# Patient Record
Sex: MALE | Race: WHITE | NOT HISPANIC OR LATINO | Employment: OTHER | URBAN - METROPOLITAN AREA
[De-identification: names, ages, dates, MRNs, and addresses within clinical notes are randomized per-mention and may not be internally consistent; named-entity substitution may affect disease eponyms.]

---

## 2017-04-13 ENCOUNTER — OFFICE VISIT (OUTPATIENT)
Dept: FAMILY MEDICINE | Facility: CLINIC | Age: 67
End: 2017-04-13
Payer: MEDICARE

## 2017-04-13 VITALS
HEART RATE: 72 BPM | TEMPERATURE: 99 F | WEIGHT: 303.56 LBS | DIASTOLIC BLOOD PRESSURE: 78 MMHG | HEIGHT: 73 IN | BODY MASS INDEX: 40.23 KG/M2 | SYSTOLIC BLOOD PRESSURE: 136 MMHG

## 2017-04-13 DIAGNOSIS — Z87.19 HISTORY OF HERNIA REPAIR: ICD-10-CM

## 2017-04-13 DIAGNOSIS — Z98.890 HISTORY OF HERNIA REPAIR: ICD-10-CM

## 2017-04-13 DIAGNOSIS — E66.01 MORBID OBESITY DUE TO EXCESS CALORIES: ICD-10-CM

## 2017-04-13 DIAGNOSIS — R63.5 WEIGHT GAIN: ICD-10-CM

## 2017-04-13 DIAGNOSIS — R10.31 PAIN, ABDOMINAL, RLQ: Primary | ICD-10-CM

## 2017-04-13 PROCEDURE — 99999 PR PBB SHADOW E&M-EST. PATIENT-LVL IV: CPT | Mod: PBBFAC,,, | Performed by: FAMILY MEDICINE

## 2017-04-13 PROCEDURE — 1125F AMNT PAIN NOTED PAIN PRSNT: CPT | Mod: S$GLB,,, | Performed by: FAMILY MEDICINE

## 2017-04-13 PROCEDURE — 1159F MED LIST DOCD IN RCRD: CPT | Mod: S$GLB,,, | Performed by: FAMILY MEDICINE

## 2017-04-13 PROCEDURE — 99213 OFFICE O/P EST LOW 20 MIN: CPT | Mod: S$GLB,,, | Performed by: FAMILY MEDICINE

## 2017-04-13 PROCEDURE — 99499 UNLISTED E&M SERVICE: CPT | Mod: S$GLB,,, | Performed by: FAMILY MEDICINE

## 2017-04-13 PROCEDURE — 1160F RVW MEDS BY RX/DR IN RCRD: CPT | Mod: S$GLB,,, | Performed by: FAMILY MEDICINE

## 2017-04-13 NOTE — PROGRESS NOTES
Patient, Adan Cain (MRN #[id number]), presented with a recorded BMI of 40.05 kg/m^2 consistent with the definition of morbid obesity (ICD-10 E66.01). The patient's morbid obesity was monitored, evaluated, addressed and/or treated. This addendum to the medical record is made on 04/13/2017.

## 2017-04-13 NOTE — MR AVS SNAPSHOT
[facility]  [address] [facility], [address]  [facility] [address]  Phone: [phone]  Fax: [phone]                  Adan Cain   2017 3:00 PM   Office Visit    Description:  Male : 1950   Provider:  Gina Reyes MD   Department:  [facility]           Reason for Visit     Hernia           Diagnoses this Visit        Comments    Pain, abdominal, RLQ    -  Primary     History of hernia repair         Weight gain                To Do List           Goals (5 Years of Data)     None      [facility]     [facility] On Call Nurse Care Line -  Assistance  Unless otherwise directed by your provider, please contact [facility], our nurse care line that is available for  assistance.     Registered nurses in the [facility] On Call Center provide: appointment scheduling, clinical advisement, health education, and other advisory services.  Call: [phone] (toll free)               Medications           Message regarding Medications     Verify the changes and/or additions to your medication regime listed below are the same as discussed with your clinician today.  If any of these changes or additions are incorrect, please notify your healthcare provider.             Verify that the below list of medications is an accurate representation of the medications you are currently taking.  If none reported, the list may be blank. If incorrect, please contact your healthcare provider. Carry this list with you in case of emergency.           Current Medications     BABY ASPIRIN ORAL Take by mouth every evening.     cholecalciferol, vitamin D3, 1,000 unit capsule Take 1,000 Units by mouth once daily.    coenzyme Q10 (CO Q-10) 100 mg capsule Take by mouth once daily.    fluticasone (FLONASE) 50 mcg/actuation nasal spray 1 spray by Each Nare route once daily.    glucosamine-chondroitin 500-400 mg tablet Take 1 tablet by mouth 3 (three) times daily.     "MULTIVITAMIN W-MINERALS/LUTEIN (CENTRUM SILVER ORAL) Take by mouth.    omega-3 fatty acids (FISH OIL) 300 mg Cap Take by mouth.    polyethylene glycol (GLYCOLAX) 17 gram PwPk Take 17 g by mouth once daily.    tamsulosin (FLOMAX) 0.4 mg Cp24 Take 1 capsule (0.4 mg total) by mouth 2 (two) times daily.    UNABLE TO FIND Take by mouth nightly. tumeric    UNABLE TO FIND once daily. actaline    VITAMIN B COMPLEX (SUPER B COMPLEX-B-12 ORAL) Take by mouth.    diclofenac sodium 1 % Gel Apply 2 g topically 4 (four) times daily.    oxycodone-acetaminophen (PERCOCET) 5-325 mg per tablet Take 1 tablet by mouth every 6 (six) hours as needed.    oxycodone-acetaminophen (PERCOCET) 5-325 mg per tablet Take 1 tablet by mouth every 6 (six) hours as needed for Pain.           Clinical Reference Information           Your Vitals Were     BP Pulse Temp Height Weight BMI    136/78 (BP Location: Right arm) 72 98.5 °F (36.9 °C) 6' 1" (1.854 m) 137.7 kg (303 lb 9.2 oz) 40.05 kg/m2      Blood Pressure          Most Recent Value    BP  136/78      Allergies as of 4/13/2017     Avodart [Dutasteride]    Naproxen    Ragweed    Synthroid [Levothyroxine]      Immunizations Administered on Date of Encounter - 4/13/2017     None      Instructions    Focus on weight loss    ================================    Increase FIBER INTAKE - your body is happiest with FIVE FRESH COLORS of fruits and  vegetables DAILY    With respect to FIBER intake, you should have 5-10 grams of viscous soluble fiber daily. This  Includes fruit (bananas, apples, pears, blackberries, citrus, peaches, plums, nectarines), whole grains (oatmeal, oat bran, all-bran cereal, granola, shredded wheat, wheat germ, alvina barley, brown rice), legumes (northern/sharma/navy/lima/kidney/black beans, peas, lentils), seeds (psyllium), and vegetables (carrots, broccoli, brussels sprouts, artichokes).            " ---------------------------------------------------------------------------------------------------------    GET MOVING-- Walking & being active (20 minutes most days of the week). [email or web address]      If not better, let me know & I can refer to surgeon           Language Assistance Services     ATTENTION: Language assistance services are available, free of charge. Please call [phone].      ATENCIÓN: Si benjie aguayo, tiene a mullen disposición servicios gratuitos de asistencia lingüística. Brandy arrieta [phone].     MARISSA Ý: N?u b?n nói Ti?ng Vi?t, có các d?ch v? h? tr? ngôn ng? mi?n phí joanne cho b?n. G?i s? [phone].         [facility] complies with applicable Federal civil rights laws and does not discriminate on the basis of race, color, national origin, age, disability, or sex.

## 2017-04-13 NOTE — PROGRESS NOTES
Subjective:      Patient ID: Adan Cain is a 67 y.o. male.    Chief Complaint: Hernia (RIGHT SIDE, INCISION REGION)    Here today for pain in the right groin region same location as in R hernia & hydrocele repair in NOv 2016.  No difficulty with bowel movements, no constipation, no blood in stool.  Has not lifted or push anything comminuted tearing sensation, no rip in the right lower quadrant.  No difficulty with urination    He has gained 20 pounds since his hernia surgery and states that the belt and his pants rub up against the scar.  This is aggravating type pain    No results found for: HGBA1C  No results found for: MICROALBUR  Lab Results   Component Value Date    LDLCALC 122.4 01/20/2015    LDLCALC 141.4 (H) 05/30/2008    CHOL 172 01/20/2015    HDL 36 (L) 01/20/2015    TRIG 68 01/20/2015       Lab Results   Component Value Date     11/23/2016    K 4.1 11/23/2016     11/23/2016    CO2 26 11/23/2016    GLU 82 11/23/2016    BUN 19 11/23/2016    CREATININE 0.8 11/23/2016    CALCIUM 8.8 11/23/2016    PROT 7.3 03/15/2016    ALBUMIN 3.5 03/15/2016    BILITOT 0.5 03/15/2016    ALKPHOS 101 03/15/2016    AST 19 03/15/2016    ALT 19 03/15/2016    ANIONGAP 9 11/23/2016    ESTGFRAFRICA >60.0 11/23/2016    EGFRNONAA >60.0 11/23/2016    WBC 7.69 01/20/2015    HGB 14.6 01/20/2015    HCT 44.0 01/20/2015    MCV 93 01/20/2015     01/20/2015    TSH 4.857 (H) 01/20/2015         Current Outpatient Prescriptions on File Prior to Visit   Medication Sig    BABY ASPIRIN ORAL Take by mouth every evening.     cholecalciferol, vitamin D3, 1,000 unit capsule Take 1,000 Units by mouth once daily.    coenzyme Q10 (CO Q-10) 100 mg capsule Take by mouth once daily.    fluticasone (FLONASE) 50 mcg/actuation nasal spray 1 spray by Each Nare route once daily. (Patient taking differently: 1 spray by Each Nare route continuous prn. )    glucosamine-chondroitin 500-400 mg tablet Take 1 tablet by mouth 3 (three) times  "daily.    MULTIVITAMIN W-MINERALS/LUTEIN (CENTRUM SILVER ORAL) Take by mouth.    omega-3 fatty acids (FISH OIL) 300 mg Cap Take by mouth.    polyethylene glycol (GLYCOLAX) 17 gram PwPk Take 17 g by mouth once daily.    tamsulosin (FLOMAX) 0.4 mg Cp24 Take 1 capsule (0.4 mg total) by mouth 2 (two) times daily.    UNABLE TO FIND Take by mouth nightly. tumeric    UNABLE TO FIND once daily. actaline    VITAMIN B COMPLEX (SUPER B COMPLEX-B-12 ORAL) Take by mouth.    diclofenac sodium 1 % Gel Apply 2 g topically 4 (four) times daily.    oxycodone-acetaminophen (PERCOCET) 5-325 mg per tablet Take 1 tablet by mouth every 6 (six) hours as needed.    oxycodone-acetaminophen (PERCOCET) 5-325 mg per tablet Take 1 tablet by mouth every 6 (six) hours as needed for Pain.     No current facility-administered medications on file prior to visit.      Past Medical History:   Diagnosis Date    Allergic rhinitis 4/13/2016    BPH (benign prostatic hyperplasia)     Urology Dr Zamora, [facility] prostate revive helps, avodart caused chest pains    Calculus of gallbladder     US 2016    DJD (degenerative joint disease) of hip 1/29/2015    Morbid obesity due to excess calories 4/13/2017    Morbid obesity with BMI of 40.0-44.9, adult 1/29/2015     Past Surgical History:   Procedure Laterality Date    CATARACT EXTRACTION      HERNIA REPAIR       Social History     Social History Narrative     to 'T', 2 children, nonsmoker, ETOH none, never had colonscopy & declines     Family History   Problem Relation Age of Onset    Leukemia Father     Aneurysm Father     Diabetes Mother      Vitals:    04/13/17 1446   BP: 136/78   Pulse: 72   Temp: 98.5 °F (36.9 °C)   Weight: (!) 137.7 kg (303 lb 9.2 oz)   Height: 6' 1" (1.854 m)   PainSc:   6     Objective:   Physical Exam   Constitutional: He is oriented to person, place, and time. He appears well-developed and well-nourished.   HENT:   Head: Normocephalic and atraumatic.   Right Ear: " External ear normal.   Left Ear: External ear normal.   Nose: Nose normal.   Mouth/Throat: Oropharynx is clear and moist.   Eyes: EOM are normal. Pupils are equal, round, and reactive to light.   Neck: Neck supple. No thyromegaly present.   Cardiovascular: Normal rate, regular rhythm, normal heart sounds and intact distal pulses.    No murmur heard.  Pulmonary/Chest: Effort normal and breath sounds normal. He has no wheezes.   Abdominal: Soft. Bowel sounds are normal. He exhibits no distension and no mass. There is no tenderness. There is no rebound and no guarding.   Large pannus, well healed scar R mons pubis, mild tenderness & R testicle mild swelling   Genitourinary: Penis normal.   Genitourinary Comments: No recurrent hernia   Musculoskeletal: He exhibits no edema.   Lymphadenopathy:     He has no cervical adenopathy.   Neurological: He is alert and oriented to person, place, and time.   Skin: Skin is warm and dry.   Psychiatric: He has a normal mood and affect. His behavior is normal.     Assessment:     1. Pain, abdominal, RLQ    2. History of hernia repair    3. Weight gain    4. Morbid obesity due to excess calories      Plan:          Patient Instructions   Focus on weight loss    ================================    Increase FIBER INTAKE - your body is happiest with FIVE FRESH COLORS of fruits and  vegetables DAILY    With respect to FIBER intake, you should have 5-10 grams of viscous soluble fiber daily. This  Includes fruit (bananas, apples, pears, blackberries, citrus, peaches, plums, nectarines), whole grains (oatmeal, oat bran, all-bran cereal, granola, shredded wheat, wheat germ, alvina barley, brown rice), legumes (northern/sharma/navy/lima/kidney/black beans, peas, lentils), seeds (psyllium), and vegetables (carrots, broccoli, brussels sprouts, artichokes).            ---------------------------------------------------------------------------------------------------------    GET MOVING-- Walking &  being active (20 minutes most days of the week). [email or web address]      If not better, let me know & I can refer to surgeon

## 2017-04-13 NOTE — PATIENT INSTRUCTIONS
Focus on weight loss    ================================    Increase FIBER INTAKE - your body is happiest with FIVE FRESH COLORS of fruits and  vegetables DAILY    With respect to FIBER intake, you should have 5-10 grams of viscous soluble fiber daily. This  Includes fruit (bananas, apples, pears, blackberries, citrus, peaches, plums, nectarines), whole grains (oatmeal, oat bran, all-bran cereal, granola, shredded wheat, wheat germ, alvina barley, brown rice), legumes (northern/sharma/navy/lima/kidney/black beans, peas, lentils), seeds (psyllium), and vegetables (carrots, broccoli, brussels sprouts, artichokes).            ---------------------------------------------------------------------------------------------------------    GET MOVING-- Walking & being active (20 minutes most days of the week). [email or web address]      If not better, let me know & I can refer to surgeon

## 2017-05-11 ENCOUNTER — OFFICE VISIT (OUTPATIENT)
Dept: FAMILY MEDICINE | Facility: CLINIC | Age: 67
End: 2017-05-11
Payer: MEDICARE

## 2017-05-11 ENCOUNTER — LAB VISIT (OUTPATIENT)
Dept: LAB | Facility: HOSPITAL | Age: 67
End: 2017-05-11
Attending: FAMILY MEDICINE
Payer: MEDICARE

## 2017-05-11 VITALS
DIASTOLIC BLOOD PRESSURE: 72 MMHG | TEMPERATURE: 99 F | SYSTOLIC BLOOD PRESSURE: 124 MMHG | HEART RATE: 84 BPM | BODY MASS INDEX: 40.32 KG/M2 | HEIGHT: 73 IN | WEIGHT: 304.25 LBS

## 2017-05-11 DIAGNOSIS — E66.01 MORBID OBESITY WITH BMI OF 40.0-44.9, ADULT: ICD-10-CM

## 2017-05-11 DIAGNOSIS — R10.11 RUQ ABDOMINAL PAIN: ICD-10-CM

## 2017-05-11 DIAGNOSIS — R10.11 RUQ ABDOMINAL PAIN: Primary | ICD-10-CM

## 2017-05-11 LAB
ALBUMIN SERPL BCP-MCNC: 3.5 G/DL
ALP SERPL-CCNC: 103 U/L
ALT SERPL W/O P-5'-P-CCNC: 18 U/L
AST SERPL-CCNC: 21 U/L
BILIRUB DIRECT SERPL-MCNC: 0.2 MG/DL
BILIRUB SERPL-MCNC: 0.5 MG/DL
PROT SERPL-MCNC: 7.7 G/DL

## 2017-05-11 PROCEDURE — 1125F AMNT PAIN NOTED PAIN PRSNT: CPT | Mod: S$GLB,,, | Performed by: FAMILY MEDICINE

## 2017-05-11 PROCEDURE — 99499 UNLISTED E&M SERVICE: CPT | Mod: S$PBB,,, | Performed by: FAMILY MEDICINE

## 2017-05-11 PROCEDURE — 36415 COLL VENOUS BLD VENIPUNCTURE: CPT | Mod: PO

## 2017-05-11 PROCEDURE — 99999 PR PBB SHADOW E&M-EST. PATIENT-LVL IV: CPT | Mod: PBBFAC,,, | Performed by: FAMILY MEDICINE

## 2017-05-11 PROCEDURE — 1160F RVW MEDS BY RX/DR IN RCRD: CPT | Mod: S$GLB,,, | Performed by: FAMILY MEDICINE

## 2017-05-11 PROCEDURE — 80076 HEPATIC FUNCTION PANEL: CPT

## 2017-05-11 PROCEDURE — 1159F MED LIST DOCD IN RCRD: CPT | Mod: S$GLB,,, | Performed by: FAMILY MEDICINE

## 2017-05-11 PROCEDURE — 99214 OFFICE O/P EST MOD 30 MIN: CPT | Mod: S$GLB,,, | Performed by: FAMILY MEDICINE

## 2017-05-11 NOTE — MR AVS SNAPSHOT
[facility]  [address] [facility], [address]  [facility] [address]  Phone: [phone]  Fax: [phone]                  Adan Cain   2017 3:20 PM   Office Visit    Description:  Male : 1950   Provider:  Gina Reyes MD   Department:  [facility]           Reason for Visit     Flank Pain           Diagnoses this Visit        Comments    RUQ abdominal pain    -  Primary     Morbid obesity with BMI of 40.0-44.9, adult                To Do List           Future Appointments        Provider Department Dept Phone    2017 3:20 PM Jamal Zamora MD [place] - Urology [phone]      Goals (5 Years of Data)     None      [facility]     [facility] On Call Nurse Care Line -  Assistance  Unless otherwise directed by your provider, please contact [facility], our nurse care line that is available for  assistance.     Registered nurses in the [facility] On Call Center provide: appointment scheduling, clinical advisement, health education, and other advisory services.  Call: [phone] (toll free)               Medications           Message regarding Medications     Verify the changes and/or additions to your medication regime listed below are the same as discussed with your clinician today.  If any of these changes or additions are incorrect, please notify your healthcare provider.             Verify that the below list of medications is an accurate representation of the medications you are currently taking.  If none reported, the list may be blank. If incorrect, please contact your healthcare provider. Carry this list with you in case of emergency.           Current Medications     BABY ASPIRIN ORAL Take by mouth every evening.     cholecalciferol, vitamin D3, 1,000 unit capsule Take 1,000 Units by mouth once daily.    glucosamine-chondroitin 500-400 mg tablet Take 1 tablet by mouth 3 (three) times daily.    MULTIVITAMIN W-MINERALS/LUTEIN  "(CENTRUM SILVER ORAL) Take by mouth.    omega-3 fatty acids (FISH OIL) 300 mg Cap Take by mouth.    UNABLE TO FIND Take by mouth nightly. tumeric    VITAMIN B COMPLEX (SUPER B COMPLEX-B-12 ORAL) Take by mouth.    coenzyme Q10 (CO Q-10) 100 mg capsule Take by mouth once daily.    diclofenac sodium 1 % Gel Apply 2 g topically 4 (four) times daily.    fluticasone (FLONASE) 50 mcg/actuation nasal spray 1 spray by Each Nare route once daily.    oxycodone-acetaminophen (PERCOCET) 5-325 mg per tablet Take 1 tablet by mouth every 6 (six) hours as needed.    oxycodone-acetaminophen (PERCOCET) 5-325 mg per tablet Take 1 tablet by mouth every 6 (six) hours as needed for Pain.    polyethylene glycol (GLYCOLAX) 17 gram PwPk Take 17 g by mouth once daily.    tamsulosin (FLOMAX) 0.4 mg Cp24 Take 1 capsule (0.4 mg total) by mouth 2 (two) times daily.    UNABLE TO FIND once daily. actaline           Clinical Reference Information           Your Vitals Were     BP Pulse Temp Height Weight BMI    124/72 (BP Location: Left arm) 84 98.8 °F (37.1 °C) 6' 1" (1.854 m) 138 kg (304 lb 3.8 oz) 40.14 kg/m2      Blood Pressure          Most Recent Value    BP  124/72      Allergies as of 5/11/2017     Avodart [Dutasteride]    Naproxen    Ragweed    Synthroid [Levothyroxine]      Immunizations Administered on Date of Encounter - 5/11/2017     None      Orders Placed During Today's Visit     Future Labs/Procedures Expected by Expires    Hepatic function panel  5/11/2017 7/10/2018    US Abdomen Limited  5/11/2017 5/11/2018      Language Assistance Services     ATTENTION: Language assistance services are available, free of charge. Please call [phone].      ATENCIÓN: Si benjie aguayo, tiene a mullen disposición servicios gratuitos de asistencia lingüística. Llame al [phone].     CHÚ Ý: N?u b?n nói Ti?ng Vi?t, có các d?ch v? h? tr? ngôn ng? mi?n phí dành cho b?n. G?i s? [phone].         [facility] complies with " applicable Federal civil rights laws and does not discriminate on the basis of race, color, national origin, age, disability, or sex.

## 2017-05-11 NOTE — PROGRESS NOTES
Subjective:      Patient ID: Adan Cain is a 67 y.o. male.    Chief Complaint: Flank Pain (RIGHT SIDE)    Here today for medical pain, on and off.  He was told that he had gallstones before.  Last night he did eat out, but ate broiled fish, nothing fried, nothing heavy sauce, no dessert.  last night it pain in the right upper quadrant to his right side denies any nausea vomiting.  no diarrhea    Current Outpatient Prescriptions on File Prior to Visit   Medication Sig    BABY ASPIRIN ORAL Take by mouth every evening.     cholecalciferol, vitamin D3, 1,000 unit capsule Take 1,000 Units by mouth once daily.    glucosamine-chondroitin 500-400 mg tablet Take 1 tablet by mouth 3 (three) times daily.    MULTIVITAMIN W-MINERALS/LUTEIN (CENTRUM SILVER ORAL) Take by mouth.    omega-3 fatty acids (FISH OIL) 300 mg Cap Take by mouth.    UNABLE TO FIND Take by mouth nightly. tumeric    VITAMIN B COMPLEX (SUPER B COMPLEX-B-12 ORAL) Take by mouth.    coenzyme Q10 (CO Q-10) 100 mg capsule Take by mouth once daily.    diclofenac sodium 1 % Gel Apply 2 g topically 4 (four) times daily.    fluticasone (FLONASE) 50 mcg/actuation nasal spray 1 spray by Each Nare route once daily. (Patient taking differently: 1 spray by Each Nare route continuous prn. )     No current facility-administered medications on file prior to visit.      Past Medical History:   Diagnosis Date    Allergic rhinitis 4/13/2016    BPH (benign prostatic hyperplasia)     Urology Dr Zamora, [facility] prostate revive helps, avodart caused chest pains    Calculus of gallbladder     US 2016    DJD (degenerative joint disease) of hip 1/29/2015    Morbid obesity due to excess calories 4/13/2017    Morbid obesity with BMI of 40.0-44.9, adult 1/29/2015     Past Surgical History:   Procedure Laterality Date    CATARACT EXTRACTION      HERNIA REPAIR       Social History     Social History Narrative     to 'T', 2 children, nonsmoker, ETOH none, never had  "colonscopy & declines     Family History   Problem Relation Age of Onset    Leukemia Father     Aneurysm Father     Diabetes Mother      Vitals:    05/11/17 1511   BP: 124/72   Pulse: 84   Temp: 98.8 °F (37.1 °C)   Weight: (!) 138 kg (304 lb 3.8 oz)   Height: 6' 1" (1.854 m)   PainSc:   6     Objective:   Physical Exam   Cardiovascular: Normal rate and regular rhythm.    Abdominal: Soft. Bowel sounds are normal. He exhibits no distension and no mass. There is tenderness. There is no rebound and no guarding.   Large pannus     Assessment:     1. RUQ abdominal pain    2. Morbid obesity with BMI of 40.0-44.9, adult      Plan:     Orders Placed This Encounter    US Abdomen Limited    Hepatic function panel     Avoid fatty foods    I will notify pt of results    There are no Patient Instructions on file for this visit.                            "

## 2017-05-23 ENCOUNTER — HOSPITAL ENCOUNTER (OUTPATIENT)
Dept: RADIOLOGY | Facility: HOSPITAL | Age: 67
Discharge: HOME | End: 2017-05-23
Attending: FAMILY MEDICINE
Payer: MEDICARE

## 2017-05-23 ENCOUNTER — PATIENT MESSAGE (OUTPATIENT)
Dept: FAMILY MEDICINE | Facility: CLINIC | Age: 67
End: 2017-05-23

## 2017-05-23 DIAGNOSIS — R10.11 RUQ ABDOMINAL PAIN: ICD-10-CM

## 2017-05-23 DIAGNOSIS — K80.20 CALCULUS OF GALLBLADDER WITHOUT CHOLECYSTITIS WITHOUT OBSTRUCTION: Primary | ICD-10-CM

## 2017-05-23 PROCEDURE — 76705 ECHO EXAM OF ABDOMEN: CPT | Mod: TC

## 2017-05-23 PROCEDURE — 76705 ECHO EXAM OF ABDOMEN: CPT | Mod: 26,,, | Performed by: RADIOLOGY

## 2017-05-23 NOTE — TELEPHONE ENCOUNTER
Please let pt know that US shows gallstone, but not currently blocking the gallbladder    Referral in for general surgeon for gallstones

## 2017-05-23 NOTE — PROGRESS NOTES
Subjective:      Patient ID: Adan Cain is a 67 y.o. male.    Chief Complaint: Consult  66yo patient presents for evaluation of gallstones found on recent imaging.  He states he was seen approximately 20 years ago at [facility] for similar complaint.  Surgery was recommended at that time but the patient declined a follow-up.  Since that time he denies any symptoms until last week.  At that time he experienced right upper quadrant pain that radiates to the right flank.  He stated that the discomfort lasted approximately 10 minutes and described it as moderate to severe.  It then resolved.  He did not experience any nausea or vomiting.  No fevers or chills.  No diarrhea or constipation.  His wife is present and states that he intermittently complains of brief right upper quadrant pain which the patient dismisses as minor discomfort.  He recently underwent hernia and hydrocele surgery and states he is still having intermittent discomfort in the right groin.  He is not anxious to undergo surgery at this time.  He states that the gallstones do not bother him.  No other c/o.    Past Medical History:   Diagnosis Date    Allergic rhinitis 4/13/2016    BPH (benign prostatic hyperplasia)     Urology Dr Zamora, [facility] prostate revive helps, avodart caused chest pains    Calculus of gallbladder     US 2016    DJD (degenerative joint disease) of hip 1/29/2015    Morbid obesity due to excess calories 4/13/2017    Morbid obesity with BMI of 40.0-44.9, adult 1/29/2015     Past Surgical History:   Procedure Laterality Date    CATARACT EXTRACTION      HERNIA REPAIR       Family History   Problem Relation Age of Onset    Leukemia Father     Aneurysm Father     Diabetes Mother      Social History     Social History    Marital status:      Spouse name: N/A    Number of children: N/A    Years of education: N/A     Social History Main Topics    Smoking status: Former Smoker     Types: Cigarettes     "Smokeless tobacco: Never Used    Alcohol use Yes    Drug use: No    Sexual activity: Yes     Partners: Female     Other Topics Concern    None     Social History Narrative     to 'T', 2 children, nonsmoker, ETOH none, never had colonscopy & declines       Current Outpatient Prescriptions   Medication Sig Dispense Refill    BABY ASPIRIN ORAL Take by mouth every evening.       cholecalciferol, vitamin D3, 1,000 unit capsule Take 1,000 Units by mouth once daily.      coenzyme Q10 (CO Q-10) 100 mg capsule Take by mouth once daily.      fluticasone (FLONASE) 50 mcg/actuation nasal spray 1 spray by Each Nare route once daily. (Patient taking differently: 1 spray by Each Nare route continuous prn. ) 16 g 12    glucosamine-chondroitin 500-400 mg tablet Take 1 tablet by mouth 3 (three) times daily.      MULTIVITAMIN W-MINERALS/LUTEIN (CENTRUM SILVER ORAL) Take by mouth.      omega-3 fatty acids (FISH OIL) 300 mg Cap Take by mouth.      polyethylene glycol (GLYCOLAX) 17 gram PwPk Take 17 g by mouth once daily. 30 packet 0    UNABLE TO FIND Take by mouth nightly. tumeric      UNABLE TO FIND once daily. actaline      VITAMIN B COMPLEX (SUPER B COMPLEX-B-12 ORAL) Take by mouth.      diclofenac sodium 1 % Gel Apply 2 g topically 4 (four) times daily. 400 g 0     No current facility-administered medications for this visit.      Review of patient's allergies indicates:   Allergen Reactions    Avodart [dutasteride]     Naproxen     Ragweed     Synthroid [levothyroxine] Rash       ROS:  All systems were reviewed and are negative, except that mentioned in the HPI.    Objective:     Vitals:    05/25/17 0913   BP: 138/84   Pulse: 75   Weight: (!) 137.8 kg (303 lb 12.7 oz)   Height: 6' 1" (1.854 m)     Physical Exam   Constitutional: He is oriented to person, place, and time. He appears well-developed and well-nourished. No distress.   HENT:   Head: Normocephalic and atraumatic.   Eyes: Conjunctivae and EOM are " normal. Pupils are equal, round, and reactive to light. No scleral icterus.   Neck: Normal range of motion. Neck supple. No JVD present.   Cardiovascular: Normal rate and regular rhythm.    Pulmonary/Chest: Effort normal and breath sounds normal. No respiratory distress.   Abdominal: Soft. Bowel sounds are normal. He exhibits no distension.   Obese. Well healed R groin incision   Musculoskeletal: Normal range of motion. He exhibits no edema or tenderness.   Neurological: He is alert and oriented to person, place, and time. No cranial nerve deficit.   Skin: Skin is warm and dry. He is not diaphoretic.   Psychiatric: He has a normal mood and affect.       Lab Review: CBC:   Lab Results   Component Value Date    WBC 7.69 01/20/2015    RBC 4.75 01/20/2015    HGB 14.6 01/20/2015    HCT 44.0 01/20/2015     01/20/2015     BMP:   Lab Results   Component Value Date    GLU 82 11/23/2016     11/23/2016    K 4.1 11/23/2016     11/23/2016    CO2 26 11/23/2016    BUN 19 11/23/2016    CREATININE 0.8 11/23/2016    CALCIUM 8.8 11/23/2016     Lab Results   Component Value Date    ALT 18 05/11/2017    AST 21 05/11/2017    ALKPHOS 103 05/11/2017    BILITOT 0.5 05/11/2017       Diagnostics Review:  U/S 5/23/17 images and reports were personally reviewed.  The report states:  The liver measures 16.8 cm and demonstrates 2 simple cysts in its left lobe.  The largest cyst measures 1.8 cm.  The liver is otherwise unremarkable.  There is no intra or extrahepatic bile duct dilatation.  The common duct measures 4 mm.  The gallbladder demonstrates multiple mobile stones.  The largest stone measures 8 mm.  The gallbladder is otherwise unremarkable.  There is no sonographic Goldberg's sign.  The visualized portions of the pancreas are unremarkable.    The spleen measures 9.1 cm and is unremarkable.  There is no free fluid within the visualized abdomen.    Assessment:     1. Calculus of gallbladder without cholecystitis without  obstruction    2. Liver cyst    3. Severe obesity (BMI >= 40)    4. Aspirin long-term use      Plan:   The pathology of the biliary colic was discussed. Written handouts were explained and given to the patient.  The patient is not interested in surgical intervention at this time b/c her believes his sx are minor and infrequent. Signs and symptoms of worsening disease was discussed.  The patient will call or go to ER should those symptoms develop.  He will monitor the frequency and severity of his discomfort.  He prefers a 3-month call back.  All of his questions and his wife's questions were answered.

## 2017-05-25 ENCOUNTER — OFFICE VISIT (OUTPATIENT)
Dept: SURGERY | Facility: CLINIC | Age: 67
End: 2017-05-25
Payer: MEDICARE

## 2017-05-25 VITALS
DIASTOLIC BLOOD PRESSURE: 84 MMHG | SYSTOLIC BLOOD PRESSURE: 138 MMHG | BODY MASS INDEX: 40.27 KG/M2 | WEIGHT: 303.81 LBS | HEART RATE: 75 BPM | HEIGHT: 73 IN

## 2017-05-25 DIAGNOSIS — K80.20 CALCULUS OF GALLBLADDER WITHOUT CHOLECYSTITIS WITHOUT OBSTRUCTION: Primary | ICD-10-CM

## 2017-05-25 DIAGNOSIS — E66.01 SEVERE OBESITY (BMI >= 40): ICD-10-CM

## 2017-05-25 DIAGNOSIS — K76.89 LIVER CYST: ICD-10-CM

## 2017-05-25 DIAGNOSIS — Z79.82 ASPIRIN LONG-TERM USE: ICD-10-CM

## 2017-05-25 PROCEDURE — 99214 OFFICE O/P EST MOD 30 MIN: CPT | Mod: S$GLB,,, | Performed by: SURGERY

## 2017-05-25 PROCEDURE — 99999 PR PBB SHADOW E&M-EST. PATIENT-LVL III: CPT | Mod: PBBFAC,,, | Performed by: SURGERY

## 2017-05-25 PROCEDURE — 1125F AMNT PAIN NOTED PAIN PRSNT: CPT | Mod: S$GLB,,, | Performed by: SURGERY

## 2017-05-25 PROCEDURE — 1159F MED LIST DOCD IN RCRD: CPT | Mod: S$GLB,,, | Performed by: SURGERY

## 2017-05-25 PROCEDURE — 99499 UNLISTED E&M SERVICE: CPT | Mod: S$PBB,,, | Performed by: SURGERY

## 2017-05-25 NOTE — LETTER
May 28, 2017      Gina Reyes MD  [address]  [address]  [facility] [address]           [facility] General Surgery  [address] [facility] [facility] [address]  Phone: [phone]          Patient: Adan Cain   MR Number: [id number]   YOB: 1950   Date of Visit: 5/25/2017       Dear Dr. Gina Reyes:    Thank you for referring Adan Cain to me for evaluation. Attached you will find relevant portions of my assessment and plan of care.    If you have questions, please do not hesitate to call me. I look forward to following Adan Cain along with you.    Sincerely,        Enclosure  CC:  No Recipients    If you would like to receive this communication electronically, please contact [email or web address] or [phone] to request more information on [employer] access.    For providers and/or their staff who would like to refer a patient to [facility], please contact us through our one-stop-shop provider referral line, Eris Engle, at [phone].    If you feel you have received this communication in error or would no longer like to receive these types of communications, please e-mail [email or web address]

## 2017-06-12 ENCOUNTER — LAB VISIT (OUTPATIENT)
Dept: LAB | Facility: HOSPITAL | Age: 67
End: 2017-06-12
Attending: UROLOGY
Payer: MEDICARE

## 2017-06-12 ENCOUNTER — OFFICE VISIT (OUTPATIENT)
Dept: UROLOGY | Facility: CLINIC | Age: 67
End: 2017-06-12
Payer: MEDICARE

## 2017-06-12 VITALS
SYSTOLIC BLOOD PRESSURE: 157 MMHG | BODY MASS INDEX: 40.84 KG/M2 | WEIGHT: 308.19 LBS | HEIGHT: 73 IN | HEART RATE: 73 BPM | DIASTOLIC BLOOD PRESSURE: 83 MMHG

## 2017-06-12 DIAGNOSIS — N13.8 BPH (BENIGN PROSTATIC HYPERTROPHY) WITH URINARY OBSTRUCTION: Primary | ICD-10-CM

## 2017-06-12 DIAGNOSIS — N40.1 BPH (BENIGN PROSTATIC HYPERTROPHY) WITH URINARY OBSTRUCTION: Primary | ICD-10-CM

## 2017-06-12 DIAGNOSIS — N13.8 BPH (BENIGN PROSTATIC HYPERTROPHY) WITH URINARY OBSTRUCTION: ICD-10-CM

## 2017-06-12 DIAGNOSIS — N40.1 BPH (BENIGN PROSTATIC HYPERTROPHY) WITH URINARY OBSTRUCTION: ICD-10-CM

## 2017-06-12 PROCEDURE — 99214 OFFICE O/P EST MOD 30 MIN: CPT | Mod: S$GLB,,, | Performed by: UROLOGY

## 2017-06-12 PROCEDURE — 1159F MED LIST DOCD IN RCRD: CPT | Mod: S$GLB,,, | Performed by: UROLOGY

## 2017-06-12 PROCEDURE — 36415 COLL VENOUS BLD VENIPUNCTURE: CPT | Mod: PO

## 2017-06-12 PROCEDURE — 1125F AMNT PAIN NOTED PAIN PRSNT: CPT | Mod: S$GLB,,, | Performed by: UROLOGY

## 2017-06-12 PROCEDURE — 99999 PR PBB SHADOW E&M-EST. PATIENT-LVL III: CPT | Mod: PBBFAC,,, | Performed by: UROLOGY

## 2017-06-12 PROCEDURE — 84153 ASSAY OF PSA TOTAL: CPT

## 2017-06-12 NOTE — PROGRESS NOTES
CC: right groin pain    Adan Cain is a 67 y.o. man who is here for the evaluation of right groin pain.  c/o right groin pain since he had right inguinal hernia and right hydrocelectomy on 11/30/16.  It is more sensitive when he bends or moves around.  The waist belt goes over the area and it also causes discomfort.  No voiding problems noted.  He had right hydrocelectomy thru the scrotal incision, followed by inguinal incision for hernia repair.  He also had a right orchiopexy during right inguinal hernia repair, because his small testicle did not stay in the dependent position of the scrotum.    No voiding problems reported.  Denies flank pain, dysuira, hematuria .      Past Medical History:   Diagnosis Date    Allergic rhinitis 4/13/2016    BPH (benign prostatic hyperplasia)     Urology Dr Zamora, [facility] prostate revive helps, avodart caused chest pains    Calculus of gallbladder     US 2016    DJD (degenerative joint disease) of hip 1/29/2015    Morbid obesity due to excess calories 4/13/2017    Morbid obesity with BMI of 40.0-44.9, adult 1/29/2015     Past Surgical History:   Procedure Laterality Date    CATARACT EXTRACTION      HERNIA REPAIR       Social History   Substance Use Topics    Smoking status: Former Smoker     Types: Cigarettes    Smokeless tobacco: Never Used    Alcohol use Yes     Family History   Problem Relation Age of Onset    Leukemia Father     Aneurysm Father     Diabetes Mother      Allergy:  Review of patient's allergies indicates:   Allergen Reactions    Avodart [dutasteride]     Naproxen     Ragweed     Synthroid [levothyroxine] Rash     Outpatient Encounter Prescriptions as of 6/12/2017   Medication Sig Dispense Refill    BABY ASPIRIN ORAL Take by mouth every evening.       cholecalciferol, vitamin D3, 1,000 unit capsule Take 1,000 Units by mouth once daily.      coenzyme Q10 (CO Q-10) 100 mg capsule Take by mouth once daily.      fluticasone (FLONASE) 50  mcg/actuation nasal spray 1 spray by Each Nare route once daily. (Patient taking differently: 1 spray by Each Nare route continuous prn. ) 16 g 12    glucosamine-chondroitin 500-400 mg tablet Take 1 tablet by mouth 3 (three) times daily.      MULTIVITAMIN W-MINERALS/LUTEIN (CENTRUM SILVER ORAL) Take by mouth.      omega-3 fatty acids (FISH OIL) 300 mg Cap Take by mouth.      polyethylene glycol (GLYCOLAX) 17 gram PwPk Take 17 g by mouth once daily. 30 packet 0    UNABLE TO FIND Take by mouth nightly. tumeric      UNABLE TO FIND once daily. actaline      VITAMIN B COMPLEX (SUPER B COMPLEX-B-12 ORAL) Take by mouth.      diclofenac sodium 1 % Gel Apply 2 g topically 4 (four) times daily. 400 g 0     No facility-administered encounter medications on file as of 6/12/2017.      Review of Systems   General ROS: GENERAL:  No weight gain or loss  SKIN:  No rashes or lacerations  HEAD:  No headaches  EYES:  No exophthalmos, jaundice or blindness  EARS:  No dizziness, tinnitus or hearing loss  NOSE:  No changes in smell  MOUTH & THROAT:  No dyskinetic movements or obvious goiter  CHEST:  No shortness of breath, hyperventilation or cough  CARDIOVASCULAR:  No tachycardia or chest pain  ABDOMEN:  No nausea, vomiting, pain, constipation or diarrhea  URINARY:  No frequency, dysuria or sexual dysfunction  ENDOCRINE:  No polydipsia, polyuria  MUSCULOSKELETAL:  No pain or stiffness of the joints  NEUROLOGIC:  No weakness, sensory changes, seizures, confusion, memory loss, tremor or other abnormal movements  Physical Exam     Vitals:    06/12/17 1456   BP: (!) 157/83   Pulse: 73     Physical Exam   Constitutional: He is oriented to person, place, and time. He appears well-developed and well-nourished. No distress.   HENT:   Head: Normocephalic and atraumatic.   Right Ear: External ear normal.   Left Ear: External ear normal.   Nose: Nose normal.   Mouth/Throat: Oropharynx is clear and moist.   Eyes: Conjunctivae are normal.  Pupils are equal, round, and reactive to light.   Neck: Normal range of motion. Neck supple. No JVD present. No tracheal deviation present. No thyromegaly present.   Cardiovascular: Normal rate, regular rhythm, normal heart sounds and intact distal pulses.  Exam reveals no gallop and no friction rub.    No murmur heard.  Pulmonary/Chest: Effort normal and breath sounds normal. No respiratory distress. He has no wheezes. He exhibits no tenderness.   Abdominal: Soft. Bowel sounds are normal. He exhibits no distension and no mass. There is no tenderness. There is no rebound and no guarding.   Genitourinary: Rectum normal and penis normal. No penile tenderness.   Genitourinary Comments: Prostate 30 grams with negative nodule or negative tenderness     Musculoskeletal: Normal range of motion. He exhibits no edema, tenderness or deformity.   Lymphadenopathy:     He has no cervical adenopathy.   Neurological: He is alert and oriented to person, place, and time.   Skin: Skin is warm and dry. He is not diaphoretic.     Psychiatric: He has a normal mood and affect. His behavior is normal. Thought content normal.     Genitalia:  Scrotum: no rash or lesion  Normal symmetric epididymis without masses  Normal vas palpated  Normal size, symmetric testicles with no masses   Normal urethral meatus with no discharge  Normal circumcised penis with no lesion   Rectal:  Normal perineum and anus upon inspection.  Normal tone, no masses or tenderness;     LABS:  Lab Results   Component Value Date    PSA 2.7 01/20/2015    PSA 1.5 05/30/2008    PSA 2.1 05/11/2005    PSADIAG 2.7 08/18/2014     Results for orders placed or performed in visit on 08/18/14   Prostate Specific Antigen, Diagnostic   Result Value Ref Range    PSA DIAGNOSTIC 2.7 0.00 - 4.00 ng/mL     Lab Results   Component Value Date    CREATININE 0.8 11/23/2016    CREATININE 0.8 03/15/2016    CREATININE 0.8 01/20/2015     No results found for this or any previous visit.  Urine  Culture, Routine   Date Value Ref Range Status   12/04/2016 No growth  Final     Assessment and Plan:  Adan was seen today for follow-up.    Diagnoses and all orders for this visit:    BPH (benign prostatic hypertrophy) with urinary obstruction  -     Prostate Specific Antigen, Diagnostic; Future  -     Prostate Specific Antigen, Diagnostic; Future    no recurrent hernia noted.  Recommend to see Dr. Hoover, his general surgeon regarding his right groin pain.  OK to use NSAIDS.  PSA today.  If stable, no more PSA is needed.    I spent 25 minutes with the patient of which more than half was spent in direct consultation with the patient in regards to our treatment and plan.      Follow-up:  Return if symptoms worsen or fail to improve.

## 2017-06-13 LAB — COMPLEXED PSA SERPL-MCNC: 2.8 NG/ML

## 2017-06-23 ENCOUNTER — OFFICE VISIT (OUTPATIENT)
Dept: SURGERY | Facility: CLINIC | Age: 67
End: 2017-06-23
Payer: MEDICARE

## 2017-06-23 VITALS — HEIGHT: 73 IN | BODY MASS INDEX: 40.29 KG/M2 | WEIGHT: 304 LBS

## 2017-06-23 DIAGNOSIS — R10.31 RIGHT GROIN PAIN: Primary | ICD-10-CM

## 2017-06-23 PROCEDURE — 1126F AMNT PAIN NOTED NONE PRSNT: CPT | Mod: S$GLB,,, | Performed by: SURGERY

## 2017-06-23 PROCEDURE — 99212 OFFICE O/P EST SF 10 MIN: CPT | Mod: S$GLB,,, | Performed by: SURGERY

## 2017-06-23 PROCEDURE — 99999 PR PBB SHADOW E&M-EST. PATIENT-LVL II: CPT | Mod: PBBFAC,,, | Performed by: SURGERY

## 2017-06-23 PROCEDURE — 1159F MED LIST DOCD IN RCRD: CPT | Mod: S$GLB,,, | Performed by: SURGERY

## 2017-06-26 NOTE — PROGRESS NOTES
Patient is 6 months s/p BIH with mesh    Recently some right groin pain    PE:  No evidence of recurrence    Normal testicles/scrotum      Advised that no recurrence exists,   Will treat with NSAID    F/u prn

## 2017-07-20 ENCOUNTER — OFFICE VISIT (OUTPATIENT)
Dept: FAMILY MEDICINE | Facility: CLINIC | Age: 67
End: 2017-07-20
Payer: MEDICARE

## 2017-07-20 ENCOUNTER — TELEPHONE (OUTPATIENT)
Dept: FAMILY MEDICINE | Facility: CLINIC | Age: 67
End: 2017-07-20

## 2017-07-20 VITALS
HEART RATE: 104 BPM | BODY MASS INDEX: 40.99 KG/M2 | SYSTOLIC BLOOD PRESSURE: 130 MMHG | TEMPERATURE: 99 F | DIASTOLIC BLOOD PRESSURE: 63 MMHG | WEIGHT: 309.31 LBS | HEIGHT: 73 IN

## 2017-07-20 DIAGNOSIS — Z00.00 ROUTINE GENERAL MEDICAL EXAMINATION AT A HEALTH CARE FACILITY: Primary | ICD-10-CM

## 2017-07-20 DIAGNOSIS — E66.01 MORBID OBESITY WITH BMI OF 40.0-44.9, ADULT: ICD-10-CM

## 2017-07-20 DIAGNOSIS — E86.0 DEHYDRATION: ICD-10-CM

## 2017-07-20 DIAGNOSIS — J30.2 ACUTE SEASONAL ALLERGIC RHINITIS, UNSPECIFIED TRIGGER: Primary | ICD-10-CM

## 2017-07-20 DIAGNOSIS — R53.83 FATIGUE, UNSPECIFIED TYPE: ICD-10-CM

## 2017-07-20 PROCEDURE — 99214 OFFICE O/P EST MOD 30 MIN: CPT | Mod: S$GLB,,, | Performed by: FAMILY MEDICINE

## 2017-07-20 PROCEDURE — 1125F AMNT PAIN NOTED PAIN PRSNT: CPT | Mod: S$GLB,,, | Performed by: FAMILY MEDICINE

## 2017-07-20 PROCEDURE — 99999 PR PBB SHADOW E&M-EST. PATIENT-LVL III: CPT | Mod: PBBFAC,,, | Performed by: FAMILY MEDICINE

## 2017-07-20 PROCEDURE — 99499 UNLISTED E&M SERVICE: CPT | Mod: S$GLB,,, | Performed by: FAMILY MEDICINE

## 2017-07-20 PROCEDURE — 1159F MED LIST DOCD IN RCRD: CPT | Mod: S$GLB,,, | Performed by: FAMILY MEDICINE

## 2017-07-20 NOTE — PATIENT INSTRUCTIONS
"Due Tdap, pneumonia & zoster vaccines    Due Hep C testing    Due colonoscopy    Follow up for Physical with fasting labs prior with Hep C    --------------------------  WATER BALANCE-  Your body systems work best with 64 ounces DAILY (HALF A GALLON DAILY)  - to flush out impurities & cleanse our organs. For every 8 ounces of caffeine you drink, ADD ANOTHER CUP of water to your daily water needs. (2 cups of coffee and one diet coke = you need 11 glasses of water a day). We were not made to drink sugary drinks  - not even artificial sweeteners. You'll notice a difference in your brain function, energy level & overall wellness. Your liver & kidney will thank you too for keeping them properly cleansed  ---------------------------    Email me if you're worse next week           An UPPER RESPIRATORY ILLNESS is initially caused by a virus or allergies 95% of the time. The goal to help you feel better is drying up the drainage & stopping the cough so the virus can run it's course in about 10 days. If your drainage becomes more thick and worse after 7-10 days of trying the below  over the counter medications, please make an appointment for further evaluation    If you have post nasal drip , "runny nose" or sore throat from clearing your throat :  Take an ANTIHISTAMINE  (Claritin or Zyrtec or Allegra ) AT NIGHT    Nasal Saline: Tilt head back and squirt into nostril 2-3 times until you taste saline in back of throat. Spit, and blow nose. Do this 4 times, alternating right and left nostril.   Do this routine 2-3 times per day.     Nasacort Nasal spray (steroid spray over the counter) or Flonase (fluticasone prescription)  Twice daily to decrease swelling & post nasal drip ------ very safe , works where the congestion is , & does not interfere with other medication or go through your blood stream    If you still have drainage or ear popping/ pressure/ decreased hearing, and you do NOT have high blood pressure:  You can add a " "DECONGESTANT like Sudafed (if it doesn't cause palpitations) or Sudafed PE  In the MORNING.     If you have thick mucus drainage from the nose or coughing up thick phlegm:  You can add a MUCOLYTIC like Mucinex (plain)    If your cough persist:  You can add a COUGH SUPPRESSANT like Delsym (dextromethorphan) twice a day    If you have pain, sore throat, fever or chills:  You can alternate 250 mg of  Tylenol with 200mg of   ibuprofen every 3 hours - for the next 3 days  Discontinue and call me if  you have stomach upset    For SORE THROAT , try: Gargle with warm salt water 2-3 times per day.     Drink lots of WATER until your urine is clear because all of the above medications can "dry you out" and cause constipation.     Come & see me  if you are not better in 7-10 days or if your symptoms worsen.    "

## 2017-07-20 NOTE — TELEPHONE ENCOUNTER
----- Message from Kathleen Nickerson sent at 7/20/2017  3:01 PM CDT -----  Doctor appointment and lab have been scheduled.  Please link lab orders to the lab appointment.  Date of doctor appointment:  11/21  Physical or EP:  epp  Date of lab appointment:  11/14  Comments:

## 2017-07-20 NOTE — PROGRESS NOTES
Subjective:      Patient ID: Adan Cain is a 67 y.o. male.    Chief Complaint: Nasal Congestion (bloody mucus); Eye Problem (right, sinus); and Rash (bilateral arms)    Here today for  nasal discharge thick secretions yesterday morning. Some blood. No mucus during the day. He has some pressure above his R eye, worse with bending forward.Sunday he mild cough but this resolved, this week he has pain above the right eye with bending over.  He is taking Claritin and Flonase once daily for allergies in the past 3 days.  Denies any fever chills,    He does not drink water. He consumes 3 cups of coffee & green tea daily.     Current Outpatient Prescriptions on File Prior to Visit   Medication Sig    BABY ASPIRIN ORAL Take by mouth every evening.     cholecalciferol, vitamin D3, 1,000 unit capsule Take 1,000 Units by mouth once daily.    coenzyme Q10 (CO Q-10) 100 mg capsule Take by mouth once daily.    fluticasone (FLONASE) 50 mcg/actuation nasal spray 1 spray by Each Nare route once daily. (Patient taking differently: 1 spray by Each Nare route continuous prn. )    glucosamine-chondroitin 500-400 mg tablet Take 1 tablet by mouth 3 (three) times daily.    MULTIVITAMIN W-MINERALS/LUTEIN (CENTRUM SILVER ORAL) Take by mouth.    omega-3 fatty acids (FISH OIL) 300 mg Cap Take by mouth.    UNABLE TO FIND Take by mouth nightly. tumeric    UNABLE TO FIND once daily. actaline    UNABLE TO FIND Use as directed 1 tablet in the mouth or throat 2 (two) times daily. Prostate Revive manufactured by [employer]    VITAMIN B COMPLEX (SUPER B COMPLEX-B-12 ORAL) Take by mouth.    diclofenac sodium 1 % Gel Apply 2 g topically 4 (four) times daily.     No current facility-administered medications on file prior to visit.      Past Medical History:   Diagnosis Date    Allergic rhinitis 4/13/2016    BPH (benign prostatic hyperplasia)     Urology Dr Zamora, OTC prostate revive helps, avodart caused chest pains    Calculus of  "gallbladder     US 2016    DJD (degenerative joint disease) of hip 1/29/2015    Morbid obesity due to excess calories 4/13/2017    Morbid obesity with BMI of 40.0-44.9, adult 1/29/2015     Past Surgical History:   Procedure Laterality Date    CATARACT EXTRACTION      HERNIA REPAIR       Social History     Social History Narrative     to 'T', 2 children, nonsmoker, ETOH none, never had colonscopy & declines     Family History   Problem Relation Age of Onset    Leukemia Father     Aneurysm Father     Diabetes Mother      Vitals:    07/20/17 1430   BP: 130/63   Pulse: 104   Temp: 98.7 °F (37.1 °C)   Weight: (!) 140.3 kg (309 lb 4.9 oz)   Height: 6' 1" (1.854 m)   PainSc:   1     Objective:   Physical Exam   Constitutional: He appears well-developed and well-nourished. He appears distressed.   HENT:   Right Ear: Tympanic membrane and external ear normal.   Left Ear: Tympanic membrane and external ear normal.   Nose: Mucosal edema and rhinorrhea present. Right sinus exhibits no maxillary sinus tenderness and no frontal sinus tenderness. Left sinus exhibits no maxillary sinus tenderness and no frontal sinus tenderness.   Mouth/Throat: Mucous membranes are normal. Posterior oropharyngeal erythema present. No oropharyngeal exudate.   Frontal sinus tenderness   Eyes: EOM are normal. Pupils are equal, round, and reactive to light.   Neck: Normal range of motion. Neck supple. No thyromegaly present.   Cardiovascular: Normal rate, regular rhythm and normal heart sounds.    No murmur heard.  Pulmonary/Chest: Effort normal and breath sounds normal. He has no wheezes. He has no rales.   Lymphadenopathy:     He has no cervical adenopathy.   Skin: Skin is warm and dry.   Nursing note and vitals reviewed.    Assessment:     1. Acute seasonal allergic rhinitis, unspecified trigger    2. Morbid obesity with BMI of 40.0-44.9, adult    3. Dehydration      Plan:        Focus on exercise & weight loss    FLONASE TWICE " "DAILY    Patient Instructions   Due Tdap, pneumonia & zoster vaccines    Due Hep C testing    Due colonoscopy    Follow up for Physical with fasting labs prior with Hep C    --------------------------  WATER BALANCE-  Your body systems work best with 64 ounces DAILY (HALF A GALLON DAILY)  - to flush out impurities & cleanse our organs. For every 8 ounces of caffeine you drink, ADD ANOTHER CUP of water to your daily water needs. (2 cups of coffee and one diet coke = you need 11 glasses of water a day). We were not made to drink sugary drinks  - not even artificial sweeteners. You'll notice a difference in your brain function, energy level & overall wellness. Your liver & kidney will thank you too for keeping them properly cleansed  ---------------------------    Email me if you're worse next week           An UPPER RESPIRATORY ILLNESS is initially caused by a virus or allergies 95% of the time. The goal to help you feel better is drying up the drainage & stopping the cough so the virus can run it's course in about 10 days. If your drainage becomes more thick and worse after 7-10 days of trying the below  over the counter medications, please make an appointment for further evaluation    If you have post nasal drip , "runny nose" or sore throat from clearing your throat :  Take an ANTIHISTAMINE  (Claritin or Zyrtec or Allegra ) AT NIGHT    Nasal Saline: Tilt head back and squirt into nostril 2-3 times until you taste saline in back of throat. Spit, and blow nose. Do this 4 times, alternating right and left nostril.   Do this routine 2-3 times per day.     Nasacort Nasal spray (steroid spray over the counter) or Flonase (fluticasone prescription)  Twice daily to decrease swelling & post nasal drip ------ very safe , works where the congestion is , & does not interfere with other medication or go through your blood stream    If you still have drainage or ear popping/ pressure/ decreased hearing, and you do NOT have high " "blood pressure:  You can add a DECONGESTANT like Sudafed (if it doesn't cause palpitations) or Sudafed PE  In the MORNING.     If you have thick mucus drainage from the nose or coughing up thick phlegm:  You can add a MUCOLYTIC like Mucinex (plain)    If your cough persist:  You can add a COUGH SUPPRESSANT like Delsym (dextromethorphan) twice a day    If you have pain, sore throat, fever or chills:  You can alternate 250 mg of  Tylenol with 200mg of   ibuprofen every 3 hours - for the next 3 days  Discontinue and call me if  you have stomach upset    For SORE THROAT , try: Gargle with warm salt water 2-3 times per day.     Drink lots of WATER until your urine is clear because all of the above medications can "dry you out" and cause constipation.     Come & see me  if you are not better in 7-10 days or if your symptoms worsen.                                "

## 2017-08-16 ENCOUNTER — OFFICE VISIT (OUTPATIENT)
Dept: URGENT CARE | Facility: CLINIC | Age: 67
End: 2017-08-16
Payer: MEDICARE

## 2017-08-16 VITALS
WEIGHT: 305 LBS | SYSTOLIC BLOOD PRESSURE: 154 MMHG | HEIGHT: 73 IN | TEMPERATURE: 98 F | BODY MASS INDEX: 40.42 KG/M2 | HEART RATE: 78 BPM | DIASTOLIC BLOOD PRESSURE: 74 MMHG | OXYGEN SATURATION: 98 %

## 2017-08-16 DIAGNOSIS — J06.9 URI, ACUTE: Primary | ICD-10-CM

## 2017-08-16 DIAGNOSIS — J30.89 ACUTE NON-SEASONAL ALLERGIC RHINITIS, UNSPECIFIED TRIGGER: ICD-10-CM

## 2017-08-16 PROCEDURE — 3008F BODY MASS INDEX DOCD: CPT | Mod: S$GLB,,, | Performed by: EMERGENCY MEDICINE

## 2017-08-16 PROCEDURE — 1159F MED LIST DOCD IN RCRD: CPT | Mod: S$GLB,,, | Performed by: EMERGENCY MEDICINE

## 2017-08-16 PROCEDURE — 96372 THER/PROPH/DIAG INJ SC/IM: CPT | Mod: S$GLB,,, | Performed by: EMERGENCY MEDICINE

## 2017-08-16 PROCEDURE — 99214 OFFICE O/P EST MOD 30 MIN: CPT | Mod: 25,S$GLB,, | Performed by: EMERGENCY MEDICINE

## 2017-08-16 RX ORDER — AZITHROMYCIN 250 MG/1
TABLET, FILM COATED ORAL
Qty: 6 TABLET | Refills: 0 | Status: SHIPPED | OUTPATIENT
Start: 2017-08-16 | End: 2017-08-21

## 2017-08-16 RX ORDER — BETAMETHASONE SODIUM PHOSPHATE AND BETAMETHASONE ACETATE 3; 3 MG/ML; MG/ML
9 INJECTION, SUSPENSION INTRA-ARTICULAR; INTRALESIONAL; INTRAMUSCULAR; SOFT TISSUE
Status: COMPLETED | OUTPATIENT
Start: 2017-08-16 | End: 2017-08-16

## 2017-08-16 RX ADMIN — BETAMETHASONE SODIUM PHOSPHATE AND BETAMETHASONE ACETATE 9 MG: 3; 3 INJECTION, SUSPENSION INTRA-ARTICULAR; INTRALESIONAL; INTRAMUSCULAR; SOFT TISSUE at 07:08

## 2017-08-17 NOTE — PATIENT INSTRUCTIONS
Preventing Common Respiratory Infections  Respiratory infections such as colds and influenza (the flu) are common in winter. These infections are often caused by viruses. They may share some symptoms, but not all respiratory infections are the same. Some make you more sick than others. You can take steps to prevent common respiratory infections. And if you get sick, you can take care of yourself to keep the infection from getting worse.    What is a cold?  · Symptoms include runny nose, coughing and sneezing, and sore throat. Cold symptoms tend to be milder than flu symptoms.  · Symptoms tend to come on slowly. They last for a few days to about a week.  · With a cold, you can still do most of the things you usually do.  What is the flu?  · Symptoms include fever, headache, fatigue, cough, sore throat, runny nose, and muscle aches. Children may have upset stomach and vomiting, but adults usually dont.  · Symptoms tend to come on quickly. Some, such as fatigue and cough, can last a few weeks.  · With the flu, you may feel worn out and not able to do normal activities.  · Its most likely NOT the flu if an adult has vomiting or diarrhea for a day or two. This so-called stomach flu is probably a GI (gastrointestinal) infection.  When the infection gets worse  Without proper care, a respiratory infection can get worse. It can lead to serious complications and death. If you arent getting better, call your health care provider. Complications can include:  · Bronchitis (infection of the airways that leads to shortness of breath and coughing up thick yellow or green mucus)  · Pneumonia (infection of the lungs in which fluid and mucus settle in the lungs, making breathing difficult)  · Worsening of chronic conditions such as heart failure, chronic lung disease, asthma, or diabetes  · Severe dehydration (loss of fluids)  · Sinus problems  · Ear infections   Get a flu vaccination  A flu vaccination protects you from  influenza (but not other colds or infections). Get a vaccination each fall, before flu season starts. This can be done at a clinic, doctors office, drugstore, senior center, or through your workplace.  Get pneumococcal vaccinations  Pneumonia can be a complication of influenza. There are 2 pneumococcal pneumonia vaccines that protect against many types of pneumonia. Talk with your health care provider about these important vaccines.   Keep germs from spreading  No one likes getting sick. To protect yourself and others from cold and flu germs:  · Wash your hands often. Use alcohol-based hand  when you dont have access to soap and water.  · Dont touch your eyes, nose, and mouth. This may help you keep germs out of your body.  · Try to avoid people with respiratory infections. You may want to stay out of crowds during flu season (winter).  · Ask your health care provider if you should get a pneumonia vaccination.  How to wash your hands  · Use warm water and plenty of soap. Work up a good lather.  · Clean your whole hand, under your nails, between your fingers, and up your wrists. Wash for at least 15 to 20 seconds. Dont just wipe--rub well.  · Rinse. Let the water run down your fingertips, not up your wrists.  · In a public restroom, use a paper towel to turn off the faucet and open the door.   Date Last Reviewed: 6/19/2014  © 1220-2576 [employer]. [address] [place] [address], [place] [address]. All rights reserved. This information is not intended as a substitute for professional medical care. Always follow your healthcare professional's instructions.      REST AND HYDRATE WITH PLENTY OF FLUIDS  1.5 CC CELESTONE GIVEN IN CLINIC  ZPACK RX  OTC ROBITUSSIN DM FOR COUGH  OTC FLONASE NASAL SPRAY  OTC CLARITIN FOR NASAL CONGESTION/RUNNY NOSE/POST-NASAL DRIP  SEE COMMON COLD ABOVE INFO  RETURN FOR ANY CONCERNS OR PROBLEMS

## 2017-08-17 NOTE — PROGRESS NOTES
"Subjective:       Patient ID: Adan Cain is a 67 y.o. male.    Vitals:  height is 6' 1" (1.854 m) and weight is 138.3 kg (305 lb) (abnormal). His temperature is 97.9 °F (36.6 °C). His blood pressure is 154/74 (abnormal) and his pulse is 78. His oxygen saturation is 98%.     Chief Complaint: Sore Throat and Nasal Congestion    SORE THROAT, RUNNY NOSE, COUGH, CONGESTION, 2-3 DAYS, NO FEVER, NO SOB, NO Cp, HO HEADACHE      Sore Throat    This is a new problem. The current episode started yesterday. The problem has been gradually worsening. The pain is worse on the left (both sides of throat) side. There has been no fever. The pain is at a severity of 6/10. The pain is moderate. Associated symptoms include congestion, coughing, a hoarse voice and trouble swallowing. Pertinent negatives include no abdominal pain, diarrhea, headaches, shortness of breath or vomiting. He has tried nothing for the symptoms. The treatment provided no relief.     Review of Systems   Constitution: Negative for chills and fever.   HENT: Positive for congestion, hoarse voice, sore throat and trouble swallowing. Negative for headaches.         Patient feels like his throat is closing. Allergies to house dust, mold, goat dander and rag wheat.   Eyes: Negative for blurred vision.   Cardiovascular: Negative for chest pain.   Respiratory: Positive for cough. Negative for shortness of breath.    Skin: Negative for rash.   Musculoskeletal: Negative for back pain and joint pain.   Gastrointestinal: Negative for abdominal pain, diarrhea, nausea and vomiting.   Psychiatric/Behavioral: The patient is not nervous/anxious.        Objective:      Physical Exam   Constitutional: He is oriented to person, place, and time. He appears well-developed and well-nourished. No distress.   HENT:   Head: Normocephalic and atraumatic.   Right Ear: Hearing, external ear and ear canal normal. No drainage or tenderness. Tympanic membrane is not injected, not " erythematous, not retracted and not bulging. No middle ear effusion.   Left Ear: Hearing, external ear and ear canal normal. No drainage or tenderness. Tympanic membrane is not injected, not erythematous, not retracted and not bulging.  No middle ear effusion.   Nose: No mucosal edema or rhinorrhea. Right sinus exhibits no maxillary sinus tenderness and no frontal sinus tenderness. Left sinus exhibits no maxillary sinus tenderness and no frontal sinus tenderness.   Mouth/Throat: Mucous membranes are normal. No dental abscesses or uvula swelling. No oropharyngeal exudate, posterior oropharyngeal edema or posterior oropharyngeal erythema. No tonsillar exudate.   POSTERIOR OP COBBLESTONING  ERYTHEMA POSTERIOR OP  NASAL CONGESTION  DRY COUGH/RARE   Eyes: Conjunctivae and EOM are normal. Pupils are equal, round, and reactive to light. Right eye exhibits no discharge. Left eye exhibits no discharge.   Neck: Normal range of motion. Neck supple.   Cardiovascular: Normal rate and regular rhythm.  Exam reveals no gallop and no friction rub.    No murmur heard.  Pulmonary/Chest: Breath sounds normal. No respiratory distress. He has no wheezes. He has no rales. He exhibits no tenderness.   Abdominal: Bowel sounds are normal. There is no tenderness.   Lymphadenopathy:        Head (right side): No submental adenopathy present.        Head (left side): No submental adenopathy present.        Right cervical: No superficial cervical and no posterior cervical adenopathy present.       Left cervical: No superficial cervical and no posterior cervical adenopathy present.   Neurological: He is alert and oriented to person, place, and time.   Skin: Skin is warm and dry. No rash noted.   Nursing note and vitals reviewed.      Assessment:       1. URI, acute    2. Acute non-seasonal allergic rhinitis, unspecified trigger        Plan:         URI, acute  -     betamethasone acetate-betamethasone sodium phosphate injection 9 mg; Inject 1.5  mLs (9 mg total) into the muscle one time.  -     azithromycin (Z-LESLEE) 250 MG tablet; Take 2 tablets by mouth on day 1; Take 1 tablet by mouth on days 2-5  Dispense: 6 tablet; Refill: 0    Acute non-seasonal allergic rhinitis, unspecified trigger  -     betamethasone acetate-betamethasone sodium phosphate injection 9 mg; Inject 1.5 mLs (9 mg total) into the muscle one time.  -     azithromycin (Z-LESLEE) 250 MG tablet; Take 2 tablets by mouth on day 1; Take 1 tablet by mouth on days 2-5  Dispense: 6 tablet; Refill: 0        IS TO CONTINUE CLARITIN, FLONASE, ADD ROBITUSSIN Dm, ZPACK Rx, CELESTONE GIVEN IN CLINIC

## 2017-08-25 DIAGNOSIS — Z12.11 COLON CANCER SCREENING: ICD-10-CM

## 2017-08-31 ENCOUNTER — OFFICE VISIT (OUTPATIENT)
Dept: FAMILY MEDICINE | Facility: CLINIC | Age: 67
End: 2017-08-31
Payer: MEDICARE

## 2017-08-31 VITALS
HEART RATE: 72 BPM | WEIGHT: 307.31 LBS | DIASTOLIC BLOOD PRESSURE: 74 MMHG | HEIGHT: 73 IN | BODY MASS INDEX: 40.73 KG/M2 | TEMPERATURE: 98 F | SYSTOLIC BLOOD PRESSURE: 115 MMHG

## 2017-08-31 DIAGNOSIS — R10.31 RIGHT GROIN PAIN: Primary | ICD-10-CM

## 2017-08-31 DIAGNOSIS — Z98.890 HISTORY OF RIGHT INGUINAL HERNIA REPAIR: ICD-10-CM

## 2017-08-31 DIAGNOSIS — Z87.19 HISTORY OF RIGHT INGUINAL HERNIA REPAIR: ICD-10-CM

## 2017-08-31 DIAGNOSIS — Z28.39 IMMUNIZATION DEFICIENCY: ICD-10-CM

## 2017-08-31 DIAGNOSIS — R05.9 COUGH: ICD-10-CM

## 2017-08-31 DIAGNOSIS — Z00.00 ROUTINE GENERAL MEDICAL EXAMINATION AT A HEALTH CARE FACILITY: ICD-10-CM

## 2017-08-31 DIAGNOSIS — Z12.11 SCREEN FOR COLON CANCER: ICD-10-CM

## 2017-08-31 PROCEDURE — 1159F MED LIST DOCD IN RCRD: CPT | Mod: S$GLB,,, | Performed by: FAMILY MEDICINE

## 2017-08-31 PROCEDURE — 90670 PCV13 VACCINE IM: CPT | Mod: S$GLB,,, | Performed by: FAMILY MEDICINE

## 2017-08-31 PROCEDURE — 99214 OFFICE O/P EST MOD 30 MIN: CPT | Mod: S$GLB,,, | Performed by: FAMILY MEDICINE

## 2017-08-31 PROCEDURE — G0009 ADMIN PNEUMOCOCCAL VACCINE: HCPCS | Mod: S$GLB,,, | Performed by: FAMILY MEDICINE

## 2017-08-31 PROCEDURE — 3008F BODY MASS INDEX DOCD: CPT | Mod: S$GLB,,, | Performed by: FAMILY MEDICINE

## 2017-08-31 PROCEDURE — 1125F AMNT PAIN NOTED PAIN PRSNT: CPT | Mod: S$GLB,,, | Performed by: FAMILY MEDICINE

## 2017-08-31 PROCEDURE — 99999 PR PBB SHADOW E&M-EST. PATIENT-LVL III: CPT | Mod: PBBFAC,,, | Performed by: FAMILY MEDICINE

## 2017-08-31 RX ORDER — METHOCARBAMOL 500 MG/1
500 TABLET, FILM COATED ORAL 3 TIMES DAILY PRN
Qty: 21 TABLET | Refills: 0 | Status: SHIPPED | OUTPATIENT
Start: 2017-08-31 | End: 2017-09-07

## 2017-08-31 NOTE — PROGRESS NOTES
Subjective:      Patient ID: Adan Cain is a 67 y.o. male.    Chief Complaint: Pain (right sided pain, near surgery site)    Here today for RIGHT groin pain, near site where he had inguinal hernia repair one year ago. Pain did worsen after 1 month of cough (treated in urgent care August 16, 2017). He has more tenderness of RIGHT lower abdomen. Not affecting his bowel movements. No fever. Pain with bending down forward & when taking care of his dog. Pain is worse than when he saw general surgeon in June.     No results found for: HGBA1C  No results found for: MICALBCREAT  Lab Results   Component Value Date    LDLCALC 122.4 01/20/2015    LDLCALC 141.4 (H) 05/30/2008    CHOL 172 01/20/2015    HDL 36 (L) 01/20/2015    TRIG 68 01/20/2015       Lab Results   Component Value Date     11/23/2016    K 4.1 11/23/2016     11/23/2016    CO2 26 11/23/2016    GLU 82 11/23/2016    BUN 19 11/23/2016    CREATININE 0.8 11/23/2016    CALCIUM 8.8 11/23/2016    PROT 7.7 05/11/2017    ALBUMIN 3.5 05/11/2017    BILITOT 0.5 05/11/2017    ALKPHOS 103 05/11/2017    AST 21 05/11/2017    ALT 18 05/11/2017    ANIONGAP 9 11/23/2016    ESTGFRAFRICA >60.0 11/23/2016    EGFRNONAA >60.0 11/23/2016    WBC 7.69 01/20/2015    HGB 14.6 01/20/2015    HCT 44.0 01/20/2015    MCV 93 01/20/2015     01/20/2015    TSH 4.857 (H) 01/20/2015         Current Outpatient Prescriptions on File Prior to Visit   Medication Sig    BABY ASPIRIN ORAL Take by mouth every evening.     cholecalciferol, vitamin D3, 1,000 unit capsule Take 1,000 Units by mouth 2 (two) times daily.     coenzyme Q10 (CO Q-10) 100 mg capsule Take by mouth once daily.    fluticasone (FLONASE) 50 mcg/actuation nasal spray 1 spray by Each Nare route once daily. (Patient taking differently: 1 spray by Each Nare route continuous prn. )    glucosamine-chondroitin 500-400 mg tablet Take 1 tablet by mouth 3 (three) times daily.    MULTIVITAMIN W-MINERALS/LUTEIN (CENTRUM  "SILVER ORAL) Take by mouth.    omega-3 fatty acids (FISH OIL) 300 mg Cap Take by mouth.    UNABLE TO FIND Take by mouth nightly. tumeric    UNABLE TO FIND once daily. actaline    UNABLE TO FIND Use as directed 1 tablet in the mouth or throat 2 (two) times daily. Prostate Revive manufactured by [employer]    VITAMIN B COMPLEX (SUPER B COMPLEX-B-12 ORAL) Take by mouth.    diclofenac sodium 1 % Gel Apply 2 g topically 4 (four) times daily.     No current facility-administered medications on file prior to visit.      Past Medical History:   Diagnosis Date    Allergic rhinitis 4/13/2016    BPH (benign prostatic hyperplasia)     Urology Dr Zamora, OTC prostate revive helps, avodart caused chest pains    Calculus of gallbladder     US 2016    DJD (degenerative joint disease) of hip 1/29/2015    Morbid obesity due to excess calories 4/13/2017    Morbid obesity with BMI of 40.0-44.9, adult 1/29/2015     Past Surgical History:   Procedure Laterality Date    CATARACT EXTRACTION      HERNIA REPAIR       Social History     Social History Narrative     to 'T', 2 children, nonsmoker, ETOH none, never had colonscopy & declines     Family History   Problem Relation Age of Onset    Leukemia Father     Aneurysm Father     Diabetes Mother      Vitals:    08/31/17 1034   BP: 115/74   Pulse: 72   Temp: 98.1 °F (36.7 °C)   Weight: (!) 139.4 kg (307 lb 5.1 oz)   Height: 6' 1" (1.854 m)   PainSc:   4     Objective:   Physical Exam   Abdominal: Soft. Bowel sounds are normal. He exhibits no distension.   Normal circumcised male, normal size testes, tender on RIGHT mons pubis & near RIGHT inguinal hernia repair, difficulty to fully assess with large pannus, no mass, unable to determine if new inguinal hernia     Assessment:     1. Right groin pain    2. Immunization deficiency    3. Cough    4. History of right inguinal hernia repair        6. Screen for colon cancer      Plan:     Orders Placed This Encounter    (In Office " Administered) Pneumococcal Conjugate Vaccine (13 Valent) (IM)    Fecal Immunochemical Test (iFOBT)    Ambulatory referral to General Surgery    DIPH,PERTUSS,ACEL,,TET VAC,PF, ADULT (ADACEL) 2 Lf-(2.5-5-3-5 mcg)-5Lf/0.5 mL Syrg    methocarbamol (ROBAXIN) 500 MG Tab       Patient Instructions   Due pneumonia vaccine    Get Tdap & flu at your pharmacy

## 2017-08-31 NOTE — PROGRESS NOTES
Two patient identifiers used, allergies reviewed, vaccine confirmed.  Prevnar/13 pneumococcal conjugate vaccine administered IM right deltoid.  Pt tolerated well, no redness or bruising at injection site.  Pt advised to remain in clinic 15 mins following injection for observation.

## 2017-09-05 ENCOUNTER — OFFICE VISIT (OUTPATIENT)
Dept: SURGERY | Facility: CLINIC | Age: 67
End: 2017-09-05
Payer: MEDICARE

## 2017-09-05 VITALS
DIASTOLIC BLOOD PRESSURE: 70 MMHG | SYSTOLIC BLOOD PRESSURE: 140 MMHG | TEMPERATURE: 98 F | HEART RATE: 86 BPM | HEIGHT: 73 IN | BODY MASS INDEX: 39.6 KG/M2 | WEIGHT: 298.81 LBS

## 2017-09-05 DIAGNOSIS — R63.5 WEIGHT GAIN: ICD-10-CM

## 2017-09-05 DIAGNOSIS — E66.01 SEVERE OBESITY (BMI >= 40): ICD-10-CM

## 2017-09-05 DIAGNOSIS — K80.20 CALCULUS OF GALLBLADDER WITHOUT CHOLECYSTITIS WITHOUT OBSTRUCTION: ICD-10-CM

## 2017-09-05 DIAGNOSIS — R10.31 RIGHT GROIN PAIN: Primary | ICD-10-CM

## 2017-09-05 PROCEDURE — 1126F AMNT PAIN NOTED NONE PRSNT: CPT | Mod: S$GLB,,, | Performed by: SURGERY

## 2017-09-05 PROCEDURE — 99999 PR PBB SHADOW E&M-EST. PATIENT-LVL III: CPT | Mod: PBBFAC,,, | Performed by: SURGERY

## 2017-09-05 PROCEDURE — 3008F BODY MASS INDEX DOCD: CPT | Mod: S$GLB,,, | Performed by: SURGERY

## 2017-09-05 PROCEDURE — 99214 OFFICE O/P EST MOD 30 MIN: CPT | Mod: S$GLB,,, | Performed by: SURGERY

## 2017-09-05 PROCEDURE — 99499 UNLISTED E&M SERVICE: CPT | Mod: S$GLB,,, | Performed by: SURGERY

## 2017-09-05 PROCEDURE — 1159F MED LIST DOCD IN RCRD: CPT | Mod: S$GLB,,, | Performed by: SURGERY

## 2017-09-05 RX ORDER — LORATADINE 10 MG/1
10 TABLET ORAL DAILY PRN
Status: ON HOLD | COMMUNITY
End: 2018-05-28 | Stop reason: HOSPADM

## 2017-09-05 NOTE — LETTER
September 8, 2017      Gina Reyes MD  [address] [facility]  [address]  [facility] [address]           [facility]  [address] [facility]  [address] [facility] [address]  Phone: [phone]          Patient: Adan Cain   MR Number: [id number]   YOB: 1950   Date of Visit: 9/5/2017       Dear Dr. Gina Reyes:    Thank you for referring Adan Cain to me for evaluation. Attached you will find relevant portions of my assessment and plan of care.    If you have questions, please do not hesitate to call me. I look forward to following Adan Cain along with you.    Sincerely,    Lian Verde,     Enclosure  CC:  No Recipients    If you would like to receive this communication electronically, please contact [email or web address] or [phone] to request more information on [employer] Link access.    For providers and/or their staff who would like to refer a patient to [facility], please contact us through our one-stop-shop provider referral line, [facility], at [phone].    If you feel you have received this communication in error or would no longer like to receive these types of communications, please e-mail [email or web address]

## 2017-09-07 ENCOUNTER — HOSPITAL ENCOUNTER (EMERGENCY)
Facility: HOSPITAL | Age: 67
Discharge: HOME | End: 2017-09-08
Attending: EMERGENCY MEDICINE
Payer: MEDICARE

## 2017-09-07 DIAGNOSIS — R07.9 CHEST PAIN, UNSPECIFIED TYPE: Primary | ICD-10-CM

## 2017-09-07 PROCEDURE — 93005 ELECTROCARDIOGRAM TRACING: CPT

## 2017-09-07 PROCEDURE — 99284 EMERGENCY DEPT VISIT MOD MDM: CPT | Mod: 25

## 2017-09-07 PROCEDURE — 96374 THER/PROPH/DIAG INJ IV PUSH: CPT

## 2017-09-07 PROCEDURE — 93010 ELECTROCARDIOGRAM REPORT: CPT | Mod: ,,, | Performed by: INTERNAL MEDICINE

## 2017-09-08 VITALS
BODY MASS INDEX: 38.7 KG/M2 | WEIGHT: 292 LBS | OXYGEN SATURATION: 93 % | SYSTOLIC BLOOD PRESSURE: 122 MMHG | HEART RATE: 70 BPM | DIASTOLIC BLOOD PRESSURE: 58 MMHG | RESPIRATION RATE: 18 BRPM | TEMPERATURE: 99 F | HEIGHT: 73 IN

## 2017-09-08 LAB
ALBUMIN SERPL BCP-MCNC: 3.5 G/DL
ALP SERPL-CCNC: 110 U/L
ALT SERPL W/O P-5'-P-CCNC: 17 U/L
ANION GAP SERPL CALC-SCNC: 12 MMOL/L
AST SERPL-CCNC: 22 U/L
BASOPHILS # BLD AUTO: 0.05 K/UL
BASOPHILS NFR BLD: 0.5 %
BILIRUB SERPL-MCNC: 0.6 MG/DL
BNP SERPL-MCNC: 19 PG/ML
BUN SERPL-MCNC: 24 MG/DL
CALCIUM SERPL-MCNC: 9.1 MG/DL
CHLORIDE SERPL-SCNC: 105 MMOL/L
CO2 SERPL-SCNC: 21 MMOL/L
CREAT SERPL-MCNC: 0.8 MG/DL
DIFFERENTIAL METHOD: ABNORMAL
EOSINOPHIL # BLD AUTO: 0.5 K/UL
EOSINOPHIL NFR BLD: 5.3 %
ERYTHROCYTE [DISTWIDTH] IN BLOOD BY AUTOMATED COUNT: 13.7 %
EST. GFR  (AFRICAN AMERICAN): >60 ML/MIN/1.73 M^2
EST. GFR  (NON AFRICAN AMERICAN): >60 ML/MIN/1.73 M^2
GLUCOSE SERPL-MCNC: 131 MG/DL
HCT VFR BLD AUTO: 41.6 %
HGB BLD-MCNC: 14.1 G/DL
LYMPHOCYTES # BLD AUTO: 3.8 K/UL
LYMPHOCYTES NFR BLD: 40.5 %
MCH RBC QN AUTO: 31.3 PG
MCHC RBC AUTO-ENTMCNC: 33.9 G/DL
MCV RBC AUTO: 92 FL
MONOCYTES # BLD AUTO: 0.9 K/UL
MONOCYTES NFR BLD: 9.5 %
NEUTROPHILS # BLD AUTO: 4.2 K/UL
NEUTROPHILS NFR BLD: 44 %
PLATELET # BLD AUTO: 278 K/UL
PMV BLD AUTO: 11.6 FL
POTASSIUM SERPL-SCNC: 4.5 MMOL/L
PROT SERPL-MCNC: 7.3 G/DL
RBC # BLD AUTO: 4.51 M/UL
SODIUM SERPL-SCNC: 138 MMOL/L
TROPONIN I SERPL DL<=0.01 NG/ML-MCNC: <0.006 NG/ML
TROPONIN I SERPL DL<=0.01 NG/ML-MCNC: <0.006 NG/ML
WBC # BLD AUTO: 9.45 K/UL

## 2017-09-08 PROCEDURE — 83880 ASSAY OF NATRIURETIC PEPTIDE: CPT

## 2017-09-08 PROCEDURE — 93010 ELECTROCARDIOGRAM REPORT: CPT | Mod: ,,, | Performed by: INTERNAL MEDICINE

## 2017-09-08 PROCEDURE — 63600175 PHARM REV CODE 636 W HCPCS: Performed by: EMERGENCY MEDICINE

## 2017-09-08 PROCEDURE — 85025 COMPLETE CBC W/AUTO DIFF WBC: CPT

## 2017-09-08 PROCEDURE — 84484 ASSAY OF TROPONIN QUANT: CPT | Mod: 91

## 2017-09-08 PROCEDURE — 93005 ELECTROCARDIOGRAM TRACING: CPT

## 2017-09-08 PROCEDURE — 25000003 PHARM REV CODE 250: Performed by: EMERGENCY MEDICINE

## 2017-09-08 PROCEDURE — 80053 COMPREHEN METABOLIC PANEL: CPT

## 2017-09-08 RX ORDER — ASPIRIN 325 MG
325 TABLET ORAL
Status: COMPLETED | OUTPATIENT
Start: 2017-09-08 | End: 2017-09-08

## 2017-09-08 RX ORDER — ONDANSETRON 2 MG/ML
4 INJECTION INTRAMUSCULAR; INTRAVENOUS
Status: COMPLETED | OUTPATIENT
Start: 2017-09-08 | End: 2017-09-08

## 2017-09-08 RX ADMIN — ONDANSETRON 4 MG: 2 INJECTION INTRAMUSCULAR; INTRAVENOUS at 12:09

## 2017-09-08 RX ADMIN — ASPIRIN 325 MG ORAL TABLET 325 MG: 325 PILL ORAL at 12:09

## 2017-09-08 NOTE — PROGRESS NOTES
Subjective:       Patient ID: Adan Cain is a 67 y.o. male.    Chief Complaint: No chief complaint on file.    HPI   Patient presents for reevaluation of right groin pain.  He is status post right inguinal hernia repair and hydrocele repair at [facility] in 11/2016.  He states he developed intermittent discomfort in the right inguinal region several weeks postop.  This is been intermittent and has not really improved.  It is worse when he coughs.  Anti-inflammatories do not help much.  He saw his surgeon who recommended stretches.  The patient reports doing stretches with minimal improvement.  The patient has gained 30 pounds over the past 10 months.  He has had no real changes and in his activity.  He has not noticed any bulge in the region of his hernia.  No other complaints.  He presents today with his wife.    Review of Systems    All systems were reviewed and are negative, except that mentioned in the HPI.    Objective:      Physical Exam   Constitutional: He is oriented to person, place, and time. He appears well-developed and well-nourished. No distress.   HENT:   Head: Normocephalic and atraumatic.   Eyes: Conjunctivae and EOM are normal. Pupils are equal, round, and reactive to light. No scleral icterus.   Neck: Normal range of motion. Neck supple. No JVD present.   Cardiovascular: Normal rate and regular rhythm.    Pulmonary/Chest: Effort normal and breath sounds normal. No respiratory distress.   Abdominal: Soft. Bowel sounds are normal. He exhibits no distension. Tenderness: mild ttp above RIH incision when supine, ttp below incision when standing. There is no rebound and no guarding. No hernia.   Well-healed right inguinal incision, no overlying skin changes   Musculoskeletal: Normal range of motion. He exhibits no edema or tenderness.   Neurological: He is alert and oriented to person, place, and time. No cranial nerve deficit.   Skin: Skin is warm and dry. He is not diaphoretic.   Psychiatric:  He has a normal mood and affect.       Assessment:       1. Right groin pain    2. Calculus of gallbladder without cholecystitis without obstruction    3. Severe obesity (BMI >= 40)    4. Weight gain        Plan:   No recurrent inguinal hernia noted.  No obvious source of his discomfort.  Possibly scar tissue.  This could also be exacerbated by his 30 pound weight gain over the past 10 months.  We discussed this in detail.  We discussed that any additional surgery could also contribute to additional discomfort.  Patient is not interested in surgery at this time.  He is interested in additional evaluation and an ultrasound was ordered.  We will call the patient with his results.  All of his questions and his wife questions were answered.  Weight loss was encouraged.

## 2017-09-08 NOTE — ED PROVIDER NOTES
Encounter Date: 9/7/2017       History     Chief Complaint   Patient presents with    Chest Pain     pt. reports episode od mid sternal chest pain approx. 1hr ago that lasted several minutes. associated symptoms are, nausea, diarrhea.      68 y/o male presents with less than 1 minute of chest pain at 10pm.  Associated loose stool and nausea.  Currently he feels well.  He does not get chest pain or heart problems.  No dyspnea.          Review of patient's allergies indicates:   Allergen Reactions    Avodart [dutasteride]     Naproxen     Ragweed     Synthroid [levothyroxine] Rash     Past Medical History:   Diagnosis Date    Allergic rhinitis 4/13/2016    BPH (benign prostatic hyperplasia)     Urology Dr Zamora, OTC prostate revive helps, avodart caused chest pains    Calculus of gallbladder     US 2016    DJD (degenerative joint disease) of hip 1/29/2015    Morbid obesity due to excess calories 4/13/2017    Morbid obesity with BMI of 40.0-44.9, adult 1/29/2015     Past Surgical History:   Procedure Laterality Date    CATARACT EXTRACTION      HERNIA REPAIR       Family History   Problem Relation Age of Onset    Leukemia Father     Aneurysm Father     Diabetes Mother      Social History   Substance Use Topics    Smoking status: Former Smoker     Types: Cigarettes    Smokeless tobacco: Never Used    Alcohol use No     Review of Systems   Constitutional: Negative for chills and fever.   HENT: Negative for congestion.    Eyes: Negative for photophobia.   Respiratory: Negative for shortness of breath.    Cardiovascular: Positive for chest pain. Negative for palpitations.   Gastrointestinal: Positive for diarrhea and nausea. Negative for abdominal distention, abdominal pain and vomiting.   Endocrine: Negative for polydipsia and polyuria.   Genitourinary: Negative for difficulty urinating.   Musculoskeletal: Negative for back pain and neck pain.   Skin: Negative for rash.   Neurological: Negative for  seizures.   Hematological: Negative for adenopathy.   Psychiatric/Behavioral: Negative for agitation.       Physical Exam     Initial Vitals [09/07/17 2333]   BP Pulse Resp Temp SpO2   (!) 176/74 70 20 98.9 °F (37.2 °C) (!) 94 %      MAP       108         Physical Exam    Vitals reviewed.  Constitutional: He appears well-developed and well-nourished.   HENT:   Head: Normocephalic.   Eyes: Pupils are equal, round, and reactive to light.   Neck: Normal range of motion. Neck supple. No JVD present.   Cardiovascular: Normal rate, regular rhythm, normal heart sounds and intact distal pulses.   No murmur heard.  Pulmonary/Chest: Breath sounds normal.   Abdominal: Soft. Bowel sounds are normal. He exhibits no distension. There is no tenderness.   Musculoskeletal: Normal range of motion. He exhibits no edema or tenderness.   Neurological: He is alert. He has normal strength. No sensory deficit.   Skin: Skin is warm. Capillary refill takes less than 2 seconds.         ED Course   Procedures  Labs Reviewed   CBC W/ AUTO DIFFERENTIAL - Abnormal; Notable for the following:        Result Value    RBC 4.51 (*)     MCH 31.3 (*)     All other components within normal limits   COMPREHENSIVE METABOLIC PANEL - Abnormal; Notable for the following:     CO2 21 (*)     Glucose 131 (*)     BUN, Bld 24 (*)     All other components within normal limits   TROPONIN I   B-TYPE NATRIURETIC PEPTIDE   TROPONIN I     EKG Readings: (Independently Interpreted)   EKG # 1: significant artifact.  Will repeat  EKG NSR with bifascicular block and frequent PVCs          Medical Decision Making:   Initial Assessment:   Atypical chest pain  Trop x 2 neg  Was asymptomatic during entire stay                   ED Course      Clinical Impression:   The encounter diagnosis was Chest pain, unspecified type.    Disposition:   Disposition: Discharged  Condition: Stable                        Ryan Dutta MD  09/08/17 6230

## 2017-09-08 NOTE — ED TRIAGE NOTES
Pt. To ER with c/o having mid sternal chest pain one hour PTA. Pt. Reports having nausea with belching and diarrhea. Pt. Is on the cardiac monitor, BP and pulse oximetry monitors. Skin is PWD, resp. Even and non labored. Family at the bedside. Bed in the low position. Dr. Dutta at bedside for exam.

## 2017-09-18 ENCOUNTER — HOSPITAL ENCOUNTER (OUTPATIENT)
Dept: RADIOLOGY | Facility: HOSPITAL | Age: 67
Discharge: HOME | End: 2017-09-18
Attending: SURGERY
Payer: MEDICARE

## 2017-09-18 DIAGNOSIS — R10.31 RIGHT GROIN PAIN: ICD-10-CM

## 2017-09-18 DIAGNOSIS — E66.01 SEVERE OBESITY (BMI >= 40): ICD-10-CM

## 2017-09-18 PROCEDURE — 76705 ECHO EXAM OF ABDOMEN: CPT | Mod: TC

## 2017-09-18 PROCEDURE — 76705 ECHO EXAM OF ABDOMEN: CPT | Mod: 26,,, | Performed by: RADIOLOGY

## 2017-10-24 ENCOUNTER — LAB VISIT (OUTPATIENT)
Dept: LAB | Facility: HOSPITAL | Age: 67
End: 2017-10-24
Attending: FAMILY MEDICINE
Payer: MEDICARE

## 2017-10-24 DIAGNOSIS — Z00.00 ROUTINE GENERAL MEDICAL EXAMINATION AT A HEALTH CARE FACILITY: ICD-10-CM

## 2017-10-24 DIAGNOSIS — R53.83 FATIGUE, UNSPECIFIED TYPE: ICD-10-CM

## 2017-10-24 DIAGNOSIS — Z11.59 NEED FOR HEPATITIS C SCREENING TEST: ICD-10-CM

## 2017-10-24 DIAGNOSIS — E66.01 MORBID OBESITY WITH BMI OF 40.0-44.9, ADULT: ICD-10-CM

## 2017-10-24 LAB
ALBUMIN SERPL BCP-MCNC: 3.4 G/DL
ALP SERPL-CCNC: 116 U/L
ALT SERPL W/O P-5'-P-CCNC: 20 U/L
ANION GAP SERPL CALC-SCNC: 9 MMOL/L
AST SERPL-CCNC: 18 U/L
BASOPHILS # BLD AUTO: 0.09 K/UL
BASOPHILS NFR BLD: 1.1 %
BILIRUB SERPL-MCNC: 0.8 MG/DL
BUN SERPL-MCNC: 22 MG/DL
CALCIUM SERPL-MCNC: 9.6 MG/DL
CHLORIDE SERPL-SCNC: 108 MMOL/L
CHOLEST SERPL-MCNC: 170 MG/DL
CHOLEST/HDLC SERPL: 4.3 {RATIO}
CO2 SERPL-SCNC: 27 MMOL/L
CREAT SERPL-MCNC: 0.8 MG/DL
DIFFERENTIAL METHOD: NORMAL
EOSINOPHIL # BLD AUTO: 0.2 K/UL
EOSINOPHIL NFR BLD: 2.9 %
ERYTHROCYTE [DISTWIDTH] IN BLOOD BY AUTOMATED COUNT: 14.1 %
EST. GFR  (AFRICAN AMERICAN): >60 ML/MIN/1.73 M^2
EST. GFR  (NON AFRICAN AMERICAN): >60 ML/MIN/1.73 M^2
GLUCOSE SERPL-MCNC: 102 MG/DL
HCT VFR BLD AUTO: 43.8 %
HCV AB SERPL QL IA: NEGATIVE
HDLC SERPL-MCNC: 40 MG/DL
HDLC SERPL: 23.5 %
HGB BLD-MCNC: 14.2 G/DL
IMM GRANULOCYTES # BLD AUTO: 0.02 K/UL
IMM GRANULOCYTES NFR BLD AUTO: 0.2 %
LDLC SERPL CALC-MCNC: 115.4 MG/DL
LYMPHOCYTES # BLD AUTO: 2.6 K/UL
LYMPHOCYTES NFR BLD: 32.1 %
MCH RBC QN AUTO: 30.5 PG
MCHC RBC AUTO-ENTMCNC: 32.4 G/DL
MCV RBC AUTO: 94 FL
MONOCYTES # BLD AUTO: 0.9 K/UL
MONOCYTES NFR BLD: 11.5 %
NEUTROPHILS # BLD AUTO: 4.3 K/UL
NEUTROPHILS NFR BLD: 52.2 %
NONHDLC SERPL-MCNC: 130 MG/DL
NRBC BLD-RTO: 0 /100 WBC
PLATELET # BLD AUTO: 273 K/UL
PMV BLD AUTO: 11.8 FL
POTASSIUM SERPL-SCNC: 4.7 MMOL/L
PROT SERPL-MCNC: 7.6 G/DL
RBC # BLD AUTO: 4.66 M/UL
SODIUM SERPL-SCNC: 144 MMOL/L
T4 FREE SERPL-MCNC: 0.85 NG/DL
TRIGL SERPL-MCNC: 73 MG/DL
TSH SERPL DL<=0.005 MIU/L-ACNC: 4.7 UIU/ML
WBC # BLD AUTO: 8.2 K/UL

## 2017-10-24 PROCEDURE — 84443 ASSAY THYROID STIM HORMONE: CPT

## 2017-10-24 PROCEDURE — 85025 COMPLETE CBC W/AUTO DIFF WBC: CPT

## 2017-10-24 PROCEDURE — 86803 HEPATITIS C AB TEST: CPT

## 2017-10-24 PROCEDURE — 36415 COLL VENOUS BLD VENIPUNCTURE: CPT | Mod: PO

## 2017-10-24 PROCEDURE — 80061 LIPID PANEL: CPT

## 2017-10-24 PROCEDURE — 80053 COMPREHEN METABOLIC PANEL: CPT

## 2017-10-24 PROCEDURE — 84439 ASSAY OF FREE THYROXINE: CPT

## 2017-10-31 ENCOUNTER — OFFICE VISIT (OUTPATIENT)
Dept: FAMILY MEDICINE | Facility: CLINIC | Age: 67
End: 2017-10-31
Payer: MEDICARE

## 2017-10-31 VITALS
DIASTOLIC BLOOD PRESSURE: 66 MMHG | HEIGHT: 73 IN | TEMPERATURE: 98 F | SYSTOLIC BLOOD PRESSURE: 120 MMHG | HEART RATE: 88 BPM | BODY MASS INDEX: 41.46 KG/M2 | WEIGHT: 312.81 LBS

## 2017-10-31 DIAGNOSIS — L60.0 INGROWING NAIL: ICD-10-CM

## 2017-10-31 DIAGNOSIS — Z23 NEED FOR PROPHYLACTIC VACCINATION AND INOCULATION AGAINST INFLUENZA: ICD-10-CM

## 2017-10-31 DIAGNOSIS — N13.8 BENIGN PROSTATIC HYPERPLASIA WITH URINARY OBSTRUCTION: ICD-10-CM

## 2017-10-31 DIAGNOSIS — E66.01 MORBID OBESITY WITH BMI OF 40.0-44.9, ADULT: ICD-10-CM

## 2017-10-31 DIAGNOSIS — Z91.89 FRAMINGHAM CARDIAC RISK 10-20% IN NEXT 10 YEARS: ICD-10-CM

## 2017-10-31 DIAGNOSIS — Z28.39 IMMUNIZATION DEFICIENCY: ICD-10-CM

## 2017-10-31 DIAGNOSIS — Z00.00 ROUTINE GENERAL MEDICAL EXAMINATION AT A HEALTH CARE FACILITY: Primary | ICD-10-CM

## 2017-10-31 DIAGNOSIS — N40.1 BENIGN PROSTATIC HYPERPLASIA WITH URINARY OBSTRUCTION: ICD-10-CM

## 2017-10-31 PROCEDURE — 90662 IIV NO PRSV INCREASED AG IM: CPT | Mod: S$GLB,,, | Performed by: FAMILY MEDICINE

## 2017-10-31 PROCEDURE — G0008 ADMIN INFLUENZA VIRUS VAC: HCPCS | Mod: S$GLB,,, | Performed by: FAMILY MEDICINE

## 2017-10-31 PROCEDURE — 99499 UNLISTED E&M SERVICE: CPT | Mod: S$GLB,,, | Performed by: FAMILY MEDICINE

## 2017-10-31 PROCEDURE — 99397 PER PM REEVAL EST PAT 65+ YR: CPT | Mod: S$GLB,,, | Performed by: FAMILY MEDICINE

## 2017-10-31 PROCEDURE — 99999 PR PBB SHADOW E&M-EST. PATIENT-LVL IV: CPT | Mod: PBBFAC,,, | Performed by: FAMILY MEDICINE

## 2017-10-31 NOTE — PATIENT INSTRUCTIONS
==========================  Your 10 year risk of future heart attack / stroke ([place] risk)     Goal is < 10%    If your risk is > 10%, I  recommend statin medication daily (lipitor, pravachol, crestor) . He declines this    ACC/AHA , ages 40 to 75 , treat if ?7.5% risk  [place]'s National [place] for Health and Clinical Excellence (NICE) recommend statin medication (lipitor, pravachol, crestor) , treat if > 10%      --------------------------  WATER BALANCE-  Your body systems work best with 64 ounces DAILY (HALF A GALLON DAILY)  - to flush out impurities & cleanse our organs. For every 8 ounces of caffeine you drink, ADD ANOTHER CUP of water to your daily water needs. (2 cups of coffee and one diet coke = you need 11 glasses of water a day). We were not made to drink sugary drinks  - not even artificial sweeteners. You'll notice a difference in your brain function, energy level & overall wellness. Your liver & kidney will thank you too for keeping them properly cleansed  ---------------------------      ====================    Increase FIBER INTAKE - your body is happiest with FIVE FRESH COLORS of fruits and  vegetables DAILY    With respect to FIBER intake, you should have 5-10 grams of viscous soluble fiber daily. This  Includes fruit (bananas, apples, pears, blackberries, citrus, peaches, plums, nectarines), whole grains (oatmeal, oat bran, all-bran cereal, granola, shredded wheat, wheat germ, alvina barley, brown rice), legumes (northern/sharma/navy/lima/kidney/black beans, peas, lentils), seeds (psyllium), and vegetables (carrots, broccoli, brussels sprouts, artichokes).     -------------------------------------------------------------------------------------------------------    Consider watching the movies to learn how our body processes American food:    FORKS OVER KNIVES    FAT SICK and NEARLY DEAD     SUPERSIZE ME    BOOK RECOMMENDATIONS: Prevent and Reverse Heart Disease,  by Sergio  MD Delon       ---------------------------------------------------------------------------------------------------------    GET MOVING-- Walking & being active (20 minutes most days of the week). [email or web address]    This is the best way to strengthen & protect your heart & all blood vessels in your body .     Cholesterol is significantly lowered with these dietary changes & exercise.     If you do the above & would like to recheck your cholesterol to see your progress -- just let me know.     Due for  Vaccines  -   Your insurance will pay for certain vaccines only when administered by your pharmacy. We sent a prescription to your pharmacy    ================================  RECOMMENDATIONS FOR MALES   ================================    Prevent the #1 cause of death- cardiovascular disease (HEART ATTACK & STROKE) by checking for normal blood pressure, cholesterol, sugars, & by not smoking.     Yearly FLU shot, ONE pneumonia shot after 65, ONE Shingles vaccine after 60    Screening colonoscopy every 10 years to check for COLON CANCER,  one of the most common & preventable cancers. Or FIT z12.11. Repeat in 3 years if POLYP found    PROSTATE CANCER screening is controversial. We can discuss this & consider checking PSA from 55-69 years.     If you EVER SMOKED - Abdominal Aortic Aneurysm ultrasound once age 65-75      I recommend  high fiber (5 fresh fruits or vegetables daily), low fat diet and aerobic  exercise (huffing/ puffing/ sweating for 20 min straight at least 4 days a week)    Follow up yearly with fasting lipids, CMP, CBC, TSH prior

## 2017-10-31 NOTE — PROGRESS NOTES
Subjective:      Patient ID: Adan Cain is a 67 y.o. male.    Chief Complaint: Annual Exam    Here today for general exam.     He still has some pain in Rlower abdomen, but he feels it's from his belt putting pressure.     He does not exercise. He does not drink water, but enjoys coffee & tea.     Denies any chest pain, shortness of breath, nausea vomiting constipation diarrhea, blood in stool, heartburn    The 10-year ASCVD risk score (Glenn BENSON Jr., et al., 2013) is: 15.5%    Values used to calculate the score:      Age: 67 years      Sex: Male      Is Non- : No      Diabetic: No      Tobacco smoker: No      Systolic Blood Pressure: 120 mmHg      Is BP treated: No      HDL Cholesterol: 40 mg/dL      Total Cholesterol: 170 mg/dL      No results found for: HGBA1C  No results found for: MICALBCREAT  Lab Results   Component Value Date    LDLCALC 115.4 10/24/2017    LDLCALC 122.4 01/20/2015    CHOL 170 10/24/2017    HDL 40 10/24/2017    TRIG 73 10/24/2017       Lab Results   Component Value Date     10/24/2017    K 4.7 10/24/2017     10/24/2017    CO2 27 10/24/2017     10/24/2017    BUN 22 10/24/2017    CREATININE 0.8 10/24/2017    CALCIUM 9.6 10/24/2017    PROT 7.6 10/24/2017    ALBUMIN 3.4 (L) 10/24/2017    BILITOT 0.8 10/24/2017    ALKPHOS 116 10/24/2017    AST 18 10/24/2017    ALT 20 10/24/2017    ANIONGAP 9 10/24/2017    ESTGFRAFRICA >60.0 10/24/2017    EGFRNONAA >60.0 10/24/2017    WBC 8.20 10/24/2017    HGB 14.2 10/24/2017    HCT 43.8 10/24/2017    MCV 94 10/24/2017     10/24/2017    TSH 4.704 (H) 10/24/2017         Current Outpatient Prescriptions on File Prior to Visit   Medication Sig    BABY ASPIRIN ORAL Take by mouth every evening.     cholecalciferol, vitamin D3, 1,000 unit capsule Take 1,000 Units by mouth 2 (two) times daily.     coenzyme Q10 (CO Q-10) 100 mg capsule Take by mouth once daily.    fluticasone (FLONASE) 50 mcg/actuation nasal spray  "1 spray by Each Nare route once daily. (Patient taking differently: 1 spray by Each Nare route continuous prn. )    glucosamine-chondroitin 500-400 mg tablet Take 1 tablet by mouth 3 (three) times daily.    loratadine (CLARITIN) 10 mg tablet Take 10 mg by mouth daily as needed for Allergies.    MULTIVITAMIN W-MINERALS/LUTEIN (CENTRUM SILVER ORAL) Take by mouth.    omega-3 fatty acids (FISH OIL) 300 mg Cap Take by mouth.    UNABLE TO FIND Take by mouth nightly. tumeric    UNABLE TO FIND once daily. actaline    UNABLE TO FIND Use as directed 1 tablet in the mouth or throat 2 (two) times daily. Prostate Revive manufactured by [employer]    VITAMIN B COMPLEX (SUPER B COMPLEX-B-12 ORAL) Take by mouth.    diclofenac sodium 1 % Gel Apply 2 g topically 4 (four) times daily.     No current facility-administered medications on file prior to visit.      Past Medical History:   Diagnosis Date    Allergic rhinitis 4/13/2016    BPH (benign prostatic hyperplasia)     Urology Dr Zamora, OTC prostate revive helps, avodart caused chest pains    Calculus of gallbladder     US 2016    DJD (degenerative joint disease) of hip 1/29/2015    [place] cardiac risk 10-20% in next 10 years 10/31/2017    recommended statin, but he declines    Morbid obesity with BMI of 40.0-44.9, adult 1/29/2015     Past Surgical History:   Procedure Laterality Date    CATARACT EXTRACTION      HERNIA REPAIR       Social History     Social History Narrative     to 'T', 2 children, nonsmoker, ETOH none, never had colonscopy & declines     Family History   Problem Relation Age of Onset    Leukemia Father     Aneurysm Father     Diabetes Mother      Vitals:    10/31/17 1359   BP: 120/66   Pulse: 88   Temp: 98 °F (36.7 °C)   Weight: (!) 141.9 kg (312 lb 13.3 oz)   Height: 6' 0.5" (1.842 m)     Objective:   Physical Exam   Constitutional: He is oriented to person, place, and time. He appears well-developed and well-nourished.   HENT:   Head: " Normocephalic and atraumatic.   Right Ear: External ear normal.   Left Ear: External ear normal.   Nose: Nose normal.   Mouth/Throat: Oropharynx is clear and moist.   Eyes: EOM are normal. Pupils are equal, round, and reactive to light.   Neck: Neck supple. No thyromegaly present.   Cardiovascular: Normal rate, regular rhythm, normal heart sounds and intact distal pulses.    No murmur heard.  Pulmonary/Chest: Effort normal and breath sounds normal. He has no wheezes.   Abdominal: Soft. Bowel sounds are normal. He exhibits no distension and no mass. There is no tenderness. There is no rebound and no guarding.   Large pannus   Musculoskeletal: He exhibits no edema.   Lymphadenopathy:     He has no cervical adenopathy.   Neurological: He is alert and oriented to person, place, and time.   Skin: Skin is warm and dry.   Psychiatric: He has a normal mood and affect. His behavior is normal.     Assessment:     1. Routine general medical examination at a health care facility    2. Immunization deficiency    3. Morbid obesity with BMI of 40.0-44.9, adult    4. [employer] cardiac risk 10-20% in next 10 years    5. Benign prostatic hyperplasia with urinary obstruction    6. Need for prophylactic vaccination and inoculation against influenza    7. Ingrowing nail      Plan:     Orders Placed This Encounter    Flu Vaccine - High Dose (PF) (65+)    Ambulatory referral to Podiatry    DIPH,BJORN XIAO,,TET VAC,PF, ADULT (ADACEL) 2 Lf-(2.5-5-3-5 mcg)-5Lf/0.5 mL Syrg       Patient Instructions   ==========================  Your 10 year risk of future heart attack / stroke ([employer] risk)     Goal is < 10%    If your risk is > 10%, I  recommend statin medication daily (lipitor, pravachol, crestor) . He declines this    ACC/AHA , ages 40 to 75 , treat if ?7.5% risk  [place]'s National [place] for Health and Clinical Excellence (NICE) recommend statin medication (lipitor, pravachol, crestor) , treat if > 10%       --------------------------  WATER BALANCE-  Your body systems work best with 64 ounces DAILY (HALF A GALLON DAILY)  - to flush out impurities & cleanse our organs. For every 8 ounces of caffeine you drink, ADD ANOTHER CUP of water to your daily water needs. (2 cups of coffee and one diet coke = you need 11 glasses of water a day). We were not made to drink sugary drinks  - not even artificial sweeteners. You'll notice a difference in your brain function, energy level & overall wellness. Your liver & kidney will thank you too for keeping them properly cleansed  ---------------------------      ====================    Increase FIBER INTAKE - your body is happiest with FIVE FRESH COLORS of fruits and  vegetables DAILY    With respect to FIBER intake, you should have 5-10 grams of viscous soluble fiber daily. This  Includes fruit (bananas, apples, pears, blackberries, citrus, peaches, plums, nectarines), whole grains (oatmeal, oat bran, all-bran cereal, granola, shredded wheat, wheat germ, alvina barley, brown rice), legumes (northern/sharma/navy/lima/kidney/black beans, peas, lentils), seeds (psyllium), and vegetables (carrots, broccoli, brussels sprouts, artichokes).     -------------------------------------------------------------------------------------------------------    Consider watching the movies to learn how our body processes American food:    FORKS OVER KNIVES    FAT SICK and NEARLY DEAD     SUPERSIZE ME    BOOK RECOMMENDATIONS: Prevent and Reverse Heart Disease,  by Hill Essel[place]ne, MD       ---------------------------------------------------------------------------------------------------------    GET MOVING-- Walking & being active (20 minutes most days of the week). [email or web address]    This is the best way to strengthen & protect your heart & all blood vessels in your body .     Cholesterol is significantly lowered with these dietary changes & exercise.     If you do the above & would like to  recheck your cholesterol to see your progress -- just let me know.     Due for  Vaccines  -   Your insurance will pay for certain vaccines only when administered by your pharmacy. We sent a prescription to your pharmacy    ================================  RECOMMENDATIONS FOR MALES   ================================    Prevent the #1 cause of death- cardiovascular disease (HEART ATTACK & STROKE) by checking for normal blood pressure, cholesterol, sugars, & by not smoking.     Yearly FLU shot, ONE pneumonia shot after 65, ONE Shingles vaccine after 60    Screening colonoscopy every 10 years to check for COLON CANCER,  one of the most common & preventable cancers. Or FIT z12.11. Repeat in 3 years if POLYP found    PROSTATE CANCER screening is controversial. We can discuss this & consider checking PSA from 55-69 years.     If you EVER SMOKED - Abdominal Aortic Aneurysm ultrasound once age 65-75      I recommend  high fiber (5 fresh fruits or vegetables daily), low fat diet and aerobic  exercise (huffing/ puffing/ sweating for 20 min straight at least 4 days a week)    Follow up yearly with fasting lipids, CMP, CBC, TSH prior                                    Answers for HPI/ROS submitted by the patient on 10/29/2017   activity change: No  unexpected weight change: No  neck pain: No  hearing loss: No  rhinorrhea: No  trouble swallowing: No  eye discharge: No  visual disturbance: No  chest tightness: No  wheezing: No  chest pain: No  palpitations: No  blood in stool: No  constipation: No  vomiting: No  diarrhea: No  polydipsia: No  polyuria: No  difficulty urinating: No  urgency: No  hematuria: No  joint swelling: No  arthralgias: No  headaches: No  weakness: No  confusion: No  dysphoric mood: No

## 2017-11-07 ENCOUNTER — OFFICE VISIT (OUTPATIENT)
Dept: PODIATRY | Facility: CLINIC | Age: 67
End: 2017-11-07
Payer: MEDICARE

## 2017-11-07 VITALS — HEIGHT: 72 IN | WEIGHT: 312 LBS | BODY MASS INDEX: 42.26 KG/M2

## 2017-11-07 DIAGNOSIS — M79.675 PAIN OF TOE OF LEFT FOOT: Primary | ICD-10-CM

## 2017-11-07 DIAGNOSIS — L60.0 INGROWN NAIL: ICD-10-CM

## 2017-11-07 DIAGNOSIS — B35.1 DERMATOPHYTOSIS OF NAIL: ICD-10-CM

## 2017-11-07 PROCEDURE — 99999 PR PBB SHADOW E&M-EST. PATIENT-LVL II: CPT | Mod: PBBFAC,,, | Performed by: PODIATRIST

## 2017-11-07 PROCEDURE — 99499 UNLISTED E&M SERVICE: CPT | Mod: S$GLB,,, | Performed by: PODIATRIST

## 2017-11-07 PROCEDURE — 11750 EXCISION NAIL&NAIL MATRIX: CPT | Mod: TA,S$GLB,, | Performed by: PODIATRIST

## 2017-11-07 RX ORDER — CICLOPIROX 80 MG/ML
SOLUTION TOPICAL NIGHTLY
Qty: 6.6 ML | Refills: 10 | Status: SHIPPED | OUTPATIENT
Start: 2017-11-07 | End: 2019-01-16 | Stop reason: ALTCHOICE

## 2017-11-07 NOTE — PROGRESS NOTES
CC:  Ingrown toenail      HPI:   Patient is a 67 y.o. male with complaints of painful toenail on the left hallux.  The pain started over a year. .  Patient denies acute trauma to the toe.    Home treatment:  clipping    Past Medical History:   Diagnosis Date    Allergic rhinitis 4/13/2016    BPH (benign prostatic hyperplasia)     Urology Dr Zamora, OTC prostate revive helps, avodart caused chest pains    Calculus of gallbladder     US 2016    DJD (degenerative joint disease) of hip 1/29/2015    [place] cardiac risk 10-20% in next 10 years 10/31/2017    recommended statin, but he declines    Morbid obesity with BMI of 40.0-44.9, adult 1/29/2015       Current Outpatient Prescriptions on File Prior to Visit   Medication Sig Dispense Refill    BABY ASPIRIN ORAL Take by mouth every evening.       cholecalciferol, vitamin D3, 1,000 unit capsule Take 1,000 Units by mouth 2 (two) times daily.       coenzyme Q10 (CO Q-10) 100 mg capsule Take by mouth once daily.      fluticasone (FLONASE) 50 mcg/actuation nasal spray 1 spray by Each Nare route once daily. (Patient taking differently: 1 spray by Each Nare route continuous prn. ) 16 g 12    glucosamine-chondroitin 500-400 mg tablet Take 1 tablet by mouth 3 (three) times daily.      loratadine (CLARITIN) 10 mg tablet Take 10 mg by mouth daily as needed for Allergies.      MULTIVITAMIN W-MINERALS/LUTEIN (CENTRUM SILVER ORAL) Take by mouth.      omega-3 fatty acids (FISH OIL) 300 mg Cap Take by mouth.      UNABLE TO FIND Take by mouth nightly. tumeric      UNABLE TO FIND once daily. actaline      UNABLE TO FIND Use as directed 1 tablet in the mouth or throat 2 (two) times daily. Prostate Revive manufactured by [employer]      VITAMIN B COMPLEX (SUPER B COMPLEX-B-12 ORAL) Take by mouth.      diclofenac sodium 1 % Gel Apply 2 g topically 4 (four) times daily. 400 g 0     No current facility-administered medications on file prior to visit.         ALLG:  Review of  patient's allergies indicates:   Allergen Reactions    Avodart [dutasteride]     Naproxen     Ragweed     Synthroid [levothyroxine] Rash             ROS:  General ROS: negative for chills, fatigue or fever  Cardiovascular ROS: no chest pain or dyspnea on exertion  Musculoskeletal ROS: negative for - joint pain or joint stiffness.  Negative for loss of strength  Neuro ROS: Negative for syncope, numbness, or muscle weakness  Skin ROS: Negative for rash, itching or hair changes.  +Toenail changes      EXAM:   Vitals:    11/07/17 0922   Weight: (!) 141.5 kg (312 lb)   Height: 6' (1.829 m)       Bilateral LOWER EXTREMITY EXAM:    Vascular:  Dorsalis pedis and posterior tibial pulses are palpable. capillary refill time is within normal limits and toes are warm to touch.  There is  presence of digital hair.  There is  mild localized edema to the affected area.     Neurological:  Light touch, proprioception, and sharp/dull sensation are all intact.     Dermatological:  The toenail is incurvated, Lateral border of the Left hallux.    no erythema noted to the affected area.     Absent paronychia.     Absent abscess    Musculoskeletal:  Muscle strength is 5/5 in all groups .    overlapping toes        ASSESSMENT/PLAN     I counseled the patient on the patient's  conditions, their implications and medical management.       Pain of toe of left foot    Ingrown nail - Left Foot    Dermatophytosis of nail  -     ciclopirox (PENLAC) 8 % Soln; Apply topically nightly. To fungal toenail  Dispense: 6.6 mL; Refill: 10    Other orders  -     Nail Removal           Nail Removal  Date/Time: 11/7/2017 9:53 AM  Performed by: LEONIDES VALERO  Authorized by: LEONIDES VALERO     Consent Done?:  Yes (Written)    Location:  Left foot  Location detail:  Left big toe  Anesthesia:  Local infiltration  Local anesthetic: lidocaine 1% without epinephrine and bupivacaine 0.5% without epinephrine  Anesthetic total (ml):  3  Preparation:  Skin prepped  with Betadine  Nail removed location: lateral border.  Wedge excision of skin of nail fold: No    Nail bed sutured?: No    Nail matrix removed:  Partial (phenol was used)  Dressing applied:  Antibiotic ointment and dressing applied  Patient tolerance:  Patient tolerated the procedure well with no immediate complications          Verbal and written post operative instructions were provided to the patient.       Patient to monitor for any adverse reactions and Return in about 10 days (around 11/17/2017) for post-op, or sooner if concerned.

## 2017-11-07 NOTE — LETTER
November 7, 2017      Gina Reyes MD  [address]  [facility] [address]           [place] [facility]  [address] [facility] [address]  Phone: [phone]          Patient: Adan Cain   MR Number: [id number]   YOB: 1950   Date of Visit: 11/7/2017       Dear Dr. Gina Reyes:    Thank you for referring Adan Cain to me for evaluation. Attached you will find relevant portions of my assessment and plan of care.    If you have questions, please do not hesitate to call me. I look forward to following Adan Cain along with you.    Sincerely,    Ledy Persaud DPM    Enclosure  CC:  No Recipients    If you would like to receive this communication electronically, please contact [email or web address] or [phone] to request more information on [employer] access.    For providers and/or their staff who would like to refer a patient to [facility], please contact us through our one-stop-shop provider referral line, [facility] , at [phone].    If you feel you have received this communication in error or would no longer like to receive these types of communications, please e-mail [email or web address]

## 2017-11-21 ENCOUNTER — OFFICE VISIT (OUTPATIENT)
Dept: PODIATRY | Facility: CLINIC | Age: 67
End: 2017-11-21
Payer: MEDICARE

## 2017-11-21 VITALS
HEIGHT: 72 IN | DIASTOLIC BLOOD PRESSURE: 66 MMHG | HEART RATE: 60 BPM | SYSTOLIC BLOOD PRESSURE: 120 MMHG | WEIGHT: 312 LBS | BODY MASS INDEX: 42.26 KG/M2

## 2017-11-21 DIAGNOSIS — L60.0 INGROWN NAIL: ICD-10-CM

## 2017-11-21 DIAGNOSIS — Z09 FOLLOW-UP EXAM AFTER TREATMENT: Primary | ICD-10-CM

## 2017-11-21 PROCEDURE — 99999 PR PBB SHADOW E&M-EST. PATIENT-LVL III: CPT | Mod: PBBFAC,,, | Performed by: PODIATRIST

## 2017-11-21 PROCEDURE — 99024 POSTOP FOLLOW-UP VISIT: CPT | Mod: S$GLB,,, | Performed by: PODIATRIST

## 2017-11-21 NOTE — PROGRESS NOTES
S:   67 y.o. male returns for follow up s/p ingrown toenail procedure - left hallux .  Patient is healing well, no complaints. Has been keeping the toe covered as instructed. Patient has no pain today. Patient denies constitutional symptoms.     O:   Vitals:    11/21/17 0941   BP: 120/66   Pulse: 60   Weight: (!) 141.5 kg (312 lb)   Height: 6' (1.829 m)      S/p  left hallux ingrown toenail matrixectomy. no erythema;  no ascending cellulitis. no swelling. No drainage.  The site is healing well. No signs of infection. Pedal pulses are palpable. Range of motion intact. no tenderness to palpation.     A/P:   1. Follow-up exam after treatment     2. Ingrown nail - Left Foot          S/p left hallux ingrown toenail chemical matrixectomy. Patient is healing well.  Return to regular activities as tolerated.   Call or return to clinic prn if these symptoms worsen or fail to improve as anticipated.

## 2017-11-28 ENCOUNTER — OFFICE VISIT (OUTPATIENT)
Dept: SURGERY | Facility: CLINIC | Age: 67
End: 2017-11-28
Payer: MEDICARE

## 2017-11-28 VITALS
OXYGEN SATURATION: 94 % | TEMPERATURE: 98 F | BODY MASS INDEX: 42.09 KG/M2 | HEIGHT: 72 IN | SYSTOLIC BLOOD PRESSURE: 139 MMHG | DIASTOLIC BLOOD PRESSURE: 74 MMHG | HEART RATE: 79 BPM | WEIGHT: 310.75 LBS

## 2017-11-28 DIAGNOSIS — Z98.890 S/P INGUINAL HERNIA REPAIR USING SYNTHETIC PATCH: ICD-10-CM

## 2017-11-28 DIAGNOSIS — N43.3 HYDROCELE, RIGHT: ICD-10-CM

## 2017-11-28 DIAGNOSIS — E66.01 SEVERE OBESITY (BMI >= 40): ICD-10-CM

## 2017-11-28 DIAGNOSIS — Z87.19 S/P INGUINAL HERNIA REPAIR USING SYNTHETIC PATCH: ICD-10-CM

## 2017-11-28 DIAGNOSIS — R10.31 RIGHT GROIN PAIN: Primary | ICD-10-CM

## 2017-11-28 PROCEDURE — 99215 OFFICE O/P EST HI 40 MIN: CPT | Mod: S$GLB,,, | Performed by: SURGERY

## 2017-11-28 PROCEDURE — 99499 UNLISTED E&M SERVICE: CPT | Mod: S$GLB,,, | Performed by: SURGERY

## 2017-11-28 PROCEDURE — 99999 PR PBB SHADOW E&M-EST. PATIENT-LVL III: CPT | Mod: PBBFAC,,, | Performed by: SURGERY

## 2017-11-28 NOTE — PROGRESS NOTES
"Subjective:       Patient ID: Adan Cain is a 67 y.o. male.    Chief Complaint: Follow-up    HPI   Patient presents for reevaluation of right groin pain/right lateral abd and right lumbar back pain.    He is status post right inguinal hernia repair and hydrocele repair at [facility] in 11/2016 with Ray Zamora and Kiko.  He states he developed intermittent discomfort in the right inguinal region several weeks postop.  This was intermittent and mildly bothersome. He was last seen by his surgeons in June 2017.  He presented to my office 9/2017 for eval of this discomfort and U/S ordered which did not reveal any abnormality.  He admits to continued weight gain and had recent in-grown toenail surgery.  He reports that he was bent over/ kneeling checking a flat tire approx 2 wk ago and thinks he "tore a muscle" or re-injured the groin when he tried to stand up.  He applied all of his weight on the RLE due to the L foot in a postop boot.  He twisted his torso during this process and felt pain in the right pubis/right lateral abdomen and right lumbar region.  Since then, the discomfort is intermittent, worse with movt and has not really improved.  He has not tried anything to help. No NSAIDs. No heating pads. Rare stretching.  He has not noticed any bulge in the groin or any abnormal skin changes. Denies constipation or diarrhea.    Review of Systems    All systems were reviewed and are negative, except that mentioned in the HPI.    Objective:      Physical Exam   Constitutional: He is oriented to person, place, and time. He appears well-developed and well-nourished. No distress.   HENT:   Head: Normocephalic and atraumatic.   Eyes: Conjunctivae and EOM are normal. Pupils are equal, round, and reactive to light. No scleral icterus.   Neck: Normal range of motion. Neck supple. No JVD present.   Cardiovascular: Normal rate and regular rhythm.    Pulmonary/Chest: Effort normal and breath sounds normal. No respiratory " distress.   Abdominal: Soft. Bowel sounds are normal. He exhibits no distension and no mass. Tenderness: sensitivity noted in ther right pubic region. There is no rebound and no guarding. No hernia.   Mild ttp inf to RIHR incision, mild ttp to the right lateral abdomen and R lumbar paravertebral muscles, no skin changes   Musculoskeletal: Normal range of motion. He exhibits no edema or tenderness.   Neurological: He is alert and oriented to person, place, and time. No cranial nerve deficit.   Skin: Skin is warm and dry. He is not diaphoretic.   Psychiatric: He has a normal mood and affect.       US 9/18/17 images and report were personally reviewed.  Multiple images were acquired of the right inguinal region.  There is no hernia identified.    Assessment:       1. Right groin pain    2. Severe obesity (BMI >= 40)    3. S/P inguinal hernia repair using synthetic patch    4. Hydrocele, right        Plan:   No obvious hernia palpated on physical exam.  Repeat U/S ordered. Discussed that this is likely musculoskeletal as he has lateral abd and r lower back discomfort now as well.  Encouraged weight loss again, as he unfortunately continues to gain weight, which may contribute to the discomfort. Encouraged the use of heating pad and anti-inflammatories for several days an monitor for improvement. Can consider CT if discomfort persists and U/S neg. Encourage f/u with his original surgeons as they are likely unaware of his postop discomfort. Pt will consider this. We will call pt with his U/S results.  All questions were answered.

## 2017-12-05 ENCOUNTER — HOSPITAL ENCOUNTER (OUTPATIENT)
Dept: RADIOLOGY | Facility: HOSPITAL | Age: 67
Discharge: HOME | End: 2017-12-05
Attending: SURGERY
Payer: MEDICARE

## 2017-12-05 DIAGNOSIS — Z87.19 S/P INGUINAL HERNIA REPAIR USING SYNTHETIC PATCH: ICD-10-CM

## 2017-12-05 DIAGNOSIS — E66.01 SEVERE OBESITY (BMI >= 40): ICD-10-CM

## 2017-12-05 DIAGNOSIS — Z98.890 S/P INGUINAL HERNIA REPAIR USING SYNTHETIC PATCH: ICD-10-CM

## 2017-12-05 DIAGNOSIS — N43.3 HYDROCELE, RIGHT: ICD-10-CM

## 2017-12-05 DIAGNOSIS — R10.31 RIGHT GROIN PAIN: ICD-10-CM

## 2017-12-05 PROCEDURE — 76705 ECHO EXAM OF ABDOMEN: CPT | Mod: 26,,, | Performed by: RADIOLOGY

## 2017-12-05 PROCEDURE — 76705 ECHO EXAM OF ABDOMEN: CPT | Mod: TC

## 2018-01-31 ENCOUNTER — OFFICE VISIT (OUTPATIENT)
Dept: FAMILY MEDICINE | Facility: CLINIC | Age: 68
End: 2018-01-31
Payer: MEDICARE

## 2018-01-31 VITALS
WEIGHT: 310.88 LBS | RESPIRATION RATE: 20 BRPM | HEIGHT: 72 IN | TEMPERATURE: 99 F | HEART RATE: 88 BPM | BODY MASS INDEX: 42.11 KG/M2 | DIASTOLIC BLOOD PRESSURE: 86 MMHG | SYSTOLIC BLOOD PRESSURE: 138 MMHG

## 2018-01-31 DIAGNOSIS — R53.83 FATIGUE, UNSPECIFIED TYPE: ICD-10-CM

## 2018-01-31 DIAGNOSIS — J34.89 SINUS PRESSURE: Primary | ICD-10-CM

## 2018-01-31 DIAGNOSIS — J30.2 ACUTE SEASONAL ALLERGIC RHINITIS, UNSPECIFIED TRIGGER: ICD-10-CM

## 2018-01-31 DIAGNOSIS — E66.01 MORBID OBESITY WITH BMI OF 40.0-44.9, ADULT: ICD-10-CM

## 2018-01-31 PROCEDURE — 1159F MED LIST DOCD IN RCRD: CPT | Mod: S$GLB,,, | Performed by: FAMILY MEDICINE

## 2018-01-31 PROCEDURE — 96372 THER/PROPH/DIAG INJ SC/IM: CPT | Mod: S$GLB,,, | Performed by: FAMILY MEDICINE

## 2018-01-31 PROCEDURE — 99214 OFFICE O/P EST MOD 30 MIN: CPT | Mod: 25,S$GLB,, | Performed by: FAMILY MEDICINE

## 2018-01-31 PROCEDURE — 99999 PR PBB SHADOW E&M-EST. PATIENT-LVL III: CPT | Mod: PBBFAC,,, | Performed by: FAMILY MEDICINE

## 2018-01-31 PROCEDURE — 99499 UNLISTED E&M SERVICE: CPT | Mod: S$GLB,,, | Performed by: FAMILY MEDICINE

## 2018-01-31 PROCEDURE — 3008F BODY MASS INDEX DOCD: CPT | Mod: S$GLB,,, | Performed by: FAMILY MEDICINE

## 2018-01-31 RX ORDER — TRIAMCINOLONE ACETONIDE 40 MG/ML
40 INJECTION, SUSPENSION INTRA-ARTICULAR; INTRAMUSCULAR
Status: COMPLETED | OUTPATIENT
Start: 2018-01-31 | End: 2018-01-31

## 2018-01-31 RX ADMIN — TRIAMCINOLONE ACETONIDE 40 MG: 40 INJECTION, SUSPENSION INTRA-ARTICULAR; INTRAMUSCULAR at 09:01

## 2018-01-31 NOTE — PROGRESS NOTES
Two patient identifiers used, allergies reviewed, medication confirmed.  Triamcinolone 40 mg administered IM RUOQ.  Pt tolerated well, no redness or bruising at injection site.  Pt advised to remain in clinic 15 mins following injection for observation.

## 2018-01-31 NOTE — PROGRESS NOTES
Subjective:      Patient ID: Adan Cain is a 67 y.o. male.    Chief Complaint: No chief complaint on file.    Here today for general exam.     Denies any chest pain, shortness of breath, nausea vomiting constipation diarrhea, blood in stool, heartburn      No results found for: HGBA1C  No results found for: MICALBCREAT  Lab Results   Component Value Date    LDLCALC 115.4 10/24/2017    LDLCALC 122.4 01/20/2015    CHOL 170 10/24/2017    HDL 40 10/24/2017    TRIG 73 10/24/2017       Lab Results   Component Value Date     10/24/2017    K 4.7 10/24/2017     10/24/2017    CO2 27 10/24/2017     10/24/2017    BUN 22 10/24/2017    CREATININE 0.8 10/24/2017    CALCIUM 9.6 10/24/2017    PROT 7.6 10/24/2017    ALBUMIN 3.4 (L) 10/24/2017    BILITOT 0.8 10/24/2017    ALKPHOS 116 10/24/2017    AST 18 10/24/2017    ALT 20 10/24/2017    ANIONGAP 9 10/24/2017    ESTGFRAFRICA >60.0 10/24/2017    EGFRNONAA >60.0 10/24/2017    WBC 8.20 10/24/2017    HGB 14.2 10/24/2017    HCT 43.8 10/24/2017    MCV 94 10/24/2017     10/24/2017    TSH 4.704 (H) 10/24/2017    PSADIAG 2.8 06/12/2017         Current Outpatient Prescriptions on File Prior to Visit   Medication Sig    BABY ASPIRIN ORAL Take by mouth every evening.     cholecalciferol, vitamin D3, 1,000 unit capsule Take 1,000 Units by mouth 2 (two) times daily.     ciclopirox (PENLAC) 8 % Soln Apply topically nightly. To fungal toenail    coenzyme Q10 (CO Q-10) 100 mg capsule Take by mouth once daily.    diclofenac sodium 1 % Gel Apply 2 g topically 4 (four) times daily.    fluticasone (FLONASE) 50 mcg/actuation nasal spray 1 spray by Each Nare route once daily. (Patient taking differently: 1 spray by Each Nare route continuous prn. )    glucosamine-chondroitin 500-400 mg tablet Take 1 tablet by mouth 3 (three) times daily.    loratadine (CLARITIN) 10 mg tablet Take 10 mg by mouth daily as needed for Allergies.    MULTIVITAMIN W-MINERALS/LUTEIN  (CENTRUM SILVER ORAL) Take by mouth.    omega-3 fatty acids (FISH OIL) 300 mg Cap Take by mouth.    UNABLE TO FIND Take by mouth nightly. tumeric    UNABLE TO FIND once daily. actaline    UNABLE TO FIND Use as directed 1 tablet in the mouth or throat 2 (two) times daily. Prostate Revive manufactured by [employer]    VITAMIN B COMPLEX (SUPER B COMPLEX-B-12 ORAL) Take by mouth.     No current facility-administered medications on file prior to visit.      Past Medical History:   Diagnosis Date    Allergic rhinitis 4/13/2016    BPH (benign prostatic hyperplasia)     Urology Dr Zamora, OTC prostate revive helps, avodart caused chest pains    Calculus of gallbladder     US 2016    DJD (degenerative joint disease) of hip 1/29/2015    [place] cardiac risk 10-20% in next 10 years 10/31/2017    recommended statin, but he declines    Morbid obesity with BMI of 40.0-44.9, adult 1/29/2015     Past Surgical History:   Procedure Laterality Date    CATARACT EXTRACTION      HERNIA REPAIR       Social History     Social History Narrative     to 'T', 2 children, nonsmoker, ETOH none, never had colonscopy & declines     Family History   Problem Relation Age of Onset    Leukemia Father     Aneurysm Father     Diabetes Mother      Vitals:    01/31/18 0841 01/31/18 0842   BP:  138/86   Pulse: 88    Resp:  20   Temp:  98.7 °F (37.1 °C)   Weight: (!) 141 kg (310 lb 13.6 oz)    Height:  6' (1.829 m)     Objective:   Physical Exam  Assessment:     1. Sinus pressure    2. Acute seasonal allergic rhinitis, unspecified trigger    3. Fatigue, unspecified type    4. Morbid obesity with BMI of 40.0-44.9, adult      Plan:     Orders Placed This Encounter    triamcinolone acetonide injection 40 mg       Patient Instructions   Continue Claritin , FLonase & saline nasal spray    Continue medications    ==========================       An UPPER RESPIRATORY ILLNESS is initially caused by a virus or allergies 95% of the time. The goal  "to help you feel better is drying up the drainage & stopping the cough so the virus can run it's course in about 10 days. If your drainage becomes more thick and worse after 7-10 days of trying the below  over the counter medications, please make an appointment for further evaluation    If you have post nasal drip , "runny nose" or sore throat from clearing your throat :  Take an ANTIHISTAMINE  (Claritin or Zyrtec or Allegra ) AT NIGHT    Nasal Saline: Tilt head back and squirt into nostril 2-3 times until you taste saline in back of throat. Spit, and blow nose. Do this 4 times, alternating right and left nostril.   Do this routine 2-3 times per day.     Nasacort Nasal spray (steroid spray over the counter) or Flonase (fluticasone prescription)  Twice daily to decrease swelling & post nasal drip ------ very safe , works where the congestion is , & does not interfere with other medication or go through your blood stream    If you still have drainage or ear popping/ pressure/ decreased hearing, and you do NOT have high blood pressure:  You can add a DECONGESTANT like Sudafed (if it doesn't cause palpitations) or Sudafed PE  In the MORNING.     If you have thick mucus drainage from the nose or coughing up thick phlegm:  You can add a MUCOLYTIC like Mucinex (plain)    If your cough persist:  You can add a COUGH SUPPRESSANT like Delsym (dextromethorphan) twice a day    If you have pain, sore throat, fever or chills:  You can alternate 250 mg of  Tylenol with 200mg of   ibuprofen every 3 hours - for the next 3 days  Discontinue and call me if  you have stomach upset    For SORE THROAT , try: Gargle with warm salt water 2-3 times per day.     Drink lots of WATER until your urine is clear because all of the above medications can "dry you out" and cause constipation.     Come & see me  if you are not better in 7-10 days or if your symptoms worsen.                                "

## 2018-01-31 NOTE — PATIENT INSTRUCTIONS
"Continue Claritin , FLonase & saline nasal spray    Continue medications    ==========================       An UPPER RESPIRATORY ILLNESS is initially caused by a virus or allergies 95% of the time. The goal to help you feel better is drying up the drainage & stopping the cough so the virus can run it's course in about 10 days. If your drainage becomes more thick and worse after 7-10 days of trying the below  over the counter medications, please make an appointment for further evaluation    If you have post nasal drip , "runny nose" or sore throat from clearing your throat :  Take an ANTIHISTAMINE  (Claritin or Zyrtec or Allegra ) AT NIGHT    Nasal Saline: Tilt head back and squirt into nostril 2-3 times until you taste saline in back of throat. Spit, and blow nose. Do this 4 times, alternating right and left nostril.   Do this routine 2-3 times per day.     Nasacort Nasal spray (steroid spray over the counter) or Flonase (fluticasone prescription)  Twice daily to decrease swelling & post nasal drip ------ very safe , works where the congestion is , & does not interfere with other medication or go through your blood stream    If you still have drainage or ear popping/ pressure/ decreased hearing, and you do NOT have high blood pressure:  You can add a DECONGESTANT like Sudafed (if it doesn't cause palpitations) or Sudafed PE  In the MORNING.     If you have thick mucus drainage from the nose or coughing up thick phlegm:  You can add a MUCOLYTIC like Mucinex (plain)    If your cough persist:  You can add a COUGH SUPPRESSANT like Delsym (dextromethorphan) twice a day    If you have pain, sore throat, fever or chills:  You can alternate 250 mg of  Tylenol with 200mg of   ibuprofen every 3 hours - for the next 3 days  Discontinue and call me if  you have stomach upset    For SORE THROAT , try: Gargle with warm salt water 2-3 times per day.     Drink lots of WATER until your urine is clear because all of the above " "medications can "dry you out" and cause constipation.     Come & see me  if you are not better in 7-10 days or if your symptoms worsen.    "

## 2018-02-05 RX ORDER — FLUTICASONE PROPIONATE 50 MCG
SPRAY, SUSPENSION (ML) NASAL
Qty: 16 G | Refills: 12 | Status: SHIPPED | OUTPATIENT
Start: 2018-02-05 | End: 2023-03-14

## 2018-03-17 ENCOUNTER — OFFICE VISIT (OUTPATIENT)
Dept: URGENT CARE | Facility: CLINIC | Age: 68
End: 2018-03-17
Payer: MEDICARE

## 2018-03-17 VITALS
HEART RATE: 83 BPM | RESPIRATION RATE: 18 BRPM | SYSTOLIC BLOOD PRESSURE: 161 MMHG | BODY MASS INDEX: 41.99 KG/M2 | HEIGHT: 72 IN | TEMPERATURE: 99 F | OXYGEN SATURATION: 96 % | WEIGHT: 310 LBS | DIASTOLIC BLOOD PRESSURE: 85 MMHG

## 2018-03-17 DIAGNOSIS — J32.9 SINUSITIS, UNSPECIFIED CHRONICITY, UNSPECIFIED LOCATION: Primary | ICD-10-CM

## 2018-03-17 PROCEDURE — 99214 OFFICE O/P EST MOD 30 MIN: CPT | Mod: S$GLB,,, | Performed by: PHYSICIAN ASSISTANT

## 2018-03-17 RX ORDER — AMOXICILLIN AND CLAVULANATE POTASSIUM 875; 125 MG/1; MG/1
1 TABLET, FILM COATED ORAL 2 TIMES DAILY
Qty: 14 TABLET | Refills: 0 | Status: SHIPPED | OUTPATIENT
Start: 2018-03-17 | End: 2018-03-24

## 2018-03-17 NOTE — PATIENT INSTRUCTIONS
-Please take antibiotic to completion.    Below are suggestions for symptomatic relief:   -Tylenol every 4 hours OR ibuprofen every 6 hours as needed for pain/fever.   -Salt water gargles to soothe throat pain.   -Chloroseptic spray also helps to numb throat pain.   -Nasal saline spray reduces inflammation and dryness.   -Warm face compresses to help with facial sinus pain/pressure.   -Vicks vapor rub at night.   -Flonase OTC or Nasacort OTC for nasal congestion.   -Simple foods like chicken noodle soup.   -Delsym helps with coughing at night   -Zyrtec/Claritin during the day & Benadryl at night may help with allergies.     If you DO NOT have Hypertension or any history of palpitations, it is ok to take over the counter Sudafed or Mucinex D or Allegra-D or Claritin-D or Zyrtec-D.  If you do take one of the above, it is ok to combine that with plain over the counter Mucinex or Allegra or Claritin or Zyrtec. If, for example, you are taking Zyrtec -D, you can combine that with Mucinex, but not Mucinex-D.  If you are taking Mucinex-D, you can combine that with plain Allegra or Claritin or Zyrtec.   If you DO have Hypertension or palpitations, it is safe to take Coricidin HBP for relief of sinus symptoms.    Please follow up with your primary care provider within 2-5 days if your signs and symptoms have not resolved or worsen.     If your condition worsens or fails to improve we recommend that you receive another evaluation at the emergency room immediately or contact your primary medical clinic to discuss your concerns.   You must understand that you have received an Urgent Care treatment only and that you may be released before all of your medical problems are known or treated. You, the patient, will arrange for follow up care as instructed.         Sinusitis (Antibiotic Treatment)    The sinuses are air-filled spaces within the bones of the face. They connect to the inside of the nose. Sinusitis is an inflammation of  the tissue lining the sinus cavity. Sinus inflammation can occur during a cold. It can also be due to allergies to pollens and other particles in the air. Sinusitis can cause symptoms of sinus congestion and fullness. A sinus infection causes fever, headache and facial pain. There is often green or yellow drainage from the nose or into the back of the throat (post-nasal drip). You have been given antibiotics to treat this condition.  Home care:  · Take the full course of antibiotics as instructed. Do not stop taking them, even if you feel better.  · Drink plenty of water, hot tea, and other liquids. This may help thin mucus. It also may promote sinus drainage.  · Heat may help soothe painful areas of the face. Use a towel soaked in hot water. Or,  the shower and direct the hot spray onto your face. Using a vaporizer along with a menthol rub at night may also help.   · An expectorant containing guaifenesin may help thin the mucus and promote drainage from the sinuses.  · Over-the-counter decongestants may be used unless a similar medicine was prescribed. Nasal sprays work the fastest. Use one that contains phenylephrine or oxymetazoline. First blow the nose gently. Then use the spray. Do not use these medicines more often than directed on the label or symptoms may get worse. You may also use tablets containing pseudoephedrine. Avoid products that combine ingredients, because side effects may be increased. Read labels. You can also ask the pharmacist for help. (NOTE: Persons with high blood pressure should not use decongestants. They can raise blood pressure.)  · Over-the-counter antihistamines may help if allergies contributed to your sinusitis.    · Do not use nasal rinses or irrigation during an acute sinus infection, unless told to by your health care provider. Rinsing may spread the infection to other sinuses.  · Use acetaminophen or ibuprofen to control pain, unless another pain medicine was prescribed.  (If you have chronic liver or kidney disease or ever had a stomach ulcer, talk with your doctor before using these medicines. Aspirin should never be used in anyone under 18 years of age who is ill with a fever. It may cause severe liver damage.)  · Don't smoke. This can worsen symptoms.  Follow-up care  Follow up with your healthcare provider or our staff if you are not improving within the next week.  When to seek medical advice  Call your healthcare provider if any of these occur:  · Facial pain or headache becoming more severe  · Stiff neck  · Unusual drowsiness or confusion  · Swelling of the forehead or eyelids  · Vision problems, including blurred or double vision  · Fever of 100.4ºF (38ºC) or higher, or as directed by your healthcare provider  · Seizure  · Breathing problems  · Symptoms not resolving within 10 days  Date Last Reviewed: 4/13/2015  © 4587-7225 [employer]. [address] [place] [address], [place] [address]. All rights reserved. This information is not intended as a substitute for professional medical care. Always follow your healthcare professional's instructions.

## 2018-03-17 NOTE — PROGRESS NOTES
Subjective:       Patient ID: Adan Cain is a 68 y.o. male.    Vitals:  height is 6' (1.829 m) and weight is 140.6 kg (310 lb) (abnormal). His oral temperature is 98.5 °F (36.9 °C). His blood pressure is 161/85 (abnormal) and his pulse is 83. His respiration is 18 and oxygen saturation is 96%.     Chief Complaint: Adenopathy    Patient presents with sinus pain behind right eye and sore throat that started 3 weeks ago but has worsened over the past few days.    Sinus Problem   This is a new problem. The current episode started in the past 7 days. The problem has been gradually worsening since onset. There has been no fever. Associated symptoms include congestion, sinus pressure and a sore throat. Pertinent negatives include no chills, coughing, diaphoresis, ear pain, headaches, shortness of breath or sneezing. Past treatments include nothing.     Review of Systems   Constitution: Negative for chills, diaphoresis and fever.   HENT: Positive for congestion, sinus pressure and sore throat. Negative for ear pain and sneezing.    Eyes: Negative for blurred vision.   Cardiovascular: Negative for chest pain.   Respiratory: Negative for cough, shortness of breath, sputum production and wheezing.    Skin: Negative for color change and rash.   Musculoskeletal: Negative for back pain and joint pain.   Gastrointestinal: Negative for abdominal pain, diarrhea, nausea and vomiting.   Neurological: Negative for headaches.   Psychiatric/Behavioral: The patient is not nervous/anxious.        Objective:      Physical Exam   Constitutional: He is oriented to person, place, and time. He appears well-developed and well-nourished. He does not appear ill. No distress.   HENT:   Head: Normocephalic and atraumatic.   Right Ear: Hearing, tympanic membrane, external ear and ear canal normal.   Left Ear: Hearing, tympanic membrane, external ear and ear canal normal.   Nose: Mucosal edema present. Right sinus exhibits maxillary sinus  tenderness and frontal sinus tenderness. Left sinus exhibits no maxillary sinus tenderness and no frontal sinus tenderness.   Mouth/Throat: Uvula is midline. Posterior oropharyngeal erythema (cobblestoning) present. No oropharyngeal exudate or posterior oropharyngeal edema.   Eyes: Conjunctivae, EOM and lids are normal. Right eye exhibits no discharge. Left eye exhibits no discharge.   Neck: Normal range of motion. Neck supple.   Cardiovascular: Normal rate, regular rhythm and normal heart sounds.  Exam reveals no gallop and no friction rub.    No murmur heard.  Pulmonary/Chest: Effort normal and breath sounds normal. No respiratory distress. He has no decreased breath sounds. He has no wheezes. He has no rhonchi. He has no rales.   Musculoskeletal: Normal range of motion.   Lymphadenopathy:        Head (right side): Tonsillar adenopathy present. No submandibular adenopathy present.        Head (left side): No submandibular and no tonsillar adenopathy present.   Neurological: He is alert and oriented to person, place, and time.   Skin: Skin is warm and dry. No rash noted. No erythema.   Psychiatric: He has a normal mood and affect. His behavior is normal.   Nursing note and vitals reviewed.      Assessment:       1. Sinusitis, unspecified chronicity, unspecified location        Plan:         Sinusitis, unspecified chronicity, unspecified location  -     amoxicillin-clavulanate 875-125mg (AUGMENTIN) 875-125 mg per tablet; Take 1 tablet by mouth 2 (two) times daily.  Dispense: 14 tablet; Refill: 0      Patient Instructions   -Please take antibiotic to completion.    Below are suggestions for symptomatic relief:   -Tylenol every 4 hours OR ibuprofen every 6 hours as needed for pain/fever.   -Salt water gargles to soothe throat pain.   -Chloroseptic spray also helps to numb throat pain.   -Nasal saline spray reduces inflammation and dryness.   -Warm face compresses to help with facial sinus pain/pressure.   -Vicks vapor  rub at night.   -Flonase OTC or Nasacort OTC for nasal congestion.   -Simple foods like chicken noodle soup.   -Delsym helps with coughing at night   -Zyrtec/Claritin during the day & Benadryl at night may help with allergies.     If you DO NOT have Hypertension or any history of palpitations, it is ok to take over the counter Sudafed or Mucinex D or Allegra-D or Claritin-D or Zyrtec-D.  If you do take one of the above, it is ok to combine that with plain over the counter Mucinex or Allegra or Claritin or Zyrtec. If, for example, you are taking Zyrtec -D, you can combine that with Mucinex, but not Mucinex-D.  If you are taking Mucinex-D, you can combine that with plain Allegra or Claritin or Zyrtec.   If you DO have Hypertension or palpitations, it is safe to take Coricidin HBP for relief of sinus symptoms.    Please follow up with your primary care provider within 2-5 days if your signs and symptoms have not resolved or worsen.     If your condition worsens or fails to improve we recommend that you receive another evaluation at the emergency room immediately or contact your primary medical clinic to discuss your concerns.   You must understand that you have received an Urgent Care treatment only and that you may be released before all of your medical problems are known or treated. You, the patient, will arrange for follow up care as instructed.         Sinusitis (Antibiotic Treatment)    The sinuses are air-filled spaces within the bones of the face. They connect to the inside of the nose. Sinusitis is an inflammation of the tissue lining the sinus cavity. Sinus inflammation can occur during a cold. It can also be due to allergies to pollens and other particles in the air. Sinusitis can cause symptoms of sinus congestion and fullness. A sinus infection causes fever, headache and facial pain. There is often green or yellow drainage from the nose or into the back of the throat (post-nasal drip). You have been given  antibiotics to treat this condition.  Home care:  · Take the full course of antibiotics as instructed. Do not stop taking them, even if you feel better.  · Drink plenty of water, hot tea, and other liquids. This may help thin mucus. It also may promote sinus drainage.  · Heat may help soothe painful areas of the face. Use a towel soaked in hot water. Or,  the shower and direct the hot spray onto your face. Using a vaporizer along with a menthol rub at night may also help.   · An expectorant containing guaifenesin may help thin the mucus and promote drainage from the sinuses.  · Over-the-counter decongestants may be used unless a similar medicine was prescribed. Nasal sprays work the fastest. Use one that contains phenylephrine or oxymetazoline. First blow the nose gently. Then use the spray. Do not use these medicines more often than directed on the label or symptoms may get worse. You may also use tablets containing pseudoephedrine. Avoid products that combine ingredients, because side effects may be increased. Read labels. You can also ask the pharmacist for help. (NOTE: Persons with high blood pressure should not use decongestants. They can raise blood pressure.)  · Over-the-counter antihistamines may help if allergies contributed to your sinusitis.    · Do not use nasal rinses or irrigation during an acute sinus infection, unless told to by your health care provider. Rinsing may spread the infection to other sinuses.  · Use acetaminophen or ibuprofen to control pain, unless another pain medicine was prescribed. (If you have chronic liver or kidney disease or ever had a stomach ulcer, talk with your doctor before using these medicines. Aspirin should never be used in anyone under 18 years of age who is ill with a fever. It may cause severe liver damage.)  · Don't smoke. This can worsen symptoms.  Follow-up care  Follow up with your healthcare provider or our staff if you are not improving within the next  week.  When to seek medical advice  Call your healthcare provider if any of these occur:  · Facial pain or headache becoming more severe  · Stiff neck  · Unusual drowsiness or confusion  · Swelling of the forehead or eyelids  · Vision problems, including blurred or double vision  · Fever of 100.4ºF (38ºC) or higher, or as directed by your healthcare provider  · Seizure  · Breathing problems  · Symptoms not resolving within 10 days  Date Last Reviewed: 4/13/2015  © 4417-0014 [employer]. [address] [place] [address], [place] [address]. All rights reserved. This information is not intended as a substitute for professional medical care. Always follow your healthcare professional's instructions.

## 2018-05-04 ENCOUNTER — OFFICE VISIT (OUTPATIENT)
Dept: PODIATRY | Facility: CLINIC | Age: 68
End: 2018-05-04
Payer: MEDICARE

## 2018-05-04 VITALS
DIASTOLIC BLOOD PRESSURE: 80 MMHG | BODY MASS INDEX: 41.99 KG/M2 | SYSTOLIC BLOOD PRESSURE: 145 MMHG | HEART RATE: 82 BPM | WEIGHT: 310 LBS | HEIGHT: 72 IN

## 2018-05-04 DIAGNOSIS — M76.821 POSTERIOR TIBIAL TENDINITIS OF RIGHT LEG: ICD-10-CM

## 2018-05-04 DIAGNOSIS — M77.41 METATARSALGIA OF BOTH FEET: ICD-10-CM

## 2018-05-04 DIAGNOSIS — M20.12 VALGUS DEFORMITY OF BOTH GREAT TOES: ICD-10-CM

## 2018-05-04 DIAGNOSIS — Q66.6 PES VALGUS: ICD-10-CM

## 2018-05-04 DIAGNOSIS — M20.42 HAMMER TOES OF BOTH FEET: ICD-10-CM

## 2018-05-04 DIAGNOSIS — L60.0 INGROWN NAIL: Primary | ICD-10-CM

## 2018-05-04 DIAGNOSIS — M20.11 VALGUS DEFORMITY OF BOTH GREAT TOES: ICD-10-CM

## 2018-05-04 DIAGNOSIS — L03.032 PARONYCHIA OF FIFTH TOE OF LEFT FOOT: ICD-10-CM

## 2018-05-04 DIAGNOSIS — M77.42 METATARSALGIA OF BOTH FEET: ICD-10-CM

## 2018-05-04 DIAGNOSIS — B35.1 ONYCHOMYCOSIS DUE TO DERMATOPHYTE: ICD-10-CM

## 2018-05-04 DIAGNOSIS — M20.41 HAMMER TOES OF BOTH FEET: ICD-10-CM

## 2018-05-04 PROCEDURE — 11730 AVULSION NAIL PLATE SIMPLE 1: CPT | Mod: TA,S$GLB,,

## 2018-05-04 PROCEDURE — 99999 PR PBB SHADOW E&M-EST. PATIENT-LVL IV: CPT | Mod: PBBFAC,,,

## 2018-05-04 PROCEDURE — 99214 OFFICE O/P EST MOD 30 MIN: CPT | Mod: 25,S$GLB,,

## 2018-05-04 NOTE — PROGRESS NOTES
Subjective:       Patient ID: Adan Cain is a 68 y.o. male.    Chief Complaint: Ankle Pain (rt ) and Ingrown Toenail (both great toes)    HPI  67 yo male presents to the clinic reporting b/l great ingrown toenails and right medial ankle pain.  He states the left medial great toenail hurts the most and he points to the posterior tibial tendon as hurting him.  No prior treatment.  Nails hurt the touch b/l medial great toenails.  He does put Penlac on his toenails for the fungual infection.    Past Medical History:   Diagnosis Date    Allergic rhinitis 4/13/2016    BPH (benign prostatic hyperplasia)     Urology Dr Zamora, OTC prostate revive helps, avodart caused chest pains    Calculus of gallbladder     US 2016    DJD (degenerative joint disease) of hip 1/29/2015    [place] cardiac risk 10-20% in next 10 years 10/31/2017    recommended statin, but he declines    Morbid obesity with BMI of 40.0-44.9, adult 1/29/2015       Past Surgical History:   Procedure Laterality Date    CATARACT EXTRACTION      HERNIA REPAIR         Family History   Problem Relation Age of Onset    Leukemia Father     Aneurysm Father     Diabetes Mother        Social History     Social History    Marital status:      Spouse name: N/A    Number of children: N/A    Years of education: N/A     Social History Main Topics    Smoking status: Former Smoker     Types: Cigarettes    Smokeless tobacco: Never Used    Alcohol use No    Drug use: No    Sexual activity: Yes     Partners: Female     Other Topics Concern    None     Social History Narrative     to 'T', 2 children, nonsmoker, ETOH none, never had colonscopy & declines       Current Outpatient Prescriptions   Medication Sig Dispense Refill    BABY ASPIRIN ORAL Take by mouth every evening.       cholecalciferol, vitamin D3, 1,000 unit capsule Take 1,000 Units by mouth 2 (two) times daily.       ciclopirox (PENLAC) 8 % Soln Apply topically nightly. To  fungal toenail 6.6 mL 10    coenzyme Q10 (CO Q-10) 100 mg capsule Take by mouth once daily.      fluticasone (FLONASE) 50 mcg/actuation nasal spray PLACE 1 SPRAY INTO EACH NOSTRIL DAILY 16 g 12    glucosamine-chondroitin 500-400 mg tablet Take 1 tablet by mouth 3 (three) times daily.      loratadine (CLARITIN) 10 mg tablet Take 10 mg by mouth daily as needed for Allergies.      MULTIVITAMIN W-MINERALS/LUTEIN (CENTRUM SILVER ORAL) Take by mouth.      omega-3 fatty acids (FISH OIL) 300 mg Cap Take by mouth.      UNABLE TO FIND Take by mouth nightly. tumeric      UNABLE TO FIND once daily. actaline      UNABLE TO FIND Use as directed 1 tablet in the mouth or throat 2 (two) times daily. Prostate Revive manufactured by [employer]      VITAMIN B COMPLEX (SUPER B COMPLEX-B-12 ORAL) Take by mouth.      diclofenac sodium 1 % Gel Apply 2 g topically 4 (four) times daily. 400 g 0     No current facility-administered medications for this visit.        Review of patient's allergies indicates:   Allergen Reactions    Avodart [dutasteride]     Naproxen     Ragweed     Synthroid [levothyroxine] Rash       Review of Systems  ROS:  Constitution: Negative for chills, fever, weakness and malaise/fatigue.   HEENT: Negative for headaches.   Cardiovascular: Negative for chest pain and claudication.   Respiratory: Negative for cough and shortness of breath.   Musculoskeletal: Positive for bilateral foot pain.  Negative for muscle cramps and muscle weakness.   Gastrointestinal: Negative for nausea and vomiting.   Neurological: Negative for numbness and paresthesias.   Dermatological: Negativefor skin rash, Negative for calluses, Positive for fungal nails, Negative for wound.        Objective:      Physical Exam  Constitutional:  Patient is oriented to person, place, and time. Vital signs are normal.  Appears well-developed and well-nourished.     Vascular:  Dorsalis pedis pulses are 2/4 on the right side, and 2/4 on the left side.    Posterior tibial pulses are 2/4 on the right side, and 2/4 on the left side.   Positive for digital hair growth, capillary fill time to all toes <3 seconds, toes are warm touch, trace pedal swelling    Skin/Dermatological:  Skin is warm and intact.  No cyanosis or clubbing.  No rashes noted.  No open wounds.  All ten toenails yellow discolored, thickened 2-4 mm to base with subungual debris.  b/    Musculoskeletal:      Pes planus, tttp right PTT from ankle to insertion.  Hallux abducto valgus bilaterally, hammertoes 2-5 observed.  Pedal rom within normal limits.  (--) ankle joint DF restriction with both knee flexed and extened.    Neurological:  (--) deficits to sharp/dull, light touch or vibratory sensation bilateral feet, ten points tested.   Muscle strength to tibialis anterior, extensor hallucis longus, extensor digitorum longus, peroneal muscles, flexor hallucis/digotorum longus, posterior tibial and gastrosoleal complex is 5/5, normal tone without assymmetry   Patellar reflexes are 2+ on the right side and 2+ on the left side.  Achilles reflexes are 2+ on the right side and 2+ on the left side.      Assessment:       1. Ingrown nail    2. Pes valgus    3. Posterior tibial tendinitis of right leg    4. Valgus deformity of both great toes    5. Hammer toes of both feet    6. Metatarsalgia of both feet    7. Onychomycosis due to dermatophyte    8. Paronychia of fifth toe of left foot        Plan:       Ingrown nail    Pes valgus    Posterior tibial tendinitis of right leg    Valgus deformity of both great toes    Hammer toes of both feet    Metatarsalgia of both feet    Onychomycosis due to dermatophyte    Paronychia of fifth toe of left foot          PTT tendinitis, hammertoes, metatarsalgia, pew valgus.  RICE right foot, obtain Spenco orthotics, wide high toe box shoes and padding.    Paronychia/ingrown b/l medial great toenail: A digital block to the left hallux with 2 1/2 cc of lidocaine 1% plain and  2-1/2 cc of Marcaine 0.75% plain was performed.  Informed consent was obtained with explanation of risks, indications, complications and alternatives.  A timeout was performed.   Laterality of the foot was noted.  A partial toenail avulsion of the left medial toenail was performed.  A slant back of the right medial great toenail was performed.  Silvadene and a dry sterile dressing was placed.  Patient tolerated well. There were no complications. Estimated blood loss was minimal.  Patient was given instructions for soaks with Epson salt and wound care to be done for the next two weeks. Patient will consider a phenol matrixectomy in the future.  Return to clinic 2 weeks.

## 2018-05-08 ENCOUNTER — PES CALL (OUTPATIENT)
Dept: ADMINISTRATIVE | Facility: CLINIC | Age: 68
End: 2018-05-08

## 2018-05-25 ENCOUNTER — OFFICE VISIT (OUTPATIENT)
Dept: URGENT CARE | Facility: CLINIC | Age: 68
End: 2018-05-25
Payer: MEDICARE

## 2018-05-25 ENCOUNTER — OFFICE VISIT (OUTPATIENT)
Dept: PODIATRY | Facility: CLINIC | Age: 68
End: 2018-05-25
Payer: MEDICARE

## 2018-05-25 VITALS
BODY MASS INDEX: 41.99 KG/M2 | WEIGHT: 310 LBS | SYSTOLIC BLOOD PRESSURE: 138 MMHG | TEMPERATURE: 98 F | OXYGEN SATURATION: 96 % | DIASTOLIC BLOOD PRESSURE: 83 MMHG | HEIGHT: 72 IN | HEART RATE: 69 BPM | RESPIRATION RATE: 18 BRPM

## 2018-05-25 VITALS — WEIGHT: 310 LBS | HEIGHT: 72 IN | BODY MASS INDEX: 41.99 KG/M2

## 2018-05-25 DIAGNOSIS — R05.9 COUGH: ICD-10-CM

## 2018-05-25 DIAGNOSIS — M76.821 POSTERIOR TIBIAL TENDINITIS OF RIGHT LEG: ICD-10-CM

## 2018-05-25 DIAGNOSIS — M20.11 VALGUS DEFORMITY OF BOTH GREAT TOES: ICD-10-CM

## 2018-05-25 DIAGNOSIS — L60.0 INGROWN NAIL: Primary | ICD-10-CM

## 2018-05-25 DIAGNOSIS — J32.9 SINUSITIS, UNSPECIFIED CHRONICITY, UNSPECIFIED LOCATION: Primary | ICD-10-CM

## 2018-05-25 DIAGNOSIS — M20.12 VALGUS DEFORMITY OF BOTH GREAT TOES: ICD-10-CM

## 2018-05-25 DIAGNOSIS — Q66.6 PES VALGUS: ICD-10-CM

## 2018-05-25 PROCEDURE — 99999 PR PBB SHADOW E&M-EST. PATIENT-LVL III: CPT | Mod: PBBFAC,,,

## 2018-05-25 PROCEDURE — 99213 OFFICE O/P EST LOW 20 MIN: CPT | Mod: S$GLB,,,

## 2018-05-25 PROCEDURE — 96372 THER/PROPH/DIAG INJ SC/IM: CPT | Mod: S$GLB,,, | Performed by: FAMILY MEDICINE

## 2018-05-25 PROCEDURE — 99214 OFFICE O/P EST MOD 30 MIN: CPT | Mod: 25,S$GLB,, | Performed by: FAMILY MEDICINE

## 2018-05-25 RX ORDER — AZITHROMYCIN 250 MG/1
TABLET, FILM COATED ORAL
Qty: 6 TABLET | Refills: 0 | Status: ON HOLD | OUTPATIENT
Start: 2018-05-25 | End: 2018-05-27

## 2018-05-25 RX ORDER — BETAMETHASONE SODIUM PHOSPHATE AND BETAMETHASONE ACETATE 3; 3 MG/ML; MG/ML
9 INJECTION, SUSPENSION INTRA-ARTICULAR; INTRALESIONAL; INTRAMUSCULAR; SOFT TISSUE
Status: COMPLETED | OUTPATIENT
Start: 2018-05-25 | End: 2018-05-25

## 2018-05-25 RX ORDER — DICLOFENAC SODIUM 10 MG/G
2 GEL TOPICAL 4 TIMES DAILY
Qty: 100 G | Refills: 10 | Status: SHIPPED | OUTPATIENT
Start: 2018-05-25 | End: 2019-01-22

## 2018-05-25 RX ADMIN — BETAMETHASONE SODIUM PHOSPHATE AND BETAMETHASONE ACETATE 9 MG: 3; 3 INJECTION, SUSPENSION INTRA-ARTICULAR; INTRALESIONAL; INTRAMUSCULAR; SOFT TISSUE at 01:05

## 2018-05-25 NOTE — PROGRESS NOTES
Subjective:       Patient ID: Adan Cain is a 68 y.o. male.    Vitals:  height is 6' (1.829 m) and weight is 140.6 kg (310 lb) (abnormal). His temperature is 97.7 °F (36.5 °C). His blood pressure is 138/83 and his pulse is 69. His respiration is 18 and oxygen saturation is 96%.     Chief Complaint: Sinus Problem    Sinus Problem   This is a new problem. The current episode started in the past 7 days. The problem has been gradually worsening since onset. There has been no fever. His pain is at a severity of 2/10. The pain is mild. Associated symptoms include congestion, coughing, headaches, sinus pressure and a sore throat. Pertinent negatives include no chills, ear pain, hoarse voice or shortness of breath. Past treatments include spray decongestants and oral decongestants. The treatment provided mild relief.     Review of Systems   Constitution: Negative for chills, fever and malaise/fatigue.   HENT: Positive for congestion, sinus pressure and sore throat. Negative for ear pain and hoarse voice.    Eyes: Negative for discharge and redness.   Cardiovascular: Negative for chest pain, dyspnea on exertion and leg swelling.   Respiratory: Positive for cough and sputum production. Negative for shortness of breath and wheezing.    Musculoskeletal: Negative for myalgias.   Gastrointestinal: Negative for abdominal pain and nausea.   Neurological: Positive for headaches.       Objective:      Physical Exam   Constitutional: He is oriented to person, place, and time. He appears well-developed and well-nourished.   HENT:   Head: Normocephalic and atraumatic.   Right Ear: External ear normal.   Left Ear: External ear normal.   Bilateral frontal sinus tenderness.   Eyes: EOM are normal. Pupils are equal, round, and reactive to light.   Neck: Normal range of motion. Neck supple. No JVD present. No tracheal deviation present. No thyromegaly present.   Cardiovascular: Normal rate, regular rhythm and normal heart sounds.   Exam reveals no gallop and no friction rub.    No murmur heard.  Pulmonary/Chest: Breath sounds normal. No respiratory distress. He has no wheezes. He has no rales. He exhibits no tenderness.   Abdominal: Soft. Bowel sounds are normal. He exhibits no distension and no mass. There is no tenderness. There is no rebound and no guarding. No hernia.   Musculoskeletal: Normal range of motion. He exhibits no edema, tenderness or deformity.   Lymphadenopathy:     He has no cervical adenopathy.   Neurological: He is alert and oriented to person, place, and time. He displays normal reflexes. No cranial nerve deficit. He exhibits normal muscle tone. Coordination normal.   Skin: Skin is warm. Capillary refill takes less than 2 seconds. No rash noted. No erythema. No pallor.   Psychiatric: He has a normal mood and affect. His behavior is normal. Judgment and thought content normal.   Vitals reviewed.      Assessment:       1. Sinusitis, unspecified chronicity, unspecified location    2. Cough        Plan:         Sinusitis, unspecified chronicity, unspecified location  -     azithromycin (Z-LESLEE) 250 MG tablet; Take 2 tablets by mouth x 1 for day 1 Then take 1 tablet by mouth daily for day 2 - 5  Dispense: 6 tablet; Refill: 0  -     betamethasone acetate-betamethasone sodium phosphate injection 9 mg; Inject 1.5 mLs (9 mg total) into the muscle one time.    Cough      Follow up with your doctor in a few days as needed.  Return to the urgent care or go to the ER if symptoms get worse.    Sukhjinder Persaud MD

## 2018-05-25 NOTE — PROGRESS NOTES
Subjective:       Patient ID: Adan Cain is a 68 y.o. male.    Chief Complaint: Ingrown Toenail (Right foot)    HPI  69 yo male returns for fu to b/l great toenail avulsion secondary to a paronychia and a right PT tendinitis.  Both doing better.    Past Medical History:   Diagnosis Date    Allergic rhinitis 4/13/2016    BPH (benign prostatic hyperplasia)     Urology Dr Zamora, OTC prostate revive helps, avodart caused chest pains    Calculus of gallbladder     US 2016    DJD (degenerative joint disease) of hip 1/29/2015    [place] cardiac risk 10-20% in next 10 years 10/31/2017    recommended statin, but he declines    Morbid obesity with BMI of 40.0-44.9, adult 1/29/2015       Past Surgical History:   Procedure Laterality Date    CATARACT EXTRACTION      HERNIA REPAIR         Family History   Problem Relation Age of Onset    Leukemia Father     Aneurysm Father     Diabetes Mother        Social History     Social History    Marital status:      Spouse name: N/A    Number of children: N/A    Years of education: N/A     Social History Main Topics    Smoking status: Former Smoker     Packs/day: 6.00     Years: 1.00     Types: Cigarettes    Smokeless tobacco: Never Used    Alcohol use No    Drug use: No    Sexual activity: Yes     Partners: Female     Other Topics Concern    None     Social History Narrative     to 'T', 2 children, nonsmoker, ETOH none, never had colonscopy & declines       No current facility-administered medications for this visit.      Current Outpatient Prescriptions   Medication Sig Dispense Refill    atorvastatin (LIPITOR) 10 MG tablet Take 1 tablet (10 mg total) by mouth once daily. 30 tablet 3    [START ON 5/29/2018] cetirizine (ZYRTEC) 10 MG tablet Take 1 tablet (10 mg total) by mouth once daily.  0     Facility-Administered Medications Ordered in Other Visits   Medication Dose Route Frequency Provider Last Rate Last Dose    acetaminophen tablet 650  mg  650 mg Oral Q6H PRN Mervin Lopez, NP        aspirin EC tablet 81 mg  81 mg Oral Daily Mervin Lopez NP   81 mg at 05/28/18 1000    atorvastatin tablet 10 mg  10 mg Oral Daily Vargas Keller MD   10 mg at 05/28/18 1208    cetirizine tablet 10 mg  10 mg Oral Daily Mervin Lopez NP   10 mg at 05/28/18 1000    diphenhydrAMINE capsule 25 mg  25 mg Oral Q6H PRN Mervin Lopez, NP        fluticasone 50 mcg/actuation nasal spray 100 mcg  2 spray Each Nare Daily Mervin Lopez NP   100 mcg at 05/28/18 1003    labetalol injection 10 mg  10 mg Intravenous Q4H PRN Mervin Lopez NP        ondansetron disintegrating tablet 8 mg  8 mg Oral Q8H PRN Mervin Lopez, NP        sodium chloride 0.9% flush 3 mL  3 mL Intravenous Q8H Mervin Lopez NP   3 mL at 05/28/18 0748       Review of patient's allergies indicates:   Allergen Reactions    Avodart [dutasteride]     Naproxen     Ragweed     Synthroid [levothyroxine] Rash       Review of Systems  ROS:  Constitution: Negative for chills, fever, weakness and malaise/fatigue.   HEENT: Negative for headaches.   Cardiovascular: Negative for chest pain and claudication.   Respiratory: Negative for cough and shortness of breath.   Musculoskeletal: Positive for bilateral foot pain.  Negative for muscle cramps and muscle weakness.   Gastrointestinal: Negative for nausea and vomiting.   Neurological: Negative for numbness and paresthesias.   Dermatological: Negativefor skin rash, Negative for calluses, Positive for fungal nails, Negative for wound.        Objective:      Physical Exam  Constitutional:  Patient is oriented to person, place, and time. Vital signs are normal.  Appears well-developed and well-nourished.     Vascular:  Dorsalis pedis pulses are 2/4 on the right side, and 2/4 on the left side.   Posterior tibial pulses are 2/4 on the right side, and 2/4 on the left side.   Positive for digital hair growth, capillary fill time to all toes <3 seconds, toes  are warm touch, trace pedal swelling    Skin/Dermatological:  Skin is warm and intact.  No cyanosis or clubbing.  No rashes noted.  No open wounds.  All ten toenails yellow discolored, thickened 2-4 mm to base with subungual debris.  B/l medial great toenails no signs of cryptosis or erythema or draainage, no pain    Musculoskeletal:      Pes planus, tttp right PTT from ankle to insertion.  Hallux abducto valgus bilaterally, hammertoes 2-5 observed.  Pedal rom within normal limits.  (--) ankle joint DF restriction with both knee flexed and extened.  Minor tenderness right medial ankle.    Neurological:  (--) deficits to sharp/dull, light touch or vibratory sensation bilateral feet, ten points tested.   Muscle strength to tibialis anterior, extensor hallucis longus, extensor digitorum longus, peroneal muscles, flexor hallucis/digotorum longus, posterior tibial and gastrosoleal complex is 5/5, normal tone without assymmetry   Patellar reflexes are 2+ on the right side and 2+ on the left side.  Achilles reflexes are 2+ on the right side and 2+ on the left side.      Assessment:       1. Ingrown nail    2. Pes valgus    3. Posterior tibial tendinitis of right leg    4. Valgus deformity of both great toes        Plan:       Ingrown nail  -     diclofenac sodium 1 % Gel; Apply 2 g topically 4 (four) times daily.  Dispense: 100 g; Refill: 10    Pes valgus  -     diclofenac sodium 1 % Gel; Apply 2 g topically 4 (four) times daily.  Dispense: 100 g; Refill: 10    Posterior tibial tendinitis of right leg  -     diclofenac sodium 1 % Gel; Apply 2 g topically 4 (four) times daily.  Dispense: 100 g; Refill: 10    Valgus deformity of both great toes  -     diclofenac sodium 1 % Gel; Apply 2 g topically 4 (four) times daily.  Dispense: 100 g; Refill: 10          PTT tendinitis, hammertoes, metatarsalgia, pew valgus.  Continue the orthotics, stretching, quality athletic shoes.    Paronychia/ingrown b/l medial great toenail: this is  healed, consider a phenol matrixectomy if recurrence.    RTC prn.

## 2018-05-25 NOTE — PATIENT INSTRUCTIONS
Sinusitis (Antibiotic Treatment)    The sinuses are air-filled spaces within the bones of the face. They connect to the inside of the nose. Sinusitis is an inflammation of the tissue lining the sinus cavity. Sinus inflammation can occur during a cold. It can also be due to allergies to pollens and other particles in the air. Sinusitis can cause symptoms of sinus congestion and fullness. A sinus infection causes fever, headache and facial pain. There is often green or yellow drainage from the nose or into the back of the throat (post-nasal drip). You have been given antibiotics to treat this condition.  Home care:  · Take the full course of antibiotics as instructed. Do not stop taking them, even if you feel better.  · Drink plenty of water, hot tea, and other liquids. This may help thin mucus. It also may promote sinus drainage.  · Heat may help soothe painful areas of the face. Use a towel soaked in hot water. Or,  the shower and direct the hot spray onto your face. Using a vaporizer along with a menthol rub at night may also help.   · An expectorant containing guaifenesin may help thin the mucus and promote drainage from the sinuses.  · Over-the-counter decongestants may be used unless a similar medicine was prescribed. Nasal sprays work the fastest. Use one that contains phenylephrine or oxymetazoline. First blow the nose gently. Then use the spray. Do not use these medicines more often than directed on the label or symptoms may get worse. You may also use tablets containing pseudoephedrine. Avoid products that combine ingredients, because side effects may be increased. Read labels. You can also ask the pharmacist for help. (NOTE: Persons with high blood pressure should not use decongestants. They can raise blood pressure.)  · Over-the-counter antihistamines may help if allergies contributed to your sinusitis.    · Do not use nasal rinses or irrigation during an acute sinus infection, unless told to by  your health care provider. Rinsing may spread the infection to other sinuses.  · Use acetaminophen or ibuprofen to control pain, unless another pain medicine was prescribed. (If you have chronic liver or kidney disease or ever had a stomach ulcer, talk with your doctor before using these medicines. Aspirin should never be used in anyone under 18 years of age who is ill with a fever. It may cause severe liver damage.)  · Don't smoke. This can worsen symptoms.  Follow-up care  Follow up with your healthcare provider or our staff if you are not improving within the next week.  When to seek medical advice  Call your healthcare provider if any of these occur:  · Facial pain or headache becoming more severe  · Stiff neck  · Unusual drowsiness or confusion  · Swelling of the forehead or eyelids  · Vision problems, including blurred or double vision  · Fever of 100.4ºF (38ºC) or higher, or as directed by your healthcare provider  · Seizure  · Breathing problems  · Symptoms not resolving within 10 days  Date Last Reviewed: 4/13/2015  © 9119-7436 [employer]. [address] [place] [address]. All rights reserved. This information is not intended as a substitute for professional medical care. Always follow your healthcare professional's instructions.    Follow up with your doctor in a few days as needed.  Return to the urgent care or go to the ER if symptoms get worse.    Sukhjinder Persaud MD

## 2018-05-26 ENCOUNTER — HOSPITAL ENCOUNTER (OUTPATIENT)
Facility: HOSPITAL | Age: 68
Discharge: HOME | End: 2018-05-28
Attending: EMERGENCY MEDICINE | Admitting: HOSPITALIST
Payer: MEDICARE

## 2018-05-26 ENCOUNTER — TELEPHONE (OUTPATIENT)
Dept: URGENT CARE | Facility: CLINIC | Age: 68
End: 2018-05-26

## 2018-05-26 DIAGNOSIS — G45.9 TIA (TRANSIENT ISCHEMIC ATTACK): ICD-10-CM

## 2018-05-26 DIAGNOSIS — I63.412 CEREBRAL INFARCTION DUE TO EMBOLISM OF LEFT MIDDLE CEREBRAL ARTERY: Primary | ICD-10-CM

## 2018-05-26 DIAGNOSIS — G45.9 TRANSIENT CEREBRAL ISCHEMIA, UNSPECIFIED TYPE: ICD-10-CM

## 2018-05-26 DIAGNOSIS — I63.9 STROKE: ICD-10-CM

## 2018-05-26 DIAGNOSIS — J32.9 SINUSITIS, UNSPECIFIED CHRONICITY, UNSPECIFIED LOCATION: Primary | ICD-10-CM

## 2018-05-26 LAB
ALBUMIN SERPL BCP-MCNC: 3.2 G/DL
ALLENS TEST: ABNORMAL
ALP SERPL-CCNC: 105 U/L
ALT SERPL W/O P-5'-P-CCNC: 18 U/L
ANION GAP SERPL CALC-SCNC: 12 MMOL/L
APTT BLDCRRT: 25.2 SEC
AST SERPL-CCNC: 15 U/L
BASOPHILS # BLD AUTO: ABNORMAL K/UL
BASOPHILS NFR BLD: 0 %
BILIRUB SERPL-MCNC: 0.3 MG/DL
BUN SERPL-MCNC: 26 MG/DL
CALCIUM SERPL-MCNC: 10 MG/DL
CHLORIDE SERPL-SCNC: 108 MMOL/L
CHOLEST SERPL-MCNC: 168 MG/DL
CHOLEST/HDLC SERPL: 4.7 {RATIO}
CO2 SERPL-SCNC: 21 MMOL/L
CREAT SERPL-MCNC: 1.3 MG/DL (ref 0.5–1.4)
CREAT SERPL-MCNC: 1.4 MG/DL
DIFFERENTIAL METHOD: ABNORMAL
EOSINOPHIL # BLD AUTO: ABNORMAL K/UL
EOSINOPHIL NFR BLD: 0 %
ERYTHROCYTE [DISTWIDTH] IN BLOOD BY AUTOMATED COUNT: 13.9 %
EST. GFR  (AFRICAN AMERICAN): 58 ML/MIN/1.73 M^2
EST. GFR  (NON AFRICAN AMERICAN): 51 ML/MIN/1.73 M^2
GLUCOSE SERPL-MCNC: 145 MG/DL
HCO3 UR-SCNC: 25.2 MMOL/L (ref 24–28)
HCT VFR BLD AUTO: 44.9 %
HDLC SERPL-MCNC: 36 MG/DL
HDLC SERPL: 21.4 %
HGB BLD-MCNC: 14.8 G/DL
INR PPP: 1
LDLC SERPL CALC-MCNC: 119.6 MG/DL
LYMPHOCYTES # BLD AUTO: ABNORMAL K/UL
LYMPHOCYTES NFR BLD: 10 %
MCH RBC QN AUTO: 30.5 PG
MCHC RBC AUTO-ENTMCNC: 33 G/DL
MCV RBC AUTO: 93 FL
MONOCYTES # BLD AUTO: ABNORMAL K/UL
MONOCYTES NFR BLD: 5 %
NEUTROPHILS # BLD AUTO: ABNORMAL K/UL
NEUTROPHILS NFR BLD: 83 %
NEUTS BAND NFR BLD MANUAL: 2 %
NONHDLC SERPL-MCNC: 132 MG/DL
PCO2 BLDA: 37.7 MMHG (ref 35–45)
PH SMN: 7.43 [PH] (ref 7.35–7.45)
PLATELET # BLD AUTO: 272 K/UL
PLATELET BLD QL SMEAR: ABNORMAL
PMV BLD AUTO: 11.2 FL
PO2 BLDA: 41 MMHG (ref 40–60)
POC BE: 1 MMOL/L
POC PTINR: 1.1 (ref 0.9–1.2)
POC PTWBT: 13.1 SEC (ref 9.7–14.3)
POC SATURATED O2: 78 % (ref 95–100)
POC TCO2: 26 MMOL/L (ref 24–29)
POCT GLUCOSE: 136 MG/DL (ref 70–110)
POTASSIUM SERPL-SCNC: 4.1 MMOL/L
PROT SERPL-MCNC: 6.9 G/DL
PROTHROMBIN TIME: 10.4 SEC
RBC # BLD AUTO: 4.85 M/UL
SAMPLE: ABNORMAL
SAMPLE: NORMAL
SAMPLE: NORMAL
SITE: ABNORMAL
SODIUM SERPL-SCNC: 141 MMOL/L
T4 FREE SERPL-MCNC: 0.8 NG/DL
TRIGL SERPL-MCNC: 62 MG/DL
TSH SERPL DL<=0.005 MIU/L-ACNC: 8.55 UIU/ML
WBC # BLD AUTO: 10.94 K/UL

## 2018-05-26 PROCEDURE — 93010 ELECTROCARDIOGRAM REPORT: CPT | Mod: ,,, | Performed by: INTERNAL MEDICINE

## 2018-05-26 PROCEDURE — 85610 PROTHROMBIN TIME: CPT

## 2018-05-26 PROCEDURE — 85007 BL SMEAR W/DIFF WBC COUNT: CPT

## 2018-05-26 PROCEDURE — 80061 LIPID PANEL: CPT

## 2018-05-26 PROCEDURE — 80053 COMPREHEN METABOLIC PANEL: CPT

## 2018-05-26 PROCEDURE — 99291 CRITICAL CARE FIRST HOUR: CPT | Mod: 25

## 2018-05-26 PROCEDURE — 82565 ASSAY OF CREATININE: CPT

## 2018-05-26 PROCEDURE — 93005 ELECTROCARDIOGRAM TRACING: CPT

## 2018-05-26 PROCEDURE — G0378 HOSPITAL OBSERVATION PER HR: HCPCS

## 2018-05-26 PROCEDURE — 84439 ASSAY OF FREE THYROXINE: CPT

## 2018-05-26 PROCEDURE — 82803 BLOOD GASES ANY COMBINATION: CPT

## 2018-05-26 PROCEDURE — 93010 ELECTROCARDIOGRAM REPORT: CPT | Mod: S$GLB,,, | Performed by: INTERNAL MEDICINE

## 2018-05-26 PROCEDURE — 85730 THROMBOPLASTIN TIME PARTIAL: CPT

## 2018-05-26 PROCEDURE — 84443 ASSAY THYROID STIM HORMONE: CPT

## 2018-05-26 PROCEDURE — 25500020 PHARM REV CODE 255: Performed by: EMERGENCY MEDICINE

## 2018-05-26 PROCEDURE — 85027 COMPLETE CBC AUTOMATED: CPT

## 2018-05-26 PROCEDURE — 82962 GLUCOSE BLOOD TEST: CPT

## 2018-05-26 PROCEDURE — G0426 INPT/ED TELECONSULT50: HCPCS | Mod: GT,,, | Performed by: PSYCHIATRY & NEUROLOGY

## 2018-05-26 RX ORDER — AMOXICILLIN AND CLAVULANATE POTASSIUM 875; 125 MG/1; MG/1
1 TABLET, FILM COATED ORAL 2 TIMES DAILY
Qty: 20 TABLET | Refills: 0 | Status: SHIPPED | OUTPATIENT
Start: 2018-05-26 | End: 2018-05-26 | Stop reason: SDUPTHER

## 2018-05-26 RX ORDER — AMOXICILLIN AND CLAVULANATE POTASSIUM 875; 125 MG/1; MG/1
1 TABLET, FILM COATED ORAL 2 TIMES DAILY
Qty: 20 TABLET | Refills: 0 | Status: ON HOLD | OUTPATIENT
Start: 2018-05-26 | End: 2018-05-27

## 2018-05-26 RX ORDER — METHYLPREDNISOLONE SOD SUCC 125 MG
80 VIAL (EA) INJECTION ONCE
Status: DISCONTINUED | OUTPATIENT
Start: 2018-05-26 | End: 2018-05-26

## 2018-05-26 RX ORDER — EPINEPHRINE 1 MG/ML
0.3 INJECTION, SOLUTION INTRACARDIAC; INTRAMUSCULAR; INTRAVENOUS; SUBCUTANEOUS
Status: DISCONTINUED | OUTPATIENT
Start: 2018-05-26 | End: 2018-05-26

## 2018-05-26 RX ADMIN — IOHEXOL 100 ML: 350 INJECTION, SOLUTION INTRAVENOUS at 10:05

## 2018-05-26 NOTE — TELEPHONE ENCOUNTER
Pt called and complaint of get rash from taking azithromycin.  Discontinue azithromycin and start augmentin 875mg bid x10 days.    Sukhjinder Persaud MD

## 2018-05-27 PROBLEM — I71.20 THORACIC AORTIC ANEURYSM WITHOUT RUPTURE: Status: ACTIVE | Noted: 2018-05-27

## 2018-05-27 PROBLEM — T78.40XA ALLERGIC DRUG REACTION: Status: ACTIVE | Noted: 2018-05-27

## 2018-05-27 PROBLEM — J01.90 ACUTE SINUSITIS: Status: ACTIVE | Noted: 2018-05-27

## 2018-05-27 PROBLEM — E66.01 MORBID OBESITY: Status: ACTIVE | Noted: 2018-05-27

## 2018-05-27 PROBLEM — G45.1 TIA INVOLVING CAROTID ARTERY: Status: ACTIVE | Noted: 2018-05-26

## 2018-05-27 LAB
ALBUMIN SERPL BCP-MCNC: 3.3 G/DL
ALP SERPL-CCNC: 100 U/L
ALT SERPL W/O P-5'-P-CCNC: 18 U/L
ANION GAP SERPL CALC-SCNC: 8 MMOL/L
AST SERPL-CCNC: 17 U/L
BASOPHILS # BLD AUTO: 0.04 K/UL
BASOPHILS NFR BLD: 0.2 %
BILIRUB SERPL-MCNC: 0.6 MG/DL
BUN SERPL-MCNC: 27 MG/DL
CALCIUM SERPL-MCNC: 9.8 MG/DL
CHLORIDE SERPL-SCNC: 107 MMOL/L
CO2 SERPL-SCNC: 25 MMOL/L
CREAT SERPL-MCNC: 0.9 MG/DL
DIFFERENTIAL METHOD: ABNORMAL
EOSINOPHIL # BLD AUTO: 0 K/UL
EOSINOPHIL NFR BLD: 0.1 %
ERYTHROCYTE [DISTWIDTH] IN BLOOD BY AUTOMATED COUNT: 14.2 %
EST. GFR  (AFRICAN AMERICAN): >60 ML/MIN/1.73 M^2
EST. GFR  (NON AFRICAN AMERICAN): >60 ML/MIN/1.73 M^2
ESTIMATED AVG GLUCOSE: 105 MG/DL
GLUCOSE SERPL-MCNC: 109 MG/DL
HBA1C MFR BLD HPLC: 5.3 %
HCT VFR BLD AUTO: 42.8 %
HGB BLD-MCNC: 14.4 G/DL
LYMPHOCYTES # BLD AUTO: 2.3 K/UL
LYMPHOCYTES NFR BLD: 11.7 %
MCH RBC QN AUTO: 31 PG
MCHC RBC AUTO-ENTMCNC: 33.6 G/DL
MCV RBC AUTO: 92 FL
MONOCYTES # BLD AUTO: 1.3 K/UL
MONOCYTES NFR BLD: 6.6 %
NEUTROPHILS # BLD AUTO: 16.2 K/UL
NEUTROPHILS NFR BLD: 81 %
PLATELET # BLD AUTO: 266 K/UL
PMV BLD AUTO: 11.3 FL
POTASSIUM SERPL-SCNC: 4.2 MMOL/L
PROT SERPL-MCNC: 6.8 G/DL
RBC # BLD AUTO: 4.65 M/UL
SODIUM SERPL-SCNC: 140 MMOL/L
TROPONIN I SERPL DL<=0.01 NG/ML-MCNC: 0.01 NG/ML
TROPONIN I SERPL DL<=0.01 NG/ML-MCNC: 0.03 NG/ML
WBC # BLD AUTO: 19.95 K/UL

## 2018-05-27 PROCEDURE — G8980 MOBILITY D/C STATUS: HCPCS | Mod: CH

## 2018-05-27 PROCEDURE — 85025 COMPLETE CBC W/AUTO DIFF WBC: CPT

## 2018-05-27 PROCEDURE — G0378 HOSPITAL OBSERVATION PER HR: HCPCS

## 2018-05-27 PROCEDURE — G8978 MOBILITY CURRENT STATUS: HCPCS | Mod: CH

## 2018-05-27 PROCEDURE — 25000242 PHARM REV CODE 250 ALT 637 W/ HCPCS: Performed by: NURSE PRACTITIONER

## 2018-05-27 PROCEDURE — 36415 COLL VENOUS BLD VENIPUNCTURE: CPT

## 2018-05-27 PROCEDURE — 84484 ASSAY OF TROPONIN QUANT: CPT | Mod: 91

## 2018-05-27 PROCEDURE — A4216 STERILE WATER/SALINE, 10 ML: HCPCS | Performed by: NURSE PRACTITIONER

## 2018-05-27 PROCEDURE — 83036 HEMOGLOBIN GLYCOSYLATED A1C: CPT

## 2018-05-27 PROCEDURE — G8998 SWALLOW D/C STATUS: HCPCS | Mod: CH

## 2018-05-27 PROCEDURE — 92610 EVALUATE SWALLOWING FUNCTION: CPT

## 2018-05-27 PROCEDURE — 97161 PT EVAL LOW COMPLEX 20 MIN: CPT

## 2018-05-27 PROCEDURE — 84484 ASSAY OF TROPONIN QUANT: CPT

## 2018-05-27 PROCEDURE — G8989 SELF CARE D/C STATUS: HCPCS | Mod: CI

## 2018-05-27 PROCEDURE — G8996 SWALLOW CURRENT STATUS: HCPCS | Mod: CH

## 2018-05-27 PROCEDURE — 25000003 PHARM REV CODE 250: Performed by: NURSE PRACTITIONER

## 2018-05-27 PROCEDURE — 97165 OT EVAL LOW COMPLEX 30 MIN: CPT

## 2018-05-27 PROCEDURE — 94761 N-INVAS EAR/PLS OXIMETRY MLT: CPT

## 2018-05-27 PROCEDURE — G8987 SELF CARE CURRENT STATUS: HCPCS | Mod: CI

## 2018-05-27 PROCEDURE — G8988 SELF CARE GOAL STATUS: HCPCS | Mod: CI

## 2018-05-27 PROCEDURE — 80053 COMPREHEN METABOLIC PANEL: CPT

## 2018-05-27 PROCEDURE — G8997 SWALLOW GOAL STATUS: HCPCS | Mod: CH

## 2018-05-27 PROCEDURE — 99900039 HC SLP GENERIC THERAPY SCREENING (STAT)

## 2018-05-27 PROCEDURE — G8979 MOBILITY GOAL STATUS: HCPCS | Mod: CH

## 2018-05-27 RX ORDER — ASPIRIN 81 MG/1
81 TABLET ORAL DAILY
Status: DISCONTINUED | OUTPATIENT
Start: 2018-05-27 | End: 2018-05-28 | Stop reason: HOSPADM

## 2018-05-27 RX ORDER — CETIRIZINE HYDROCHLORIDE 10 MG/1
10 TABLET ORAL DAILY
Status: DISCONTINUED | OUTPATIENT
Start: 2018-05-27 | End: 2018-05-28 | Stop reason: HOSPADM

## 2018-05-27 RX ORDER — ACETAMINOPHEN 325 MG/1
650 TABLET ORAL EVERY 6 HOURS PRN
Status: DISCONTINUED | OUTPATIENT
Start: 2018-05-27 | End: 2018-05-28 | Stop reason: HOSPADM

## 2018-05-27 RX ORDER — SODIUM CHLORIDE 0.9 % (FLUSH) 0.9 %
3 SYRINGE (ML) INJECTION EVERY 8 HOURS
Status: DISCONTINUED | OUTPATIENT
Start: 2018-05-27 | End: 2018-05-28 | Stop reason: HOSPADM

## 2018-05-27 RX ORDER — DIPHENHYDRAMINE HCL 25 MG
25 CAPSULE ORAL EVERY 6 HOURS PRN
Status: DISCONTINUED | OUTPATIENT
Start: 2018-05-27 | End: 2018-05-28 | Stop reason: HOSPADM

## 2018-05-27 RX ORDER — FLUTICASONE PROPIONATE 50 MCG
2 SPRAY, SUSPENSION (ML) NASAL DAILY
Status: DISCONTINUED | OUTPATIENT
Start: 2018-05-27 | End: 2018-05-28 | Stop reason: HOSPADM

## 2018-05-27 RX ORDER — ONDANSETRON 8 MG/1
8 TABLET, ORALLY DISINTEGRATING ORAL EVERY 8 HOURS PRN
Status: DISCONTINUED | OUTPATIENT
Start: 2018-05-27 | End: 2018-05-28 | Stop reason: HOSPADM

## 2018-05-27 RX ORDER — CLOPIDOGREL BISULFATE 75 MG/1
75 TABLET ORAL DAILY
Status: DISCONTINUED | OUTPATIENT
Start: 2018-05-27 | End: 2018-05-27

## 2018-05-27 RX ORDER — LABETALOL HYDROCHLORIDE 5 MG/ML
10 INJECTION, SOLUTION INTRAVENOUS EVERY 4 HOURS PRN
Status: DISCONTINUED | OUTPATIENT
Start: 2018-05-27 | End: 2018-05-28 | Stop reason: HOSPADM

## 2018-05-27 RX ORDER — ATORVASTATIN CALCIUM 40 MG/1
40 TABLET, FILM COATED ORAL DAILY
Status: DISCONTINUED | OUTPATIENT
Start: 2018-05-27 | End: 2018-05-27

## 2018-05-27 RX ADMIN — SODIUM CHLORIDE, PRESERVATIVE FREE 3 ML: 5 INJECTION INTRAVENOUS at 10:05

## 2018-05-27 RX ADMIN — SODIUM CHLORIDE, PRESERVATIVE FREE 3 ML: 5 INJECTION INTRAVENOUS at 05:05

## 2018-05-27 RX ADMIN — SODIUM CHLORIDE, PRESERVATIVE FREE 3 ML: 5 INJECTION INTRAVENOUS at 02:05

## 2018-05-27 RX ADMIN — CETIRIZINE HYDROCHLORIDE 10 MG: 10 TABLET, FILM COATED ORAL at 08:05

## 2018-05-27 RX ADMIN — ASPIRIN 81 MG: 81 TABLET, COATED ORAL at 08:05

## 2018-05-27 RX ADMIN — FLUTICASONE PROPIONATE 100 MCG: 50 SPRAY, METERED NASAL at 09:05

## 2018-05-27 NOTE — SUBJECTIVE & OBJECTIVE
"  Woke up with symptoms?: no  Last known normal: Last Known Normal Date: 05/26/18 Last Known Normal Time: 2100    Recent bleeding noted: no  Does the patient take any Blood Thinners? no  Medications: No relevant medications      Past Medical History: no relevant history    Past Surgical History: no relevant surgical history    Family History: no relevant history    Social History: no smoking, no drinking, no drugs    Allergies: Augmentin [Amoxicillin-Pot Clavulanate]  Azithromycin  Avodart [Dutasteride]  Naproxen  Ragweed  Synthroid [Levothyroxine]     Review of Systems   Neurological: Positive for facial asymmetry, weakness, numbness and headaches.   Psychiatric/Behavioral: Positive for confusion.   All other systems reviewed and are negative.    Objective:   Vitals: Blood pressure 139/74, pulse 70, temperature 98 °F (36.7 °C), temperature source Oral, resp. rate 18, height 6' 1" (1.854 m), weight (!) 137.8 kg (303 lb 12.7 oz), SpO2 95 %.     CT READ: Yes  No hemmorhage. No mass effect. No early infarct signs.     Physical Exam   Constitutional: He is oriented to person, place, and time. He appears well-developed and well-nourished.   HENT:   Head: Normocephalic and atraumatic.   Eyes: EOM are normal. Pupils are equal, round, and reactive to light.   Cardiovascular: Normal rate and regular rhythm.    Pulmonary/Chest: Effort normal.   Neurological: He is alert and oriented to person, place, and time.   Vitals reviewed.        "

## 2018-05-27 NOTE — HPI
Adan Cain is a 69 y/o  male with dejenerative joint disease, seasonal allergic rhinitis, obesity, and BPH.   He is  and lives in [place], [place].  His PCP is Dr. Gina Reyes.  Patient had been suffering with sinus pressure, sore throat, cough, and nasal congestion x 1 week.  He was evaluated at an [facility] on 5/25/18 and diagnosed with acute sinusitis.  He was prescribed azithromycin.  He developed a rash after taking a dose of azithromycin.  His wife called Urgent Care on 5/26/18 to inform of rash, he was changed to Augmentin.  Patient begin to have bilateral hand itching, swelling of lips and cheeks after taking Augmentin. He began to feel funny and was unable to ambulate; his daughter reports that he was diaphoretic and shaking.  EMS was called, patient was given IV Benadryl en route to ED.  While in ED patient began to have right facial droop and right sided weakness prompting initiation of Code Stroke.  Head CT was negative.  Patients symptoms resolved prior to evaluation by vascular neurologist Dr. Feng via telemedicine.  Dr. Feng recommended admission for TIA work-up.

## 2018-05-27 NOTE — CONSULTS
[facility]  Vascular Neurology  Comprehensive Stroke Center  Tele-Consultation Note      Inpatient consult to Telemedicine-Stroke  Consult performed by: CY FLOYD  Consult ordered by: JONA BROWNING  Reason for consult: Stroke          Consulting Provider: Spoke Physician:: Dr Thompson  Current Providers  No providers found    Patient Location: [facility]  Spoke hospital nurse at bedside with patient assisting consultant.     Patient information was obtained from patient and spouse/SO.       Assessment/Plan:     STROKE DOCUMENTATION     Acute Stroke Times:   Acute Stroke Times   Last Known Normal Date: 05/26/18  Last Known Normal Time: 2100  Symptom Onset Date: 05/26/18  Symptom Onset Time: 2100  Stroke Team Called Date: 05/26/18  Stroke Team Called Time: 2146  Stroke Team Arrival Date: 05/26/18  Stroke Team Arrival Time: 2200  CT Interpretation Time: 1500    NIH Scale:  Interval: baseline (upon arrival/admit)  1a. Level Of Consciousness: 0-->Alert: keenly responsive  1b. LOC Questions: 0-->Answers both questions correctly     Modified [place] Score: 0  [place] Coma Scale:15   ABCD2 Score:    [id number] Score:   HAS -BLED Score:   ICH Score:   Hunt & Walters Classification:       Diagnoses:   * TIA involving carotid artery    TIA  Antithrombotics for secondary stroke prevention: Antiplatelets: Aspirin: 81 mg daily    Statins for secondary stroke prevention and hyperlipidemia, if present:   Statins: Atorvastatin- 40 mg daily    Aggressive risk factor modification: None     Rehab efforts: None: Reason: No deficits    Diagnostics ordered/pending: CTA Head to assess vasculature , CTA Neck/Arch to assess vasculature, Lipid Profile to assess cholesterol levels, MRI head without contrast to assess brain parenchyma, TTE to assess cardiac function/status     VTE prophylaxis: None: Reason for No Pharmacological VTE Prophylaxis: Ambulating with or without  "assistance    BP parameters: TIA: SBP <220 until imaging confirmation of no infarct                 Blood pressure 139/74, pulse 70, temperature 98 °F (36.7 °C), temperature source Oral, resp. rate 18, height 6' 1" (1.854 m), weight (!) 137.8 kg (303 lb 12.7 oz), SpO2 95 %.  Alteplase Eligible?: Yes  Alteplase Recommendation: Alteplase not recommended due to Symptoms resolved   Possible Interventional Revascularization Candidate? No; No significant neurological deficit    Disposition Recommendation: admit to inpatient  do not transfer      Subjective:     History of Present Illness:  Adan Cain is a 68 y.o. male who  has a past medical history of Allergic rhinitis (4/13/2016); BPH (benign prostatic hyperplasia); Calculus of gallbladder; DJD (degenerative joint disease) of hip (1/29/2015); [place] cardiac risk 10-20% in next 10 years (10/31/2017); and Morbid obesity with BMI of 40.0-44.9, adult (1/29/2015).     The patient presents to the ED via EMS due to hand tingling and redness to chest and arms, as well as lip swelling after taking Augmentin 1 hour PTA. Upon intial assessment, patient appeared confused with a  mild right facial droop and RUE weakness and numbness to his right hand. The patient became more somnolent and became unable to answer questions or follow commands. A stroke code was called at this time.       Woke up with symptoms?: no  Last known normal: Last Known Normal Date: 05/26/18 Last Known Normal Time: 2100    Recent bleeding noted: no  Does the patient take any Blood Thinners? no  Medications: No relevant medications      Past Medical History: no relevant history    Past Surgical History: no relevant surgical history    Family History: no relevant history    Social History: no smoking, no drinking, no drugs    Allergies: Augmentin [Amoxicillin-Pot Clavulanate]  Azithromycin  Avodart [Dutasteride]  Naproxen  Ragweed  Synthroid [Levothyroxine]     Review of Systems   Neurological: " "Positive for facial asymmetry, weakness, numbness and headaches.   Psychiatric/Behavioral: Positive for confusion.   All other systems reviewed and are negative.    Objective:   Vitals: Blood pressure 139/74, pulse 70, temperature 98 °F (36.7 °C), temperature source Oral, resp. rate 18, height 6' 1" (1.854 m), weight (!) 137.8 kg (303 lb 12.7 oz), SpO2 95 %.     CT READ: Yes  No hemmorhage. No mass effect. No early infarct signs.     Physical Exam   Constitutional: He is oriented to person, place, and time. He appears well-developed and well-nourished.   HENT:   Head: Normocephalic and atraumatic.   Eyes: EOM are normal. Pupils are equal, round, and reactive to light.   Cardiovascular: Normal rate and regular rhythm.    Pulmonary/Chest: Effort normal.   Neurological: He is alert and oriented to person, place, and time.   Vitals reviewed.            Recommended the emergency room physician to have a brief discussion with the patient and/or family if available regarding the risks and benefits of treatment, and to briefly document the occurrence of that discussion in his clinical encounter note.     The attending portion of this evaluation, treatment, and documentation was performed per Marium Feng MD via audiovisual.    Billing code:  (non-intervention mild to moderate stroke, TIA, some mimics)    · This patient has a critical neurological condition/illness, with some potential for high morbidity and mortality.  · There is a moderate probability for acute neurological change leading to clinical and possibly life-threatening deterioration requiring highest level of physician preparedness for urgent intervention.  · Care was coordinated with other physicians involved in the patient's care.  · Radiologic studies and laboratory data were reviewed and interpreted, and plan of care was re-assessed based on the results.  · Diagnosis, treatment options and prognosis may have been discussed with the patient and/or " family members or caregiver.      Consult End Time: 4872     Marium Feng MD  [facility]  Vascular Neurology   [facility]

## 2018-05-27 NOTE — ASSESSMENT & PLAN NOTE
Chronic.  Patient takes all natural supplement at home; not available in hospital formulary.  Continue to monitor.

## 2018-05-27 NOTE — PROGRESS NOTES
[facility] Hospital Medicine  Progress Note    Patient Name: Adan Cain  MRN: [id number]  Patient Class: OP- Observation   Admission Date: 5/26/2018  Length of Stay: 0 days  Attending Physician: Vargas Keller MD  Primary Care Provider: Gina Reyes MD        Subjective:     Principal Problem:TIA involving carotid artery    HPI:  Adan Cain is a 69 y/o  male with dejenerative joint disease, seasonal allergic rhinitis, obesity, and BPH.   He is  and lives in [place], [place].  His PCP is Dr. Gina Reyes.  Patient had been suffering with sinus pressure, sore throat, cough, and nasal congestion x 1 week.  He was evaluated at an [facility] on 5/25/18 and diagnosed with acute sinusitis.  He was prescribed azithromycin.  He developed a rash after taking a dose of azithromycin.  His wife called Urgent Care on 5/26/18 to inform of rash, he was changed to Augmentin.  Patient begin to have bilateral hand itching, swelling of lips and cheeks after taking Augmentin. He began to feel funny and was unable to ambulate; his daughter reports that he was diaphoretic and shaking.  EMS was called, patient was given IV Benadryl en route to ED.  While in ED patient began to have right facial droop and right sided weakness prompting initiation of Code Stroke.  Head CT was negative.  Patients symptoms resolved prior to evaluation by vascular neurologist Dr. Feng via telemedicine.  Dr. Feng recommended admission for TIA work-up.    Hospital Course:  No notes on file    Interval History: Feeling fine today. Itching and redness have resolved. Thinks that the weakness/numbness and ?LOC was due to the medications that he received.     Review of Systems   Constitutional: Negative for chills and fever.   Respiratory: Negative for cough and shortness of breath.    Cardiovascular: Negative for chest pain and palpitations.   Gastrointestinal: Negative for nausea and vomiting.      Objective:     Vital Signs (Most Recent):  Temp: 98.6 °F (37 °C) (05/27/18 1601)  Pulse: 74 (05/27/18 1601)  Resp: 18 (05/27/18 1601)  BP: (!) 159/76 (05/27/18 1601)  SpO2: 96 % (05/27/18 1325) Vital Signs (24h Range):  Temp:  [97 °F (36.1 °C)-99 °F (37.2 °C)] 98.6 °F (37 °C)  Pulse:  [] 74  Resp:  [16-21] 18  SpO2:  [94 %-97 %] 96 %  BP: ()/(60-85) 159/76     Weight: (!) 137.8 kg (303 lb 12.7 oz)  Body mass index is 40.08 kg/m².    Intake/Output Summary (Last 24 hours) at 05/27/18 1647  Last data filed at 05/27/18 1300   Gross per 24 hour   Intake              610 ml   Output             1570 ml   Net             -960 ml      Physical Exam   Constitutional: He is oriented to person, place, and time. He appears well-developed and well-nourished. No distress.   HENT:   Head: Normocephalic and atraumatic.   Musculoskeletal: He exhibits no edema.   Neurological: He is alert and oriented to person, place, and time.   Skin: No rash noted.   Psychiatric: He has a normal mood and affect. His behavior is normal.   Nursing note and vitals reviewed.      Significant Labs:   A1C:   Recent Labs  Lab 05/27/18  0104   HGBA1C 5.3     CBC:   Recent Labs  Lab 05/26/18  2151 05/27/18  0535   WBC 10.94 19.95*   HGB 14.8 14.4   HCT 44.9 42.8    266     CMP:   Recent Labs  Lab 05/26/18  2153 05/27/18  0534    140   K 4.1 4.2    107   CO2 21* 25   * 109   BUN 26* 27*   CREATININE 1.4 0.9   CALCIUM 10.0 9.8   PROT 6.9 6.8   ALBUMIN 3.2* 3.3*   BILITOT 0.3 0.6   ALKPHOS 105 100   AST 15 17   ALT 18 18   ANIONGAP 12 8   EGFRNONAA 51* >60     Lipid Panel:   Recent Labs  Lab 05/26/18  2153   CHOL 168   HDL 36*   LDLCALC 119.6   TRIG 62   CHOLHDL 21.4     Troponin:   Recent Labs  Lab 05/27/18  0534 05/27/18  1132   TROPONINI 0.032* 0.012       Significant Imaging: I have reviewed all pertinent imaging results/findings within the past 24 hours.    Assessment/Plan:      * TIA involving carotid artery     CTA negative for stenosis or thrombosis. Will get MRI brain to ensure no stroke. Daily ASA. Refused to take the Lipitor. Consults placed to PT/OT/SLP for evaluations.  Monitor on telemetry.  2D echo and neuro checks.  Consult Vascular Neuro.          Thoracic aortic aneurysm without rupture    F/u as outpatient with cardiology.          Allergic drug reaction    PRN Benadryl; Augmentin and azithromycin listed as allergies in EPIC.          Acute sinusitis    Allergic rhinitis  Augmentin and azithromycin discontinued. No Abx for now (majority of sinus infections viral); continue cetirizine and Flonase. Says that he is feeling a little better today.         Morbid obesity with BMI of 40.0-44.9, adult    Noted. Discussed healthy diet and weight loss.         Benign prostatic hyperplasia without lower urinary tract symptoms    Chronic.  Patient takes all natural supplement at home; not available in hospital formulary.  Continue to monitor.            VTE Risk Mitigation         Ordered     IP VTE HIGH RISK PATIENT  Once      05/27/18 0052              Vargas Keller MD  Department of Hospital Medicine   [facility]

## 2018-05-27 NOTE — PT/OT/SLP EVAL
Occupational Therapy   Evaluation and Discharge Note    Name: Adan Cain  MRN: [id number]  Admitting Diagnosis:  TIA involving carotid artery      Recommendations:     Discharge Recommendations: home  Discharge Equipment Recommendations:  none  Barriers to discharge:  None    History:     Occupational Profile:  Living Environment: Pt lives with spouse in [facility] with 4 steps to enter and B rails - has walk in shower with built in bench and GB  Previous level of function: Independent  Roles and Routines:  - father - recently retired (04/18)  Equipment Owned:  grab bar, raised toilet, shower chair  Assistance upon Discharge: 24/7 - from wife - daughter lives 1 block away and available as needed    Past Medical History:   Diagnosis Date    Allergic rhinitis 4/13/2016    BPH (benign prostatic hyperplasia)     Urology Dr Zamora, OTC prostate revive helps, avodart caused chest pains    Calculus of gallbladder     US 2016    DJD (degenerative joint disease) of hip 1/29/2015    [place] cardiac risk 10-20% in next 10 years 10/31/2017    recommended statin, but he declines    Morbid obesity with BMI of 40.0-44.9, adult 1/29/2015       Past Surgical History:   Procedure Laterality Date    CATARACT EXTRACTION      HERNIA REPAIR         Subjective     Chief Complaint: none   Patient/Family stated goals: pt feels ready to go home  Communicated with: nurse prior to session.  Pain/Comfort:  · Pain Rating 1: 0/10  · Pain Rating Post-Intervention 1: 0/10    Patients cultural, spiritual, Mandaeism conflicts given the current situation:      Objective:     Patient found with: telemetry    General Precautions: Standard, fall   Orthopedic Precautions:N/A   Braces: N/A     Occupational Performance:    Bed Mobility:    · Patient completed Rolling/Turning to Left with  independence  · Patient completed Rolling/Turning to Right with independence  · Patient completed Scooting/Bridging with independence  · Patient completed  "Supine to Sit with modified independence  · Patient completed Sit to Supine with modified independence    Functional Mobility/Transfers:  · Patient completed Sit <> Stand Transfer with modified independence  with  no assistive device   · Patient completed Toilet Transfer Stand Pivot technique with modified independence with  no AD and raised toilet height  · Functional Mobility: Pt able to walk from bed <-> bathroom with SBA - slight lean to L when walking (family report this is his "norm" - supposed to get insert for shoe)    Activities of Daily Living:  · Feeding:  independence with meals  · Grooming: modified independence standing at sink to wash hands  · UB Dressing: modified [facility] gown as robe in sitting  · LB Dressing: modified independence haim/doff socks sitting EOB - needed increased time to complete -  reports he uses a sock aide at home  · Toileting: modified independence completed all steps    Cognitive/Visual Perceptual:  Cognitive/Psychosocial Skills:     -       Oriented to: Person, Place, Time and Situation   -       Follows Commands/attention:Follows two-step commands  -       Communication: clear/fluent  -       Memory: No Deficits noted  -       Safety awareness/insight to disability: intact   -       Mood/Affect/Coping skills/emotional control: Appropriate to situation    Physical Exam:  Balance:    -       Sitting = Good; Standing = fair-good  Postural examination/scapula alignment: -       Rounded shoulders  Skin integrity: Visible skin intact and some redness in R hand  Edema:  Mild B hands  Sensation: -       Intact  Dominant hand: -       right  Upper Extremity Range of Motion:   B UEs = WFL  Upper Extremity Strength:  B UEs = WFL   Strength:  Good  Fine Motor Coordination: -       Intact  Gross motor coordination: WFL    Patient left HOB elevated with call button in reach, nurse notified and family present    [facility] 6 Click:  AMPA Total Score: 23    Treatment & " "Education:  · Pt completed ADLs and func mobility activities for tx session as noted above  · Pt educated on role of OT and POC    Education:    Assessment:     Adan Cain is a 68 y.o. male with a medical diagnosis of TIA involving carotid artery. At this time, patient is functioning at their prior level of function and does not require further acute OT services.     Clinical Decision Makin.  OT Low:  "Pt evaluation falls under low complexity for evaluation coding due to performance deficits noted in 1-3 areas as stated above and 0 co-morbities affecting current functional status. Data obtained from problem focused assessments. No modifications or assistance was required for completion of evaluation. Only brief occupational profile and history review completed."     Plan:     During this hospitalization, patient does not require further acute OT services.  Please re-consult if situation changes.    · Plan of Care Reviewed with: patient    This Plan of care has been discussed with the patient who was involved in its development and understands and is in agreement with the identified goals and treatment plan    GOALS:    Occupational Therapy Goals     Not on file                Time Tracking:     OT Date of Treatment: 18  OT Start Time: 932  OT Stop Time: 950  OT Total Time (min): 18 min    Billable Minutes:Evaluation 18    Summer Zurita, OT  2018    "

## 2018-05-27 NOTE — PLAN OF CARE
Problem: Physical Therapy Goal  Goal: Physical Therapy Goal  Outcome: Outcome(s) achieved Date Met: 05/27/18  Initial PT evaluation performed.  Pt ok for D/C home from PT standpoint.  No skilled PT or DME needs

## 2018-05-27 NOTE — SUBJECTIVE & OBJECTIVE
Interval History: Feeling fine today. Itching and redness have resolved. Thinks that the weakness/numbness and ?LOC was due to the medications that he received.     Review of Systems   Constitutional: Negative for chills and fever.   Respiratory: Negative for cough and shortness of breath.    Cardiovascular: Negative for chest pain and palpitations.   Gastrointestinal: Negative for nausea and vomiting.     Objective:     Vital Signs (Most Recent):  Temp: 98.6 °F (37 °C) (05/27/18 1601)  Pulse: 74 (05/27/18 1601)  Resp: 18 (05/27/18 1601)  BP: (!) 159/76 (05/27/18 1601)  SpO2: 96 % (05/27/18 1325) Vital Signs (24h Range):  Temp:  [97 °F (36.1 °C)-99 °F (37.2 °C)] 98.6 °F (37 °C)  Pulse:  [] 74  Resp:  [16-21] 18  SpO2:  [94 %-97 %] 96 %  BP: ()/(60-85) 159/76     Weight: (!) 137.8 kg (303 lb 12.7 oz)  Body mass index is 40.08 kg/m².    Intake/Output Summary (Last 24 hours) at 05/27/18 1647  Last data filed at 05/27/18 1300   Gross per 24 hour   Intake              610 ml   Output             1570 ml   Net             -960 ml      Physical Exam   Constitutional: He is oriented to person, place, and time. He appears well-developed and well-nourished. No distress.   HENT:   Head: Normocephalic and atraumatic.   Musculoskeletal: He exhibits no edema.   Neurological: He is alert and oriented to person, place, and time.   Skin: No rash noted.   Psychiatric: He has a normal mood and affect. His behavior is normal.   Nursing note and vitals reviewed.      Significant Labs:   A1C:   Recent Labs  Lab 05/27/18  0104   HGBA1C 5.3     CBC:   Recent Labs  Lab 05/26/18 2151 05/27/18  0535   WBC 10.94 19.95*   HGB 14.8 14.4   HCT 44.9 42.8    266     CMP:   Recent Labs  Lab 05/26/18 2153 05/27/18  0534    140   K 4.1 4.2    107   CO2 21* 25   * 109   BUN 26* 27*   CREATININE 1.4 0.9   CALCIUM 10.0 9.8   PROT 6.9 6.8   ALBUMIN 3.2* 3.3*   BILITOT 0.3 0.6   ALKPHOS 105 100   AST 15 17   ALT 18  18   ANIONGAP 12 8   EGFRNONAA 51* >60     Lipid Panel:   Recent Labs  Lab 05/26/18  2153   CHOL 168   HDL 36*   LDLCALC 119.6   TRIG 62   CHOLHDL 21.4     Troponin:   Recent Labs  Lab 05/27/18  0534 05/27/18  1132   TROPONINI 0.032* 0.012       Significant Imaging: I have reviewed all pertinent imaging results/findings within the past 24 hours.

## 2018-05-27 NOTE — ASSESSMENT & PLAN NOTE
Allergic rhinitis  Augmentin and azithromycin discontinued.  Offered patient doxycycline, he refused.  No Abx for now (majority of sinus infections viral); continue cetirizine and Flonase.  Can do sinus rinses and compressions for symptom relief.

## 2018-05-27 NOTE — PT/OT/SLP EVAL
Speech Language Pathology Evaluation  Bedside Swallow    Patient Name:  Adan Cain   MRN:  [id number]  Admitting Diagnosis: TIA involving carotid artery    Recommendations:                 General Recommendations:  N/A  Diet recommendations:  Regular, Thin   Aspiration Precautions: HOB to 90 degrees   General Precautions: Standard, fall  Communication strategies:  none    History:     Past Medical History:   Diagnosis Date    Allergic rhinitis 4/13/2016    BPH (benign prostatic hyperplasia)     Urology Dr Zamora, OTC prostate revive helps, avodart caused chest pains    Calculus of gallbladder     US 2016    DJD (degenerative joint disease) of hip 1/29/2015    [place] cardiac risk 10-20% in next 10 years 10/31/2017    recommended statin, but he declines    Morbid obesity with BMI of 40.0-44.9, adult 1/29/2015       Past Surgical History:   Procedure Laterality Date    CATARACT EXTRACTION      HERNIA REPAIR         Social History: Patient lives with family at home.    Prior Intubation HX:  Not indicated this admit    Modified Barium Swallow: Not indicated this admit    Chest X-Rays: 5/26/18 FINDINGS:  Support device overlies the left chest wall.  Heart is stable in size.  Lungs are symmetrically expanded.  There is nonspecific diffuse interstitial prominence which could reflect mild interstitial edema.  No evidence of focal consolidation, pneumothorax, or significant effusion.  No acute osseous abnormality identified.    Prior diet: regular /thin    Occupation/hobbies/homemaking: none stated    Subjective     Principal Problem:TIA (transient ischemic attack)     Chief Complaint:        Chief Complaint   Patient presents with    Allergic Reaction       Reports X 2 hours having itching to hands, neck, and lips. Reports swelling to lip and hands. Pt denies trouble swallowing at this time. Pt reports relief with Benadryl. States is taking augmentin at this time         HPI: Adan Cain is a 69 y/o   male with dejenerative joint disease, seasonal allergic rhinitis, obesity, and BPH.   He is  and lives in [place], [place].  His PCP is Dr. Gina Reyes.  Patient had been suffering with sinus pressure, sore throat, cough, and nasal congestion x 1 week.  He was evaluated at an [facility] on 5/25/18 and diagnosed with acute sinusitis.  He was prescribed azithromycin.  He developed a rash after taking a dose of azithromycin.  His wife called Urgent Care on 5/26/18 to inform of rash, he was changed to Augmentin.  Patient begin to have bilateral hand itching, swelling of lips and cheeks after taking Augmentin. He began to feel funny and was unable to ambulate; his daughter reports that he was diaphoretic and shaking.  EMS was called, patient was given IV Benadryl en route to ED.  While in ED patient began to have right facial droop and right sided weakness prompting initiation of Code Stroke.  Head CT was negative.  Patients symptoms resolved prior to evaluation by vascular neurologist Dr. Feng via telemedicine.  Dr. Feng recommended admission for TIA work-up.          Past Medical History:   Diagnosis Date    Allergic rhinitis 4/13/2016    BPH (benign prostatic hyperplasia)       Urology Dr Zamora, OTC prostate revive helps, avodart caused chest pains    Calculus of gallbladder       US 2016    DJD (degenerative joint disease) of hip 1/29/2015    [place] cardiac risk 10-20% in next 10 years 10/31/2017     recommended statin, but he declines    Morbid obesity with BMI of 40.0-44.9, adult 1/29/2015               Past Surgical History:   Procedure Laterality Date    CATARACT EXTRACTION        HERNIA REPAIR                   Review of patient's allergies indicates:   Allergen Reactions    Augmentin [amoxicillin-pot clavulanate] Rash       Rash, confusion, TIA like symptoms    Azithromycin Rash    Avodart [dutasteride]      Naproxen      Ragweed      Synthroid [levothyroxine] Rash          No current facility-administered medications on file prior to encounter.            Current Outpatient Prescriptions on File Prior to Encounter   Medication Sig    BABY ASPIRIN ORAL Take by mouth every evening.     cholecalciferol, vitamin D3, 1,000 unit capsule Take 1,000 Units by mouth 2 (two) times daily.     ciclopirox (PENLAC) 8 % Soln Apply topically nightly. To fungal toenail    coenzyme Q10 (CO Q-10) 100 mg capsule Take by mouth once daily.    diclofenac sodium 1 % Gel Apply 2 g topically 4 (four) times daily.    fluticasone (FLONASE) 50 mcg/actuation nasal spray PLACE 1 SPRAY INTO EACH NOSTRIL DAILY    glucosamine-chondroitin 500-400 mg tablet Take 1 tablet by mouth 3 (three) times daily.    loratadine (CLARITIN) 10 mg tablet Take 10 mg by mouth daily as needed for Allergies.    MULTIVITAMIN W-MINERALS/LUTEIN (CENTRUM SILVER ORAL) Take by mouth.    omega-3 fatty acids (FISH OIL) 300 mg Cap Take by mouth.    UNABLE TO FIND Take by mouth nightly. tumeric    UNABLE TO FIND once daily. actaline    UNABLE TO FIND Use as directed 1 tablet in the mouth or throat 2 (two) times daily. Prostate Revive manufactured by [employer]    VITAMIN B COMPLEX (SUPER B COMPLEX-B-12 ORAL) Take by mouth.    [DISCONTINUED] amoxicillin-clavulanate 875-125mg (AUGMENTIN) 875-125 mg per tablet Take 1 tablet by mouth 2 (two) times daily.    [DISCONTINUED] azithromycin (Z-LESLEE) 250 MG tablet Take 2 tablets by mouth x 1 for day 1 Then take 1 tablet by mouth daily for day 2 - 5           Family History      Problem Relation (Age of Onset)     Aneurysm Father     Diabetes Mother     Leukemia Father                Social History Main Topics    Smoking status: Former Smoker       Packs/day: 6.00       Years: 1.00       Types: Cigarettes    Smokeless tobacco: Never Used    Alcohol use No    Drug use: No    Sexual activity: Yes       Partners: Female      Review of Systems   Constitutional: Positive for diaphoresis.  Negative for chills, fatigue and fever.   HENT: Positive for facial swelling, sinus pressure and sore throat. Negative for congestion and trouble swallowing.    Eyes: Negative for photophobia, pain and visual disturbance.   Respiratory: Negative for cough, shortness of breath and wheezing.    Cardiovascular: Negative for chest pain, palpitations and leg swelling.   Gastrointestinal: Negative for abdominal pain, nausea and vomiting.   Endocrine: Negative for cold intolerance and heat intolerance.   Genitourinary: Negative for dysuria, frequency and urgency.   Musculoskeletal: Negative for arthralgias and myalgias.   Skin: Positive for rash. Negative for color change and pallor.        Pruritus    Neurological: Positive for tremors, weakness and numbness. Negative for dizziness and headaches.   Hematological: Does not bruise/bleed easily.   Psychiatric/Behavioral: Negative for agitation and confusion. The patient is not nervous/anxious.       Objective:      Vital Signs (Most Recent):  Temp: 97 °F (36.1 °C) (05/27/18 0059)  Pulse: 85 (05/27/18 0059)  Resp: 16 (05/27/18 0059)  BP: 139/74 (05/27/18 0059)  SpO2: 95 % (05/27/18 0144) Vital Signs (24h Range):  Temp:  [97 °F (36.1 °C)-97.8 °F (36.6 °C)] 97 °F (36.1 °C)  Pulse:  [] 85  Resp:  [16-21] 16  SpO2:  [94 %-97 %] 95 %  BP: ()/(60-77) 139/74      Weight: (!) 137.8 kg (303 lb 12.7 oz)  Body mass index is 40.08 kg/m².     Physical Exam   Constitutional: He is oriented to person, place, and time. He appears well-developed and well-nourished. No distress. Nasal cannula in place.   2L NC   HENT:   Head: Normocephalic and atraumatic.   Mouth/Throat: Oropharynx is clear and moist.   Eyes: EOM are normal. Pupils are equal, round, and reactive to light. No scleral icterus.   Neck: Normal range of motion. Neck supple. No tracheal deviation present.   Cardiovascular: Normal rate, regular rhythm and intact distal pulses.    Pulmonary/Chest: Effort normal and breath  sounds normal. No respiratory distress. He has no wheezes.   Abdominal: Soft. Bowel sounds are normal. He exhibits no distension. There is no tenderness.   Musculoskeletal: Normal range of motion. He exhibits edema. He exhibits no tenderness.   1+ edema to right hand   Neurological: He is alert and oriented to person, place, and time. No sensory deficit. He exhibits normal muscle tone. Coordination normal. GCS eye subscore is 4. GCS verbal subscore is 5. GCS motor subscore is 6.   Skin: Skin is warm, dry and intact. There is erythema.   Mild erythema to bilateral hands   Psychiatric: He has a normal mood and affect. His speech is normal and behavior is normal.   Nursing note and vitals reviewed.           CRANIAL NERVES      CN III, IV, VI   Pupils are equal, round, and reactive to light.  Extraocular motions are normal.         Significant Labs:   A1C:   Recent Labs  Lab 05/27/18  0104   HGBA1C 5.3      CBC:   Recent Labs  Lab 05/26/18 2151   WBC 10.94   HGB 14.8   HCT 44.9         CMP:   Recent Labs  Lab 05/26/18 2153      K 4.1      CO2 21*   *   BUN 26*   CREATININE 1.4   CALCIUM 10.0   PROT 6.9   ALBUMIN 3.2*   BILITOT 0.3   ALKPHOS 105   AST 15   ALT 18   ANIONGAP 12   EGFRNONAA 51*      Coagulation:   Recent Labs  Lab 05/26/18 2151   INR 1.0   APTT 25.2      Lipid Panel:   Recent Labs  Lab 05/26/18 2153   CHOL 168   HDL 36*   LDLCALC 119.6   TRIG 62   CHOLHDL 21.4      TSH:   Recent Labs  Lab 05/26/18 2153   TSH 8.549*      Free T4 0.71 - 1.51 ng/dL 0.80         All pertinent labs within the past 24 hours have been reviewed.     Significant Imaging: I have reviewed all pertinent imaging results/findings within the past 24 hours.      CT Head Without Contrast: No acute intracranial abnormalities identified.     X-Ray Chest AP Portable: Nonspecific diffuse interstitial prominence which could reflect chronic change versus mild interstitial edema.     CTA Head and Neck:   1. No  acute intracranial abnormalities identified.  2. CTA head and neck without significant focal stenosis or occlusion.  3. Fusiform aneurysmal dilatation of the ascending thoracic aorta measuring 5 cm.  Recommend nonemergent cardiology referral and future CT follow-up as clinically indicated.     Assessment/Plan:          * TIA (transient ischemic attack)     Complete stroke work-up.  Daily ASA and Lipitor.  Consults placed to PT/OT/SLP for evaluations.  Monitor on telemetry.  2D echo, A1c, neuro checks.  Consult neuro telemedicine.             Thoracic aortic aneurysm without rupture     F/u as outpatient with cardiology.             Allergic drug reaction     PRN Benadryl; Augmentin and azithromycin listed as allergies in EPIC.             Acute sinusitis     Allergic rhinitis  Augmentin and azithromycin discontinued.  Offered patient doxycycline, he refused.  No Abx for now (majority of sinus infections viral); continue cetirizine and Flonase.  Can do sinus rinses and compressions for symptom relief.             Benign prostatic hyperplasia without lower urinary tract symptoms     Chronic.  Patient takes all natural supplement at home; not available in hospital formulary.  Continue to monitor.             Patient goals: to go home    Pain/Comfort:  · Pain Rating 1: 0/10    Objective:     Oral Musculature Evaluation  · Oral Musculature: WFL  · Dentition: present and adequate  · Mucosal Quality: good  · Mandibular Strength and Mobility: WFL  · Oral Labial Strength and Mobility: WFL  · Lingual Strength and Mobility: WFL  · Buccal Strength and Mobility: WFL  · Volitional Cough: strong  · Volitional Swallow: WFL  · Voice Prior to PO Intake: clear vocal quality    Bedside Swallow Eval:   Consistencies Assessed:  · Thin liquids approx 4 oz via self regulated straw sips  · Puree approx 2 oz via self regulated tsp bites  · Soft solids approx 4 oz via self regulated tsp bites  · Solids 1 irving cracker     Oral Phase:    · WFL    Pharyngeal Phase:   · no overt clinical signs/symptoms of aspiration  · no overt clinical signs/symptoms of pharyngeal dysphagia    Compensatory Strategies  · None    Treatment: St provided bedside swallow eval and speech/cognitive language screen this afternoon.     Assessment:     Adan Cain is a 68 y.o. male with an SLP diagnosis of N/A.  He presents with oropharyngeal skills and speech/cognitive language skills WFL this eval. Oral mech exam revealed labial/lingual/buccal strength/movement WFL. Confrontational naming at 100%. Pt followed 1 and 2 step verbal directions at 100%. Intelligibility at 100%. Pt independently oriented to self, place, situation, and time. Pt was observed to consume presented PO consistencies this eval without overt s/s of aspiration. Pt denied odynophagia, denied globus sensation. Recommend continue current diet of regular consistency food/thin liquid. Further ST intervention not warranted at this time. MARIANNE Ortiz notified of these recommendations.     Goals:    SLP Goals     Not on file                Plan:     · Patient to be seen:      · Plan of Care expires:     · Plan of Care reviewed with:  patient, son   · SLP Follow-Up:  No       Discharge recommendations:  home   Barriers to Discharge:  None    Time Tracking:     SLP Treatment Date:   05/27/18  Speech Start Time:  1445  Speech Stop Time:  1500     Speech Total Time (min):  15 min    Billable Minutes: Eval Swallow and Oral Function 15    Hina Aragon CCC-SLP  05/27/2018

## 2018-05-27 NOTE — ED NOTES
Pt neuro exam now  Normal, motor, sensory intake, alert and orient x4. NAD noted. Pt remains on cardiac monitor. Family @ bedside.

## 2018-05-27 NOTE — ASSESSMENT & PLAN NOTE
CTA negative for stenosis or thrombosis. Will get MRI brain to ensure no stroke. Daily ASA. Refused to take the Lipitor. Consults placed to PT/OT/SLP for evaluations.  Monitor on telemetry.  2D echo and neuro checks.  Consult Vascular Neuro.

## 2018-05-27 NOTE — H&P
[facility] Medicine  History & Physical    Patient Name: Adan Cain  MRN: [id number]  Admission Date: 5/26/2018  Attending Physician: Vargas Keller MD   Primary Care Provider: Gina Reyes MD         Patient information was obtained from patient, past medical records and ER records.     Subjective:     Principal Problem:TIA (transient ischemic attack)    Chief Complaint:   Chief Complaint   Patient presents with    Allergic Reaction     Reports X 2 hours having itching to hands, neck, and lips. Reports swelling to lip and hands. Pt denies trouble swallowing at this time. Pt reports relief with Benadryl. States is taking augmentin at this time        HPI: Adan aCin is a 69 y/o  male with dejenerative joint disease, seasonal allergic rhinitis, obesity, and BPH.   He is  and lives in [place], [place].  His PCP is Dr. Gina Reyes.  Patient had been suffering with sinus pressure, sore throat, cough, and nasal congestion x 1 week.  He was evaluated at an [facility] on 5/25/18 and diagnosed with acute sinusitis.  He was prescribed azithromycin.  He developed a rash after taking a dose of azithromycin.  His wife called Urgent Care on 5/26/18 to inform of rash, he was changed to Augmentin.  Patient begin to have bilateral hand itching, swelling of lips and cheeks after taking Augmentin. He began to feel funny and was unable to ambulate; his daughter reports that he was diaphoretic and shaking.  EMS was called, patient was given IV Benadryl en route to ED.  While in ED patient began to have right facial droop and right sided weakness prompting initiation of Code Stroke.  Head CT was negative.  Patients symptoms resolved prior to evaluation by vascular neurologist Dr. Feng via telemedicine.  Dr. Feng recommended admission for TIA work-up.    Past Medical History:   Diagnosis Date    Allergic rhinitis 4/13/2016    BPH (benign prostatic hyperplasia)      Urology Dr Zamora, OTC prostate revive helps, avodart caused chest pains    Calculus of gallbladder     US 2016    DJD (degenerative joint disease) of hip 1/29/2015    [place] cardiac risk 10-20% in next 10 years 10/31/2017    recommended statin, but he declines    Morbid obesity with BMI of 40.0-44.9, adult 1/29/2015       Past Surgical History:   Procedure Laterality Date    CATARACT EXTRACTION      HERNIA REPAIR         Review of patient's allergies indicates:   Allergen Reactions    Augmentin [amoxicillin-pot clavulanate] Rash     Rash, confusion, TIA like symptoms    Azithromycin Rash    Avodart [dutasteride]     Naproxen     Ragweed     Synthroid [levothyroxine] Rash       No current facility-administered medications on file prior to encounter.      Current Outpatient Prescriptions on File Prior to Encounter   Medication Sig    BABY ASPIRIN ORAL Take by mouth every evening.     cholecalciferol, vitamin D3, 1,000 unit capsule Take 1,000 Units by mouth 2 (two) times daily.     ciclopirox (PENLAC) 8 % Soln Apply topically nightly. To fungal toenail    coenzyme Q10 (CO Q-10) 100 mg capsule Take by mouth once daily.    diclofenac sodium 1 % Gel Apply 2 g topically 4 (four) times daily.    fluticasone (FLONASE) 50 mcg/actuation nasal spray PLACE 1 SPRAY INTO EACH NOSTRIL DAILY    glucosamine-chondroitin 500-400 mg tablet Take 1 tablet by mouth 3 (three) times daily.    loratadine (CLARITIN) 10 mg tablet Take 10 mg by mouth daily as needed for Allergies.    MULTIVITAMIN W-MINERALS/LUTEIN (CENTRUM SILVER ORAL) Take by mouth.    omega-3 fatty acids (FISH OIL) 300 mg Cap Take by mouth.    UNABLE TO FIND Take by mouth nightly. tumeric    UNABLE TO FIND once daily. actaline    UNABLE TO FIND Use as directed 1 tablet in the mouth or throat 2 (two) times daily. Prostate Revive manufactured by [employer]    VITAMIN B COMPLEX (SUPER B COMPLEX-B-12 ORAL) Take by mouth.    [DISCONTINUED]  amoxicillin-clavulanate 875-125mg (AUGMENTIN) 875-125 mg per tablet Take 1 tablet by mouth 2 (two) times daily.    [DISCONTINUED] azithromycin (Z-LESLEE) 250 MG tablet Take 2 tablets by mouth x 1 for day 1 Then take 1 tablet by mouth daily for day 2 - 5     Family History     Problem Relation (Age of Onset)    Aneurysm Father    Diabetes Mother    Leukemia Father        Social History Main Topics    Smoking status: Former Smoker     Packs/day: 6.00     Years: 1.00     Types: Cigarettes    Smokeless tobacco: Never Used    Alcohol use No    Drug use: No    Sexual activity: Yes     Partners: Female     Review of Systems   Constitutional: Positive for diaphoresis. Negative for chills, fatigue and fever.   HENT: Positive for facial swelling, sinus pressure and sore throat. Negative for congestion and trouble swallowing.    Eyes: Negative for photophobia, pain and visual disturbance.   Respiratory: Negative for cough, shortness of breath and wheezing.    Cardiovascular: Negative for chest pain, palpitations and leg swelling.   Gastrointestinal: Negative for abdominal pain, nausea and vomiting.   Endocrine: Negative for cold intolerance and heat intolerance.   Genitourinary: Negative for dysuria, frequency and urgency.   Musculoskeletal: Negative for arthralgias and myalgias.   Skin: Positive for rash. Negative for color change and pallor.        Pruritus    Neurological: Positive for tremors, weakness and numbness. Negative for dizziness and headaches.   Hematological: Does not bruise/bleed easily.   Psychiatric/Behavioral: Negative for agitation and confusion. The patient is not nervous/anxious.      Objective:     Vital Signs (Most Recent):  Temp: 97 °F (36.1 °C) (05/27/18 0059)  Pulse: 85 (05/27/18 0059)  Resp: 16 (05/27/18 0059)  BP: 139/74 (05/27/18 0059)  SpO2: 95 % (05/27/18 0144) Vital Signs (24h Range):  Temp:  [97 °F (36.1 °C)-97.8 °F (36.6 °C)] 97 °F (36.1 °C)  Pulse:  [] 85  Resp:  [16-21] 16  SpO2:   [94 %-97 %] 95 %  BP: ()/(60-77) 139/74     Weight: (!) 137.8 kg (303 lb 12.7 oz)  Body mass index is 40.08 kg/m².    Physical Exam   Constitutional: He is oriented to person, place, and time. He appears well-developed and well-nourished. No distress. Nasal cannula in place.   2L NC   HENT:   Head: Normocephalic and atraumatic.   Mouth/Throat: Oropharynx is clear and moist.   Eyes: EOM are normal. Pupils are equal, round, and reactive to light. No scleral icterus.   Neck: Normal range of motion. Neck supple. No tracheal deviation present.   Cardiovascular: Normal rate, regular rhythm and intact distal pulses.    Pulmonary/Chest: Effort normal and breath sounds normal. No respiratory distress. He has no wheezes.   Abdominal: Soft. Bowel sounds are normal. He exhibits no distension. There is no tenderness.   Musculoskeletal: Normal range of motion. He exhibits edema. He exhibits no tenderness.   1+ edema to right hand   Neurological: He is alert and oriented to person, place, and time. No sensory deficit. He exhibits normal muscle tone. Coordination normal. GCS eye subscore is 4. GCS verbal subscore is 5. GCS motor subscore is 6.   Skin: Skin is warm, dry and intact. There is erythema.   Mild erythema to bilateral hands   Psychiatric: He has a normal mood and affect. His speech is normal and behavior is normal.   Nursing note and vitals reviewed.        CRANIAL NERVES     CN III, IV, VI   Pupils are equal, round, and reactive to light.  Extraocular motions are normal.        Significant Labs:   A1C:   Recent Labs  Lab 05/27/18  0104   HGBA1C 5.3     CBC:   Recent Labs  Lab 05/26/18 2151   WBC 10.94   HGB 14.8   HCT 44.9        CMP:   Recent Labs  Lab 05/26/18 2153      K 4.1      CO2 21*   *   BUN 26*   CREATININE 1.4   CALCIUM 10.0   PROT 6.9   ALBUMIN 3.2*   BILITOT 0.3   ALKPHOS 105   AST 15   ALT 18   ANIONGAP 12   EGFRNONAA 51*     Coagulation:   Recent Labs  Lab 05/26/18 2151    INR 1.0   APTT 25.2     Lipid Panel:   Recent Labs  Lab 05/26/18  2153   CHOL 168   HDL 36*   LDLCALC 119.6   TRIG 62   CHOLHDL 21.4     TSH:   Recent Labs  Lab 05/26/18  2153   TSH 8.549*     Free T4 0.71 - 1.51 ng/dL 0.80       All pertinent labs within the past 24 hours have been reviewed.    Significant Imaging: I have reviewed all pertinent imaging results/findings within the past 24 hours.     CT Head Without Contrast: No acute intracranial abnormalities identified.    X-Ray Chest AP Portable: Nonspecific diffuse interstitial prominence which could reflect chronic change versus mild interstitial edema.    CTA Head and Neck:   1. No acute intracranial abnormalities identified.  2. CTA head and neck without significant focal stenosis or occlusion.  3. Fusiform aneurysmal dilatation of the ascending thoracic aorta measuring 5 cm.  Recommend nonemergent cardiology referral and future CT follow-up as clinically indicated.    Assessment/Plan:     * TIA (transient ischemic attack)    Complete stroke work-up.  Daily ASA and Lipitor.  Consults placed to PT/OT/SLP for evaluations.  Monitor on telemetry.  2D echo, A1c, neuro checks.  Consult neuro telemedicine.          Thoracic aortic aneurysm without rupture    F/u as outpatient with cardiology.          Allergic drug reaction    PRN Benadryl; Augmentin and azithromycin listed as allergies in EPIC.          Acute sinusitis    Allergic rhinitis  Augmentin and azithromycin discontinued.  Offered patient doxycycline, he refused.  No Abx for now (majority of sinus infections viral); continue cetirizine and Flonase.  Can do sinus rinses and compressions for symptom relief.          Benign prostatic hyperplasia without lower urinary tract symptoms    Chronic.  Patient takes all natural supplement at home; not available in hospital formulary.  Continue to monitor.            VTE Risk Mitigation         Ordered     IP VTE HIGH RISK PATIENT  Once      05/27/18 0052              Jennifer Lopez NP  Department of Hospital Medicine   [facility]

## 2018-05-27 NOTE — HPI
68M presented to ED for allergic reaction after abx, found to have right hemiparesis w/ facial droop in ED, s/p telemedicine consult, no tPA given.

## 2018-05-27 NOTE — ED PROVIDER NOTES
Encounter Date: 5/26/2018    SCRIBE #1 NOTE: I, Shyam Faulkner, am scribing for, and in the presence of,  Dr. Thompson. I have scribed the entire note.       History     Chief Complaint   Patient presents with    Allergic Reaction     Reports X 2 hours having itching to hands, neck, and lips. Reports swelling to lip and hands. Pt denies trouble swallowing at this time. Pt reports relief with Benadryl. States is taking augmentin at this time     Adan Cain is a 68 y.o. male who  has a past medical history of Allergic rhinitis (4/13/2016); BPH (benign prostatic hyperplasia); Calculus of gallbladder; DJD (degenerative joint disease) of hip (1/29/2015); [place] cardiac risk 10-20% in next 10 years (10/31/2017); and Morbid obesity with BMI of 40.0-44.9, adult (1/29/2015).    The patient presents to the ED via EMS due to hand tingling and redness to chest and arms, as well as lip swelling after taking Augmentin 1 hour PTA. He reports gradually worsening symptoms. He denies prior similar episodes or known allergy to Augmentin.     Upon intial assessment, patient appears slightly confused with a mild right facial droop and RUE weakness on exam. The patient suddenly became more somnolent, and was unable to answer questions or follow commands. A stroke code was called at this time. He was taken to CT immediately.      The history is provided by the patient.     Review of patient's allergies indicates:   Allergen Reactions    Avodart [dutasteride]     Naproxen     Ragweed     Synthroid [levothyroxine] Rash     Past Medical History:   Diagnosis Date    Allergic rhinitis 4/13/2016    BPH (benign prostatic hyperplasia)     Urology Dr Zamora, [facility] prostate revive helps, avodart caused chest pains    Calculus of gallbladder     US 2016    DJD (degenerative joint disease) of hip 1/29/2015    [place] cardiac risk 10-20% in next 10 years 10/31/2017    recommended statin, but he declines    Hemispheric carotid artery  syndrome 6/6/2018    [facility] , see MRI    Morbid obesity with BMI of 40.0-44.9, adult 1/29/2015    Seasonal allergic rhinitis due to pollen 4/13/2016     Past Surgical History:   Procedure Laterality Date    CATARACT EXTRACTION      HERNIA REPAIR       Family History   Problem Relation Age of Onset    Leukemia Father     Aneurysm Father     Diabetes Mother      Social History   Substance Use Topics    Smoking status: Former Smoker     Packs/day: 6.00     Years: 1.00     Types: Cigarettes    Smokeless tobacco: Never Used    Alcohol use No     Review of Systems   Constitutional: Negative for chills and fever.   HENT: Positive for facial swelling and trouble swallowing. Negative for congestion.         Oral swelling, itchiness to lips and neck   Eyes: Negative for redness.   Respiratory: Negative for shortness of breath.    Cardiovascular: Negative for chest pain.   Gastrointestinal: Negative for abdominal pain, diarrhea, nausea and vomiting.   Genitourinary: Negative for dysuria and hematuria.   Musculoskeletal: Negative for gait problem.        Swelling and itchiness to hands   Skin: Negative for rash.   Neurological: Positive for facial asymmetry (Right-sided facial droop) and weakness. Negative for speech difficulty.   Psychiatric/Behavioral: Positive for confusion. Negative for agitation and behavioral problems.       Physical Exam     Initial Vitals [05/26/18 2054]   BP Pulse Resp Temp SpO2   (!) 144/64 104 20 97.7 °F (36.5 °C) 96 %      MAP       90.67         Physical Exam    Nursing note and vitals reviewed.  Constitutional: He appears well-developed and well-nourished. He is not diaphoretic. No distress.   HENT:   Head: Normocephalic and atraumatic.   Mouth/Throat: Oropharynx is clear and moist.   Eyes: EOM are normal. Pupils are equal, round, and reactive to light.   Neck: No tracheal deviation present.   Cardiovascular: Normal rate, regular rhythm, normal heart sounds and intact distal pulses.    Pulmonary/Chest: Breath sounds normal. No stridor. No respiratory distress.   Abdominal: Soft. He exhibits no distension and no mass. There is no tenderness.   Musculoskeletal: Normal range of motion. He exhibits no edema.   Neurological: He is alert and oriented to person, place, and time. A cranial nerve deficit and sensory deficit is present.   Right-sided facial droop  RUE flaccid with weakness  Patient appears confused, not following commands   Skin: Skin is warm and dry. Capillary refill takes less than 2 seconds. No rash noted.   Psychiatric: He has a normal mood and affect. His behavior is normal. Thought content normal.         ED Course   Procedures  Labs Reviewed   CBC W/ AUTO DIFFERENTIAL - Abnormal; Notable for the following:        Result Value    Gran% 83.0 (*)     Lymph% 10.0 (*)     All other components within normal limits   LIPID PANEL - Abnormal; Notable for the following:     HDL 36 (*)     All other components within normal limits   TSH - Abnormal; Notable for the following:     TSH 8.549 (*)     All other components within normal limits   COMPREHENSIVE METABOLIC PANEL - Abnormal; Notable for the following:     CO2 21 (*)     Glucose 145 (*)     BUN, Bld 26 (*)     Albumin 3.2 (*)     eGFR if  58 (*)     eGFR if non  51 (*)     All other components within normal limits   POCT GLUCOSE - Abnormal; Notable for the following:     POCT Glucose 136 (*)     All other components within normal limits   ISTAT PROCEDURE - Abnormal; Notable for the following:     POC SATURATED O2 78 (*)     All other components within normal limits   CBC W/ AUTO DIFFERENTIAL   LIPID PANEL   COMPREHENSIVE METABOLIC PANEL   PROTIME-INR   TSH   APTT   PROTIME-INR   T4, FREE   ISTAT PROCEDURE   ISTAT CREATININE     EKG Readings: (Independently Interpreted)   NSR at 76 bpm, RBBB, left anterior fascicular block, LVH, normal intervals, no ischemia  Prior EKG during 09/2017 shows no significant  change    Repeat EKG at 21:30  Sinus tachycardia at 107 bpm, RBBB, no ST changes, no ischemia  Grossly stable from prior EKG       X-Rays:   Independently Interpreted Readings:   Other Readings:  Reviewed by myself, read by radiology.    Imaging Results          CTA Head and Neck (xpd) (Final result)  Result time 05/26/18 23:10:38    Final result by Lorna Ruiz MD (05/26/18 23:10:38)                 Impression:      1. No acute intracranial abnormalities identified.  2. CTA head and neck without significant focal stenosis or occlusion.  3. Fusiform aneurysmal dilatation of the ascending thoracic aorta measuring 5 cm.  Recommend nonemergent cardiology referral and future CT follow-up as clinically indicated.      Electronically signed by: Lorna Ruiz MD  Date:    05/26/2018  Time:    23:10             Narrative:    EXAMINATION:  CTA HEAD AND NECK (XPD)    CLINICAL HISTORY:  RUE weakness, R facial droop;    TECHNIQUE:  Non contrast low dose axial images were obtained thought the head.  CT angiogram was performed from the level of the gustabo to the top of the head following the IV administration of 75mL of Omnipaque 350.   Sagittal and coronal reconstructions and maximum intensity projection reconstructions were performed. Arterial stenosis percentages are based on NASCET measurement criteria.    COMPARISON:  CT head from the same date.    FINDINGS:  No evidence of acute/recent major vascular distribution cerebral infarction, intraparenchymal hemorrhage, or intra-axial space occupying lesion. The ventricular system is normal in size and configuration with no evidence of hydrocephalus. No effacement of the skull-base cisterns. No abnormal extra-axial fluid collections or blood products.  No evidence of abnormal enhancement on postcontrast imaging.  The visualized paranasal sinuses and mastoid air cells are clear. The calvarium shows no significant abnormality.    CTA Neck - the origins of the right  brachiocephalic, left common carotid and left subclavian arteries from the arch are within normal limits.  The origins of the vertebral arteries are within normal limits.  The vertebral arteries are unremarkable throughout their course without evidence for focal stenosis or occlusion.   The bilateral common carotid arteries and internal carotid arteries are patent without evidence for focal stenosis or occlusion.    Anterior circulation - The bilateral distal ICAs are patent without significant stenosis or aneurysm.  Minimal plaquing of the cavernous segments of the ICAs.  The anterior and middle cerebral arteries are patent without significant stenosis, occlusion, or aneurysm.    Posterior circulation -   distal vertebral arteries, basilar artery and posterior cerebral arteries are patent without significant focal stenosis, occlusion, or aneurysm.    Airways - Unremarkable.    Glands/Nodes - The parotid, submandibular, and thyroid glands are unremarkable.    Spine-multilevel degenerative changes are seen within the spine.    Lungs - Visualized lung apices are clear.    There is fusiform aneurysmal dilatation of the ascending thoracic aorta measuring 5 cm.                               X-Ray Chest AP Portable (Final result)  Result time 05/26/18 21:48:30    Final result by Lorna Ruiz MD (05/26/18 21:48:30)                 Impression:      Nonspecific diffuse interstitial prominence which could reflect chronic change versus mild interstitial edema.      Electronically signed by: Lorna Ruiz MD  Date:    05/26/2018  Time:    21:48             Narrative:    EXAMINATION:  XR CHEST AP PORTABLE    CLINICAL HISTORY:  Stroke;    TECHNIQUE:  Single frontal view of the chest was performed.    COMPARISON:  September 2017.    FINDINGS:  Support device overlies the left chest wall.  Heart is stable in size.  Lungs are symmetrically expanded.  There is nonspecific diffuse interstitial prominence which could reflect mild  interstitial edema.  No evidence of focal consolidation, pneumothorax, or significant effusion.  No acute osseous abnormality identified.                               CT Head Without Contrast (Final result)  Result time 05/26/18 21:46:45    Final result by Lorna Ruiz MD (05/26/18 21:46:45)                 Impression:      No acute intracranial abnormalities identified.      Electronically signed by: Lorna Ruiz MD  Date:    05/26/2018  Time:    21:46             Narrative:    EXAMINATION:  CT HEAD WITHOUT CONTRAST    CLINICAL HISTORY:  Stroke;    TECHNIQUE:  Low dose axial images were obtained through the head.  Coronal and sagittal reformations were also performed. Contrast was not administered.    COMPARISON:  None.    FINDINGS:  No evidence of acute/recent major vascular distribution cerebral infarction, intraparenchymal hemorrhage, or intra-axial space occupying lesion. The ventricular system is normal in size and configuration with no evidence of hydrocephalus. No effacement of the skull-base cisterns. The visualized paranasal sinuses and mastoid air cells are clear. The calvarium shows no significant abnormality.                              Medical Decision Making:   Initial Assessment:   68 y.o. male who presents to the ED with hand tingling and redness to chest and arms, as well as lip swelling after taking Augmentin 1 hour PTA. Upon intial assessment, patient appears confused with a  mild right facial droop and RUE weakness on exam. Code stroke activated. Patient somnolent and difficult to arouse; taken into trauma bed and IV fluids started prior to CT.  Differential Diagnosis:   Differential Diagnosis includes, but is not limited to:  CVA/TIA, intracranial mass/hemorrhage, head trauma, seizure, status epilepticus, post-ictal state, meningitis/encephalitis, sepsis, MI/ACS, arrhythmia, syncope,   anaphylaxis, thyroid disease, neuroleptic malignant syndrome, serotonin syndrome, CO poisoning,  hypoxia/hypercapnea, uremic/hepatic encephalopathy, medication reaction, intentional overdose, metabolic derangement, psychiatric disturbance, substance abuse, alcohol intoxication/withdrawal, hypoglycemia/hyperglycemia, complicated migraine.    Clinical Tests:   Lab Tests: Ordered and Reviewed  Radiological Study: Ordered and Reviewed  Medical Tests: Ordered and Reviewed  ED Management:  EKG without arrhythmia or ischemia.  CT head without acute process.  On reassessment, patient back to baseline mental status.    Dr. Feng, vascular neurologist, evaluated patient vie tele-consult, and appreciated no continued focal neuro deficits. Given temporary focal deficits, recommend admission for TIA workup and further management.    Spoke with [facility] hospitalist, who will admit patient for TIA/stroke workup.  Upon re-evaluation, the patient's status has improved.  At this time, I believe the patient should be admitted to the hospital for further evaluation and management of TIA.    [facility] HM service was contacted and the case was discussed. The consulting physician agrees with plan and will admit under their service. Additional recommendations at this time: none    The patient and family were updated with test results, overall impression, and further plan of care. All questions were answered. The patient expressed understanding and agreed with the current plan.                  Attending Attestation:         Attending Critical Care:   Critical Care Times:   Direct Patient Care (initial evaluation, reassessments, and time considering the case)................................................................20 minutes.   Additional History from reviewing old medical records or taking additional history from the family, EMS, PCP, etc.......................10 minutes.   Ordering, Reviewing, and Interpreting Diagnostic  Studies...............................................................................................................10 minutes.   Documentation..................................................................................................................................................................................5 minutes.   Consultation with other Physicians. .................................................................................................................................................10 minutes.   Consultation with the patient's family directly relating to the patient's condition, care, and DNR status (when patient unable)......5 minutes.   ==============================================================  · Total Critical Care Time - exclusive of procedural time: 60 minutes.  ==============================================================  Critical care was necessary to treat or prevent imminent or life-threatening deterioration of the following conditions: stroke and hypotension (TIA, hypotension).   Critical care was time spent personally by me on the following activities: obtaining history from patient or relative, examination of patient, review of x-rays / CT sent with the patient, review of old charts, ordering lab, x-rays, and/or EKG, development of treatment plan with patient or relative, ordering and performing treatments and interventions, evaluation of patient's response to treatment, interpretation of cardiac measurements and re-evaluation of patient's conition.   Critical Care Condition: critical                  Clinical Impression:     1. Cerebral infarction due to embolism of left middle cerebral artery    2. Stroke    3. TIA (transient ischemic attack)    4. Transient cerebral ischemia, unspecified type             I, Dr. Viral Thompson, personally performed the services described in this documentation. All medical record entries made by the scribe were at my direction and in my  presence.  I have reviewed the chart and agree that the record reflects my personal performance and is accurate and complete.     Viral Thompson MD.             Viral Thompson MD  06/14/18 0811

## 2018-05-27 NOTE — PLAN OF CARE
Problem: Patient Care Overview  Goal: Plan of Care Review  Outcome: Outcome(s) achieved Date Met: 05/27/18  ST provided bedside swallow study eval and speech/cognitive language eval. Pt with oropharyngeal and speech/cognitive skills WFL this eval. Further ST intervention not warranted at this time.

## 2018-05-27 NOTE — PT/OT/SLP EVAL
Physical Therapy Evaluation and Discharge Note    Patient Name:  Adan Cain   MRN:  [id number]    Recommendations:     Discharge Recommendations:  home   Discharge Equipment Recommendations: none   Barriers to discharge: None    Assessment:     Adan Cain is a 68 y.o. male admitted with a medical diagnosis of TIA involving carotid artery. .  At this time, patient is functioning at their prior level of function and does not require further acute PT services.     Recent Surgery: * No surgery found *      Plan:     During this hospitalization, patient does not require further acute PT services.  Please re-consult if situation changes.     Plan of Care Reviewed with: patient, family    Subjective     Communicated with nsg prior to session.  Patient found supine in bed el upon PT entry to room, agreeable to evaluation.      Chief Complaint: No c/o  Patient comments/goals: to go home  Pain/Comfort:  · Pain Rating 1: 0/10    Patients cultural, spiritual, Taoist conflicts given the current situation: none    Living Environment:  Pt lives with spouse, no concerns  Prior to admission, patients level of function was Independent.  Patient has the following equipment: raised toilet, grab bar, shower chair.  DME owned (not currently used): rolling walker.  Upon discharge, patient will have assistance from spouse/family.    Objective:     Patient found with: telemetry     General Precautions: Standard, fall   Orthopedic Precautions:N/A   Braces: N/A     Exams:  · Cognitive Exam:  Patient is oriented to Person, Place, Time and Situation and follows 100% of 1-step commands   · Gross Motor Coordination:  Person, Place, Time and Situation  · Postural Exam:  Patient presented with the following abnormalities:    · -       Rounded shoulders  · -       Forward head  · -       Abnormal trunk flexion  · Sensation:    · -       Intact  light/touch B LE's  · Skin Integrity/Edema:      · -       Skin integrity: Visible skin  intact  · RLE ROM: WFL  · RLE Strength: WFL  · LLE ROM: WFL  · LLE Strength: WFL    Functional Mobility:  · Bed Mobility:     · Scooting: modified independence  · Supine to Sit: modified independence  · Sit to Supine: modified independence  · Transfers:     · Sit to Stand:  modified independence with no AD  · Gait: 400' Mod Indep, decreased kirti  · Balance: good sit, Good- stand     AM-PAC 6 CLICK MOBILITY  Total Score:24       Therapeutic Activities and Exercises:   eval only    Patient left supine with all lines intact, call button in reach, nsg notified and family present.    GOALS:    Physical Therapy Goals     Not on file          Multidisciplinary Problems (Resolved)        Problem: Physical Therapy Goal    Goal Priority Disciplines Outcome Goal Variances Interventions   Physical Therapy Goal   (Resolved)     PT/OT, PT Outcome(s) achieved                     History:     Past Medical History:   Diagnosis Date    Allergic rhinitis 4/13/2016    BPH (benign prostatic hyperplasia)     Urology Dr Zamora, [facility] prostate revive helps, avodart caused chest pains    Calculus of gallbladder     US 2016    DJD (degenerative joint disease) of hip 1/29/2015    [place] cardiac risk 10-20% in next 10 years 10/31/2017    recommended statin, but he declines    Morbid obesity with BMI of 40.0-44.9, adult 1/29/2015       Past Surgical History:   Procedure Laterality Date    CATARACT EXTRACTION      HERNIA REPAIR         Clinical Decision Making:     History  Co-morbidities and personal factors that may impact the plan of care Examination  Body Structures and Functions, activity limitations and participation restrictions that may impact the plan of care Clinical Presentation   Decision Making/ Complexity Score   Co-morbidities:   [] Time since onset of injury / illness / exacerbation  [] Status of current condition  []Patient's cognitive status and safety concerns    [] Multiple Medical Problems (see med hx)  Personal  Factors:   [] Patient's age  [] Prior Level of function   [] Patient's home situation (environment and family support)  [] Patient's level of motivation  [] Expected progression of patient      HISTORY:(criteria)    [] 23418 - no personal factors/history    [] 74150 - has 1-2 personal factor/comorbidity     [] 73098 - has >3 personal factor/comorbidity     Body Regions:  [] Objective examination findings  [] Head     []  Neck  [] Trunk   [] Upper Extremity  [] Lower Extremity    Body Systems:  [] For communication ability, affect, cognition, language, and learning style: the assessment of the ability to make needs known, consciousness, orientation (person, place, and time), expected emotional /behavioral responses, and learning preferences (eg, learning barriers, education  needs)  [] For the neuromuscular system: a general assessment of gross coordinated movement (eg, balance, gait, locomotion, transfers, and transitions) and motor function  (motor control and motor learning)  [] For the musculoskeletal system: the assessment of gross symmetry, gross range of motion, gross strength, height, and weight  [] For the integumentary system: the assessment of pliability(texture), presence of scar formation, skin color, and skin integrity  [] For cardiovascular/pulmonary system: the assessment of heart rate, respiratory rate, blood pressure, and edema     Activity limitations:    [] Patient's cognitive status and saf ety concerns          [] Status of current condition      [] Weight bearing restriction  [] Cardiopulmunary Restriction    Participation Restrictions:   [] Goals and goal agreement with the patient     [] Rehab potential (prognosis) and probable outcome      Examination of Body System: (criteria)    [] 19840 - addressing 1-2 elements    [] 08528 - addressing a total of 3 or more elements     [] 83153 -  Addressing a total of 4 or more elements         Clinical Presentation: (criteria)  Choose one     On  examination of body system using standardized tests and measures patient presents with (CHOOSE ONE) elements from any of the following: body structures and functions, activity limitations, and/or participation restrictions.  Leading to a clinical presentation that is considered (CHOOSE ONE)                              Clinical Decision Making  (Eval Complexity):  Choose One     Time Tracking:     PT Received On: 05/27/18  PT Start Time: 1110     PT Stop Time: 1125  PT Total Time (min): 15 min     Billable Minutes: Evaluation 15      Sade Fam, PT  05/27/2018

## 2018-05-27 NOTE — ED NOTES
While Jay DURON assessing pt, pt became disoriented, less responsive, pale, c/o numbness/tingling to RUE, pt eyes rolling back. Pt unable to to move RUE and endorses numbness/tingling. Pt transferred to , code stroke called.

## 2018-05-27 NOTE — ASSESSMENT & PLAN NOTE
TIA  Antithrombotics for secondary stroke prevention: Antiplatelets: Aspirin: 81 mg daily    Statins for secondary stroke prevention and hyperlipidemia, if present:   Statins: Atorvastatin- 40 mg daily    Aggressive risk factor modification: None     Rehab efforts: None: Reason: No deficits    Diagnostics ordered/pending: CTA Head to assess vasculature , CTA Neck/Arch to assess vasculature, Lipid Profile to assess cholesterol levels, MRI head without contrast to assess brain parenchyma, TTE to assess cardiac function/status     VTE prophylaxis: None: Reason for No Pharmacological VTE Prophylaxis: Ambulating with or without assistance    BP parameters: TIA: SBP <220 until imaging confirmation of no infarct

## 2018-05-27 NOTE — SUBJECTIVE & OBJECTIVE
Past Medical History:   Diagnosis Date    Allergic rhinitis 4/13/2016    BPH (benign prostatic hyperplasia)     Urology Dr Zamora, OTC prostate revive helps, avodart caused chest pains    Calculus of gallbladder     US 2016    DJD (degenerative joint disease) of hip 1/29/2015    [place] cardiac risk 10-20% in next 10 years 10/31/2017    recommended statin, but he declines    Morbid obesity with BMI of 40.0-44.9, adult 1/29/2015       Past Surgical History:   Procedure Laterality Date    CATARACT EXTRACTION      HERNIA REPAIR         Review of patient's allergies indicates:   Allergen Reactions    Augmentin [amoxicillin-pot clavulanate] Rash     Rash, confusion, TIA like symptoms    Azithromycin Rash    Avodart [dutasteride]     Naproxen     Ragweed     Synthroid [levothyroxine] Rash       No current facility-administered medications on file prior to encounter.      Current Outpatient Prescriptions on File Prior to Encounter   Medication Sig    BABY ASPIRIN ORAL Take by mouth every evening.     cholecalciferol, vitamin D3, 1,000 unit capsule Take 1,000 Units by mouth 2 (two) times daily.     ciclopirox (PENLAC) 8 % Soln Apply topically nightly. To fungal toenail    coenzyme Q10 (CO Q-10) 100 mg capsule Take by mouth once daily.    diclofenac sodium 1 % Gel Apply 2 g topically 4 (four) times daily.    fluticasone (FLONASE) 50 mcg/actuation nasal spray PLACE 1 SPRAY INTO EACH NOSTRIL DAILY    glucosamine-chondroitin 500-400 mg tablet Take 1 tablet by mouth 3 (three) times daily.    loratadine (CLARITIN) 10 mg tablet Take 10 mg by mouth daily as needed for Allergies.    MULTIVITAMIN W-MINERALS/LUTEIN (CENTRUM SILVER ORAL) Take by mouth.    omega-3 fatty acids (FISH OIL) 300 mg Cap Take by mouth.    UNABLE TO FIND Take by mouth nightly. tumeric    UNABLE TO FIND once daily. actaline    UNABLE TO FIND Use as directed 1 tablet in the mouth or throat 2 (two) times daily. Prostate Revive  manufactured by [employer]    VITAMIN B COMPLEX (SUPER B COMPLEX-B-12 ORAL) Take by mouth.    [DISCONTINUED] amoxicillin-clavulanate 875-125mg (AUGMENTIN) 875-125 mg per tablet Take 1 tablet by mouth 2 (two) times daily.    [DISCONTINUED] azithromycin (Z-LESLEE) 250 MG tablet Take 2 tablets by mouth x 1 for day 1 Then take 1 tablet by mouth daily for day 2 - 5     Family History     Problem Relation (Age of Onset)    Aneurysm Father    Diabetes Mother    Leukemia Father        Social History Main Topics    Smoking status: Former Smoker     Packs/day: 6.00     Years: 1.00     Types: Cigarettes    Smokeless tobacco: Never Used    Alcohol use No    Drug use: No    Sexual activity: Yes     Partners: Female     Review of Systems   Constitutional: Positive for diaphoresis. Negative for chills, fatigue and fever.   HENT: Positive for facial swelling, sinus pressure and sore throat. Negative for congestion and trouble swallowing.    Eyes: Negative for photophobia, pain and visual disturbance.   Respiratory: Negative for cough, shortness of breath and wheezing.    Cardiovascular: Negative for chest pain, palpitations and leg swelling.   Gastrointestinal: Negative for abdominal pain, nausea and vomiting.   Endocrine: Negative for cold intolerance and heat intolerance.   Genitourinary: Negative for dysuria, frequency and urgency.   Musculoskeletal: Negative for arthralgias and myalgias.   Skin: Positive for rash. Negative for color change and pallor.        Pruritus    Neurological: Positive for tremors, weakness and numbness. Negative for dizziness and headaches.   Hematological: Does not bruise/bleed easily.   Psychiatric/Behavioral: Negative for agitation and confusion. The patient is not nervous/anxious.      Objective:     Vital Signs (Most Recent):  Temp: 97 °F (36.1 °C) (05/27/18 0059)  Pulse: 85 (05/27/18 0059)  Resp: 16 (05/27/18 0059)  BP: 139/74 (05/27/18 0059)  SpO2: 95 % (05/27/18 0144) Vital Signs (24h  Range):  Temp:  [97 °F (36.1 °C)-97.8 °F (36.6 °C)] 97 °F (36.1 °C)  Pulse:  [] 85  Resp:  [16-21] 16  SpO2:  [94 %-97 %] 95 %  BP: ()/(60-77) 139/74     Weight: (!) 137.8 kg (303 lb 12.7 oz)  Body mass index is 40.08 kg/m².    Physical Exam   Constitutional: He is oriented to person, place, and time. He appears well-developed and well-nourished. No distress. Nasal cannula in place.   2L NC   HENT:   Head: Normocephalic and atraumatic.   Mouth/Throat: Oropharynx is clear and moist.   Eyes: EOM are normal. Pupils are equal, round, and reactive to light. No scleral icterus.   Neck: Normal range of motion. Neck supple. No tracheal deviation present.   Cardiovascular: Normal rate, regular rhythm and intact distal pulses.    Pulmonary/Chest: Effort normal and breath sounds normal. No respiratory distress. He has no wheezes.   Abdominal: Soft. Bowel sounds are normal. He exhibits no distension. There is no tenderness.   Musculoskeletal: Normal range of motion. He exhibits edema. He exhibits no tenderness.   1+ edema to right hand   Neurological: He is alert and oriented to person, place, and time. No sensory deficit. He exhibits normal muscle tone. Coordination normal. GCS eye subscore is 4. GCS verbal subscore is 5. GCS motor subscore is 6.   Skin: Skin is warm, dry and intact. There is erythema.   Mild erythema to bilateral hands   Psychiatric: He has a normal mood and affect. His speech is normal and behavior is normal.   Nursing note and vitals reviewed.        CRANIAL NERVES     CN III, IV, VI   Pupils are equal, round, and reactive to light.  Extraocular motions are normal.        Significant Labs:   A1C:   Recent Labs  Lab 05/27/18  0104   HGBA1C 5.3     CBC:   Recent Labs  Lab 05/26/18  2151   WBC 10.94   HGB 14.8   HCT 44.9        CMP:   Recent Labs  Lab 05/26/18  2153      K 4.1      CO2 21*   *   BUN 26*   CREATININE 1.4   CALCIUM 10.0   PROT 6.9   ALBUMIN 3.2*   BILITOT  0.3   ALKPHOS 105   AST 15   ALT 18   ANIONGAP 12   EGFRNONAA 51*     Coagulation:   Recent Labs  Lab 05/26/18  2151   INR 1.0   APTT 25.2     Lipid Panel:   Recent Labs  Lab 05/26/18 2153   CHOL 168   HDL 36*   LDLCALC 119.6   TRIG 62   CHOLHDL 21.4     TSH:   Recent Labs  Lab 05/26/18 2153   TSH 8.549*     Free T4 0.71 - 1.51 ng/dL 0.80       All pertinent labs within the past 24 hours have been reviewed.    Significant Imaging: I have reviewed all pertinent imaging results/findings within the past 24 hours.     CT Head Without Contrast: No acute intracranial abnormalities identified.    X-Ray Chest AP Portable: Nonspecific diffuse interstitial prominence which could reflect chronic change versus mild interstitial edema.    CTA Head and Neck:   1. No acute intracranial abnormalities identified.  2. CTA head and neck without significant focal stenosis or occlusion.  3. Fusiform aneurysmal dilatation of the ascending thoracic aorta measuring 5 cm.  Recommend nonemergent cardiology referral and future CT follow-up as clinically indicated.

## 2018-05-27 NOTE — PLAN OF CARE
Plan of care reviewed with patient, understanding verbalized.  Family at bedside.  Pt remains on SR tele.  Patient passed YVETTE with score of 20. Bed alarm on, call light in reach, fall precautions in place. Report given to oncoming nurse.

## 2018-05-27 NOTE — PLAN OF CARE
Problem: Patient Care Overview  Goal: Plan of Care Review  Outcome: Ongoing (interventions implemented as appropriate)  Reviewed plan of care with patient and family members. Patient verbalized understanding. AAOx3. Neuro checks done q4h - no deficits noted. Worked with PT/OT. Seen by SLP. Pt on regular diet; tolerates PO meds whole. 2D echo and MRI pending to be done. Denies pain. Patient on continuous tele monitoring; SR; HR 60s-80s. Ambulatory to bathroom. Strict I/Os. Refuses bed alarm when family is at bedside. Bed in lowest position, call bell in reach. Will continue to monitor.

## 2018-05-27 NOTE — ASSESSMENT & PLAN NOTE
Complete stroke work-up.  Daily ASA and Lipitor.  Consults placed to PT/OT/SLP for evaluations.  Monitor on telemetry.  2D echo, A1c, neuro checks.  Consult neuro telemedicine.

## 2018-05-27 NOTE — PLAN OF CARE
Problem: Occupational Therapy Goal  Goal: Occupational Therapy Goal  Outcome: Outcome(s) achieved Date Met: 05/27/18    Pt's PLOF was I with ADLs and func mobility.  Pt completed OT evaluation and noted to perform func mobility and ADLs with mod I to I.  Pt will have assistance/supervision as needed once discharged home.  Due to these factors, pt is discharged from OT services.  No DME or post acute OT required at this time.       Summer Zurita, OT  5/27/2018

## 2018-05-27 NOTE — ASSESSMENT & PLAN NOTE
Allergic rhinitis  Augmentin and azithromycin discontinued. No Abx for now (majority of sinus infections viral); continue cetirizine and Flonase. Says that he is feeling a little better today.

## 2018-05-28 VITALS
HEART RATE: 81 BPM | DIASTOLIC BLOOD PRESSURE: 67 MMHG | HEIGHT: 73 IN | OXYGEN SATURATION: 97 % | RESPIRATION RATE: 16 BRPM | TEMPERATURE: 98 F | WEIGHT: 303.81 LBS | SYSTOLIC BLOOD PRESSURE: 138 MMHG | BODY MASS INDEX: 40.27 KG/M2

## 2018-05-28 PROBLEM — I63.412 CEREBRAL INFARCTION DUE TO EMBOLISM OF LEFT MIDDLE CEREBRAL ARTERY: Status: ACTIVE | Noted: 2018-05-26

## 2018-05-28 PROBLEM — R29.818 TRANSIENT NEUROLOGICAL SYMPTOMS: Status: ACTIVE | Noted: 2018-05-28

## 2018-05-28 LAB
ALBUMIN SERPL BCP-MCNC: 3 G/DL
ALP SERPL-CCNC: 82 U/L
ALT SERPL W/O P-5'-P-CCNC: 17 U/L
ANION GAP SERPL CALC-SCNC: 7 MMOL/L
AST SERPL-CCNC: 20 U/L
BASOPHILS # BLD AUTO: 0.05 K/UL
BASOPHILS NFR BLD: 0.4 %
BILIRUB SERPL-MCNC: 0.6 MG/DL
BUN SERPL-MCNC: 24 MG/DL
CALCIUM SERPL-MCNC: 8.7 MG/DL
CHLORIDE SERPL-SCNC: 107 MMOL/L
CO2 SERPL-SCNC: 24 MMOL/L
CREAT SERPL-MCNC: 0.7 MG/DL
DIASTOLIC DYSFUNCTION: YES
DIFFERENTIAL METHOD: ABNORMAL
EOSINOPHIL # BLD AUTO: 0.2 K/UL
EOSINOPHIL NFR BLD: 2 %
ERYTHROCYTE [DISTWIDTH] IN BLOOD BY AUTOMATED COUNT: 14.1 %
EST. GFR  (AFRICAN AMERICAN): >60 ML/MIN/1.73 M^2
EST. GFR  (NON AFRICAN AMERICAN): >60 ML/MIN/1.73 M^2
GLUCOSE SERPL-MCNC: 89 MG/DL
HCT VFR BLD AUTO: 39.6 %
HGB BLD-MCNC: 13 G/DL
LYMPHOCYTES # BLD AUTO: 3.6 K/UL
LYMPHOCYTES NFR BLD: 32.1 %
MCH RBC QN AUTO: 30.5 PG
MCHC RBC AUTO-ENTMCNC: 32.8 G/DL
MCV RBC AUTO: 93 FL
MITRAL VALVE MOBILITY: NORMAL
MONOCYTES # BLD AUTO: 1.1 K/UL
MONOCYTES NFR BLD: 9.7 %
NEUTROPHILS # BLD AUTO: 6.2 K/UL
NEUTROPHILS NFR BLD: 55.6 %
PLATELET # BLD AUTO: 242 K/UL
PMV BLD AUTO: 10.8 FL
POTASSIUM SERPL-SCNC: 4 MMOL/L
PROT SERPL-MCNC: 6.3 G/DL
RBC # BLD AUTO: 4.26 M/UL
RETIRED EF AND QEF - SEE NOTES: 60 (ref 55–65)
SODIUM SERPL-SCNC: 138 MMOL/L
WBC # BLD AUTO: 11.2 K/UL

## 2018-05-28 PROCEDURE — 93306 TTE W/DOPPLER COMPLETE: CPT | Mod: 26,,, | Performed by: INTERNAL MEDICINE

## 2018-05-28 PROCEDURE — G0378 HOSPITAL OBSERVATION PER HR: HCPCS

## 2018-05-28 PROCEDURE — 85025 COMPLETE CBC W/AUTO DIFF WBC: CPT

## 2018-05-28 PROCEDURE — 36415 COLL VENOUS BLD VENIPUNCTURE: CPT

## 2018-05-28 PROCEDURE — 93306 TTE W/DOPPLER COMPLETE: CPT

## 2018-05-28 PROCEDURE — 25500020 PHARM REV CODE 255: Performed by: HOSPITALIST

## 2018-05-28 PROCEDURE — 25000003 PHARM REV CODE 250: Performed by: NURSE PRACTITIONER

## 2018-05-28 PROCEDURE — 80053 COMPREHEN METABOLIC PANEL: CPT

## 2018-05-28 PROCEDURE — 94761 N-INVAS EAR/PLS OXIMETRY MLT: CPT

## 2018-05-28 PROCEDURE — 63600175 PHARM REV CODE 636 W HCPCS: Performed by: HOSPITALIST

## 2018-05-28 PROCEDURE — G0425 INPT/ED TELECONSULT30: HCPCS | Mod: GT,,, | Performed by: PSYCHIATRY & NEUROLOGY

## 2018-05-28 PROCEDURE — A4216 STERILE WATER/SALINE, 10 ML: HCPCS | Performed by: NURSE PRACTITIONER

## 2018-05-28 PROCEDURE — 25000003 PHARM REV CODE 250: Performed by: HOSPITALIST

## 2018-05-28 PROCEDURE — A9585 GADOBUTROL INJECTION: HCPCS | Performed by: HOSPITALIST

## 2018-05-28 RX ORDER — CETIRIZINE HYDROCHLORIDE 10 MG/1
10 TABLET ORAL DAILY
Refills: 0 | COMMUNITY
Start: 2018-05-29 | End: 2023-03-14

## 2018-05-28 RX ORDER — ATORVASTATIN CALCIUM 10 MG/1
10 TABLET, FILM COATED ORAL DAILY
Status: DISCONTINUED | OUTPATIENT
Start: 2018-05-28 | End: 2018-05-28 | Stop reason: HOSPADM

## 2018-05-28 RX ORDER — ATORVASTATIN CALCIUM 10 MG/1
10 TABLET, FILM COATED ORAL DAILY
Qty: 30 TABLET | Refills: 3 | Status: SHIPPED | OUTPATIENT
Start: 2018-05-28 | End: 2018-09-09

## 2018-05-28 RX ORDER — GADOBUTROL 604.72 MG/ML
10 INJECTION INTRAVENOUS
Status: COMPLETED | OUTPATIENT
Start: 2018-05-28 | End: 2018-05-28

## 2018-05-28 RX ADMIN — CETIRIZINE HYDROCHLORIDE 10 MG: 10 TABLET, FILM COATED ORAL at 10:05

## 2018-05-28 RX ADMIN — ASPIRIN 81 MG: 81 TABLET, COATED ORAL at 10:05

## 2018-05-28 RX ADMIN — FLUTICASONE PROPIONATE 100 MCG: 50 SPRAY, METERED NASAL at 10:05

## 2018-05-28 RX ADMIN — SODIUM CHLORIDE, PRESERVATIVE FREE 3 ML: 5 INJECTION INTRAVENOUS at 07:05

## 2018-05-28 RX ADMIN — LORAZEPAM 2 MG: 2 INJECTION, SOLUTION INTRAMUSCULAR; INTRAVENOUS at 07:05

## 2018-05-28 RX ADMIN — ATORVASTATIN CALCIUM 10 MG: 10 TABLET, FILM COATED ORAL at 12:05

## 2018-05-28 RX ADMIN — GADOBUTROL 10 ML: 604.72 INJECTION INTRAVENOUS at 09:05

## 2018-05-28 NOTE — ASSESSMENT & PLAN NOTE
Etiology ddx includes TIA vs. Benadryl related event. Patient got two IV pushes of benadryl and symptoms were confusion and decreased level of alertness, questionable right sided weakness. In any event, he does have some CV risk factors, particularly hypertension, hypothyroidism, and a transient left MCA syndrome at this point is impossible to rule out. MRI reviewed and showed faint area of hyperintensity only on DWI sequence in left frontal middle gyrus, unclear significance. Vessel imaging showed small plaque at origin of left ICA extracranially.    · Continue Aspirin 81 mg daily  · Start low dose statin therapy, lipitor 10 given the left ICA calcification  · Goal BP < 130/80 long term  · F/u TTE; 30 day event monitor at discharge to r/o occult atrial fibrillation  · F/u vascular neurology clinic in 4-6 weeks

## 2018-05-28 NOTE — PLAN OF CARE
TN met with patient, no family at bedside.  Patient is discharged to home. No needs noted upon discharge. Follow-up appointment scheduled with PCP. TN called vascular neurology. They will call him to schedule follow-up. TN left message for office. Patient is aware.    Patient's prescriptions called into his preferred pharmacy.    Future Appointments  Date Time Provider Department Center   6/6/2018 9:20 AM Gina Reyes MD [facility]     Follow-up With  Details  Why  Contact Info   Gina Reyes MD  On 6/6/2018  at 09:20 am, Primary Care Physician  [address]  [address]  [place] [address]  [phone]   Marium Feng MD  Schedule an appointment as soon as possible for a visit in 6 weeks  Vascular Neurology Follow-Up--They will call you to schedule.  [address]  [place] [address]  [phone]         05/28/18 1409   Final Note   Assessment Type Final Discharge Note   Discharge Disposition Home   What phone number can be called within the next 1-3 days to see how you are doing after discharge? [phone]   Hospital Follow Up  Appt(s) scheduled? Yes   Discharge plans and expectations educations in teach back method with documentation complete? Yes   Right Care Referral Info   Post Acute Recommendation No Care     Diane Diaz RN  Transition Navigator  [phone]

## 2018-05-28 NOTE — PLAN OF CARE
Problem: Patient Care Overview  Goal: Plan of Care Review  Outcome: Ongoing (interventions implemented as appropriate)  Patient is awake, alert and orientedx4. No drift on all four extremities. On heart monitoring running Sinus Rhythm with PVCs at 80s. No complaints of shortness of breath, rash or pain. Ambulates to the bathroom with stand-by assist. Bed in lowest position, call light within reach and instructed to call for help. Family remain at bedside.

## 2018-05-28 NOTE — PROGRESS NOTES
[facility]  Vascular Neurology  Telemedicine Rounding Note        Consulting Provider: Spoke Physician:: Dr Thompson  Current Providers  No providers found    Patient Location: [facility]  Spoke hospital nurse at bedside with patient assisting consultant.     Patient information was obtained from patient, spouse/SO and relative(s).     Subjective:     History of Present Illness:  68M presented to ED for allergic reaction after abx, found to have right hemiparesis w/ facial droop in ED, s/p telemedicine consult, no tPA given.    Neurologic Chief Complaint: right sided weakness and facial droop    Subjective:     Interval History: Patient is seen for follow-up neurological assessment and treatment recommendations: states he is doing well, he was having a reaction to a medication, he had two benadryl injections on the way in via EMS, says he was being stuck with two needs at the same time, aggravated with questions, and says that his right arm was weak when he went to grab the rail because he had a turniquet on that arm. Wife denies seeing a facial droop, but said that his face was puffy and swollen on both sides.    HPI, Past Medical, Family, and Social History remains the same as documented in the initial encounter.     Review of Systems   Constitutional: Negative for appetite change, chills and fever.   HENT: Negative for congestion and sore throat.    Eyes: Negative for discharge and itching.   Respiratory: Negative for apnea and shortness of breath.    Cardiovascular: Negative for chest pain and palpitations.   Gastrointestinal: Negative for abdominal pain and anal bleeding.   Endocrine: Negative for cold intolerance and polydipsia.   Genitourinary: Negative for dysuria and hematuria.   Musculoskeletal: Negative for joint swelling and myalgias.   Skin: Negative for color change and rash.   Neurological: Negative for tremors.   Psychiatric/Behavioral: Negative for  hallucinations and self-injury.     Scheduled Meds:   aspirin  81 mg Oral Daily    cetirizine  10 mg Oral Daily    fluticasone  2 spray Each Nare Daily    sodium chloride 0.9%  3 mL Intravenous Q8H     Continuous Infusions:  PRN Meds:acetaminophen, diphenhydrAMINE, labetalol, ondansetron    Objective:     Vital Signs (Most Recent):  Temp: 97.7 °F (36.5 °C) (05/28/18 0444)  Pulse: 72 (05/28/18 0500)  Resp: 18 (05/28/18 0444)  BP: (!) 156/76 (05/28/18 0444)  SpO2: (!) 94 % (05/28/18 0730)  BP Location: Left arm    Vital Signs Range (Last 24H):  Temp:  [97.7 °F (36.5 °C)-98.6 °F (37 °C)]   Pulse:  [58-81]   Resp:  [18-20]   BP: (141-175)/(70-85)   SpO2:  [94 %-98 %]   BP Location: Left arm    Physical Exam   Constitutional: He appears well-nourished. No distress.   HENT:   Head: Atraumatic.   Right Ear: External ear normal.   Left Ear: External ear normal.   Eyes: Conjunctivae are normal. No scleral icterus.   Neck: Normal range of motion.   Pulmonary/Chest: Effort normal.   Abdominal: He exhibits no distension. There is no guarding.   Musculoskeletal: Normal range of motion. He exhibits no deformity.   Neurological: He is alert.   Skin: Skin is warm and dry.   Psychiatric: He has a normal mood and affect.     Laboratory:  CMP:   Recent Labs  Lab 05/28/18  0253   CALCIUM 8.7   ALBUMIN 3.0*   PROT 6.3      K 4.0   CO2 24      BUN 24*   CREATININE 0.7   ALKPHOS 82   ALT 17   AST 20   BILITOT 0.6     CBC:   Recent Labs  Lab 05/28/18 0253   WBC 11.20   RBC 4.26*   HGB 13.0*   HCT 39.6*      MCV 93   MCH 30.5   MCHC 32.8     Lipid Panel:   Recent Labs  Lab 05/26/18 2153   CHOL 168   LDLCALC 119.6   HDL 36*   TRIG 62     Coagulation:   Recent Labs  Lab 05/26/18 2151   INR 1.0   APTT 25.2     Hgb A1C:   Recent Labs  Lab 05/27/18  0104   HGBA1C 5.3     TSH:   Recent Labs  Lab 05/26/18  2153   TSH 8.549*       Diagnostic Results     Brain Imaging   MRI - 5/26 - faint area of hyperintensity within left  middle frontal gyrus, no ADC correlation , no FLAIR correlation; no significant burden of  or other pathology    Vessel Imaging   CTA - negative for significant extracranial atherosclerotic disease or intracranial atherosclerotic disease; minor calcified atheroma at origin of extracranial left ICA    Cardiac Imaging   Pending TTE      Assessment/Plan:     STROKE DOCUMENTATION     NIH Scale:  1a. Level Of Consciousness: 0-->Alert: keenly responsive  1b. LOC Questions: 0-->Answers both questions correctly  1c. LOC Commands: 0-->Performs both tasks correctly  2. Best Gaze: 0-->Normal  3. Visual: 0-->No visual loss  4. Facial Palsy: 0-->Normal symmetrical movements  5a. Motor Arm, Left: 0-->No drift: limb holds 90 (or 45) degrees for full 10 secs  5b. Motor Arm, Right: 0-->No drift: limb holds 90 (or 45) degrees for full 10 secs  6a. Motor Leg, Left: 0-->No drift: leg holds 30 degree position for full 5 secs  6b. Motor Leg, Right: 0-->No drift: leg holds 30 degree position for full 5 secs  7. Limb Ataxia: 0-->Absent  8. Sensory: 0-->Normal: no sensory loss  9. Best Language: 0-->No aphasia: normal  10. Dysarthria: 0-->Normal  11. Extinction and Inattention (formerly Neglect): 0-->No abnormality  Total (NIH Stroke Scale): 0     Modified [place] Score: 0  [place] Coma Scale:    ABCD2 Score:    [id number] Score:   HAS -BLED Score:   ICH Score:   Hunt & Walters Classification:       Diagnoses:   Transient neurological symptoms    Etiology ddx includes TIA vs. Benadryl related event. Patient got two IV pushes of benadryl and symptoms were confusion and decreased level of alertness, questionable right sided weakness. In any event, he does have some CV risk factors, particularly hypertension, hypothyroidism, and a transient left MCA syndrome at this point is impossible to rule out. MRI reviewed and showed faint area of hyperintensity only on DWI sequence in left frontal middle gyrus, unclear significance. Vessel imaging  "showed small plaque at origin of left ICA extracranially.    · Continue Aspirin 81 mg daily  · Start low dose statin therapy, lipitor 10 given the left ICA calcification  · Goal BP < 130/80 long term  · F/u TTE; 30 day event monitor at discharge to r/o occult atrial fibrillation  · F/u vascular neurology clinic in 4-6 weeks            Blood pressure (!) 156/76, pulse 72, temperature 97.7 °F (36.5 °C), temperature source Oral, resp. rate 18, height 6' 1" (1.854 m), weight (!) 137.8 kg (303 lb 12.7 oz), SpO2 (!) 94 %.    The attending portion of this evaluation, treatment, and documentation was performed per Jean Lee MD via audiovisual.    Billing code:  ([place] [facility] INPT CONSULT 35MIN)    Consult End Time: 10:32 AM     Jean Lee MD  [facility]  Vascular Neurology   [facility]  "

## 2018-05-28 NOTE — SUBJECTIVE & OBJECTIVE
Neurologic Chief Complaint: right sided weakness and facial droop    Subjective:     Interval History: Patient is seen for follow-up neurological assessment and treatment recommendations: states he is doing well, he was having a reaction to a medication, he had two benadryl injections on the way in via EMS, says he was being stuck with two needs at the same time, aggravated with questions, and says that his right arm was weak when he went to grab the rail because he had a turniquet on that arm. Wife denies seeing a facial droop, but said that his face was puffy and swollen on both sides.    HPI, Past Medical, Family, and Social History remains the same as documented in the initial encounter.     Review of Systems   Constitutional: Negative for appetite change, chills and fever.   HENT: Negative for congestion and sore throat.    Eyes: Negative for discharge and itching.   Respiratory: Negative for apnea and shortness of breath.    Cardiovascular: Negative for chest pain and palpitations.   Gastrointestinal: Negative for abdominal pain and anal bleeding.   Endocrine: Negative for cold intolerance and polydipsia.   Genitourinary: Negative for dysuria and hematuria.   Musculoskeletal: Negative for joint swelling and myalgias.   Skin: Negative for color change and rash.   Neurological: Negative for tremors.   Psychiatric/Behavioral: Negative for hallucinations and self-injury.     Scheduled Meds:   aspirin  81 mg Oral Daily    cetirizine  10 mg Oral Daily    fluticasone  2 spray Each Nare Daily    sodium chloride 0.9%  3 mL Intravenous Q8H     Continuous Infusions:  PRN Meds:acetaminophen, diphenhydrAMINE, labetalol, ondansetron    Objective:     Vital Signs (Most Recent):  Temp: 97.7 °F (36.5 °C) (05/28/18 0444)  Pulse: 72 (05/28/18 0500)  Resp: 18 (05/28/18 0444)  BP: (!) 156/76 (05/28/18 0444)  SpO2: (!) 94 % (05/28/18 0730)  BP Location: Left arm    Vital Signs Range (Last 24H):  Temp:  [97.7 °F (36.5 °C)-98.6  °F (37 °C)]   Pulse:  [58-81]   Resp:  [18-20]   BP: (141-175)/(70-85)   SpO2:  [94 %-98 %]   BP Location: Left arm    Physical Exam   Constitutional: He appears well-nourished. No distress.   HENT:   Head: Atraumatic.   Right Ear: External ear normal.   Left Ear: External ear normal.   Eyes: Conjunctivae are normal. No scleral icterus.   Neck: Normal range of motion.   Pulmonary/Chest: Effort normal.   Abdominal: He exhibits no distension. There is no guarding.   Musculoskeletal: Normal range of motion. He exhibits no deformity.   Neurological: He is alert.   Skin: Skin is warm and dry.   Psychiatric: He has a normal mood and affect.     Laboratory:  CMP:   Recent Labs  Lab 18  0253   CALCIUM 8.7   ALBUMIN 3.0*   PROT 6.3      K 4.0   CO2 24      BUN 24*   CREATININE 0.7   ALKPHOS 82   ALT 17   AST 20   BILITOT 0.6     CBC:   Recent Labs  Lab 18   WBC 11.20   RBC 4.26*   HGB 13.0*   HCT 39.6*      MCV 93   MCH 30.5   MCHC 32.8     Lipid Panel:   Recent Labs  Lab 18  2153   CHOL 168   LDLCALC 119.6   HDL 36*   TRIG 62     Coagulation:   Recent Labs  Lab 18   INR 1.0   APTT 25.2     Hgb A1C:   Recent Labs  Lab 18  0104   HGBA1C 5.3     TSH:   Recent Labs  Lab 18   TSH 8.549*       Diagnostic Results     Brain Imaging   MRI -  - faint area of hyperintensity within left middle frontal gyrus, no ADC correlation , no FLAIR correlation; no significant burden of  or other pathology    Vessel Imaging   CTA - negative for significant extracranial atherosclerotic disease or intracranial atherosclerotic disease; minor calcified atheroma at origin of extracranial left ICA    Cardiac Imaging   Pending TTE

## 2018-05-28 NOTE — PROGRESS NOTES
Consult received to assess dietary needs. Met with pt and family to discuss changing Regular diet order to Cardiac. Discussed benefits of low NA diet. Pt reports use of [employer] Pal application to track caloric intake - goal of 2000kcal/day for wt loss. Wife cooks meals at home w/o adding salt. Discussed potential sources of NA in diet - deli meat, canned goods, fast food/restuarant meals. Encouraged pt to choose low NA foods with meals at home, to request restaurant meals cooked without salt, and to continue to self-monitor through [employer] with a goal of <2300mg NA/day. Pt and family understanding; additional questions answered. If pt is not d/c'd today, RD to monitor pt per protocol.

## 2018-05-28 NOTE — DISCHARGE INSTRUCTIONS
Stroke, Discharge Instructions for (English) View Edit Remove   TIA, What is (English) View Edit Remove   TIA: Transient Ischemic Attack (English) View Edit Remove   Transient Ischemic Attack (TIA), Discharge Instructions for (English) View Edit Remove   Atorvastatin tablets (English) View Edit Remove   Cetirizine tablets (English) View Edit Remove

## 2018-05-28 NOTE — PLAN OF CARE
Problem: Patient Care Overview  Goal: Plan of Care Review  Outcome: Outcome(s) achieved Date Met: 05/28/18  Patient safety maintained throughout this shift, Neuro checks done x2, IV and tele box removed, patient and family verbalizes understanding of discharge instructions, new prescriptions and changes to medications, patient was in stable condition throughout shift, patient discharged from unit

## 2018-05-29 PROBLEM — T78.40XA ALLERGIC DRUG REACTION: Status: RESOLVED | Noted: 2018-05-27 | Resolved: 2018-05-29

## 2018-05-30 NOTE — HOSPITAL COURSE
He did not have any recurrence of his weakness or other issues. His itching and redness resolved. He underwent stroke evaluation and was found to have a possible subacute infarction in the anterior left frontal lobe on MRI without any significant stenosis on CTA head and neck. PT/OT/SLP evaluated him and did not feel any continued need was present. Vascular Neurology as evaluated him and recommended continuing ASA 81 mg daily and to start on lipitor 10 mg daily. They also recommended 30 day event monitor on discharge. His blood pressure was elevated at times during the hospital stay, but initiation of medication will be deferred to his PCP pending continued elevation of his blood pressure.

## 2018-05-30 NOTE — DISCHARGE SUMMARY
[facility] Hospital Medicine  Discharge Summary      Patient Name: Adan Cain  MRN: [id number]  Admission Date: 5/26/2018  Hospital Length of Stay: 0 days  Discharge Date and Time: 5/28/2018  2:18 PM  Attending Physician: Vargas Keller MD   Discharging Provider: Vargas Keller MD  Primary Care Provider: Gina Reyes MD      HPI:   Adan Cain is a 69 y/o  male with dejenerative joint disease, seasonal allergic rhinitis, obesity, and BPH.   He is  and lives in [place], [place].  His PCP is Dr. Gina Reyes.  Patient had been suffering with sinus pressure, sore throat, cough, and nasal congestion x 1 week.  He was evaluated at an [facility] on 5/25/18 and diagnosed with acute sinusitis.  He was prescribed azithromycin.  He developed a rash after taking a dose of azithromycin.  His wife called Urgent Care on 5/26/18 to inform of rash, he was changed to Augmentin.  Patient begin to have bilateral hand itching, swelling of lips and cheeks after taking Augmentin. He began to feel funny and was unable to ambulate; his daughter reports that he was diaphoretic and shaking.  EMS was called, patient was given IV Benadryl en route to ED.  While in ED patient began to have right facial droop and right sided weakness prompting initiation of Code Stroke.  Head CT was negative.  Patients symptoms resolved prior to evaluation by vascular neurologist Dr. Feng via telemedicine.  Dr. Feng recommended admission for TIA work-up.    * No surgery found *      Hospital Course:   He did not have any recurrence of his weakness or other issues. His itching and redness resolved. He underwent stroke evaluation and was found to have a possible subacute infarction in the anterior left frontal lobe on MRI without any significant stenosis on CTA head and neck. PT/OT/SLP evaluated him and did not feel any continued need was present. Vascular Neurology as evaluated him and recommended  continuing ASA 81 mg daily and to start on lipitor 10 mg daily. They also recommended 30 day event monitor on discharge. His blood pressure was elevated at times during the hospital stay, but initiation of medication will be deferred to his PCP pending continued elevation of his blood pressure.      Consults:   Consults         Status Ordering Provider     Inpatient consult to Registered Dietitian/Nutritionist  Once     Provider:  (Not yet assigned)    JONA Camarena     Inpatient consult to Telemedicine-Stroke  Once     Provider:  Maruim Feng MD    Completed RICCARDO CONWAY          No new Assessment & Plan notes have been filed under this hospital service since the last note was generated.  Service: Hospital Medicine    Final Active Diagnoses:    Diagnosis Date Noted POA    PRINCIPAL PROBLEM:  Cerebral infarction due to embolism of left middle cerebral artery [I63.412] 05/26/2018 Yes    Transient neurological symptoms [R29.818] 05/28/2018 Yes    Acute sinusitis [J01.90] 05/27/2018 Yes    Thoracic aortic aneurysm without rupture [I71.2] 05/27/2018 Yes    Allergic rhinitis [J30.9] 04/13/2016 Yes     Chronic    Morbid obesity with BMI of 40.0-44.9, adult [E66.01, Z68.41] 01/29/2015 Not Applicable    Benign prostatic hyperplasia without lower urinary tract symptoms [N40.0] 08/18/2014 Yes     Chronic      Problems Resolved During this Admission:    Diagnosis Date Noted Date Resolved POA    Allergic drug reaction [T78.40XA] 05/27/2018 05/29/2018 Yes       Discharged Condition: good    Disposition: Home or Self Care    Follow Up:  Follow-up Information     Gina Reyes MD On 6/6/2018.    Specialty:  Family Medicine  Why:  at 09:20 am, Primary Care Physician  Contact information:  [address]  [address]  [facility] [address]  [phone]             Marium Feng MD. Schedule an appointment as soon as possible for a visit in 6 weeks.    Specialty:  Neurology  Why:  Vascular  Neurology Follow-Up--They will call you to schedule.  Contact information:  [address]  [facility] [id number] [phone]                 Patient Instructions:     Diet Cardiac     Activity as tolerated     Notify your health care provider if you experience any of the following:  persistent nausea and vomiting or diarrhea     Notify your health care provider if you experience any of the following:  severe uncontrolled pain     Notify your health care provider if you experience any of the following:  difficulty breathing or increased cough     Notify your health care provider if you experience any of the following:  severe persistent headache     Notify your health care provider if you experience any of the following:  persistent dizziness, light-headedness, or visual disturbances     Notify your health care provider if you experience any of the following:  increased confusion or weakness     Cardiac event monitor   Order Comments: 30 day event monitor   Order Specific Question Answer Comments   Cardiac Event Monitor Looping Recorder          Significant Diagnostic Studies: Labs:   Lipid Panel   Lab Results   Component Value Date    CHOL 168 05/26/2018    HDL 36 (L) 05/26/2018    LDLCALC 119.6 05/26/2018    TRIG 62 05/26/2018    CHOLHDL 21.4 05/26/2018    and A1C:   Recent Labs  Lab 05/27/18  0104   HGBA1C 5.3     Radiology:  Imaging Results          CTA Head and Neck (xpd) (Final result)  Result time 05/26/18 23:10:38    Final result by Lorna Ruiz MD (05/26/18 23:10:38)                 Impression:      1. No acute intracranial abnormalities identified.  2. CTA head and neck without significant focal stenosis or occlusion.  3. Fusiform aneurysmal dilatation of the ascending thoracic aorta measuring 5 cm.  Recommend nonemergent cardiology referral and future CT follow-up as clinically indicated.      Electronically signed by: Lorna Ruiz MD  Date:    05/26/2018  Time:    23:10             Narrative:     EXAMINATION:  CTA HEAD AND NECK (XPD)    CLINICAL HISTORY:  RUE weakness, R facial droop;    TECHNIQUE:  Non contrast low dose axial images were obtained thought the head.  CT angiogram was performed from the level of the gustabo to the top of the head following the IV administration of 75mL of Omnipaque 350.   Sagittal and coronal reconstructions and maximum intensity projection reconstructions were performed. Arterial stenosis percentages are based on NASCET measurement criteria.    COMPARISON:  CT head from the same date.    FINDINGS:  No evidence of acute/recent major vascular distribution cerebral infarction, intraparenchymal hemorrhage, or intra-axial space occupying lesion. The ventricular system is normal in size and configuration with no evidence of hydrocephalus. No effacement of the skull-base cisterns. No abnormal extra-axial fluid collections or blood products.  No evidence of abnormal enhancement on postcontrast imaging.  The visualized paranasal sinuses and mastoid air cells are clear. The calvarium shows no significant abnormality.    CTA Neck - the origins of the right brachiocephalic, left common carotid and left subclavian arteries from the arch are within normal limits.  The origins of the vertebral arteries are within normal limits.  The vertebral arteries are unremarkable throughout their course without evidence for focal stenosis or occlusion.   The bilateral common carotid arteries and internal carotid arteries are patent without evidence for focal stenosis or occlusion.    Anterior circulation - The bilateral distal ICAs are patent without significant stenosis or aneurysm.  Minimal plaquing of the cavernous segments of the ICAs.  The anterior and middle cerebral arteries are patent without significant stenosis, occlusion, or aneurysm.    Posterior circulation -   distal vertebral arteries, basilar artery and posterior cerebral arteries are patent without significant focal stenosis,  occlusion, or aneurysm.    Airways - Unremarkable.    Glands/Nodes - The parotid, submandibular, and thyroid glands are unremarkable.    Spine-multilevel degenerative changes are seen within the spine.    Lungs - Visualized lung apices are clear.    There is fusiform aneurysmal dilatation of the ascending thoracic aorta measuring 5 cm.                               X-Ray Chest AP Portable (Final result)  Result time 05/26/18 21:48:30    Final result by Lorna Ruiz MD (05/26/18 21:48:30)                 Impression:      Nonspecific diffuse interstitial prominence which could reflect chronic change versus mild interstitial edema.      Electronically signed by: Lorna Ruiz MD  Date:    05/26/2018  Time:    21:48             Narrative:    EXAMINATION:  XR CHEST AP PORTABLE    CLINICAL HISTORY:  Stroke;    TECHNIQUE:  Single frontal view of the chest was performed.    COMPARISON:  September 2017.    FINDINGS:  Support device overlies the left chest wall.  Heart is stable in size.  Lungs are symmetrically expanded.  There is nonspecific diffuse interstitial prominence which could reflect mild interstitial edema.  No evidence of focal consolidation, pneumothorax, or significant effusion.  No acute osseous abnormality identified.                               CT Head Without Contrast (Final result)  Result time 05/26/18 21:46:45    Final result by Lorna Ruiz MD (05/26/18 21:46:45)                 Impression:      No acute intracranial abnormalities identified.      Electronically signed by: Lorna Ruiz MD  Date:    05/26/2018  Time:    21:46             Narrative:    EXAMINATION:  CT HEAD WITHOUT CONTRAST    CLINICAL HISTORY:  Stroke;    TECHNIQUE:  Low dose axial images were obtained through the head.  Coronal and sagittal reformations were also performed. Contrast was not administered.    COMPARISON:  None.    FINDINGS:  No evidence of acute/recent major vascular distribution cerebral infarction,  intraparenchymal hemorrhage, or intra-axial space occupying lesion. The ventricular system is normal in size and configuration with no evidence of hydrocephalus. No effacement of the skull-base cisterns. The visualized paranasal sinuses and mastoid air cells are clear. The calvarium shows no significant abnormality.                                Cardiac Graphics: Echocardiogram:   2D echo with color flow doppler:   Results for orders placed or performed during the hospital encounter of 05/26/18   Echo doppler color flow   Result Value Ref Range    EF 60 55 - 65    Diastolic Dysfunction Yes (A)     Mitral Valve Mobility NORMAL        Pending Diagnostic Studies:     None         Medications:  Reconciled Home Medications:      Medication List      START taking these medications    atorvastatin 10 MG tablet  Commonly known as:  LIPITOR  Take 1 tablet (10 mg total) by mouth once daily.     cetirizine 10 MG tablet  Commonly known as:  ZYRTEC  Take 1 tablet (10 mg total) by mouth once daily.  Replaces:  loratadine 10 mg tablet        CONTINUE taking these medications    BABY ASPIRIN ORAL  Take by mouth every evening.     CENTRUM SILVER ORAL  Take by mouth.     cholecalciferol (vitamin D3) 1,000 unit capsule  Take 1,000 Units by mouth 2 (two) times daily.     ciclopirox 8 % Soln  Commonly known as:  PENLAC  Apply topically nightly. To fungal toenail     CO Q-10 100 mg capsule  Generic drug:  coenzyme Q10  Take by mouth once daily.     diclofenac sodium 1 % Gel  Apply 2 g topically 4 (four) times daily.     FISH  mg Cap  Generic drug:  omega-3 fatty acids  Take by mouth.     fluticasone 50 mcg/actuation nasal spray  Commonly known as:  FLONASE  PLACE 1 SPRAY INTO EACH NOSTRIL DAILY     glucosamine-chondroitin 500-400 mg tablet  Take 1 tablet by mouth 3 (three) times daily.     SUPER B COMPLEX-B-12 ORAL  Take by mouth.     * UNABLE TO FIND  Take by mouth nightly. tumeric     * UNABLE TO FIND  once daily. actaline      * UNABLE TO FIND  Use as directed 1 tablet in the mouth or throat 2 (two) times daily. Prostate Revive manufactured by [employer]        * This list has 3 medication(s) that are the same as other medications prescribed for you. Read the directions carefully, and ask your doctor or other care provider to review them with you.            STOP taking these medications    amoxicillin-clavulanate 875-125mg 875-125 mg per tablet  Commonly known as:  AUGMENTIN     azithromycin 250 MG tablet  Commonly known as:  Z-LESLEE     loratadine 10 mg tablet  Commonly known as:  CLARITIN  Replaced by:  cetirizine 10 MG tablet            Indwelling Lines/Drains at time of discharge:   Lines/Drains/Airways          No matching active lines, drains, or airways          Time spent on the discharge of patient: 40 minutes  Patient was seen and examined on the date of discharge and determined to be suitable for discharge.         Vargas Keller MD  Department of Hospital Medicine  [facility]

## 2018-06-01 DIAGNOSIS — G45.9 TIA (TRANSIENT ISCHEMIC ATTACK): Primary | ICD-10-CM

## 2018-06-06 ENCOUNTER — LAB VISIT (OUTPATIENT)
Dept: LAB | Facility: HOSPITAL | Age: 68
End: 2018-06-06
Attending: FAMILY MEDICINE
Payer: MEDICARE

## 2018-06-06 ENCOUNTER — OFFICE VISIT (OUTPATIENT)
Dept: FAMILY MEDICINE | Facility: CLINIC | Age: 68
End: 2018-06-06
Payer: MEDICARE

## 2018-06-06 VITALS
RESPIRATION RATE: 20 BRPM | TEMPERATURE: 99 F | SYSTOLIC BLOOD PRESSURE: 130 MMHG | HEIGHT: 73 IN | DIASTOLIC BLOOD PRESSURE: 70 MMHG | WEIGHT: 304 LBS | BODY MASS INDEX: 40.29 KG/M2

## 2018-06-06 DIAGNOSIS — I71.20 THORACIC AORTIC ANEURYSM WITHOUT RUPTURE: ICD-10-CM

## 2018-06-06 DIAGNOSIS — G45.9 TRANSIENT CEREBRAL ISCHEMIA, UNSPECIFIED TYPE: Primary | ICD-10-CM

## 2018-06-06 DIAGNOSIS — R79.89 ABNORMAL THYROID BLOOD TEST: ICD-10-CM

## 2018-06-06 DIAGNOSIS — I63.412 CEREBRAL INFARCTION DUE TO EMBOLISM OF LEFT MIDDLE CEREBRAL ARTERY: ICD-10-CM

## 2018-06-06 PROBLEM — J01.90 ACUTE SINUSITIS: Status: RESOLVED | Noted: 2018-05-27 | Resolved: 2018-06-06

## 2018-06-06 PROBLEM — R29.818 TRANSIENT NEUROLOGICAL SYMPTOMS: Status: RESOLVED | Noted: 2018-05-28 | Resolved: 2018-06-06

## 2018-06-06 PROBLEM — G45.1 HEMISPHERIC CAROTID ARTERY SYNDROME: Status: ACTIVE | Noted: 2018-06-06

## 2018-06-06 PROBLEM — G45.1 HEMISPHERIC CAROTID ARTERY SYNDROME: Status: RESOLVED | Noted: 2018-06-06 | Resolved: 2018-06-06

## 2018-06-06 LAB — TSH SERPL DL<=0.005 MIU/L-ACNC: 0.86 UIU/ML

## 2018-06-06 PROCEDURE — 36415 COLL VENOUS BLD VENIPUNCTURE: CPT | Mod: PO

## 2018-06-06 PROCEDURE — 99999 PR PBB SHADOW E&M-EST. PATIENT-LVL III: CPT | Mod: PBBFAC,,, | Performed by: FAMILY MEDICINE

## 2018-06-06 PROCEDURE — 99499 UNLISTED E&M SERVICE: CPT | Mod: HCNC,,, | Performed by: FAMILY MEDICINE

## 2018-06-06 PROCEDURE — 99499 UNLISTED E&M SERVICE: CPT | Mod: HCNC,S$GLB,, | Performed by: FAMILY MEDICINE

## 2018-06-06 PROCEDURE — 84443 ASSAY THYROID STIM HORMONE: CPT

## 2018-06-06 PROCEDURE — 99214 OFFICE O/P EST MOD 30 MIN: CPT | Mod: S$GLB,,, | Performed by: FAMILY MEDICINE

## 2018-06-06 NOTE — PROGRESS NOTES
"2Subjective:      Patient ID: Adan Cain is a 68 y.o. male.    Chief Complaint: Hospital Follow Up and Sore Throat (itchy, bump near tonsils)    Transitional Care Note    Family and/or Caretaker present at visit?  Yes.  Diagnostic tests reviewed/disposition: No diagnosic tests pending after this hospitalization.  Disease/illness education:     Home health/community services discussion/referrals: Patient does not have home health established from hospital visit.  They do not need home health.  If needed, we will set up home health for the patient.   Establishment or re-establishment of referral orders for community resources: No other necessary community resources.   Discussion with other health care providers: No discussion with other health care providers necessary.     He was treated for sinusitis at urgent care center with an injection of Celestone and Z-Florentin.  States when he picked up that medication, which he had taken before without any problems, it was "different looking than previous".  The next morning he had itching with a rash on his chest and back.  He called the urgent care physician who changed his medication to Augmentin.  He has taken amoxicillin before without any side effects.  He took one dosage of this, drove to the grocery store, his hands were itchy, redness, face swelling, legs were red, he felt weak.  His wife called EMS and he was taken to the emergency room.  He could not grab the right hand rail due to right hand pain from IV site. He states this prompted a workup for stroke.     Mr Cain & his wife state that neurologist and cardiologist who evaluated him upon discharge stated that he did not have a stroke. His R arm weakness was from his IV site & states he had no residual weakness.   He has been taking a baby aspirin.  He was reluctant to take Lipitor daily, but states he will start. He does not exercise, but has a pool in his backyard.     TSH was abnormal in the " ER    5/28/2018 -   Small area of probable subacute infarction involving the anterior left frontal lobe as detailed above.    CTA head/ neck - 5/26/2018  1. No acute intracranial abnormalities identified.  2. CTA head and neck without significant focal stenosis or occlusion.  3. Fusiform aneurysmal dilatation of the ascending thoracic aorta measuring 5 cm.  Recommend nonemergent cardiology referral and future CT follow-up as clinically indicated.    5/28/2018 Echocardiogram  CONCLUSIONS     1 - Normal left ventricular systolic function (EF 60-65%).     2 - Impaired LV relaxation, normal LAP (grade 1 diastolic dysfunction).     3 - Normal right ventricular systolic function .     ============  He still has mild sore throat with postnasal drip, worse at night    Lab Results   Component Value Date    HGBA1C 5.3 05/27/2018     No results found for: MICALBCREAT  Lab Results   Component Value Date    LDLCALC 119.6 05/26/2018    LDLCALC 115.4 10/24/2017    CHOL 168 05/26/2018    HDL 36 (L) 05/26/2018    TRIG 62 05/26/2018       Lab Results   Component Value Date     05/28/2018    K 4.0 05/28/2018     05/28/2018    CO2 24 05/28/2018    GLU 89 05/28/2018    BUN 24 (H) 05/28/2018    CREATININE 0.7 05/28/2018    CALCIUM 8.7 05/28/2018    PROT 6.3 05/28/2018    ALBUMIN 3.0 (L) 05/28/2018    BILITOT 0.6 05/28/2018    ALKPHOS 82 05/28/2018    AST 20 05/28/2018    ALT 17 05/28/2018    ANIONGAP 7 (L) 05/28/2018    ESTGFRAFRICA >60 05/28/2018    EGFRNONAA >60 05/28/2018    WBC 11.20 05/28/2018    HGB 13.0 (L) 05/28/2018    HCT 39.6 (L) 05/28/2018    MCV 93 05/28/2018     05/28/2018    TSH 8.549 (H) 05/26/2018         Current Outpatient Prescriptions on File Prior to Visit   Medication Sig    atorvastatin (LIPITOR) 10 MG tablet Take 1 tablet (10 mg total) by mouth once daily.    BABY ASPIRIN ORAL Take by mouth every evening.     cetirizine (ZYRTEC) 10 MG tablet Take 1 tablet (10 mg total) by mouth once daily.     cholecalciferol, vitamin D3, 1,000 unit capsule Take 1,000 Units by mouth 2 (two) times daily.     ciclopirox (PENLAC) 8 % Soln Apply topically nightly. To fungal toenail    coenzyme Q10 (CO Q-10) 100 mg capsule Take by mouth once daily.    fluticasone (FLONASE) 50 mcg/actuation nasal spray PLACE 1 SPRAY INTO EACH NOSTRIL DAILY    glucosamine-chondroitin 500-400 mg tablet Take 1 tablet by mouth 3 (three) times daily.    MULTIVITAMIN W-MINERALS/LUTEIN (CENTRUM SILVER ORAL) Take by mouth.    omega-3 fatty acids (FISH OIL) 300 mg Cap Take by mouth.    UNABLE TO FIND Take by mouth nightly. tumeric    UNABLE TO FIND once daily. actaline    UNABLE TO FIND Use as directed 1 tablet in the mouth or throat 2 (two) times daily. Prostate Revive manufactured by [employer]    VITAMIN B COMPLEX (SUPER B COMPLEX-B-12 ORAL) Take by mouth.    diclofenac sodium 1 % Gel Apply 2 g topically 4 (four) times daily.     No current facility-administered medications on file prior to visit.      Past Medical History:   Diagnosis Date    Allergic rhinitis 4/13/2016    BPH (benign prostatic hyperplasia)     Urology Dr Zamora, OTC prostate revive helps, avodart caused chest pains    Calculus of gallbladder     US 2016    DJD (degenerative joint disease) of hip 1/29/2015    [place] cardiac risk 10-20% in next 10 years 10/31/2017    recommended statin, but he declines    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    MCA , see MRI    Morbid obesity with BMI of 40.0-44.9, adult 1/29/2015    Seasonal allergic rhinitis due to pollen 4/13/2016     Past Surgical History:   Procedure Laterality Date    CATARACT EXTRACTION      HERNIA REPAIR       Social History     Social History Narrative     to 'T', 2 children, nonsmoker, ETOH none, never had colonscopy & declines     Family History   Problem Relation Age of Onset    Leukemia Father     Aneurysm Father     Diabetes Mother      Vitals:    06/06/18 0922   BP: 130/70   Resp:  "20   Temp: 98.5 °F (36.9 °C)   Weight: (!) 137.9 kg (304 lb 0.2 oz)   Height: 6' 1" (1.854 m)   PainSc:   2     Objective:   Physical Exam   Constitutional: He is oriented to person, place, and time. He appears well-developed and well-nourished.   HENT:   Head: Normocephalic and atraumatic.   Right Ear: External ear normal.   Left Ear: External ear normal.   Nose: Nose normal.   Mouth/Throat: Oropharynx is clear and moist.   Eyes: EOM are normal. Pupils are equal, round, and reactive to light.   Neck: Neck supple. No thyromegaly present.   Cardiovascular: Normal rate, regular rhythm, normal heart sounds and intact distal pulses.    No murmur heard.  Pulmonary/Chest: Effort normal and breath sounds normal. He has no wheezes.   Abdominal: Soft. Bowel sounds are normal. He exhibits no distension and no mass. There is no tenderness. There is no rebound and no guarding.   Musculoskeletal: He exhibits no edema.   Lymphadenopathy:     He has no cervical adenopathy.   Neurological: He is alert and oriented to person, place, and time.     Cranial nerves III through XII grossly intact, neck is supple, Nontender Cervical spine,  Can touch chin to  chest and to both shoulders     Skin: Skin is warm and dry.   Psychiatric: He has a normal mood and affect. His behavior is normal.     Assessment:     1. Transient cerebral ischemia, unspecified type    2. Cerebral infarction due to embolism of left middle cerebral artery    3. Thoracic aortic aneurysm without rupture    4. Abnormal thyroid blood test      Plan:     Orders Placed This Encounter    TSH    Ambulatory referral to Neurology     To help with management as TIA or stroke    Continue Aspirin 81 mg daily & lipitor    Patient Instructions   flonase twice a day    claritin at night    Continue medications        Your #1 MEDICINE is DAILY EXERCISE - 15-20 minutes of huffing & puffing EVERY DAY.     I recommend  high fiber (5 fresh fruits or vegetables daily), low fat diet and " aerobic  exercise (huffing/ puffing/ sweating for 20 min straight at least 4 days a week)    Follow up yearly with fasting lipids, CMP, CBC, TSH prior

## 2018-06-07 ENCOUNTER — PATIENT MESSAGE (OUTPATIENT)
Dept: NEUROLOGY | Facility: CLINIC | Age: 68
End: 2018-06-07

## 2018-07-16 ENCOUNTER — PES CALL (OUTPATIENT)
Dept: ADMINISTRATIVE | Facility: CLINIC | Age: 68
End: 2018-07-16

## 2018-07-16 ENCOUNTER — OFFICE VISIT (OUTPATIENT)
Dept: UROLOGY | Facility: CLINIC | Age: 68
End: 2018-07-16
Payer: MEDICARE

## 2018-07-16 ENCOUNTER — LAB VISIT (OUTPATIENT)
Dept: LAB | Facility: HOSPITAL | Age: 68
End: 2018-07-16
Attending: UROLOGY
Payer: MEDICARE

## 2018-07-16 VITALS
HEIGHT: 73 IN | HEART RATE: 61 BPM | BODY MASS INDEX: 40.82 KG/M2 | SYSTOLIC BLOOD PRESSURE: 143 MMHG | WEIGHT: 308 LBS | DIASTOLIC BLOOD PRESSURE: 77 MMHG

## 2018-07-16 DIAGNOSIS — N40.0 BENIGN PROSTATIC HYPERPLASIA WITHOUT LOWER URINARY TRACT SYMPTOMS: Chronic | ICD-10-CM

## 2018-07-16 DIAGNOSIS — R35.1 NOCTURIA: Primary | ICD-10-CM

## 2018-07-16 LAB — COMPLEXED PSA SERPL-MCNC: 2.5 NG/ML

## 2018-07-16 PROCEDURE — 99999 PR PBB SHADOW E&M-EST. PATIENT-LVL IV: CPT | Mod: PBBFAC,,, | Performed by: UROLOGY

## 2018-07-16 PROCEDURE — 36415 COLL VENOUS BLD VENIPUNCTURE: CPT | Mod: PO

## 2018-07-16 PROCEDURE — 84153 ASSAY OF PSA TOTAL: CPT

## 2018-07-16 PROCEDURE — 99214 OFFICE O/P EST MOD 30 MIN: CPT | Mod: S$GLB,,, | Performed by: UROLOGY

## 2018-07-16 RX ORDER — TAMSULOSIN HYDROCHLORIDE 0.4 MG/1
0.4 CAPSULE ORAL NIGHTLY
Qty: 30 CAPSULE | Refills: 11 | Status: SHIPPED | OUTPATIENT
Start: 2018-07-16 | End: 2019-01-16

## 2018-07-16 NOTE — PROGRESS NOTES
CC: nocturia 4 to 5 x, BPH    Adan Cain is a 68 y.o. man who is here for the evaluation of Nocturia (about 4-5 a night)    A new pt referred by his PCP, Dr. Reyes.  C/o nocturia 4 to 5 x, daytime urination only 3 to 4 x.  C/o weak urine flow but feels like he is emptying his bladder well.  Denies any problem with sleep apnea.  Hx of CAD and HTN.  No family hx of prostate cancer.  Denies flank pain, dysuira, hematuria.      Previously had right hydrocelectomy by me at the time he was getting right inguinal hernia surgery back in 11/30/16.    Past Medical History:   Diagnosis Date    Allergic rhinitis 4/13/2016    BPH (benign prostatic hyperplasia)     Urology Dr Zamora, [facility] prostate revive helps, avodart caused chest pains    Calculus of gallbladder     US 2016    DJD (degenerative joint disease) of hip 1/29/2015    [place] cardiac risk 10-20% in next 10 years 10/31/2017    recommended statin, but he declines    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    [facility] , see MRI    Morbid obesity with BMI of 40.0-44.9, adult 1/29/2015    Seasonal allergic rhinitis due to pollen 4/13/2016     Past Surgical History:   Procedure Laterality Date    CATARACT EXTRACTION      HERNIA REPAIR       Social History   Substance Use Topics    Smoking status: Former Smoker     Packs/day: 6.00     Years: 1.00     Types: Cigarettes    Smokeless tobacco: Never Used    Alcohol use No     Family History   Problem Relation Age of Onset    Leukemia Father     Aneurysm Father     Diabetes Mother      Allergy:  Review of patient's allergies indicates:   Allergen Reactions    Augmentin [amoxicillin-pot clavulanate] Rash     Rash, confusion, TIA like symptoms    Azithromycin Rash    Avodart [dutasteride]     Naproxen     Ragweed     Synthroid [levothyroxine] Rash     Outpatient Encounter Prescriptions as of 7/16/2018   Medication Sig Dispense Refill    BABY ASPIRIN ORAL Take by mouth every evening.       cetirizine  (ZYRTEC) 10 MG tablet Take 1 tablet (10 mg total) by mouth once daily.  0    cholecalciferol, vitamin D3, 1,000 unit capsule Take 1,000 Units by mouth 2 (two) times daily.       ciclopirox (PENLAC) 8 % Soln Apply topically nightly. To fungal toenail 6.6 mL 10    coenzyme Q10 (CO Q-10) 100 mg capsule Take by mouth once daily.      fluticasone (FLONASE) 50 mcg/actuation nasal spray PLACE 1 SPRAY INTO EACH NOSTRIL DAILY 16 g 12    glucosamine-chondroitin 500-400 mg tablet Take 1 tablet by mouth 3 (three) times daily.      MULTIVITAMIN W-MINERALS/LUTEIN (CENTRUM SILVER ORAL) Take by mouth.      omega-3 fatty acids (FISH OIL) 300 mg Cap Take by mouth.      UNABLE TO FIND Take by mouth nightly. tumeric      UNABLE TO FIND once daily. actaline      UNABLE TO FIND Use as directed 1 tablet in the mouth or throat 2 (two) times daily. Prostate Revive manufactured by [employer]      VITAMIN B COMPLEX (SUPER B COMPLEX-B-12 ORAL) Take by mouth.      atorvastatin (LIPITOR) 10 MG tablet Take 1 tablet (10 mg total) by mouth once daily. 30 tablet 3    diclofenac sodium 1 % Gel Apply 2 g topically 4 (four) times daily. 100 g 10    tamsulosin (FLOMAX) 0.4 mg Cap Take 1 capsule (0.4 mg total) by mouth every evening. 30 capsule 11     No facility-administered encounter medications on file as of 7/16/2018.      Review of Systems   General ROS: GENERAL:  No weight gain or loss  SKIN:  No rashes or lacerations  HEAD:  No headaches  EYES:  No exophthalmos, jaundice or blindness  EARS:  No dizziness, tinnitus or hearing loss  NOSE:  No changes in smell  MOUTH & THROAT:  No dyskinetic movements or obvious goiter  CHEST:  No shortness of breath, hyperventilation or cough  CARDIOVASCULAR:  No tachycardia or chest pain  ABDOMEN:  No nausea, vomiting, pain, constipation or diarrhea  URINARY:  No frequency, dysuria or sexual dysfunction  ENDOCRINE:  No polydipsia, polyuria  MUSCULOSKELETAL:  No pain or stiffness of the joints  NEUROLOGIC:   No weakness, sensory changes, seizures, confusion, memory loss, tremor or other abnormal movements  Physical Exam     Vitals:    07/16/18 1403   BP: (!) 143/77   Pulse: 61     Physical Exam   Constitutional: He is oriented to person, place, and time. He appears well-developed and well-nourished. No distress.   HENT:   Head: Normocephalic and atraumatic.   Right Ear: External ear normal.   Left Ear: External ear normal.   Nose: Nose normal.   Mouth/Throat: Oropharynx is clear and moist.   Eyes: Conjunctivae are normal. Pupils are equal, round, and reactive to light.   Neck: Normal range of motion. Neck supple. No JVD present. No tracheal deviation present. No thyromegaly present.   Cardiovascular: Normal rate, regular rhythm, normal heart sounds and intact distal pulses.  Exam reveals no gallop and no friction rub.    No murmur heard.  Pulmonary/Chest: Effort normal and breath sounds normal. No respiratory distress. He has no wheezes. He exhibits no tenderness.   Abdominal: Soft. Bowel sounds are normal. He exhibits no distension and no mass. There is no tenderness. There is no rebound and no guarding.   Genitourinary: Rectum normal and penis normal. No penile tenderness.   Genitourinary Comments: Prostate 30 grams with negative nodule or negative tenderness     Musculoskeletal: Normal range of motion. He exhibits no edema, tenderness or deformity.   Lymphadenopathy:     He has no cervical adenopathy.   Neurological: He is alert and oriented to person, place, and time.   Skin: Skin is warm and dry. He is not diaphoretic.     Psychiatric: He has a normal mood and affect. His behavior is normal. Thought content normal.     Genitalia:  Scrotum: no rash or lesion  Normal symmetric epididymis without masses  Normal vas palpated  Normal size, symmetric testicles with no masses   Normal urethral meatus with no discharge  Normal circumcised penis with no lesion   Rectal:  Normal perineum and anus upon inspection.  Normal  tone, no masses or tenderness;     LABS:  Lab Results   Component Value Date    PSA 2.7 01/20/2015    PSA 1.5 05/30/2008    PSA 2.1 05/11/2005    PSADIAG 2.8 06/12/2017    PSADIAG 2.7 08/18/2014     Results for orders placed or performed in visit on 06/12/17   Prostate Specific Antigen, Diagnostic   Result Value Ref Range    PSA DIAGNOSTIC 2.8 0.00 - 4.00 ng/mL   Results for orders placed or performed in visit on 08/18/14   Prostate Specific Antigen, Diagnostic   Result Value Ref Range    PSA DIAGNOSTIC 2.7 0.00 - 4.00 ng/mL     Lab Results   Component Value Date    CREATININE 0.7 05/28/2018    CREATININE 0.9 05/27/2018    CREATININE 1.4 05/26/2018     No results found for this or any previous visit.  Urine Culture, Routine   Date Value Ref Range Status   12/04/2016 No growth  Final     UA clear    Assessment and Plan:  Adan was seen today for nocturia.    Diagnoses and all orders for this visit:    Nocturia  -     tamsulosin (FLOMAX) 0.4 mg Cap; Take 1 capsule (0.4 mg total) by mouth every evening.    Benign prostatic hyperplasia without lower urinary tract symptoms  -     Prostate Specific Antigen, Diagnostic; Future  -     tamsulosin (FLOMAX) 0.4 mg Cap; Take 1 capsule (0.4 mg total) by mouth every evening.    nature of nocturia including, but not limited to polyuria due to cardio-vascular condition, sleep apnea, BPH and / or bladder function problems.  Will try Flomax for now.  Check his PSA today.  Will follow up in 2 month to re-assess his LUTS.  He was informed about Rezum Therapy.   Its website given  He watched its video today.    discussed Rezum therapy for BPH with obstruction.  Its brochure given along with its website.   He watched the educational video.  He understands that his urinary symptoms may not be better for the first month, and even  up to 3 months when the denaturing and absorption of the prostate tissues will be completed.  Expect to keep an indwelling catheter up to 1 wk.  He needs to stop  blood thinning medications 1 wk before the procedure.  Needs to have someone to drive in and out for your procedure.  Nothing by mouth for anesthesia for 8 hours before the procedure.    Follow-up:  Follow-up in about 2 months (around 9/16/2018) for voiding diary night vs. day time.

## 2018-09-09 ENCOUNTER — OFFICE VISIT (OUTPATIENT)
Dept: URGENT CARE | Facility: CLINIC | Age: 68
End: 2018-09-09
Payer: MEDICARE

## 2018-09-09 VITALS
SYSTOLIC BLOOD PRESSURE: 140 MMHG | RESPIRATION RATE: 16 BRPM | HEIGHT: 73 IN | WEIGHT: 299.19 LBS | BODY MASS INDEX: 39.65 KG/M2 | HEART RATE: 88 BPM | TEMPERATURE: 99 F | OXYGEN SATURATION: 96 % | DIASTOLIC BLOOD PRESSURE: 67 MMHG

## 2018-09-09 DIAGNOSIS — J02.9 PHARYNGITIS, UNSPECIFIED ETIOLOGY: ICD-10-CM

## 2018-09-09 DIAGNOSIS — J01.00 ACUTE NON-RECURRENT MAXILLARY SINUSITIS: Primary | ICD-10-CM

## 2018-09-09 PROCEDURE — 99214 OFFICE O/P EST MOD 30 MIN: CPT | Mod: 25,S$GLB,, | Performed by: PHYSICIAN ASSISTANT

## 2018-09-09 PROCEDURE — 96372 THER/PROPH/DIAG INJ SC/IM: CPT | Mod: S$GLB,,, | Performed by: PHYSICIAN ASSISTANT

## 2018-09-09 RX ORDER — BETAMETHASONE SODIUM PHOSPHATE AND BETAMETHASONE ACETATE 3; 3 MG/ML; MG/ML
6 INJECTION, SUSPENSION INTRA-ARTICULAR; INTRALESIONAL; INTRAMUSCULAR; SOFT TISSUE
Status: COMPLETED | OUTPATIENT
Start: 2018-09-09 | End: 2018-09-09

## 2018-09-09 RX ORDER — DOXYCYCLINE 100 MG/1
100 CAPSULE ORAL 2 TIMES DAILY
Qty: 14 CAPSULE | Refills: 0 | Status: SHIPPED | OUTPATIENT
Start: 2018-09-09 | End: 2018-09-16

## 2018-09-09 RX ADMIN — BETAMETHASONE SODIUM PHOSPHATE AND BETAMETHASONE ACETATE 6 MG: 3; 3 INJECTION, SUSPENSION INTRA-ARTICULAR; INTRALESIONAL; INTRAMUSCULAR; SOFT TISSUE at 12:09

## 2018-09-09 NOTE — PROGRESS NOTES
"Subjective:       Patient ID: Adan Cain is a 68 y.o. male.    Vitals:  height is 6' 1" (1.854 m) and weight is 135.7 kg (299 lb 3.2 oz). His oral temperature is 99.2 °F (37.3 °C). His blood pressure is 140/67 (abnormal) and his pulse is 88. His respiration is 16 and oxygen saturation is 96%.     Chief Complaint: Sore Throat    Sore Throat    This is a new problem. The current episode started yesterday. The problem has been gradually worsening. There has been no fever. The pain is at a severity of 6/10. The pain is moderate. Associated symptoms include congestion, coughing and a hoarse voice. Pertinent negatives include no abdominal pain, ear pain, headaches or shortness of breath. Treatments tried: Flonase, claritin, Robitussin. The treatment provided mild relief.     Review of Systems   Constitution: Negative for chills, fever and malaise/fatigue.   HENT: Positive for congestion, hoarse voice and sore throat. Negative for ear pain.    Eyes: Negative for discharge and redness.   Cardiovascular: Negative for chest pain, dyspnea on exertion and leg swelling.   Respiratory: Positive for cough and sputum production. Negative for shortness of breath and wheezing.    Musculoskeletal: Negative for myalgias.   Gastrointestinal: Negative for abdominal pain and nausea.   Neurological: Negative for headaches.       Objective:      Physical Exam   Constitutional: He is oriented to person, place, and time. He appears well-developed and well-nourished. He is cooperative.  Non-toxic appearance. He does not appear ill. No distress.   HENT:   Head: Normocephalic and atraumatic.   Right Ear: Hearing, tympanic membrane, external ear and ear canal normal.   Left Ear: Hearing, tympanic membrane, external ear and ear canal normal.   Nose: Mucosal edema, rhinorrhea and sinus tenderness present. No nasal deformity. No epistaxis. Right sinus exhibits maxillary sinus tenderness. Right sinus exhibits no frontal sinus tenderness. Left " sinus exhibits maxillary sinus tenderness. Left sinus exhibits no frontal sinus tenderness.   Mouth/Throat: Uvula is midline and mucous membranes are normal. No trismus in the jaw. Normal dentition. No uvula swelling. Posterior oropharyngeal erythema present. Tonsils are 1+ on the right. Tonsils are 1+ on the left. No tonsillar exudate.   Eyes: Conjunctivae and lids are normal. Right eye exhibits no discharge. Left eye exhibits no discharge. No scleral icterus.   Sclera clear bilat   Neck: Trachea normal, normal range of motion, full passive range of motion without pain and phonation normal. Neck supple.   Cardiovascular: Normal rate, regular rhythm, normal heart sounds, intact distal pulses and normal pulses.   Pulmonary/Chest: Effort normal and breath sounds normal. No accessory muscle usage or stridor. No respiratory distress. He has no decreased breath sounds. He has no wheezes. He has no rhonchi. He has no rales.   Abdominal: Soft. Normal appearance and bowel sounds are normal. He exhibits no distension, no pulsatile midline mass and no mass. There is no tenderness.   Musculoskeletal: Normal range of motion. He exhibits no edema or deformity.   Neurological: He is alert and oriented to person, place, and time. He exhibits normal muscle tone. Coordination normal.   Skin: Skin is warm, dry and intact. He is not diaphoretic. No pallor.   Psychiatric: He has a normal mood and affect. His speech is normal and behavior is normal. Judgment and thought content normal. Cognition and memory are normal.   Nursing note and vitals reviewed.      Assessment:       1. Acute non-recurrent maxillary sinusitis    2. Pharyngitis, unspecified etiology        Plan:         Acute non-recurrent maxillary sinusitis  -     betamethasone acetate-betamethasone sodium phosphate injection 6 mg; Inject 1 mL (6 mg total) into the muscle one time.    Pharyngitis, unspecified etiology    Other orders  -     doxycycline (VIBRAMYCIN) 100 MG Cap;  Take 1 capsule (100 mg total) by mouth 2 (two) times daily. for 7 days  Dispense: 14 capsule; Refill: 0          Sinusitis (No Antibiotics)    The sinuses are air-filled spaces within the bones of the face. They connect to the inside of the nose. Sinusitis is an inflammation of the tissue lining the sinus cavity. Sinus inflammation can occur during a cold. It can also be due to allergies to pollens and other particles in the air. It can cause symptoms such as sinus congestion, headache, sore throat, facial swelling and fullness. It may also cause a low-grade fever. No infection is present, and no antibiotic treatment is needed.  Home care  · Drink plenty of water, hot tea, and other liquids. This may help thin mucus. It also may promote sinus drainage.  · Heat may help soothe painful areas of the face. Use a towel soaked in hot water. Or,  the shower and direct the hot spray onto your face. Using a vaporizer along with a menthol rub at night may also help.   · An expectorant containing guaifenesin may help thin the mucus and promote drainage from the sinuses.  · Over-the-counter decongestants may be used unless a similar medicine was prescribed. Nasal sprays work the fastest. Use one that contains phenylephrine or oxymetazoline. First blow the nose gently. Then use the spray. Do not use these medicines more often than directed on the label or symptoms may get worse. You may also use tablets containing pseudoephedrine. Avoid products that combine ingredients, because side effects may be increased. Read labels. You can also ask the pharmacist for help. (NOTE: Persons with high blood pressure should not use decongestants. They can raise blood pressure.)  · Over-the-counter antihistamines may help if allergies contributed to your sinusitis.    · Use acetaminophen or ibuprofen to control pain, unless another pain medicine was prescribed. (If you have chronic liver or kidney disease or ever had a stomach ulcer,  talk with your doctor before using these medicines. Aspirin should never be used in anyone under 18 years of age who is ill with a fever. It may cause severe liver damage.)  · Use nasal rinses or irrigation as instructed by your health care provider.  · Don't smoke. This can worsen symptoms.  Follow-up care  Follow up with your healthcare provider or our staff if you are not improving within the next week.  When to seek medical advice  Call your healthcare provider if any of these occur:  · Green or yellow discharge from the nose or into the throat  · Facial pain or headache becoming more severe  · Stiff neck  · Unusual drowsiness or confusion  · Swelling of the forehead or eyelids  · Vision problems, including blurred or double vision  · Fever of 100.4ºF (38ºC) or higher, or as directed by your healthcare provider  · Seizure  · Breathing problems  · Symptoms not resolving within 10 days  Date Last Reviewed: 4/13/2015  © 4126-2481 [employer]. [address] [place] [address]. All rights reserved. This information is not intended as a substitute for professional medical care. Always follow your healthcare professional's instructions.      Please follow up with your Primary care provider within 2-5 days if your signs and symptoms have not resolved or worsen.     If your condition worsens or fails to improve we recommend that you receive another evaluation at the emergency room immediately or contact your primary medical clinic to discuss your concerns.   You must understand that you have received an Urgent Care treatment only and that you may be released before all of your medical problems are known or treated. You, the patient, will arrange for follow up care as instructed.     RED FLAGS/WARNING SYMPTOMS DISCUSSED WITH PATIENT THAT WOULD WARRANT EMERGENT MEDICAL ATTENTION. PATIENT VERBALIZED UNDERSTANDING.       YOU HAVE BEEN GIVEN A PRESCRIPTION FOR ANTIBIOTICS. IT WAS ADVISED TO DELAY THE  COURSE OF ANTIBIOTICS FOR 2-3 DAYS IF SYMPTOMS DO NOT RESOLVE. IT IMPORTANT TO FOLLOW THESE INSTRUCTIONS AS ANTIBIOTIC RESISTANCE IS HIGH. YOUR SYMPTOMS WILL LIKELY RESOLVE WITHOUT THIS PRESCRIPTIONS.

## 2018-09-09 NOTE — PATIENT INSTRUCTIONS
Sinusitis (No Antibiotics)    The sinuses are air-filled spaces within the bones of the face. They connect to the inside of the nose. Sinusitis is an inflammation of the tissue lining the sinus cavity. Sinus inflammation can occur during a cold. It can also be due to allergies to pollens and other particles in the air. It can cause symptoms such as sinus congestion, headache, sore throat, facial swelling and fullness. It may also cause a low-grade fever. No infection is present, and no antibiotic treatment is needed.  Home care  · Drink plenty of water, hot tea, and other liquids. This may help thin mucus. It also may promote sinus drainage.  · Heat may help soothe painful areas of the face. Use a towel soaked in hot water. Or,  the shower and direct the hot spray onto your face. Using a vaporizer along with a menthol rub at night may also help.   · An expectorant containing guaifenesin may help thin the mucus and promote drainage from the sinuses.  · Over-the-counter decongestants may be used unless a similar medicine was prescribed. Nasal sprays work the fastest. Use one that contains phenylephrine or oxymetazoline. First blow the nose gently. Then use the spray. Do not use these medicines more often than directed on the label or symptoms may get worse. You may also use tablets containing pseudoephedrine. Avoid products that combine ingredients, because side effects may be increased. Read labels. You can also ask the pharmacist for help. (NOTE: Persons with high blood pressure should not use decongestants. They can raise blood pressure.)  · Over-the-counter antihistamines may help if allergies contributed to your sinusitis.    · Use acetaminophen or ibuprofen to control pain, unless another pain medicine was prescribed. (If you have chronic liver or kidney disease or ever had a stomach ulcer, talk with your doctor before using these medicines. Aspirin should never be used in anyone under 18 years of age  who is ill with a fever. It may cause severe liver damage.)  · Use nasal rinses or irrigation as instructed by your health care provider.  · Don't smoke. This can worsen symptoms.  Follow-up care  Follow up with your healthcare provider or our staff if you are not improving within the next week.  When to seek medical advice  Call your healthcare provider if any of these occur:  · Green or yellow discharge from the nose or into the throat  · Facial pain or headache becoming more severe  · Stiff neck  · Unusual drowsiness or confusion  · Swelling of the forehead or eyelids  · Vision problems, including blurred or double vision  · Fever of 100.4ºF (38ºC) or higher, or as directed by your healthcare provider  · Seizure  · Breathing problems  · Symptoms not resolving within 10 days  Date Last Reviewed: 4/13/2015  © 9152-2734 [employer]. [address] [place] [address]. All rights reserved. This information is not intended as a substitute for professional medical care. Always follow your healthcare professional's instructions.      Please follow up with your Primary care provider within 2-5 days if your signs and symptoms have not resolved or worsen.     If your condition worsens or fails to improve we recommend that you receive another evaluation at the emergency room immediately or contact your primary medical clinic to discuss your concerns.   You must understand that you have received an Urgent Care treatment only and that you may be released before all of your medical problems are known or treated. You, the patient, will arrange for follow up care as instructed.     RED FLAGS/WARNING SYMPTOMS DISCUSSED WITH PATIENT THAT WOULD WARRANT EMERGENT MEDICAL ATTENTION. PATIENT VERBALIZED UNDERSTANDING.       YOU HAVE BEEN GIVEN A PRESCRIPTION FOR ANTIBIOTICS. IT WAS ADVISED TO DELAY THE COURSE OF ANTIBIOTICS FOR 2-3 DAYS IF SYMPTOMS DO NOT RESOLVE. IT IMPORTANT TO FOLLOW THESE INSTRUCTIONS AS  ANTIBIOTIC RESISTANCE IS HIGH. YOUR SYMPTOMS WILL LIKELY RESOLVE WITHOUT THIS PRESCRIPTIONS.

## 2018-09-17 ENCOUNTER — TELEPHONE (OUTPATIENT)
Dept: URGENT CARE | Facility: CLINIC | Age: 68
End: 2018-09-17

## 2018-09-17 DIAGNOSIS — J32.9 SINUSITIS, UNSPECIFIED CHRONICITY, UNSPECIFIED LOCATION: Primary | ICD-10-CM

## 2018-09-17 RX ORDER — CEPHALEXIN 500 MG/1
500 CAPSULE ORAL 4 TIMES DAILY
Qty: 40 CAPSULE | Refills: 0 | Status: SHIPPED | OUTPATIENT
Start: 2018-09-17 | End: 2018-09-27

## 2018-09-18 NOTE — TELEPHONE ENCOUNTER
Patient had trouble with getting prescription due to addition of antibiotic. Will change to keflex. All patient questions were answered. I advised if he does not get better or worsens to RTC or FU with PCP.

## 2018-09-25 ENCOUNTER — PES CALL (OUTPATIENT)
Dept: ADMINISTRATIVE | Facility: CLINIC | Age: 68
End: 2018-09-25

## 2018-10-11 ENCOUNTER — PES CALL (OUTPATIENT)
Dept: ADMINISTRATIVE | Facility: CLINIC | Age: 68
End: 2018-10-11

## 2019-01-14 ENCOUNTER — NURSE TRIAGE (OUTPATIENT)
Dept: ADMINISTRATIVE | Facility: CLINIC | Age: 69
End: 2019-01-14

## 2019-01-14 ENCOUNTER — HOSPITAL ENCOUNTER (EMERGENCY)
Facility: HOSPITAL | Age: 69
Discharge: HOME | End: 2019-01-14
Attending: EMERGENCY MEDICINE
Payer: MEDICARE

## 2019-01-14 VITALS
TEMPERATURE: 98 F | HEIGHT: 73 IN | DIASTOLIC BLOOD PRESSURE: 81 MMHG | RESPIRATION RATE: 16 BRPM | WEIGHT: 298 LBS | HEART RATE: 80 BPM | OXYGEN SATURATION: 98 % | BODY MASS INDEX: 39.49 KG/M2 | SYSTOLIC BLOOD PRESSURE: 179 MMHG

## 2019-01-14 DIAGNOSIS — R07.9 CHEST PAIN: ICD-10-CM

## 2019-01-14 LAB
ALBUMIN SERPL BCP-MCNC: 3.6 G/DL
ALP SERPL-CCNC: 110 U/L
ALT SERPL W/O P-5'-P-CCNC: 23 U/L
ANION GAP SERPL CALC-SCNC: 8 MMOL/L
AST SERPL-CCNC: 19 U/L
BASOPHILS # BLD AUTO: 0.08 K/UL
BASOPHILS NFR BLD: 1 %
BILIRUB SERPL-MCNC: 0.6 MG/DL
BNP SERPL-MCNC: 39 PG/ML
BUN SERPL-MCNC: 16 MG/DL
CALCIUM SERPL-MCNC: 10.1 MG/DL
CHLORIDE SERPL-SCNC: 104 MMOL/L
CO2 SERPL-SCNC: 24 MMOL/L
CREAT SERPL-MCNC: 0.8 MG/DL
DIFFERENTIAL METHOD: ABNORMAL
EOSINOPHIL # BLD AUTO: 0.1 K/UL
EOSINOPHIL NFR BLD: 1 %
ERYTHROCYTE [DISTWIDTH] IN BLOOD BY AUTOMATED COUNT: 13.8 %
EST. GFR  (AFRICAN AMERICAN): >60 ML/MIN/1.73 M^2
EST. GFR  (NON AFRICAN AMERICAN): >60 ML/MIN/1.73 M^2
GLUCOSE SERPL-MCNC: 106 MG/DL
HCT VFR BLD AUTO: 41.7 %
HGB BLD-MCNC: 14.1 G/DL
IMM GRANULOCYTES # BLD AUTO: 0.01 K/UL
IMM GRANULOCYTES NFR BLD AUTO: 0.1 %
LYMPHOCYTES # BLD AUTO: 1.9 K/UL
LYMPHOCYTES NFR BLD: 24.4 %
MCH RBC QN AUTO: 30.7 PG
MCHC RBC AUTO-ENTMCNC: 33.8 G/DL
MCV RBC AUTO: 91 FL
MONOCYTES # BLD AUTO: 1 K/UL
MONOCYTES NFR BLD: 12.6 %
NEUTROPHILS # BLD AUTO: 4.7 K/UL
NEUTROPHILS NFR BLD: 60.9 %
NRBC BLD-RTO: 0 /100 WBC
PLATELET # BLD AUTO: 313 K/UL
PMV BLD AUTO: 11 FL
POTASSIUM SERPL-SCNC: 4.1 MMOL/L
PROT SERPL-MCNC: 8 G/DL
RBC # BLD AUTO: 4.59 M/UL
SODIUM SERPL-SCNC: 136 MMOL/L
TROPONIN I SERPL DL<=0.01 NG/ML-MCNC: <0.006 NG/ML
WBC # BLD AUTO: 7.75 K/UL

## 2019-01-14 PROCEDURE — 83880 ASSAY OF NATRIURETIC PEPTIDE: CPT

## 2019-01-14 PROCEDURE — 93005 ELECTROCARDIOGRAM TRACING: CPT

## 2019-01-14 PROCEDURE — 93010 ELECTROCARDIOGRAM REPORT: CPT | Mod: ,,, | Performed by: INTERNAL MEDICINE

## 2019-01-14 PROCEDURE — 99284 PR EMERGENCY DEPT VISIT,LEVEL IV: ICD-10-PCS | Mod: ,,, | Performed by: PHYSICIAN ASSISTANT

## 2019-01-14 PROCEDURE — 99285 EMERGENCY DEPT VISIT HI MDM: CPT | Mod: 25

## 2019-01-14 PROCEDURE — 90471 IMMUNIZATION ADMIN: CPT | Performed by: PHYSICIAN ASSISTANT

## 2019-01-14 PROCEDURE — 85025 COMPLETE CBC W/AUTO DIFF WBC: CPT

## 2019-01-14 PROCEDURE — 90715 TDAP VACCINE 7 YRS/> IM: CPT | Performed by: PHYSICIAN ASSISTANT

## 2019-01-14 PROCEDURE — 84484 ASSAY OF TROPONIN QUANT: CPT

## 2019-01-14 PROCEDURE — 80053 COMPREHEN METABOLIC PANEL: CPT

## 2019-01-14 PROCEDURE — 96372 THER/PROPH/DIAG INJ SC/IM: CPT

## 2019-01-14 PROCEDURE — 93010 EKG 12-LEAD: ICD-10-PCS | Mod: ,,, | Performed by: INTERNAL MEDICINE

## 2019-01-14 PROCEDURE — 63600175 PHARM REV CODE 636 W HCPCS: Performed by: PHYSICIAN ASSISTANT

## 2019-01-14 PROCEDURE — 99284 EMERGENCY DEPT VISIT MOD MDM: CPT | Mod: ,,, | Performed by: PHYSICIAN ASSISTANT

## 2019-01-14 RX ADMIN — CLOSTRIDIUM TETANI TOXOID ANTIGEN (FORMALDEHYDE INACTIVATED), CORYNEBACTERIUM DIPHTHERIAE TOXOID ANTIGEN (FORMALDEHYDE INACTIVATED), BORDETELLA PERTUSSIS TOXOID ANTIGEN (GLUTARALDEHYDE INACTIVATED), BORDETELLA PERTUSSIS FILAMENTOUS HEMAGGLUTININ ANTIGEN (FORMALDEHYDE INACTIVATED), BORDETELLA PERTUSSIS PERTACTIN ANTIGEN, AND BORDETELLA PERTUSSIS FIMBRIAE 2/3 ANTIGEN 0.5 ML: 5; 2; 2.5; 5; 3; 5 INJECTION, SUSPENSION INTRAMUSCULAR at 03:01

## 2019-01-14 NOTE — ED NOTES
LOC: The patient is awake, alert, and aware of environment. The patient is oriented x 3 and speaking appropriately.   APPEARANCE: No acute distress noted.   PSYCHOSOCIAL: Patient is calm and cooperative.   SKIN: The skin is warm, dry.   RESPIRATORY: Airway is open and patent. Bilateral chest rise and fall. Respirations are spontaneous, even and unlabored. Normal effort and rate noted. No accessory muscle use noted.   CARDIAC: Patient has a normal rate and rhythm. Denies chest pain or SOB.   ABDOMEN: Soft and non tender to palpation. No distention noted.   URINARY:  Voids independently.   EXTREMITIES: WNL  NEUROLOGIC: Eyes open spontaneously. Speech clear. Tolerating saliva secretions well. Able to follow commands, demonstrating ability to actively and appropriately communicate within context of current conversation. Symmetrical facial muscles. Moving all extremities well. Movement is purposeful.   MUSCULOSKELETAL: No obvious deformities noted.

## 2019-01-14 NOTE — TELEPHONE ENCOUNTER
Reason for Disposition   All other patients with chest pain    Protocols used: ST CHEST PAIN-A-[place]  Mr. Cain states he has been experiencing intermittent chest pain since yesterday. Patient is requesting an appointment for today or tomorrow.

## 2019-01-14 NOTE — PROVIDER PROGRESS NOTES - EMERGENCY DEPT.
Encounter Date: 1/14/2019    ED Physician Progress Notes       SCRIBE NOTE: I, Dre Archer, am scribing for, and in the presence of,  Dr. Valera.  Physician Statement: I, Dr. Valera, personally performed the services described in this documentation as scribed by Dre Archer in my presence, and it is both accurate and complete.      EKG - STEMI Decision  Initial Reading: No STEMI present.

## 2019-01-14 NOTE — ED TRIAGE NOTES
"Pt reports he was sent from PCP's office concerning CP, elevated BP, and excessive burping yesterday; reports CP has persisted "but only when I burp"; reports symptoms were mildly alleviated s/p taking TUMS yesterday; pt denies SOB, fever; endorses nonproductive cough; pt A&Ox4; respirations even, unlabored; lung sounds clear; abd soft, nontender  "

## 2019-01-14 NOTE — ED NOTES
/82, asymptomatic. Marco Antonio Martinez PA-C, notified of BP reading with no new order at this time.

## 2019-01-14 NOTE — ED PROVIDER NOTES
"Encounter Date: 1/14/2019       History     Chief Complaint   Patient presents with    Chest Pain     had chest pain yest on my outside muscle across chest, called my gp and told to come to er, no cardiac hx     Mr Cain is a 67 yo  male patient with PMHx of BPH, DJD that presents to the ED with chest pain yesterday that has since resolved. Pt currently asymptomatic without complaints. Pt reports that the chest pain yesterday was bilateral and improved when he would belch. Pain did not radiate. Pain improved with belching.  Pt was watching the [place] game yesterday when the discomfort began and thinks he got too "worked up when Nathan threw that interception in the first quarter". Pt checked his BP last night using his wife's machine and it was elevated at 158/108. Pt denies any history of HTN and takes no medications. Pt called his PCP this morning to make an appointment to be evaluated for his chest pain, but would not be able to get in until next week. Pt currently denies any chest pain, shortness of breath, abdominal pain, N/V/D, urinary symptoms, headaches, weakness, numbness, fevers, chills, myalgias.           Review of patient's allergies indicates:   Allergen Reactions    Augmentin [amoxicillin-pot clavulanate] Rash     Rash, confusion, TIA like symptoms    Azithromycin Rash    Avodart [dutasteride]     Naproxen     Ragweed     Synthroid [levothyroxine] Rash     Past Medical History:   Diagnosis Date    Allergic rhinitis 4/13/2016    BPH (benign prostatic hyperplasia)     Urology Dr Zamora, OTC prostate revive helps, avodart caused chest pains    Calculus of gallbladder     US 2016    DJD (degenerative joint disease) of hip 1/29/2015    [place] cardiac risk 10-20% in next 10 years 10/31/2017    recommended statin, but he declines    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    [facility] , see MRI    Morbid obesity with BMI of 40.0-44.9, adult 1/29/2015    Seasonal allergic rhinitis " due to pollen 4/13/2016     Past Surgical History:   Procedure Laterality Date    CATARACT EXTRACTION      HERNIA REPAIR      HYDROCELECTOMY Right 11/30/2016    Performed by [facility]al [facility] at [facility]ND [facility]    REPAIR-HERNIA-INGUINAL Right 11/30/2016    Performed by Marco Antonio Hoover MD at [facility]    SPERMATOCELECTOMY Right 11/30/2016    Performed by [facility] MD at [facility] 2ND [facility]     Family History   Problem Relation Age of Onset    Leukemia Father     Aneurysm Father     Diabetes Mother      Social History     Tobacco Use    Smoking status: Former Smoker     Packs/day: 6.00     Years: 1.00     Pack years: 6.00     Types: Cigarettes    Smokeless tobacco: Never Used   Substance Use Topics    Alcohol use: No    Drug use: No     Review of Systems   Constitutional: Negative for chills and fever.   HENT: Negative for congestion, rhinorrhea, sinus pressure, sinus pain and sore throat.    Eyes: Negative for photophobia and pain.   Respiratory: Negative for cough and shortness of breath.    Cardiovascular: Negative for chest pain, palpitations and leg swelling.   Gastrointestinal: Negative for abdominal pain, diarrhea, nausea and vomiting.   Genitourinary: Negative for dysuria, flank pain and frequency.   Musculoskeletal: Negative for arthralgias, back pain, myalgias, neck pain and neck stiffness.   Skin: Negative for pallor and rash.   Neurological: Positive for facial asymmetry. Negative for dizziness, syncope, weakness, light-headedness, numbness and headaches.   Hematological: Does not bruise/bleed easily.   Psychiatric/Behavioral: Negative for confusion.       Physical Exam     Initial Vitals [01/14/19 1213]   BP Pulse Resp Temp SpO2   (!) 172/74 90 18 98.7 °F (37.1 °C) 96 %      MAP       --         Physical Exam    Constitutional: He appears well-developed and well-nourished. He is cooperative. He does not appear ill. No distress.   HENT:   Head: Normocephalic and atraumatic.   Eyes:  Pupils are equal, round, and reactive to light.   Neck: Normal range of motion. Neck supple.   Cardiovascular: Normal rate. Exam reveals no gallop and no friction rub.    No murmur heard.  Pulmonary/Chest: Effort normal. No respiratory distress. He has no wheezes. He has no rhonchi. He has no rales.   Abdominal: Soft. Bowel sounds are normal. There is no tenderness.   Musculoskeletal: Normal range of motion.   Neurological: He is alert and oriented to person, place, and time.   Skin: Skin is warm, dry and intact.   Psychiatric: He has a normal mood and affect. His speech is normal and behavior is normal.         ED Course   Procedures  Labs Reviewed   CBC W/ AUTO DIFFERENTIAL - Abnormal; Notable for the following components:       Result Value    RBC 4.59 (*)     All other components within normal limits    Narrative:     LAV SHARED   COMPREHENSIVE METABOLIC PANEL    Narrative:     LAV SHARED   TROPONIN I    Narrative:     LAV SHARED   B-TYPE NATRIURETIC PEPTIDE    Narrative:     LAV SHARED          Imaging Results          X-Ray Chest PA And Lateral (Final result)  Result time 01/14/19 13:55:12    Final result by Israel Harper III, MD (01/14/19 13:55:12)                 Impression:      See above    No acute process seen.      Electronically signed by: Israel Harper MD  Date:    01/14/2019  Time:    13:55             Narrative:    EXAMINATION:  XR CHEST PA AND LATERAL    CLINICAL HISTORY:  Chest pain, unspecified    FINDINGS:  Heart size is normal lungs are clear and the bones showed DJD and aortic plaque.                                 Medical Decision Making:   History:   Old Medical Records: I decided to obtain old medical records.  Initial Assessment:   Mr Cain is a 67 yo  male patient with PMHx of BPH, DJD that presents to the ED with chest pain yesterday that has since resolved. Pt currently asymptomatic without complaints. Pt reports that the chest pain yesterday was bilateral and improved  "when he would belch. Pain did not radiate. Pain improved with belching.  Pt was watching the [place] game yesterday when the discomfort began and thinks he got too "worked up when Nathan threw that interception in the first quarter".  Differential Diagnosis:   ACS  Reflux  Indigestion  Electroyte abnormality  MSK Strain    Clinical Tests:   Lab Tests: Ordered and Reviewed  Radiological Study: Ordered and Reviewed  Medical Tests: Ordered and Reviewed  ED Management:  Pt hypertensive on arrival to ED. Pt very pleasant, conversational and in no acute distress. Pt's symptoms resolved last night and has been asymptomatic without complaints since. Labs, 12-lead, CXR ordered. 12 lead shows NSR with bifascicular block. CXR reveals no acute process. Troponin <0.006. BNP 39. Discussed results with patient and patient's wife and offered patient the option to be placed in observation with possibly stress test in AM. Pt states that he feels great and would prefer to have close follow up with Cardiology and be evaluated on outpatient basis. Ambulatory referral to Cardiology placed. Before discharge, patient raised concerns of not being up to date on tetanus due to a small abrasion on hand that he received by scraping his hand on grocery cart. Pt given tdap. Plan is to discharge patient with close follow up with Cardiology. Strict return instructions given. He was discharged with no new prescriptions.  He will follow up with Cardiology and PCP.  All of the patient's questions were answered.  I reviewed the patient's chart, labs, and imaging and discussed the case with my supervising physician.                       Clinical Impression:   The encounter diagnosis was Chest pain.      Disposition:   Disposition: Discharged  Condition: Stable                        Marco Antonio Martinez PA-C  01/15/19 1506    "

## 2019-01-14 NOTE — ED NOTES
"Pt requesting a tetanus shot; reports he cut the top of his hand on a [employer] shopping cart two days ago; small laceration noted to dorsal aspect of L hand; bleeding controlled PTA; reports last tetanus was "several years ago"  "

## 2019-01-14 NOTE — DISCHARGE INSTRUCTIONS
Please follow up with Cardiology and your PCP to discuss today's Emergency Department visit and for further evaluation and management. Please return to the Emergency Department if your symptoms persist, worsen or you develop any additional concerning symptoms.

## 2019-01-16 ENCOUNTER — OFFICE VISIT (OUTPATIENT)
Dept: CARDIOLOGY | Facility: CLINIC | Age: 69
End: 2019-01-16
Payer: MEDICARE

## 2019-01-16 VITALS
HEIGHT: 73 IN | BODY MASS INDEX: 39.41 KG/M2 | WEIGHT: 297.38 LBS | DIASTOLIC BLOOD PRESSURE: 76 MMHG | HEART RATE: 72 BPM | SYSTOLIC BLOOD PRESSURE: 130 MMHG

## 2019-01-16 DIAGNOSIS — Z91.89 FRAMINGHAM CARDIAC RISK 10-20% IN NEXT 10 YEARS: ICD-10-CM

## 2019-01-16 DIAGNOSIS — I77.9 AORTIC DISEASE: ICD-10-CM

## 2019-01-16 DIAGNOSIS — R69 DIAGNOSIS DEFERRED: ICD-10-CM

## 2019-01-16 DIAGNOSIS — E66.01 MORBID OBESITY WITH BMI OF 40.0-44.9, ADULT: Primary | ICD-10-CM

## 2019-01-16 PROCEDURE — 1101F PT FALLS ASSESS-DOCD LE1/YR: CPT | Mod: CPTII,S$GLB,, | Performed by: INTERNAL MEDICINE

## 2019-01-16 PROCEDURE — 93010 ELECTROCARDIOGRAM REPORT: CPT | Mod: S$GLB,,, | Performed by: INTERNAL MEDICINE

## 2019-01-16 PROCEDURE — 99999 PR PBB SHADOW E&M-EST. PATIENT-LVL IV: CPT | Mod: PBBFAC,,, | Performed by: INTERNAL MEDICINE

## 2019-01-16 PROCEDURE — 93010 EKG 12-LEAD: ICD-10-PCS | Mod: S$GLB,,, | Performed by: INTERNAL MEDICINE

## 2019-01-16 PROCEDURE — 99999 PR PBB SHADOW E&M-EST. PATIENT-LVL IV: ICD-10-PCS | Mod: PBBFAC,,, | Performed by: INTERNAL MEDICINE

## 2019-01-16 PROCEDURE — 93005 ELECTROCARDIOGRAM TRACING: CPT | Mod: S$GLB,,, | Performed by: INTERNAL MEDICINE

## 2019-01-16 PROCEDURE — 99204 OFFICE O/P NEW MOD 45 MIN: CPT | Mod: S$GLB,,, | Performed by: INTERNAL MEDICINE

## 2019-01-16 PROCEDURE — 1101F PR PT FALLS ASSESS DOC 0-1 FALLS W/OUT INJ PAST YR: ICD-10-PCS | Mod: CPTII,S$GLB,, | Performed by: INTERNAL MEDICINE

## 2019-01-16 PROCEDURE — 93005 EKG 12-LEAD: ICD-10-PCS | Mod: S$GLB,,, | Performed by: INTERNAL MEDICINE

## 2019-01-16 PROCEDURE — 99204 PR OFFICE/OUTPT VISIT, NEW, LEVL IV, 45-59 MIN: ICD-10-PCS | Mod: S$GLB,,, | Performed by: INTERNAL MEDICINE

## 2019-01-16 NOTE — LETTER
January 17, 2019      Marco Antonio Martinez PA-C  [address] [facility] [address]           [place] - Cardiology  [address] [place] [facility] [address]  [place] [address]  Phone: [phone]          Patient: Adan Cain   MR Number: [id number]   YOB: 1950   Date of Visit: 1/16/2019       Dear Marco Antonio Martinez:    Thank you for referring Adan Cain to me for evaluation. Attached you will find relevant portions of my assessment and plan of care.    If you have questions, please do not hesitate to call me. I look forward to following Adan Cain along with you.    Sincerely,    Kenan Stone MD    Enclosure  CC:  No Recipients    If you would like to receive this communication electronically, please contact [email or web address] or [phone] to request more information on [employer] access.    For providers and/or their staff who would like to refer a patient to [facility], please contact us through our one-stop-shop provider referral line, [facility], at [phone].    If you feel you have received this communication in error or would no longer like to receive these types of communications, please e-mail [email or web address]

## 2019-01-17 ENCOUNTER — TELEPHONE (OUTPATIENT)
Dept: CARDIOLOGY | Facility: CLINIC | Age: 69
End: 2019-01-17

## 2019-01-17 NOTE — PROGRESS NOTES
Subjective:   Chief Complaint: Follow-up and Chest Pain    Last Clinic Visit: Visit date not found    History of Present Illness: Adan Cain is a 68 y.o. gentleman with obesity, physical inactivity, who presents to establish Cardiology after was recently seen in the ER for chest pain.  He reports he was watching the [place] game on Sunday, and during the first quarter with the excitement and [place] interception he developed a cramping sensation across his chest, he reports that the pain improved with belching, has never had pain like this before, and only lasted a brief time before resolving spontaneously.  He does report GERD, and takes Tums occasionally.  He was evaluated in the ER the following morning, EKG negative for acute changes, troponins were negative, and discharged with Cardiology follow-up.  He reports at baseline he is not extremely active, but when he does perform activities, denies any chest pain.  With the episode he had he denies any nausea, no vomiting, no diaphoresis, and no lightheadedness.  He has a diagnosis of TIA, but reports he feels like this was a miss managed diagnosis, had difficulty in his right hand which he ascribed to an IV line, and was reported having weakness in that hand.     PMHx:  Obesity  Physical inactivity    Review of Systems   Constitution: Negative.   HENT: Negative.    Eyes: Negative.    Cardiovascular: Positive for chest pain.   Respiratory: Negative.    Hematologic/Lymphatic: Negative.    Skin: Negative.    Musculoskeletal: Negative.    Gastrointestinal: Negative.    Genitourinary: Negative.        Medications:  Current Outpatient Medications on File Prior to Visit   Medication Sig    BABY ASPIRIN ORAL Take 81 tablets by mouth every evening.     cetirizine (ZYRTEC) 10 MG tablet Take 1 tablet (10 mg total) by mouth once daily. (Patient taking differently: Take 10 mg by mouth as needed. )    cholecalciferol, vitamin D3, 1,000 unit capsule Take 1,000 Units by  "mouth 2 (two) times daily.     coenzyme Q10 (CO Q-10) 100 mg capsule Take by mouth once daily.    fluticasone (FLONASE) 50 mcg/actuation nasal spray PLACE 1 SPRAY INTO EACH NOSTRIL DAILY (Patient taking differently: Pt states he use as needed)    glucosamine-chondroitin 500-400 mg tablet Take 1 tablet by mouth 2 (two) times daily.     MULTIVITAMIN W-MINERALS/LUTEIN (CENTRUM SILVER ORAL) Take by mouth.    omega-3 fatty acids (FISH OIL) 300 mg Cap Take 1 tablet by mouth once daily.     UNABLE TO FIND Take by mouth nightly. tumeric    UNABLE TO FIND once daily. actaline    UNABLE TO FIND Use as directed 1 tablet in the mouth or throat 2 (two) times daily. Prostate Revive manufactured by [employer]    VITAMIN B COMPLEX (SUPER B COMPLEX-B-12 ORAL) Take by mouth.    diclofenac sodium 1 % Gel Apply 2 g topically 4 (four) times daily.     No current facility-administered medications on file prior to visit.      Family History:  Adan's family history includes Aneurysm in his father; Diabetes in his mother; Leukemia in his father.    Social History:  Adan reports that he has quit smoking. His smoking use included cigarettes. He has a 6.00 pack-year smoking history. he has never used smokeless tobacco. He reports that he does not drink alcohol or use drugs.    Objective:   /76   Pulse 72   Ht 6' 1" (1.854 m)   Wt 134.9 kg (297 lb 6.4 oz)   BMI 39.24 kg/m²     Physical Exam   Constitutional: He is oriented to person, place, and time and well-developed, well-nourished, and in no distress. No distress.   HENT:   Head: Normocephalic and atraumatic.   Mouth/Throat: No oropharyngeal exudate.   Eyes: EOM are normal. No scleral icterus.   Neck: No JVD present. No tracheal deviation present. No thyromegaly present.   Cardiovascular: Normal rate and regular rhythm. Exam reveals no gallop and no friction rub.   No murmur heard.  Pulmonary/Chest: Effort normal and breath sounds normal. No respiratory distress. He has no " wheezes. He has no rales. He exhibits no tenderness.   Abdominal: Soft. He exhibits no distension. There is no tenderness. There is no rebound and no guarding.   Musculoskeletal: Normal range of motion. He exhibits no edema.   Neurological: He is alert and oriented to person, place, and time.   Skin: Skin is warm and dry. He is not diaphoretic. No erythema.   Psychiatric: Affect normal.     EKG:  My independent visualization of most recent EKG is normal sinus with a right bundle-branch block    TTE:  05/28/2018  CONCLUSIONS     1 - Normal left ventricular systolic function (EF 60-65%).     2 - Impaired LV relaxation, normal LAP (grade 1 diastolic dysfunction).     3 - Normal right ventricular systolic function .     Event monitor  06/11/2018  CONCLUSIONS     1 - Normal left ventricular systolic function (EF 60-65%).     2 - Impaired LV relaxation, normal LAP (grade 1 diastolic dysfunction).     3 - Normal right ventricular systolic function .     Lipids:  Recent Labs   Lab 05/26/18  2153   LDL CHOLESTEROL 119.6   HDL 36 L   CHOLESTEROL 168      CT chest  05/28/2018  1. No acute intracranial abnormalities identified.  2. CTA head and neck without significant focal stenosis or occlusion.  3. Fusiform aneurysmal dilatation of the ascending thoracic aorta measuring 5 cm.  Recommend nonemergent cardiology referral and future CT follow-up as clinically indicated.        Assessment:     1. Morbid obesity with BMI of 40.0-44.9, adult    2. Aortic disease    3. Diagnosis deferred    4. [place] cardiac risk 10-20% in next 10 years        Plan:   1. Aortic disease  Explained dilation of ascending aorta to 5 cm on CT obtained May of 2018, diagnosed incidentally during workup for potential stroke.  Recommended follow-up, will repeat dedicated CT of chest  - IN OFFICE EKG 12-LEAD (to Muse)  - CTA Chest Non Coronary; Future    2. Diagnosis deferred  no signs of ischemia on EKG, troponin negative, and chest pain description  atypical, no additional workup at this time  - IN OFFICE EKG 12-LEAD (to Muse)    3. Morbid obesity with BMI of 40.0-44.9, adult  Encouraged weight loss    4. [place] cardiac risk 10-20% in next 10 years  Explain his elevated risk of cardiovascular events, and recommended lipid-lowering therapy, he is not interested at this time      Follow-up in about 3 months (around 4/16/2019).

## 2019-01-17 NOTE — TELEPHONE ENCOUNTER
----- Message from Madelin Patricio sent at 1/17/2019 12:43 PM CST -----  Contact: Patient's wife  The Pt's wife would like to schedule the CTscan sooner than a month out. Please call her back @ [phone]. Thanks, Madelin

## 2019-01-18 ENCOUNTER — HOSPITAL ENCOUNTER (OUTPATIENT)
Dept: RADIOLOGY | Facility: HOSPITAL | Age: 69
Discharge: HOME | End: 2019-01-18
Attending: INTERNAL MEDICINE
Payer: MEDICARE

## 2019-01-18 ENCOUNTER — OFFICE VISIT (OUTPATIENT)
Dept: OPHTHALMOLOGY | Facility: CLINIC | Age: 69
End: 2019-01-18
Payer: MEDICARE

## 2019-01-18 DIAGNOSIS — H40.052 BORDERLINE GLAUCOMA OF LEFT EYE WITH OCULAR HYPERTENSION: ICD-10-CM

## 2019-01-18 DIAGNOSIS — I77.9 AORTIC DISEASE: ICD-10-CM

## 2019-01-18 DIAGNOSIS — H26.492 POSTERIOR CAPSULAR OPACIFICATION, LEFT EYE: ICD-10-CM

## 2019-01-18 PROCEDURE — 92004 PR EYE EXAM, NEW PATIENT,COMPREHESV: ICD-10-PCS | Mod: S$GLB,,, | Performed by: OPHTHALMOLOGY

## 2019-01-18 PROCEDURE — 71275 CT ANGIOGRAPHY CHEST: CPT | Mod: TC

## 2019-01-18 PROCEDURE — 71275 CTA CHEST NON CORONARY: ICD-10-PCS | Mod: 26,,, | Performed by: RADIOLOGY

## 2019-01-18 PROCEDURE — 99999 PR PBB SHADOW E&M-EST. PATIENT-LVL II: ICD-10-PCS | Mod: PBBFAC,,, | Performed by: OPHTHALMOLOGY

## 2019-01-18 PROCEDURE — 25500020 PHARM REV CODE 255: Performed by: INTERNAL MEDICINE

## 2019-01-18 PROCEDURE — 92004 COMPRE OPH EXAM NEW PT 1/>: CPT | Mod: S$GLB,,, | Performed by: OPHTHALMOLOGY

## 2019-01-18 PROCEDURE — 99999 PR PBB SHADOW E&M-EST. PATIENT-LVL II: CPT | Mod: PBBFAC,,, | Performed by: OPHTHALMOLOGY

## 2019-01-18 PROCEDURE — 71275 CT ANGIOGRAPHY CHEST: CPT | Mod: 26,,, | Performed by: RADIOLOGY

## 2019-01-18 RX ORDER — LATANOPROST 50 UG/ML
1 SOLUTION/ DROPS OPHTHALMIC DAILY
Qty: 1 BOTTLE | Refills: 5 | Status: SHIPPED | OUTPATIENT
Start: 2019-01-18 | End: 2019-09-11

## 2019-01-18 RX ADMIN — IOHEXOL 100 ML: 350 INJECTION, SOLUTION INTRAVENOUS at 09:01

## 2019-01-22 ENCOUNTER — OFFICE VISIT (OUTPATIENT)
Dept: FAMILY MEDICINE | Facility: CLINIC | Age: 69
End: 2019-01-22
Payer: MEDICARE

## 2019-01-22 VITALS
HEIGHT: 72 IN | BODY MASS INDEX: 40.67 KG/M2 | WEIGHT: 300.25 LBS | HEART RATE: 72 BPM | DIASTOLIC BLOOD PRESSURE: 64 MMHG | TEMPERATURE: 98 F | SYSTOLIC BLOOD PRESSURE: 122 MMHG

## 2019-01-22 DIAGNOSIS — I63.412 CEREBRAL INFARCTION DUE TO EMBOLISM OF LEFT MIDDLE CEREBRAL ARTERY: ICD-10-CM

## 2019-01-22 DIAGNOSIS — E66.01 MORBID OBESITY WITH BMI OF 40.0-44.9, ADULT: ICD-10-CM

## 2019-01-22 DIAGNOSIS — I71.20 THORACIC AORTIC ANEURYSM WITHOUT RUPTURE: ICD-10-CM

## 2019-01-22 DIAGNOSIS — Z23 NEED FOR PROPHYLACTIC VACCINATION AND INOCULATION AGAINST INFLUENZA: Primary | ICD-10-CM

## 2019-01-22 DIAGNOSIS — H90.A11 CONDUCTIVE HEARING LOSS OF RIGHT EAR WITH RESTRICTED HEARING OF LEFT EAR: ICD-10-CM

## 2019-01-22 DIAGNOSIS — R14.2 BELCHING: ICD-10-CM

## 2019-01-22 PROCEDURE — 99499 UNLISTED E&M SERVICE: CPT | Mod: S$GLB,,, | Performed by: FAMILY MEDICINE

## 2019-01-22 PROCEDURE — 99214 PR OFFICE/OUTPT VISIT, EST, LEVL IV, 30-39 MIN: ICD-10-PCS | Mod: 25,S$GLB,, | Performed by: FAMILY MEDICINE

## 2019-01-22 PROCEDURE — 99999 PR PBB SHADOW E&M-EST. PATIENT-LVL III: CPT | Mod: PBBFAC,,, | Performed by: FAMILY MEDICINE

## 2019-01-22 PROCEDURE — 99214 OFFICE O/P EST MOD 30 MIN: CPT | Mod: 25,S$GLB,, | Performed by: FAMILY MEDICINE

## 2019-01-22 PROCEDURE — G0008 FLU VACCINE - HIGH DOSE (65+) PRESERVATIVE FREE IM: ICD-10-PCS | Mod: S$GLB,,, | Performed by: FAMILY MEDICINE

## 2019-01-22 PROCEDURE — 90662 IIV NO PRSV INCREASED AG IM: CPT | Mod: S$GLB,,, | Performed by: FAMILY MEDICINE

## 2019-01-22 PROCEDURE — 99999 PR PBB SHADOW E&M-EST. PATIENT-LVL III: ICD-10-PCS | Mod: PBBFAC,,, | Performed by: FAMILY MEDICINE

## 2019-01-22 PROCEDURE — G0008 ADMIN INFLUENZA VIRUS VAC: HCPCS | Mod: S$GLB,,, | Performed by: FAMILY MEDICINE

## 2019-01-22 PROCEDURE — 99499 RISK ADDL DX/OHS AUDIT: ICD-10-PCS | Mod: S$GLB,,, | Performed by: FAMILY MEDICINE

## 2019-01-22 PROCEDURE — 90662 FLU VACCINE - HIGH DOSE (65+) PRESERVATIVE FREE IM: ICD-10-PCS | Mod: S$GLB,,, | Performed by: FAMILY MEDICINE

## 2019-01-22 NOTE — PROGRESS NOTES
Subjective:      Patient ID: Adan Cain is a 68 y.o. male.    Chief Complaint: Transitional Care (ER follow up) and Otalgia (right)    Transitional Care Note    Family and/or Caretaker present at visit?  Yes.  Diagnostic tests reviewed/disposition: No diagnosic tests pending after this hospitalization.  Disease/illness education:     Home health/community services discussion/referrals: Patient does not have home health established from hospital visit.  They do not need home health.  If needed, we will set up home health for the patient.   Establishment or re-establishment of referral orders for community resources: No other necessary community resources.   Discussion with other health care providers: No discussion with other health care providers necessary.       He presents today with his wife.  He was in the emergency room for chest discomfort which he attributed to watching a football game, but he did follow up with cardiologist and they are following his thoracic aorta aneurysm.  He is to follow up in 3 months.  He no longer has chest discomfort.    He has some right ear discomfort for the past month, no postnasal drip or congestion.    He also is belching more, more reflux symptoms, he has gained weight, he does not exercise, he is not taking the medication for this.  His wife states that belching is worse when he is lying in the recliner and sits up to get out of the chair, compressing his stomach      Lab Results   Component Value Date    HGBA1C 5.3 05/27/2018     No results found for: MICALBCREAT  Lab Results   Component Value Date    LDLCALC 119.6 05/26/2018    LDLCALC 115.4 10/24/2017    CHOL 168 05/26/2018    HDL 36 (L) 05/26/2018    TRIG 62 05/26/2018       Lab Results   Component Value Date     01/14/2019    K 4.1 01/14/2019     01/14/2019    CO2 24 01/14/2019     01/14/2019    BUN 16 01/14/2019    CREATININE 0.8 01/14/2019    CALCIUM 10.1 01/14/2019    PROT 8.0 01/14/2019     ALBUMIN 3.6 01/14/2019    BILITOT 0.6 01/14/2019    ALKPHOS 110 01/14/2019    AST 19 01/14/2019    ALT 23 01/14/2019    ANIONGAP 8 01/14/2019    ESTGFRAFRICA >60.0 01/14/2019    EGFRNONAA >60.0 01/14/2019    WBC 7.75 01/14/2019    HGB 14.1 01/14/2019    HCT 41.7 01/14/2019    MCV 91 01/14/2019     01/14/2019    TSH 0.859 06/06/2018         Current Outpatient Medications on File Prior to Visit   Medication Sig    BABY ASPIRIN ORAL Take 81 tablets by mouth every evening.     cetirizine (ZYRTEC) 10 MG tablet Take 1 tablet (10 mg total) by mouth once daily. (Patient taking differently: Take 10 mg by mouth as needed. )    cholecalciferol, vitamin D3, 1,000 unit capsule Take 1,000 Units by mouth 2 (two) times daily.     coenzyme Q10 (CO Q-10) 100 mg capsule Take by mouth once daily.    fluticasone (FLONASE) 50 mcg/actuation nasal spray PLACE 1 SPRAY INTO EACH NOSTRIL DAILY (Patient taking differently: Pt states he use as needed)    glucosamine-chondroitin 500-400 mg tablet Take 1 tablet by mouth 2 (two) times daily.     latanoprost 0.005 % ophthalmic solution Place 1 drop into the left eye once daily.    MULTIVITAMIN W-MINERALS/LUTEIN (CENTRUM SILVER ORAL) Take by mouth.    omega-3 fatty acids (FISH OIL) 300 mg Cap Take 1 tablet by mouth once daily.     UNABLE TO FIND Take by mouth nightly. tumeric    UNABLE TO FIND once daily. actaline    UNABLE TO FIND Use as directed 1 tablet in the mouth or throat 2 (two) times daily. Prostate Revive manufactured by [employer]    VITAMIN B COMPLEX (SUPER B COMPLEX-B-12 ORAL) Take by mouth.    [DISCONTINUED] diclofenac sodium 1 % Gel Apply 2 g topically 4 (four) times daily.     No current facility-administered medications on file prior to visit.      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, didn't use ear protection    DJD (degenerative joint disease) of hip 1/29/2015    Swiss cardiac risk 10-20% in next 10 years 10/31/2017    recommended statin, but he declines    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    MCA , see MRI    Morbid obesity with BMI of 40.0-44.9, adult 1/29/2015    Seasonal allergic rhinitis due to pollen 4/13/2016    Thoracic aortic aneurysm without rupture 5/27/2018    Cardiologist Dr Stone, follows yearly     Past Surgical History:   Procedure Laterality Date    CATARACT EXTRACTION      HERNIA REPAIR      HYDROCELECTOMY Right 11/30/2016    [facility] 2ND F[facility]    REPAIR-HERNIA-INGUINAL Right 11/30/2016    [facility] by Marco Antonio Hoover [facility] at [facility] OR 2ND FLR    SPERMATOCELECTOMY Right 11/30/2016    [facility] at [facility] OR 2ND FLR     Social History     Social History Narrative     to 'T', 2 children, nonsmoker, ETOH none, never had colonscopy & declines     Family History   Problem Relation Age of Onset    Leukemia Father     Aneurysm Father     Diabetes Mother      Vitals:    01/22/19 1020   BP: 122/64   Pulse: 72   Temp: 98.3 °F (36.8 °C)   TempSrc: Oral   Weight: (!) 136.2 kg (300 lb 4.3 oz)   Height: 6' (1.829 m)   PainSc: 0-No pain     Objective:   Physical Exam   Constitutional: He appears well-developed and well-nourished. No distress.   HENT:   Right Ear: External ear normal. Tympanic membrane is retracted.   Left Ear: External ear normal. Tympanic membrane is retracted.   Nose: Mucosal edema and rhinorrhea present. Right sinus exhibits no maxillary sinus tenderness and no frontal sinus tenderness. Left sinus exhibits no maxillary sinus tenderness and no frontal sinus tenderness.   Mouth/Throat: Mucous membranes are normal. No oropharyngeal  exudate or posterior oropharyngeal erythema.   Eyes: EOM are normal. Pupils are equal, round, and reactive to light.   Neck: Normal range of motion. Neck supple. No thyromegaly present.   Cardiovascular: Normal rate, regular rhythm and normal heart sounds.   No murmur heard.  Pulmonary/Chest: Effort normal and breath sounds normal. He has no wheezes. He has no rales.   Abdominal: Soft. Bowel sounds are normal. He exhibits distension. He exhibits no mass. There is no tenderness. There is no rebound and no guarding.   Large pannus   Lymphadenopathy:     He has no cervical adenopathy.   Skin: Skin is warm and dry.   Nursing note and vitals reviewed.    Assessment:     1. Need for prophylactic vaccination and inoculation against influenza    2. Thoracic aortic aneurysm without rupture    3. Cerebral infarction due to embolism of left middle cerebral artery    4. Conductive hearing loss of right ear with restricted hearing of left ear    5. Belching    6. Morbid obesity with BMI of 40.0-44.9, adult      Plan:     Orders Placed This Encounter    Flu Vaccine - High Dose (PF) (65+)     Continue other medications  He will try Zantac daily, encouraged to lose 5 lb    Patient Instructions   Try FLonase twice daily for a week    If not better, consider seeing ENT & audiologist with hearing loss R    ==============    Follow [facility] Cardiologist    ============    Try to decrease the  triggers of heartburn which include - alcohol,   Tobacco, caffeine, spicy foods, fatty foods, eating large meals before lying down.     Also taking an antiinflammatory ( like Aspirin, Advil (ibuprofen), Alleve (naproxen), Mobic, Aspirin 325mg )  daily can worsen Heartburn or Reflux (GERD)    You can try nonprescription supplements over the counter to see if they help - Papaya enzyme or Aloe Vera concentrate or ZANTAC daily    Consider stopping DAIRY ( milk, yogurt, cheese) for a month to see if this is causing your symptoms.     If you are not  better, we may consider referral to gastroenterology for further testing or  EGD                                   Answers for HPI/ROS submitted by the patient on 1/21/2019   Ear pain  Affected ear: right  Chronicity: recurrent  Onset: more than 1 month ago  Progression since onset: waxing and waning  Frequency: every few hours  Fever: no fever  Pain - numeric: 4/10  abdominal pain: No  ear discharge: Yes  cough: No  headaches: No  rhinorrhea: No  diarrhea: No  hearing loss: No  sore throat: No  neck pain: No  vomiting: No  drainage: No  Treatments tried: nothing  Improvement on treatment: no relief  Pain severity: mild  chronic ear infection: No  hearing loss: No  tympanostomy tube: No

## 2019-01-22 NOTE — PATIENT INSTRUCTIONS
Try FLonase twice daily for a week    If not better, consider seeing ENT & audiologist with hearing loss R    ==============    Follow [facility] Cardiologist    ============    Try to decrease the  triggers of heartburn which include - alcohol,   Tobacco, caffeine, spicy foods, fatty foods, eating large meals before lying down.     Also taking an antiinflammatory ( like Aspirin, Advil (ibuprofen), Alleve (naproxen), Mobic, Aspirin 325mg )  daily can worsen Heartburn or Reflux (GERD)    You can try nonprescription supplements over the counter to see if they help - Papaya enzyme or Aloe Vera concentrate or ZANTAC daily    Consider stopping DAIRY ( milk, yogurt, cheese) for a month to see if this is causing your symptoms.     If you are not better, we may consider referral to gastroenterology for further testing or  EGD

## 2019-01-25 ENCOUNTER — TELEPHONE (OUTPATIENT)
Dept: CARDIOLOGY | Facility: CLINIC | Age: 69
End: 2019-01-25

## 2019-01-25 DIAGNOSIS — I71.20 THORACIC AORTIC ANEURYSM WITHOUT RUPTURE: Primary | ICD-10-CM

## 2019-01-29 ENCOUNTER — TELEPHONE (OUTPATIENT)
Dept: CARDIOLOGY | Facility: CLINIC | Age: 69
End: 2019-01-29

## 2019-01-29 NOTE — TELEPHONE ENCOUNTER
Tried to reach patient by phone to schedule but no answer. Left message on recorder. Will try again.      Notes recorded by Kenan Stone MD on 1/25/2019 at 3:24 PM CST  Can we call Mr. Cain and set up an appointment with CT surgery?    Kenan

## 2019-02-06 ENCOUNTER — PATIENT MESSAGE (OUTPATIENT)
Dept: CARDIOLOGY | Facility: CLINIC | Age: 69
End: 2019-02-06

## 2019-02-06 ENCOUNTER — TELEPHONE (OUTPATIENT)
Dept: CARDIOLOGY | Facility: CLINIC | Age: 69
End: 2019-02-06

## 2019-02-06 NOTE — TELEPHONE ENCOUNTER
Have attempted to reach patient by phone 4 times since 1/25 but no answer. Left messages on voice mail. I also sent him a message through [facility] telling him that someone from CT Surgery Dept will be calling him with an appt and to be on the lookout for that call.    Kenan Stone MD          1/25/19 3:24 PM   Note      Thoracic Aorta aneuysm stable but borderline, will get CT surgery to weigh in.       Kenan Stone

## 2019-02-11 ENCOUNTER — OFFICE VISIT (OUTPATIENT)
Dept: URGENT CARE | Facility: CLINIC | Age: 69
End: 2019-02-11
Payer: MEDICARE

## 2019-02-11 VITALS
HEART RATE: 95 BPM | WEIGHT: 300 LBS | RESPIRATION RATE: 16 BRPM | TEMPERATURE: 99 F | DIASTOLIC BLOOD PRESSURE: 74 MMHG | SYSTOLIC BLOOD PRESSURE: 104 MMHG | BODY MASS INDEX: 40.63 KG/M2 | HEIGHT: 72 IN | OXYGEN SATURATION: 96 %

## 2019-02-11 DIAGNOSIS — J06.9 UPPER RESPIRATORY TRACT INFECTION, UNSPECIFIED TYPE: Primary | ICD-10-CM

## 2019-02-11 LAB
CTP QC/QA: YES
S PYO RRNA THROAT QL PROBE: NEGATIVE

## 2019-02-11 PROCEDURE — 87880 POCT RAPID STREP A: ICD-10-PCS | Mod: QW,S$GLB,, | Performed by: PHYSICIAN ASSISTANT

## 2019-02-11 PROCEDURE — 87880 STREP A ASSAY W/OPTIC: CPT | Mod: QW,S$GLB,, | Performed by: PHYSICIAN ASSISTANT

## 2019-02-11 PROCEDURE — 1101F PT FALLS ASSESS-DOCD LE1/YR: CPT | Mod: CPTII,S$GLB,, | Performed by: PHYSICIAN ASSISTANT

## 2019-02-11 PROCEDURE — 99214 PR OFFICE/OUTPT VISIT, EST, LEVL IV, 30-39 MIN: ICD-10-PCS | Mod: 25,S$GLB,, | Performed by: PHYSICIAN ASSISTANT

## 2019-02-11 PROCEDURE — 99214 OFFICE O/P EST MOD 30 MIN: CPT | Mod: 25,S$GLB,, | Performed by: PHYSICIAN ASSISTANT

## 2019-02-11 PROCEDURE — 1101F PR PT FALLS ASSESS DOC 0-1 FALLS W/OUT INJ PAST YR: ICD-10-PCS | Mod: CPTII,S$GLB,, | Performed by: PHYSICIAN ASSISTANT

## 2019-02-11 PROCEDURE — 96372 THER/PROPH/DIAG INJ SC/IM: CPT | Mod: S$GLB,,, | Performed by: PHYSICIAN ASSISTANT

## 2019-02-11 PROCEDURE — 96372 PR INJECTION,THERAP/PROPH/DIAG2ST, IM OR SUBCUT: ICD-10-PCS | Mod: S$GLB,,, | Performed by: PHYSICIAN ASSISTANT

## 2019-02-11 RX ORDER — BETAMETHASONE SODIUM PHOSPHATE AND BETAMETHASONE ACETATE 3; 3 MG/ML; MG/ML
9 INJECTION, SUSPENSION INTRA-ARTICULAR; INTRALESIONAL; INTRAMUSCULAR; SOFT TISSUE
Status: COMPLETED | OUTPATIENT
Start: 2019-02-11 | End: 2019-02-11

## 2019-02-11 RX ORDER — BENZONATATE 100 MG/1
100 CAPSULE ORAL 3 TIMES DAILY PRN
Qty: 30 CAPSULE | Refills: 0 | Status: SHIPPED | OUTPATIENT
Start: 2019-02-11 | End: 2019-09-11

## 2019-02-11 RX ORDER — PROMETHAZINE HYDROCHLORIDE AND DEXTROMETHORPHAN HYDROBROMIDE 6.25; 15 MG/5ML; MG/5ML
5 SYRUP ORAL NIGHTLY PRN
Qty: 118 ML | Refills: 0 | Status: SHIPPED | OUTPATIENT
Start: 2019-02-11 | End: 2019-07-17

## 2019-02-11 RX ADMIN — BETAMETHASONE SODIUM PHOSPHATE AND BETAMETHASONE ACETATE 9 MG: 3; 3 INJECTION, SUSPENSION INTRA-ARTICULAR; INTRALESIONAL; INTRAMUSCULAR; SOFT TISSUE at 01:02

## 2019-02-11 NOTE — PATIENT INSTRUCTIONS
-Take tessalon perles during the day and cough syrup at night as needed. Be aware the cough syrup may cause drowsiness.    Below are suggestions for symptomatic relief:   -Tylenol every 4 hours OR ibuprofen every 6 hours as needed for pain/fever.   -Salt water gargles to soothe throat pain.   -Chloroseptic spray also helps to numb throat pain.   -Nasal saline spray reduces inflammation and dryness.   -Warm face compresses to help with facial sinus pain/pressure.   -Vicks vapor rub at night.   -Flonase OTC or Nasacort OTC for nasal congestion.   -Simple foods like chicken noodle soup.   -Delsym helps with coughing at night   -Zyrtec/Claritin during the day & Benadryl at night may help with allergies.   -Latasha seltzer cold and sinus - honey tea to help with sore throat/sinus congestion.     If you DO NOT have Hypertension or any history of palpitations, it is ok to take over the counter Sudafed or Mucinex D or Allegra-D or Claritin-D or Zyrtec-D.  If you do take one of the above, it is ok to combine that with plain over the counter Mucinex or Allegra or Claritin or Zyrtec. If, for example, you are taking Zyrtec -D, you can combine that with Mucinex, but not Mucinex-D.  If you are taking Mucinex-D, you can combine that with plain Allegra or Claritin or Zyrtec.   If you DO have Hypertension or palpitations, it is safe to take Coricidin HBP for relief of sinus symptoms.    Please follow up with your primary care provider within 2-5 days if your signs and symptoms have not resolved or worsen.     If your condition worsens or fails to improve we recommend that you receive another evaluation at the emergency room immediately or contact your primary medical clinic to discuss your concerns.   You must understand that you have received an Urgent Care treatment only and that you may be released before all of your medical problems are known or treated. You, the patient, will arrange for follow up care as instructed.     Viral  Upper Respiratory Illness (Adult)  You have a viral upper respiratory illness (URI), which is another term for the common cold. This illness is contagious during the first few days. It is spread through the air by coughing and sneezing. It may also be spread by direct contact (touching the sick person and then touching your own eyes, nose, or mouth). Frequent handwashing will decrease risk of spread. Most viral illnesses go away within 7 to 10 days with rest and simple home remedies. Sometimes the illness may last for several weeks. Antibiotics will not kill a virus, and they are generally not prescribed for this condition.    Home care  · If symptoms are severe, rest at home for the first 2 to 3 days. When you resume activity, don't let yourself get too tired.  · Avoid being exposed to cigarette smoke (yours or others).  · You may use acetaminophen or ibuprofen to control pain and fever, unless another medicine was prescribed. (Note: If you have chronic liver or kidney disease, have ever had a stomach ulcer or gastrointestinal bleeding, or are taking blood-thinning medicines, talk with your healthcare provider before using these medicines.) Aspirin should never be given to anyone under 18 years of age who is ill with a viral infection or fever. It may cause severe liver or brain damage.  · Your appetite may be poor, so a light diet is fine. Avoid dehydration by drinking 6 to 8 glasses of fluids per day (water, soft drinks, juices, tea, or soup). Extra fluids will help loosen secretions in the nose and lungs.  · Over-the-counter cold medicines will not shorten the length of time youre sick, but they may be helpful for the following symptoms: cough, sore throat, and nasal and sinus congestion. (Note: Do not use decongestants if you have high blood pressure.)  Follow-up care  Follow up with your healthcare provider, or as advised.  When to seek medical advice  Call your healthcare provider right away if any of these  occur:  · Cough with lots of colored sputum (mucus)  · Severe headache; face, neck, or ear pain  · Difficulty swallowing due to throat pain  · Fever of 100.4°F (38°C)  Call 911, or get immediate medical care  Call emergency services right away if any of these occur:  · Chest pain, shortness of breath, wheezing, or difficulty breathing  · Coughing up blood  · Inability to swallow due to throat pain  Date Last Reviewed: 9/13/2015 © 2000-2017 [employer] [address] [place] [address]. All rights reserved. This information is not intended as a substitute for professional medical care. Always follow your healthcare professional's instructions.

## 2019-02-11 NOTE — PROGRESS NOTES
Subjective:       Patient ID: Adan Cain is a 68 y.o. male.    Vitals:  height is 6' (1.829 m) and weight is 136.1 kg (300 lb). His oral temperature is 98.6 °F (37 °C). His blood pressure is 104/74 and his pulse is 95. His respiration is 16 and oxygen saturation is 96%.     Chief Complaint: Cough    Patient states his symptoms started on 2/8/19.      Cough   This is a new problem. The problem occurs constantly. The cough is productive of sputum. Associated symptoms include nasal congestion, postnasal drip and a sore throat. Pertinent negatives include no chills, ear pain, eye redness, fever, hemoptysis, myalgias, rash, shortness of breath or wheezing. The symptoms are aggravated by lying down. He has tried OTC cough suppressant for the symptoms. The treatment provided no relief. His past medical history is significant for environmental allergies.       Constitution: Negative for chills, sweating, fatigue and fever.   HENT: Positive for congestion, postnasal drip and sore throat. Negative for ear pain, sinus pain, sinus pressure and voice change.    Neck: Negative for painful lymph nodes.   Eyes: Negative for eye redness.   Respiratory: Positive for cough and sputum production. Negative for chest tightness, bloody sputum, COPD, shortness of breath, stridor, wheezing and asthma.    Gastrointestinal: Negative for nausea and vomiting.   Musculoskeletal: Negative for muscle ache.   Skin: Negative for rash and erythema.   Allergic/Immunologic: Positive for environmental allergies. Negative for seasonal allergies and asthma.   Hematologic/Lymphatic: Negative for swollen lymph nodes.       Objective:      Physical Exam   Constitutional: He is oriented to person, place, and time. Vital signs are normal. He appears well-developed and well-nourished. He does not appear ill. No distress.   HENT:   Head: Normocephalic and atraumatic.   Right Ear: Hearing, tympanic membrane, external ear and ear canal normal.   Left Ear:  Hearing, tympanic membrane, external ear and ear canal normal.   Nose: Mucosal edema present. Right sinus exhibits no maxillary sinus tenderness and no frontal sinus tenderness. Left sinus exhibits no maxillary sinus tenderness and no frontal sinus tenderness.   Mouth/Throat: Uvula is midline. Posterior oropharyngeal erythema (cobblestoning) present. No oropharyngeal exudate or posterior oropharyngeal edema.   Eyes: Conjunctivae, EOM and lids are normal. Right eye exhibits no discharge. Left eye exhibits no discharge.   Neck: Normal range of motion. Neck supple.   Cardiovascular: Normal rate, regular rhythm and normal heart sounds. Exam reveals no gallop and no friction rub.   No murmur heard.  Pulmonary/Chest: Effort normal and breath sounds normal. No respiratory distress. He has no decreased breath sounds. He has no wheezes. He has no rhonchi. He has no rales.   Musculoskeletal: Normal range of motion.   Lymphadenopathy:        Head (right side): No submandibular and no tonsillar adenopathy present.        Head (left side): No submandibular and no tonsillar adenopathy present.   Neurological: He is alert and oriented to person, place, and time.   Skin: Skin is warm and dry. No rash noted. No erythema.   Psychiatric: He has a normal mood and affect. His behavior is normal.   Nursing note and vitals reviewed.      Assessment:       1. Upper respiratory tract infection, unspecified type        Office Visit on 02/11/2019   Component Date Value Ref Range Status    Rapid Strep A Screen 02/11/2019 Negative  Negative Final     Acceptable 02/11/2019 Yes   Final     Plan:         Upper respiratory tract infection, unspecified type  -     POCT rapid strep A  -     benzonatate (TESSALON PERLES) 100 MG capsule; Take 1 capsule (100 mg total) by mouth 3 (three) times daily as needed for Cough.  Dispense: 30 capsule; Refill: 0  -     promethazine-dextromethorphan (PROMETHAZINE-DM) 6.25-15 mg/5 mL Syrp; Take 5  mLs by mouth nightly as needed.  Dispense: 118 mL; Refill: 0  -     betamethasone acetate-betamethasone sodium phosphate injection 9 mg      Patient Instructions   -Take tessalon perles during the day and cough syrup at night as needed. Be aware the cough syrup may cause drowsiness.    Below are suggestions for symptomatic relief:   -Tylenol every 4 hours OR ibuprofen every 6 hours as needed for pain/fever.   -Salt water gargles to soothe throat pain.   -Chloroseptic spray also helps to numb throat pain.   -Nasal saline spray reduces inflammation and dryness.   -Warm face compresses to help with facial sinus pain/pressure.   -Vicks vapor rub at night.   -Flonase OTC or Nasacort OTC for nasal congestion.   -Simple foods like chicken noodle soup.   -Delsym helps with coughing at night   -Zyrtec/Claritin during the day & Benadryl at night may help with allergies.   -Latasha seltzer cold and sinus - honey tea to help with sore throat/sinus congestion.     If you DO NOT have Hypertension or any history of palpitations, it is ok to take over the counter Sudafed or Mucinex D or Allegra-D or Claritin-D or Zyrtec-D.  If you do take one of the above, it is ok to combine that with plain over the counter Mucinex or Allegra or Claritin or Zyrtec. If, for example, you are taking Zyrtec -D, you can combine that with Mucinex, but not Mucinex-D.  If you are taking Mucinex-D, you can combine that with plain Allegra or Claritin or Zyrtec.   If you DO have Hypertension or palpitations, it is safe to take Coricidin HBP for relief of sinus symptoms.    Please follow up with your primary care provider within 2-5 days if your signs and symptoms have not resolved or worsen.     If your condition worsens or fails to improve we recommend that you receive another evaluation at the emergency room immediately or contact your primary medical clinic to discuss your concerns.   You must understand that you have received an Urgent Care treatment only  and that you may be released before all of your medical problems are known or treated. You, the patient, will arrange for follow up care as instructed.     Viral Upper Respiratory Illness (Adult)  You have a viral upper respiratory illness (URI), which is another term for the common cold. This illness is contagious during the first few days. It is spread through the air by coughing and sneezing. It may also be spread by direct contact (touching the sick person and then touching your own eyes, nose, or mouth). Frequent handwashing will decrease risk of spread. Most viral illnesses go away within 7 to 10 days with rest and simple home remedies. Sometimes the illness may last for several weeks. Antibiotics will not kill a virus, and they are generally not prescribed for this condition.    Home care  · If symptoms are severe, rest at home for the first 2 to 3 days. When you resume activity, don't let yourself get too tired.  · Avoid being exposed to cigarette smoke (yours or others).  · You may use acetaminophen or ibuprofen to control pain and fever, unless another medicine was prescribed. (Note: If you have chronic liver or kidney disease, have ever had a stomach ulcer or gastrointestinal bleeding, or are taking blood-thinning medicines, talk with your healthcare provider before using these medicines.) Aspirin should never be given to anyone under 18 years of age who is ill with a viral infection or fever. It may cause severe liver or brain damage.  · Your appetite may be poor, so a light diet is fine. Avoid dehydration by drinking 6 to 8 glasses of fluids per day (water, soft drinks, juices, tea, or soup). Extra fluids will help loosen secretions in the nose and lungs.  · Over-the-counter cold medicines will not shorten the length of time youre sick, but they may be helpful for the following symptoms: cough, sore throat, and nasal and sinus congestion. (Note: Do not use decongestants if you have high blood  pressure.)  Follow-up care  Follow up with your healthcare provider, or as advised.  When to seek medical advice  Call your healthcare provider right away if any of these occur:  · Cough with lots of colored sputum (mucus)  · Severe headache; face, neck, or ear pain  · Difficulty swallowing due to throat pain  · Fever of 100.4°F (38°C)  Call 911, or get immediate medical care  Call emergency services right away if any of these occur:  · Chest pain, shortness of breath, wheezing, or difficulty breathing  · Coughing up blood  · Inability to swallow due to throat pain  Date Last Reviewed: 9/13/2015  © 4290-3872 [employer]. [address] [place] [address], [place] [address]. All rights reserved. This information is not intended as a substitute for professional medical care. Always follow your healthcare professional's instructions.

## 2019-02-18 ENCOUNTER — PROCEDURE VISIT (OUTPATIENT)
Dept: OPHTHALMOLOGY | Facility: CLINIC | Age: 69
End: 2019-02-18
Payer: MEDICARE

## 2019-02-18 VITALS — DIASTOLIC BLOOD PRESSURE: 84 MMHG | SYSTOLIC BLOOD PRESSURE: 173 MMHG | HEART RATE: 74 BPM

## 2019-02-18 DIAGNOSIS — Z96.1 PSEUDOPHAKIA: ICD-10-CM

## 2019-02-18 DIAGNOSIS — H26.492 POSTERIOR CAPSULAR OPACIFICATION, LEFT EYE: Primary | ICD-10-CM

## 2019-02-18 DIAGNOSIS — H40.022 OPEN ANGLE WITH BORDERLINE FINDINGS AND HIGH GLAUCOMA RISK IN LEFT EYE: ICD-10-CM

## 2019-02-18 DIAGNOSIS — H53.8 BLURRY VISION, LEFT EYE: ICD-10-CM

## 2019-02-18 PROCEDURE — 66821 PR DISCISSION,2ND CATARACT,LASER: ICD-10-PCS | Mod: HCNC,LT,S$GLB, | Performed by: OPHTHALMOLOGY

## 2019-02-18 PROCEDURE — 66821 AFTER CATARACT LASER SURGERY: CPT | Mod: HCNC,LT,S$GLB, | Performed by: OPHTHALMOLOGY

## 2019-02-18 PROCEDURE — 92014 COMPRE OPH EXAM EST PT 1/>: CPT | Mod: HCNC,57,S$GLB, | Performed by: OPHTHALMOLOGY

## 2019-02-18 PROCEDURE — 92014 PR EYE EXAM, EST PATIENT,COMPREHESV: ICD-10-PCS | Mod: HCNC,57,S$GLB, | Performed by: OPHTHALMOLOGY

## 2019-02-19 NOTE — PROGRESS NOTES
Subjective:       Patient ID: Adan Cain is a 68 y.o. male.    Chief Complaint: Laser Treatment (Yag Laser)    HPI     Laser Treatment      Additional comments: Yag Laser              Comments     DSL- 1/18/19      67 y/o is here for Poss Yag Laser. Pt c/o of blurry Va OS. Pt was also   given latanoprost Gtts for elevated pressure in clinic.     Eyemeds  Latanoprost OS QHS           Last edited by Adele Means on 2/18/2019  3:44 PM. (History)             Assessment:       1. Posterior capsular opacification, left eye    2. 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 Blurry vision, left eye    4. Pseudophakia        Plan:       Visually significant  PCO OS- Pt. Wants Laser.  OHT/glaucoma suspect OS-IOP much better today on Xalatan OS. ON's appear healthy.     Transient blurry vision OS x 2-Pt had 1 episode on day with elevated IOP OS.  test 80-90% OS.      YAG CAP OS today. TE=   42.7 mj, Avg. 0.7 mj, 61 pulses.  PF taper OS.  RTC 1 wk for IOP's,CCT's, HVF's & OCT's.

## 2019-02-20 ENCOUNTER — OFFICE VISIT (OUTPATIENT)
Dept: CARDIOTHORACIC SURGERY | Facility: CLINIC | Age: 69
End: 2019-02-20
Payer: MEDICARE

## 2019-02-20 VITALS
SYSTOLIC BLOOD PRESSURE: 170 MMHG | WEIGHT: 297.75 LBS | OXYGEN SATURATION: 95 % | TEMPERATURE: 99 F | DIASTOLIC BLOOD PRESSURE: 86 MMHG | HEART RATE: 81 BPM | BODY MASS INDEX: 39.46 KG/M2 | HEIGHT: 73 IN

## 2019-02-20 DIAGNOSIS — I71.60 THORACOABDOMINAL AORTIC ANEURYSM (TAAA) WITHOUT RUPTURE: Primary | ICD-10-CM

## 2019-02-20 PROCEDURE — 1101F PT FALLS ASSESS-DOCD LE1/YR: CPT | Mod: HCNC,CPTII,S$GLB, | Performed by: THORACIC SURGERY (CARDIOTHORACIC VASCULAR SURGERY)

## 2019-02-20 PROCEDURE — 99205 OFFICE O/P NEW HI 60 MIN: CPT | Mod: HCNC,S$GLB,, | Performed by: THORACIC SURGERY (CARDIOTHORACIC VASCULAR SURGERY)

## 2019-02-20 PROCEDURE — 99999 PR PBB SHADOW E&M-EST. PATIENT-LVL IV: CPT | Mod: PBBFAC,HCNC,, | Performed by: THORACIC SURGERY (CARDIOTHORACIC VASCULAR SURGERY)

## 2019-02-20 PROCEDURE — 99999 PR PBB SHADOW E&M-EST. PATIENT-LVL IV: ICD-10-PCS | Mod: PBBFAC,HCNC,, | Performed by: THORACIC SURGERY (CARDIOTHORACIC VASCULAR SURGERY)

## 2019-02-20 PROCEDURE — 1101F PR PT FALLS ASSESS DOC 0-1 FALLS W/OUT INJ PAST YR: ICD-10-PCS | Mod: HCNC,CPTII,S$GLB, | Performed by: THORACIC SURGERY (CARDIOTHORACIC VASCULAR SURGERY)

## 2019-02-20 PROCEDURE — 99499 UNLISTED E&M SERVICE: CPT | Mod: S$GLB,,, | Performed by: THORACIC SURGERY (CARDIOTHORACIC VASCULAR SURGERY)

## 2019-02-20 PROCEDURE — 99205 PR OFFICE/OUTPT VISIT, NEW, LEVL V, 60-74 MIN: ICD-10-PCS | Mod: HCNC,S$GLB,, | Performed by: THORACIC SURGERY (CARDIOTHORACIC VASCULAR SURGERY)

## 2019-02-20 PROCEDURE — 99499 RISK ADDL DX/OHS AUDIT: ICD-10-PCS | Mod: S$GLB,,, | Performed by: THORACIC SURGERY (CARDIOTHORACIC VASCULAR SURGERY)

## 2019-02-20 NOTE — LETTER
February 21, 2019      Kenan Stone MD  [address]  [facility] [address]           [facility] - Cardiovascular Surg  [address]  [facility] [address]  Phone: [phone]          Patient: Adan Cain   MR Number: [id number]   YOB: 1950   Date of Visit: 2/20/2019       Dear Dr. Kenan Stone:    Thank you for referring Adan Cain to me for evaluation. Attached you will find relevant portions of my assessment and plan of care.    If you have questions, please do not hesitate to call me. I look forward to following Adan Cain along with you.    Sincerely,    Marco Antonio Ewing MD    Enclosure  CC:  No Recipients    If you would like to receive this communication electronically, please contact [email or web address] or [phone] to request more information on [employer] access.    For providers and/or their staff who would like to refer a patient to [facility], please contact us through our one-stop-shop provider referral line, [facility], at [phone].    If you feel you have received this communication in error or would no longer like to receive these types of communications, please e-mail [email or web address]

## 2019-02-21 ENCOUNTER — TELEPHONE (OUTPATIENT)
Dept: FAMILY MEDICINE | Facility: CLINIC | Age: 69
End: 2019-02-21

## 2019-02-21 NOTE — PROGRESS NOTES
HPI: Adan Cain is a 69 y/o  male with dejenerative joint disease, seasonal allergic rhinitis, obesity, and BPH.   He is  and lives in [place], [place].  His PCP is Dr. Gina Reyes.  Here for TAA        Past Medical History:   Diagnosis Date    Allergic rhinitis 4/13/2016    BPH (benign prostatic hyperplasia)       Urology Dr Zamora, OTC prostate revive helps, avodart caused chest pains    Calculus of gallbladder       [place] 2016    DJD (degenerative joint disease) of hip 1/29/2015    Vanlue cardiac risk 10-20% in next 10 years 10/31/2017     recommended statin, but he declines    Morbid obesity with BMI of 40.0-44.9, adult 1/29/2015               Past Surgical History:   Procedure Laterality Date    CATARACT EXTRACTION        HERNIA REPAIR                   Review of patient's allergies indicates:   Allergen Reactions    Augmentin [amoxicillin-pot clavulanate] Rash       Rash, confusion, TIA like symptoms    Azithromycin Rash    Avodart [dutasteride]      Naproxen      Ragweed      Synthroid [levothyroxine] Rash         No current facility-administered medications on file prior to encounter.            Current Outpatient Prescriptions on File Prior to Encounter   Medication Sig    BABY ASPIRIN ORAL Take by mouth every evening.     cholecalciferol, vitamin D3, 1,000 unit capsule Take 1,000 Units by mouth 2 (two) times daily.     ciclopirox (PENLAC) 8 % Soln Apply topically nightly. To fungal toenail    coenzyme Q10 (CO Q-10) 100 mg capsule Take by mouth once daily.    diclofenac sodium 1 % Gel Apply 2 g topically 4 (four) times daily.    fluticasone (FLONASE) 50 mcg/actuation nasal spray PLACE 1 SPRAY INTO EACH NOSTRIL DAILY    glucosamine-chondroitin 500-400 mg tablet Take 1 tablet by mouth 3 (three) times daily.    loratadine (CLARITIN) 10 mg tablet Take 10 mg by mouth daily as needed for Allergies.    MULTIVITAMIN W-MINERALS/LUTEIN (CENTRUM SILVER ORAL) Take by mouth.     omega-3 fatty acids (FISH OIL) 300 mg Cap Take by mouth.    UNABLE TO FIND Take by mouth nightly. tumeric    UNABLE TO FIND once daily. actaline    UNABLE TO FIND Use as directed 1 tablet in the mouth or throat 2 (two) times daily. Prostate Revive manufactured by [employer]    VITAMIN B COMPLEX (SUPER B COMPLEX-B-12 ORAL) Take by mouth.    [DISCONTINUED] amoxicillin-clavulanate 875-125mg (AUGMENTIN) 875-125 mg per tablet Take 1 tablet by mouth 2 (two) times daily.    [DISCONTINUED] azithromycin (Z-LESLEE) 250 MG tablet Take 2 tablets by mouth x 1 for day 1 Then take 1 tablet by mouth daily for day 2 - 5           Family History      Problem Relation (Age of Onset)     Aneurysm Father     Diabetes Mother     Leukemia Father                Social History Main Topics    Smoking status: Former Smoker       Packs/day: 6.00       Years: 1.00       Types: Cigarettes    Smokeless tobacco: Never Used    Alcohol use No    Drug use: No    Sexual activity: Yes       Partners: Female      Review of Systems   Constitutional: Negative for chills, fatigue and fever.   HENT: . Negative for congestion and trouble swallowing.    Eyes: Negative for photophobia, pain and visual disturbance.   Respiratory: Negative for cough, shortness of breath and wheezing.    Cardiovascular: Negative for chest pain, palpitations and leg swelling.   Gastrointestinal: Negative for abdominal pain, nausea and vomiting.   Endocrine: Negative for cold intolerance and heat intolerance.   Genitourinary: Negative for dysuria, frequency and urgency.   Musculoskeletal: Negative for arthralgias and myalgias.   Skin: . Negative for color change and pallor.         Neurological:  Negative for dizziness and headaches.   Hematological: Does not bruise/bleed easily.   Psychiatric/Behavioral: Negative for agitation and confusion. The patient is not nervous/anxious.       Objective:      .     Physical Exam   Constitutional: He is oriented to person, place, and time.  He appears well-developed and well-nourished. No distress. Head: Normocephalic and atraumatic.   Mouth/Throat: Oropharynx is clear and moist.   Eyes: EOM are normal. Pupils are equal, round, and reactive to light. No scleral icterus.   Neck: Normal range of motion. Neck supple. No tracheal deviation present.   Cardiovascular: Normal rate, regular rhythm and intact distal pulses.    Pulmonary/Chest: Effort normal and breath sounds normal. No respiratory distress. He has no wheezes.   Abdominal: Soft. Bowel sounds are normal. He exhibits no distension. There is no tenderness.   Musculoskeletal: Normal range of motion. . He exhibits no tenderness.   Neurological: He is alert and oriented to person, place, and time. Skin: Skin is warm, dry and intact.   Psychiatric: He has a normal mood and affect. His speech is normal and behavior is normal.            Assessment/Plan:                     Thoracic aortic aneurysm without rupture     F/u as outpatient with cardiology.       CT reviewed. Ascending aorta 5.1 cm. Echo trileaflet AV. Does not smoke no family history. We will recommend starting BB and f/u 1 year for CT scan chest to monitor the size of his aorta. I wxplained aortic dissection and aneurysms in detail. No indication for replacement at this time.

## 2019-02-21 NOTE — TELEPHONE ENCOUNTER
----- Message from Katlyn Gould sent at 2/21/2019  3:08 PM CST -----  Contact: self/[phone]  Patient called in regards needing to f/u with PCP about if Dr Ewing f/u with her about getting a dosage of medication (patient does not know the name of it). Please if you can f/u with Patient cardiac surgeon.[facility]/pharmacy #[id number] - [place] - [address] [phone] (Phone) [phone] (Fax). Please call and advise. Thank you!!!

## 2019-02-22 ENCOUNTER — PATIENT MESSAGE (OUTPATIENT)
Dept: CARDIOLOGY | Facility: CLINIC | Age: 69
End: 2019-02-22

## 2019-02-25 ENCOUNTER — TELEPHONE (OUTPATIENT)
Dept: FAMILY MEDICINE | Facility: CLINIC | Age: 69
End: 2019-02-25

## 2019-02-25 DIAGNOSIS — I71.20 THORACIC AORTIC ANEURYSM WITHOUT RUPTURE: ICD-10-CM

## 2019-02-25 RX ORDER — METOPROLOL SUCCINATE 25 MG/1
25 TABLET, EXTENDED RELEASE ORAL DAILY
Qty: 30 TABLET | Refills: 11 | Status: SHIPPED | OUTPATIENT
Start: 2019-02-25 | End: 2020-02-26 | Stop reason: SDUPTHER

## 2019-02-25 NOTE — TELEPHONE ENCOUNTER
Sure, I sent Metoprolol 25mg qd. Jaz, please let pt know.     ----- Message from Jaz Michaud LPN sent at 2/20/2019  1:41 PM CST -----      ----- Message -----  From: Etta Markham RN  Sent: 2/20/2019  11:48 AM  To: Eric Soto    Good morning,    This patient was seen by Dr. Ewing today for follow up for a Thoracic Ascending Aorta Aneurysm.  He has asked that the patient's PCP start him on a low dose of Beta Blockers.    Please let me know if we can provide additional information.    Thank you,    Etta Markham, MARIANNE  [phone]

## 2019-02-26 NOTE — TELEPHONE ENCOUNTER
Pt advised no need for tomorrow's appt.  A Rx for Metoporol 25 mg as sent to his pharm.  I will cancel his appt.

## 2019-02-27 ENCOUNTER — CLINICAL SUPPORT (OUTPATIENT)
Dept: OPHTHALMOLOGY | Facility: CLINIC | Age: 69
End: 2019-02-27
Payer: MEDICARE

## 2019-02-27 ENCOUNTER — OFFICE VISIT (OUTPATIENT)
Dept: OPHTHALMOLOGY | Facility: CLINIC | Age: 69
End: 2019-02-27
Payer: MEDICARE

## 2019-02-27 DIAGNOSIS — H40.022 OPEN ANGLE WITH BORDERLINE FINDINGS AND HIGH GLAUCOMA RISK IN LEFT EYE: ICD-10-CM

## 2019-02-27 DIAGNOSIS — Z98.890 POST-OPERATIVE STATE: Primary | ICD-10-CM

## 2019-02-27 PROCEDURE — 99024 POSTOP FOLLOW-UP VISIT: CPT | Mod: HCNC,S$GLB,, | Performed by: OPHTHALMOLOGY

## 2019-02-27 PROCEDURE — 92133 CPTRZD OPH DX IMG PST SGM ON: CPT | Mod: HCNC,S$GLB,, | Performed by: OPHTHALMOLOGY

## 2019-02-27 PROCEDURE — 92083 HUMPHREY VISUAL FIELD - OU - BOTH EYES: ICD-10-PCS | Mod: HCNC,S$GLB,, | Performed by: OPHTHALMOLOGY

## 2019-02-27 PROCEDURE — 99999 PR PBB SHADOW E&M-EST. PATIENT-LVL III: CPT | Mod: PBBFAC,HCNC,, | Performed by: OPHTHALMOLOGY

## 2019-02-27 PROCEDURE — 99999 PR PBB SHADOW E&M-EST. PATIENT-LVL I: ICD-10-PCS | Mod: PBBFAC,HCNC,,

## 2019-02-27 PROCEDURE — 99024 PR POST-OP FOLLOW-UP VISIT: ICD-10-PCS | Mod: HCNC,S$GLB,, | Performed by: OPHTHALMOLOGY

## 2019-02-27 PROCEDURE — 92083 EXTENDED VISUAL FIELD XM: CPT | Mod: HCNC,S$GLB,, | Performed by: OPHTHALMOLOGY

## 2019-02-27 PROCEDURE — 99999 PR PBB SHADOW E&M-EST. PATIENT-LVL III: ICD-10-PCS | Mod: PBBFAC,HCNC,, | Performed by: OPHTHALMOLOGY

## 2019-02-27 PROCEDURE — 99999 PR PBB SHADOW E&M-EST. PATIENT-LVL I: CPT | Mod: PBBFAC,HCNC,,

## 2019-02-27 PROCEDURE — 92133 POSTERIOR SEGMENT OCT OPTIC NERVE(OCULAR COHERENCE TOMOGRAPHY) - OU - BOTH EYES: ICD-10-PCS | Mod: HCNC,S$GLB,, | Performed by: OPHTHALMOLOGY

## 2019-02-27 NOTE — PROGRESS NOTES
Subjective:       Patient ID: Adan Cain is a 69 y.o. male.    Chief Complaint: Post-op Evaluation (2 week YAG Laser OS. Here for HVF's OCT's, CCT's & IOP check. )    HPI     Post-op Evaluation      Additional comments: 2 week YAG Laser OS. Here for HVF's OCT's, CCT's &   IOP check.               Comments     2 week YAG Laser OS. Here for HVF's OCT's, CCT's & IOP check. Denies eye   pain and flashes. Notice floaters left eye after YAG Laser. Vision has   improve since laser left eye.     Eye Meds: Latanoprost qhs OS           Last edited by GRAY Peguero on 2/27/2019  8:24 AM. (History)             Assessment:       1. Post-operative state    2. 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  Glaucoma suspect OS-Pt does not have glaucoma. Pt with nl HVF's & OCT's OU. IOP's are WNL's on Xalatan OS. Possibly OHT OS.        D/c Xalatan OS.  RTC 3-4 wks for IOP's.

## 2019-03-04 ENCOUNTER — OFFICE VISIT (OUTPATIENT)
Dept: URGENT CARE | Facility: CLINIC | Age: 69
End: 2019-03-04
Payer: MEDICARE

## 2019-03-04 VITALS
WEIGHT: 297 LBS | OXYGEN SATURATION: 96 % | HEART RATE: 72 BPM | TEMPERATURE: 99 F | BODY MASS INDEX: 39.36 KG/M2 | RESPIRATION RATE: 19 BRPM | DIASTOLIC BLOOD PRESSURE: 77 MMHG | HEIGHT: 73 IN | SYSTOLIC BLOOD PRESSURE: 140 MMHG

## 2019-03-04 DIAGNOSIS — M54.41 ACUTE RIGHT-SIDED LOW BACK PAIN WITH RIGHT-SIDED SCIATICA: Primary | ICD-10-CM

## 2019-03-04 PROCEDURE — 1101F PT FALLS ASSESS-DOCD LE1/YR: CPT | Mod: CPTII,S$GLB,, | Performed by: NURSE PRACTITIONER

## 2019-03-04 PROCEDURE — 99214 PR OFFICE/OUTPT VISIT, EST, LEVL IV, 30-39 MIN: ICD-10-PCS | Mod: S$GLB,,, | Performed by: NURSE PRACTITIONER

## 2019-03-04 PROCEDURE — 1101F PR PT FALLS ASSESS DOC 0-1 FALLS W/OUT INJ PAST YR: ICD-10-PCS | Mod: CPTII,S$GLB,, | Performed by: NURSE PRACTITIONER

## 2019-03-04 PROCEDURE — 99214 OFFICE O/P EST MOD 30 MIN: CPT | Mod: S$GLB,,, | Performed by: NURSE PRACTITIONER

## 2019-03-04 RX ORDER — TIZANIDINE 4 MG/1
4 TABLET ORAL EVERY 6 HOURS PRN
Qty: 15 TABLET | Refills: 0 | Status: SHIPPED | OUTPATIENT
Start: 2019-03-04 | End: 2019-03-09

## 2019-03-04 NOTE — PROGRESS NOTES
"Subjective:       Patient ID: Adan Cain is a 69 y.o. male.    Vitals:  height is 6' 1" (1.854 m) and weight is 134.7 kg (297 lb). His oral temperature is 98.7 °F (37.1 °C). His blood pressure is 140/77 (abnormal) and his pulse is 72. His respiration is 19 and oxygen saturation is 96%.     Chief Complaint: Back Pain    The patient presents to the clinic today with complaints of right lower back pain over the last few days.  Claims the pain is radiating to his right upper leg.  Denies any numbness or tingling to the lower legs.  Denies any bowel or bladder incontinence.      Back Pain   This is a new problem. The current episode started in the past 7 days (a week ago). The problem occurs constantly. The problem has been gradually worsening since onset. The pain is present in the lumbar spine. The quality of the pain is described as aching. The pain does not radiate. The pain is at a severity of 8/10. The pain is severe. The pain is the same all the time. The symptoms are aggravated by sitting, bending, coughing, lying down and standing. Stiffness is present all day. Pertinent negatives include no abdominal pain, bladder incontinence, bowel incontinence, chest pain, dysuria, fever, headaches, leg pain, numbness, paresis, paresthesias, pelvic pain, perianal numbness, tingling, weakness or weight loss. He has tried NSAIDs for the symptoms. The treatment provided mild relief.       Constitution: Negative for fatigue and fever.   Cardiovascular: Negative for chest pain.   Gastrointestinal: Negative for abdominal pain and bowel incontinence.   Genitourinary: Negative for dysuria, urgency, bladder incontinence, hematuria and pelvic pain.   Musculoskeletal: Positive for back pain. Negative for muscle cramps and history of spine disorder.   Skin: Negative for rash.   Neurological: Negative for coordination disturbances, headaches, numbness and tingling.       Objective:      Physical Exam   Constitutional: He is " oriented to person, place, and time. He appears well-developed and well-nourished. He is cooperative.  Non-toxic appearance. He does not appear ill. No distress.   HENT:   Head: Normocephalic and atraumatic. Head is without abrasion, without contusion and without laceration.   Right Ear: Hearing, tympanic membrane, external ear and ear canal normal. No hemotympanum.   Left Ear: Hearing, tympanic membrane, external ear and ear canal normal. No hemotympanum.   Nose: Nose normal. No mucosal edema, rhinorrhea or nasal deformity. No epistaxis. Right sinus exhibits no maxillary sinus tenderness and no frontal sinus tenderness. Left sinus exhibits no maxillary sinus tenderness and no frontal sinus tenderness.   Mouth/Throat: Uvula is midline, oropharynx is clear and moist and mucous membranes are normal. No trismus in the jaw. Normal dentition. No uvula swelling. No posterior oropharyngeal erythema.   Eyes: Conjunctivae, EOM and lids are normal. Pupils are equal, round, and reactive to light. Right eye exhibits no discharge. Left eye exhibits no discharge. No scleral icterus.   Sclera clear bilat   Neck: Trachea normal, normal range of motion, full passive range of motion without pain and phonation normal. Neck supple. No spinous process tenderness and no muscular tenderness present. No neck rigidity. No tracheal deviation present.   Cardiovascular: Normal rate, regular rhythm, normal heart sounds, intact distal pulses and normal pulses.   Pulses:       Dorsalis pedis pulses are 2+ on the right side, and 2+ on the left side.        Posterior tibial pulses are 2+ on the right side, and 2+ on the left side.   Pulmonary/Chest: Effort normal and breath sounds normal. No respiratory distress.   Abdominal: Soft. Normal appearance and bowel sounds are normal. He exhibits no distension, no pulsatile midline mass and no mass. There is no tenderness.   Musculoskeletal: Normal range of motion. He exhibits no edema or deformity.         Lumbar back: He exhibits tenderness, pain and spasm.        Back:    Neurological: He is alert and oriented to person, place, and time. He has normal strength. No cranial nerve deficit or sensory deficit. He exhibits normal muscle tone. He displays no seizure activity. Coordination normal. GCS eye subscore is 4. GCS verbal subscore is 5. GCS motor subscore is 6.   Skin: Skin is warm, dry and intact. Capillary refill takes less than 2 seconds. No abrasion, no bruising, no burn, no ecchymosis and no laceration noted. He is not diaphoretic. No pallor.   Psychiatric: He has a normal mood and affect. His speech is normal and behavior is normal. Judgment and thought content normal. Cognition and memory are normal.   Nursing note and vitals reviewed.      Assessment:       1. Acute right-sided low back pain with right-sided sciatica        Plan:         Acute right-sided low back pain with right-sided sciatica  -     tiZANidine (ZANAFLEX) 4 MG tablet; Take 1 tablet (4 mg total) by mouth every 6 (six) hours as needed.  Dispense: 15 tablet; Refill: 0      Patient Instructions       Back Pain (Acute or Chronic)    Back pain is one of the most common problems. The good news is that most people feel better in 1 to 2 weeks, and most of the rest in 1 to 2 months. Most people can remain active.  People experience and describe pain differently; not everyone is the same.  · The pain can be sharp, stabbing, shooting, aching, cramping or burning.  · Movement, standing, bending, lifting, sitting, or walking may worsen pain.  · It can be localized to one spot or area, or it can be more generalized.  · It can spread or radiate upwards, to the front, or go down your arms or legs (sciatica).  · It can cause muscle spasm.  Most of the time, mechanical problems with the muscles or spine cause the pain. Mechanical problems are usually caused by an injury to the muscles or ligaments. While illness can cause back pain, it is usually not caused  by a serious illness. Mechanical problems include:   · Physical activity such as sports, exercise, work, or normal activity  · Overexertion, lifting, pushing, pulling incorrectly or too aggressively  · Sudden twisting, bending, or stretching from an accident, or accidental movement  · Poor posture  · Stretching or moving wrong, without noticing pain at the time  · Poor coordination, lack of regular exercise (check with your doctor about this)  · Spinal disc disease or arthritis  · Stress  Pain can also be related to pregnancy, or illness like appendicitis, bladder or kidney infections, pelvic infections, and many other things.  Acute back pain usually gets better in 1 to 2 weeks. Back pain related to disk disease, arthritis in the spinal joints or spinal stenosis (narrowing of the spinal canal) can become chronic and last for months or years.  Unless you had a physical injury (for example, a car accident or fall) X-rays are usually not needed for the initial evaluation of back pain. If pain continues and does not respond to medical treatment, X-rays and other tests may be needed.  Home care  Try these home care recommendations:  · When in bed, try to find a position of comfort. A firm mattress is best. Try lying flat on your back with pillows under your knees. You can also try lying on your side with your knees bent up towards your chest and a pillow between your knees.  · At first, do not try to stretch out the sore spots. If there is a strain, it is not like the good soreness you get after exercising without an injury. In this case, stretching may make it worse.  · Avoid prolong sitting, long car rides, or travel. This puts more stress on the lower back than standing or walking.  · During the first 24 to 72 hours after an acute injury or flare up of chronic back pain, apply an ice pack to the painful area for 20 minutes and then remove it for 20 minutes. Do this over a period of 60 to 90 minutes or several times a  day. This will reduce swelling and pain. Wrap the ice pack in a thin towel or plastic to protect your skin.  · You can start with ice, then switch to heat. Heat (hot shower, hot bath, or heating pad) reduces pain and works well for muscle spasms. Heat can be applied to the painful area for 20 minutes then remove it for 20 minutes. Do this over a period of 60 to 90 minutes or several times a day. Do not sleep on a heating pad. It can lead to skin burns or tissue damage.  · You can alternate ice and heat therapy. Talk with your doctor about the best treatment for your back pain.  · Therapeutic massage can help relax the back muscles without stretching them.  · Be aware of safe lifting methods and do not lift anything without stretching first.  Medicines  Talk to your doctor before using medicine, especially if you have other medical problems or are taking other medicines.  · You may use over-the-counter medicine as directed on the bottle to control pain, unless another pain medicine was prescribed. If you have chronic conditions like diabetes, liver or kidney disease, stomach ulcers, or gastrointestinal bleeding, or are taking blood thinners, talk to your doctor before taking any medicine.  · Be careful if you are given a prescription medicines, narcotics, or medicine for muscle spasms. They can cause drowsiness, affect your coordination, reflexes, and judgement. Do not drive or operate heavy machinery.  Follow-up care  Follow up with your healthcare provider, or as advised.   A radiologist will review any X-rays that were taken. Your provide will notify you of any new findings that may affect your care.  Call 911  Call emergency services if any of the following occur:  · Trouble breathing  · Confusion  · Very drowsy or trouble awakening  · Fainting or loss of consciousness  · Rapid or very slow heart rate  · Loss of bowel or bladder control  When to seek medical advice  Call your healthcare provider right away if any  of these occur:   · Pain becomes worse or spreads to your legs  · Weakness or numbness in one or both legs  · Numbness in the groin or genital area  Date Last Reviewed: 7/1/2016 © 2000-2017 "[employer]. [address] [place] [address], [place] [address]. All rights reserved. This information is not intended as a substitute for professional medical care. Always follow your healthcare professional's instructions.        Self-Care for Low Back Pain    Most people have low back pain now and then. In many cases, it isnt serious and self-care can help. Sometimes low back pain can be a sign of a bigger problem. Call your healthcare provider if your pain returns often or gets worse over time. For the long-term care of your back, get regular exercise, lose any excess weight and learn good posture.  Take a short rest  Lying down during the day may be beneficial for short periods of time if severe pain increases with sitting or standing. Long-term bed rest could be detrimental.  Reduce pain and swelling  Cold reduces swelling. Both cold and heat can reduce pain. Protect your skin by placing a towel between your body and the ice or heat source.  · For the first few days, apply an ice pack for 15 to 20 minutes .  · After the first few days, try heat for 15 minutes at a time to ease pain. Never sleep on a heating pad.  · Over-the-counter medicine can help control pain and swelling. Try aspirin or ibuprofen.  Exercise  Exercise can help your back heal. It also helps your back get stronger and more flexible, preventing any reinjury. Ask your healthcare provider about specific exercises for your back.  Use good posture to avoid reinjury  · When moving, bend at the hips and knees. Dont bend at the waist or twist around.  · When lifting, keep the object close to your body. Dont try to lift more than you can handle.  · When sitting, keep your lower back supported. Use a rolled-up towel as needed.  Seek immediate medical care  if:  · Youre unable to stand or walk.  · You have a temperature over 100.4°F (38.0°C)  · You have frequent, painful, or bloody urination.  · You have severe abdominal pain.  · You have a sharp, stabbing pain.  · Your pain is constant.  · You have pain or numbness in your leg.  · You feel pain in a new area of your back.  · You notice that the pain isnt decreasing after more than a week.   Date Last Reviewed: 9/29/2015 © 2000-2017 [employer]. [address] [place] [address]. All rights reserved. This information is not intended as a substitute for professional medical care. Always follow your healthcare professional's instructions.      -Tylenol as needed for the pain.  -Muscle relaxant as needed for muscle spasms.  -Ice and heat to the affected area.  -Please follow up with your Primary care provider within 2-5 days if your signs and symptoms have not resolved or worsen.     If your condition worsens or fails to improve we recommend that you receive another evaluation at the emergency room immediately or contact your primary medical clinic to discuss your concerns.   You must understand that you have received an Urgent Care treatment only and that you may be released before all of your medical problems are known or treated. You, the patient, will arrange for follow up care as instructed.

## 2019-03-04 NOTE — PATIENT INSTRUCTIONS
Back Pain (Acute or Chronic)    Back pain is one of the most common problems. The good news is that most people feel better in 1 to 2 weeks, and most of the rest in 1 to 2 months. Most people can remain active.  People experience and describe pain differently; not everyone is the same.  · The pain can be sharp, stabbing, shooting, aching, cramping or burning.  · Movement, standing, bending, lifting, sitting, or walking may worsen pain.  · It can be localized to one spot or area, or it can be more generalized.  · It can spread or radiate upwards, to the front, or go down your arms or legs (sciatica).  · It can cause muscle spasm.  Most of the time, mechanical problems with the muscles or spine cause the pain. Mechanical problems are usually caused by an injury to the muscles or ligaments. While illness can cause back pain, it is usually not caused by a serious illness. Mechanical problems include:   · Physical activity such as sports, exercise, work, or normal activity  · Overexertion, lifting, pushing, pulling incorrectly or too aggressively  · Sudden twisting, bending, or stretching from an accident, or accidental movement  · Poor posture  · Stretching or moving wrong, without noticing pain at the time  · Poor coordination, lack of regular exercise (check with your doctor about this)  · Spinal disc disease or arthritis  · Stress  Pain can also be related to pregnancy, or illness like appendicitis, bladder or kidney infections, pelvic infections, and many other things.  Acute back pain usually gets better in 1 to 2 weeks. Back pain related to disk disease, arthritis in the spinal joints or spinal stenosis (narrowing of the spinal canal) can become chronic and last for months or years.  Unless you had a physical injury (for example, a car accident or fall) X-rays are usually not needed for the initial evaluation of back pain. If pain continues and does not respond to medical treatment, X-rays and other tests may be  needed.  Home care  Try these home care recommendations:  · When in bed, try to find a position of comfort. A firm mattress is best. Try lying flat on your back with pillows under your knees. You can also try lying on your side with your knees bent up towards your chest and a pillow between your knees.  · At first, do not try to stretch out the sore spots. If there is a strain, it is not like the good soreness you get after exercising without an injury. In this case, stretching may make it worse.  · Avoid prolong sitting, long car rides, or travel. This puts more stress on the lower back than standing or walking.  · During the first 24 to 72 hours after an acute injury or flare up of chronic back pain, apply an ice pack to the painful area for 20 minutes and then remove it for 20 minutes. Do this over a period of 60 to 90 minutes or several times a day. This will reduce swelling and pain. Wrap the ice pack in a thin towel or plastic to protect your skin.  · You can start with ice, then switch to heat. Heat (hot shower, hot bath, or heating pad) reduces pain and works well for muscle spasms. Heat can be applied to the painful area for 20 minutes then remove it for 20 minutes. Do this over a period of 60 to 90 minutes or several times a day. Do not sleep on a heating pad. It can lead to skin burns or tissue damage.  · You can alternate ice and heat therapy. Talk with your doctor about the best treatment for your back pain.  · Therapeutic massage can help relax the back muscles without stretching them.  · Be aware of safe lifting methods and do not lift anything without stretching first.  Medicines  Talk to your doctor before using medicine, especially if you have other medical problems or are taking other medicines.  · You may use over-the-counter medicine as directed on the bottle to control pain, unless another pain medicine was prescribed. If you have chronic conditions like diabetes, liver or kidney disease,  stomach ulcers, or gastrointestinal bleeding, or are taking blood thinners, talk to your doctor before taking any medicine.  · Be careful if you are given a prescription medicines, narcotics, or medicine for muscle spasms. They can cause drowsiness, affect your coordination, reflexes, and judgement. Do not drive or operate heavy machinery.  Follow-up care  Follow up with your healthcare provider, or as advised.   A radiologist will review any X-rays that were taken. Your provide will notify you of any new findings that may affect your care.  Call 911  Call emergency services if any of the following occur:  · Trouble breathing  · Confusion  · Very drowsy or trouble awakening  · Fainting or loss of consciousness  · Rapid or very slow heart rate  · Loss of bowel or bladder control  When to seek medical advice  Call your healthcare provider right away if any of these occur:   · Pain becomes worse or spreads to your legs  · Weakness or numbness in one or both legs  · Numbness in the groin or genital area  Date Last Reviewed: 7/1/2016 © 2000-2017 [employer]. [address] [place] [address]. All rights reserved. This information is not intended as a substitute for professional medical care. Always follow your healthcare professional's instructions.        Self-Care for Low Back Pain    Most people have low back pain now and then. In many cases, it isnt serious and self-care can help. Sometimes low back pain can be a sign of a bigger problem. Call your healthcare provider if your pain returns often or gets worse over time. For the long-term care of your back, get regular exercise, lose any excess weight and learn good posture.  Take a short rest  Lying down during the day may be beneficial for short periods of time if severe pain increases with sitting or standing. Long-term bed rest could be detrimental.  Reduce pain and swelling  Cold reduces swelling. Both cold and heat can reduce pain. Protect  your skin by placing a towel between your body and the ice or heat source.  · For the first few days, apply an ice pack for 15 to 20 minutes .  · After the first few days, try heat for 15 minutes at a time to ease pain. Never sleep on a heating pad.  · Over-the-counter medicine can help control pain and swelling. Try aspirin or ibuprofen.  Exercise  Exercise can help your back heal. It also helps your back get stronger and more flexible, preventing any reinjury. Ask your healthcare provider about specific exercises for your back.  Use good posture to avoid reinjury  · When moving, bend at the hips and knees. Dont bend at the waist or twist around.  · When lifting, keep the object close to your body. Dont try to lift more than you can handle.  · When sitting, keep your lower back supported. Use a rolled-up towel as needed.  Seek immediate medical care if:  · Youre unable to stand or walk.  · You have a temperature over 100.4°F (38.0°C)  · You have frequent, painful, or bloody urination.  · You have severe abdominal pain.  · You have a sharp, stabbing pain.  · Your pain is constant.  · You have pain or numbness in your leg.  · You feel pain in a new area of your back.  · You notice that the pain isnt decreasing after more than a week.   Date Last Reviewed: 9/29/2015  © 3744-3682 [employer]. [address] [place] [address]. All rights reserved. This information is not intended as a substitute for professional medical care. Always follow your healthcare professional's instructions.      -Tylenol as needed for the pain.  -Muscle relaxant as needed for muscle spasms.  -Ice and heat to the affected area.  -Please follow up with your Primary care provider within 2-5 days if your signs and symptoms have not resolved or worsen.     If your condition worsens or fails to improve we recommend that you receive another evaluation at the emergency room immediately or contact your primary medical clinic  to discuss your concerns.   You must understand that you have received an Urgent Care treatment only and that you may be released before all of your medical problems are known or treated. You, the patient, will arrange for follow up care as instructed.

## 2019-03-06 ENCOUNTER — HOSPITAL ENCOUNTER (OUTPATIENT)
Dept: RADIOLOGY | Facility: HOSPITAL | Age: 69
Discharge: HOME | End: 2019-03-06
Attending: FAMILY MEDICINE
Payer: MEDICARE

## 2019-03-06 ENCOUNTER — OFFICE VISIT (OUTPATIENT)
Dept: FAMILY MEDICINE | Facility: CLINIC | Age: 69
End: 2019-03-06
Payer: MEDICARE

## 2019-03-06 VITALS
SYSTOLIC BLOOD PRESSURE: 128 MMHG | BODY MASS INDEX: 39.47 KG/M2 | HEIGHT: 73 IN | TEMPERATURE: 98 F | DIASTOLIC BLOOD PRESSURE: 70 MMHG | WEIGHT: 297.81 LBS | HEART RATE: 64 BPM

## 2019-03-06 DIAGNOSIS — M54.50 LOW BACK PAIN, NON-SPECIFIC: ICD-10-CM

## 2019-03-06 DIAGNOSIS — M54.41 ACUTE RIGHT-SIDED LOW BACK PAIN WITH RIGHT-SIDED SCIATICA: Primary | ICD-10-CM

## 2019-03-06 DIAGNOSIS — M43.9 CURVATURE OF SPINE: ICD-10-CM

## 2019-03-06 PROCEDURE — 99214 OFFICE O/P EST MOD 30 MIN: CPT | Mod: HCNC,S$GLB,, | Performed by: FAMILY MEDICINE

## 2019-03-06 PROCEDURE — 99214 PR OFFICE/OUTPT VISIT, EST, LEVL IV, 30-39 MIN: ICD-10-PCS | Mod: HCNC,S$GLB,, | Performed by: FAMILY MEDICINE

## 2019-03-06 PROCEDURE — 72100 X-RAY EXAM L-S SPINE 2/3 VWS: CPT | Mod: TC,HCNC,FY,PO

## 2019-03-06 PROCEDURE — 99999 PR PBB SHADOW E&M-EST. PATIENT-LVL III: ICD-10-PCS | Mod: PBBFAC,HCNC,, | Performed by: FAMILY MEDICINE

## 2019-03-06 PROCEDURE — 72100 XR LUMBAR SPINE AP AND LATERAL: ICD-10-PCS | Mod: 26,HCNC,, | Performed by: RADIOLOGY

## 2019-03-06 PROCEDURE — 99999 PR PBB SHADOW E&M-EST. PATIENT-LVL III: CPT | Mod: PBBFAC,HCNC,, | Performed by: FAMILY MEDICINE

## 2019-03-06 PROCEDURE — 1101F PR PT FALLS ASSESS DOC 0-1 FALLS W/OUT INJ PAST YR: ICD-10-PCS | Mod: HCNC,CPTII,S$GLB, | Performed by: FAMILY MEDICINE

## 2019-03-06 PROCEDURE — 1101F PT FALLS ASSESS-DOCD LE1/YR: CPT | Mod: HCNC,CPTII,S$GLB, | Performed by: FAMILY MEDICINE

## 2019-03-06 PROCEDURE — 72100 X-RAY EXAM L-S SPINE 2/3 VWS: CPT | Mod: 26,HCNC,, | Performed by: RADIOLOGY

## 2019-03-06 NOTE — PATIENT INSTRUCTIONS
You can call the [facility], PHYSICAL THERAPY center for an appointment    [address] [facility] - not far from the [address] [place] [address], right past [facility]  [phone]    They will help diagnose the cause of your problem.     Let me know if your symptoms persist    ===  Try gabapentin that wife has at home    If not better, consider urinalysis for possible kidney stone, although I doubt

## 2019-03-07 NOTE — PROGRESS NOTES
Subjective:      Patient ID: Adan Cain is a 69 y.o. male.    Chief Complaint: Sciatica (follow up)    Here today for  follow-up after flare-up of sciatica.  One week ago he had right-sided lower back pain that went to his buttock.  He presents to the urgent care but tizanidine is not helping much.  He can very uncomfortable and unable to sleep.  He is taking Tylenol 325, 4 tablets a day.  He denies any bowel bladder incontinence.  Denies any motor vehicle accident, recent trauma.  Years ago he had an occasion when he had right leg numbness, but it did not last long. No hx kidney stone.     Current Outpatient Medications on File Prior to Visit   Medication Sig    BABY ASPIRIN ORAL Take 81 tablets by mouth every evening.     cholecalciferol, vitamin D3, 1,000 unit capsule Take 1,000 Units by mouth 2 (two) times daily.     coenzyme Q10 (CO Q-10) 100 mg capsule Take by mouth once daily.    fluticasone (FLONASE) 50 mcg/actuation nasal spray PLACE 1 SPRAY INTO EACH NOSTRIL DAILY (Patient taking differently: Pt states he use as needed)    glucosamine-chondroitin 500-400 mg tablet Take 1 tablet by mouth 2 (two) times daily.     metoprolol succinate (TOPROL-XL) 25 MG 24 hr tablet Take 1 tablet (25 mg total) by mouth once daily.    MULTIVITAMIN W-MINERALS/LUTEIN (CENTRUM SILVER ORAL) Take by mouth once daily.     omega-3 fatty acids (FISH OIL) 300 mg Cap Take 1 tablet by mouth once daily.     tiZANidine (ZANAFLEX) 4 MG tablet Take 1 tablet (4 mg total) by mouth every 6 (six) hours as needed.    UNABLE TO FIND Take by mouth nightly. tumeric    UNABLE TO FIND once daily. actaline    UNABLE TO FIND Use as directed 1 tablet in the mouth or throat 2 (two) times daily. Prostate Revive manufactured by [employer]    VITAMIN B COMPLEX (SUPER B COMPLEX-B-12 ORAL) Take by mouth.    benzonatate (TESSALON PERLES) 100 MG capsule Take 1 capsule (100 mg total) by mouth 3 (three) times daily as needed for Cough.     cetirizine (ZYRTEC) 10 MG tablet Take 1 tablet (10 mg total) by mouth once daily. (Patient taking differently: Take 10 mg by mouth as needed. )    latanoprost 0.005 % ophthalmic solution Place 1 drop into the left eye once daily.    promethazine-dextromethorphan (PROMETHAZINE-DM) 6.25-15 mg/5 mL Syrp Take 5 mLs by mouth nightly as needed.     No current facility-administered medications on file prior to visit.      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, didn't use ear protection    DJD (degenerative joint disease) of hip 1/29/2015    [place] cardiac risk 10-20% in next 10 years 10/31/2017    recommended statin, but he declines    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    MCA , see MRI    Morbid obesity with BMI of 40.0-44.9, adult 1/29/2015    Right-sided low back pain with right-sided sciatica 3/6/2019    Seasonal allergic rhinitis due to pollen 4/13/2016    Thoracic aortic aneurysm without rupture 5/27/2018    tx with Su, Cardiologist Dr Stone, follows yearly     Past Surgical History:   Procedure Laterality Date    CATARACT EXTRACTION      HERNIA REPAIR      HYDROCELECTOMY Right 11/30/2016    [facility] 2ND FLR    REPAIR-HERNIA-INGUINAL Right 11/30/2016    Performed by Marco Antonio Hoover MD at [facility] OR 2ND FLR    SPERMATOCELECTOMY Right 11/30/2016    [facility] at [facility] OR 2ND FLR    YAG LAser Capsulotomy Bilateral     2/18/2019 OS Dr. Cornelius     Social History     Social History Narrative     to 'T', 2 children, nonsmoker, ETOH none, never had colonscopy & declines  "    Family History   Problem Relation Age of Onset    Leukemia Father     Aneurysm Father     Diabetes Mother     Cataracts Paternal Uncle     Amblyopia Neg Hx     Blindness Neg Hx     Glaucoma Neg Hx     Macular degeneration Neg Hx     Retinal detachment Neg Hx     Strabismus Neg Hx      Vitals:    03/06/19 1119   BP: 128/70   Pulse: 64   Temp: 98.1 °F (36.7 °C)   TempSrc: Oral   Weight: 135.1 kg (297 lb 13.5 oz)   Height: 6' 1" (1.854 m)   PainSc:   4   PainLoc: Back     Objective:   Physical Exam   Musculoskeletal:   Normal gait, can walk on toes and heels, can lean forward 45 degrees with pulling sensation in RIGHT lower back to buttock, can  lean back and side to side but tight, nontender lumbar spine with curvature, tight & tender paraspinous musculature, nontender sacroiliacs, negative straight leg test, 2+ pulses       Assessment:     1. Acute right-sided low back pain with right-sided sciatica    2. Low back pain, non-specific    3. Curvature of spine      Plan:     Orders Placed This Encounter    X-Ray Lumbar Spine AP And Lateral     I will email results    Patient Instructions   You can call the [facility], PHYSICAL THERAPY center for an appointment    [address] [facility] - not far from the [address] [place] [address], right past [facility]  [phone]    They will help diagnose the cause of your problem.     Let me know if your symptoms persist    ===  Try gabapentin that wife has at home    If not better, consider urinalysis for possible kidney stone, although I doubt                                "

## 2019-03-08 NOTE — PROGRESS NOTES
Hello.     Your BACK xray does show arthritis & some curvature of the spine. If you're not better with PT, let me know & I can refer you to an Orthopedist

## 2019-03-27 ENCOUNTER — OFFICE VISIT (OUTPATIENT)
Dept: OPHTHALMOLOGY | Facility: CLINIC | Age: 69
End: 2019-03-27
Payer: MEDICARE

## 2019-03-27 DIAGNOSIS — H40.022 OPEN ANGLE WITH BORDERLINE FINDINGS AND HIGH GLAUCOMA RISK IN LEFT EYE: Primary | ICD-10-CM

## 2019-03-27 PROCEDURE — 99999 PR PBB SHADOW E&M-EST. PATIENT-LVL II: ICD-10-PCS | Mod: PBBFAC,HCNC,, | Performed by: OPHTHALMOLOGY

## 2019-03-27 PROCEDURE — 92012 INTRM OPH EXAM EST PATIENT: CPT | Mod: HCNC,S$GLB,, | Performed by: OPHTHALMOLOGY

## 2019-03-27 PROCEDURE — 92012 PR EYE EXAM, EST PATIENT,INTERMED: ICD-10-PCS | Mod: HCNC,S$GLB,, | Performed by: OPHTHALMOLOGY

## 2019-03-27 PROCEDURE — 99999 PR PBB SHADOW E&M-EST. PATIENT-LVL II: CPT | Mod: PBBFAC,HCNC,, | Performed by: OPHTHALMOLOGY

## 2019-03-27 NOTE — PROGRESS NOTES
Subjective:       Patient ID: Adan Cain is a 69 y.o. male.    Chief Complaint: Follow-up    HPI     DSL- 2/27/19     70 y/o male is here for IOP Check. Pt states no Va change. Pt d/c   Latanoprost.     Eyemeds  No gtts    Last edited by Adele Means on 3/27/2019  8:23 AM. (History)             Assessment:       1. 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        Stay off Xlatan OS.  RTC 6 mos for IOP's.

## 2019-05-07 DIAGNOSIS — Z12.11 COLON CANCER SCREENING: ICD-10-CM

## 2019-06-11 ENCOUNTER — PES CALL (OUTPATIENT)
Dept: ADMINISTRATIVE | Facility: CLINIC | Age: 69
End: 2019-06-11

## 2019-06-24 ENCOUNTER — TELEPHONE (OUTPATIENT)
Dept: OPHTHALMOLOGY | Facility: CLINIC | Age: 69
End: 2019-06-24

## 2019-06-24 NOTE — TELEPHONE ENCOUNTER
----- Message from Yaritza So sent at 6/24/2019  9:01 AM CDT -----  Contact: Adan  Needs Advice    Reason for call: pt stated he's seeing orange, pt stated this is coming and going. Pt stated this happens often pt stated this just happen on yesterday. Pt stated he needed to be seen soon. Pt stated Dr. Cornelius knows about what's going on with his eye.         Communication Preference: [phone]    Additional Information:

## 2019-06-26 ENCOUNTER — OFFICE VISIT (OUTPATIENT)
Dept: OPHTHALMOLOGY | Facility: CLINIC | Age: 69
End: 2019-06-26
Payer: MEDICARE

## 2019-06-26 DIAGNOSIS — H04.123 DRY EYE SYNDROME OF BOTH EYES: ICD-10-CM

## 2019-06-26 DIAGNOSIS — H53.122 TRANSIENT VISUAL LOSS OF LEFT EYE: Primary | ICD-10-CM

## 2019-06-26 DIAGNOSIS — Z96.1 PSEUDOPHAKIA: ICD-10-CM

## 2019-06-26 PROCEDURE — 92014 PR EYE EXAM, EST PATIENT,COMPREHESV: ICD-10-PCS | Mod: HCNC,S$GLB,, | Performed by: OPHTHALMOLOGY

## 2019-06-26 PROCEDURE — 99999 PR PBB SHADOW E&M-EST. PATIENT-LVL II: CPT | Mod: PBBFAC,HCNC,, | Performed by: OPHTHALMOLOGY

## 2019-06-26 PROCEDURE — 99999 PR PBB SHADOW E&M-EST. PATIENT-LVL II: ICD-10-PCS | Mod: PBBFAC,HCNC,, | Performed by: OPHTHALMOLOGY

## 2019-06-26 PROCEDURE — 92014 COMPRE OPH EXAM EST PT 1/>: CPT | Mod: HCNC,S$GLB,, | Performed by: OPHTHALMOLOGY

## 2019-06-26 NOTE — PROGRESS NOTES
Subjective:       Patient ID: Adan Cain is a 69 y.o. male.    Chief Complaint: Eye Problem    HPI     DLS: 3/27/19    Pt c/o foggy vision in his OS that comes and goes and while looking up   towards light he sees an orange/yellow ring around the light. Pt denies   any eye pain. Pt states he is not using any eye drops.     Last edited by Anne Marie Elizalde on 6/26/2019  9:59 AM. (History)             Assessment:       1. Transient visual loss of left eye    2. Dry eye syndrome of both eyes    3. Pseudophakia        Plan:       Transient blurry vision OS-No ocular pathology to explain. Pt told to call triage clinic if he has another episode or report to ED in on the weekend.  KENDALL-Needs more AT's.        AT's.  RTC 1 yr.

## 2019-06-28 DIAGNOSIS — I71.60 THORACOABDOMINAL AORTIC ANEURYSM (TAAA) WITHOUT RUPTURE: Primary | ICD-10-CM

## 2019-07-02 ENCOUNTER — OFFICE VISIT (OUTPATIENT)
Dept: PRIMARY CARE CLINIC | Facility: CLINIC | Age: 69
End: 2019-07-02
Payer: MEDICARE

## 2019-07-02 VITALS
WEIGHT: 295.44 LBS | TEMPERATURE: 99 F | HEART RATE: 64 BPM | HEIGHT: 73 IN | SYSTOLIC BLOOD PRESSURE: 128 MMHG | BODY MASS INDEX: 39.16 KG/M2 | DIASTOLIC BLOOD PRESSURE: 74 MMHG

## 2019-07-02 DIAGNOSIS — R29.818 NEUROLOGICAL DEFICIT PRESENT: ICD-10-CM

## 2019-07-02 DIAGNOSIS — Z23 NEED FOR PNEUMOCOCCAL VACCINATION: ICD-10-CM

## 2019-07-02 DIAGNOSIS — I71.20 THORACIC AORTIC ANEURYSM WITHOUT RUPTURE: ICD-10-CM

## 2019-07-02 DIAGNOSIS — H53.9 VISION CHANGES: Primary | ICD-10-CM

## 2019-07-02 DIAGNOSIS — I67.82 CEREBRAL ISCHEMIA: ICD-10-CM

## 2019-07-02 PROBLEM — H53.122 TRANSIENT VISUAL LOSS OF LEFT EYE: Status: RESOLVED | Noted: 2019-06-26 | Resolved: 2019-07-02

## 2019-07-02 PROBLEM — Z91.89 FRAMINGHAM CARDIAC RISK 10-20% IN NEXT 10 YEARS: Status: RESOLVED | Noted: 2017-10-31 | Resolved: 2019-07-02

## 2019-07-02 PROCEDURE — 1101F PR PT FALLS ASSESS DOC 0-1 FALLS W/OUT INJ PAST YR: ICD-10-PCS | Mod: HCNC,CPTII,S$GLB, | Performed by: FAMILY MEDICINE

## 2019-07-02 PROCEDURE — G0009 PNEUMOCOCCAL POLYSACCHARIDE VACCINE 23-VALENT =>2YO SQ IM: ICD-10-PCS | Mod: HCNC,S$GLB,, | Performed by: FAMILY MEDICINE

## 2019-07-02 PROCEDURE — G0009 ADMIN PNEUMOCOCCAL VACCINE: HCPCS | Mod: HCNC,S$GLB,, | Performed by: FAMILY MEDICINE

## 2019-07-02 PROCEDURE — 99214 OFFICE O/P EST MOD 30 MIN: CPT | Mod: HCNC,25,S$GLB, | Performed by: FAMILY MEDICINE

## 2019-07-02 PROCEDURE — 90732 PPSV23 VACC 2 YRS+ SUBQ/IM: CPT | Mod: HCNC,S$GLB,, | Performed by: FAMILY MEDICINE

## 2019-07-02 PROCEDURE — 1101F PT FALLS ASSESS-DOCD LE1/YR: CPT | Mod: HCNC,CPTII,S$GLB, | Performed by: FAMILY MEDICINE

## 2019-07-02 PROCEDURE — 99999 PR PBB SHADOW E&M-EST. PATIENT-LVL IV: ICD-10-PCS | Mod: PBBFAC,HCNC,, | Performed by: FAMILY MEDICINE

## 2019-07-02 PROCEDURE — 99999 PR PBB SHADOW E&M-EST. PATIENT-LVL IV: CPT | Mod: PBBFAC,HCNC,, | Performed by: FAMILY MEDICINE

## 2019-07-02 PROCEDURE — 90732 PNEUMOCOCCAL POLYSACCHARIDE VACCINE 23-VALENT =>2YO SQ IM: ICD-10-PCS | Mod: HCNC,S$GLB,, | Performed by: FAMILY MEDICINE

## 2019-07-02 PROCEDURE — 99214 PR OFFICE/OUTPT VISIT, EST, LEVL IV, 30-39 MIN: ICD-10-PCS | Mod: HCNC,25,S$GLB, | Performed by: FAMILY MEDICINE

## 2019-07-02 NOTE — PROGRESS NOTES
Two patient identifiers used, allergies reviewed, vaccine confirmed.  Pneumovax/23 pneumococcal polysaccharide vaccine administered IM right deltoid.  Pt tolerated well, no redness or bruising at injection site.  Pt advised to remain in clinic 15 mins following injection for observation.

## 2019-07-02 NOTE — PROGRESS NOTES
"Subjective:      Patient ID: Adan Cain is a 69 y.o. male.    Chief Complaint: Blurred Vision (left eye, occasional)    He presents today with blurred vision & "orange vision".  It occurs mainly in the left eye and 1st occur January 16th S he was on the way to get his CT scan of the chest.  He thought his glasses fogged up, but even when removing his glasses, the left eye was fuzzy.  In February he had laser surgery on the eye.  He states today his vision is normal, but about 2 days ago he felt he was looking through a fog, when he looks at a white light,  he sees yellow and orange halo on the left side.  Usually  lasts less than 1 hr, then resolves.  It occurred once while they were at a slot machine at a casino in [place].  With episodes , he denies any dizziness, no pain, no chest pain or shortness of breath, no weakness of arms or legs.  Looking in his chart, he was prescribed a glaucoma eyedrops in January that he took for a few weeks then stopped. No other new meds or change in dosages.     He is asking Zyrtec or Flonase could cause blurry vision.  He stop Zyrtec 2 weeks and no change.    He is requesting referral to neurologist      He saw ophthalmologist Dr. Cornelius on June 26th. His note states No ocular pathology to explain. He does have glaucoma.  In Feb, he had ophthalmologic procedure.     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 He saw cardiologist Dr Ewing 2/2019 for thoracoabdominal AA  - he started B blocker & has upcoming repeat CT chest scheduled.     MRI brain 5/28/2018  Small area of probable subacute infarction involving the anterior left frontal lobe as detailed above.    CTA head & neck 5/26/2018  1. No acute intracranial abnormalities identified.  2. CTA head and neck without significant focal stenosis or occlusion.  3. Fusiform aneurysmal dilatation of the ascending thoracic aorta measuring 5 cm.  Recommend nonemergent cardiology referral and future CT follow-up " as clinically indicated.        Current Outpatient Medications:     BABY ASPIRIN ORAL, Take 81 tablets by mouth every evening. , Disp: , Rfl:     benzonatate (TESSALON PERLES) 100 MG capsule, Take 1 capsule (100 mg total) by mouth 3 (three) times daily as needed for Cough., Disp: 30 capsule, Rfl: 0    cholecalciferol, vitamin D3, 1,000 unit capsule, Take 1,000 Units by mouth 2 (two) times daily. , Disp: , Rfl:     coenzyme Q10 (CO Q-10) 100 mg capsule, Take by mouth once daily., Disp: , Rfl:     fluticasone (FLONASE) 50 mcg/actuation nasal spray, PLACE 1 SPRAY INTO EACH NOSTRIL DAILY (Patient taking differently: Pt states he use as needed), Disp: 16 g, Rfl: 12    glucosamine-chondroitin 500-400 mg tablet, Take 1 tablet by mouth 2 (two) times daily. , Disp: , Rfl:     latanoprost 0.005 % ophthalmic solution, Place 1 drop into the left eye once daily., Disp: 1 Bottle, Rfl: 5    metoprolol succinate (TOPROL-XL) 25 MG 24 hr tablet, Take 1 tablet (25 mg total) by mouth once daily., Disp: 30 tablet, Rfl: 11    MULTIVITAMIN W-MINERALS/LUTEIN (CENTRUM SILVER ORAL), Take by mouth once daily. , Disp: , Rfl:     omega-3 fatty acids (FISH OIL) 300 mg Cap, Take 1 tablet by mouth once daily. , Disp: , Rfl:     promethazine-dextromethorphan (PROMETHAZINE-DM) 6.25-15 mg/5 mL Syrp, Take 5 mLs by mouth nightly as needed., Disp: 118 mL, Rfl: 0    UNABLE TO FIND, Take by mouth nightly. tumeric, Disp: , Rfl:     UNABLE TO FIND, once daily. actaline, Disp: , Rfl:     UNABLE TO FIND, Use as directed 1 tablet in the mouth or throat 2 (two) times daily. Prostate Revive manufactured by [employer], Disp: , Rfl:     VITAMIN B COMPLEX (SUPER B COMPLEX-B-12 ORAL), Take by mouth., Disp: , Rfl:     cetirizine (ZYRTEC) 10 MG tablet, Take 1 tablet (10 mg total) by mouth once daily. (Patient taking differently: Take 10 mg by mouth as needed. ), Disp: , Rfl: 0    Lab Results   Component Value Date    HGBA1C 5.3 05/27/2018     No results  found for: MICALBCREAT  Lab Results   Component Value Date    LDLCALC 119.6 05/26/2018    LDLCALC 115.4 10/24/2017    CHOL 168 05/26/2018    HDL 36 (L) 05/26/2018    TRIG 62 05/26/2018       Lab Results   Component Value Date     01/14/2019    K 4.1 01/14/2019     01/14/2019    CO2 24 01/14/2019     01/14/2019    BUN 16 01/14/2019    CREATININE 0.8 01/14/2019    CALCIUM 10.1 01/14/2019    PROT 8.0 01/14/2019    ALBUMIN 3.6 01/14/2019    BILITOT 0.6 01/14/2019    ALKPHOS 110 01/14/2019    AST 19 01/14/2019    ALT 23 01/14/2019    ANIONGAP 8 01/14/2019    ESTGFRAFRICA >60.0 01/14/2019    EGFRNONAA >60.0 01/14/2019    WBC 7.75 01/14/2019    HGB 14.1 01/14/2019    HGB 13.0 (L) 05/28/2018    HCT 41.7 01/14/2019    MCV 91 01/14/2019     01/14/2019    TSH 0.859 06/06/2018    PSA 2.7 01/20/2015    PSA 1.5 05/30/2008    PSADIAG 2.5 07/16/2018    PSADIAG 2.8 06/12/2017    HEPCAB Negative 10/24/2017       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, didn't use ear protection    DJD (degenerative joint disease) of hip 1/29/2015    [place] cardiac risk 10-20% in next 10 years 10/31/2017    recommended statin, but he declines    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    MCA , see MRI    Morbid obesity with BMI of 40.0-44.9, adult 1/29/2015    Right-sided low back pain with right-sided sciatica 3/6/2019    Seasonal allergic rhinitis due to pollen 4/13/2016    Thoracic aortic aneurysm without rupture 5/27/2018    tx with Su, Cardiologist Dr Stone, follows yearly     Past Surgical History:   Procedure Laterality Date    CATARACT EXTRACTION    "   HERNIA REPAIR      HYDROCELECTOMY Right 11/30/2016    [facility]ND F[facility]    REPAIR-HERNIA-INGUINAL Right 11/30/2016    Performed by Marco Antonio Hoover [facility] at [facility] OR 2ND FLR    SPERMATOCELECTOMY Right 11/30/2016    [facility] at [facility] OR 2ND FLR    YAG LAser Capsulotomy Bilateral     2/18/2019 OS Dr. Cornelius     Social History     Social History Narrative     to 'T', 2 children, nonsmoker, ETOH none, never had colonscopy & declines     Family History   Problem Relation Age of Onset    Leukemia Father     Aneurysm Father     Diabetes Mother     Cataracts Paternal Uncle     Amblyopia Neg Hx     Blindness Neg Hx     Glaucoma Neg Hx     Macular degeneration Neg Hx     Retinal detachment Neg Hx     Strabismus Neg Hx      Vitals:    07/02/19 0906   BP: 128/74   Pulse: 64   Temp: 98.5 °F (36.9 °C)   Weight: 134 kg (295 lb 6.7 oz)   Height: 6' 1" (1.854 m)   PainSc: 0-No pain     Objective:   Physical Exam   Constitutional: He is oriented to person, place, and time. He appears well-developed and well-nourished.   HENT:   Head: Normocephalic and atraumatic.   Right Ear: External ear normal.   Left Ear: External ear normal.   Nose: Nose normal.   Mouth/Throat: Oropharynx is clear and moist.   Eyes: Pupils are equal, round, and reactive to light. EOM are normal.   Neck: Neck supple. No thyromegaly present.   Cardiovascular: Normal rate, regular rhythm, normal heart sounds and intact distal pulses.   No murmur heard.  Pulmonary/Chest: Effort normal and breath sounds normal. He has no wheezes.   Abdominal: Soft. Bowel sounds are normal. He exhibits no distension and no mass. There is no tenderness. There is no rebound and no guarding.   Musculoskeletal: He exhibits no edema.   Lymphadenopathy:     He has no cervical adenopathy.   Neurological: He is alert and oriented to person, place, and time.   No halo around L vision today    Cranial nerves III " through XII grossly intact, neck is supple, Nontender Cervical spine,  Can touch chin to  chest and to both shoulders, normal speech    5/5 bilateral hand , normal gait     Skin: Skin is warm and dry.   Psychiatric: He has a normal mood and affect. His behavior is normal.     Assessment:     1. Vision changes    2. Cerebral ischemia    3. Neurological deficit present    4. Need for pneumococcal vaccination    5. Thoracic aortic aneurysm without rupture      Plan:     Orders Placed This Encounter    MRI Brain Without Contrast    (In Office Administered) Pneumococcal Polysaccharide Vaccine (23 Valent) (SQ/IM)    Ambulatory referral to Neurology     He has upcoming CT chest     Continue other medications    I will e-mail with results    To the emergency room his symptoms acutely worsen or change    There are no Patient Instructions on file for this visit.

## 2019-07-03 ENCOUNTER — TELEPHONE (OUTPATIENT)
Dept: PRIMARY CARE CLINIC | Facility: CLINIC | Age: 69
End: 2019-07-03

## 2019-07-03 RX ORDER — DIAZEPAM 5 MG/1
5 TABLET ORAL ONCE
Qty: 1 TABLET | Refills: 0 | Status: SHIPPED | OUTPATIENT
Start: 2019-07-03 | End: 2020-12-09

## 2019-07-03 NOTE — TELEPHONE ENCOUNTER
"I sent valium 5mg to take 30 min prior to MRI    ----- Message from Brittney Oneil sent at 7/2/2019  2:33 PM CDT -----  Thanks for responding Dr. Reyes, the patient would prefer IV sedation, which is only administer on [facility], which is where he's scheduled. Is this something you have to put an order in for?  ----- Message -----  From: Gina Reyes MD  Sent: 7/2/2019  12:20 PM  To: Brittney Oneil    Hi. This is Dr Reyes. Can you administer sedation? What's usually ordered? He's had "an IV" with previous MRI. Or should I just prescribe valium oral?    ----- Message -----  From: Jaz Michaud LPN  Sent: 7/2/2019  11:12 AM  To: Gina Reyes MD        ----- Message -----  From: Brittney Oneil  Sent: 7/2/2019  10:16 AM  To: Eric BASS Staff    Patient is requesting sedation for MRI scheduled on 7/16 at 8:30.         "

## 2019-07-16 ENCOUNTER — TELEPHONE (OUTPATIENT)
Dept: PRIMARY CARE CLINIC | Facility: CLINIC | Age: 69
End: 2019-07-16

## 2019-07-16 ENCOUNTER — HOSPITAL ENCOUNTER (OUTPATIENT)
Dept: RADIOLOGY | Facility: HOSPITAL | Age: 69
Discharge: HOME | End: 2019-07-16
Attending: FAMILY MEDICINE
Payer: MEDICARE

## 2019-07-16 DIAGNOSIS — I67.82 CEREBRAL ISCHEMIA: ICD-10-CM

## 2019-07-16 DIAGNOSIS — R29.818 NEUROLOGICAL DEFICIT PRESENT: ICD-10-CM

## 2019-07-16 NOTE — TELEPHONE ENCOUNTER
----- Message from Frances Ko sent at 7/16/2019 10:59 AM CDT -----  Contact: pt wife [phone]  Pt called to get something for anxiety for MRI test.  Pt had to reschedule test on today due to anxiety.  Please advise

## 2019-07-16 NOTE — TELEPHONE ENCOUNTER
Pt's wife reports that Diazepam did nothing to help with anxiety.  Today's MRI had to be rescheduled to 7/23/19.  Previously when pt had MR (2004)  an injection was ordered for sedation.    [facility] does outpatient MRI with sedation, but they sustained equipment damage.  Only able to do inpatient MRIs for the next few months.    Pt advised to check with her insurance to see if MRI can be done at [facility].           Pt said to tell Jaz yes, [facility] will accept their insurance for the mri.

## 2019-07-16 NOTE — TELEPHONE ENCOUNTER
"Pt advised. New MRI order entered to [facility] "ok to use sedation protocol".  New order will need authorization from his ins.  "

## 2019-07-17 ENCOUNTER — HOSPITAL ENCOUNTER (OUTPATIENT)
Dept: RADIOLOGY | Facility: HOSPITAL | Age: 69
Discharge: HOME | End: 2019-07-17
Attending: THORACIC SURGERY (CARDIOTHORACIC VASCULAR SURGERY)
Payer: MEDICARE

## 2019-07-17 ENCOUNTER — OFFICE VISIT (OUTPATIENT)
Dept: CARDIOTHORACIC SURGERY | Facility: CLINIC | Age: 69
End: 2019-07-17
Payer: MEDICARE

## 2019-07-17 VITALS
HEIGHT: 73 IN | DIASTOLIC BLOOD PRESSURE: 81 MMHG | TEMPERATURE: 99 F | WEIGHT: 293.38 LBS | HEART RATE: 73 BPM | BODY MASS INDEX: 38.88 KG/M2 | OXYGEN SATURATION: 95 % | SYSTOLIC BLOOD PRESSURE: 166 MMHG

## 2019-07-17 DIAGNOSIS — I71.21 ASCENDING AORTIC ANEURYSM: ICD-10-CM

## 2019-07-17 DIAGNOSIS — I71.60 THORACOABDOMINAL AORTIC ANEURYSM (TAAA) WITHOUT RUPTURE: ICD-10-CM

## 2019-07-17 PROBLEM — I71.20 THORACIC AORTIC ANEURYSM WITHOUT RUPTURE: Status: RESOLVED | Noted: 2018-05-27 | Resolved: 2019-07-17

## 2019-07-17 PROCEDURE — 71250 CT CHEST WITHOUT CONTRAST: ICD-10-PCS | Mod: 26,HCNC,, | Performed by: RADIOLOGY

## 2019-07-17 PROCEDURE — 1101F PT FALLS ASSESS-DOCD LE1/YR: CPT | Mod: HCNC,CPTII,S$GLB, | Performed by: THORACIC SURGERY (CARDIOTHORACIC VASCULAR SURGERY)

## 2019-07-17 PROCEDURE — 99215 PR OFFICE/OUTPT VISIT, EST, LEVL V, 40-54 MIN: ICD-10-PCS | Mod: HCNC,S$GLB,, | Performed by: THORACIC SURGERY (CARDIOTHORACIC VASCULAR SURGERY)

## 2019-07-17 PROCEDURE — 71250 CT THORAX DX C-: CPT | Mod: TC,HCNC

## 2019-07-17 PROCEDURE — 99215 OFFICE O/P EST HI 40 MIN: CPT | Mod: HCNC,S$GLB,, | Performed by: THORACIC SURGERY (CARDIOTHORACIC VASCULAR SURGERY)

## 2019-07-17 PROCEDURE — 99999 PR PBB SHADOW E&M-EST. PATIENT-LVL IV: ICD-10-PCS | Mod: PBBFAC,HCNC,, | Performed by: THORACIC SURGERY (CARDIOTHORACIC VASCULAR SURGERY)

## 2019-07-17 PROCEDURE — 71250 CT THORAX DX C-: CPT | Mod: 26,HCNC,, | Performed by: RADIOLOGY

## 2019-07-17 PROCEDURE — 99999 PR PBB SHADOW E&M-EST. PATIENT-LVL IV: CPT | Mod: PBBFAC,HCNC,, | Performed by: THORACIC SURGERY (CARDIOTHORACIC VASCULAR SURGERY)

## 2019-07-17 PROCEDURE — 1101F PR PT FALLS ASSESS DOC 0-1 FALLS W/OUT INJ PAST YR: ICD-10-PCS | Mod: HCNC,CPTII,S$GLB, | Performed by: THORACIC SURGERY (CARDIOTHORACIC VASCULAR SURGERY)

## 2019-07-17 NOTE — TELEPHONE ENCOUNTER
----- Message from Katlyn Gould sent at 7/17/2019  1:37 PM CDT -----  Contact: self/[phone]  Patient called in regards needing to talk with Dr Reyes nurse about If she find out whether or not if MRI will be cover by her insurance. Please call and advise. Thank you

## 2019-07-17 NOTE — PROGRESS NOTES
Subjective:      Patient ID: Adan Cain is a 69 y.o. male.    Chief Complaint: No chief complaint on file.       HPI:  Adan Cain is a 69 y.o. male who presents TAA, dejenerative joint disease, seasonal allergic rhinitis, obesity, and BPH. Pt has no complaints and good blood pressure control at home.     Current medications Reviewed    Review of Systems   Constitutional: Negative for fatigue and unexpected weight change.   HENT: Negative for nosebleeds.    Respiratory: Negative for cough, chest tightness and shortness of breath.    Cardiovascular: Negative for chest pain, palpitations and leg swelling.   Gastrointestinal: Negative for abdominal pain, nausea and vomiting.   Musculoskeletal: Negative for neck pain.   Neurological: Negative for dizziness, seizures and light-headedness.   Psychiatric/Behavioral: Negative for agitation and confusion.     Objective:   Physical Exam   Constitutional: He is oriented to person, place, and time. He appears well-developed and well-nourished.   Neck: No JVD present. No tracheal deviation present.   Cardiovascular: Normal rate, regular rhythm, normal heart sounds and intact distal pulses.   Pulmonary/Chest: Effort normal and breath sounds normal. He exhibits no tenderness.   Abdominal: Soft. Bowel sounds are normal. He exhibits no distension. There is no tenderness.   Musculoskeletal: He exhibits no edema or tenderness.   Neurological: He is alert and oriented to person, place, and time.   Skin: Skin is warm and dry.   Psychiatric: He has a normal mood and affect. His behavior is normal. Judgment and thought content normal.       Diagnotic Results:  CT reviewed - TAA 5.1  Assessment:   1. TAA  Plan:   Follow up in one year with repeat CT of chest

## 2019-07-22 ENCOUNTER — TELEPHONE (OUTPATIENT)
Dept: PRIMARY CARE CLINIC | Facility: CLINIC | Age: 69
End: 2019-07-22

## 2019-07-22 NOTE — TELEPHONE ENCOUNTER
LVM advising that the order was approved by insurance, faxed to [facility], and fax confirmation was received. Advised a call back with any questions.

## 2019-07-22 NOTE — TELEPHONE ENCOUNTER
----- Message from Frances Ko sent at 7/22/2019 10:14 AM CDT -----  Contact: pt [phone]  Pt called in requesting to MRI done @ [facility].  Please advise     Fax:[phone]

## 2019-07-23 ENCOUNTER — TELEPHONE (OUTPATIENT)
Dept: PRIMARY CARE CLINIC | Facility: CLINIC | Age: 69
End: 2019-07-23

## 2019-07-23 NOTE — TELEPHONE ENCOUNTER
Detailed VM left for the pt advising that the Authorization was sent to [facility], and that the fax was received. [facility] advised that even though the authorization was sent [facility] is advising that [employer] is out of network & not accepted at [facility]. Advised that the pt can contact [facility] directly or our office regarding having the MRI at another location.

## 2019-07-23 NOTE — TELEPHONE ENCOUNTER
----- Message from Mirna Lugo sent at 7/23/2019  2:55 PM CDT -----  Contact: Self  [phone]  Waiting on your office to send the  AUTH referral for his MRI to [facility].

## 2019-07-24 NOTE — TELEPHONE ENCOUNTER
----- Message from Sujata Hicks sent at 7/24/2019 11:43 AM CDT -----  Contact: Pt self Mobile [phone] or Home [phone]  Patient would like a call back in regards to him wanting to speak with you about him not being able to schedule his MRI appointment. He said that [facility] does not accept his [employer] insurance and he would like to speak with you about this please.

## 2019-07-24 NOTE — TELEPHONE ENCOUNTER
Spoke with pt.  I will check with Dr. Reyes if a open stand up MRI will be sufficient.  If so, pt will check with [facility]OTH [facility] [place] [facility] [place] to see if covered.

## 2019-07-25 NOTE — TELEPHONE ENCOUNTER
I dont' know if it will be sufficient. First see Neurology (referral put in 7/2) & they can help order the best MRI or imaging needed

## 2019-07-29 NOTE — TELEPHONE ENCOUNTER
Please have him check & see if an outsider provider , Neurologist , is covered on his insurance. He can try these     NEUROLOGISTS  Dr Salvatore Roldan [address], [place] [address], [phone]  Dr Karishma Moore, [phone], [phone]

## 2019-07-29 NOTE — TELEPHONE ENCOUNTER
Pt advised that I left phone msg on 7/25/19.  Dr. Reyes's message reviewed with pt. Pt waiting for return call to schedule appt with neurology.

## 2019-07-30 NOTE — TELEPHONE ENCOUNTER
Phone msg left for pt regarding external neurologists.  Pt should check with his insurance to see if they are in network providers.

## 2019-07-31 NOTE — TELEPHONE ENCOUNTER
Called pt to confirm that he had rec'd my msg regarding non [facility] neurologists that may be able to offer an earlier appt (currently pt's appt with [facility] is 9/11/19).  Unable to speak with pt.  I did leave phone msg and requested a call back to confirm that he has rec'd my msgs.

## 2019-08-02 NOTE — TELEPHONE ENCOUNTER
3rd phone msg left for pt regarding recommendations for neurologists outside of [facility] that may be able to offer and earlier appt.  Call back again requested.

## 2019-08-15 ENCOUNTER — TELEPHONE (OUTPATIENT)
Dept: PRIMARY CARE CLINIC | Facility: CLINIC | Age: 69
End: 2019-08-15

## 2019-08-15 NOTE — TELEPHONE ENCOUNTER
I spoke with Mr Cain today. He desires to wait & see Neurologist on 9/11. To ER if symptoms recur.

## 2019-08-27 ENCOUNTER — PES CALL (OUTPATIENT)
Dept: ADMINISTRATIVE | Facility: CLINIC | Age: 69
End: 2019-08-27

## 2019-09-09 ENCOUNTER — PATIENT OUTREACH (OUTPATIENT)
Dept: ADMINISTRATIVE | Facility: OTHER | Age: 69
End: 2019-09-09

## 2019-09-11 ENCOUNTER — LAB VISIT (OUTPATIENT)
Dept: LAB | Facility: HOSPITAL | Age: 69
End: 2019-09-11
Attending: PSYCHIATRY & NEUROLOGY
Payer: MEDICARE

## 2019-09-11 ENCOUNTER — OFFICE VISIT (OUTPATIENT)
Dept: NEUROLOGY | Facility: CLINIC | Age: 69
End: 2019-09-11
Payer: MEDICARE

## 2019-09-11 VITALS
WEIGHT: 296.94 LBS | BODY MASS INDEX: 39.35 KG/M2 | DIASTOLIC BLOOD PRESSURE: 79 MMHG | HEIGHT: 73 IN | SYSTOLIC BLOOD PRESSURE: 155 MMHG | HEART RATE: 71 BPM

## 2019-09-11 DIAGNOSIS — H53.9 VISUAL DISTURBANCE: Primary | ICD-10-CM

## 2019-09-11 DIAGNOSIS — H53.9 VISUAL DISTURBANCE: ICD-10-CM

## 2019-09-11 DIAGNOSIS — R20.2 RIGHT HAND PARESTHESIA: ICD-10-CM

## 2019-09-11 LAB
ALBUMIN SERPL BCP-MCNC: 3.8 G/DL (ref 3.5–5.2)
ALP SERPL-CCNC: 108 U/L (ref 55–135)
ALT SERPL W/O P-5'-P-CCNC: 18 U/L (ref 10–44)
ANION GAP SERPL CALC-SCNC: 6 MMOL/L (ref 8–16)
AST SERPL-CCNC: 23 U/L (ref 10–40)
BILIRUB SERPL-MCNC: 0.6 MG/DL (ref 0.1–1)
BUN SERPL-MCNC: 19 MG/DL (ref 8–23)
CALCIUM SERPL-MCNC: 9.9 MG/DL (ref 8.7–10.5)
CHLORIDE SERPL-SCNC: 103 MMOL/L (ref 95–110)
CO2 SERPL-SCNC: 29 MMOL/L (ref 23–29)
CREAT SERPL-MCNC: 0.8 MG/DL (ref 0.5–1.4)
EST. GFR  (AFRICAN AMERICAN): >60 ML/MIN/1.73 M^2
EST. GFR  (NON AFRICAN AMERICAN): >60 ML/MIN/1.73 M^2
GLUCOSE SERPL-MCNC: 86 MG/DL (ref 70–110)
POTASSIUM SERPL-SCNC: 4.9 MMOL/L (ref 3.5–5.1)
PROT SERPL-MCNC: 8 G/DL (ref 6–8.4)
SODIUM SERPL-SCNC: 138 MMOL/L (ref 136–145)

## 2019-09-11 PROCEDURE — 99499 RISK ADDL DX/OHS AUDIT: ICD-10-PCS | Mod: HCNC,S$GLB,, | Performed by: PSYCHIATRY & NEUROLOGY

## 2019-09-11 PROCEDURE — 99203 OFFICE O/P NEW LOW 30 MIN: CPT | Mod: HCNC,S$GLB,, | Performed by: PSYCHIATRY & NEUROLOGY

## 2019-09-11 PROCEDURE — 36415 COLL VENOUS BLD VENIPUNCTURE: CPT | Mod: HCNC

## 2019-09-11 PROCEDURE — 1101F PR PT FALLS ASSESS DOC 0-1 FALLS W/OUT INJ PAST YR: ICD-10-PCS | Mod: HCNC,CPTII,S$GLB, | Performed by: PSYCHIATRY & NEUROLOGY

## 2019-09-11 PROCEDURE — 1101F PT FALLS ASSESS-DOCD LE1/YR: CPT | Mod: HCNC,CPTII,S$GLB, | Performed by: PSYCHIATRY & NEUROLOGY

## 2019-09-11 PROCEDURE — 99999 PR PBB SHADOW E&M-EST. PATIENT-LVL IV: CPT | Mod: PBBFAC,HCNC,, | Performed by: PSYCHIATRY & NEUROLOGY

## 2019-09-11 PROCEDURE — 99999 PR PBB SHADOW E&M-EST. PATIENT-LVL IV: ICD-10-PCS | Mod: PBBFAC,HCNC,, | Performed by: PSYCHIATRY & NEUROLOGY

## 2019-09-11 PROCEDURE — 80053 COMPREHEN METABOLIC PANEL: CPT | Mod: HCNC

## 2019-09-11 PROCEDURE — 99499 UNLISTED E&M SERVICE: CPT | Mod: HCNC,S$GLB,, | Performed by: PSYCHIATRY & NEUROLOGY

## 2019-09-11 PROCEDURE — 99203 PR OFFICE/OUTPT VISIT, NEW, LEVL III, 30-44 MIN: ICD-10-PCS | Mod: HCNC,S$GLB,, | Performed by: PSYCHIATRY & NEUROLOGY

## 2019-09-11 RX ORDER — LORAZEPAM 1 MG/1
TABLET ORAL
Qty: 2 TABLET | Refills: 0 | Status: SHIPPED | OUTPATIENT
Start: 2019-09-11 | End: 2020-12-09

## 2019-09-11 NOTE — LETTER
September 11, 2019      Gina Reyes MD  [address] [place] [facility] [address]           [facility] Neurology  [address], [address]  [facility] [address]  Phone: [phone]  Fax: [phone]          Patient: Adan Cain   MR Number: [id number]   YOB: 1950   Date of Visit: 9/11/2019       Dear Dr. Gina Reyes:    Thank you for referring Adan Cain to me for evaluation. Attached you will find relevant portions of my assessment and plan of care.    If you have questions, please do not hesitate to call me. I look forward to following Adan Cain along with you.    Sincerely,    Tahir Layton MD    Enclosure  CC:  No Recipients    If you would like to receive this communication electronically, please contact [email or web address] or [phone] to request more information on [employer] Link access.    For providers and/or their staff who would like to refer a patient to [facility], please contact us through our one-stop-shop provider referral line, [facility], at [phone].    If you feel you have received this communication in error or would no longer like to receive these types of communications, please e-mail [email or web address]

## 2019-09-11 NOTE — PROGRESS NOTES
Neurology Clinic Visit  Primary Care Provider: Gina Reyes MD   Referring Provider: Gina Reyes MD   Date of Visit: 09/11/2019       chief complaint:   Chief Complaint   Patient presents with    Consult     Cerebral ischemia       History of Present Illness  Adan Cain is a 69 y.o.  male I have been asked to consult for evaluation and treatment of visual symptoms in left eye.  Patient is somewhat a poor historian and was difficult to obtain history from the patient. He reports that early this year he started to have blurry vision.  He also reports that he started to see halos in his left eye.  The blurry vision was only in left eye.  He was seen by Ophthalmology in the patient was found to have a posterior capsular opacifications and underwent laser surgery.  Patient reports the laser surgery improved the blurriness of his vision but he still report a few times a month he will still see the halos.  He he was also diagnosed with glaucoma and was started on drops.  The patient denies any headaches with these symptoms.  He denies any vision loss other than seeing the halos.  He denies any weakness associated with these episodes.  He reports episodes can last about 30 min.  MRI of the brain was ordered PCP but the patient did not get them done due to claustrophobia.  He also reports that having a pinched nerve in his neck that cause paresthesia in his hand a few years ago but this resolved but today he reports he now feels his paresthesias in his right hand.  He currently denies any neck pain. He has questionable history of TIA versus medication reaction in the past and was evaluated by vascular Neurology through tele consult.      Patient Active Problem List    Diagnosis Date Noted    Ascending aortic aneurysm 07/17/2019    Dry eye syndrome of both eyes 06/26/2019    Right-sided low back pain with right-sided sciatica 03/06/2019    Blurry vision, left eye 02/18/2019    Conductive hearing loss of  right ear with restricted hearing of left ear 01/22/2019    Belching 01/22/2019    Posterior capsular opacification, left eye 01/18/2019    Open angle with borderline findings and high glaucoma risk in left eye 01/18/2019    Transient cerebral ischemia 06/06/2018    Cerebral infarction due to embolism of left middle cerebral artery 05/26/2018    History of hydrocelectomy 12/12/2016    Right inguinal hernia 11/30/2016    Seasonal allergic rhinitis due to pollen 04/13/2016    Calculus of gallbladder 04/13/2016    Morbid obesity with BMI of 40.0-44.9, adult 01/29/2015    DJD (degenerative joint disease) of hip 01/29/2015    Post-operative state 01/07/2015    Pseudophakia 12/10/2014    Nocturia 08/18/2014    Benign prostatic hyperplasia without lower urinary tract symptoms 08/18/2014    Hydrocele, right 08/18/2014     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, didn't use ear protection    DJD (degenerative joint disease) of hip 1/29/2015    [place] cardiac risk 10-20% in next 10 years 10/31/2017    recommended statin, but he declines    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    MCA , see MRI    Morbid obesity with BMI of 40.0-44.9, adult 1/29/2015    Right-sided low back pain with right-sided sciatica 3/6/2019    Seasonal allergic rhinitis due to pollen 4/13/2016    Thoracic aortic aneurysm without rupture 5/27/2018    tx with Su, Cardiologist Dr Stone, follows yearly     Past Surgical History:   Procedure Laterality Date    CATARACT EXTRACTION      HERNIA REPAIR      HYDROCELECTOMY Right 11/30/2016    Performed by Jam[facility]  at [facility] OR 2ND FLR    REPAIR-HERNIA-INGUINAL Right 11/30/2016    Performed by Marco Antonio Hoover MD at [facility] OR 2ND FLR    SPERMATOCELECTOMY Right 11/30/2016    Performed by Jamal [facility] MD at [facility] OR 2ND FLR    YAG LAser Capsulotomy Bilateral     2/18/2019 OS Dr. Cornelius     Family History   Problem Relation Age of Onset    Leukemia Father     Aneurysm Father     Diabetes Mother     Cataracts Paternal Uncle     Amblyopia Neg Hx     Blindness Neg Hx     Glaucoma Neg Hx     Macular degeneration Neg Hx     Retinal detachment Neg Hx     Strabismus Neg Hx          Current Outpatient Medications   Medication Sig    BABY ASPIRIN ORAL Take 81 tablets by mouth every evening.     cholecalciferol, vitamin D3, 1,000 unit capsule Take 1,000 Units by mouth 2 (two) times daily.     coenzyme Q10 (CO Q-10) 100 mg capsule Take by mouth once daily.    fluticasone (FLONASE) 50 mcg/actuation nasal spray PLACE 1 SPRAY INTO EACH NOSTRIL DAILY (Patient taking differently: Pt states he use as needed)    glucosamine-chondroitin 500-400 mg tablet Take 1 tablet by mouth 2 (two) times daily.     metoprolol succinate (TOPROL-XL) 25 MG 24 hr tablet Take 1 tablet (25 mg total) by mouth once daily.    MULTIVITAMIN W-MINERALS/LUTEIN (CENTRUM SILVER ORAL) Take by mouth once daily.     omega-3 fatty acids (FISH OIL) 300 mg Cap Take 1 tablet by mouth once daily.     UNABLE TO FIND Take by mouth nightly. tumeric    UNABLE TO FIND once daily. actaline    UNABLE TO FIND Use as directed 1 tablet in the mouth or throat 2 (two) times daily. Prostate Revive manufactured by [employer]    VITAMIN B COMPLEX (SUPER B COMPLEX-B-12 ORAL) Take by mouth.    cetirizine (ZYRTEC) 10 MG tablet Take 1 tablet (10 mg total) by mouth once daily. (Patient taking differently: Take 10 mg by mouth as needed. )    diazePAM (VALIUM) 5 MG tablet Take 1 tablet (5 mg total) by mouth once. 30 min prior to MRI for 1 dose    LORazepam (ATIVAN) 1 MG tablet  Take 1 tablet by mouth 1 hour prior to MRI; May repeat once if needed.     No current facility-administered medications for this visit.          Review of patient's allergies indicates:   Allergen Reactions    Augmentin [amoxicillin-pot clavulanate] Rash     Rash, confusion, TIA like symptoms    Azithromycin Rash    Avodart [dutasteride]     House dust mite Other (See Comments)    Naproxen     Ragweed     Synthroid [levothyroxine] Rash     Social History     Socioeconomic History    Marital status:      Spouse name: Not on file    Number of children: Not on file    Years of education: Not on file    Highest education level: Not on file   Occupational History    Not on file   Social Needs    Financial resource strain: Not on file    Food insecurity:     Worry: Not on file     Inability: Not on file    Transportation needs:     Medical: Not on file     Non-medical: Not on file   Tobacco Use    Smoking status: Former Smoker     Packs/day: 6.00     Years: 1.00     Pack years: 6.00     Types: Cigarettes    Smokeless tobacco: Never Used   Substance and Sexual Activity    Alcohol use: No    Drug use: No    Sexual activity: Yes     Partners: Female   Lifestyle    Physical activity:     Days per week: Not on file     Minutes per session: Not on file    Stress: Not on file   Relationships    Social connections:     Talks on phone: Not on file     Gets together: Not on file     Attends Voodoo service: Not on file     Active member of club or organization: Not on file     Attends meetings of clubs or organizations: Not on file     Relationship status: Not on file   Other Topics Concern    Not on file   Social History Narrative     to 'T', 2 children, nonsmoker, ETOH none, never had colonscopy & declines       Review of Systems    Constitutional: negative  Eyes: negative  Ears, nose, mouth, throat, and face: negative  Respiratory: negative  Cardiovascular: negative  Gastrointestinal:  "negative  Genitourinary:negative  Integument/breast: negative  Hematologic/lymphatic: negative  Musculoskeletal:negative  Neurological: negative  Behavioral/Psych: negative  Endocrine: negative  Allergic/Immunologic: negative    Objective:  Vital signs in last 24 hours:    Vitals:    09/11/19 0813   BP: (!) 155/79   BP Location: Right arm   Patient Position: Sitting   BP Method: Large (Automatic)   Pulse: 71   Weight: 134.7 kg (296 lb 15.4 oz)   Height: 6' 1" (1.854 m)       Body mass index is 39.18 kg/m².     General: no acute distress, well nourished, well-groomed  CVS: RRR, no murmur, gallops or rubs  Respiratory: Clear to ausculation  Extremities: no edema    Neurological Examination:    HIGHER INTEGRATIVE FUNCTIONS:  -Normal attention span and concentration; immediately responds to questions and commands  -Oriented to time, place and person  -Recent and remote memory intact  -Language normal (no aphasia or dysarthria)  -Normal fund of knowledge    CN:  -PERRLA, visual fields full, unable to visualize optic discs due to small pupils on fundus exam   -EOMI with normal saccades and smooth pursuit  -Facial sensation intact bilaterally  -Facial strength/movement intact bilaterally  -Hearing intact to voice  -Palate elevates symmetrically  -Normal shoulder shrug and head turn  -Tongue protrudes midline    MOTOR: (left/right graded 1-5)  -UE: 5/5 deltoids; 5/5 biceps, triceps; 5/5 wrist flexors, extensors; 5/5 interosseous; 5/5   -LEs: 5/5 hip flexion, extension; 5/5 knee flexion, extension; 5/5 ankle flexion, extension  -Tone: normal  -No pronator drift, no orbiting    SENSORY:  -Light touch, temperature sensation intact bilaterally    REFLEXES:  -2+ upper and lower bilaterally  -Flexor plantar reflex bilaterally  -No clonus    COORDINATION:  -FNF, HKS, KIMI intact bilaterally    GAIT:  -Normal casual gait       Assessment/Plan:    1.  Visual disturbance:  UnClear of the exact cause.  However we will obtain " intracranial images and images of his blood vessels.  We discussed the possibility of ocular migraines but we will get images first.  2.  Right hand paresthesia      Plan:  Obtain MRI of the brain and CT angiogram  Obtain EMG nerve conduction study of the right upper extremity.      I discussed assessment and plan with patient/wife and answered the questions that they had.     Part of patient note might have been created using Dragon Dictation.  Any errors in syntax or even information may not have been identified and edited on initial review prior to signing this note.

## 2019-09-24 ENCOUNTER — HOSPITAL ENCOUNTER (OUTPATIENT)
Dept: RADIOLOGY | Facility: HOSPITAL | Age: 69
Discharge: HOME | End: 2019-09-24
Attending: PSYCHIATRY & NEUROLOGY
Payer: MEDICARE

## 2019-09-24 DIAGNOSIS — H53.9 VISUAL DISTURBANCE: ICD-10-CM

## 2019-09-24 PROCEDURE — 70498 CT ANGIOGRAPHY NECK: CPT | Mod: 26,HCNC,, | Performed by: RADIOLOGY

## 2019-09-24 PROCEDURE — 70496 CT ANGIOGRAPHY HEAD: CPT | Mod: TC,HCNC

## 2019-09-24 PROCEDURE — 70498 CT ANGIOGRAPHY NECK: CPT | Mod: TC,HCNC

## 2019-09-24 PROCEDURE — 70498 CTA HEAD AND NECK (XPD): ICD-10-PCS | Mod: 26,HCNC,, | Performed by: RADIOLOGY

## 2019-09-24 PROCEDURE — 70496 CTA HEAD AND NECK (XPD): ICD-10-PCS | Mod: 26,HCNC,, | Performed by: RADIOLOGY

## 2019-09-24 PROCEDURE — 25500020 PHARM REV CODE 255: Mod: HCNC | Performed by: PSYCHIATRY & NEUROLOGY

## 2019-09-24 PROCEDURE — 70496 CT ANGIOGRAPHY HEAD: CPT | Mod: 26,HCNC,, | Performed by: RADIOLOGY

## 2019-09-24 RX ADMIN — IOHEXOL 100 ML: 350 INJECTION, SOLUTION INTRAVENOUS at 08:09

## 2019-09-27 ENCOUNTER — PATIENT OUTREACH (OUTPATIENT)
Dept: ADMINISTRATIVE | Facility: OTHER | Age: 69
End: 2019-09-27

## 2019-10-01 ENCOUNTER — OFFICE VISIT (OUTPATIENT)
Dept: NEUROLOGY | Facility: CLINIC | Age: 69
End: 2019-10-01
Payer: MEDICARE

## 2019-10-01 VITALS
SYSTOLIC BLOOD PRESSURE: 133 MMHG | BODY MASS INDEX: 39.24 KG/M2 | DIASTOLIC BLOOD PRESSURE: 72 MMHG | HEIGHT: 73 IN | HEART RATE: 67 BPM | WEIGHT: 296.06 LBS

## 2019-10-01 DIAGNOSIS — H53.142 VISUAL DISCOMFORT OF LEFT EYE: Primary | ICD-10-CM

## 2019-10-01 PROCEDURE — 99213 OFFICE O/P EST LOW 20 MIN: CPT | Mod: HCNC,S$GLB,, | Performed by: PSYCHIATRY & NEUROLOGY

## 2019-10-01 PROCEDURE — 99999 PR PBB SHADOW E&M-EST. PATIENT-LVL III: CPT | Mod: PBBFAC,HCNC,, | Performed by: PSYCHIATRY & NEUROLOGY

## 2019-10-01 PROCEDURE — 1101F PT FALLS ASSESS-DOCD LE1/YR: CPT | Mod: HCNC,CPTII,S$GLB, | Performed by: PSYCHIATRY & NEUROLOGY

## 2019-10-01 PROCEDURE — 99499 RISK ADDL DX/OHS AUDIT: ICD-10-PCS | Mod: HCNC,S$GLB,, | Performed by: PSYCHIATRY & NEUROLOGY

## 2019-10-01 PROCEDURE — 99999 PR PBB SHADOW E&M-EST. PATIENT-LVL III: ICD-10-PCS | Mod: PBBFAC,HCNC,, | Performed by: PSYCHIATRY & NEUROLOGY

## 2019-10-01 PROCEDURE — 1101F PR PT FALLS ASSESS DOC 0-1 FALLS W/OUT INJ PAST YR: ICD-10-PCS | Mod: HCNC,CPTII,S$GLB, | Performed by: PSYCHIATRY & NEUROLOGY

## 2019-10-01 PROCEDURE — 99499 UNLISTED E&M SERVICE: CPT | Mod: HCNC,S$GLB,, | Performed by: PSYCHIATRY & NEUROLOGY

## 2019-10-01 PROCEDURE — 99213 PR OFFICE/OUTPT VISIT, EST, LEVL III, 20-29 MIN: ICD-10-PCS | Mod: HCNC,S$GLB,, | Performed by: PSYCHIATRY & NEUROLOGY

## 2019-10-10 ENCOUNTER — PATIENT MESSAGE (OUTPATIENT)
Dept: ADMINISTRATIVE | Facility: HOSPITAL | Age: 69
End: 2019-10-10

## 2019-10-15 ENCOUNTER — TELEPHONE (OUTPATIENT)
Dept: NEUROLOGY | Facility: CLINIC | Age: 69
End: 2019-10-15

## 2019-10-15 ENCOUNTER — HOSPITAL ENCOUNTER (OUTPATIENT)
Dept: RADIOLOGY | Facility: HOSPITAL | Age: 69
Discharge: HOME | End: 2019-10-15
Attending: PSYCHIATRY & NEUROLOGY
Payer: MEDICARE

## 2019-10-15 VITALS
HEIGHT: 73 IN | HEART RATE: 74 BPM | SYSTOLIC BLOOD PRESSURE: 142 MMHG | TEMPERATURE: 99 F | DIASTOLIC BLOOD PRESSURE: 65 MMHG | BODY MASS INDEX: 39.1 KG/M2 | RESPIRATION RATE: 20 BRPM | WEIGHT: 295 LBS | OXYGEN SATURATION: 96 %

## 2019-10-15 PROCEDURE — 70551 MRI BRAIN WITHOUT CONTRAST: ICD-10-PCS | Mod: 26,HCNC,, | Performed by: RADIOLOGY

## 2019-10-15 PROCEDURE — 63600175 PHARM REV CODE 636 W HCPCS: Mod: HCNC | Performed by: STUDENT IN AN ORGANIZED HEALTH CARE EDUCATION/TRAINING PROGRAM

## 2019-10-15 PROCEDURE — 70551 MRI BRAIN STEM W/O DYE: CPT | Mod: 26,HCNC,, | Performed by: RADIOLOGY

## 2019-10-15 PROCEDURE — 70551 MRI BRAIN STEM W/O DYE: CPT | Mod: TC,HCNC

## 2019-10-15 RX ORDER — MIDAZOLAM HYDROCHLORIDE 1 MG/ML
2 INJECTION, SOLUTION INTRAMUSCULAR; INTRAVENOUS ONCE
Status: COMPLETED | OUTPATIENT
Start: 2019-10-15 | End: 2019-10-15

## 2019-10-15 RX ADMIN — MIDAZOLAM HYDROCHLORIDE 2 MG: 1 INJECTION, SOLUTION INTRAMUSCULAR; INTRAVENOUS at 10:10

## 2019-10-15 NOTE — PROGRESS NOTES
Called radiology Fellow for Versed order / pt AAO w/ NAD review NPO status / medications and allergies and wife in [place] for ride home / waiting on scanner

## 2019-10-15 NOTE — PROGRESS NOTES
Pt identifiers checked and correct.    LOC: The patient is awake, alert and aware of environment with an appropriate affect, the patient is oriented x 3 and speaking appropriately.   APPEARANCE: Patient resting comfortably and in no acute distress, patient is clean and well groomed, patient's clothing is properly fastened.   SKIN: The skin is warm and dry, color consistent with ethnicity, patient has normal skin turgor and moist mucus membranes, skin intact, no breakdown or bruising noted.   MUSCULOSKELETAL: Patient moving all extremities spontaneously, no obvious swelling or deformities noted.   RESPIRATORY: Airway is open and patent, respirations are spontaneous, patient has a normal effort and rate, no accessory muscle use noted.   CARDIAC: Patient has a  rate and regular rhythm, no periphreal edema noted, capillary refill < 3 seconds.   ABDOMEN: Soft and non tender to palpation, no distention noted, active bowel sounds present in all four quadrants.   NEUROLOGIC: PERRL, 3 mm bilaterally, eyes open spontaneously, behavior appropriate to situation, follows commands, facial expression symmetrical, bilateral hand grasp equal and even, purposeful motor response noted, normal sensation in all extremities when touched with a finger.

## 2019-10-15 NOTE — TELEPHONE ENCOUNTER
I called patient to schedule a follow up appt with Dr. Layton. Appt scheduled for 11/11/2019 at 8:40

## 2019-10-15 NOTE — PROGRESS NOTES
MRI done and pt tolerated very well pt sitting in stretcher and AAO w/ NAD / wife in [place] #6 and pt waiting allowed time

## 2019-10-15 NOTE — PROGRESS NOTES
Ambulatory to restroom and dressing room w/ NAD wife with pt given AVS and discussed D/C inst / eat a meal / increase fluids and no driving today / call PCP for follow up / pt D/C home

## 2019-10-16 ENCOUNTER — LAB VISIT (OUTPATIENT)
Dept: LAB | Facility: HOSPITAL | Age: 69
End: 2019-10-16
Attending: FAMILY MEDICINE
Payer: MEDICARE

## 2019-10-16 DIAGNOSIS — Z12.11 COLON CANCER SCREENING: ICD-10-CM

## 2019-10-16 PROCEDURE — 82274 ASSAY TEST FOR BLOOD FECAL: CPT | Mod: HCNC

## 2019-10-17 LAB — HEMOCCULT STL QL IA: NEGATIVE

## 2019-10-17 NOTE — PROGRESS NOTES
Good news!    Your stool shows NO BLOOD     Recommendations are to repeat this YEARLY as screening for colon cancer.

## 2019-11-08 NOTE — PROGRESS NOTES
Neurology Clinic Visit  Primary Care Provider: Gina Reyes MD   Referring Provider: No ref. provider found   Date of Visit: 10/1/2019     chief complaint:   Chief Complaint   Patient presents with    Follow-up       Interval history  Patient is here for follow-up on transient vision symptoms in the right time.  He states since the last visit he has not had any symptoms.  He feels as if he has not had any more symptoms in his left eye for about the past 1 month.  He he denies further tingling of his right hand.  He attempted to have MRI of the brain but he stated he was not able to remain calm even with Ativan.  CTA did not reveal any large vessel occlusion or high-grade stenosis.    History of Present Illness  Adan Cain is a 69 y.o.  male I have been asked to consult for evaluation and treatment of visual symptoms in left eye.  Patient is somewhat a poor historian and was difficult to obtain history from the patient. He reports that early this year he started to have blurry vision.  He also reports that he started to see halos in his left eye.  The blurry vision was only in left eye.  He was seen by Ophthalmology in the patient was found to have a posterior capsular opacifications and underwent laser surgery.  Patient reports the laser surgery improved the blurriness of his vision but he still report a few times a month he will still see the halos.  He he was also diagnosed with glaucoma and was started on drops.  The patient denies any headaches with these symptoms.  He denies any vision loss other than seeing the halos.  He denies any weakness associated with these episodes.  He reports episodes can last about 30 min.  MRI of the brain was ordered PCP but the patient did not get them done due to claustrophobia.  He also reports that having a pinched nerve in his neck that cause paresthesia in his hand a few years ago but this resolved but today he reports he now feels his paresthesias in his right hand.   PLAN:   Advise increase p.o. fluids-- water/juice & rest  Meds: Amoxil, Tessalon perles, Diflucan   / no refills  Simply saline nasal wash to irrigate sinuses and for congestion/runny nose.  Advise use of Flonase  Cool mist humidifier/vaporizer.  Practice good handwashing.  Advise use of Mucinex in am  Tylenol for fever, headache and body aches.  Chloraseptic spray or lozenges for throat comfort.  Advise follow up with PCP  Advise go to ER if symptoms worsen or fail to improve with treatment.  AVS provided and reviewed with patient including supportive care, follow up, and red flag symptoms.   Patient verbalizes understanding and agrees with treatment plan. Discharged from Urgent Care in stable condition.     He currently denies any neck pain. He has questionable history of TIA versus medication reaction in the past and was evaluated by vascular Neurology through tele consult.      Patient Active Problem List    Diagnosis Date Noted    Ascending aortic aneurysm 07/17/2019    Dry eye syndrome of both eyes 06/26/2019    Right-sided low back pain with right-sided sciatica 03/06/2019    Blurry vision, left eye 02/18/2019    Conductive hearing loss of right ear with restricted hearing of left ear 01/22/2019    Belching 01/22/2019    Posterior capsular opacification, left eye 01/18/2019    Open angle with borderline findings and high glaucoma risk in left eye 01/18/2019    Transient cerebral ischemia 06/06/2018    Cerebral infarction due to embolism of left middle cerebral artery 05/26/2018    History of hydrocelectomy 12/12/2016    Right inguinal hernia 11/30/2016    Seasonal allergic rhinitis due to pollen 04/13/2016    Calculus of gallbladder 04/13/2016    Morbid obesity with BMI of 40.0-44.9, adult 01/29/2015    DJD (degenerative joint disease) of hip 01/29/2015    Post-operative state 01/07/2015    Pseudophakia 12/10/2014    Nocturia 08/18/2014    Benign prostatic hyperplasia without lower urinary tract symptoms 08/18/2014    Hydrocele, right 08/18/2014     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, didn't use ear protection    DJD (degenerative joint disease) of hip 1/29/2015    [place] cardiac risk 10-20% in next 10 years 10/31/2017    recommended statin, but he declines    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    MCA , see MRI    Morbid  obesity with BMI of 40.0-44.9, adult 1/29/2015    Right-sided low back pain with right-sided sciatica 3/6/2019    Seasonal allergic rhinitis due to pollen 4/13/2016    Thoracic aortic aneurysm without rupture 5/27/2018    tx with Su, Cardiologist Dr Stone, follows yearly     Past Surgical History:   Procedure Laterality Date    CATARACT EXTRACTION      HERNIA REPAIR      YAG LAser Capsulotomy Bilateral     2/18/2019 OS Dr. Cornelius     Family History   Problem Relation Age of Onset    Leukemia Father     Aneurysm Father     Diabetes Mother     Cataracts Paternal Uncle     Amblyopia Neg Hx     Blindness Neg Hx     Glaucoma Neg Hx     Macular degeneration Neg Hx     Retinal detachment Neg Hx     Strabismus Neg Hx          Current Outpatient Medications   Medication Sig    BABY ASPIRIN ORAL Take 81 tablets by mouth every evening.     cholecalciferol, vitamin D3, 1,000 unit capsule Take 1,000 Units by mouth 2 (two) times daily.     coenzyme Q10 (CO Q-10) 100 mg capsule Take by mouth once daily.    fluticasone (FLONASE) 50 mcg/actuation nasal spray PLACE 1 SPRAY INTO EACH NOSTRIL DAILY (Patient taking differently: Pt states he use as needed)    glucosamine-chondroitin 500-400 mg tablet Take 1 tablet by mouth 2 (two) times daily.     metoprolol succinate (TOPROL-XL) 25 MG 24 hr tablet Take 1 tablet (25 mg total) by mouth once daily.    MULTIVITAMIN W-MINERALS/LUTEIN (CENTRUM SILVER ORAL) Take by mouth once daily.     omega-3 fatty acids (FISH OIL) 300 mg Cap Take 1 tablet by mouth once daily.     UNABLE TO FIND Take by mouth nightly. tumeric    UNABLE TO FIND once daily. actaline    UNABLE TO FIND Use as directed 1 tablet in the mouth or throat 2 (two) times daily. Prostate Revive manufactured by [employer]    VITAMIN B COMPLEX (SUPER B COMPLEX-B-12 ORAL) Take by mouth.    cetirizine (ZYRTEC) 10 MG tablet Take 1 tablet (10 mg total) by mouth once daily. (Patient taking differently: Take  10 mg by mouth as needed. )    diazePAM (VALIUM) 5 MG tablet Take 1 tablet (5 mg total) by mouth once. 30 min prior to MRI for 1 dose    LORazepam (ATIVAN) 1 MG tablet Take 1 tablet by mouth 1 hour prior to MRI; May repeat once if needed. (Patient not taking: Reported on 10/1/2019)     No current facility-administered medications for this visit.        Scheduled Meds:  Continuous Infusions:  PRN Meds:    Review of patient's allergies indicates:   Allergen Reactions    Augmentin [amoxicillin-pot clavulanate] Rash     Rash, confusion, TIA like symptoms    Azithromycin Rash    Avodart [dutasteride]     House dust mite Other (See Comments)    Naproxen     Ragweed     Synthroid [levothyroxine] Rash     Social History     Socioeconomic History    Marital status:      Spouse name: Not on file    Number of children: Not on file    Years of education: Not on file    Highest education level: Not on file   Occupational History    Not on file   Social Needs    Financial resource strain: Not on file    Food insecurity:     Worry: Not on file     Inability: Not on file    Transportation needs:     Medical: Not on file     Non-medical: Not on file   Tobacco Use    Smoking status: Former Smoker     Packs/day: 6.00     Years: 1.00     Pack years: 6.00     Types: Cigarettes    Smokeless tobacco: Never Used   Substance and Sexual Activity    Alcohol use: No    Drug use: No    Sexual activity: Yes     Partners: Female   Lifestyle    Physical activity:     Days per week: Not on file     Minutes per session: Not on file    Stress: Not on file   Relationships    Social connections:     Talks on phone: Not on file     Gets together: Not on file     Attends Latter day service: Not on file     Active member of club or organization: Not on file     Attends meetings of clubs or organizations: Not on file     Relationship status: Not on file   Other Topics Concern    Not on file   Social History Narrative     " to 'T', 2 children, nonsmoker, ETOH none, never had colonscopy & declines       Review of Systems    Constitutional: negative  Eyes: negative  Ears, nose, mouth, throat, and face: negative  Respiratory: negative  Cardiovascular: negative  Gastrointestinal: negative  Genitourinary:negative  Integument/breast: negative  Hematologic/lymphatic: negative  Musculoskeletal:negative  Neurological: negative  Behavioral/Psych: negative  Endocrine: negative  Allergic/Immunologic: negative    Objective:  Vital signs in last 24 hours:    Vitals:    10/01/19 1107   BP: 133/72   Pulse: 67   Weight: 134.3 kg (296 lb 1.2 oz)   Height: 6' 1" (1.854 m)       Body mass index is 39.06 kg/m².     General: no acute distress, well nourished, well-groomed  CVS: RRR, no murmur, gallops or rubs  Respiratory: Clear to ausculation  Extremities: no edema     Neurological Examination:     HIGHER INTEGRATIVE FUNCTIONS:  -Normal attention span and concentration; immediately responds to questions and commands  -Oriented to time, place and person  -Recent and remote memory intact  -Language normal (no aphasia or dysarthria)  -Normal fund of knowledge     CN:  -PERRLA, visual fields full, unable to visualize optic discs due to small pupils on fundus exam   -EOMI with normal saccades and smooth pursuit  -Facial sensation intact bilaterally  -Facial strength/movement intact bilaterally  -Hearing intact to voice  -Palate elevates symmetrically  -Normal shoulder shrug and head turn  -Tongue protrudes midline     MOTOR: (left/right graded 1-5)  -UE: 5/5 deltoids; 5/5 biceps, triceps; 5/5 wrist flexors, extensors; 5/5 interosseous; 5/5   -LEs: 5/5 hip flexion, extension; 5/5 knee flexion, extension; 5/5 ankle flexion, extension  -Tone: normal  -No pronator drift, no orbiting     SENSORY:  -Light touch, temperature sensation intact bilaterally     REFLEXES:  -2+ upper and lower bilaterally  -Flexor plantar reflex bilaterally  -No " clonus     COORDINATION:  -FNF, HKS, KIMI intact bilaterally     GAIT:  -Normal casual gait      CTA of the head and neck     Impression       No acute abnormality. No high-grade stenosis or major vessel occlusion.          Assessment/Plan:     1.  Transient visual disturbance in left eye:  Unclear of the exact cause.We discussed the possibility of ocular migraines but we will get images first.  He has not had any further symptoms in over a month.  He also states he was told by his eyes specialist to go to the emergency room whenever his symptoms occur to be evaluated to check his ocular pressure given his history of glaucoma.     Plan:  Await MRI of the brain  He can follow up with Neurology after imaging.     I discussed assessment and plan with patient/wife and answered the questions that they had.      Part of patient note might have been created using Dragon Dictation.  Any errors in syntax or even information may not have been identified and edited on initial review prior to signing this note.

## 2019-11-11 ENCOUNTER — TELEPHONE (OUTPATIENT)
Dept: NEUROLOGY | Facility: CLINIC | Age: 69
End: 2019-11-11

## 2019-11-11 ENCOUNTER — OFFICE VISIT (OUTPATIENT)
Dept: NEUROLOGY | Facility: CLINIC | Age: 69
End: 2019-11-11
Payer: MEDICARE

## 2019-11-11 ENCOUNTER — LAB VISIT (OUTPATIENT)
Dept: LAB | Facility: HOSPITAL | Age: 69
End: 2019-11-11
Attending: PSYCHIATRY & NEUROLOGY
Payer: MEDICARE

## 2019-11-11 ENCOUNTER — TELEPHONE (OUTPATIENT)
Dept: NEUROSURGERY | Facility: CLINIC | Age: 69
End: 2019-11-11

## 2019-11-11 VITALS
DIASTOLIC BLOOD PRESSURE: 74 MMHG | WEIGHT: 298.5 LBS | SYSTOLIC BLOOD PRESSURE: 136 MMHG | HEART RATE: 75 BPM | BODY MASS INDEX: 39.56 KG/M2 | HEIGHT: 73 IN

## 2019-11-11 DIAGNOSIS — R20.2 PARESTHESIA: ICD-10-CM

## 2019-11-11 DIAGNOSIS — G96.08 SUBDURAL HYGROMA: Primary | ICD-10-CM

## 2019-11-11 DIAGNOSIS — H53.9 VISUAL DISTURBANCE: ICD-10-CM

## 2019-11-11 LAB
FOLATE SERPL-MCNC: 17.8 NG/ML (ref 4–24)
T4 FREE SERPL-MCNC: 0.87 NG/DL (ref 0.71–1.51)
TSH SERPL DL<=0.005 MIU/L-ACNC: 5.43 UIU/ML (ref 0.4–4)
VIT B12 SERPL-MCNC: 1249 PG/ML (ref 210–950)

## 2019-11-11 PROCEDURE — 84165 PROTEIN E-PHORESIS SERUM: CPT | Mod: 26,HCNC,, | Performed by: PATHOLOGY

## 2019-11-11 PROCEDURE — 86334 PATHOLOGIST INTERPRETATION IFE: ICD-10-PCS | Mod: 26,HCNC,, | Performed by: PATHOLOGY

## 2019-11-11 PROCEDURE — 84165 PATHOLOGIST INTERPRETATION SPE: ICD-10-PCS | Mod: 26,HCNC,, | Performed by: PATHOLOGY

## 2019-11-11 PROCEDURE — 86334 IMMUNOFIX E-PHORESIS SERUM: CPT | Mod: 26,HCNC,, | Performed by: PATHOLOGY

## 2019-11-11 PROCEDURE — 84443 ASSAY THYROID STIM HORMONE: CPT | Mod: HCNC

## 2019-11-11 PROCEDURE — 84425 ASSAY OF VITAMIN B-1: CPT | Mod: HCNC

## 2019-11-11 PROCEDURE — 99499 RISK ADDL DX/OHS AUDIT: ICD-10-PCS | Mod: HCNC,S$GLB,, | Performed by: PSYCHIATRY & NEUROLOGY

## 2019-11-11 PROCEDURE — 99214 OFFICE O/P EST MOD 30 MIN: CPT | Mod: HCNC,S$GLB,, | Performed by: PSYCHIATRY & NEUROLOGY

## 2019-11-11 PROCEDURE — 82607 VITAMIN B-12: CPT | Mod: HCNC

## 2019-11-11 PROCEDURE — 36415 COLL VENOUS BLD VENIPUNCTURE: CPT | Mod: HCNC

## 2019-11-11 PROCEDURE — 99214 PR OFFICE/OUTPT VISIT, EST, LEVL IV, 30-39 MIN: ICD-10-PCS | Mod: HCNC,S$GLB,, | Performed by: PSYCHIATRY & NEUROLOGY

## 2019-11-11 PROCEDURE — 86334 IMMUNOFIX E-PHORESIS SERUM: CPT | Mod: HCNC

## 2019-11-11 PROCEDURE — 99999 PR PBB SHADOW E&M-EST. PATIENT-LVL IV: ICD-10-PCS | Mod: PBBFAC,HCNC,, | Performed by: PSYCHIATRY & NEUROLOGY

## 2019-11-11 PROCEDURE — 84165 PROTEIN E-PHORESIS SERUM: CPT | Mod: HCNC

## 2019-11-11 PROCEDURE — 99499 UNLISTED E&M SERVICE: CPT | Mod: HCNC,S$GLB,, | Performed by: PSYCHIATRY & NEUROLOGY

## 2019-11-11 PROCEDURE — 1101F PT FALLS ASSESS-DOCD LE1/YR: CPT | Mod: HCNC,CPTII,S$GLB, | Performed by: PSYCHIATRY & NEUROLOGY

## 2019-11-11 PROCEDURE — 1101F PR PT FALLS ASSESS DOC 0-1 FALLS W/OUT INJ PAST YR: ICD-10-PCS | Mod: HCNC,CPTII,S$GLB, | Performed by: PSYCHIATRY & NEUROLOGY

## 2019-11-11 PROCEDURE — 99999 PR PBB SHADOW E&M-EST. PATIENT-LVL IV: CPT | Mod: PBBFAC,HCNC,, | Performed by: PSYCHIATRY & NEUROLOGY

## 2019-11-11 PROCEDURE — 84439 ASSAY OF FREE THYROXINE: CPT | Mod: HCNC

## 2019-11-11 PROCEDURE — 82746 ASSAY OF FOLIC ACID SERUM: CPT | Mod: HCNC
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
## 2019-11-11 NOTE — TELEPHONE ENCOUNTER
----- Message from Mey Moore MA sent at 11/11/2019  8:46 AM CST -----  DR. Layton has placed a referral , please advise.

## 2019-11-11 NOTE — TELEPHONE ENCOUNTER
I called patient to schedule a neurosurgery appointment. Appt scheduled for 01/20/20 at 10:00 am. Appt letter was mailed out.

## 2019-11-12 LAB
ALBUMIN SERPL ELPH-MCNC: 3.73 G/DL (ref 3.35–5.55)
ALPHA1 GLOB SERPL ELPH-MCNC: 0.3 G/DL (ref 0.17–0.41)
ALPHA2 GLOB SERPL ELPH-MCNC: 0.78 G/DL (ref 0.43–0.99)
B-GLOBULIN SERPL ELPH-MCNC: 1.12 G/DL (ref 0.5–1.1)
GAMMA GLOB SERPL ELPH-MCNC: 1.26 G/DL (ref 0.67–1.58)
INTERPRETATION SERPL IFE-IMP: NORMAL
PATHOLOGIST INTERPRETATION IFE: NORMAL
PATHOLOGIST INTERPRETATION SPE: NORMAL
PROT SERPL-MCNC: 7.2 G/DL (ref 6–8.4)

## 2019-11-14 LAB — VIT B1 BLD-MCNC: 66 UG/L (ref 38–122)

## 2019-12-09 ENCOUNTER — TELEPHONE (OUTPATIENT)
Dept: OPHTHALMOLOGY | Facility: CLINIC | Age: 69
End: 2019-12-09

## 2019-12-10 ENCOUNTER — TELEPHONE (OUTPATIENT)
Dept: OPHTHALMOLOGY | Facility: CLINIC | Age: 69
End: 2019-12-10

## 2019-12-12 ENCOUNTER — OFFICE VISIT (OUTPATIENT)
Dept: OPHTHALMOLOGY | Facility: CLINIC | Age: 69
End: 2019-12-12
Payer: MEDICARE

## 2019-12-12 ENCOUNTER — TELEPHONE (OUTPATIENT)
Dept: OPHTHALMOLOGY | Facility: CLINIC | Age: 69
End: 2019-12-12

## 2019-12-12 DIAGNOSIS — H53.122 TRANSIENT VISUAL LOSS OF LEFT EYE: Primary | ICD-10-CM

## 2019-12-12 PROCEDURE — 99999 PR PBB SHADOW E&M-EST. PATIENT-LVL III: CPT | Mod: PBBFAC,HCNC,, | Performed by: OPHTHALMOLOGY

## 2019-12-12 PROCEDURE — 92012 PR EYE EXAM, EST PATIENT,INTERMED: ICD-10-PCS | Mod: HCNC,S$GLB,, | Performed by: OPHTHALMOLOGY

## 2019-12-12 PROCEDURE — 99999 PR PBB SHADOW E&M-EST. PATIENT-LVL III: ICD-10-PCS | Mod: PBBFAC,HCNC,, | Performed by: OPHTHALMOLOGY

## 2019-12-12 PROCEDURE — 92012 INTRM OPH EXAM EST PATIENT: CPT | Mod: HCNC,S$GLB,, | Performed by: OPHTHALMOLOGY

## 2019-12-12 NOTE — PROGRESS NOTES
HPI     Triage pt  Seen (01/18/2019)  Patient states OS gets blurry/cloudy which last couple hours past week.  Pt states had the same episode few months ago.    I have personally interviewed the patient, reviewed the history and   examined the patient and agree with the technician's exam.    He has stopped using his glaucoma eye drops.      Last edited by Haroon Madden MD on 12/12/2019 11:38 AM. (History)            Assessment /Plan     For exam results, see Encounter Report.    Transient visual loss of left eye      The pattern of visual loss does not match what would be expected with amaurosis fugax, migraine, transient obscuration of vision, or transient ischemic attack. I will schedule Mr. Cain for follow up with Dr. Cornelius regarding the possibility of a problem with his intraocular pressure or his IOL.

## 2019-12-30 ENCOUNTER — OFFICE VISIT (OUTPATIENT)
Dept: PODIATRY | Facility: CLINIC | Age: 69
End: 2019-12-30
Payer: MEDICARE

## 2019-12-30 ENCOUNTER — OFFICE VISIT (OUTPATIENT)
Dept: OPHTHALMOLOGY | Facility: CLINIC | Age: 69
End: 2019-12-30
Payer: MEDICARE

## 2019-12-30 VITALS
BODY MASS INDEX: 39.49 KG/M2 | DIASTOLIC BLOOD PRESSURE: 76 MMHG | WEIGHT: 298 LBS | HEART RATE: 66 BPM | HEIGHT: 73 IN | SYSTOLIC BLOOD PRESSURE: 162 MMHG

## 2019-12-30 DIAGNOSIS — Z96.1 PSEUDOPHAKIA: ICD-10-CM

## 2019-12-30 DIAGNOSIS — M20.12 VALGUS DEFORMITY OF BOTH GREAT TOES: ICD-10-CM

## 2019-12-30 DIAGNOSIS — H04.123 DRY EYE SYNDROME OF BOTH EYES: ICD-10-CM

## 2019-12-30 DIAGNOSIS — M76.821 POSTERIOR TIBIAL TENDINITIS OF RIGHT LEG: Primary | ICD-10-CM

## 2019-12-30 DIAGNOSIS — H53.122 TRANSIENT VISUAL LOSS OF LEFT EYE: Primary | ICD-10-CM

## 2019-12-30 DIAGNOSIS — M20.11 VALGUS DEFORMITY OF BOTH GREAT TOES: ICD-10-CM

## 2019-12-30 PROCEDURE — 1101F PR PT FALLS ASSESS DOC 0-1 FALLS W/OUT INJ PAST YR: ICD-10-PCS | Mod: HCNC,CPTII,S$GLB, | Performed by: PODIATRIST

## 2019-12-30 PROCEDURE — 99214 OFFICE O/P EST MOD 30 MIN: CPT | Mod: HCNC,S$GLB,, | Performed by: PODIATRIST

## 2019-12-30 PROCEDURE — 1125F AMNT PAIN NOTED PAIN PRSNT: CPT | Mod: HCNC,S$GLB,, | Performed by: PODIATRIST

## 2019-12-30 PROCEDURE — 92012 PR EYE EXAM, EST PATIENT,INTERMED: ICD-10-PCS | Mod: HCNC,S$GLB,, | Performed by: OPHTHALMOLOGY

## 2019-12-30 PROCEDURE — 1125F PR PAIN SEVERITY QUANTIFIED, PAIN PRESENT: ICD-10-PCS | Mod: HCNC,S$GLB,, | Performed by: PODIATRIST

## 2019-12-30 PROCEDURE — 1159F PR MEDICATION LIST DOCUMENTED IN MEDICAL RECORD: ICD-10-PCS | Mod: HCNC,S$GLB,, | Performed by: PODIATRIST

## 2019-12-30 PROCEDURE — 99999 PR PBB SHADOW E&M-EST. PATIENT-LVL II: ICD-10-PCS | Mod: PBBFAC,HCNC,, | Performed by: OPHTHALMOLOGY

## 2019-12-30 PROCEDURE — 1159F MED LIST DOCD IN RCRD: CPT | Mod: HCNC,S$GLB,, | Performed by: PODIATRIST

## 2019-12-30 PROCEDURE — 99999 PR PBB SHADOW E&M-EST. PATIENT-LVL II: CPT | Mod: PBBFAC,HCNC,, | Performed by: OPHTHALMOLOGY

## 2019-12-30 PROCEDURE — 99999 PR PBB SHADOW E&M-EST. PATIENT-LVL III: CPT | Mod: PBBFAC,HCNC,, | Performed by: PODIATRIST

## 2019-12-30 PROCEDURE — 99999 PR PBB SHADOW E&M-EST. PATIENT-LVL III: ICD-10-PCS | Mod: PBBFAC,HCNC,, | Performed by: PODIATRIST

## 2019-12-30 PROCEDURE — 99214 PR OFFICE/OUTPT VISIT, EST, LEVL IV, 30-39 MIN: ICD-10-PCS | Mod: HCNC,S$GLB,, | Performed by: PODIATRIST

## 2019-12-30 PROCEDURE — 1101F PT FALLS ASSESS-DOCD LE1/YR: CPT | Mod: HCNC,CPTII,S$GLB, | Performed by: PODIATRIST

## 2019-12-30 PROCEDURE — 92012 INTRM OPH EXAM EST PATIENT: CPT | Mod: HCNC,S$GLB,, | Performed by: OPHTHALMOLOGY

## 2019-12-31 NOTE — PROGRESS NOTES
Subjective:       Patient ID: Adan Cain is a 69 y.o. male.    Chief Complaint: Eye Problem    HPI     DLS 12/12/2019   Pt referred by Dr. Madden  Pt complaint of vision in OS becoming foggy vision   Pt states blurry vision comes on while watching   TV, looking at a Clock.  Pt deny any headaches,  Flashes, floaters.      Last edited by Adan Markham on 12/30/2019  2:51 PM. (History)             Assessment:       1. Transient visual loss of left eye    2. Dry eye syndrome of both eyes    3. Pseudophakia        Plan:       Unexplained transient visual loss OS-No ocular pathology to explain. Pt saw Dr Madden on 12/12/19.  KENDALL-Stable OU.      RTC 1 yr.
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
## 2020-02-26 DIAGNOSIS — I63.412 CEREBRAL INFARCTION DUE TO EMBOLISM OF LEFT MIDDLE CEREBRAL ARTERY: Primary | ICD-10-CM

## 2020-02-27 RX ORDER — METOPROLOL SUCCINATE 25 MG/1
25 TABLET, EXTENDED RELEASE ORAL DAILY
Qty: 30 TABLET | Refills: 2 | Status: SHIPPED | OUTPATIENT
Start: 2020-02-27 | End: 2020-05-19 | Stop reason: SDUPTHER

## 2020-03-09 ENCOUNTER — PES CALL (OUTPATIENT)
Dept: ADMINISTRATIVE | Facility: CLINIC | Age: 70
End: 2020-03-09

## 2020-05-19 DIAGNOSIS — I63.412 CEREBRAL INFARCTION DUE TO EMBOLISM OF LEFT MIDDLE CEREBRAL ARTERY: ICD-10-CM

## 2020-05-19 RX ORDER — METOPROLOL SUCCINATE 25 MG/1
25 TABLET, EXTENDED RELEASE ORAL DAILY
Qty: 30 TABLET | Refills: 0 | Status: SHIPPED | OUTPATIENT
Start: 2020-05-19 | End: 2020-05-21 | Stop reason: SDUPTHER

## 2020-05-19 NOTE — TELEPHONE ENCOUNTER
----- Message from Katlyn Gould sent at 5/19/2020 11:17 AM CDT -----  Contact: [facility]/[phone]  Requesting an RX refill or new RX.  Is this a refill or new RX:  Refill 1  RX name and strength: metoprolol succinate (TOPROL-XL) 25 MG 24 hr tablet  Directions (copy/paste from chart):    Is this a 30 day or 90 day RX:    Local pharmacy or mail order pharmacy:  Local pharmacy  Pharmacy name and phone # (copy/paste from chart):   [facility] [place] - [address] [place] [address] [phone] (Phone)  [phone] (Fax)  Comments:

## 2020-05-21 DIAGNOSIS — I63.412 CEREBRAL INFARCTION DUE TO EMBOLISM OF LEFT MIDDLE CEREBRAL ARTERY: ICD-10-CM

## 2020-05-21 RX ORDER — METOPROLOL SUCCINATE 25 MG/1
25 TABLET, EXTENDED RELEASE ORAL DAILY
Qty: 30 TABLET | Refills: 0 | Status: SHIPPED | OUTPATIENT
Start: 2020-05-21 | End: 2020-10-23 | Stop reason: SDUPTHER

## 2020-05-21 NOTE — TELEPHONE ENCOUNTER
----- Message from Radha Joseph sent at 5/21/2020 12:04 PM CDT -----  Contact: [facility]/ [facility] [place] [phone] (Phone)  Can you please resend metoprolol succinate (TOPROL-XL) 25 MG 24 hr tablet 30 tablet 0 5/19/2020    Sig - Route: Take 1 tablet (25 mg total) by mouth once daily. - Oral   Sent to pharmacy as: metoprolol succinate (TOPROL-XL) 25 MG 24 hr tablet   Notes to Pharmacy: .   E-Prescribing Status: Receipt confirmed by pharmacy (5/19/2020 11:44 AM CDT)     To [facility] [place] [facility] [phone] (Phone)  [phone] (Fax)    Pharmacy.

## 2020-06-04 ENCOUNTER — TELEPHONE (OUTPATIENT)
Dept: CARDIOTHORACIC SURGERY | Facility: CLINIC | Age: 70
End: 2020-06-04

## 2020-06-04 DIAGNOSIS — I71.21 ASCENDING AORTIC ANEURYSM: Primary | ICD-10-CM

## 2020-06-04 NOTE — TELEPHONE ENCOUNTER
Returned call to pt.  Pt scheduled to see Dr. Dueans on July 20, for his 1 year follow-up for TAA, with CT scan.  Appt slip mailed to pt.    ----- Message from Anna Christensen sent at 6/4/2020 10:20 AM CDT -----  Contact: pt called [phone]  Patient was a patient of Dr Ewing.  Was told that Dr. Ewing is no longer at [facility] and patient is calling to see who will be taking over Dr patients

## 2020-07-20 ENCOUNTER — OFFICE VISIT (OUTPATIENT)
Dept: CARDIOTHORACIC SURGERY | Facility: CLINIC | Age: 70
End: 2020-07-20
Payer: MEDICARE

## 2020-07-20 ENCOUNTER — HOSPITAL ENCOUNTER (OUTPATIENT)
Dept: RADIOLOGY | Facility: HOSPITAL | Age: 70
Discharge: HOME | End: 2020-07-20
Attending: THORACIC SURGERY (CARDIOTHORACIC VASCULAR SURGERY)
Payer: MEDICARE

## 2020-07-20 VITALS
DIASTOLIC BLOOD PRESSURE: 92 MMHG | HEART RATE: 85 BPM | BODY MASS INDEX: 38.19 KG/M2 | OXYGEN SATURATION: 96 % | HEIGHT: 73 IN | SYSTOLIC BLOOD PRESSURE: 183 MMHG | TEMPERATURE: 99 F | WEIGHT: 288.13 LBS

## 2020-07-20 DIAGNOSIS — I71.21 ASCENDING AORTIC ANEURYSM: ICD-10-CM

## 2020-07-20 DIAGNOSIS — I71.21 ASCENDING AORTIC ANEURYSM: Primary | ICD-10-CM

## 2020-07-20 PROCEDURE — 1159F MED LIST DOCD IN RCRD: CPT | Mod: HCNC,S$GLB,, | Performed by: THORACIC SURGERY (CARDIOTHORACIC VASCULAR SURGERY)

## 2020-07-20 PROCEDURE — 71250 CT THORAX DX C-: CPT | Mod: TC,HCNC

## 2020-07-20 PROCEDURE — 1159F PR MEDICATION LIST DOCUMENTED IN MEDICAL RECORD: ICD-10-PCS | Mod: HCNC,S$GLB,, | Performed by: THORACIC SURGERY (CARDIOTHORACIC VASCULAR SURGERY)

## 2020-07-20 PROCEDURE — 3008F PR BODY MASS INDEX (BMI) DOCUMENTED: ICD-10-PCS | Mod: HCNC,CPTII,S$GLB, | Performed by: THORACIC SURGERY (CARDIOTHORACIC VASCULAR SURGERY)

## 2020-07-20 PROCEDURE — 71250 CT CHEST WITHOUT CONTRAST: ICD-10-PCS | Mod: 26,HCNC,, | Performed by: RADIOLOGY

## 2020-07-20 PROCEDURE — 1101F PR PT FALLS ASSESS DOC 0-1 FALLS W/OUT INJ PAST YR: ICD-10-PCS | Mod: HCNC,CPTII,S$GLB, | Performed by: THORACIC SURGERY (CARDIOTHORACIC VASCULAR SURGERY)

## 2020-07-20 PROCEDURE — 71250 CT THORAX DX C-: CPT | Mod: 26,HCNC,, | Performed by: RADIOLOGY

## 2020-07-20 PROCEDURE — 1101F PT FALLS ASSESS-DOCD LE1/YR: CPT | Mod: HCNC,CPTII,S$GLB, | Performed by: THORACIC SURGERY (CARDIOTHORACIC VASCULAR SURGERY)

## 2020-07-20 PROCEDURE — 99999 PR PBB SHADOW E&M-EST. PATIENT-LVL IV: CPT | Mod: PBBFAC,HCNC,, | Performed by: THORACIC SURGERY (CARDIOTHORACIC VASCULAR SURGERY)

## 2020-07-20 PROCEDURE — 3008F BODY MASS INDEX DOCD: CPT | Mod: HCNC,CPTII,S$GLB, | Performed by: THORACIC SURGERY (CARDIOTHORACIC VASCULAR SURGERY)

## 2020-07-20 PROCEDURE — 99215 PR OFFICE/OUTPT VISIT, EST, LEVL V, 40-54 MIN: ICD-10-PCS | Mod: HCNC,S$GLB,, | Performed by: THORACIC SURGERY (CARDIOTHORACIC VASCULAR SURGERY)

## 2020-07-20 PROCEDURE — 99499 UNLISTED E&M SERVICE: CPT | Mod: HCNC,S$GLB,, | Performed by: THORACIC SURGERY (CARDIOTHORACIC VASCULAR SURGERY)

## 2020-07-20 PROCEDURE — 1126F AMNT PAIN NOTED NONE PRSNT: CPT | Mod: HCNC,S$GLB,, | Performed by: THORACIC SURGERY (CARDIOTHORACIC VASCULAR SURGERY)

## 2020-07-20 PROCEDURE — 99215 OFFICE O/P EST HI 40 MIN: CPT | Mod: HCNC,S$GLB,, | Performed by: THORACIC SURGERY (CARDIOTHORACIC VASCULAR SURGERY)

## 2020-07-20 PROCEDURE — 1126F PR PAIN SEVERITY QUANTIFIED, NO PAIN PRESENT: ICD-10-PCS | Mod: HCNC,S$GLB,, | Performed by: THORACIC SURGERY (CARDIOTHORACIC VASCULAR SURGERY)

## 2020-07-20 PROCEDURE — 99499 RISK ADDL DX/OHS AUDIT: ICD-10-PCS | Mod: HCNC,S$GLB,, | Performed by: THORACIC SURGERY (CARDIOTHORACIC VASCULAR SURGERY)

## 2020-07-20 PROCEDURE — 99999 PR PBB SHADOW E&M-EST. PATIENT-LVL IV: ICD-10-PCS | Mod: PBBFAC,HCNC,, | Performed by: THORACIC SURGERY (CARDIOTHORACIC VASCULAR SURGERY)

## 2020-07-20 NOTE — PROGRESS NOTES
Subjective:      Patient ID: Adan Cain is a 70 y.o. male     Chief Complaint: No chief complaint on file.    HPI:  Adan Cain is a 70 y.o. male with a medical history of TAA, dejenerative joint disease, seasonal allergic rhinitis, obesity, and BPH who presents to the [facility] clinic for follow up of TAA.  He was previously followed by Dr. Ewing who requests follow up in one year and will now be followed by Dr. Duenas.  Last visit, he has imaging that reported TAA of 5.1, todays imaging shows TAA at 5.2.  He reports good blood pressure control at home previously however today his blood pressure is elevated. He denies any symptoms.     Current Outpatient Medications   Medication Instructions    BABY ASPIRIN ORAL 81 tablets, Oral, Nightly    cetirizine (ZYRTEC) 10 mg, Oral, Daily    cholecalciferol (vitamin D3) (VITAMIN D3) 1,000 Units, Oral, 2 times daily    coenzyme Q10 (CO Q-10) 100 mg capsule Oral, Daily    diazePAM (VALIUM) 5 mg, Oral, Once, 30 min prior to MRI    fluticasone (FLONASE) 50 mcg/actuation nasal spray PLACE 1 SPRAY INTO EACH NOSTRIL DAILY    glucosamine-chondroitin 500-400 mg tablet 1 tablet, Oral, 2 times daily    LORazepam (ATIVAN) 1 MG tablet Take 1 tablet by mouth 1 hour prior to MRI; May repeat once if needed.    metoprolol succinate (TOPROL-XL) 25 mg, Oral, Daily    MULTIVITAMIN W-MINERALS/LUTEIN (CENTRUM SILVER ORAL) Oral, Daily    omega-3 fatty acids (FISH OIL) 300 mg Cap 1 tablet, Oral, Daily    UNABLE TO FIND Oral, Nightly, tumeric     UNABLE TO FIND Daily, actaline     UNABLE TO FIND 1 tablet, Mouth/Throat, 2 times daily, Prostate Revive manufactured by [email or web address]     VITAMIN B COMPLEX (SUPER B COMPLEX-B-12 ORAL) Oral     Current medications Reviewed    Review of Systems   Constitutional: Negative for activity change, appetite change, fatigue and fever.   HENT: Negative for nosebleeds.    Respiratory: Negative for cough and shortness of breath.    Cardiovascular:  Negative for chest pain, palpitations and leg swelling.   Gastrointestinal: Negative for abdominal distention, abdominal pain and nausea.   Genitourinary: Negative for frequency.   Musculoskeletal: Negative for arthralgias and myalgias.   Skin: Negative for rash.   Neurological: Negative for dizziness and numbness.   Hematological: Does not bruise/bleed easily.     Objective:   Physical Exam  Constitutional:       Appearance: He is well-developed. He is obese.   HENT:      Head: Normocephalic and atraumatic.   Neck:      Musculoskeletal: Normal range of motion.   Cardiovascular:      Rate and Rhythm: Normal rate and regular rhythm.      Heart sounds: Normal heart sounds.   Pulmonary:      Effort: Pulmonary effort is normal.      Breath sounds: Normal breath sounds.   Abdominal:      Palpations: Abdomen is soft.   Musculoskeletal: Normal range of motion.   Skin:     General: Skin is warm and dry.      Capillary Refill: Capillary refill takes less than 2 seconds.   Neurological:      Mental Status: He is alert and oriented to person, place, and time.       Diagnotic Results:  7/20/2020 CT Chest  Aorta: Left-sided aortic arch with normal branching pattern and mild atherosclerotic calcifications throughout its course.  There is motion artifact making measurements of the aorta suboptimal and difficult to compare on coronal images.  Aortic measurements on this noncontrast study are as follows:  - Prox AAo = 4.4 cm  (axial series 2 image 51)  - Mid AAo = 5.2 cm  (axial series 2 image 45), previously 5.1 cm when remeasured similarly.  - Distal AAo = 3.8 cm  (sagittal series 603 image 180), previously 3.7 cm  - Ao arch = 3.2 cm  (sagittal series 603 image 186), previously 3.2 cm  - Isthmus = 3.2 cm  (sagittal series 603 image 189), previously 3.3 cm  - Prox D'g Ao = 3.7 cm  (sagittal series 603 image 181), previously 3.5 cm  - Mid D'g Ao = 3.7 cm  (sagittal series 603 image 176), previously 3.6 cm  - Distal D'g Ao = 3.6 cm   (sagittal series 603 image 176), previously 3.6 cm     7/17/2019 CT Chest   Aorta: Sided aortic arch which demonstrates normal branching pattern and maintains normal contour and course.  There is abnormal calcification involving the aortic valve/annulus and mild atherosclerosis of the aorta.  Aortic measurements on this noncontrast study are as follows:  - Mid AAo = 4.9 cm  (coronal series 603 image 105)  - Distal AAo = 3.7 cm  (sagittal series 604 image 135)  - Ao arch = 3.2 cm  (sagittal series 604 image 140)  - Isthmus = 3.3 cm  (sagittal series 604 image 142)  - Prox D'g Ao = 3.5 cm  (sagittal series 604 image 138)  - Mid D'g Ao = 3.6 cm  (sagittal series 604 image 138)  - Distal D'g Ao = 3.6 cm  (sagittal series 604 image 138)  Assessment:   1. TAA  Plan:   Follow up in one year repeat imaging (CT Chest). Discussed optimizing blood pressure control and weight loss to reach a goal of BMI less than 35, if this goal is not reached there will be no need to follow up with CTS after next visit.  Patient states a verbal understanding to these instructions given.  Patient is going to work on losing weight and maintaining his blood pressure.

## 2020-08-19 ENCOUNTER — TELEPHONE (OUTPATIENT)
Dept: PRIMARY CARE CLINIC | Facility: CLINIC | Age: 70
End: 2020-08-19

## 2020-08-19 DIAGNOSIS — Z00.00 ANNUAL PHYSICAL EXAM: Primary | ICD-10-CM

## 2020-08-19 DIAGNOSIS — I71.20 THORACIC AORTIC ANEURYSM WITHOUT RUPTURE: ICD-10-CM

## 2020-08-19 NOTE — TELEPHONE ENCOUNTER
----- Message from Feli Lipscomb sent at 8/19/2020 12:14 PM CDT -----  Doctor appointment and lab have been scheduled.  Please link lab orders to the lab appointment.  Date of doctor appointment:  11/17/20  Date of lab appointment:  11/10/20  Physical or EP: physical  Comments:

## 2020-08-20 ENCOUNTER — OFFICE VISIT (OUTPATIENT)
Dept: PRIMARY CARE CLINIC | Facility: CLINIC | Age: 70
End: 2020-08-20
Payer: MEDICARE

## 2020-08-20 VITALS
TEMPERATURE: 98 F | SYSTOLIC BLOOD PRESSURE: 138 MMHG | DIASTOLIC BLOOD PRESSURE: 83 MMHG | OXYGEN SATURATION: 94 % | HEART RATE: 75 BPM

## 2020-08-20 DIAGNOSIS — L30.9 DERMATITIS: ICD-10-CM

## 2020-08-20 DIAGNOSIS — I71.21 ASCENDING AORTIC ANEURYSM: ICD-10-CM

## 2020-08-20 DIAGNOSIS — I10 HTN (HYPERTENSION), BENIGN: ICD-10-CM

## 2020-08-20 DIAGNOSIS — J30.1 SEASONAL ALLERGIC RHINITIS DUE TO POLLEN: ICD-10-CM

## 2020-08-20 DIAGNOSIS — R09.82 POST-NASAL DRIP: ICD-10-CM

## 2020-08-20 DIAGNOSIS — J02.9 SORE THROAT: Primary | ICD-10-CM

## 2020-08-20 DIAGNOSIS — E66.01 MORBID OBESITY WITH BMI OF 40.0-44.9, ADULT: ICD-10-CM

## 2020-08-20 PROCEDURE — 99499 UNLISTED E&M SERVICE: CPT | Mod: HCNC,95,, | Performed by: FAMILY MEDICINE

## 2020-08-20 PROCEDURE — 1159F PR MEDICATION LIST DOCUMENTED IN MEDICAL RECORD: ICD-10-PCS | Mod: HCNC,95,, | Performed by: FAMILY MEDICINE

## 2020-08-20 PROCEDURE — 1159F MED LIST DOCD IN RCRD: CPT | Mod: HCNC,95,, | Performed by: FAMILY MEDICINE

## 2020-08-20 PROCEDURE — 99499 RISK ADDL DX/OHS AUDIT: ICD-10-PCS | Mod: HCNC,95,, | Performed by: FAMILY MEDICINE

## 2020-08-20 PROCEDURE — 1101F PT FALLS ASSESS-DOCD LE1/YR: CPT | Mod: HCNC,CPTII,95, | Performed by: FAMILY MEDICINE

## 2020-08-20 PROCEDURE — 99214 OFFICE O/P EST MOD 30 MIN: CPT | Mod: HCNC,95,, | Performed by: FAMILY MEDICINE

## 2020-08-20 PROCEDURE — 1101F PR PT FALLS ASSESS DOC 0-1 FALLS W/OUT INJ PAST YR: ICD-10-PCS | Mod: HCNC,CPTII,95, | Performed by: FAMILY MEDICINE

## 2020-08-20 PROCEDURE — 99214 PR OFFICE/OUTPT VISIT, EST, LEVL IV, 30-39 MIN: ICD-10-PCS | Mod: HCNC,95,, | Performed by: FAMILY MEDICINE

## 2020-08-20 RX ORDER — MONTELUKAST SODIUM 10 MG/1
10 TABLET ORAL DAILY
Qty: 30 TABLET | Refills: 12 | Status: SHIPPED | OUTPATIENT
Start: 2020-08-20 | End: 2020-09-19

## 2020-08-20 RX ORDER — BENZONATATE 200 MG/1
200 CAPSULE ORAL 3 TIMES DAILY PRN
Qty: 30 CAPSULE | Refills: 0 | Status: SHIPPED | OUTPATIENT
Start: 2020-08-20 | End: 2020-08-30

## 2020-08-20 RX ORDER — IPRATROPIUM BROMIDE 42 UG/1
2 SPRAY, METERED NASAL 3 TIMES DAILY
Qty: 15 ML | Refills: 12 | Status: SHIPPED | OUTPATIENT
Start: 2020-08-20 | End: 2023-03-14

## 2020-08-20 RX ORDER — NYSTATIN AND TRIAMCINOLONE ACETONIDE 100000; 1 [USP'U]/G; MG/G
CREAM TOPICAL 4 TIMES DAILY
Qty: 60 G | Refills: 0 | Status: SHIPPED | OUTPATIENT
Start: 2020-08-20 | End: 2020-10-02 | Stop reason: SDUPTHER

## 2020-08-20 NOTE — PROGRESS NOTES
Subjective:      Patient ID: Adan Cain is a 70 y.o. male.    Chief Complaint: No chief complaint on file.    The patient location is: home  The chief complaint leading to consultation is: sinuses    Visit type: audiovisual    Face to Face time with patient:2:26 - 46  30 minutes of total time spent on the encounter, which includes face to face time and non-face to face time preparing to see the patient (eg, review of tests), Obtaining and/or reviewing separately obtained history, Documenting clinical information in the electronic or other health record, Independently interpreting results (not separately reported) and communicating results to the patient/family/caregiver, or Care coordination (not separately reported).         Each patient to whom he or she provides medical services by telemedicine is:  (1) informed of the relationship between the physician and patient and the respective role of any other health care provider with respect to management of the patient; and (2) notified that he or she may decline to receive medical services by telemedicine and may withdraw from such care at any time.    Notes:       Sore throat with allergies. Coughing up phlegm, post nasal drip. Worse in morning. Having it awhile. No fever or diarrhea. Using Claritin & Zyrtec, plain mucinex prior to bedtime. I feel like if I had a bottle brush I'd scrape my throat. 94 % oxygen level.     Jock itch won't go away. Using Lotrimin spray. 2 weeks. Not much better    He has seen cardiologist for AAA & takes metoprolol 25 mg qd.     CT 7/20/202  Impression:     Allowing for patient motion artifact, there is stable fusiform dilatation of the thoracic aorta allowing for differences in measuring technique, as detailed above.  Mild aortic calcific atherosclerosis and calcification of the aortic valve.  Tiny hiatal hernia.  Simple left hepatic lobe cysts  Cholelithiasis.      Current Outpatient Medications:     BABY ASPIRIN ORAL, Take 81  tablets by mouth every evening. , Disp: , Rfl:     benzonatate (TESSALON) 200 MG capsule, Take 1 capsule (200 mg total) by mouth 3 (three) times daily as needed., Disp: 30 capsule, Rfl: 0    cetirizine (ZYRTEC) 10 MG tablet, Take 1 tablet (10 mg total) by mouth once daily. (Patient taking differently: Take 10 mg by mouth as needed. ), Disp: , Rfl: 0    cholecalciferol, vitamin D3, 1,000 unit capsule, Take 1,000 Units by mouth 2 (two) times daily. , Disp: , Rfl:     coenzyme Q10 (CO Q-10) 100 mg capsule, Take by mouth once daily., Disp: , Rfl:     diazePAM (VALIUM) 5 MG tablet, Take 1 tablet (5 mg total) by mouth once. 30 min prior to MRI for 1 dose, Disp: 1 tablet, Rfl: 0    fluticasone (FLONASE) 50 mcg/actuation nasal spray, PLACE 1 SPRAY INTO EACH NOSTRIL DAILY (Patient taking differently: Pt states he use as needed), Disp: 16 g, Rfl: 12    glucosamine-chondroitin 500-400 mg tablet, Take 1 tablet by mouth 2 (two) times daily. , Disp: , Rfl:     ipratropium (ATROVENT) 42 mcg (0.06 %) nasal spray, 2 sprays by Nasal route 3 (three) times daily., Disp: 15 mL, Rfl: 12    LORazepam (ATIVAN) 1 MG tablet, Take 1 tablet by mouth 1 hour prior to MRI; May repeat once if needed. (Patient not taking: Reported on 7/20/2020), Disp: 2 tablet, Rfl: 0    metoprolol succinate (TOPROL-XL) 25 MG 24 hr tablet, Take 1 tablet (25 mg total) by mouth once daily., Disp: 30 tablet, Rfl: 0    montelukast (SINGULAIR) 10 mg tablet, Take 1 tablet (10 mg total) by mouth once daily., Disp: 30 tablet, Rfl: 12    MULTIVITAMIN W-MINERALS/LUTEIN (CENTRUM SILVER ORAL), Take by mouth once daily. , Disp: , Rfl:     nystatin-triamcinolone (MYCOLOG II) cream, Apply topically 4 (four) times daily., Disp: 60 g, Rfl: 0    omega-3 fatty acids (FISH OIL) 300 mg Cap, Take 1 tablet by mouth once daily. , Disp: , Rfl:     UNABLE TO FIND, Take by mouth nightly. tumeric, Disp: , Rfl:     UNABLE TO FIND, once daily. actaline, Disp: , Rfl:     UNABLE  TO FIND, Use as directed 1 tablet in the mouth or throat 2 (two) times daily. Prostate Revive manufactured by [employer], Disp: , Rfl:     VITAMIN B COMPLEX (SUPER B COMPLEX-B-12 ORAL), Take by mouth., Disp: , Rfl:     Lab Results   Component Value Date    HGBA1C 5.3 05/27/2018     No results found for: MICALBCREAT  Lab Results   Component Value Date    LDLCALC 119.6 05/26/2018    LDLCALC 115.4 10/24/2017    CHOL 168 05/26/2018    HDL 36 (L) 05/26/2018    TRIG 62 05/26/2018       Lab Results   Component Value Date     09/11/2019    K 4.9 09/11/2019     09/11/2019    CO2 29 09/11/2019    GLU 86 09/11/2019    BUN 19 09/11/2019    CREATININE 0.8 09/11/2019    CALCIUM 9.9 09/11/2019    PROT 8.0 09/11/2019    ALBUMIN 3.8 09/11/2019    BILITOT 0.6 09/11/2019    ALKPHOS 108 09/11/2019    AST 23 09/11/2019    ALT 18 09/11/2019    ANIONGAP 6 (L) 09/11/2019    ESTGFRAFRICA >60 09/11/2019    EGFRNONAA >60 09/11/2019    WBC 7.75 01/14/2019    HGB 14.1 01/14/2019    HGB 13.0 (L) 05/28/2018    HCT 41.7 01/14/2019    MCV 91 01/14/2019     01/14/2019    TSH 5.426 (H) 11/11/2019    PSA 2.7 01/20/2015    PSA 1.5 05/30/2008    PSADIAG 2.5 07/16/2018    PSADIAG 2.8 06/12/2017    HEPCAB Negative 10/24/2017       Lab Results   Component Value Date    DHBOHTTY37 1249 (H) 11/11/2019         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, didn't use ear protection    DJD (degenerative joint disease) of hip 1/29/2015    [place] cardiac risk 10-20% in next 10 years 10/31/2017    recommended statin, but he declines    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    MCA , see MRI     HTN (hypertension), benign 8/20/2020    HTN (hypertension), benign 8/20/2020    Morbid obesity with BMI of 40.0-44.9, adult 1/29/2015    Right-sided low back pain with right-sided sciatica 3/6/2019    Seasonal allergic rhinitis due to pollen 4/13/2016    Thoracic aortic aneurysm without rupture 5/27/2018    tx with Su, Cardiologist Dr Stone, follows yearly     Past Surgical History:   Procedure Laterality Date    CATARACT EXTRACTION      HERNIA REPAIR      YAG LAser Capsulotomy Bilateral     2/18/2019 OS Dr. Cornelius     Social History     Social History Narrative     to 'T', 2 children, nonsmoker, ETOH none, never had colonscopy & declines     Family History   Problem Relation Age of Onset    Leukemia Father     Aneurysm Father     Diabetes Mother     Cataracts Paternal Uncle     Amblyopia Neg Hx     Blindness Neg Hx     Glaucoma Neg Hx     Macular degeneration Neg Hx     Retinal detachment Neg Hx     Strabismus Neg Hx      Vitals:    08/20/20 1434 08/20/20 1439   BP: 138/83    Pulse:  75   Temp: 97.8 °F (36.6 °C)    SpO2:  (!) 94%     Objective:   Physical Exam  Assessment:     1. Sore throat    2. Post-nasal drip    3. Dermatitis    4. Seasonal allergic rhinitis due to pollen    5. Morbid obesity with BMI of 40.0-44.9, adult    6. HTN (hypertension), benign    7. Ascending aortic aneurysm      Plan:     Orders Placed This Encounter    montelukast (SINGULAIR) 10 mg tablet    ipratropium (ATROVENT) 42 mcg (0.06 %) nasal spray    nystatin-triamcinolone (MYCOLOG II) cream    benzonatate (TESSALON) 200 MG capsule     I watched him do the BP cuff & he was putting it on upside down. We discussed correct way to use home BP cuff.     Bring home BP readings to upcoming physical    Continue metoprolol 25mg eaily    Continue other medications    Let me know if you're not better    TELEMED        There are no Patient Instructions on file for this visit.                            Answers  for HPI/ROS submitted by the patient on 8/20/2020   Sore throat  Chronicity: recurrent  Onset: 1 to 4 weeks ago  Progression since onset: unchanged  Pain worse on: neither  Fever: no fever  Pain - numeric: 5/10  abdominal pain: No  congestion: Yes  cough: Yes  diarrhea: No  drooling: No  ear discharge: No  ear pain: No  headaches: No  hoarse voice: Yes  neck pain: No  plugged ear sensation: No  shortness of breath: No  stridor: No  swollen glands: No  trouble swallowing: No  vomiting: No  strep: No  mono: No  Treatments tried: gargles  Improvement on treatment: mild  Pain severity: mild

## 2020-09-09 NOTE — TELEPHONE ENCOUNTER
Returned pt phone call to schedule an earlier appt for CT scan. The pt and his wife, Anastasiia, stated the time no longer works. The wife stated that they would like to have an earlier appt, tomorrow if it could be possible. Called CT at [facility] and spoke to Autumn. Autumn offered an appt for 1/18/19 at 8:30a. Called the pt and his wife back to offer the appt for that day. The pt and his wife accepted the appt date and time. Pt and wife notified and verbalized understanding.    Vaccine Information Sheet, \"Influenza - Inactivated\"  given to Popeye Beatty, or parent/legal guardian of  Popeye Beatty and verbalized understanding. Patient responses:    Have you ever had a reaction to a flu vaccine? No  Do you have any current illness? No  Have you ever had [place] Syndrome? No  Do you have a serious allergy to any of the follow: Neomycin, Polymyxin, Thimerosal, eggs or egg products? No    Flu vaccine given per order. Please see immunization tab. Risks and benefits explained. Current VIS given.       Immunizations Administered     Name Date Dose Route    Influenza, Quadv, adjuvanted, 65 yrs +, IM, PF (Fluad) 9/9/2020 0.5 mL Intramuscular    Site: Deltoid- Left    Lot: [id number]    NDC: 71128-053-69

## 2020-09-29 ENCOUNTER — PATIENT MESSAGE (OUTPATIENT)
Dept: PRIMARY CARE CLINIC | Facility: CLINIC | Age: 70
End: 2020-09-29

## 2020-09-29 ENCOUNTER — PATIENT MESSAGE (OUTPATIENT)
Dept: OTHER | Facility: OTHER | Age: 70
End: 2020-09-29

## 2020-09-29 NOTE — TELEPHONE ENCOUNTER
Spoke with Keny at [facility].  Pt got high dose flu shot at their pharm on 9/17/20.  Immunization history updated. Please update HM.

## 2020-10-02 ENCOUNTER — PATIENT MESSAGE (OUTPATIENT)
Dept: PRIMARY CARE CLINIC | Facility: CLINIC | Age: 70
End: 2020-10-02

## 2020-10-02 RX ORDER — NYSTATIN AND TRIAMCINOLONE ACETONIDE 100000; 1 [USP'U]/G; MG/G
CREAM TOPICAL 4 TIMES DAILY
Qty: 60 G | Refills: 0 | Status: SHIPPED | OUTPATIENT
Start: 2020-10-02 | End: 2020-11-23 | Stop reason: SDUPTHER

## 2020-10-23 ENCOUNTER — PES CALL (OUTPATIENT)
Dept: ADMINISTRATIVE | Facility: CLINIC | Age: 70
End: 2020-10-23

## 2020-10-23 DIAGNOSIS — I63.412 CEREBRAL INFARCTION DUE TO EMBOLISM OF LEFT MIDDLE CEREBRAL ARTERY: ICD-10-CM

## 2020-10-23 DIAGNOSIS — Z12.11 SCREEN FOR COLON CANCER: Primary | ICD-10-CM

## 2020-10-23 RX ORDER — METOPROLOL SUCCINATE 25 MG/1
25 TABLET, EXTENDED RELEASE ORAL DAILY
Qty: 30 TABLET | Refills: 0 | Status: SHIPPED | OUTPATIENT
Start: 2020-10-23 | End: 2020-11-23 | Stop reason: SDUPTHER

## 2020-10-23 NOTE — TELEPHONE ENCOUNTER
Left message for patient blood pressure medication refilled also it's been a year since you last colon screening and one has been mailed to your home please return within 7 days of receiving the kit.

## 2020-10-23 NOTE — TELEPHONE ENCOUNTER
I filled his BP medication    Please let him know it's been a year since we did the stool colon cancer screening kit. I sent another one to his home . Please return to the lab within 7d of receiving the kit. thanks

## 2020-11-11 LAB — Lab: NEGATIVE

## 2020-11-23 ENCOUNTER — PATIENT OUTREACH (OUTPATIENT)
Dept: ADMINISTRATIVE | Facility: HOSPITAL | Age: 70
End: 2020-11-23

## 2020-11-23 DIAGNOSIS — I63.412 CEREBRAL INFARCTION DUE TO EMBOLISM OF LEFT MIDDLE CEREBRAL ARTERY: ICD-10-CM

## 2020-11-23 RX ORDER — METOPROLOL SUCCINATE 25 MG/1
25 TABLET, EXTENDED RELEASE ORAL DAILY
Qty: 30 TABLET | Refills: 0 | Status: SHIPPED | OUTPATIENT
Start: 2020-11-23 | End: 2020-12-21 | Stop reason: SDUPTHER

## 2020-11-23 RX ORDER — NYSTATIN AND TRIAMCINOLONE ACETONIDE 100000; 1 [USP'U]/G; MG/G
CREAM TOPICAL 4 TIMES DAILY
Qty: 60 G | Refills: 0 | Status: SHIPPED | OUTPATIENT
Start: 2020-11-23 | End: 2021-09-17 | Stop reason: SDUPTHER

## 2020-11-23 NOTE — TELEPHONE ENCOUNTER
----- Message from Steffany Joseph MA sent at 11/23/2020  1:24 PM CST -----  Contact: self  [phone]  Requesting an RX refill or new RX.  Is this a refill or new RX:   RX name and strength:nystatin-triamcinolone (MYCOLOG II) cream  Is this a 30 day or 90 day RX:   Pharmacy name and phone # (copy/paste from chart):  [facility] [place] [facility] [place] [id number] [facility] [phone] (Phone)  [phone] (Fax  Comments:   Requesting an RX refill or new RX.  Is this a refill or new RX:   RX name and strength:metoprolol succinate (TOPROL-XL) 25 MG 24 hr tablet  Is this a 30 day or 90 day RX: 90  Pharmacy name and phone # (copy/paste from chart[facility] [place] [facility] [address] [facility] [phone] (Phone)  [phone] (Fax  Comments: pt has a 2 day supply left

## 2020-11-23 NOTE — TELEPHONE ENCOUNTER
----- Message from Theresa Ku sent at 11/23/2020  8:55 AM CST -----  Contact: [facility]  Requesting an RX refill or new RX.  Is this a refill or new RX: Refill  RX name and strength: metoprolol succinate (TOPROL-XL) 25 MG 24 hr tablet  Is this a 30 day or 90 day RX: 90  Pharmacy name and phone #:   [facility] - [place] - [address] [place] [address]  [place] [address]  Phone: [phone] Fax: [phone]    Comments:     Thank You

## 2020-11-25 ENCOUNTER — PATIENT OUTREACH (OUTPATIENT)
Dept: ADMINISTRATIVE | Facility: HOSPITAL | Age: 70
End: 2020-11-25

## 2020-11-25 NOTE — PROGRESS NOTES
Patient due for the following    High Dose Statin     Shingles Vaccine (1 of 2)      Pre-visit chart review completed.  Immunizations: reviewed and updated  Care Everywhere: triggered  Care Teams: up to date  Outreach: none needed

## 2020-12-02 ENCOUNTER — LAB VISIT (OUTPATIENT)
Dept: LAB | Facility: HOSPITAL | Age: 70
End: 2020-12-02
Attending: FAMILY MEDICINE
Payer: MEDICARE

## 2020-12-02 DIAGNOSIS — I71.20 THORACIC AORTIC ANEURYSM WITHOUT RUPTURE: ICD-10-CM

## 2020-12-02 DIAGNOSIS — Z00.00 ANNUAL PHYSICAL EXAM: ICD-10-CM

## 2020-12-02 LAB
ALBUMIN SERPL BCP-MCNC: 3.6 G/DL (ref 3.5–5.2)
ALP SERPL-CCNC: 99 U/L (ref 55–135)
ALT SERPL W/O P-5'-P-CCNC: 23 U/L (ref 10–44)
ANION GAP SERPL CALC-SCNC: 7 MMOL/L (ref 8–16)
AST SERPL-CCNC: 20 U/L (ref 10–40)
BASOPHILS # BLD AUTO: 0.09 K/UL (ref 0–0.2)
BASOPHILS NFR BLD: 1 % (ref 0–1.9)
BILIRUB SERPL-MCNC: 0.9 MG/DL (ref 0.1–1)
BUN SERPL-MCNC: 20 MG/DL (ref 8–23)
CALCIUM SERPL-MCNC: 9.2 MG/DL (ref 8.7–10.5)
CHLORIDE SERPL-SCNC: 105 MMOL/L (ref 95–110)
CHOLEST SERPL-MCNC: 186 MG/DL (ref 120–199)
CHOLEST/HDLC SERPL: 4.7 {RATIO} (ref 2–5)
CO2 SERPL-SCNC: 27 MMOL/L (ref 23–29)
CREAT SERPL-MCNC: 0.8 MG/DL (ref 0.5–1.4)
DIFFERENTIAL METHOD: ABNORMAL
EOSINOPHIL # BLD AUTO: 0.5 K/UL (ref 0–0.5)
EOSINOPHIL NFR BLD: 5.2 % (ref 0–8)
ERYTHROCYTE [DISTWIDTH] IN BLOOD BY AUTOMATED COUNT: 13.7 % (ref 11.5–14.5)
EST. GFR  (AFRICAN AMERICAN): >60 ML/MIN/1.73 M^2
EST. GFR  (NON AFRICAN AMERICAN): >60 ML/MIN/1.73 M^2
GLUCOSE SERPL-MCNC: 103 MG/DL (ref 70–110)
HCT VFR BLD AUTO: 44.9 % (ref 40–54)
HDLC SERPL-MCNC: 40 MG/DL (ref 40–75)
HDLC SERPL: 21.5 % (ref 20–50)
HGB BLD-MCNC: 14.4 G/DL (ref 14–18)
IMM GRANULOCYTES # BLD AUTO: 0.02 K/UL (ref 0–0.04)
IMM GRANULOCYTES NFR BLD AUTO: 0.2 % (ref 0–0.5)
LDLC SERPL CALC-MCNC: 127 MG/DL (ref 63–159)
LYMPHOCYTES # BLD AUTO: 2.8 K/UL (ref 1–4.8)
LYMPHOCYTES NFR BLD: 31.3 % (ref 18–48)
MCH RBC QN AUTO: 31.2 PG (ref 27–31)
MCHC RBC AUTO-ENTMCNC: 32.1 G/DL (ref 32–36)
MCV RBC AUTO: 97 FL (ref 82–98)
MONOCYTES # BLD AUTO: 1 K/UL (ref 0.3–1)
MONOCYTES NFR BLD: 10.5 % (ref 4–15)
NEUTROPHILS # BLD AUTO: 4.7 K/UL (ref 1.8–7.7)
NEUTROPHILS NFR BLD: 51.8 % (ref 38–73)
NONHDLC SERPL-MCNC: 146 MG/DL
NRBC BLD-RTO: 0 /100 WBC
PLATELET # BLD AUTO: 264 K/UL (ref 150–350)
PMV BLD AUTO: 11.4 FL (ref 9.2–12.9)
POTASSIUM SERPL-SCNC: 4.3 MMOL/L (ref 3.5–5.1)
PROT SERPL-MCNC: 7.7 G/DL (ref 6–8.4)
RBC # BLD AUTO: 4.61 M/UL (ref 4.6–6.2)
SODIUM SERPL-SCNC: 139 MMOL/L (ref 136–145)
TRIGL SERPL-MCNC: 95 MG/DL (ref 30–150)
WBC # BLD AUTO: 9.03 K/UL (ref 3.9–12.7)

## 2020-12-02 PROCEDURE — 36415 COLL VENOUS BLD VENIPUNCTURE: CPT | Mod: HCNC,PN

## 2020-12-02 PROCEDURE — 85025 COMPLETE CBC W/AUTO DIFF WBC: CPT | Mod: HCNC

## 2020-12-02 PROCEDURE — 80053 COMPREHEN METABOLIC PANEL: CPT | Mod: HCNC

## 2020-12-02 PROCEDURE — 80061 LIPID PANEL: CPT | Mod: HCNC

## 2020-12-09 ENCOUNTER — OFFICE VISIT (OUTPATIENT)
Dept: PRIMARY CARE CLINIC | Facility: CLINIC | Age: 70
End: 2020-12-09
Payer: MEDICARE

## 2020-12-09 VITALS
TEMPERATURE: 98 F | DIASTOLIC BLOOD PRESSURE: 78 MMHG | RESPIRATION RATE: 19 BRPM | OXYGEN SATURATION: 98 % | HEIGHT: 73 IN | HEART RATE: 78 BPM | BODY MASS INDEX: 39.73 KG/M2 | SYSTOLIC BLOOD PRESSURE: 124 MMHG | WEIGHT: 299.81 LBS

## 2020-12-09 DIAGNOSIS — I10 HTN (HYPERTENSION), BENIGN: ICD-10-CM

## 2020-12-09 DIAGNOSIS — M25.571 CHRONIC PAIN OF BOTH ANKLES: ICD-10-CM

## 2020-12-09 DIAGNOSIS — I71.21 ASCENDING AORTIC ANEURYSM: ICD-10-CM

## 2020-12-09 DIAGNOSIS — L30.9 DERMATITIS: ICD-10-CM

## 2020-12-09 DIAGNOSIS — I63.412 CEREBRAL INFARCTION DUE TO EMBOLISM OF LEFT MIDDLE CEREBRAL ARTERY: ICD-10-CM

## 2020-12-09 DIAGNOSIS — R79.89 ABNORMAL TSH: ICD-10-CM

## 2020-12-09 DIAGNOSIS — H90.A11 CONDUCTIVE HEARING LOSS OF RIGHT EAR WITH RESTRICTED HEARING OF LEFT EAR: ICD-10-CM

## 2020-12-09 DIAGNOSIS — G89.29 CHRONIC PAIN OF BOTH ANKLES: ICD-10-CM

## 2020-12-09 DIAGNOSIS — N40.1 BENIGN PROSTATIC HYPERPLASIA WITH NOCTURIA: ICD-10-CM

## 2020-12-09 DIAGNOSIS — M25.572 CHRONIC PAIN OF BOTH ANKLES: ICD-10-CM

## 2020-12-09 DIAGNOSIS — Z00.00 ROUTINE GENERAL MEDICAL EXAMINATION AT A HEALTH CARE FACILITY: Primary | ICD-10-CM

## 2020-12-09 DIAGNOSIS — E66.01 MORBID OBESITY WITH BMI OF 40.0-44.9, ADULT: ICD-10-CM

## 2020-12-09 DIAGNOSIS — R35.1 BENIGN PROSTATIC HYPERPLASIA WITH NOCTURIA: ICD-10-CM

## 2020-12-09 PROCEDURE — 99999 PR PBB SHADOW E&M-EST. PATIENT-LVL IV: ICD-10-PCS | Mod: PBBFAC,HCNC,, | Performed by: FAMILY MEDICINE

## 2020-12-09 PROCEDURE — 1101F PR PT FALLS ASSESS DOC 0-1 FALLS W/OUT INJ PAST YR: ICD-10-PCS | Mod: HCNC,CPTII,S$GLB, | Performed by: FAMILY MEDICINE

## 2020-12-09 PROCEDURE — 99397 PER PM REEVAL EST PAT 65+ YR: CPT | Mod: HCNC,S$GLB,, | Performed by: FAMILY MEDICINE

## 2020-12-09 PROCEDURE — 99999 PR PBB SHADOW E&M-EST. PATIENT-LVL IV: CPT | Mod: PBBFAC,HCNC,, | Performed by: FAMILY MEDICINE

## 2020-12-09 PROCEDURE — 1157F PR ADVANCE CARE PLAN OR EQUIV PRESENT IN MEDICAL RECORD: ICD-10-PCS | Mod: HCNC,S$GLB,, | Performed by: FAMILY MEDICINE

## 2020-12-09 PROCEDURE — 1126F PR PAIN SEVERITY QUANTIFIED, NO PAIN PRESENT: ICD-10-PCS | Mod: HCNC,S$GLB,, | Performed by: FAMILY MEDICINE

## 2020-12-09 PROCEDURE — 1101F PT FALLS ASSESS-DOCD LE1/YR: CPT | Mod: HCNC,CPTII,S$GLB, | Performed by: FAMILY MEDICINE

## 2020-12-09 PROCEDURE — 1157F ADVNC CARE PLAN IN RCRD: CPT | Mod: HCNC,S$GLB,, | Performed by: FAMILY MEDICINE

## 2020-12-09 PROCEDURE — 99397 PR PREVENTIVE VISIT,EST,65 & OVER: ICD-10-PCS | Mod: HCNC,S$GLB,, | Performed by: FAMILY MEDICINE

## 2020-12-09 PROCEDURE — 3288F PR FALLS RISK ASSESSMENT DOCUMENTED: ICD-10-PCS | Mod: HCNC,CPTII,S$GLB, | Performed by: FAMILY MEDICINE

## 2020-12-09 PROCEDURE — 1126F AMNT PAIN NOTED NONE PRSNT: CPT | Mod: HCNC,S$GLB,, | Performed by: FAMILY MEDICINE

## 2020-12-09 PROCEDURE — 3008F PR BODY MASS INDEX (BMI) DOCUMENTED: ICD-10-PCS | Mod: HCNC,CPTII,S$GLB, | Performed by: FAMILY MEDICINE

## 2020-12-09 PROCEDURE — 99499 UNLISTED E&M SERVICE: CPT | Mod: S$GLB,,, | Performed by: FAMILY MEDICINE

## 2020-12-09 PROCEDURE — 3288F FALL RISK ASSESSMENT DOCD: CPT | Mod: HCNC,CPTII,S$GLB, | Performed by: FAMILY MEDICINE

## 2020-12-09 PROCEDURE — 99499 RISK ADDL DX/OHS AUDIT: ICD-10-PCS | Mod: S$GLB,,, | Performed by: FAMILY MEDICINE

## 2020-12-09 PROCEDURE — 3008F BODY MASS INDEX DOCD: CPT | Mod: HCNC,CPTII,S$GLB, | Performed by: FAMILY MEDICINE

## 2020-12-09 RX ORDER — A/SINGAPORE/GP1908/2015 IVR-180 (AN A/MICHIGAN/45/2015 (H1N1)PDM09-LIKE VIRUS, A/HONG KONG/4801/2014, NYMC X-263B (H3N2) (AN A/HONG KONG/4801/2014-LIKE VIRUS), AND B/BRISBANE/60/2008, WILD TYPE (A B/BRISBANE/60/2008-LIKE VIRUS) 15; 15; 15 UG/.5ML; UG/.5ML; UG/.5ML
INJECTION, SUSPENSION INTRAMUSCULAR
COMMUNITY
Start: 2020-09-17 | End: 2022-06-29 | Stop reason: ALTCHOICE

## 2020-12-09 RX ORDER — LORATADINE 10 MG/1
10 TABLET ORAL DAILY PRN
COMMUNITY
Start: 2020-10-16

## 2020-12-09 RX ORDER — ROSUVASTATIN CALCIUM 10 MG/1
10 TABLET, COATED ORAL DAILY
Qty: 90 TABLET | Refills: 3 | Status: SHIPPED | OUTPATIENT
Start: 2020-12-09 | End: 2022-03-24 | Stop reason: SDUPTHER
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
## 2020-12-11 ENCOUNTER — PATIENT MESSAGE (OUTPATIENT)
Dept: OTHER | Facility: OTHER | Age: 70
End: 2020-12-11

## 2020-12-21 DIAGNOSIS — I63.412 CEREBRAL INFARCTION DUE TO EMBOLISM OF LEFT MIDDLE CEREBRAL ARTERY: ICD-10-CM

## 2020-12-21 NOTE — TELEPHONE ENCOUNTER
----- Message from Kady Talbert sent at 12/21/2020  1:27 PM CST -----  Contact: Ambrose Worley [phone]  Type: Rx    Name of medication(s): metoprolol succinate (TOPROL-XL) 25 MG 24 hr tablet    Is this a refill? New rx? New refill    Who prescribed medication?Dr Reyes    Pharmacy Name, Phone, & Location: [facility] [place] [place] - [address] [place] [address] [phone]      Comments: asking for 30 day supply with additional refills    thanks

## 2020-12-22 RX ORDER — METOPROLOL SUCCINATE 25 MG/1
25 TABLET, EXTENDED RELEASE ORAL DAILY
Qty: 90 TABLET | Refills: 3 | Status: SHIPPED | OUTPATIENT
Start: 2020-12-22 | End: 2021-02-10 | Stop reason: SDUPTHER

## 2020-12-23 ENCOUNTER — OFFICE VISIT (OUTPATIENT)
Dept: PODIATRY | Facility: CLINIC | Age: 70
End: 2020-12-23
Payer: MEDICARE

## 2020-12-23 VITALS
SYSTOLIC BLOOD PRESSURE: 150 MMHG | HEART RATE: 75 BPM | DIASTOLIC BLOOD PRESSURE: 78 MMHG | WEIGHT: 299.81 LBS | HEIGHT: 73 IN | BODY MASS INDEX: 39.73 KG/M2

## 2020-12-23 DIAGNOSIS — M20.12 VALGUS DEFORMITY OF BOTH GREAT TOES: ICD-10-CM

## 2020-12-23 DIAGNOSIS — M20.11 VALGUS DEFORMITY OF BOTH GREAT TOES: ICD-10-CM

## 2020-12-23 DIAGNOSIS — M76.821 POSTERIOR TIBIAL TENDINITIS OF RIGHT LEG: Primary | ICD-10-CM

## 2020-12-23 PROCEDURE — 3008F BODY MASS INDEX DOCD: CPT | Mod: HCNC,CPTII,S$GLB, | Performed by: PODIATRIST

## 2020-12-23 PROCEDURE — 99999 PR PBB SHADOW E&M-EST. PATIENT-LVL III: ICD-10-PCS | Mod: PBBFAC,HCNC,, | Performed by: PODIATRIST

## 2020-12-23 PROCEDURE — 1159F PR MEDICATION LIST DOCUMENTED IN MEDICAL RECORD: ICD-10-PCS | Mod: HCNC,S$GLB,, | Performed by: PODIATRIST

## 2020-12-23 PROCEDURE — 1125F PR PAIN SEVERITY QUANTIFIED, PAIN PRESENT: ICD-10-PCS | Mod: HCNC,S$GLB,, | Performed by: PODIATRIST

## 2020-12-23 PROCEDURE — 99214 OFFICE O/P EST MOD 30 MIN: CPT | Mod: HCNC,S$GLB,, | Performed by: PODIATRIST

## 2020-12-23 PROCEDURE — 1157F PR ADVANCE CARE PLAN OR EQUIV PRESENT IN MEDICAL RECORD: ICD-10-PCS | Mod: HCNC,S$GLB,, | Performed by: PODIATRIST

## 2020-12-23 PROCEDURE — 99999 PR PBB SHADOW E&M-EST. PATIENT-LVL III: CPT | Mod: PBBFAC,HCNC,, | Performed by: PODIATRIST

## 2020-12-23 PROCEDURE — 1159F MED LIST DOCD IN RCRD: CPT | Mod: HCNC,S$GLB,, | Performed by: PODIATRIST

## 2020-12-23 PROCEDURE — 1157F ADVNC CARE PLAN IN RCRD: CPT | Mod: HCNC,S$GLB,, | Performed by: PODIATRIST

## 2020-12-23 PROCEDURE — 3008F PR BODY MASS INDEX (BMI) DOCUMENTED: ICD-10-PCS | Mod: HCNC,CPTII,S$GLB, | Performed by: PODIATRIST

## 2020-12-23 PROCEDURE — 1125F AMNT PAIN NOTED PAIN PRSNT: CPT | Mod: HCNC,S$GLB,, | Performed by: PODIATRIST

## 2020-12-23 PROCEDURE — 99214 PR OFFICE/OUTPT VISIT, EST, LEVL IV, 30-39 MIN: ICD-10-PCS | Mod: HCNC,S$GLB,, | Performed by: PODIATRIST

## 2020-12-23 RX ORDER — DICLOFENAC SODIUM 10 MG/G
2 GEL TOPICAL 4 TIMES DAILY
Qty: 1 TUBE | Refills: 2 | Status: SHIPPED | OUTPATIENT
Start: 2020-12-23

## 2021-01-04 ENCOUNTER — IMMUNIZATION (OUTPATIENT)
Dept: INTERNAL MEDICINE | Facility: CLINIC | Age: 71
End: 2021-01-04
Payer: MEDICARE

## 2021-01-04 DIAGNOSIS — Z23 NEED FOR VACCINATION: ICD-10-CM

## 2021-01-04 PROCEDURE — 91300 COVID-19, MRNA, LNP-S, PF, 30 MCG/0.3 ML DOSE VACCINE: CPT | Mod: PBBFAC | Performed by: FAMILY MEDICINE

## 2021-01-13 ENCOUNTER — PATIENT MESSAGE (OUTPATIENT)
Dept: PRIMARY CARE CLINIC | Facility: CLINIC | Age: 71
End: 2021-01-13

## 2021-01-13 ENCOUNTER — NURSE TRIAGE (OUTPATIENT)
Dept: ADMINISTRATIVE | Facility: CLINIC | Age: 71
End: 2021-01-13

## 2021-01-13 ENCOUNTER — OFFICE VISIT (OUTPATIENT)
Dept: CARDIOLOGY | Facility: CLINIC | Age: 71
End: 2021-01-13
Payer: MEDICARE

## 2021-01-13 ENCOUNTER — HOSPITAL ENCOUNTER (OUTPATIENT)
Dept: CARDIOLOGY | Facility: CLINIC | Age: 71
Discharge: HOME | End: 2021-01-13
Payer: MEDICARE

## 2021-01-13 VITALS
WEIGHT: 296.94 LBS | SYSTOLIC BLOOD PRESSURE: 156 MMHG | DIASTOLIC BLOOD PRESSURE: 69 MMHG | OXYGEN SATURATION: 95 % | HEIGHT: 73 IN | BODY MASS INDEX: 39.35 KG/M2 | HEART RATE: 39 BPM

## 2021-01-13 DIAGNOSIS — R00.2 PALPITATIONS: ICD-10-CM

## 2021-01-13 DIAGNOSIS — I71.21 ASCENDING AORTIC ANEURYSM: ICD-10-CM

## 2021-01-13 DIAGNOSIS — R00.2 PALPITATIONS: Primary | ICD-10-CM

## 2021-01-13 DIAGNOSIS — I45.2 BIFASCICULAR BLOCK: ICD-10-CM

## 2021-01-13 DIAGNOSIS — I10 HTN (HYPERTENSION), BENIGN: ICD-10-CM

## 2021-01-13 DIAGNOSIS — I49.3 PVC (PREMATURE VENTRICULAR CONTRACTION): Primary | ICD-10-CM

## 2021-01-13 DIAGNOSIS — E66.01 SEVERE OBESITY (BMI 35.0-39.9) WITH COMORBIDITY: ICD-10-CM

## 2021-01-13 PROCEDURE — 93005 EKG 12-LEAD: ICD-10-PCS | Mod: HCNC,S$GLB,, | Performed by: INTERNAL MEDICINE

## 2021-01-13 PROCEDURE — 93005 ELECTROCARDIOGRAM TRACING: CPT | Mod: HCNC,S$GLB,, | Performed by: INTERNAL MEDICINE

## 2021-01-13 PROCEDURE — 99214 PR OFFICE/OUTPT VISIT, EST, LEVL IV, 30-39 MIN: ICD-10-PCS | Mod: HCNC,S$GLB,, | Performed by: INTERNAL MEDICINE

## 2021-01-13 PROCEDURE — 1157F ADVNC CARE PLAN IN RCRD: CPT | Mod: HCNC,S$GLB,, | Performed by: INTERNAL MEDICINE

## 2021-01-13 PROCEDURE — 1159F PR MEDICATION LIST DOCUMENTED IN MEDICAL RECORD: ICD-10-PCS | Mod: HCNC,S$GLB,, | Performed by: INTERNAL MEDICINE

## 2021-01-13 PROCEDURE — 99499 RISK ADDL DX/OHS AUDIT: ICD-10-PCS | Mod: S$GLB,,, | Performed by: INTERNAL MEDICINE

## 2021-01-13 PROCEDURE — 1126F PR PAIN SEVERITY QUANTIFIED, NO PAIN PRESENT: ICD-10-PCS | Mod: HCNC,S$GLB,, | Performed by: INTERNAL MEDICINE

## 2021-01-13 PROCEDURE — 3008F PR BODY MASS INDEX (BMI) DOCUMENTED: ICD-10-PCS | Mod: HCNC,CPTII,S$GLB, | Performed by: INTERNAL MEDICINE

## 2021-01-13 PROCEDURE — 3077F PR MOST RECENT SYSTOLIC BLOOD PRESSURE >= 140 MM HG: ICD-10-PCS | Mod: HCNC,CPTII,S$GLB, | Performed by: INTERNAL MEDICINE

## 2021-01-13 PROCEDURE — 3008F BODY MASS INDEX DOCD: CPT | Mod: HCNC,CPTII,S$GLB, | Performed by: INTERNAL MEDICINE

## 2021-01-13 PROCEDURE — 3078F PR MOST RECENT DIASTOLIC BLOOD PRESSURE < 80 MM HG: ICD-10-PCS | Mod: HCNC,CPTII,S$GLB, | Performed by: INTERNAL MEDICINE

## 2021-01-13 PROCEDURE — 93010 EKG 12-LEAD: ICD-10-PCS | Mod: HCNC,S$GLB,, | Performed by: INTERNAL MEDICINE

## 2021-01-13 PROCEDURE — 99999 PR PBB SHADOW E&M-EST. PATIENT-LVL V: ICD-10-PCS | Mod: PBBFAC,HCNC,, | Performed by: INTERNAL MEDICINE

## 2021-01-13 PROCEDURE — 93010 ELECTROCARDIOGRAM REPORT: CPT | Mod: HCNC,S$GLB,, | Performed by: INTERNAL MEDICINE

## 2021-01-13 PROCEDURE — 99499 UNLISTED E&M SERVICE: CPT | Mod: S$GLB,,, | Performed by: INTERNAL MEDICINE

## 2021-01-13 PROCEDURE — 3077F SYST BP >= 140 MM HG: CPT | Mod: HCNC,CPTII,S$GLB, | Performed by: INTERNAL MEDICINE

## 2021-01-13 PROCEDURE — 1159F MED LIST DOCD IN RCRD: CPT | Mod: HCNC,S$GLB,, | Performed by: INTERNAL MEDICINE

## 2021-01-13 PROCEDURE — 99999 PR PBB SHADOW E&M-EST. PATIENT-LVL V: CPT | Mod: PBBFAC,HCNC,, | Performed by: INTERNAL MEDICINE

## 2021-01-13 PROCEDURE — 99214 OFFICE O/P EST MOD 30 MIN: CPT | Mod: HCNC,S$GLB,, | Performed by: INTERNAL MEDICINE

## 2021-01-13 PROCEDURE — 1126F AMNT PAIN NOTED NONE PRSNT: CPT | Mod: HCNC,S$GLB,, | Performed by: INTERNAL MEDICINE

## 2021-01-13 PROCEDURE — 3078F DIAST BP <80 MM HG: CPT | Mod: HCNC,CPTII,S$GLB, | Performed by: INTERNAL MEDICINE

## 2021-01-13 PROCEDURE — 1157F PR ADVANCE CARE PLAN OR EQUIV PRESENT IN MEDICAL RECORD: ICD-10-PCS | Mod: HCNC,S$GLB,, | Performed by: INTERNAL MEDICINE

## 2021-01-15 ENCOUNTER — TELEPHONE (OUTPATIENT)
Dept: ELECTROPHYSIOLOGY | Facility: CLINIC | Age: 71
End: 2021-01-15

## 2021-01-15 DIAGNOSIS — I49.8 OTHER SPECIFIED CARDIAC ARRHYTHMIAS: Primary | ICD-10-CM

## 2021-01-19 ENCOUNTER — OFFICE VISIT (OUTPATIENT)
Dept: ELECTROPHYSIOLOGY | Facility: CLINIC | Age: 71
End: 2021-01-19
Payer: MEDICARE

## 2021-01-19 VITALS
DIASTOLIC BLOOD PRESSURE: 68 MMHG | HEART RATE: 77 BPM | HEIGHT: 73 IN | SYSTOLIC BLOOD PRESSURE: 141 MMHG | BODY MASS INDEX: 38.43 KG/M2 | WEIGHT: 290 LBS

## 2021-01-19 DIAGNOSIS — I45.2 BIFASCICULAR BLOCK: ICD-10-CM

## 2021-01-19 DIAGNOSIS — I49.8 OTHER SPECIFIED CARDIAC ARRHYTHMIAS: ICD-10-CM

## 2021-01-19 DIAGNOSIS — I49.3 PVC (PREMATURE VENTRICULAR CONTRACTION): Primary | ICD-10-CM

## 2021-01-19 DIAGNOSIS — I10 HTN (HYPERTENSION), BENIGN: ICD-10-CM

## 2021-01-19 PROCEDURE — 1159F PR MEDICATION LIST DOCUMENTED IN MEDICAL RECORD: ICD-10-PCS | Mod: S$GLB,,, | Performed by: INTERNAL MEDICINE

## 2021-01-19 PROCEDURE — 3288F FALL RISK ASSESSMENT DOCD: CPT | Mod: CPTII,S$GLB,, | Performed by: INTERNAL MEDICINE

## 2021-01-19 PROCEDURE — 3288F PR FALLS RISK ASSESSMENT DOCUMENTED: ICD-10-PCS | Mod: CPTII,S$GLB,, | Performed by: INTERNAL MEDICINE

## 2021-01-19 PROCEDURE — 99999 PR PBB SHADOW E&M-EST. PATIENT-LVL V: ICD-10-PCS | Mod: PBBFAC,,, | Performed by: INTERNAL MEDICINE

## 2021-01-19 PROCEDURE — 93010 ELECTROCARDIOGRAM REPORT: CPT | Mod: S$GLB,,, | Performed by: INTERNAL MEDICINE

## 2021-01-19 PROCEDURE — 1101F PT FALLS ASSESS-DOCD LE1/YR: CPT | Mod: CPTII,S$GLB,, | Performed by: INTERNAL MEDICINE

## 2021-01-19 PROCEDURE — 1101F PR PT FALLS ASSESS DOC 0-1 FALLS W/OUT INJ PAST YR: ICD-10-PCS | Mod: CPTII,S$GLB,, | Performed by: INTERNAL MEDICINE

## 2021-01-19 PROCEDURE — 3008F BODY MASS INDEX DOCD: CPT | Mod: CPTII,S$GLB,, | Performed by: INTERNAL MEDICINE

## 2021-01-19 PROCEDURE — 93005 RHYTHM STRIP: ICD-10-PCS | Mod: S$GLB,,, | Performed by: INTERNAL MEDICINE

## 2021-01-19 PROCEDURE — 1157F ADVNC CARE PLAN IN RCRD: CPT | Mod: S$GLB,,, | Performed by: INTERNAL MEDICINE

## 2021-01-19 PROCEDURE — 93010 RHYTHM STRIP: ICD-10-PCS | Mod: S$GLB,,, | Performed by: INTERNAL MEDICINE

## 2021-01-19 PROCEDURE — 93005 ELECTROCARDIOGRAM TRACING: CPT | Mod: S$GLB,,, | Performed by: INTERNAL MEDICINE

## 2021-01-19 PROCEDURE — 1159F MED LIST DOCD IN RCRD: CPT | Mod: S$GLB,,, | Performed by: INTERNAL MEDICINE

## 2021-01-19 PROCEDURE — 1126F AMNT PAIN NOTED NONE PRSNT: CPT | Mod: S$GLB,,, | Performed by: INTERNAL MEDICINE

## 2021-01-19 PROCEDURE — 99204 OFFICE O/P NEW MOD 45 MIN: CPT | Mod: S$GLB,,, | Performed by: INTERNAL MEDICINE

## 2021-01-19 PROCEDURE — 1157F PR ADVANCE CARE PLAN OR EQUIV PRESENT IN MEDICAL RECORD: ICD-10-PCS | Mod: S$GLB,,, | Performed by: INTERNAL MEDICINE

## 2021-01-19 PROCEDURE — 3078F PR MOST RECENT DIASTOLIC BLOOD PRESSURE < 80 MM HG: ICD-10-PCS | Mod: CPTII,S$GLB,, | Performed by: INTERNAL MEDICINE

## 2021-01-19 PROCEDURE — 3078F DIAST BP <80 MM HG: CPT | Mod: CPTII,S$GLB,, | Performed by: INTERNAL MEDICINE

## 2021-01-19 PROCEDURE — 3008F PR BODY MASS INDEX (BMI) DOCUMENTED: ICD-10-PCS | Mod: CPTII,S$GLB,, | Performed by: INTERNAL MEDICINE

## 2021-01-19 PROCEDURE — 99999 PR PBB SHADOW E&M-EST. PATIENT-LVL V: CPT | Mod: PBBFAC,,, | Performed by: INTERNAL MEDICINE

## 2021-01-19 PROCEDURE — 1126F PR PAIN SEVERITY QUANTIFIED, NO PAIN PRESENT: ICD-10-PCS | Mod: S$GLB,,, | Performed by: INTERNAL MEDICINE

## 2021-01-19 PROCEDURE — 99204 PR OFFICE/OUTPT VISIT, NEW, LEVL IV, 45-59 MIN: ICD-10-PCS | Mod: S$GLB,,, | Performed by: INTERNAL MEDICINE

## 2021-01-19 PROCEDURE — 3077F PR MOST RECENT SYSTOLIC BLOOD PRESSURE >= 140 MM HG: ICD-10-PCS | Mod: CPTII,S$GLB,, | Performed by: INTERNAL MEDICINE

## 2021-01-19 PROCEDURE — 3077F SYST BP >= 140 MM HG: CPT | Mod: CPTII,S$GLB,, | Performed by: INTERNAL MEDICINE

## 2021-01-25 ENCOUNTER — HOSPITAL ENCOUNTER (OUTPATIENT)
Dept: CARDIOLOGY | Facility: HOSPITAL | Age: 71
Discharge: HOME | End: 2021-01-25
Attending: STUDENT IN AN ORGANIZED HEALTH CARE EDUCATION/TRAINING PROGRAM
Payer: MEDICARE

## 2021-01-25 ENCOUNTER — IMMUNIZATION (OUTPATIENT)
Dept: INTERNAL MEDICINE | Facility: CLINIC | Age: 71
End: 2021-01-25
Payer: MEDICARE

## 2021-01-25 VITALS
WEIGHT: 290 LBS | DIASTOLIC BLOOD PRESSURE: 70 MMHG | HEART RATE: 80 BPM | BODY MASS INDEX: 38.43 KG/M2 | SYSTOLIC BLOOD PRESSURE: 140 MMHG | HEIGHT: 73 IN

## 2021-01-25 DIAGNOSIS — I49.3 PVC (PREMATURE VENTRICULAR CONTRACTION): ICD-10-CM

## 2021-01-25 DIAGNOSIS — Z23 NEED FOR VACCINATION: Primary | ICD-10-CM

## 2021-01-25 LAB
ASCENDING AORTA: 5.08 CM
AV INDEX (PROSTH): 0.69
AV MEAN GRADIENT: 4 MMHG
AV PEAK GRADIENT: 8 MMHG
AV VALVE AREA: 2.56 CM2
AV VELOCITY RATIO: 0.65
BSA FOR ECHO PROCEDURE: 2.6 M2
CV ECHO LV RWT: 0.4 CM
DOP CALC AO PEAK VEL: 1.39 M/S
DOP CALC AO VTI: 31.82 CM
DOP CALC LVOT AREA: 3.7 CM2
DOP CALC LVOT DIAMETER: 2.18 CM
DOP CALC LVOT PEAK VEL: 0.91 M/S
DOP CALC LVOT STROKE VOLUME: 81.44 CM3
DOP CALCLVOT PEAK VEL VTI: 21.83 CM
E WAVE DECELERATION TIME: 220.08 MSEC
E/A RATIO: 0.84
E/E' RATIO: 10.27 M/S
ECHO LV POSTERIOR WALL: 1.07 CM (ref 0.6–1.1)
FRACTIONAL SHORTENING: 35 % (ref 28–44)
INTERVENTRICULAR SEPTUM: 1.08 CM (ref 0.6–1.1)
LA MAJOR: 5.97 CM
LA MINOR: 6.31 CM
LA WIDTH: 4.39 CM
LEFT ATRIUM SIZE: 4.1 CM
LEFT ATRIUM VOLUME INDEX MOD: 26.1 ML/M2
LEFT ATRIUM VOLUME INDEX: 37.2 ML/M2
LEFT ATRIUM VOLUME MOD: 65.89 CM3
LEFT ATRIUM VOLUME: 93.86 CM3
LEFT INTERNAL DIMENSION IN SYSTOLE: 3.46 CM (ref 2.1–4)
LEFT VENTRICLE DIASTOLIC VOLUME INDEX: 52.64 ML/M2
LEFT VENTRICLE DIASTOLIC VOLUME: 132.66 ML
LEFT VENTRICLE MASS INDEX: 88 G/M2
LEFT VENTRICLE SYSTOLIC VOLUME INDEX: 19.7 ML/M2
LEFT VENTRICLE SYSTOLIC VOLUME: 49.64 ML
LEFT VENTRICULAR INTERNAL DIMENSION IN DIASTOLE: 5.3 CM (ref 3.5–6)
LEFT VENTRICULAR MASS: 220.76 G
LV LATERAL E/E' RATIO: 9.63 M/S
LV SEPTAL E/E' RATIO: 11 M/S
MV A" WAVE DURATION": 12.18 MSEC
MV PEAK A VEL: 0.92 M/S
MV PEAK E VEL: 0.77 M/S
PISA TR MAX VEL: 1.98 M/S
PULM VEIN S/D RATIO: 1.68
PV PEAK D VEL: 0.37 M/S
PV PEAK S VEL: 0.62 M/S
RA MAJOR: 5.01 CM
RA PRESSURE: 3 MMHG
RA WIDTH: 3.61 CM
RIGHT VENTRICULAR END-DIASTOLIC DIMENSION: 3.46 CM
SINUS: 3.62 CM
STJ: 2.69 CM
TDI LATERAL: 0.08 M/S
TDI SEPTAL: 0.07 M/S
TDI: 0.08 M/S
TR MAX PG: 16 MMHG
TRICUSPID ANNULAR PLANE SYSTOLIC EXCURSION: 2.25 CM
TV REST PULMONARY ARTERY PRESSURE: 19 MMHG

## 2021-01-25 PROCEDURE — 93306 TTE W/DOPPLER COMPLETE: CPT | Mod: 26,,, | Performed by: INTERNAL MEDICINE

## 2021-01-25 PROCEDURE — 0002A COVID-19, MRNA, LNP-S, PF, 30 MCG/0.3 ML DOSE VACCINE: CPT | Mod: PBBFAC | Performed by: FAMILY MEDICINE

## 2021-01-25 PROCEDURE — 93306 ECHO (CUPID ONLY): ICD-10-PCS | Mod: 26,,, | Performed by: INTERNAL MEDICINE

## 2021-01-25 PROCEDURE — 91300 COVID-19, MRNA, LNP-S, PF, 30 MCG/0.3 ML DOSE VACCINE: CPT | Mod: PBBFAC | Performed by: FAMILY MEDICINE

## 2021-01-25 PROCEDURE — 93306 TTE W/DOPPLER COMPLETE: CPT

## 2021-02-02 ENCOUNTER — CLINICAL SUPPORT (OUTPATIENT)
Dept: CARDIOLOGY | Facility: HOSPITAL | Age: 71
End: 2021-02-02
Attending: STUDENT IN AN ORGANIZED HEALTH CARE EDUCATION/TRAINING PROGRAM
Payer: MEDICARE

## 2021-02-02 DIAGNOSIS — I49.3 PVC (PREMATURE VENTRICULAR CONTRACTION): ICD-10-CM

## 2021-02-02 PROCEDURE — 93227 HOLTER MONITOR - 48 HOUR (CUPID ONLY): ICD-10-PCS | Mod: ,,, | Performed by: INTERNAL MEDICINE

## 2021-02-02 PROCEDURE — 93227 XTRNL ECG REC<48 HR R&I: CPT | Mod: ,,, | Performed by: INTERNAL MEDICINE

## 2021-02-02 PROCEDURE — 93225 XTRNL ECG REC<48 HRS REC: CPT

## 2021-02-05 LAB
OHS CV EVENT MONITOR DAY: 0
OHS CV HOLTER LENGTH DECIMAL HOURS: 48
OHS CV HOLTER LENGTH HOURS: 48
OHS CV HOLTER LENGTH MINUTES: 0

## 2021-02-10 ENCOUNTER — TELEPHONE (OUTPATIENT)
Dept: ELECTROPHYSIOLOGY | Facility: CLINIC | Age: 71
End: 2021-02-10

## 2021-02-10 DIAGNOSIS — I63.412 CEREBRAL INFARCTION DUE TO EMBOLISM OF LEFT MIDDLE CEREBRAL ARTERY: ICD-10-CM

## 2021-02-10 RX ORDER — METOPROLOL SUCCINATE 50 MG/1
50 TABLET, EXTENDED RELEASE ORAL DAILY
Qty: 90 TABLET | Refills: 3 | Status: SHIPPED | OUTPATIENT
Start: 2021-02-10 | End: 2022-03-23 | Stop reason: SDUPTHER

## 2021-02-12 ENCOUNTER — OFFICE VISIT (OUTPATIENT)
Dept: URGENT CARE | Facility: CLINIC | Age: 71
End: 2021-02-12
Payer: MEDICARE

## 2021-02-12 VITALS
WEIGHT: 290 LBS | DIASTOLIC BLOOD PRESSURE: 97 MMHG | HEIGHT: 73 IN | TEMPERATURE: 99 F | BODY MASS INDEX: 38.43 KG/M2 | SYSTOLIC BLOOD PRESSURE: 190 MMHG | RESPIRATION RATE: 19 BRPM | OXYGEN SATURATION: 96 % | HEART RATE: 74 BPM

## 2021-02-12 DIAGNOSIS — Z20.822 CLOSE EXPOSURE TO COVID-19 VIRUS: Primary | ICD-10-CM

## 2021-02-12 DIAGNOSIS — J30.1 SEASONAL ALLERGIC RHINITIS DUE TO POLLEN: ICD-10-CM

## 2021-02-12 LAB
CTP QC/QA: YES
SARS-COV-2 RDRP RESP QL NAA+PROBE: NEGATIVE

## 2021-02-12 PROCEDURE — U0002: ICD-10-PCS | Mod: QW,S$GLB,, | Performed by: STUDENT IN AN ORGANIZED HEALTH CARE EDUCATION/TRAINING PROGRAM

## 2021-02-12 PROCEDURE — 99213 OFFICE O/P EST LOW 20 MIN: CPT | Mod: S$GLB,,, | Performed by: STUDENT IN AN ORGANIZED HEALTH CARE EDUCATION/TRAINING PROGRAM

## 2021-02-12 PROCEDURE — 1157F PR ADVANCE CARE PLAN OR EQUIV PRESENT IN MEDICAL RECORD: ICD-10-PCS | Mod: S$GLB,,, | Performed by: STUDENT IN AN ORGANIZED HEALTH CARE EDUCATION/TRAINING PROGRAM

## 2021-02-12 PROCEDURE — U0002 COVID-19 LAB TEST NON-CDC: HCPCS | Mod: QW,S$GLB,, | Performed by: STUDENT IN AN ORGANIZED HEALTH CARE EDUCATION/TRAINING PROGRAM

## 2021-02-12 PROCEDURE — 3008F BODY MASS INDEX DOCD: CPT | Mod: CPTII,S$GLB,, | Performed by: STUDENT IN AN ORGANIZED HEALTH CARE EDUCATION/TRAINING PROGRAM

## 2021-02-12 PROCEDURE — 99213 PR OFFICE/OUTPT VISIT, EST, LEVL III, 20-29 MIN: ICD-10-PCS | Mod: S$GLB,,, | Performed by: STUDENT IN AN ORGANIZED HEALTH CARE EDUCATION/TRAINING PROGRAM

## 2021-02-12 PROCEDURE — 1157F ADVNC CARE PLAN IN RCRD: CPT | Mod: S$GLB,,, | Performed by: STUDENT IN AN ORGANIZED HEALTH CARE EDUCATION/TRAINING PROGRAM

## 2021-02-12 PROCEDURE — 3008F PR BODY MASS INDEX (BMI) DOCUMENTED: ICD-10-PCS | Mod: CPTII,S$GLB,, | Performed by: STUDENT IN AN ORGANIZED HEALTH CARE EDUCATION/TRAINING PROGRAM

## 2021-02-16 ENCOUNTER — OFFICE VISIT (OUTPATIENT)
Dept: URGENT CARE | Facility: CLINIC | Age: 71
End: 2021-02-16
Payer: MEDICARE

## 2021-02-16 VITALS
SYSTOLIC BLOOD PRESSURE: 160 MMHG | DIASTOLIC BLOOD PRESSURE: 84 MMHG | BODY MASS INDEX: 38.26 KG/M2 | TEMPERATURE: 100 F | WEIGHT: 290 LBS | OXYGEN SATURATION: 97 % | HEART RATE: 88 BPM | RESPIRATION RATE: 16 BRPM

## 2021-02-16 DIAGNOSIS — J02.9 SORE THROAT: ICD-10-CM

## 2021-02-16 DIAGNOSIS — J40 BRONCHITIS: Primary | ICD-10-CM

## 2021-02-16 DIAGNOSIS — R00.0 TACHYCARDIA: ICD-10-CM

## 2021-02-16 DIAGNOSIS — R50.9 FEVER, UNSPECIFIED FEVER CAUSE: ICD-10-CM

## 2021-02-16 LAB
BILIRUB UR QL STRIP: NEGATIVE
CTP QC/QA: YES
CTP QC/QA: YES
GLUCOSE UR QL STRIP: NEGATIVE
KETONES UR QL STRIP: NEGATIVE
LEUKOCYTE ESTERASE UR QL STRIP: NEGATIVE
MOLECULAR STREP A: NEGATIVE
PH, POC UA: 5 (ref 5–8)
POC BLOOD, URINE: POSITIVE
POC NITRATES, URINE: NEGATIVE
PROT UR QL STRIP: NEGATIVE
SARS-COV-2 RDRP RESP QL NAA+PROBE: NEGATIVE
SP GR UR STRIP: 1.02 (ref 1–1.03)
UROBILINOGEN UR STRIP-ACNC: NORMAL (ref 0.3–2.2)

## 2021-02-16 PROCEDURE — 87651 STREP A DNA AMP PROBE: CPT | Mod: QW,S$GLB,, | Performed by: PHYSICIAN ASSISTANT

## 2021-02-16 PROCEDURE — 93010 EKG 12-LEAD: ICD-10-PCS | Mod: S$GLB,,, | Performed by: INTERNAL MEDICINE

## 2021-02-16 PROCEDURE — 71046 X-RAY EXAM CHEST 2 VIEWS: CPT | Mod: FY,S$GLB,, | Performed by: RADIOLOGY

## 2021-02-16 PROCEDURE — 93010 ELECTROCARDIOGRAM REPORT: CPT | Mod: S$GLB,,, | Performed by: INTERNAL MEDICINE

## 2021-02-16 PROCEDURE — 99214 OFFICE O/P EST MOD 30 MIN: CPT | Mod: 25,S$GLB,, | Performed by: PHYSICIAN ASSISTANT

## 2021-02-16 PROCEDURE — 71046 XR CHEST PA AND LATERAL: ICD-10-PCS | Mod: FY,S$GLB,, | Performed by: RADIOLOGY

## 2021-02-16 PROCEDURE — 93005 ELECTROCARDIOGRAM TRACING: CPT | Mod: S$GLB,,, | Performed by: PHYSICIAN ASSISTANT

## 2021-02-16 PROCEDURE — 3008F BODY MASS INDEX DOCD: CPT | Mod: CPTII,S$GLB,, | Performed by: PHYSICIAN ASSISTANT

## 2021-02-16 PROCEDURE — 1157F ADVNC CARE PLAN IN RCRD: CPT | Mod: S$GLB,,, | Performed by: PHYSICIAN ASSISTANT

## 2021-02-16 PROCEDURE — 87651 POCT STREP A MOLECULAR: ICD-10-PCS | Mod: QW,S$GLB,, | Performed by: PHYSICIAN ASSISTANT

## 2021-02-16 PROCEDURE — U0002 COVID-19 LAB TEST NON-CDC: HCPCS | Mod: QW,S$GLB,, | Performed by: PHYSICIAN ASSISTANT

## 2021-02-16 PROCEDURE — 3008F PR BODY MASS INDEX (BMI) DOCUMENTED: ICD-10-PCS | Mod: CPTII,S$GLB,, | Performed by: PHYSICIAN ASSISTANT

## 2021-02-16 PROCEDURE — 99214 PR OFFICE/OUTPT VISIT, EST, LEVL IV, 30-39 MIN: ICD-10-PCS | Mod: 25,S$GLB,, | Performed by: PHYSICIAN ASSISTANT

## 2021-02-16 PROCEDURE — 87086 URINE CULTURE/COLONY COUNT: CPT

## 2021-02-16 PROCEDURE — 93005 EKG 12-LEAD: ICD-10-PCS | Mod: S$GLB,,, | Performed by: PHYSICIAN ASSISTANT

## 2021-02-16 PROCEDURE — 1157F PR ADVANCE CARE PLAN OR EQUIV PRESENT IN MEDICAL RECORD: ICD-10-PCS | Mod: S$GLB,,, | Performed by: PHYSICIAN ASSISTANT

## 2021-02-16 PROCEDURE — U0002: ICD-10-PCS | Mod: QW,S$GLB,, | Performed by: PHYSICIAN ASSISTANT

## 2021-02-16 PROCEDURE — 81003 URINALYSIS AUTO W/O SCOPE: CPT | Mod: QW,S$GLB,, | Performed by: PHYSICIAN ASSISTANT

## 2021-02-16 PROCEDURE — 81003 POCT URINALYSIS, DIPSTICK, AUTOMATED, W/O SCOPE: ICD-10-PCS | Mod: QW,S$GLB,, | Performed by: PHYSICIAN ASSISTANT

## 2021-02-16 RX ORDER — ACETAMINOPHEN 325 MG/1
650 TABLET ORAL
Status: COMPLETED | OUTPATIENT
Start: 2021-02-16 | End: 2021-02-16

## 2021-02-16 RX ORDER — DOXYCYCLINE HYCLATE 100 MG
100 TABLET ORAL 2 TIMES DAILY
Qty: 14 TABLET | Refills: 0 | Status: SHIPPED | OUTPATIENT
Start: 2021-02-16 | End: 2021-02-23

## 2021-02-16 RX ADMIN — ACETAMINOPHEN 650 MG: 325 TABLET ORAL at 04:02

## 2021-02-17 LAB — BACTERIA UR CULT: NO GROWTH

## 2021-02-18 ENCOUNTER — TELEPHONE (OUTPATIENT)
Dept: URGENT CARE | Facility: CLINIC | Age: 71
End: 2021-02-18

## 2021-02-22 ENCOUNTER — PATIENT MESSAGE (OUTPATIENT)
Dept: PRIMARY CARE CLINIC | Facility: CLINIC | Age: 71
End: 2021-02-22

## 2021-02-22 DIAGNOSIS — R05.9 COUGH: Primary | ICD-10-CM

## 2021-02-23 ENCOUNTER — HOSPITAL ENCOUNTER (OUTPATIENT)
Dept: RADIOLOGY | Facility: HOSPITAL | Age: 71
Discharge: HOME | End: 2021-02-23
Attending: FAMILY MEDICINE
Payer: MEDICARE

## 2021-02-23 ENCOUNTER — OFFICE VISIT (OUTPATIENT)
Dept: INTERNAL MEDICINE | Facility: CLINIC | Age: 71
End: 2021-02-23
Payer: MEDICARE

## 2021-02-23 ENCOUNTER — TELEPHONE (OUTPATIENT)
Dept: INTERNAL MEDICINE | Facility: CLINIC | Age: 71
End: 2021-02-23

## 2021-02-23 VITALS
WEIGHT: 288.81 LBS | SYSTOLIC BLOOD PRESSURE: 130 MMHG | HEIGHT: 73 IN | HEART RATE: 78 BPM | TEMPERATURE: 98 F | DIASTOLIC BLOOD PRESSURE: 80 MMHG | BODY MASS INDEX: 38.28 KG/M2 | RESPIRATION RATE: 16 BRPM | OXYGEN SATURATION: 98 %

## 2021-02-23 DIAGNOSIS — R05.9 COUGH: ICD-10-CM

## 2021-02-23 DIAGNOSIS — J40 BRONCHITIS: ICD-10-CM

## 2021-02-23 DIAGNOSIS — N40.0 BENIGN PROSTATIC HYPERPLASIA, UNSPECIFIED WHETHER LOWER URINARY TRACT SYMPTOMS PRESENT: Primary | ICD-10-CM

## 2021-02-23 DIAGNOSIS — J30.1 SEASONAL ALLERGIC RHINITIS DUE TO POLLEN: ICD-10-CM

## 2021-02-23 PROCEDURE — 1126F PR PAIN SEVERITY QUANTIFIED, NO PAIN PRESENT: ICD-10-PCS | Mod: S$GLB,,, | Performed by: STUDENT IN AN ORGANIZED HEALTH CARE EDUCATION/TRAINING PROGRAM

## 2021-02-23 PROCEDURE — 71046 X-RAY EXAM CHEST 2 VIEWS: CPT | Mod: 26,,, | Performed by: RADIOLOGY

## 2021-02-23 PROCEDURE — 1159F PR MEDICATION LIST DOCUMENTED IN MEDICAL RECORD: ICD-10-PCS | Mod: S$GLB,,, | Performed by: STUDENT IN AN ORGANIZED HEALTH CARE EDUCATION/TRAINING PROGRAM

## 2021-02-23 PROCEDURE — 1101F PR PT FALLS ASSESS DOC 0-1 FALLS W/OUT INJ PAST YR: ICD-10-PCS | Mod: CPTII,S$GLB,, | Performed by: STUDENT IN AN ORGANIZED HEALTH CARE EDUCATION/TRAINING PROGRAM

## 2021-02-23 PROCEDURE — 71046 XR CHEST PA AND LATERAL: ICD-10-PCS | Mod: 26,,, | Performed by: RADIOLOGY

## 2021-02-23 PROCEDURE — 71046 X-RAY EXAM CHEST 2 VIEWS: CPT | Mod: TC,PO

## 2021-02-23 PROCEDURE — 3079F PR MOST RECENT DIASTOLIC BLOOD PRESSURE 80-89 MM HG: ICD-10-PCS | Mod: CPTII,S$GLB,, | Performed by: STUDENT IN AN ORGANIZED HEALTH CARE EDUCATION/TRAINING PROGRAM

## 2021-02-23 PROCEDURE — 99214 PR OFFICE/OUTPT VISIT, EST, LEVL IV, 30-39 MIN: ICD-10-PCS | Mod: S$GLB,,, | Performed by: STUDENT IN AN ORGANIZED HEALTH CARE EDUCATION/TRAINING PROGRAM

## 2021-02-23 PROCEDURE — 99999 PR PBB SHADOW E&M-EST. PATIENT-LVL V: CPT | Mod: PBBFAC,,, | Performed by: STUDENT IN AN ORGANIZED HEALTH CARE EDUCATION/TRAINING PROGRAM

## 2021-02-23 PROCEDURE — 1126F AMNT PAIN NOTED NONE PRSNT: CPT | Mod: S$GLB,,, | Performed by: STUDENT IN AN ORGANIZED HEALTH CARE EDUCATION/TRAINING PROGRAM

## 2021-02-23 PROCEDURE — 99214 OFFICE O/P EST MOD 30 MIN: CPT | Mod: S$GLB,,, | Performed by: STUDENT IN AN ORGANIZED HEALTH CARE EDUCATION/TRAINING PROGRAM

## 2021-02-23 PROCEDURE — 3288F FALL RISK ASSESSMENT DOCD: CPT | Mod: CPTII,S$GLB,, | Performed by: STUDENT IN AN ORGANIZED HEALTH CARE EDUCATION/TRAINING PROGRAM

## 2021-02-23 PROCEDURE — 1157F ADVNC CARE PLAN IN RCRD: CPT | Mod: S$GLB,,, | Performed by: STUDENT IN AN ORGANIZED HEALTH CARE EDUCATION/TRAINING PROGRAM

## 2021-02-23 PROCEDURE — 3075F PR MOST RECENT SYSTOLIC BLOOD PRESS GE 130-139MM HG: ICD-10-PCS | Mod: CPTII,S$GLB,, | Performed by: STUDENT IN AN ORGANIZED HEALTH CARE EDUCATION/TRAINING PROGRAM

## 2021-02-23 PROCEDURE — 1157F PR ADVANCE CARE PLAN OR EQUIV PRESENT IN MEDICAL RECORD: ICD-10-PCS | Mod: S$GLB,,, | Performed by: STUDENT IN AN ORGANIZED HEALTH CARE EDUCATION/TRAINING PROGRAM

## 2021-02-23 PROCEDURE — 3079F DIAST BP 80-89 MM HG: CPT | Mod: CPTII,S$GLB,, | Performed by: STUDENT IN AN ORGANIZED HEALTH CARE EDUCATION/TRAINING PROGRAM

## 2021-02-23 PROCEDURE — 1101F PT FALLS ASSESS-DOCD LE1/YR: CPT | Mod: CPTII,S$GLB,, | Performed by: STUDENT IN AN ORGANIZED HEALTH CARE EDUCATION/TRAINING PROGRAM

## 2021-02-23 PROCEDURE — 3008F BODY MASS INDEX DOCD: CPT | Mod: CPTII,S$GLB,, | Performed by: STUDENT IN AN ORGANIZED HEALTH CARE EDUCATION/TRAINING PROGRAM

## 2021-02-23 PROCEDURE — 3075F SYST BP GE 130 - 139MM HG: CPT | Mod: CPTII,S$GLB,, | Performed by: STUDENT IN AN ORGANIZED HEALTH CARE EDUCATION/TRAINING PROGRAM

## 2021-02-23 PROCEDURE — 99999 PR PBB SHADOW E&M-EST. PATIENT-LVL V: ICD-10-PCS | Mod: PBBFAC,,, | Performed by: STUDENT IN AN ORGANIZED HEALTH CARE EDUCATION/TRAINING PROGRAM

## 2021-02-23 PROCEDURE — 3288F PR FALLS RISK ASSESSMENT DOCUMENTED: ICD-10-PCS | Mod: CPTII,S$GLB,, | Performed by: STUDENT IN AN ORGANIZED HEALTH CARE EDUCATION/TRAINING PROGRAM

## 2021-02-23 PROCEDURE — 1159F MED LIST DOCD IN RCRD: CPT | Mod: S$GLB,,, | Performed by: STUDENT IN AN ORGANIZED HEALTH CARE EDUCATION/TRAINING PROGRAM

## 2021-02-23 PROCEDURE — 3008F PR BODY MASS INDEX (BMI) DOCUMENTED: ICD-10-PCS | Mod: CPTII,S$GLB,, | Performed by: STUDENT IN AN ORGANIZED HEALTH CARE EDUCATION/TRAINING PROGRAM

## 2021-02-23 RX ORDER — ALBUTEROL SULFATE 90 UG/1
2 AEROSOL, METERED RESPIRATORY (INHALATION) EVERY 6 HOURS PRN
Qty: 18 G | Refills: 1 | Status: SHIPPED | OUTPATIENT
Start: 2021-02-23 | End: 2023-12-26 | Stop reason: SDUPTHER

## 2021-02-23 RX ORDER — PREDNISONE 10 MG/1
20 TABLET ORAL DAILY
Qty: 10 TABLET | Refills: 0 | Status: SHIPPED | OUTPATIENT
Start: 2021-02-23 | End: 2021-02-28

## 2021-03-02 ENCOUNTER — PATIENT MESSAGE (OUTPATIENT)
Dept: UROLOGY | Facility: CLINIC | Age: 71
End: 2021-03-02

## 2021-03-05 ENCOUNTER — OFFICE VISIT (OUTPATIENT)
Dept: UROLOGY | Facility: CLINIC | Age: 71
End: 2021-03-05
Payer: MEDICARE

## 2021-03-05 VITALS
HEIGHT: 73 IN | DIASTOLIC BLOOD PRESSURE: 80 MMHG | SYSTOLIC BLOOD PRESSURE: 149 MMHG | BODY MASS INDEX: 38.17 KG/M2 | WEIGHT: 288 LBS | HEART RATE: 75 BPM

## 2021-03-05 DIAGNOSIS — R35.1 NOCTURIA: Primary | ICD-10-CM

## 2021-03-05 DIAGNOSIS — R31.29 MICROSCOPIC HEMATURIA: ICD-10-CM

## 2021-03-05 DIAGNOSIS — N39.41 URGE INCONTINENCE: ICD-10-CM

## 2021-03-05 LAB
BACTERIA #/AREA URNS AUTO: ABNORMAL /HPF
BILIRUB SERPL-MCNC: ABNORMAL MG/DL
BLOOD URINE, POC: 50
CAOX CRY UR QL COMP ASSIST: ABNORMAL
CLARITY, POC UA: CLEAR
COLOR, POC UA: ABNORMAL
GLUCOSE UR QL STRIP: NORMAL
KETONES UR QL STRIP: ABNORMAL
LEUKOCYTE ESTERASE URINE, POC: ABNORMAL
MICROSCOPIC COMMENT: ABNORMAL
NITRITE, POC UA: ABNORMAL
PH, POC UA: 5
POC RESIDUAL URINE VOLUME: 29 ML (ref 0–100)
PROTEIN, POC: 30
RBC #/AREA URNS AUTO: 6 /HPF (ref 0–4)
SPECIFIC GRAVITY, POC UA: 1.02
UROBILINOGEN, POC UA: 1

## 2021-03-05 PROCEDURE — 99214 PR OFFICE/OUTPT VISIT, EST, LEVL IV, 30-39 MIN: ICD-10-PCS | Mod: 25,S$GLB,, | Performed by: UROLOGY

## 2021-03-05 PROCEDURE — 1157F ADVNC CARE PLAN IN RCRD: CPT | Mod: S$GLB,,, | Performed by: UROLOGY

## 2021-03-05 PROCEDURE — 51798 US URINE CAPACITY MEASURE: CPT | Mod: S$GLB,,, | Performed by: UROLOGY

## 2021-03-05 PROCEDURE — 81002 POCT URINE DIPSTICK WITHOUT MICROSCOPE: ICD-10-PCS | Mod: S$GLB,,, | Performed by: UROLOGY

## 2021-03-05 PROCEDURE — 99214 OFFICE O/P EST MOD 30 MIN: CPT | Mod: 25,S$GLB,, | Performed by: UROLOGY

## 2021-03-05 PROCEDURE — 3008F BODY MASS INDEX DOCD: CPT | Mod: CPTII,S$GLB,, | Performed by: UROLOGY

## 2021-03-05 PROCEDURE — 99999 PR PBB SHADOW E&M-EST. PATIENT-LVL IV: ICD-10-PCS | Mod: PBBFAC,,, | Performed by: UROLOGY

## 2021-03-05 PROCEDURE — 3077F SYST BP >= 140 MM HG: CPT | Mod: CPTII,S$GLB,, | Performed by: UROLOGY

## 2021-03-05 PROCEDURE — 3288F PR FALLS RISK ASSESSMENT DOCUMENTED: ICD-10-PCS | Mod: CPTII,S$GLB,, | Performed by: UROLOGY

## 2021-03-05 PROCEDURE — 1126F AMNT PAIN NOTED NONE PRSNT: CPT | Mod: S$GLB,,, | Performed by: UROLOGY

## 2021-03-05 PROCEDURE — 3288F FALL RISK ASSESSMENT DOCD: CPT | Mod: CPTII,S$GLB,, | Performed by: UROLOGY

## 2021-03-05 PROCEDURE — 1159F PR MEDICATION LIST DOCUMENTED IN MEDICAL RECORD: ICD-10-PCS | Mod: S$GLB,,, | Performed by: UROLOGY

## 2021-03-05 PROCEDURE — 1101F PR PT FALLS ASSESS DOC 0-1 FALLS W/OUT INJ PAST YR: ICD-10-PCS | Mod: CPTII,S$GLB,, | Performed by: UROLOGY

## 2021-03-05 PROCEDURE — 99999 PR PBB SHADOW E&M-EST. PATIENT-LVL IV: CPT | Mod: PBBFAC,,, | Performed by: UROLOGY

## 2021-03-05 PROCEDURE — 1126F PR PAIN SEVERITY QUANTIFIED, NO PAIN PRESENT: ICD-10-PCS | Mod: S$GLB,,, | Performed by: UROLOGY

## 2021-03-05 PROCEDURE — 1157F PR ADVANCE CARE PLAN OR EQUIV PRESENT IN MEDICAL RECORD: ICD-10-PCS | Mod: S$GLB,,, | Performed by: UROLOGY

## 2021-03-05 PROCEDURE — 3008F PR BODY MASS INDEX (BMI) DOCUMENTED: ICD-10-PCS | Mod: CPTII,S$GLB,, | Performed by: UROLOGY

## 2021-03-05 PROCEDURE — 81002 URINALYSIS NONAUTO W/O SCOPE: CPT | Mod: S$GLB,,, | Performed by: UROLOGY

## 2021-03-05 PROCEDURE — 3079F PR MOST RECENT DIASTOLIC BLOOD PRESSURE 80-89 MM HG: ICD-10-PCS | Mod: CPTII,S$GLB,, | Performed by: UROLOGY

## 2021-03-05 PROCEDURE — 3079F DIAST BP 80-89 MM HG: CPT | Mod: CPTII,S$GLB,, | Performed by: UROLOGY

## 2021-03-05 PROCEDURE — 3077F PR MOST RECENT SYSTOLIC BLOOD PRESSURE >= 140 MM HG: ICD-10-PCS | Mod: CPTII,S$GLB,, | Performed by: UROLOGY

## 2021-03-05 PROCEDURE — 81001 URINALYSIS AUTO W/SCOPE: CPT | Performed by: UROLOGY

## 2021-03-05 PROCEDURE — 1101F PT FALLS ASSESS-DOCD LE1/YR: CPT | Mod: CPTII,S$GLB,, | Performed by: UROLOGY

## 2021-03-05 PROCEDURE — 1159F MED LIST DOCD IN RCRD: CPT | Mod: S$GLB,,, | Performed by: UROLOGY

## 2021-03-05 PROCEDURE — 51798 POCT BLADDER SCAN: ICD-10-PCS | Mod: S$GLB,,, | Performed by: UROLOGY

## 2021-03-05 RX ORDER — MIRABEGRON 50 MG/1
50 TABLET, FILM COATED, EXTENDED RELEASE ORAL DAILY
Qty: 30 TABLET | Refills: 11 | Status: SHIPPED | OUTPATIENT
Start: 2021-03-05 | End: 2022-03-16 | Stop reason: SDUPTHER

## 2021-03-18 ENCOUNTER — PATIENT MESSAGE (OUTPATIENT)
Dept: RESEARCH | Facility: HOSPITAL | Age: 71
End: 2021-03-18

## 2021-03-26 ENCOUNTER — PATIENT MESSAGE (OUTPATIENT)
Dept: RESEARCH | Facility: HOSPITAL | Age: 71
End: 2021-03-26

## 2021-03-28 ENCOUNTER — PATIENT MESSAGE (OUTPATIENT)
Dept: UROLOGY | Facility: CLINIC | Age: 71
End: 2021-03-28

## 2021-03-28 ENCOUNTER — PATIENT MESSAGE (OUTPATIENT)
Dept: PRIMARY CARE CLINIC | Facility: CLINIC | Age: 71
End: 2021-03-28

## 2021-03-29 ENCOUNTER — TELEPHONE (OUTPATIENT)
Dept: UROLOGY | Facility: CLINIC | Age: 71
End: 2021-03-29

## 2021-03-29 ENCOUNTER — TELEPHONE (OUTPATIENT)
Dept: PRIMARY CARE CLINIC | Facility: CLINIC | Age: 71
End: 2021-03-29

## 2021-04-01 ENCOUNTER — LAB VISIT (OUTPATIENT)
Dept: LAB | Facility: HOSPITAL | Age: 71
End: 2021-04-01
Attending: FAMILY MEDICINE
Payer: MEDICARE

## 2021-04-01 ENCOUNTER — OFFICE VISIT (OUTPATIENT)
Dept: PRIMARY CARE CLINIC | Facility: CLINIC | Age: 71
End: 2021-04-01
Payer: MEDICARE

## 2021-04-01 VITALS
DIASTOLIC BLOOD PRESSURE: 80 MMHG | WEIGHT: 300.94 LBS | OXYGEN SATURATION: 96 % | HEART RATE: 70 BPM | BODY MASS INDEX: 39.88 KG/M2 | HEIGHT: 73 IN | SYSTOLIC BLOOD PRESSURE: 138 MMHG

## 2021-04-01 DIAGNOSIS — K80.20 CALCULUS OF GALLBLADDER WITHOUT CHOLECYSTITIS WITHOUT OBSTRUCTION: ICD-10-CM

## 2021-04-01 DIAGNOSIS — R79.89 ABNORMAL TSH: ICD-10-CM

## 2021-04-01 DIAGNOSIS — K44.9 HIATAL HERNIA: ICD-10-CM

## 2021-04-01 DIAGNOSIS — I71.21 ASCENDING AORTIC ANEURYSM: ICD-10-CM

## 2021-04-01 DIAGNOSIS — I10 HTN (HYPERTENSION), BENIGN: ICD-10-CM

## 2021-04-01 DIAGNOSIS — F41.9 ANXIETY DISORDER, UNSPECIFIED: ICD-10-CM

## 2021-04-01 DIAGNOSIS — R10.11 COLICKY RUQ ABDOMINAL PAIN: Primary | ICD-10-CM

## 2021-04-01 DIAGNOSIS — N32.81 OAB (OVERACTIVE BLADDER): ICD-10-CM

## 2021-04-01 PROCEDURE — 1157F PR ADVANCE CARE PLAN OR EQUIV PRESENT IN MEDICAL RECORD: ICD-10-PCS | Mod: S$GLB,,, | Performed by: FAMILY MEDICINE

## 2021-04-01 PROCEDURE — 99999 PR PBB SHADOW E&M-EST. PATIENT-LVL V: CPT | Mod: PBBFAC,,, | Performed by: FAMILY MEDICINE

## 2021-04-01 PROCEDURE — 1101F PT FALLS ASSESS-DOCD LE1/YR: CPT | Mod: CPTII,S$GLB,, | Performed by: FAMILY MEDICINE

## 2021-04-01 PROCEDURE — 84443 ASSAY THYROID STIM HORMONE: CPT | Performed by: FAMILY MEDICINE

## 2021-04-01 PROCEDURE — 3008F PR BODY MASS INDEX (BMI) DOCUMENTED: ICD-10-PCS | Mod: CPTII,S$GLB,, | Performed by: FAMILY MEDICINE

## 2021-04-01 PROCEDURE — 99999 PR PBB SHADOW E&M-EST. PATIENT-LVL V: ICD-10-PCS | Mod: PBBFAC,,, | Performed by: FAMILY MEDICINE

## 2021-04-01 PROCEDURE — 3075F SYST BP GE 130 - 139MM HG: CPT | Mod: CPTII,S$GLB,, | Performed by: FAMILY MEDICINE

## 2021-04-01 PROCEDURE — 1126F PR PAIN SEVERITY QUANTIFIED, NO PAIN PRESENT: ICD-10-PCS | Mod: S$GLB,,, | Performed by: FAMILY MEDICINE

## 2021-04-01 PROCEDURE — 3288F PR FALLS RISK ASSESSMENT DOCUMENTED: ICD-10-PCS | Mod: CPTII,S$GLB,, | Performed by: FAMILY MEDICINE

## 2021-04-01 PROCEDURE — 1159F MED LIST DOCD IN RCRD: CPT | Mod: S$GLB,,, | Performed by: FAMILY MEDICINE

## 2021-04-01 PROCEDURE — 99214 OFFICE O/P EST MOD 30 MIN: CPT | Mod: S$GLB,,, | Performed by: FAMILY MEDICINE

## 2021-04-01 PROCEDURE — 3075F PR MOST RECENT SYSTOLIC BLOOD PRESS GE 130-139MM HG: ICD-10-PCS | Mod: CPTII,S$GLB,, | Performed by: FAMILY MEDICINE

## 2021-04-01 PROCEDURE — 3079F PR MOST RECENT DIASTOLIC BLOOD PRESSURE 80-89 MM HG: ICD-10-PCS | Mod: CPTII,S$GLB,, | Performed by: FAMILY MEDICINE

## 2021-04-01 PROCEDURE — 3008F BODY MASS INDEX DOCD: CPT | Mod: CPTII,S$GLB,, | Performed by: FAMILY MEDICINE

## 2021-04-01 PROCEDURE — 80076 HEPATIC FUNCTION PANEL: CPT | Performed by: FAMILY MEDICINE

## 2021-04-01 PROCEDURE — 1157F ADVNC CARE PLAN IN RCRD: CPT | Mod: S$GLB,,, | Performed by: FAMILY MEDICINE

## 2021-04-01 PROCEDURE — 99214 PR OFFICE/OUTPT VISIT, EST, LEVL IV, 30-39 MIN: ICD-10-PCS | Mod: S$GLB,,, | Performed by: FAMILY MEDICINE

## 2021-04-01 PROCEDURE — 1101F PR PT FALLS ASSESS DOC 0-1 FALLS W/OUT INJ PAST YR: ICD-10-PCS | Mod: CPTII,S$GLB,, | Performed by: FAMILY MEDICINE

## 2021-04-01 PROCEDURE — 3079F DIAST BP 80-89 MM HG: CPT | Mod: CPTII,S$GLB,, | Performed by: FAMILY MEDICINE

## 2021-04-01 PROCEDURE — 36415 COLL VENOUS BLD VENIPUNCTURE: CPT | Mod: PN | Performed by: FAMILY MEDICINE

## 2021-04-01 PROCEDURE — 3288F FALL RISK ASSESSMENT DOCD: CPT | Mod: CPTII,S$GLB,, | Performed by: FAMILY MEDICINE

## 2021-04-01 PROCEDURE — 1159F PR MEDICATION LIST DOCUMENTED IN MEDICAL RECORD: ICD-10-PCS | Mod: S$GLB,,, | Performed by: FAMILY MEDICINE

## 2021-04-01 PROCEDURE — 1126F AMNT PAIN NOTED NONE PRSNT: CPT | Mod: S$GLB,,, | Performed by: FAMILY MEDICINE

## 2021-04-01 RX ORDER — MONTELUKAST SODIUM 10 MG/1
10 TABLET ORAL DAILY
COMMUNITY
Start: 2021-03-02 | End: 2021-10-05 | Stop reason: SDUPTHER

## 2021-04-02 LAB
ALBUMIN SERPL BCP-MCNC: 3.7 G/DL (ref 3.5–5.2)
ALP SERPL-CCNC: 93 U/L (ref 55–135)
ALT SERPL W/O P-5'-P-CCNC: 19 U/L (ref 10–44)
AST SERPL-CCNC: 21 U/L (ref 10–40)
BILIRUB DIRECT SERPL-MCNC: 0.3 MG/DL (ref 0.1–0.3)
BILIRUB SERPL-MCNC: 0.7 MG/DL (ref 0.1–1)
PROT SERPL-MCNC: 7.9 G/DL (ref 6–8.4)
TSH SERPL DL<=0.005 MIU/L-ACNC: 3.54 UIU/ML (ref 0.4–4)

## 2021-04-05 ENCOUNTER — PATIENT MESSAGE (OUTPATIENT)
Dept: UROLOGY | Facility: CLINIC | Age: 71
End: 2021-04-05

## 2021-04-06 ENCOUNTER — HOSPITAL ENCOUNTER (OUTPATIENT)
Dept: RADIOLOGY | Facility: HOSPITAL | Age: 71
Discharge: HOME | End: 2021-04-06
Attending: FAMILY MEDICINE
Payer: MEDICARE

## 2021-04-06 DIAGNOSIS — K80.20 CALCULUS OF GALLBLADDER WITHOUT CHOLECYSTITIS WITHOUT OBSTRUCTION: ICD-10-CM

## 2021-04-06 PROCEDURE — 76705 ECHO EXAM OF ABDOMEN: CPT | Mod: TC

## 2021-04-06 PROCEDURE — 76705 US ABDOMEN LIMITED: ICD-10-PCS | Mod: 26,,, | Performed by: RADIOLOGY

## 2021-04-06 PROCEDURE — 76705 ECHO EXAM OF ABDOMEN: CPT | Mod: 26,,, | Performed by: RADIOLOGY

## 2021-04-08 DIAGNOSIS — R16.0 ENLARGED LIVER: ICD-10-CM

## 2021-04-13 ENCOUNTER — OFFICE VISIT (OUTPATIENT)
Dept: ELECTROPHYSIOLOGY | Facility: CLINIC | Age: 71
End: 2021-04-13
Payer: MEDICARE

## 2021-04-13 VITALS
WEIGHT: 299.19 LBS | HEIGHT: 73 IN | SYSTOLIC BLOOD PRESSURE: 130 MMHG | HEART RATE: 72 BPM | DIASTOLIC BLOOD PRESSURE: 76 MMHG | BODY MASS INDEX: 39.65 KG/M2

## 2021-04-13 DIAGNOSIS — E66.01 SEVERE OBESITY (BMI 35.0-39.9) WITH COMORBIDITY: ICD-10-CM

## 2021-04-13 DIAGNOSIS — I49.3 PVC (PREMATURE VENTRICULAR CONTRACTION): Primary | ICD-10-CM

## 2021-04-13 DIAGNOSIS — I49.8 OTHER SPECIFIED CARDIAC ARRHYTHMIAS: ICD-10-CM

## 2021-04-13 DIAGNOSIS — I10 HTN (HYPERTENSION), BENIGN: ICD-10-CM

## 2021-04-13 PROCEDURE — 1101F PR PT FALLS ASSESS DOC 0-1 FALLS W/OUT INJ PAST YR: ICD-10-PCS | Mod: CPTII,S$GLB,, | Performed by: INTERNAL MEDICINE

## 2021-04-13 PROCEDURE — 3008F BODY MASS INDEX DOCD: CPT | Mod: CPTII,S$GLB,, | Performed by: INTERNAL MEDICINE

## 2021-04-13 PROCEDURE — 93005 RHYTHM STRIP: ICD-10-PCS | Mod: S$GLB,,, | Performed by: INTERNAL MEDICINE

## 2021-04-13 PROCEDURE — 1125F PR PAIN SEVERITY QUANTIFIED, PAIN PRESENT: ICD-10-PCS | Mod: S$GLB,,, | Performed by: INTERNAL MEDICINE

## 2021-04-13 PROCEDURE — 1125F AMNT PAIN NOTED PAIN PRSNT: CPT | Mod: S$GLB,,, | Performed by: INTERNAL MEDICINE

## 2021-04-13 PROCEDURE — 1101F PT FALLS ASSESS-DOCD LE1/YR: CPT | Mod: CPTII,S$GLB,, | Performed by: INTERNAL MEDICINE

## 2021-04-13 PROCEDURE — 1157F ADVNC CARE PLAN IN RCRD: CPT | Mod: S$GLB,,, | Performed by: INTERNAL MEDICINE

## 2021-04-13 PROCEDURE — 3288F PR FALLS RISK ASSESSMENT DOCUMENTED: ICD-10-PCS | Mod: CPTII,S$GLB,, | Performed by: INTERNAL MEDICINE

## 2021-04-13 PROCEDURE — 1159F MED LIST DOCD IN RCRD: CPT | Mod: S$GLB,,, | Performed by: INTERNAL MEDICINE

## 2021-04-13 PROCEDURE — 1159F PR MEDICATION LIST DOCUMENTED IN MEDICAL RECORD: ICD-10-PCS | Mod: S$GLB,,, | Performed by: INTERNAL MEDICINE

## 2021-04-13 PROCEDURE — 93010 RHYTHM STRIP: ICD-10-PCS | Mod: S$GLB,,, | Performed by: INTERNAL MEDICINE

## 2021-04-13 PROCEDURE — 3008F PR BODY MASS INDEX (BMI) DOCUMENTED: ICD-10-PCS | Mod: CPTII,S$GLB,, | Performed by: INTERNAL MEDICINE

## 2021-04-13 PROCEDURE — 93005 ELECTROCARDIOGRAM TRACING: CPT | Mod: S$GLB,,, | Performed by: INTERNAL MEDICINE

## 2021-04-13 PROCEDURE — 1157F PR ADVANCE CARE PLAN OR EQUIV PRESENT IN MEDICAL RECORD: ICD-10-PCS | Mod: S$GLB,,, | Performed by: INTERNAL MEDICINE

## 2021-04-13 PROCEDURE — 99214 OFFICE O/P EST MOD 30 MIN: CPT | Mod: S$GLB,,, | Performed by: INTERNAL MEDICINE

## 2021-04-13 PROCEDURE — 99999 PR PBB SHADOW E&M-EST. PATIENT-LVL IV: CPT | Mod: PBBFAC,,, | Performed by: INTERNAL MEDICINE

## 2021-04-13 PROCEDURE — 99999 PR PBB SHADOW E&M-EST. PATIENT-LVL IV: ICD-10-PCS | Mod: PBBFAC,,, | Performed by: INTERNAL MEDICINE

## 2021-04-13 PROCEDURE — 99214 PR OFFICE/OUTPT VISIT, EST, LEVL IV, 30-39 MIN: ICD-10-PCS | Mod: S$GLB,,, | Performed by: INTERNAL MEDICINE

## 2021-04-13 PROCEDURE — 93010 ELECTROCARDIOGRAM REPORT: CPT | Mod: S$GLB,,, | Performed by: INTERNAL MEDICINE

## 2021-04-13 PROCEDURE — 3288F FALL RISK ASSESSMENT DOCD: CPT | Mod: CPTII,S$GLB,, | Performed by: INTERNAL MEDICINE

## 2021-05-07 ENCOUNTER — OFFICE VISIT (OUTPATIENT)
Dept: UROLOGY | Facility: CLINIC | Age: 71
End: 2021-05-07
Payer: MEDICARE

## 2021-05-07 VITALS
SYSTOLIC BLOOD PRESSURE: 148 MMHG | HEIGHT: 73 IN | WEIGHT: 299.19 LBS | HEART RATE: 67 BPM | BODY MASS INDEX: 39.65 KG/M2 | DIASTOLIC BLOOD PRESSURE: 79 MMHG

## 2021-05-07 DIAGNOSIS — R35.1 NOCTURIA: Primary | ICD-10-CM

## 2021-05-07 DIAGNOSIS — N39.41 URGE INCONTINENCE: ICD-10-CM

## 2021-05-07 PROCEDURE — 1157F PR ADVANCE CARE PLAN OR EQUIV PRESENT IN MEDICAL RECORD: ICD-10-PCS | Mod: S$GLB,,, | Performed by: UROLOGY

## 2021-05-07 PROCEDURE — 99214 PR OFFICE/OUTPT VISIT, EST, LEVL IV, 30-39 MIN: ICD-10-PCS | Mod: S$GLB,,, | Performed by: UROLOGY

## 2021-05-07 PROCEDURE — 1126F AMNT PAIN NOTED NONE PRSNT: CPT | Mod: S$GLB,,, | Performed by: UROLOGY

## 2021-05-07 PROCEDURE — 3008F BODY MASS INDEX DOCD: CPT | Mod: CPTII,S$GLB,, | Performed by: UROLOGY

## 2021-05-07 PROCEDURE — 1159F MED LIST DOCD IN RCRD: CPT | Mod: S$GLB,,, | Performed by: UROLOGY

## 2021-05-07 PROCEDURE — 3288F PR FALLS RISK ASSESSMENT DOCUMENTED: ICD-10-PCS | Mod: CPTII,S$GLB,, | Performed by: UROLOGY

## 2021-05-07 PROCEDURE — 3008F PR BODY MASS INDEX (BMI) DOCUMENTED: ICD-10-PCS | Mod: CPTII,S$GLB,, | Performed by: UROLOGY

## 2021-05-07 PROCEDURE — 1157F ADVNC CARE PLAN IN RCRD: CPT | Mod: S$GLB,,, | Performed by: UROLOGY

## 2021-05-07 PROCEDURE — 3288F FALL RISK ASSESSMENT DOCD: CPT | Mod: CPTII,S$GLB,, | Performed by: UROLOGY

## 2021-05-07 PROCEDURE — 99999 PR PBB SHADOW E&M-EST. PATIENT-LVL IV: ICD-10-PCS | Mod: PBBFAC,,, | Performed by: UROLOGY

## 2021-05-07 PROCEDURE — 99999 PR PBB SHADOW E&M-EST. PATIENT-LVL IV: CPT | Mod: PBBFAC,,, | Performed by: UROLOGY

## 2021-05-07 PROCEDURE — 1101F PR PT FALLS ASSESS DOC 0-1 FALLS W/OUT INJ PAST YR: ICD-10-PCS | Mod: CPTII,S$GLB,, | Performed by: UROLOGY

## 2021-05-07 PROCEDURE — 1101F PT FALLS ASSESS-DOCD LE1/YR: CPT | Mod: CPTII,S$GLB,, | Performed by: UROLOGY

## 2021-05-07 PROCEDURE — 99214 OFFICE O/P EST MOD 30 MIN: CPT | Mod: S$GLB,,, | Performed by: UROLOGY

## 2021-05-07 PROCEDURE — 1159F PR MEDICATION LIST DOCUMENTED IN MEDICAL RECORD: ICD-10-PCS | Mod: S$GLB,,, | Performed by: UROLOGY

## 2021-05-07 PROCEDURE — 1126F PR PAIN SEVERITY QUANTIFIED, NO PAIN PRESENT: ICD-10-PCS | Mod: S$GLB,,, | Performed by: UROLOGY

## 2021-05-07 RX ORDER — SOLIFENACIN SUCCINATE 10 MG/1
10 TABLET, FILM COATED ORAL DAILY
Qty: 30 TABLET | Refills: 11 | Status: SHIPPED | OUTPATIENT
Start: 2021-05-07 | End: 2022-05-17

## 2021-05-28 ENCOUNTER — TELEPHONE (OUTPATIENT)
Dept: CARDIOTHORACIC SURGERY | Facility: CLINIC | Age: 71
End: 2021-05-28

## 2021-05-28 DIAGNOSIS — I71.21 ASCENDING AORTIC ANEURYSM: Primary | ICD-10-CM

## 2021-07-01 ENCOUNTER — PATIENT OUTREACH (OUTPATIENT)
Dept: ADMINISTRATIVE | Facility: OTHER | Age: 71
End: 2021-07-01

## 2021-07-06 ENCOUNTER — TELEPHONE (OUTPATIENT)
Dept: ELECTROPHYSIOLOGY | Facility: CLINIC | Age: 71
End: 2021-07-06

## 2021-07-06 ENCOUNTER — NURSE TRIAGE (OUTPATIENT)
Dept: ADMINISTRATIVE | Facility: CLINIC | Age: 71
End: 2021-07-06

## 2021-07-06 DIAGNOSIS — R42 DIZZINESS: ICD-10-CM

## 2021-07-06 DIAGNOSIS — R55 NEAR SYNCOPE: Primary | ICD-10-CM

## 2021-07-06 DIAGNOSIS — I49.8 OTHER SPECIFIED CARDIAC ARRHYTHMIAS: ICD-10-CM

## 2021-07-07 ENCOUNTER — CLINICAL SUPPORT (OUTPATIENT)
Dept: CARDIOLOGY | Facility: HOSPITAL | Age: 71
End: 2021-07-07
Attending: INTERNAL MEDICINE
Payer: MEDICARE

## 2021-07-07 DIAGNOSIS — R55 NEAR SYNCOPE: ICD-10-CM

## 2021-07-07 DIAGNOSIS — R42 DIZZINESS: ICD-10-CM

## 2021-07-07 PROCEDURE — 93272 CARDIAC EVENT MONITOR (CUPID ONLY): ICD-10-PCS | Mod: ,,, | Performed by: INTERNAL MEDICINE

## 2021-07-07 PROCEDURE — 93272 ECG/REVIEW INTERPRET ONLY: CPT | Mod: ,,, | Performed by: INTERNAL MEDICINE

## 2021-07-07 PROCEDURE — 93271 ECG/MONITORING AND ANALYSIS: CPT

## 2021-07-23 ENCOUNTER — TELEPHONE (OUTPATIENT)
Dept: CARDIOTHORACIC SURGERY | Facility: CLINIC | Age: 71
End: 2021-07-23

## 2021-07-23 ENCOUNTER — OFFICE VISIT (OUTPATIENT)
Dept: UROLOGY | Facility: CLINIC | Age: 71
End: 2021-07-23
Payer: MEDICARE

## 2021-07-23 VITALS
HEART RATE: 69 BPM | HEIGHT: 73 IN | WEIGHT: 299.19 LBS | BODY MASS INDEX: 39.65 KG/M2 | DIASTOLIC BLOOD PRESSURE: 72 MMHG | SYSTOLIC BLOOD PRESSURE: 130 MMHG

## 2021-07-23 DIAGNOSIS — R35.1 NOCTURIA: Primary | ICD-10-CM

## 2021-07-23 PROCEDURE — 99214 PR OFFICE/OUTPT VISIT, EST, LEVL IV, 30-39 MIN: ICD-10-PCS | Mod: S$GLB,,, | Performed by: UROLOGY

## 2021-07-23 PROCEDURE — 1159F PR MEDICATION LIST DOCUMENTED IN MEDICAL RECORD: ICD-10-PCS | Mod: CPTII,S$GLB,, | Performed by: UROLOGY

## 2021-07-23 PROCEDURE — 87086 URINE CULTURE/COLONY COUNT: CPT | Performed by: UROLOGY

## 2021-07-23 PROCEDURE — 1159F MED LIST DOCD IN RCRD: CPT | Mod: CPTII,S$GLB,, | Performed by: UROLOGY

## 2021-07-23 PROCEDURE — 3075F SYST BP GE 130 - 139MM HG: CPT | Mod: CPTII,S$GLB,, | Performed by: UROLOGY

## 2021-07-23 PROCEDURE — 3078F DIAST BP <80 MM HG: CPT | Mod: CPTII,S$GLB,, | Performed by: UROLOGY

## 2021-07-23 PROCEDURE — 3288F PR FALLS RISK ASSESSMENT DOCUMENTED: ICD-10-PCS | Mod: CPTII,S$GLB,, | Performed by: UROLOGY

## 2021-07-23 PROCEDURE — 1126F PR PAIN SEVERITY QUANTIFIED, NO PAIN PRESENT: ICD-10-PCS | Mod: CPTII,S$GLB,, | Performed by: UROLOGY

## 2021-07-23 PROCEDURE — 1157F PR ADVANCE CARE PLAN OR EQUIV PRESENT IN MEDICAL RECORD: ICD-10-PCS | Mod: CPTII,S$GLB,, | Performed by: UROLOGY

## 2021-07-23 PROCEDURE — 3008F PR BODY MASS INDEX (BMI) DOCUMENTED: ICD-10-PCS | Mod: CPTII,S$GLB,, | Performed by: UROLOGY

## 2021-07-23 PROCEDURE — 3288F FALL RISK ASSESSMENT DOCD: CPT | Mod: CPTII,S$GLB,, | Performed by: UROLOGY

## 2021-07-23 PROCEDURE — 1101F PR PT FALLS ASSESS DOC 0-1 FALLS W/OUT INJ PAST YR: ICD-10-PCS | Mod: CPTII,S$GLB,, | Performed by: UROLOGY

## 2021-07-23 PROCEDURE — 3008F BODY MASS INDEX DOCD: CPT | Mod: CPTII,S$GLB,, | Performed by: UROLOGY

## 2021-07-23 PROCEDURE — 99999 PR PBB SHADOW E&M-EST. PATIENT-LVL IV: ICD-10-PCS | Mod: PBBFAC,,, | Performed by: UROLOGY

## 2021-07-23 PROCEDURE — 99214 OFFICE O/P EST MOD 30 MIN: CPT | Mod: S$GLB,,, | Performed by: UROLOGY

## 2021-07-23 PROCEDURE — 1101F PT FALLS ASSESS-DOCD LE1/YR: CPT | Mod: CPTII,S$GLB,, | Performed by: UROLOGY

## 2021-07-23 PROCEDURE — 3075F PR MOST RECENT SYSTOLIC BLOOD PRESS GE 130-139MM HG: ICD-10-PCS | Mod: CPTII,S$GLB,, | Performed by: UROLOGY

## 2021-07-23 PROCEDURE — 1126F AMNT PAIN NOTED NONE PRSNT: CPT | Mod: CPTII,S$GLB,, | Performed by: UROLOGY

## 2021-07-23 PROCEDURE — 99999 PR PBB SHADOW E&M-EST. PATIENT-LVL IV: CPT | Mod: PBBFAC,,, | Performed by: UROLOGY

## 2021-07-23 PROCEDURE — 1157F ADVNC CARE PLAN IN RCRD: CPT | Mod: CPTII,S$GLB,, | Performed by: UROLOGY

## 2021-07-23 PROCEDURE — 3078F PR MOST RECENT DIASTOLIC BLOOD PRESSURE < 80 MM HG: ICD-10-PCS | Mod: CPTII,S$GLB,, | Performed by: UROLOGY

## 2021-07-23 RX ORDER — TAMSULOSIN HYDROCHLORIDE 0.4 MG/1
0.4 CAPSULE ORAL DAILY
Qty: 30 CAPSULE | Refills: 11 | Status: SHIPPED | OUTPATIENT
Start: 2021-07-23 | End: 2022-09-29 | Stop reason: SDUPTHER

## 2021-07-25 LAB — BACTERIA UR CULT: NO GROWTH

## 2021-07-26 ENCOUNTER — OFFICE VISIT (OUTPATIENT)
Dept: CARDIOTHORACIC SURGERY | Facility: CLINIC | Age: 71
End: 2021-07-26
Payer: MEDICARE

## 2021-07-26 ENCOUNTER — HOSPITAL ENCOUNTER (OUTPATIENT)
Dept: RADIOLOGY | Facility: HOSPITAL | Age: 71
Discharge: HOME | End: 2021-07-26
Attending: THORACIC SURGERY (CARDIOTHORACIC VASCULAR SURGERY)
Payer: MEDICARE

## 2021-07-26 VITALS
HEART RATE: 88 BPM | OXYGEN SATURATION: 95 % | DIASTOLIC BLOOD PRESSURE: 73 MMHG | BODY MASS INDEX: 40.06 KG/M2 | TEMPERATURE: 99 F | WEIGHT: 302.25 LBS | HEIGHT: 73 IN | SYSTOLIC BLOOD PRESSURE: 149 MMHG

## 2021-07-26 DIAGNOSIS — I71.21 ASCENDING AORTIC ANEURYSM: Primary | ICD-10-CM

## 2021-07-26 DIAGNOSIS — I71.21 ASCENDING AORTIC ANEURYSM: ICD-10-CM

## 2021-07-26 PROCEDURE — 3008F PR BODY MASS INDEX (BMI) DOCUMENTED: ICD-10-PCS | Mod: CPTII,S$GLB,, | Performed by: THORACIC SURGERY (CARDIOTHORACIC VASCULAR SURGERY)

## 2021-07-26 PROCEDURE — 3077F SYST BP >= 140 MM HG: CPT | Mod: CPTII,S$GLB,, | Performed by: THORACIC SURGERY (CARDIOTHORACIC VASCULAR SURGERY)

## 2021-07-26 PROCEDURE — 3288F FALL RISK ASSESSMENT DOCD: CPT | Mod: CPTII,S$GLB,, | Performed by: THORACIC SURGERY (CARDIOTHORACIC VASCULAR SURGERY)

## 2021-07-26 PROCEDURE — 99999 PR PBB SHADOW E&M-EST. PATIENT-LVL V: ICD-10-PCS | Mod: PBBFAC,,, | Performed by: THORACIC SURGERY (CARDIOTHORACIC VASCULAR SURGERY)

## 2021-07-26 PROCEDURE — 71250 CT THORAX DX C-: CPT | Mod: 26,,, | Performed by: RADIOLOGY

## 2021-07-26 PROCEDURE — 1101F PT FALLS ASSESS-DOCD LE1/YR: CPT | Mod: CPTII,S$GLB,, | Performed by: THORACIC SURGERY (CARDIOTHORACIC VASCULAR SURGERY)

## 2021-07-26 PROCEDURE — 99213 PR OFFICE/OUTPT VISIT, EST, LEVL III, 20-29 MIN: ICD-10-PCS | Mod: S$GLB,,, | Performed by: THORACIC SURGERY (CARDIOTHORACIC VASCULAR SURGERY)

## 2021-07-26 PROCEDURE — 1126F AMNT PAIN NOTED NONE PRSNT: CPT | Mod: CPTII,S$GLB,, | Performed by: THORACIC SURGERY (CARDIOTHORACIC VASCULAR SURGERY)

## 2021-07-26 PROCEDURE — 1160F PR REVIEW ALL MEDS BY PRESCRIBER/CLIN PHARMACIST DOCUMENTED: ICD-10-PCS | Mod: CPTII,S$GLB,, | Performed by: THORACIC SURGERY (CARDIOTHORACIC VASCULAR SURGERY)

## 2021-07-26 PROCEDURE — 3078F PR MOST RECENT DIASTOLIC BLOOD PRESSURE < 80 MM HG: ICD-10-PCS | Mod: CPTII,S$GLB,, | Performed by: THORACIC SURGERY (CARDIOTHORACIC VASCULAR SURGERY)

## 2021-07-26 PROCEDURE — 99999 PR PBB SHADOW E&M-EST. PATIENT-LVL V: CPT | Mod: PBBFAC,,, | Performed by: THORACIC SURGERY (CARDIOTHORACIC VASCULAR SURGERY)

## 2021-07-26 PROCEDURE — 1101F PR PT FALLS ASSESS DOC 0-1 FALLS W/OUT INJ PAST YR: ICD-10-PCS | Mod: CPTII,S$GLB,, | Performed by: THORACIC SURGERY (CARDIOTHORACIC VASCULAR SURGERY)

## 2021-07-26 PROCEDURE — 1160F RVW MEDS BY RX/DR IN RCRD: CPT | Mod: CPTII,S$GLB,, | Performed by: THORACIC SURGERY (CARDIOTHORACIC VASCULAR SURGERY)

## 2021-07-26 PROCEDURE — 3078F DIAST BP <80 MM HG: CPT | Mod: CPTII,S$GLB,, | Performed by: THORACIC SURGERY (CARDIOTHORACIC VASCULAR SURGERY)

## 2021-07-26 PROCEDURE — 1159F MED LIST DOCD IN RCRD: CPT | Mod: CPTII,S$GLB,, | Performed by: THORACIC SURGERY (CARDIOTHORACIC VASCULAR SURGERY)

## 2021-07-26 PROCEDURE — 1157F PR ADVANCE CARE PLAN OR EQUIV PRESENT IN MEDICAL RECORD: ICD-10-PCS | Mod: CPTII,S$GLB,, | Performed by: THORACIC SURGERY (CARDIOTHORACIC VASCULAR SURGERY)

## 2021-07-26 PROCEDURE — 71250 CT THORAX DX C-: CPT | Mod: TC

## 2021-07-26 PROCEDURE — 99213 OFFICE O/P EST LOW 20 MIN: CPT | Mod: S$GLB,,, | Performed by: THORACIC SURGERY (CARDIOTHORACIC VASCULAR SURGERY)

## 2021-07-26 PROCEDURE — 71250 CT CHEST WITHOUT CONTRAST: ICD-10-PCS | Mod: 26,,, | Performed by: RADIOLOGY

## 2021-07-26 PROCEDURE — 1157F ADVNC CARE PLAN IN RCRD: CPT | Mod: CPTII,S$GLB,, | Performed by: THORACIC SURGERY (CARDIOTHORACIC VASCULAR SURGERY)

## 2021-07-26 PROCEDURE — 3288F PR FALLS RISK ASSESSMENT DOCUMENTED: ICD-10-PCS | Mod: CPTII,S$GLB,, | Performed by: THORACIC SURGERY (CARDIOTHORACIC VASCULAR SURGERY)

## 2021-07-26 PROCEDURE — 3077F PR MOST RECENT SYSTOLIC BLOOD PRESSURE >= 140 MM HG: ICD-10-PCS | Mod: CPTII,S$GLB,, | Performed by: THORACIC SURGERY (CARDIOTHORACIC VASCULAR SURGERY)

## 2021-07-26 PROCEDURE — 1159F PR MEDICATION LIST DOCUMENTED IN MEDICAL RECORD: ICD-10-PCS | Mod: CPTII,S$GLB,, | Performed by: THORACIC SURGERY (CARDIOTHORACIC VASCULAR SURGERY)

## 2021-07-26 PROCEDURE — 3008F BODY MASS INDEX DOCD: CPT | Mod: CPTII,S$GLB,, | Performed by: THORACIC SURGERY (CARDIOTHORACIC VASCULAR SURGERY)

## 2021-07-26 PROCEDURE — 1126F PR PAIN SEVERITY QUANTIFIED, NO PAIN PRESENT: ICD-10-PCS | Mod: CPTII,S$GLB,, | Performed by: THORACIC SURGERY (CARDIOTHORACIC VASCULAR SURGERY)

## 2021-08-04 ENCOUNTER — TELEPHONE (OUTPATIENT)
Dept: CARDIOLOGY | Facility: HOSPITAL | Age: 71
End: 2021-08-04

## 2021-08-04 ENCOUNTER — TELEPHONE (OUTPATIENT)
Dept: ELECTROPHYSIOLOGY | Facility: CLINIC | Age: 71
End: 2021-08-04

## 2021-08-04 ENCOUNTER — PATIENT MESSAGE (OUTPATIENT)
Dept: PRIMARY CARE CLINIC | Facility: CLINIC | Age: 71
End: 2021-08-04

## 2021-08-04 ENCOUNTER — PATIENT MESSAGE (OUTPATIENT)
Dept: ELECTROPHYSIOLOGY | Facility: CLINIC | Age: 71
End: 2021-08-04

## 2021-08-04 ENCOUNTER — ANESTHESIA EVENT (OUTPATIENT)
Dept: MEDSURG UNIT | Facility: HOSPITAL | Age: 71
End: 2021-08-04
Payer: MEDICARE

## 2021-08-04 DIAGNOSIS — I45.2 BIFASCICULAR BLOCK: ICD-10-CM

## 2021-08-04 DIAGNOSIS — R00.1 SINUS BRADYCARDIA: ICD-10-CM

## 2021-08-04 DIAGNOSIS — I63.412 CEREBRAL INFARCTION DUE TO EMBOLISM OF LEFT MIDDLE CEREBRAL ARTERY: ICD-10-CM

## 2021-08-04 DIAGNOSIS — I44.4 LAFB (LEFT ANTERIOR FASCICULAR BLOCK): ICD-10-CM

## 2021-08-04 DIAGNOSIS — R55 NEAR SYNCOPE: Primary | ICD-10-CM

## 2021-08-04 DIAGNOSIS — I45.2 RBBB (RIGHT BUNDLE BRANCH BLOCK WITH LEFT ANTERIOR FASCICULAR BLOCK): ICD-10-CM

## 2021-08-04 DIAGNOSIS — R56.9 SEIZURE-LIKE ACTIVITY: Primary | ICD-10-CM

## 2021-08-04 DIAGNOSIS — G45.9 TRANSIENT CEREBRAL ISCHEMIA, UNSPECIFIED TYPE: ICD-10-CM

## 2021-08-04 DIAGNOSIS — I45.5 SINUS PAUSE: ICD-10-CM

## 2021-08-05 ENCOUNTER — ANESTHESIA (OUTPATIENT)
Dept: MEDSURG UNIT | Facility: HOSPITAL | Age: 71
End: 2021-08-05
Payer: MEDICARE

## 2021-08-05 ENCOUNTER — PATIENT MESSAGE (OUTPATIENT)
Dept: PRIMARY CARE CLINIC | Facility: CLINIC | Age: 71
End: 2021-08-05

## 2021-08-05 ENCOUNTER — HOSPITAL ENCOUNTER (OUTPATIENT)
Facility: HOSPITAL | Age: 71
Discharge: HOME | End: 2021-08-05
Attending: INTERNAL MEDICINE | Admitting: INTERNAL MEDICINE
Payer: MEDICARE

## 2021-08-05 VITALS
HEIGHT: 73 IN | TEMPERATURE: 98 F | BODY MASS INDEX: 39.76 KG/M2 | WEIGHT: 300 LBS | SYSTOLIC BLOOD PRESSURE: 168 MMHG | HEART RATE: 72 BPM | DIASTOLIC BLOOD PRESSURE: 74 MMHG | RESPIRATION RATE: 17 BRPM | OXYGEN SATURATION: 98 %

## 2021-08-05 DIAGNOSIS — R55 NEAR SYNCOPE: ICD-10-CM

## 2021-08-05 DIAGNOSIS — Z01.818 PRE-OP TESTING: Primary | ICD-10-CM

## 2021-08-05 DIAGNOSIS — I45.5 SINUS PAUSE: ICD-10-CM

## 2021-08-05 DIAGNOSIS — Z95.9 CARDIAC DEVICE IN SITU: ICD-10-CM

## 2021-08-05 DIAGNOSIS — R00.1 SINUS BRADYCARDIA: ICD-10-CM

## 2021-08-05 DIAGNOSIS — I45.2 RBBB (RIGHT BUNDLE BRANCH BLOCK WITH LEFT ANTERIOR FASCICULAR BLOCK): ICD-10-CM

## 2021-08-05 DIAGNOSIS — Z95.0 CARDIAC PACEMAKER IN SITU: Primary | ICD-10-CM

## 2021-08-05 DIAGNOSIS — I44.4 LAFB (LEFT ANTERIOR FASCICULAR BLOCK): ICD-10-CM

## 2021-08-05 LAB
ANION GAP SERPL CALC-SCNC: 10 MMOL/L (ref 8–16)
APTT BLDCRRT: 29.1 SEC (ref 21–32)
BASOPHILS # BLD AUTO: 0.07 K/UL (ref 0–0.2)
BASOPHILS NFR BLD: 1 % (ref 0–1.9)
BUN SERPL-MCNC: 15 MG/DL (ref 8–23)
CALCIUM SERPL-MCNC: 9.7 MG/DL (ref 8.7–10.5)
CHLORIDE SERPL-SCNC: 103 MMOL/L (ref 95–110)
CO2 SERPL-SCNC: 25 MMOL/L (ref 23–29)
CREAT SERPL-MCNC: 0.8 MG/DL (ref 0.5–1.4)
DIFFERENTIAL METHOD: ABNORMAL
EOSINOPHIL # BLD AUTO: 0.1 K/UL (ref 0–0.5)
EOSINOPHIL NFR BLD: 1.9 % (ref 0–8)
ERYTHROCYTE [DISTWIDTH] IN BLOOD BY AUTOMATED COUNT: 13.8 % (ref 11.5–14.5)
EST. GFR  (AFRICAN AMERICAN): >60 ML/MIN/1.73 M^2
EST. GFR  (NON AFRICAN AMERICAN): >60 ML/MIN/1.73 M^2
GLUCOSE SERPL-MCNC: 101 MG/DL (ref 70–110)
HCT VFR BLD AUTO: 39.9 % (ref 40–54)
HGB BLD-MCNC: 13.8 G/DL (ref 14–18)
IMM GRANULOCYTES # BLD AUTO: 0.02 K/UL (ref 0–0.04)
IMM GRANULOCYTES NFR BLD AUTO: 0.3 % (ref 0–0.5)
INR PPP: 1 (ref 0.8–1.2)
LYMPHOCYTES # BLD AUTO: 1.7 K/UL (ref 1–4.8)
LYMPHOCYTES NFR BLD: 24.3 % (ref 18–48)
MCH RBC QN AUTO: 32.2 PG (ref 27–31)
MCHC RBC AUTO-ENTMCNC: 34.6 G/DL (ref 32–36)
MCV RBC AUTO: 93 FL (ref 82–98)
MONOCYTES # BLD AUTO: 0.8 K/UL (ref 0.3–1)
MONOCYTES NFR BLD: 12 % (ref 4–15)
NEUTROPHILS # BLD AUTO: 4.2 K/UL (ref 1.8–7.7)
NEUTROPHILS NFR BLD: 60.5 % (ref 38–73)
NRBC BLD-RTO: 0 /100 WBC
PLATELET # BLD AUTO: 228 K/UL (ref 150–450)
PMV BLD AUTO: 10.9 FL (ref 9.2–12.9)
POTASSIUM SERPL-SCNC: 4.5 MMOL/L (ref 3.5–5.1)
PROTHROMBIN TIME: 10.6 SEC (ref 9–12.5)
RBC # BLD AUTO: 4.28 M/UL (ref 4.6–6.2)
SARS-COV-2 RDRP RESP QL NAA+PROBE: NEGATIVE
SODIUM SERPL-SCNC: 138 MMOL/L (ref 136–145)
WBC # BLD AUTO: 6.92 K/UL (ref 3.9–12.7)

## 2021-08-05 PROCEDURE — 85025 COMPLETE CBC W/AUTO DIFF WBC: CPT | Performed by: INTERNAL MEDICINE

## 2021-08-05 PROCEDURE — 93010 EKG 12-LEAD: ICD-10-PCS | Mod: ,,, | Performed by: INTERNAL MEDICINE

## 2021-08-05 PROCEDURE — 33208 PR INSER HART PACER XVENOUS ATR/VENTR: ICD-10-PCS | Mod: KX,,, | Performed by: INTERNAL MEDICINE

## 2021-08-05 PROCEDURE — 25000003 PHARM REV CODE 250: Performed by: NURSE PRACTITIONER

## 2021-08-05 PROCEDURE — 33208 INSRT HEART PM ATRIAL & VENT: CPT | Mod: KX | Performed by: INTERNAL MEDICINE

## 2021-08-05 PROCEDURE — 80048 BASIC METABOLIC PNL TOTAL CA: CPT | Performed by: INTERNAL MEDICINE

## 2021-08-05 PROCEDURE — U0002 COVID-19 LAB TEST NON-CDC: HCPCS | Performed by: INTERNAL MEDICINE

## 2021-08-05 PROCEDURE — 85730 THROMBOPLASTIN TIME PARTIAL: CPT | Performed by: INTERNAL MEDICINE

## 2021-08-05 PROCEDURE — 63600175 PHARM REV CODE 636 W HCPCS: Performed by: NURSE PRACTITIONER

## 2021-08-05 PROCEDURE — D9220A PRA ANESTHESIA: Mod: ,,, | Performed by: ANESTHESIOLOGY

## 2021-08-05 PROCEDURE — 93010 ELECTROCARDIOGRAM REPORT: CPT | Mod: ,,, | Performed by: INTERNAL MEDICINE

## 2021-08-05 PROCEDURE — 25000003 PHARM REV CODE 250: Performed by: NURSE ANESTHETIST, CERTIFIED REGISTERED

## 2021-08-05 PROCEDURE — 63600175 PHARM REV CODE 636 W HCPCS: Performed by: INTERNAL MEDICINE

## 2021-08-05 PROCEDURE — 37000008 HC ANESTHESIA 1ST 15 MINUTES: Performed by: INTERNAL MEDICINE

## 2021-08-05 PROCEDURE — 25500020 PHARM REV CODE 255: Performed by: NURSE ANESTHETIST, CERTIFIED REGISTERED

## 2021-08-05 PROCEDURE — 37000009 HC ANESTHESIA EA ADD 15 MINS: Performed by: INTERNAL MEDICINE

## 2021-08-05 PROCEDURE — C1785 PMKR, DUAL, RATE-RESP: HCPCS | Performed by: INTERNAL MEDICINE

## 2021-08-05 PROCEDURE — 93005 ELECTROCARDIOGRAM TRACING: CPT | Mod: 59

## 2021-08-05 PROCEDURE — C1894 INTRO/SHEATH, NON-LASER: HCPCS | Performed by: INTERNAL MEDICINE

## 2021-08-05 PROCEDURE — D9220A PRA ANESTHESIA: ICD-10-PCS | Mod: ,,, | Performed by: ANESTHESIOLOGY

## 2021-08-05 PROCEDURE — 25000003 PHARM REV CODE 250: Performed by: INTERNAL MEDICINE

## 2021-08-05 PROCEDURE — 85610 PROTHROMBIN TIME: CPT | Performed by: INTERNAL MEDICINE

## 2021-08-05 PROCEDURE — 33208 INSRT HEART PM ATRIAL & VENT: CPT | Mod: KX,,, | Performed by: INTERNAL MEDICINE

## 2021-08-05 PROCEDURE — 63600175 PHARM REV CODE 636 W HCPCS: Performed by: NURSE ANESTHETIST, CERTIFIED REGISTERED

## 2021-08-05 PROCEDURE — C1898 LEAD, PMKR, OTHER THAN TRANS: HCPCS | Performed by: INTERNAL MEDICINE

## 2021-08-05 DEVICE — IMPLANTABLE DEVICE
Type: IMPLANTABLE DEVICE | Site: CHEST | Status: FUNCTIONAL
Brand: EDORA 8 DR-T

## 2021-08-05 DEVICE — IMPLANTABLE DEVICE
Type: IMPLANTABLE DEVICE | Site: CHEST | Status: FUNCTIONAL
Brand: SOLIA

## 2021-08-05 RX ORDER — SODIUM CHLORIDE 0.9 % (FLUSH) 0.9 %
10 SYRINGE (ML) INJECTION
Status: DISCONTINUED | OUTPATIENT
Start: 2021-08-05 | End: 2021-08-05 | Stop reason: HOSPADM

## 2021-08-05 RX ORDER — FENTANYL CITRATE 50 UG/ML
25 INJECTION, SOLUTION INTRAMUSCULAR; INTRAVENOUS EVERY 5 MIN PRN
Status: DISCONTINUED | OUTPATIENT
Start: 2021-08-05 | End: 2021-08-05 | Stop reason: HOSPADM

## 2021-08-05 RX ORDER — ONDANSETRON 2 MG/ML
INJECTION INTRAMUSCULAR; INTRAVENOUS
Status: DISCONTINUED | OUTPATIENT
Start: 2021-08-05 | End: 2021-08-05

## 2021-08-05 RX ORDER — FENTANYL CITRATE 50 UG/ML
INJECTION, SOLUTION INTRAMUSCULAR; INTRAVENOUS
Status: DISCONTINUED | OUTPATIENT
Start: 2021-08-05 | End: 2021-08-05

## 2021-08-05 RX ORDER — MIDAZOLAM HYDROCHLORIDE 1 MG/ML
INJECTION INTRAMUSCULAR; INTRAVENOUS
Status: DISCONTINUED | OUTPATIENT
Start: 2021-08-05 | End: 2021-08-05

## 2021-08-05 RX ORDER — ONDANSETRON 2 MG/ML
4 INJECTION INTRAMUSCULAR; INTRAVENOUS DAILY PRN
Status: DISCONTINUED | OUTPATIENT
Start: 2021-08-05 | End: 2021-08-05 | Stop reason: HOSPADM

## 2021-08-05 RX ORDER — IODIXANOL 320 MG/ML
INJECTION, SOLUTION INTRAVASCULAR
Status: DISCONTINUED | OUTPATIENT
Start: 2021-08-05 | End: 2021-08-05

## 2021-08-05 RX ORDER — DOXYCYCLINE 100 MG/1
100 CAPSULE ORAL EVERY 12 HOURS
Qty: 10 CAPSULE | Refills: 0 | Status: SHIPPED | OUTPATIENT
Start: 2021-08-05 | End: 2021-08-10

## 2021-08-05 RX ORDER — LIDOCAINE HYDROCHLORIDE 20 MG/ML
INJECTION INTRAVENOUS
Status: DISCONTINUED | OUTPATIENT
Start: 2021-08-05 | End: 2021-08-05

## 2021-08-05 RX ORDER — VANCOMYCIN HYDROCHLORIDE 1 G/20ML
INJECTION, POWDER, LYOPHILIZED, FOR SOLUTION INTRAVENOUS
Status: DISCONTINUED | OUTPATIENT
Start: 2021-08-05 | End: 2021-08-05 | Stop reason: HOSPADM

## 2021-08-05 RX ORDER — BUPIVACAINE HYDROCHLORIDE 2.5 MG/ML
INJECTION, SOLUTION EPIDURAL; INFILTRATION; INTRACAUDAL
Status: DISCONTINUED | OUTPATIENT
Start: 2021-08-05 | End: 2021-08-05 | Stop reason: HOSPADM

## 2021-08-05 RX ORDER — LIDOCAINE HYDROCHLORIDE 20 MG/ML
INJECTION, SOLUTION EPIDURAL; INFILTRATION; INTRACAUDAL; PERINEURAL
Status: DISCONTINUED | OUTPATIENT
Start: 2021-08-05 | End: 2021-08-05 | Stop reason: HOSPADM

## 2021-08-05 RX ORDER — PROPOFOL 10 MG/ML
VIAL (ML) INTRAVENOUS CONTINUOUS PRN
Status: DISCONTINUED | OUTPATIENT
Start: 2021-08-05 | End: 2021-08-05

## 2021-08-05 RX ORDER — ACETAMINOPHEN 325 MG/1
650 TABLET ORAL EVERY 4 HOURS PRN
Status: DISCONTINUED | OUTPATIENT
Start: 2021-08-05 | End: 2021-08-05 | Stop reason: HOSPADM

## 2021-08-05 RX ORDER — KETAMINE HCL IN 0.9 % NACL 50 MG/5 ML
SYRINGE (ML) INTRAVENOUS
Status: DISCONTINUED | OUTPATIENT
Start: 2021-08-05 | End: 2021-08-05

## 2021-08-05 RX ORDER — VANCOMYCIN HCL IN 5 % DEXTROSE 1G/250ML
1000 PLASTIC BAG, INJECTION (ML) INTRAVENOUS
Status: DISCONTINUED | OUTPATIENT
Start: 2021-08-05 | End: 2021-08-05

## 2021-08-05 RX ORDER — SODIUM CHLORIDE 0.9 G/100ML
IRRIGANT IRRIGATION
Status: DISCONTINUED | OUTPATIENT
Start: 2021-08-05 | End: 2021-08-05 | Stop reason: HOSPADM

## 2021-08-05 RX ADMIN — FENTANYL CITRATE 50 MCG: 50 INJECTION INTRAMUSCULAR; INTRAVENOUS at 12:08

## 2021-08-05 RX ADMIN — ACETAMINOPHEN 650 MG: 325 TABLET ORAL at 03:08

## 2021-08-05 RX ADMIN — PROPOFOL 40 MCG/KG/MIN: 10 INJECTION, EMULSION INTRAVENOUS at 12:08

## 2021-08-05 RX ADMIN — LIDOCAINE HYDROCHLORIDE 80 MG: 20 INJECTION, SOLUTION INTRAVENOUS at 12:08

## 2021-08-05 RX ADMIN — IODIXANOL 10 ML: 320 INJECTION, SOLUTION INTRAVASCULAR at 12:08

## 2021-08-05 RX ADMIN — MIDAZOLAM HYDROCHLORIDE 2 MG: 1 INJECTION, SOLUTION INTRAMUSCULAR; INTRAVENOUS at 12:08

## 2021-08-05 RX ADMIN — Medication 25 MG: at 12:08

## 2021-08-05 RX ADMIN — SODIUM CHLORIDE: 9 INJECTION, SOLUTION INTRAVENOUS at 12:08

## 2021-08-05 RX ADMIN — VANCOMYCIN HYDROCHLORIDE 1000 MG: 1 INJECTION, POWDER, LYOPHILIZED, FOR SOLUTION INTRAVENOUS at 12:08

## 2021-08-05 RX ADMIN — ONDANSETRON 4 MG: 2 INJECTION INTRAMUSCULAR; INTRAVENOUS at 12:08

## 2021-08-08 ENCOUNTER — NURSE TRIAGE (OUTPATIENT)
Dept: ADMINISTRATIVE | Facility: CLINIC | Age: 71
End: 2021-08-08

## 2021-08-09 ENCOUNTER — TELEPHONE (OUTPATIENT)
Dept: CARDIOLOGY | Facility: HOSPITAL | Age: 71
End: 2021-08-09

## 2021-08-09 ENCOUNTER — PATIENT MESSAGE (OUTPATIENT)
Dept: ELECTROPHYSIOLOGY | Facility: CLINIC | Age: 71
End: 2021-08-09

## 2021-08-12 ENCOUNTER — PATIENT MESSAGE (OUTPATIENT)
Dept: UROLOGY | Facility: CLINIC | Age: 71
End: 2021-08-12

## 2021-08-12 ENCOUNTER — CLINICAL SUPPORT (OUTPATIENT)
Dept: CARDIOLOGY | Facility: HOSPITAL | Age: 71
End: 2021-08-12
Attending: INTERNAL MEDICINE
Payer: MEDICARE

## 2021-08-12 ENCOUNTER — TELEPHONE (OUTPATIENT)
Dept: UROLOGY | Facility: CLINIC | Age: 71
End: 2021-08-12

## 2021-08-12 DIAGNOSIS — Z95.0 CARDIAC PACEMAKER IN SITU: ICD-10-CM

## 2021-08-12 PROCEDURE — 93280 CARDIAC DEVICE CHECK - IN CLINIC & HOSPITAL: ICD-10-PCS | Mod: 26,,, | Performed by: INTERNAL MEDICINE

## 2021-08-12 PROCEDURE — 93280 PM DEVICE PROGR EVAL DUAL: CPT

## 2021-08-12 PROCEDURE — 93280 PM DEVICE PROGR EVAL DUAL: CPT | Mod: 26,,, | Performed by: INTERNAL MEDICINE

## 2021-08-23 ENCOUNTER — OFFICE VISIT (OUTPATIENT)
Dept: UROLOGY | Facility: CLINIC | Age: 71
End: 2021-08-23
Payer: MEDICARE

## 2021-08-23 DIAGNOSIS — R35.1 BENIGN PROSTATIC HYPERPLASIA WITH NOCTURIA: ICD-10-CM

## 2021-08-23 DIAGNOSIS — R35.1 NOCTURIA: ICD-10-CM

## 2021-08-23 DIAGNOSIS — N32.81 OAB (OVERACTIVE BLADDER): Primary | ICD-10-CM

## 2021-08-23 DIAGNOSIS — N40.1 BENIGN PROSTATIC HYPERPLASIA WITH NOCTURIA: ICD-10-CM

## 2021-08-23 PROCEDURE — 1160F PR REVIEW ALL MEDS BY PRESCRIBER/CLIN PHARMACIST DOCUMENTED: ICD-10-PCS | Mod: CPTII,95,, | Performed by: NURSE PRACTITIONER

## 2021-08-23 PROCEDURE — 99442 PR PHYSICIAN TELEPHONE EVALUATION 11-20 MIN: CPT | Mod: 95,,, | Performed by: NURSE PRACTITIONER

## 2021-08-23 PROCEDURE — 1157F PR ADVANCE CARE PLAN OR EQUIV PRESENT IN MEDICAL RECORD: ICD-10-PCS | Mod: CPTII,95,, | Performed by: NURSE PRACTITIONER

## 2021-08-23 PROCEDURE — 1160F RVW MEDS BY RX/DR IN RCRD: CPT | Mod: CPTII,95,, | Performed by: NURSE PRACTITIONER

## 2021-08-23 PROCEDURE — 1159F PR MEDICATION LIST DOCUMENTED IN MEDICAL RECORD: ICD-10-PCS | Mod: CPTII,95,, | Performed by: NURSE PRACTITIONER

## 2021-08-23 PROCEDURE — 1159F MED LIST DOCD IN RCRD: CPT | Mod: CPTII,95,, | Performed by: NURSE PRACTITIONER

## 2021-08-23 PROCEDURE — 1157F ADVNC CARE PLAN IN RCRD: CPT | Mod: CPTII,95,, | Performed by: NURSE PRACTITIONER

## 2021-08-23 PROCEDURE — 99442 PR PHYSICIAN TELEPHONE EVALUATION 11-20 MIN: ICD-10-PCS | Mod: 95,,, | Performed by: NURSE PRACTITIONER

## 2021-08-24 ENCOUNTER — TELEPHONE (OUTPATIENT)
Dept: UROLOGY | Facility: CLINIC | Age: 71
End: 2021-08-24

## 2021-09-01 ENCOUNTER — PATIENT MESSAGE (OUTPATIENT)
Dept: ELECTROPHYSIOLOGY | Facility: CLINIC | Age: 71
End: 2021-09-01

## 2021-09-10 ENCOUNTER — TELEPHONE (OUTPATIENT)
Dept: PODIATRY | Facility: CLINIC | Age: 71
End: 2021-09-10

## 2021-09-16 ENCOUNTER — PATIENT MESSAGE (OUTPATIENT)
Dept: PRIMARY CARE CLINIC | Facility: CLINIC | Age: 71
End: 2021-09-16

## 2021-09-17 RX ORDER — NYSTATIN AND TRIAMCINOLONE ACETONIDE 100000; 1 [USP'U]/G; MG/G
CREAM TOPICAL 4 TIMES DAILY
Qty: 60 G | Refills: 5 | Status: SHIPPED | OUTPATIENT
Start: 2021-09-17 | End: 2023-03-09

## 2021-09-23 ENCOUNTER — PATIENT OUTREACH (OUTPATIENT)
Dept: ADMINISTRATIVE | Facility: OTHER | Age: 71
End: 2021-09-23

## 2021-09-24 ENCOUNTER — OFFICE VISIT (OUTPATIENT)
Dept: UROLOGY | Facility: CLINIC | Age: 71
End: 2021-09-24
Payer: MEDICARE

## 2021-09-24 VITALS
BODY MASS INDEX: 39.77 KG/M2 | HEART RATE: 66 BPM | SYSTOLIC BLOOD PRESSURE: 146 MMHG | HEIGHT: 73 IN | WEIGHT: 300.06 LBS | DIASTOLIC BLOOD PRESSURE: 77 MMHG

## 2021-09-24 DIAGNOSIS — N40.1 BENIGN PROSTATIC HYPERPLASIA WITH NOCTURIA: ICD-10-CM

## 2021-09-24 DIAGNOSIS — R35.1 BENIGN PROSTATIC HYPERPLASIA WITH NOCTURIA: ICD-10-CM

## 2021-09-24 DIAGNOSIS — N39.41 URGE INCONTINENCE: ICD-10-CM

## 2021-09-24 DIAGNOSIS — N32.81 OAB (OVERACTIVE BLADDER): Primary | ICD-10-CM

## 2021-09-24 PROCEDURE — 1159F PR MEDICATION LIST DOCUMENTED IN MEDICAL RECORD: ICD-10-PCS | Mod: HCNC,CPTII,S$GLB, | Performed by: UROLOGY

## 2021-09-24 PROCEDURE — 1159F MED LIST DOCD IN RCRD: CPT | Mod: HCNC,CPTII,S$GLB, | Performed by: UROLOGY

## 2021-09-24 PROCEDURE — 3008F PR BODY MASS INDEX (BMI) DOCUMENTED: ICD-10-PCS | Mod: HCNC,CPTII,S$GLB, | Performed by: UROLOGY

## 2021-09-24 PROCEDURE — 3077F PR MOST RECENT SYSTOLIC BLOOD PRESSURE >= 140 MM HG: ICD-10-PCS | Mod: HCNC,CPTII,S$GLB, | Performed by: UROLOGY

## 2021-09-24 PROCEDURE — 99999 PR PBB SHADOW E&M-EST. PATIENT-LVL V: ICD-10-PCS | Mod: PBBFAC,HCNC,, | Performed by: UROLOGY

## 2021-09-24 PROCEDURE — 1126F PR PAIN SEVERITY QUANTIFIED, NO PAIN PRESENT: ICD-10-PCS | Mod: HCNC,CPTII,S$GLB, | Performed by: UROLOGY

## 2021-09-24 PROCEDURE — 3288F FALL RISK ASSESSMENT DOCD: CPT | Mod: HCNC,CPTII,S$GLB, | Performed by: UROLOGY

## 2021-09-24 PROCEDURE — 3008F BODY MASS INDEX DOCD: CPT | Mod: HCNC,CPTII,S$GLB, | Performed by: UROLOGY

## 2021-09-24 PROCEDURE — 3077F SYST BP >= 140 MM HG: CPT | Mod: HCNC,CPTII,S$GLB, | Performed by: UROLOGY

## 2021-09-24 PROCEDURE — 1126F AMNT PAIN NOTED NONE PRSNT: CPT | Mod: HCNC,CPTII,S$GLB, | Performed by: UROLOGY

## 2021-09-24 PROCEDURE — 1160F RVW MEDS BY RX/DR IN RCRD: CPT | Mod: HCNC,CPTII,S$GLB, | Performed by: UROLOGY

## 2021-09-24 PROCEDURE — 99999 PR PBB SHADOW E&M-EST. PATIENT-LVL V: CPT | Mod: PBBFAC,HCNC,, | Performed by: UROLOGY

## 2021-09-24 PROCEDURE — 1157F PR ADVANCE CARE PLAN OR EQUIV PRESENT IN MEDICAL RECORD: ICD-10-PCS | Mod: HCNC,CPTII,S$GLB, | Performed by: UROLOGY

## 2021-09-24 PROCEDURE — 1160F PR REVIEW ALL MEDS BY PRESCRIBER/CLIN PHARMACIST DOCUMENTED: ICD-10-PCS | Mod: HCNC,CPTII,S$GLB, | Performed by: UROLOGY

## 2021-09-24 PROCEDURE — 1157F ADVNC CARE PLAN IN RCRD: CPT | Mod: HCNC,CPTII,S$GLB, | Performed by: UROLOGY

## 2021-09-24 PROCEDURE — 3078F DIAST BP <80 MM HG: CPT | Mod: HCNC,CPTII,S$GLB, | Performed by: UROLOGY

## 2021-09-24 PROCEDURE — 1101F PR PT FALLS ASSESS DOC 0-1 FALLS W/OUT INJ PAST YR: ICD-10-PCS | Mod: HCNC,CPTII,S$GLB, | Performed by: UROLOGY

## 2021-09-24 PROCEDURE — 99213 OFFICE O/P EST LOW 20 MIN: CPT | Mod: HCNC,S$GLB,, | Performed by: UROLOGY

## 2021-09-24 PROCEDURE — 3288F PR FALLS RISK ASSESSMENT DOCUMENTED: ICD-10-PCS | Mod: HCNC,CPTII,S$GLB, | Performed by: UROLOGY

## 2021-09-24 PROCEDURE — 99213 PR OFFICE/OUTPT VISIT, EST, LEVL III, 20-29 MIN: ICD-10-PCS | Mod: HCNC,S$GLB,, | Performed by: UROLOGY

## 2021-09-24 PROCEDURE — 1101F PT FALLS ASSESS-DOCD LE1/YR: CPT | Mod: HCNC,CPTII,S$GLB, | Performed by: UROLOGY

## 2021-09-24 PROCEDURE — 3078F PR MOST RECENT DIASTOLIC BLOOD PRESSURE < 80 MM HG: ICD-10-PCS | Mod: HCNC,CPTII,S$GLB, | Performed by: UROLOGY

## 2021-09-26 ENCOUNTER — PATIENT MESSAGE (OUTPATIENT)
Dept: PRIMARY CARE CLINIC | Facility: CLINIC | Age: 71
End: 2021-09-26

## 2021-09-28 ENCOUNTER — OFFICE VISIT (OUTPATIENT)
Dept: PODIATRY | Facility: CLINIC | Age: 71
End: 2021-09-28
Payer: MEDICARE

## 2021-09-28 VITALS
DIASTOLIC BLOOD PRESSURE: 86 MMHG | WEIGHT: 302.94 LBS | BODY MASS INDEX: 40.15 KG/M2 | HEART RATE: 73 BPM | HEIGHT: 73 IN | SYSTOLIC BLOOD PRESSURE: 151 MMHG

## 2021-09-28 DIAGNOSIS — M21.41 PES PLANUS OF BOTH FEET: Primary | ICD-10-CM

## 2021-09-28 DIAGNOSIS — M25.572 SINUS TARSI SYNDROME, LEFT: ICD-10-CM

## 2021-09-28 DIAGNOSIS — M21.42 PES PLANUS OF BOTH FEET: Primary | ICD-10-CM

## 2021-09-28 PROCEDURE — 99214 OFFICE O/P EST MOD 30 MIN: CPT | Mod: 25,HCNC,S$GLB, | Performed by: PODIATRIST

## 2021-09-28 PROCEDURE — 20605 DRAIN/INJ JOINT/BURSA W/O US: CPT | Mod: HCNC,LT,S$GLB, | Performed by: PODIATRIST

## 2021-09-28 PROCEDURE — 3288F FALL RISK ASSESSMENT DOCD: CPT | Mod: HCNC,CPTII,S$GLB, | Performed by: PODIATRIST

## 2021-09-28 PROCEDURE — 3079F PR MOST RECENT DIASTOLIC BLOOD PRESSURE 80-89 MM HG: ICD-10-PCS | Mod: HCNC,CPTII,S$GLB, | Performed by: PODIATRIST

## 2021-09-28 PROCEDURE — 20605 PR DRAIN/INJECT INTERMEDIATE JOINT/BURSA: ICD-10-PCS | Mod: HCNC,LT,S$GLB, | Performed by: PODIATRIST

## 2021-09-28 PROCEDURE — 1157F PR ADVANCE CARE PLAN OR EQUIV PRESENT IN MEDICAL RECORD: ICD-10-PCS | Mod: HCNC,CPTII,S$GLB, | Performed by: PODIATRIST

## 2021-09-28 PROCEDURE — 1125F PR PAIN SEVERITY QUANTIFIED, PAIN PRESENT: ICD-10-PCS | Mod: HCNC,CPTII,S$GLB, | Performed by: PODIATRIST

## 2021-09-28 PROCEDURE — 3077F PR MOST RECENT SYSTOLIC BLOOD PRESSURE >= 140 MM HG: ICD-10-PCS | Mod: HCNC,CPTII,S$GLB, | Performed by: PODIATRIST

## 2021-09-28 PROCEDURE — 3079F DIAST BP 80-89 MM HG: CPT | Mod: HCNC,CPTII,S$GLB, | Performed by: PODIATRIST

## 2021-09-28 PROCEDURE — 1101F PR PT FALLS ASSESS DOC 0-1 FALLS W/OUT INJ PAST YR: ICD-10-PCS | Mod: HCNC,CPTII,S$GLB, | Performed by: PODIATRIST

## 2021-09-28 PROCEDURE — 1157F ADVNC CARE PLAN IN RCRD: CPT | Mod: HCNC,CPTII,S$GLB, | Performed by: PODIATRIST

## 2021-09-28 PROCEDURE — 1101F PT FALLS ASSESS-DOCD LE1/YR: CPT | Mod: HCNC,CPTII,S$GLB, | Performed by: PODIATRIST

## 2021-09-28 PROCEDURE — 1125F AMNT PAIN NOTED PAIN PRSNT: CPT | Mod: HCNC,CPTII,S$GLB, | Performed by: PODIATRIST

## 2021-09-28 PROCEDURE — 3288F PR FALLS RISK ASSESSMENT DOCUMENTED: ICD-10-PCS | Mod: HCNC,CPTII,S$GLB, | Performed by: PODIATRIST

## 2021-09-28 PROCEDURE — 99214 PR OFFICE/OUTPT VISIT, EST, LEVL IV, 30-39 MIN: ICD-10-PCS | Mod: 25,HCNC,S$GLB, | Performed by: PODIATRIST

## 2021-09-28 PROCEDURE — 3008F BODY MASS INDEX DOCD: CPT | Mod: HCNC,CPTII,S$GLB, | Performed by: PODIATRIST

## 2021-09-28 PROCEDURE — 3008F PR BODY MASS INDEX (BMI) DOCUMENTED: ICD-10-PCS | Mod: HCNC,CPTII,S$GLB, | Performed by: PODIATRIST

## 2021-09-28 PROCEDURE — 3077F SYST BP >= 140 MM HG: CPT | Mod: HCNC,CPTII,S$GLB, | Performed by: PODIATRIST

## 2021-09-28 PROCEDURE — 99999 PR PBB SHADOW E&M-EST. PATIENT-LVL V: ICD-10-PCS | Mod: PBBFAC,HCNC,, | Performed by: PODIATRIST

## 2021-09-28 PROCEDURE — 99999 PR PBB SHADOW E&M-EST. PATIENT-LVL V: CPT | Mod: PBBFAC,HCNC,, | Performed by: PODIATRIST

## 2021-09-28 RX ORDER — KETOCONAZOLE 20 MG/G
CREAM TOPICAL DAILY
Qty: 1 TUBE | Refills: 2 | Status: SHIPPED | OUTPATIENT
Start: 2021-09-28 | End: 2022-06-29

## 2021-09-28 RX ORDER — TRIAMCINOLONE ACETONIDE 40 MG/ML
40 INJECTION, SUSPENSION INTRA-ARTICULAR; INTRAMUSCULAR
Status: DISCONTINUED | OUTPATIENT
Start: 2021-09-28 | End: 2023-03-09

## 2021-09-28 RX ORDER — LIDOCAINE HYDROCHLORIDE 20 MG/ML
JELLY TOPICAL 2 TIMES DAILY
Qty: 30 ML | Refills: 2 | Status: SHIPPED | OUTPATIENT
Start: 2021-09-28 | End: 2022-02-22 | Stop reason: ALTCHOICE

## 2021-09-30 ENCOUNTER — TELEPHONE (OUTPATIENT)
Dept: UROLOGY | Facility: CLINIC | Age: 71
End: 2021-09-30

## 2021-09-30 ENCOUNTER — OFFICE VISIT (OUTPATIENT)
Dept: UROLOGY | Facility: CLINIC | Age: 71
End: 2021-09-30
Payer: MEDICARE

## 2021-09-30 VITALS
WEIGHT: 297.63 LBS | BODY MASS INDEX: 39.44 KG/M2 | HEART RATE: 72 BPM | HEIGHT: 73 IN | SYSTOLIC BLOOD PRESSURE: 155 MMHG | DIASTOLIC BLOOD PRESSURE: 82 MMHG

## 2021-09-30 DIAGNOSIS — N32.81 OAB (OVERACTIVE BLADDER): Primary | ICD-10-CM

## 2021-09-30 PROCEDURE — 99214 PR OFFICE/OUTPT VISIT, EST, LEVL IV, 30-39 MIN: ICD-10-PCS | Mod: HCNC,S$GLB,, | Performed by: UROLOGY

## 2021-09-30 PROCEDURE — 3079F PR MOST RECENT DIASTOLIC BLOOD PRESSURE 80-89 MM HG: ICD-10-PCS | Mod: HCNC,CPTII,S$GLB, | Performed by: UROLOGY

## 2021-09-30 PROCEDURE — 1157F ADVNC CARE PLAN IN RCRD: CPT | Mod: HCNC,CPTII,S$GLB, | Performed by: UROLOGY

## 2021-09-30 PROCEDURE — 1159F MED LIST DOCD IN RCRD: CPT | Mod: HCNC,CPTII,S$GLB, | Performed by: UROLOGY

## 2021-09-30 PROCEDURE — 3077F SYST BP >= 140 MM HG: CPT | Mod: HCNC,CPTII,S$GLB, | Performed by: UROLOGY

## 2021-09-30 PROCEDURE — 1159F PR MEDICATION LIST DOCUMENTED IN MEDICAL RECORD: ICD-10-PCS | Mod: HCNC,CPTII,S$GLB, | Performed by: UROLOGY

## 2021-09-30 PROCEDURE — 1157F PR ADVANCE CARE PLAN OR EQUIV PRESENT IN MEDICAL RECORD: ICD-10-PCS | Mod: HCNC,CPTII,S$GLB, | Performed by: UROLOGY

## 2021-09-30 PROCEDURE — 99214 OFFICE O/P EST MOD 30 MIN: CPT | Mod: HCNC,S$GLB,, | Performed by: UROLOGY

## 2021-09-30 PROCEDURE — 99999 PR PBB SHADOW E&M-EST. PATIENT-LVL III: CPT | Mod: PBBFAC,HCNC,, | Performed by: UROLOGY

## 2021-09-30 PROCEDURE — 3008F BODY MASS INDEX DOCD: CPT | Mod: HCNC,CPTII,S$GLB, | Performed by: UROLOGY

## 2021-09-30 PROCEDURE — 3079F DIAST BP 80-89 MM HG: CPT | Mod: HCNC,CPTII,S$GLB, | Performed by: UROLOGY

## 2021-09-30 PROCEDURE — 1160F PR REVIEW ALL MEDS BY PRESCRIBER/CLIN PHARMACIST DOCUMENTED: ICD-10-PCS | Mod: HCNC,CPTII,S$GLB, | Performed by: UROLOGY

## 2021-09-30 PROCEDURE — 1160F RVW MEDS BY RX/DR IN RCRD: CPT | Mod: HCNC,CPTII,S$GLB, | Performed by: UROLOGY

## 2021-09-30 PROCEDURE — 3077F PR MOST RECENT SYSTOLIC BLOOD PRESSURE >= 140 MM HG: ICD-10-PCS | Mod: HCNC,CPTII,S$GLB, | Performed by: UROLOGY

## 2021-09-30 PROCEDURE — 3008F PR BODY MASS INDEX (BMI) DOCUMENTED: ICD-10-PCS | Mod: HCNC,CPTII,S$GLB, | Performed by: UROLOGY

## 2021-09-30 PROCEDURE — 99999 PR PBB SHADOW E&M-EST. PATIENT-LVL III: ICD-10-PCS | Mod: PBBFAC,HCNC,, | Performed by: UROLOGY

## 2021-10-02 ENCOUNTER — IMMUNIZATION (OUTPATIENT)
Dept: INTERNAL MEDICINE | Facility: CLINIC | Age: 71
End: 2021-10-02
Payer: MEDICARE

## 2021-10-02 DIAGNOSIS — Z23 NEED FOR VACCINATION: Primary | ICD-10-CM

## 2021-10-02 PROCEDURE — 0003A COVID-19, MRNA, LNP-S, PF, 30 MCG/0.3 ML DOSE VACCINE: CPT | Mod: HCNC,CV19,PBBFAC | Performed by: INTERNAL MEDICINE

## 2021-10-02 PROCEDURE — 91300 COVID-19, MRNA, LNP-S, PF, 30 MCG/0.3 ML DOSE VACCINE: CPT | Mod: HCNC,PBBFAC | Performed by: INTERNAL MEDICINE

## 2021-10-05 RX ORDER — MONTELUKAST SODIUM 10 MG/1
10 TABLET ORAL DAILY
Qty: 90 TABLET | Refills: 3 | Status: SHIPPED | OUTPATIENT
Start: 2021-10-05 | End: 2022-09-01 | Stop reason: SDUPTHER

## 2021-10-15 ENCOUNTER — TELEPHONE (OUTPATIENT)
Dept: UROLOGY | Facility: CLINIC | Age: 71
End: 2021-10-15

## 2021-10-15 ENCOUNTER — PATIENT MESSAGE (OUTPATIENT)
Dept: UROLOGY | Facility: CLINIC | Age: 71
End: 2021-10-15
Payer: MEDICARE

## 2021-10-18 ENCOUNTER — TELEPHONE (OUTPATIENT)
Dept: UROLOGY | Facility: CLINIC | Age: 71
End: 2021-10-18

## 2021-10-22 ENCOUNTER — PATIENT MESSAGE (OUTPATIENT)
Dept: UROLOGY | Facility: CLINIC | Age: 71
End: 2021-10-22
Payer: MEDICARE

## 2021-10-22 ENCOUNTER — TELEPHONE (OUTPATIENT)
Dept: UROLOGY | Facility: CLINIC | Age: 71
End: 2021-10-22

## 2021-10-25 ENCOUNTER — HOSPITAL ENCOUNTER (OUTPATIENT)
Facility: HOSPITAL | Age: 71
Discharge: HOME | End: 2021-10-25
Attending: UROLOGY | Admitting: UROLOGY
Payer: MEDICARE

## 2021-10-25 VITALS
TEMPERATURE: 98 F | WEIGHT: 300 LBS | RESPIRATION RATE: 18 BRPM | HEIGHT: 73 IN | DIASTOLIC BLOOD PRESSURE: 77 MMHG | OXYGEN SATURATION: 96 % | HEART RATE: 72 BPM | BODY MASS INDEX: 39.76 KG/M2 | SYSTOLIC BLOOD PRESSURE: 164 MMHG

## 2021-10-25 DIAGNOSIS — N32.9 BLADDER DISORDER: ICD-10-CM

## 2021-10-25 PROCEDURE — 27200973 HC CYSTO SUPPLY IV (URODYNAMICS): Performed by: UROLOGY

## 2021-10-25 PROCEDURE — 51784 ANAL/URINARY MUSCLE STUDY: CPT | Mod: 26,51,, | Performed by: UROLOGY

## 2021-10-25 PROCEDURE — 51600 INJECTION FOR BLADDER X-RAY: CPT | Mod: 51,,, | Performed by: UROLOGY

## 2021-10-25 PROCEDURE — 74455 X-RAY URETHRA/BLADDER: CPT | Mod: 26,,, | Performed by: UROLOGY

## 2021-10-25 PROCEDURE — 51797 INTRAABDOMINAL PRESSURE TEST: CPT | Mod: 26,,, | Performed by: UROLOGY

## 2021-10-25 PROCEDURE — 74455 PR X-RAY URETHROCYSTOGRAM+VOIDING: ICD-10-PCS | Mod: 26,,, | Performed by: UROLOGY

## 2021-10-25 PROCEDURE — 51797 PR VOIDING PRESS STUDY INTRA-ABDOMINAL VOID: ICD-10-PCS | Mod: 26,,, | Performed by: UROLOGY

## 2021-10-25 PROCEDURE — 51728 PR COMPLEX CYSTOMETROGRAM VOIDING PRESSURE STUDIES: ICD-10-PCS | Mod: 26,,, | Performed by: UROLOGY

## 2021-10-25 PROCEDURE — 51741 ELECTRO-UROFLOWMETRY FIRST: CPT | Mod: 26,51,, | Performed by: UROLOGY

## 2021-10-25 PROCEDURE — 51741 PR UROFLOWMETRY, COMPLEX: ICD-10-PCS | Mod: 26,51,, | Performed by: UROLOGY

## 2021-10-25 PROCEDURE — 51728 CYSTOMETROGRAM W/VP: CPT | Mod: 26,,, | Performed by: UROLOGY

## 2021-10-25 PROCEDURE — 51600 PR INJECTION FOR BLADDER X-RAY: ICD-10-PCS | Mod: 51,,, | Performed by: UROLOGY

## 2021-10-25 PROCEDURE — 36000707: Performed by: UROLOGY

## 2021-10-25 PROCEDURE — 36000706: Performed by: UROLOGY

## 2021-10-25 PROCEDURE — 51784 PR ANAL/URINARY MUSCLE STUDY: ICD-10-PCS | Mod: 26,51,, | Performed by: UROLOGY

## 2021-10-27 ENCOUNTER — PATIENT MESSAGE (OUTPATIENT)
Dept: UROLOGY | Facility: CLINIC | Age: 71
End: 2021-10-27
Payer: MEDICARE

## 2021-11-03 ENCOUNTER — CLINICAL SUPPORT (OUTPATIENT)
Dept: CARDIOLOGY | Facility: HOSPITAL | Age: 71
End: 2021-11-03
Payer: MEDICARE

## 2021-11-16 ENCOUNTER — PES CALL (OUTPATIENT)
Dept: ADMINISTRATIVE | Facility: CLINIC | Age: 71
End: 2021-11-16
Payer: MEDICARE

## 2021-11-24 ENCOUNTER — PATIENT MESSAGE (OUTPATIENT)
Dept: UROLOGY | Facility: CLINIC | Age: 71
End: 2021-11-24
Payer: MEDICARE

## 2021-11-29 ENCOUNTER — TELEPHONE (OUTPATIENT)
Dept: ADMINISTRATIVE | Facility: CLINIC | Age: 71
End: 2021-11-29
Payer: MEDICARE

## 2021-12-01 ENCOUNTER — OFFICE VISIT (OUTPATIENT)
Dept: HOME HEALTH SERVICES | Facility: CLINIC | Age: 71
End: 2021-12-01
Payer: MEDICARE

## 2021-12-01 VITALS — WEIGHT: 290.63 LBS | BODY MASS INDEX: 38.52 KG/M2 | HEIGHT: 73 IN

## 2021-12-01 DIAGNOSIS — Z95.0 PRESENCE OF CARDIAC PACEMAKER: ICD-10-CM

## 2021-12-01 DIAGNOSIS — H90.A11 CONDUCTIVE HEARING LOSS OF RIGHT EAR WITH RESTRICTED HEARING OF LEFT EAR: ICD-10-CM

## 2021-12-01 DIAGNOSIS — N32.81 OAB (OVERACTIVE BLADDER): ICD-10-CM

## 2021-12-01 DIAGNOSIS — I71.21 ASCENDING AORTIC ANEURYSM: ICD-10-CM

## 2021-12-01 DIAGNOSIS — Z00.00 ENCOUNTER FOR PREVENTIVE HEALTH EXAMINATION: Primary | ICD-10-CM

## 2021-12-01 DIAGNOSIS — E66.01 SEVERE OBESITY (BMI 35.0-39.9) WITH COMORBIDITY: ICD-10-CM

## 2021-12-01 DIAGNOSIS — R35.1 BENIGN PROSTATIC HYPERPLASIA WITH NOCTURIA: ICD-10-CM

## 2021-12-01 DIAGNOSIS — J30.1 SEASONAL ALLERGIC RHINITIS DUE TO POLLEN: ICD-10-CM

## 2021-12-01 DIAGNOSIS — I10 HTN (HYPERTENSION), BENIGN: ICD-10-CM

## 2021-12-01 DIAGNOSIS — N40.1 BENIGN PROSTATIC HYPERPLASIA WITH NOCTURIA: ICD-10-CM

## 2021-12-01 PROCEDURE — G0439 PPPS, SUBSEQ VISIT: HCPCS | Mod: 95,,, | Performed by: NURSE PRACTITIONER

## 2021-12-01 PROCEDURE — 99499 UNLISTED E&M SERVICE: CPT | Mod: S$GLB,,, | Performed by: NURSE PRACTITIONER

## 2021-12-01 PROCEDURE — 99499 RISK ADDL DX/OHS AUDIT: ICD-10-PCS | Mod: S$GLB,,, | Performed by: NURSE PRACTITIONER

## 2021-12-01 PROCEDURE — 1157F PR ADVANCE CARE PLAN OR EQUIV PRESENT IN MEDICAL RECORD: ICD-10-PCS | Mod: CPTII,S$GLB,, | Performed by: NURSE PRACTITIONER

## 2021-12-01 PROCEDURE — 1157F ADVNC CARE PLAN IN RCRD: CPT | Mod: CPTII,S$GLB,, | Performed by: NURSE PRACTITIONER

## 2021-12-01 PROCEDURE — G0439 PR MEDICARE ANNUAL WELLNESS SUBSEQUENT VISIT: ICD-10-PCS | Mod: 95,,, | Performed by: NURSE PRACTITIONER

## 2021-12-21 ENCOUNTER — PATIENT OUTREACH (OUTPATIENT)
Dept: ADMINISTRATIVE | Facility: OTHER | Age: 71
End: 2021-12-21
Payer: MEDICARE

## 2022-02-01 ENCOUNTER — CLINICAL SUPPORT (OUTPATIENT)
Dept: CARDIOLOGY | Facility: HOSPITAL | Age: 72
End: 2022-02-01
Payer: MEDICARE

## 2022-02-10 ENCOUNTER — PATIENT MESSAGE (OUTPATIENT)
Dept: UROLOGY | Facility: CLINIC | Age: 72
End: 2022-02-10
Payer: MEDICARE

## 2022-02-22 ENCOUNTER — OFFICE VISIT (OUTPATIENT)
Dept: PRIMARY CARE CLINIC | Facility: CLINIC | Age: 72
End: 2022-02-22
Payer: MEDICARE

## 2022-02-22 VITALS
WEIGHT: 300.5 LBS | HEIGHT: 73 IN | OXYGEN SATURATION: 98 % | BODY MASS INDEX: 39.82 KG/M2 | DIASTOLIC BLOOD PRESSURE: 82 MMHG | TEMPERATURE: 98 F | HEART RATE: 70 BPM | SYSTOLIC BLOOD PRESSURE: 124 MMHG

## 2022-02-22 DIAGNOSIS — R16.0 ENLARGED LIVER: ICD-10-CM

## 2022-02-22 DIAGNOSIS — E66.01 MORBID OBESITY WITH BMI OF 40.0-44.9, ADULT: ICD-10-CM

## 2022-02-22 DIAGNOSIS — I71.20 THORACIC AORTIC ANEURYSM WITHOUT RUPTURE: ICD-10-CM

## 2022-02-22 DIAGNOSIS — K80.20 CALCULUS OF GALLBLADDER WITHOUT CHOLECYSTITIS WITHOUT OBSTRUCTION: Primary | ICD-10-CM

## 2022-02-22 DIAGNOSIS — N32.81 OAB (OVERACTIVE BLADDER): ICD-10-CM

## 2022-02-22 DIAGNOSIS — R35.1 NOCTURIA: ICD-10-CM

## 2022-02-22 DIAGNOSIS — Z95.0 PRESENCE OF CARDIAC PACEMAKER: ICD-10-CM

## 2022-02-22 DIAGNOSIS — I63.412 CEREBRAL INFARCTION DUE TO EMBOLISM OF LEFT MIDDLE CEREBRAL ARTERY: ICD-10-CM

## 2022-02-22 PROCEDURE — 3288F FALL RISK ASSESSMENT DOCD: CPT | Mod: HCNC,CPTII,S$GLB, | Performed by: FAMILY MEDICINE

## 2022-02-22 PROCEDURE — 1159F PR MEDICATION LIST DOCUMENTED IN MEDICAL RECORD: ICD-10-PCS | Mod: HCNC,CPTII,S$GLB, | Performed by: FAMILY MEDICINE

## 2022-02-22 PROCEDURE — 1125F AMNT PAIN NOTED PAIN PRSNT: CPT | Mod: HCNC,CPTII,S$GLB, | Performed by: FAMILY MEDICINE

## 2022-02-22 PROCEDURE — 99499 RISK ADDL DX/OHS AUDIT: ICD-10-PCS | Mod: S$GLB,,, | Performed by: FAMILY MEDICINE

## 2022-02-22 PROCEDURE — 3079F PR MOST RECENT DIASTOLIC BLOOD PRESSURE 80-89 MM HG: ICD-10-PCS | Mod: HCNC,CPTII,S$GLB, | Performed by: FAMILY MEDICINE

## 2022-02-22 PROCEDURE — 99999 PR PBB SHADOW E&M-EST. PATIENT-LVL V: ICD-10-PCS | Mod: PBBFAC,HCNC,, | Performed by: FAMILY MEDICINE

## 2022-02-22 PROCEDURE — 3008F BODY MASS INDEX DOCD: CPT | Mod: HCNC,CPTII,S$GLB, | Performed by: FAMILY MEDICINE

## 2022-02-22 PROCEDURE — 1101F PT FALLS ASSESS-DOCD LE1/YR: CPT | Mod: HCNC,CPTII,S$GLB, | Performed by: FAMILY MEDICINE

## 2022-02-22 PROCEDURE — 3288F PR FALLS RISK ASSESSMENT DOCUMENTED: ICD-10-PCS | Mod: HCNC,CPTII,S$GLB, | Performed by: FAMILY MEDICINE

## 2022-02-22 PROCEDURE — 1157F ADVNC CARE PLAN IN RCRD: CPT | Mod: HCNC,CPTII,S$GLB, | Performed by: FAMILY MEDICINE

## 2022-02-22 PROCEDURE — 1101F PR PT FALLS ASSESS DOC 0-1 FALLS W/OUT INJ PAST YR: ICD-10-PCS | Mod: HCNC,CPTII,S$GLB, | Performed by: FAMILY MEDICINE

## 2022-02-22 PROCEDURE — 99499 UNLISTED E&M SERVICE: CPT | Mod: S$GLB,,, | Performed by: FAMILY MEDICINE

## 2022-02-22 PROCEDURE — 3079F DIAST BP 80-89 MM HG: CPT | Mod: HCNC,CPTII,S$GLB, | Performed by: FAMILY MEDICINE

## 2022-02-22 PROCEDURE — 1157F PR ADVANCE CARE PLAN OR EQUIV PRESENT IN MEDICAL RECORD: ICD-10-PCS | Mod: HCNC,CPTII,S$GLB, | Performed by: FAMILY MEDICINE

## 2022-02-22 PROCEDURE — 3074F SYST BP LT 130 MM HG: CPT | Mod: HCNC,CPTII,S$GLB, | Performed by: FAMILY MEDICINE

## 2022-02-22 PROCEDURE — 3074F PR MOST RECENT SYSTOLIC BLOOD PRESSURE < 130 MM HG: ICD-10-PCS | Mod: HCNC,CPTII,S$GLB, | Performed by: FAMILY MEDICINE

## 2022-02-22 PROCEDURE — 99214 PR OFFICE/OUTPT VISIT, EST, LEVL IV, 30-39 MIN: ICD-10-PCS | Mod: HCNC,S$GLB,, | Performed by: FAMILY MEDICINE

## 2022-02-22 PROCEDURE — 1159F MED LIST DOCD IN RCRD: CPT | Mod: HCNC,CPTII,S$GLB, | Performed by: FAMILY MEDICINE

## 2022-02-22 PROCEDURE — 99214 OFFICE O/P EST MOD 30 MIN: CPT | Mod: HCNC,S$GLB,, | Performed by: FAMILY MEDICINE

## 2022-02-22 PROCEDURE — 1160F PR REVIEW ALL MEDS BY PRESCRIBER/CLIN PHARMACIST DOCUMENTED: ICD-10-PCS | Mod: HCNC,CPTII,S$GLB, | Performed by: FAMILY MEDICINE

## 2022-02-22 PROCEDURE — 1125F PR PAIN SEVERITY QUANTIFIED, PAIN PRESENT: ICD-10-PCS | Mod: HCNC,CPTII,S$GLB, | Performed by: FAMILY MEDICINE

## 2022-02-22 PROCEDURE — 3008F PR BODY MASS INDEX (BMI) DOCUMENTED: ICD-10-PCS | Mod: HCNC,CPTII,S$GLB, | Performed by: FAMILY MEDICINE

## 2022-02-22 PROCEDURE — 1160F RVW MEDS BY RX/DR IN RCRD: CPT | Mod: HCNC,CPTII,S$GLB, | Performed by: FAMILY MEDICINE

## 2022-02-22 PROCEDURE — 99999 PR PBB SHADOW E&M-EST. PATIENT-LVL V: CPT | Mod: PBBFAC,HCNC,, | Performed by: FAMILY MEDICINE
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
## 2022-03-02 ENCOUNTER — PATIENT OUTREACH (OUTPATIENT)
Dept: ADMINISTRATIVE | Facility: OTHER | Age: 72
End: 2022-03-02
Payer: MEDICARE

## 2022-03-02 NOTE — PROGRESS NOTES
LINKS immunization registry updated  Care Everywhere updated  Health Maintenance updated  Chart reviewed for overdue Proactive [facility] Encounters (RASHIDA) health maintenance testing (CRS, Breast Ca, Diabetic Eye Exam)   Orders entered:N/A

## 2022-03-04 DIAGNOSIS — I48.91 ATRIAL FIBRILLATION, UNSPECIFIED TYPE: Primary | ICD-10-CM

## 2022-03-04 NOTE — PROGRESS NOTES
Mr. Webb is a patient of Dr. Miranda and was last seen in clinic 4/13/2021.      Subjective:   Patient ID:  Adan Webb is a 72 y.o. male who presents for follow-up of Pacemaker Check  .     HPI:    Mr. Webb is a 72 y.o. male with PVC, bifascicular block, HTN, sinus pauses here for follow up after PPM implantation.     Background:    Mr. Cain has a hx of frequent, mildly symptomatic PVCs (papillary muscle in origin?) with baseline RBBB/LAFB, and hypertension. He was seen in EP clinic 1/19/2021 for his PVCs. He noted only episodes of intermittent skipped heart beats with slow heart rates intermittently noted on a pulse-oximeter. I recommended Holter monitor/ECHO and likely uptitration of metoprolol. Suspect his observed bradycardia on his pulse ox was secondary to ectopy. Plan was if no bradycardia issues on holter would likely increase metoprolol. If EF abnormal, would recommend stress test and if normal consider PVC ablation.    4/13/2021:  Mr. Cain returns for follow-up. Holter monitor noted sinus rhythm with an average rate of 69 bpm with a 1.2% PVC burden. His echocardiogram noted normal LV function with severe aortic root dilation (5.1cm). He is now on 50mg of metoprolol succinate daily. He has no current complaints besides over-active bladder.  His AAA is being monitored by Dr. Duenas. He feels well. He has normal LV function and PVC burden of 1.2% on a recent holter. He is tolerating 50mg of metoprolol succinate. Discussed the etiology and pathophysiology of PVCs. Reassurance provided. Symptoms improved with metoprolol.    7/6/2021: Pt experienced LOC event. Event monitor ordered.    8/4/2021:  Received an alert on patient's event monitor for sinus pause/bradycardia. Patient with symptomatic PVCs on 50mg of metoprolol who had an episode of near syncope for which a monitor was applied. For this current event, reports he just ate breakfast and was lying in his recliner when he felt a  sudden wave of light-headedness from his feet to his head. His wife reports his eyes rolled back in his head and he started shaking. He is unsure if he fully lost consciousness however does not remember shaking. He reports once he became fully awake he noted he was very tired, which has persisted.   Event monitor strips reviewed and are consistent with a few seconds of sinus rate slowing followed by several sinus pauses up to 6.5 seconds in duration with a sinus rate of ~20-30 bpm in between followed by sinus rate speeding and then normal sinus rhythm. He has a RBBB/LAFB however no complete heart block was observed. Taken together this was likely a vagal episode, unclear what the trigger was. Recommend PPM implantation.     Update (03/09/2022):    8/5/2021: Successful implantation of PPM Dual.    Today he reports he has had no more syncopal spells device device implant. Primary complaint is back pain and nocturia. No CP, THACKER, palpitations, LH, syncope.    Device Interrogation (3/9/2022) reveals an intrinsic SB with stable lead and device function. No arrhythmias or treated episodes were noted. He paces 63% in the RA and 0% in the RV. Estimated battery longevity 8.2 years.     I have personally reviewed the patient's EKG today, which shows APVS with RBBB at 70bpm. ME interval is 214. QRS is 140. QTc is 479.    Relevant Cardiac Test Results:    2D Echo (1/25/2021):  · The ascending aorta is severely dilated masuring 5.1 cm. The aortic root is normal in size.  · Mild left atrial enlargement.  · The left ventricle is normal in size with normal systolic function. The estimated ejection fraction is 55%  · Indeterminate left ventricular diastolic function.  · Normal right ventricular size with normal right ventricular systolic function.  · The estimated PA systolic pressure is 19 mmHg.  · Normal central venous pressure (3 mmHg).  ·   Current Outpatient Medications   Medication Sig    albuterol (VENTOLIN HFA) 90  mcg/actuation inhaler Inhale 2 puffs into the lungs every 6 (six) hours as needed for Wheezing. Rescue    ALLERGY RELIEF, LORATADINE, 10 mg tablet Take 10 mg by mouth once daily.    BABY ASPIRIN ORAL Take 81 tablets by mouth every evening.     cholecalciferol, vitamin D3, 1,000 unit capsule Take 1,000 Units by mouth 2 (two) times daily.     coenzyme Q10 100 mg capsule Take by mouth once daily.    FLUAD QUAD 2020-21,65Y UP,,PF, 60 mcg (15 mcg x 4)/0.5 mL Syrg INJECT 0.5ML(CC) INTRAMUSCULARLY FOR ONE DOSE FOR FLU.    fluticasone (FLONASE) 50 mcg/actuation nasal spray PLACE 1 SPRAY INTO EACH NOSTRIL DAILY    glucosamine-chondroitin 500-400 mg tablet Take 1 tablet by mouth 2 (two) times daily.     ketoconazole (NIZORAL) 2 % cream Apply topically once daily. For nail    metoprolol succinate (TOPROL-XL) 50 MG 24 hr tablet Take 1 tablet (50 mg total) by mouth once daily.    mirabegron (MYRBETRIQ) 50 mg Tb24 Take 1 tablet (50 mg total) by mouth once daily.    montelukast (SINGULAIR) 10 mg tablet Take 1 tablet (10 mg total) by mouth once daily.    MULTIVITAMIN W-MINERALS/LUTEIN (CENTRUM SILVER ORAL) Take by mouth once daily.     omega-3 fatty acids 300 mg Cap Take 1 tablet by mouth once daily.     rosuvastatin (CRESTOR) 10 MG tablet Take 1 tablet (10 mg total) by mouth once daily.    solifenacin (VESICARE) 10 MG tablet Take 1 tablet (10 mg total) by mouth once daily.    tamsulosin (FLOMAX) 0.4 mg Cap Take 1 capsule (0.4 mg total) by mouth once daily.    TURMERIC ORAL Take by mouth. Pt take 1 in the evening.    VITAMIN B COMPLEX (SUPER B COMPLEX-B-12 ORAL) Take by mouth.    cetirizine (ZYRTEC) 10 MG tablet Take 1 tablet (10 mg total) by mouth once daily.    diclofenac sodium (VOLTAREN) 1 % Gel Apply 2 g topically 4 (four) times daily. (Patient not taking: Reported on 3/9/2022)    ipratropium (ATROVENT) 42 mcg (0.06 %) nasal spray 2 sprays by Nasal route 3 (three) times daily. (Patient not taking: Reported  "on 3/9/2022)    nystatin-triamcinolone (MYCOLOG II) cream Apply topically 4 (four) times daily. (Patient not taking: Reported on 3/9/2022)    UNABLE TO FIND Take by mouth nightly. tumeric    UNABLE TO FIND 2 (two) times a day. actaline    UNABLE TO FIND Use as directed 1 tablet in the mouth or throat 2 (two) times daily. Prostate Revive manufactured by [employer]     Current Facility-Administered Medications   Medication    triamcinolone acetonide injection 40 mg     Review of Systems   Constitutional: Negative for malaise/fatigue.   Cardiovascular: Negative for chest pain, dyspnea on exertion, irregular heartbeat, leg swelling and palpitations.   Respiratory: Negative for shortness of breath.    Hematologic/Lymphatic: Negative for bleeding problem.   Skin: Negative for rash.   Musculoskeletal: Positive for back pain. Negative for myalgias.   Gastrointestinal: Positive for abdominal pain. Negative for hematemesis, hematochezia and nausea.   Genitourinary: Positive for frequency and nocturia. Negative for hematuria.   Neurological: Negative for light-headedness.   Psychiatric/Behavioral: Negative for altered mental status.   Allergic/Immunologic: Negative for persistent infections.     Objective:        BP (!) 142/82   Pulse 70   Ht 6' 1" (1.854 m)   Wt (!) 136.2 kg (300 lb 4.3 oz)   BMI 39.62 kg/m²     Physical Exam  Vitals and nursing note reviewed.   Constitutional:       Appearance: Normal appearance. He is well-developed.   HENT:      Head: Normocephalic.      Nose: Nose normal.   Eyes:      Pupils: Pupils are equal, round, and reactive to light.   Cardiovascular:      Rate and Rhythm: Normal rate and regular rhythm.   Pulmonary:      Effort: No respiratory distress.      Breath sounds: Normal breath sounds.   Chest:      Comments: Device to LUCW. Incision and pocket in good repair.    Musculoskeletal:         General: Normal range of motion.   Skin:     General: Skin is warm and dry.      Findings: No " erythema.   Neurological:      Mental Status: He is alert and oriented to person, place, and time.   Psychiatric:         Speech: Speech normal.         Behavior: Behavior normal.       Lab Results   Component Value Date     08/05/2021    K 4.5 08/05/2021    BUN 15 08/05/2021    CREATININE 0.8 08/05/2021    ALT 19 04/01/2021    AST 21 04/01/2021    HGB 13.8 (L) 08/05/2021    HCT 39.9 (L) 08/05/2021    TSH 3.536 04/01/2021    LDLCALC 127.0 12/02/2020       Recent Labs   Lab 08/05/21  0845   INR 1.0         Assessment:     1. Presence of cardiac pacemaker    2. PVC (premature ventricular contraction)    3. HTN (hypertension), benign    4. Bifascicular block      Plan:     In summary, Mr. Webb is a 72 y.o. male with PVC, bifascicular block, HTN, sinus pauses here for follow up after PPM implantation.   He is 8 months s/p pacemaker implantation for SSS. No subsequent syncopal episodes after implant. No cardiac complaints.   Mr. Webb is doing well from a device perspective with stable lead and device function. No arrhythmia noted. No RV pacing. No CHF symptoms. RTC 1 yr.    Continue current medication regimen and device settings.   Follow up in device clinic as scheduled.   Follow up in EP clinic in 1 year, sooner as needed.     *A copy of this note has been sent to Dr. Miranda*    Follow up in about 1 year (around 3/9/2023).    ------------------------------------------------------------------    HENRRY Reynoso, NP-C  Cardiac Electrophysiology

## 2022-03-08 ENCOUNTER — TELEPHONE (OUTPATIENT)
Dept: ELECTROPHYSIOLOGY | Facility: CLINIC | Age: 72
End: 2022-03-08
Payer: MEDICARE

## 2022-03-08 ENCOUNTER — OFFICE VISIT (OUTPATIENT)
Dept: SURGERY | Facility: CLINIC | Age: 72
End: 2022-03-08
Payer: MEDICARE

## 2022-03-08 VITALS
TEMPERATURE: 98 F | HEIGHT: 73 IN | OXYGEN SATURATION: 95 % | BODY MASS INDEX: 39.44 KG/M2 | WEIGHT: 297.63 LBS | HEART RATE: 73 BPM | SYSTOLIC BLOOD PRESSURE: 157 MMHG | DIASTOLIC BLOOD PRESSURE: 74 MMHG

## 2022-03-08 DIAGNOSIS — K80.20 CALCULUS OF GALLBLADDER WITHOUT CHOLECYSTITIS WITHOUT OBSTRUCTION: ICD-10-CM

## 2022-03-08 DIAGNOSIS — M94.0 COSTOCHONDRITIS: Primary | ICD-10-CM

## 2022-03-08 PROCEDURE — 3008F BODY MASS INDEX DOCD: CPT | Mod: HCNC,CPTII,S$GLB, | Performed by: SURGERY

## 2022-03-08 PROCEDURE — 3078F DIAST BP <80 MM HG: CPT | Mod: HCNC,CPTII,S$GLB, | Performed by: SURGERY

## 2022-03-08 PROCEDURE — 99999 PR PBB SHADOW E&M-EST. PATIENT-LVL V: CPT | Mod: PBBFAC,HCNC,, | Performed by: SURGERY

## 2022-03-08 PROCEDURE — 3077F PR MOST RECENT SYSTOLIC BLOOD PRESSURE >= 140 MM HG: ICD-10-PCS | Mod: HCNC,CPTII,S$GLB, | Performed by: SURGERY

## 2022-03-08 PROCEDURE — 3077F SYST BP >= 140 MM HG: CPT | Mod: HCNC,CPTII,S$GLB, | Performed by: SURGERY

## 2022-03-08 PROCEDURE — 99203 OFFICE O/P NEW LOW 30 MIN: CPT | Mod: HCNC,S$GLB,, | Performed by: SURGERY

## 2022-03-08 PROCEDURE — 1157F PR ADVANCE CARE PLAN OR EQUIV PRESENT IN MEDICAL RECORD: ICD-10-PCS | Mod: HCNC,CPTII,S$GLB, | Performed by: SURGERY

## 2022-03-08 PROCEDURE — 3008F PR BODY MASS INDEX (BMI) DOCUMENTED: ICD-10-PCS | Mod: HCNC,CPTII,S$GLB, | Performed by: SURGERY

## 2022-03-08 PROCEDURE — 3288F FALL RISK ASSESSMENT DOCD: CPT | Mod: HCNC,CPTII,S$GLB, | Performed by: SURGERY

## 2022-03-08 PROCEDURE — 1101F PT FALLS ASSESS-DOCD LE1/YR: CPT | Mod: HCNC,CPTII,S$GLB, | Performed by: SURGERY

## 2022-03-08 PROCEDURE — 1159F PR MEDICATION LIST DOCUMENTED IN MEDICAL RECORD: ICD-10-PCS | Mod: HCNC,CPTII,S$GLB, | Performed by: SURGERY

## 2022-03-08 PROCEDURE — 99203 PR OFFICE/OUTPT VISIT, NEW, LEVL III, 30-44 MIN: ICD-10-PCS | Mod: HCNC,S$GLB,, | Performed by: SURGERY

## 2022-03-08 PROCEDURE — 1159F MED LIST DOCD IN RCRD: CPT | Mod: HCNC,CPTII,S$GLB, | Performed by: SURGERY

## 2022-03-08 PROCEDURE — 1101F PR PT FALLS ASSESS DOC 0-1 FALLS W/OUT INJ PAST YR: ICD-10-PCS | Mod: HCNC,CPTII,S$GLB, | Performed by: SURGERY

## 2022-03-08 PROCEDURE — 99999 PR PBB SHADOW E&M-EST. PATIENT-LVL V: ICD-10-PCS | Mod: PBBFAC,HCNC,, | Performed by: SURGERY

## 2022-03-08 PROCEDURE — 1157F ADVNC CARE PLAN IN RCRD: CPT | Mod: HCNC,CPTII,S$GLB, | Performed by: SURGERY

## 2022-03-08 PROCEDURE — 3288F PR FALLS RISK ASSESSMENT DOCUMENTED: ICD-10-PCS | Mod: HCNC,CPTII,S$GLB, | Performed by: SURGERY

## 2022-03-08 PROCEDURE — 3078F PR MOST RECENT DIASTOLIC BLOOD PRESSURE < 80 MM HG: ICD-10-PCS | Mod: HCNC,CPTII,S$GLB, | Performed by: SURGERY

## 2022-03-08 PROCEDURE — 1125F PR PAIN SEVERITY QUANTIFIED, PAIN PRESENT: ICD-10-PCS | Mod: HCNC,CPTII,S$GLB, | Performed by: SURGERY

## 2022-03-08 PROCEDURE — 1125F AMNT PAIN NOTED PAIN PRSNT: CPT | Mod: HCNC,CPTII,S$GLB, | Performed by: SURGERY

## 2022-03-08 NOTE — TELEPHONE ENCOUNTER
Spoke with pt to confirm appt on 3/9/22 for 10 am. Pt verbalized understanding of appt date, time, and location.

## 2022-03-08 NOTE — PROGRESS NOTES
General Surgery Office Visit   History and Physical    Patient Name: Adan Webb  YOB: 1950 (72 y.o.)  MRN: [id number]  Today's Date: 03/08/2022    Referring Md:   Gina Reyes Md  [address] [place] [address]    SUBJECTIVE:     Chief Complaint: Abdominal pain    History of Present Illness:  Adan Webb is a 72 y.o. male with PMHx of thoracic aortic aneurysm, pacemaker, obesity, overactive bladder, RBBB who presents to the clinic today complaining of RUQ abdominal pain which radiates to his back. He reports that his back pain is actually worse than his abdominal pain. First started a a few weeks ago. Occurs almost daily, worse with movement. Not related to food intake. Waxes and wanes in severity. Improves with OTC Tylenol arthritis. No other known aggravating or alleviating factors. His only other complaint today is nocturia which is his baseline right now, followed by urology. Having regular BM. He denies fever, chills, unintentional weight loss, n/v/d, constipation, hematochezia, dysuria, hematuria, CP, SOB, and all other symptoms. Patient reports being compliant with home medication regimen. He reports being able to achieve >4 METS at home. Here today with his wife.    Abdominal US from April 2021 with cholelithiasis, no signs of acute cholecystitis    Patient denies personal history of MI, CVA, lung disease, DM  Previous abdominal surgeries include: bilateral inguinal hernia repair (one as a child, one in 2016 by Dr. Hoover)  Denies alcohol, tobacco, and elicit drug use.   Not currently on any anticoagulants. Takes baby ASA daily      Review of patient's allergies indicates:   Allergen Reactions    Augmentin [amoxicillin-pot clavulanate] Rash     Rash, confusion, TIA like symptoms    Azithromycin Rash    Avodart [dutasteride]     House dust mite Other (See Comments)    Naproxen     Ragweed     Synthroid [levothyroxine] Rash       Past Medical History:    Diagnosis Date    Abnormal TSH 12/9/2020    Allergic rhinitis 4/13/2016    Belching 1/22/2019    Benign prostatic hyperplasia with nocturia 8/18/2014    BPH (benign prostatic hyperplasia)     Urology Dr Zamora, OTC prostate revive helps, avodart caused chest pains    Calculus of gallbladder     US 2016    Calculus of gallbladder without cholecystitis without obstruction 4/13/2016    CT chest 2018    Cataract     Chronic pain of both ankles 12/9/2020    Podiatrist Dr Sesay    Conductive hearing loss of right ear with restricted hearing of left ear 1/22/2019    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, didn't use ear protection    Dermatitis 12/9/2020    nystat groin    DJD (degenerative joint disease) of hip 1/29/2015    Enlarged liver 4/8/2021    US 4/21     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    MCA , see MRI    Hiatal hernia 4/1/2021    CT chest tiny 2018    HTN (hypertension), benign 8/20/2020    Morbid obesity with BMI of 40.0-44.9, adult 1/29/2015    OAB (overactive bladder) 4/1/2021    Rash with pads    Right-sided low back pain with right-sided sciatica 3/6/2019    Seasonal allergic rhinitis due to pollen 4/13/2016    Thoracic aortic aneurysm without rupture 05/27/2018    tx with Su, Cardiologist Dr Stone, 5.1 CTS Dr Duenas, follows yearly    Transient cerebral ischemia 6/6/2018     Past Surgical History:   Procedure Laterality Date    A-V CARDIAC PACEMAKER INSERTION N/A 8/5/2021    Procedure: INSERTION, CARDIAC PACEMAKER, DUAL CHAMBER;  Surgeon: Sheldon Miranda MD;  Location: [facility] EP LAB;  Service: Cardiology;  Laterality: N/A;  Near syncope,SB,Sinus Pause, RBBB,LAFB, Dual PPM, BIO, MAC, WA, 3 Prep    CATARACT EXTRACTION      CYSTOSCOPY WITH URODYNAMIC TESTING N/A 10/25/2021    Procedure: CYSTOSCOPY, WITH URODYNAMIC TESTING FLOUROSCOPIC;  Surgeon: Sancho Wesley MD;  [facility];  Service: Urology;  Laterality: N/A;  1hr    HERNIA REPAIR      YAG  "LAser Capsulotomy Bilateral     2/18/2019 OS Dr. Cornelius     Family History   Problem Relation Age of Onset    Leukemia Father     Aneurysm Father     Diabetes Mother     Cataracts Paternal Uncle     Amblyopia Neg Hx     Blindness Neg Hx     Glaucoma Neg Hx     Macular degeneration Neg Hx     Retinal detachment Neg Hx     Strabismus Neg Hx      Social History     Tobacco Use    Smoking status: Former Smoker     Packs/day: 6.00     Years: 1.00     Pack years: 6.00     Types: Cigarettes    Smokeless tobacco: Never Used   Substance Use Topics    Alcohol use: Yes     Comment: wine, seldom    Drug use: No        Review of Systems:  Review of Systems   Constitutional: Negative for chills and fever.   Respiratory: Negative for cough and shortness of breath.    Cardiovascular: Negative for chest pain.   Gastrointestinal: Positive for abdominal pain. Negative for blood in stool, constipation, diarrhea, nausea and vomiting.   Genitourinary: Negative for dysuria and hematuria.   Musculoskeletal: Positive for back pain. Negative for falls.   Skin: Negative for rash.   Neurological: Negative for dizziness and headaches.   Psychiatric/Behavioral: Negative for substance abuse. The patient is not nervous/anxious.    All other systems reviewed and are negative.      OBJECTIVE:     Vital Signs (Most Recent)  BP (!) 157/74 (BP Location: Left arm, Patient Position: Sitting)   Pulse 73   Temp 98 °F (36.7 °C) (Oral)   Ht 6' 1" (1.854 m)   Wt 135 kg (297 lb 9.9 oz)   SpO2 95%   BMI 39.27 kg/m²     Physical Exam  Vitals and nursing note reviewed.   Constitutional:       General: He is not in acute distress.     Appearance: He is obese. He is not ill-appearing or diaphoretic.      Comments: Room air   HENT:      Head: Normocephalic and atraumatic.      Mouth/Throat:      Mouth: Mucous membranes are moist.      Pharynx: Oropharynx is clear.   Eyes:      Extraocular Movements: Extraocular movements intact.      " Conjunctiva/sclera: Conjunctivae normal.   Cardiovascular:      Rate and Rhythm: Normal rate.   Pulmonary:      Effort: Pulmonary effort is normal. No respiratory distress.   Abdominal:      General: There is no distension.      Palpations: Abdomen is soft.      Tenderness: There is no abdominal tenderness. There is no guarding or rebound.      Comments: Negative Goldberg's. No peritonitic signs   Musculoskeletal:         General: No deformity.      Comments: No TTP of paraspinous muscles. No step off or deformity   Slight TTP of R chest wall   Skin:     General: Skin is warm and dry.      Coloration: Skin is not jaundiced.   Neurological:      Mental Status: He is alert and oriented to person, place, and time.       Labs:   Lab Results   Component Value Date    WBC 6.92 08/05/2021    HGB 13.8 (L) 08/05/2021    HCT 39.9 (L) 08/05/2021    MCV 93 08/05/2021     08/05/2021       CMP  Sodium   Date Value Ref Range Status   08/05/2021 138 136 - 145 mmol/L Final     Potassium   Date Value Ref Range Status   08/05/2021 4.5 3.5 - 5.1 mmol/L Final     Comment:     *Result may be interfered by visible hemolysis     Chloride   Date Value Ref Range Status   08/05/2021 103 95 - 110 mmol/L Final     CO2   Date Value Ref Range Status   08/05/2021 25 23 - 29 mmol/L Final     Glucose   Date Value Ref Range Status   08/05/2021 101 70 - 110 mg/dL Final     BUN   Date Value Ref Range Status   08/05/2021 15 8 - 23 mg/dL Final     Creatinine   Date Value Ref Range Status   08/05/2021 0.8 0.5 - 1.4 mg/dL Final     Calcium   Date Value Ref Range Status   08/05/2021 9.7 8.7 - 10.5 mg/dL Final     Total Protein   Date Value Ref Range Status   04/01/2021 7.9 6.0 - 8.4 g/dL Final     Albumin   Date Value Ref Range Status   04/01/2021 3.7 3.5 - 5.2 g/dL Final     Total Bilirubin   Date Value Ref Range Status   04/01/2021 0.7 0.1 - 1.0 mg/dL Final     Comment:     For infants and newborns, interpretation of results should be based  on  gestational age, weight and in agreement with clinical  observations.    Premature Infant recommended reference ranges:  Up to 24 hours.............<8.0 mg/dL  Up to 48 hours............<12.0 mg/dL  3-5 days..................<15.0 mg/dL  6-29 days.................<15.0 mg/dL       Alkaline Phosphatase   Date Value Ref Range Status   04/01/2021 93 55 - 135 U/L Final     AST   Date Value Ref Range Status   04/01/2021 21 10 - 40 U/L Final     ALT   Date Value Ref Range Status   04/01/2021 19 10 - 44 U/L Final     Anion Gap   Date Value Ref Range Status   08/05/2021 10 8 - 16 mmol/L Final     eGFR if    Date Value Ref Range Status   08/05/2021 >60.0 >60 mL/min/1.73 m^2 Final     eGFR if non    Date Value Ref Range Status   08/05/2021 >60.0 >60 mL/min/1.73 m^2 Final     Comment:     Calculation used to obtain the estimated glomerular filtration  rate (eGFR) is the CKD-EPI equation.          Imaging:   US Abdomen Limited  Order: [id number]   Status: Final result     Visible to patient: Yes (seen)     Next appt: 03/09/2022 at 10:00 AM in Cardiology (EKG, APPT)     Dx: Calculus of gallbladder without sandy...     1 Result Note     1 Patient Communication     1  Topic    Details    Reading Physician Reading Date Result Priority   Trent Ivan MD  [phone]  [phone] 4/6/2021 Routine     Narrative & Impression  EXAMINATION:  US ABDOMEN LIMITED     CLINICAL HISTORY:  Calculus of gallbladder without cholecystitis without obstruction     TECHNIQUE:  Limited ultrasound of the right upper quadrant of the abdomen (including pancreas, liver, gallbladder, common bile duct, and spleen) was performed.     COMPARISON:  None.     FINDINGS:  Liver: Prominent in size, measuring 17.2 cm. Homogeneous echotexture. To a patent cyst, measuring 0.9 x 0.7 x 0.7 cm and 1.7 x 1.3 x 1.6 cm respectively LEFT hepatic lobe.     Gallbladder: Multiple gallstones identified within the gallbladder.  No  sonographic Goldberg sign, pericholecystic fluid, or gallbladder wall thickening.     Biliary system: The common duct is not dilated, measuring 1.8 mm.  No intrahepatic ductal dilatation.     Spleen: Normal in size and echotexture, measuring 9 cm.     Miscellaneous: No upper abdominal ascites.     Impression:     Cholelithiasis without evidence for acute cholecystitis.        CT Chest Without Contrast  Order: [id number]   Status: Final result     Visible to patient: Yes (seen)     Next appt: 03/09/2022 at 10:00 AM in Cardiology (EKG, APPT)     Dx: Ascending aortic aneurysm     0 Result Notes    Details    Reading Physician Reading Date Result Priority   Neno Sterling MD  [phone]  [phone] 7/26/2021 Routine   Robb Saleh MD  [phone] 7/26/2021      Narrative & Impression  EXAMINATION:  CT CHEST WITHOUT CONTRAST     CLINICAL HISTORY:  Thoracic Aortic Ascending Aneurysm; Thoracic aortic aneurysm, without rupture     TECHNIQUE:  Using low dose technique the chest was surveyed from above the pulmonary apices through the posterior costophrenic angles.  Data was reconstructed for multiplanar images in axial, sagittal and coronal planes and for maximal intensity projection images in the axial plane.  Contrast was not administered.     COMPARISON:  Chest radiograph 02/23/2021, CT chest without contrast 07/20/2020.     FINDINGS:  Base of Neck: No significant abnormality.     Aorta: Left-sided 3 vessel arch with mild atherosclerosis.  Aorta maintains normal contour and course.  Continued demonstration of fusiform dilatation of the ascending aorta with measurements as follows:     - Prox AAo = 4.4 cm  (axial series 2 image 51)     - Mid AAo = 5.1 cm (axial series 2 image 45), previously 5.2 cm when remeasured similarly.     - Distal AAo = 3.8 cm  (sagittal series 603 image 168), previously 3.8 cm     - Ao arch = 3.2 cm  (sagittal series 603 image 175), previously 3.2 cm     - Isthmus = 3.2 cm   (sagittal series 603 image 176), previously 3.2 cm     - Prox D'g Ao = 3.8 cm (sagittal series 603 image 165), previously 3.5 cm     - Mid D'g Ao = 3.7 cm  (sagittal series 603 image 165), previously 3.7 cm     - Distal D'g Ao = 3.6 cm  (sagittal series 603 image 165), previously 3.6 cm     Heart/pericardium: The heart is normal in size with no pericardial effusion or calcification.  Mild coronary artery calcification.     Pulmonary vasculature: Pulmonary arteries distribute normally.  The pulmonary veins are unremarkable.     Tere/Mediastinum: Stable prominent precarinal lymph node measuring 1.0 cm (axial series 2, image 30), unchanged compared to prior imaging.     Esophagus: Normal.     Upper Abdomen: Multiple scattered hypodensities throughout the liver, the largest in the left hepatic lobe measuring 1.8 cm, favoring simple hepatic cysts.  The gallbladder is filled with gallstones without evidence for cholecystitis.  Small hiatal hernia.  Remaining partially visualized intra-abdominal organs are unremarkable.     Thoracic soft tissues: Normal.     Bones: No acute fracture. No suspicious lytic or sclerotic lesion.     Airways: Patent.     Lungs: The lungs are clear without evidence for consolidation or pulmonary mass.  Punctate calcified granuloma in the right lung (axial series 4, image 191).     Pleura: No pleural fluid or thickening.No pleural calcification.     Impression:     Stable fusiform dilatation of the ascending aorta measuring up to 5.1 cm, noting limited evaluation in this non contrasted exam.     Mild aortic and coronary artery atherosclerosis.     Stable findings in the abdomen including small hiatal hernia, left hepatic lobe cyst, and cholelithiasis without evidence for cholecystitis.           ASSESSMENT/PLAN:     Adan Webb is a 72 y.o. male with PMHx of thoracic aortic aneurysm, pacemaker, obesity, overactive bladder, RBBB who presents to the clinic today complaining of abdominal pain  and back pain. Clinically stable without signs of infection    Calculus of gallbladder without cholecystitis without obstruction  Costochondritis   - Given above findings, do not believe that the origin of the patient's pain is biliary colic. Appears to be musculoskeletal at this time given that pain is worse with movement, not related to food, no other symptoms, and TTP chest wall on exam  - Trial of NSAIDs, heating pad, and stretching  - RTC PRN  - ED precautions given  - Continue any current medications  - Discussed POC with patient. All questions were answered. Patient is agreeable to plan and verbalized understanding.     Case discussed with Dr. Collazo. Patient seen and examined by Dr. Collazo.      Bruno Knapp MPAS, PA-C  General Surgery  - [facility]

## 2022-03-09 ENCOUNTER — HOSPITAL ENCOUNTER (OUTPATIENT)
Dept: CARDIOLOGY | Facility: CLINIC | Age: 72
Discharge: HOME | End: 2022-03-09
Payer: MEDICARE

## 2022-03-09 ENCOUNTER — OFFICE VISIT (OUTPATIENT)
Dept: ELECTROPHYSIOLOGY | Facility: CLINIC | Age: 72
End: 2022-03-09
Payer: MEDICARE

## 2022-03-09 ENCOUNTER — CLINICAL SUPPORT (OUTPATIENT)
Dept: CARDIOLOGY | Facility: HOSPITAL | Age: 72
End: 2022-03-09
Attending: INTERNAL MEDICINE
Payer: MEDICARE

## 2022-03-09 VITALS
DIASTOLIC BLOOD PRESSURE: 82 MMHG | WEIGHT: 300.25 LBS | HEIGHT: 73 IN | BODY MASS INDEX: 39.79 KG/M2 | HEART RATE: 70 BPM | SYSTOLIC BLOOD PRESSURE: 142 MMHG

## 2022-03-09 DIAGNOSIS — I48.91 ATRIAL FIBRILLATION, UNSPECIFIED TYPE: ICD-10-CM

## 2022-03-09 DIAGNOSIS — I45.2 BIFASCICULAR BLOCK: ICD-10-CM

## 2022-03-09 DIAGNOSIS — I49.8 OTHER SPECIFIED CARDIAC ARRHYTHMIAS: ICD-10-CM

## 2022-03-09 DIAGNOSIS — Z95.0 PRESENCE OF CARDIAC PACEMAKER: Primary | ICD-10-CM

## 2022-03-09 DIAGNOSIS — I49.3 PVC (PREMATURE VENTRICULAR CONTRACTION): ICD-10-CM

## 2022-03-09 DIAGNOSIS — I10 HTN (HYPERTENSION), BENIGN: ICD-10-CM

## 2022-03-09 PROCEDURE — 3077F SYST BP >= 140 MM HG: CPT | Mod: HCNC,CPTII,S$GLB, | Performed by: NURSE PRACTITIONER

## 2022-03-09 PROCEDURE — 1125F AMNT PAIN NOTED PAIN PRSNT: CPT | Mod: HCNC,CPTII,S$GLB, | Performed by: NURSE PRACTITIONER

## 2022-03-09 PROCEDURE — 1159F MED LIST DOCD IN RCRD: CPT | Mod: HCNC,CPTII,S$GLB, | Performed by: NURSE PRACTITIONER

## 2022-03-09 PROCEDURE — 1159F PR MEDICATION LIST DOCUMENTED IN MEDICAL RECORD: ICD-10-PCS | Mod: HCNC,CPTII,S$GLB, | Performed by: NURSE PRACTITIONER

## 2022-03-09 PROCEDURE — 3077F PR MOST RECENT SYSTOLIC BLOOD PRESSURE >= 140 MM HG: ICD-10-PCS | Mod: HCNC,CPTII,S$GLB, | Performed by: NURSE PRACTITIONER

## 2022-03-09 PROCEDURE — 93010 RHYTHM STRIP: ICD-10-PCS | Mod: HCNC,S$GLB,, | Performed by: INTERNAL MEDICINE

## 2022-03-09 PROCEDURE — 99999 PR PBB SHADOW E&M-EST. PATIENT-LVL V: CPT | Mod: PBBFAC,HCNC,, | Performed by: NURSE PRACTITIONER

## 2022-03-09 PROCEDURE — 3008F BODY MASS INDEX DOCD: CPT | Mod: HCNC,CPTII,S$GLB, | Performed by: NURSE PRACTITIONER

## 2022-03-09 PROCEDURE — 1157F PR ADVANCE CARE PLAN OR EQUIV PRESENT IN MEDICAL RECORD: ICD-10-PCS | Mod: HCNC,CPTII,S$GLB, | Performed by: NURSE PRACTITIONER

## 2022-03-09 PROCEDURE — 3079F PR MOST RECENT DIASTOLIC BLOOD PRESSURE 80-89 MM HG: ICD-10-PCS | Mod: HCNC,CPTII,S$GLB, | Performed by: NURSE PRACTITIONER

## 2022-03-09 PROCEDURE — 93005 ELECTROCARDIOGRAM TRACING: CPT | Mod: HCNC,S$GLB,, | Performed by: INTERNAL MEDICINE

## 2022-03-09 PROCEDURE — 3008F PR BODY MASS INDEX (BMI) DOCUMENTED: ICD-10-PCS | Mod: HCNC,CPTII,S$GLB, | Performed by: NURSE PRACTITIONER

## 2022-03-09 PROCEDURE — 93280 PM DEVICE PROGR EVAL DUAL: CPT | Mod: 26,HCNC,, | Performed by: INTERNAL MEDICINE

## 2022-03-09 PROCEDURE — 93280 CARDIAC DEVICE CHECK - IN CLINIC & HOSPITAL: ICD-10-PCS | Mod: 26,HCNC,, | Performed by: INTERNAL MEDICINE

## 2022-03-09 PROCEDURE — 99999 PR PBB SHADOW E&M-EST. PATIENT-LVL V: ICD-10-PCS | Mod: PBBFAC,HCNC,, | Performed by: NURSE PRACTITIONER

## 2022-03-09 PROCEDURE — 99214 PR OFFICE/OUTPT VISIT, EST, LEVL IV, 30-39 MIN: ICD-10-PCS | Mod: HCNC,S$GLB,, | Performed by: NURSE PRACTITIONER

## 2022-03-09 PROCEDURE — 93010 ELECTROCARDIOGRAM REPORT: CPT | Mod: HCNC,S$GLB,, | Performed by: INTERNAL MEDICINE

## 2022-03-09 PROCEDURE — 3288F PR FALLS RISK ASSESSMENT DOCUMENTED: ICD-10-PCS | Mod: HCNC,CPTII,S$GLB, | Performed by: NURSE PRACTITIONER

## 2022-03-09 PROCEDURE — 1125F PR PAIN SEVERITY QUANTIFIED, PAIN PRESENT: ICD-10-PCS | Mod: HCNC,CPTII,S$GLB, | Performed by: NURSE PRACTITIONER

## 2022-03-09 PROCEDURE — 3079F DIAST BP 80-89 MM HG: CPT | Mod: HCNC,CPTII,S$GLB, | Performed by: NURSE PRACTITIONER

## 2022-03-09 PROCEDURE — 1101F PT FALLS ASSESS-DOCD LE1/YR: CPT | Mod: HCNC,CPTII,S$GLB, | Performed by: NURSE PRACTITIONER

## 2022-03-09 PROCEDURE — 93005 RHYTHM STRIP: ICD-10-PCS | Mod: HCNC,S$GLB,, | Performed by: INTERNAL MEDICINE

## 2022-03-09 PROCEDURE — 1160F RVW MEDS BY RX/DR IN RCRD: CPT | Mod: HCNC,CPTII,S$GLB, | Performed by: NURSE PRACTITIONER

## 2022-03-09 PROCEDURE — 1157F ADVNC CARE PLAN IN RCRD: CPT | Mod: HCNC,CPTII,S$GLB, | Performed by: NURSE PRACTITIONER

## 2022-03-09 PROCEDURE — 1101F PR PT FALLS ASSESS DOC 0-1 FALLS W/OUT INJ PAST YR: ICD-10-PCS | Mod: HCNC,CPTII,S$GLB, | Performed by: NURSE PRACTITIONER

## 2022-03-09 PROCEDURE — 99214 OFFICE O/P EST MOD 30 MIN: CPT | Mod: HCNC,S$GLB,, | Performed by: NURSE PRACTITIONER

## 2022-03-09 PROCEDURE — 3288F FALL RISK ASSESSMENT DOCD: CPT | Mod: HCNC,CPTII,S$GLB, | Performed by: NURSE PRACTITIONER

## 2022-03-09 PROCEDURE — 93280 PM DEVICE PROGR EVAL DUAL: CPT | Mod: HCNC

## 2022-03-09 PROCEDURE — 1160F PR REVIEW ALL MEDS BY PRESCRIBER/CLIN PHARMACIST DOCUMENTED: ICD-10-PCS | Mod: HCNC,CPTII,S$GLB, | Performed by: NURSE PRACTITIONER

## 2022-03-15 ENCOUNTER — TELEPHONE (OUTPATIENT)
Dept: PRIMARY CARE CLINIC | Facility: CLINIC | Age: 72
End: 2022-03-15
Payer: MEDICARE

## 2022-03-15 ENCOUNTER — PATIENT MESSAGE (OUTPATIENT)
Dept: UROLOGY | Facility: CLINIC | Age: 72
End: 2022-03-15
Payer: MEDICARE

## 2022-03-15 DIAGNOSIS — G89.29 CHRONIC PAIN OF BOTH ANKLES: Primary | ICD-10-CM

## 2022-03-15 DIAGNOSIS — M25.571 CHRONIC PAIN OF BOTH ANKLES: Primary | ICD-10-CM

## 2022-03-15 DIAGNOSIS — E66.01 SEVERE OBESITY (BMI 35.0-39.9) WITH COMORBIDITY: ICD-10-CM

## 2022-03-15 DIAGNOSIS — M25.572 CHRONIC PAIN OF BOTH ANKLES: Primary | ICD-10-CM

## 2022-03-15 DIAGNOSIS — M54.41 CHRONIC RIGHT-SIDED LOW BACK PAIN WITH RIGHT-SIDED SCIATICA: ICD-10-CM

## 2022-03-15 DIAGNOSIS — G89.29 CHRONIC RIGHT-SIDED LOW BACK PAIN WITH RIGHT-SIDED SCIATICA: ICD-10-CM

## 2022-03-16 RX ORDER — MIRABEGRON 50 MG/1
50 TABLET, FILM COATED, EXTENDED RELEASE ORAL DAILY
Qty: 30 TABLET | Refills: 11 | Status: SHIPPED | OUTPATIENT
Start: 2022-03-16 | End: 2022-05-17

## 2022-03-23 DIAGNOSIS — I63.412 CEREBRAL INFARCTION DUE TO EMBOLISM OF LEFT MIDDLE CEREBRAL ARTERY: ICD-10-CM

## 2022-03-23 RX ORDER — METOPROLOL SUCCINATE 50 MG/1
50 TABLET, EXTENDED RELEASE ORAL DAILY
Qty: 90 TABLET | Refills: 3 | Status: SHIPPED | OUTPATIENT
Start: 2022-03-23 | End: 2022-12-28

## 2022-03-24 RX ORDER — ROSUVASTATIN CALCIUM 10 MG/1
10 TABLET, COATED ORAL DAILY
Qty: 90 TABLET | Refills: 3 | Status: SHIPPED | OUTPATIENT
Start: 2022-03-24 | End: 2022-12-28

## 2022-03-24 NOTE — TELEPHONE ENCOUNTER
No new care gaps identified.  Powered by [employer]. Reference number: [id number].   3/24/2022 10:32:38 AM CDT

## 2022-03-26 ENCOUNTER — PATIENT MESSAGE (OUTPATIENT)
Dept: ELECTROPHYSIOLOGY | Facility: CLINIC | Age: 72
End: 2022-03-26
Payer: MEDICARE

## 2022-03-30 ENCOUNTER — PATIENT MESSAGE (OUTPATIENT)
Dept: PODIATRY | Facility: CLINIC | Age: 72
End: 2022-03-30
Payer: MEDICARE

## 2022-03-31 ENCOUNTER — OFFICE VISIT (OUTPATIENT)
Dept: PODIATRY | Facility: CLINIC | Age: 72
End: 2022-03-31
Payer: MEDICARE

## 2022-03-31 ENCOUNTER — PATIENT MESSAGE (OUTPATIENT)
Dept: SURGERY | Facility: CLINIC | Age: 72
End: 2022-03-31
Payer: MEDICARE

## 2022-03-31 VITALS
WEIGHT: 299.5 LBS | HEART RATE: 69 BPM | SYSTOLIC BLOOD PRESSURE: 132 MMHG | BODY MASS INDEX: 39.51 KG/M2 | DIASTOLIC BLOOD PRESSURE: 72 MMHG

## 2022-03-31 DIAGNOSIS — M21.41 PES PLANUS OF BOTH FEET: Primary | ICD-10-CM

## 2022-03-31 DIAGNOSIS — M21.42 PES PLANUS OF BOTH FEET: Primary | ICD-10-CM

## 2022-03-31 DIAGNOSIS — M25.572 SINUS TARSI SYNDROME, LEFT: ICD-10-CM

## 2022-03-31 PROCEDURE — 3075F SYST BP GE 130 - 139MM HG: CPT | Mod: CPTII,S$GLB,, | Performed by: PODIATRIST

## 2022-03-31 PROCEDURE — 3078F DIAST BP <80 MM HG: CPT | Mod: CPTII,S$GLB,, | Performed by: PODIATRIST

## 2022-03-31 PROCEDURE — 20605 PR DRAIN/INJECT INTERMEDIATE JOINT/BURSA: ICD-10-PCS | Mod: LT,S$GLB,, | Performed by: PODIATRIST

## 2022-03-31 PROCEDURE — 3008F BODY MASS INDEX DOCD: CPT | Mod: CPTII,S$GLB,, | Performed by: PODIATRIST

## 2022-03-31 PROCEDURE — 3008F PR BODY MASS INDEX (BMI) DOCUMENTED: ICD-10-PCS | Mod: CPTII,S$GLB,, | Performed by: PODIATRIST

## 2022-03-31 PROCEDURE — 1125F PR PAIN SEVERITY QUANTIFIED, PAIN PRESENT: ICD-10-PCS | Mod: CPTII,S$GLB,, | Performed by: PODIATRIST

## 2022-03-31 PROCEDURE — 3075F PR MOST RECENT SYSTOLIC BLOOD PRESS GE 130-139MM HG: ICD-10-PCS | Mod: CPTII,S$GLB,, | Performed by: PODIATRIST

## 2022-03-31 PROCEDURE — 99214 PR OFFICE/OUTPT VISIT, EST, LEVL IV, 30-39 MIN: ICD-10-PCS | Mod: 25,S$GLB,, | Performed by: PODIATRIST

## 2022-03-31 PROCEDURE — 1159F PR MEDICATION LIST DOCUMENTED IN MEDICAL RECORD: ICD-10-PCS | Mod: CPTII,S$GLB,, | Performed by: PODIATRIST

## 2022-03-31 PROCEDURE — 1157F PR ADVANCE CARE PLAN OR EQUIV PRESENT IN MEDICAL RECORD: ICD-10-PCS | Mod: CPTII,S$GLB,, | Performed by: PODIATRIST

## 2022-03-31 PROCEDURE — 1159F MED LIST DOCD IN RCRD: CPT | Mod: CPTII,S$GLB,, | Performed by: PODIATRIST

## 2022-03-31 PROCEDURE — 99999 PR PBB SHADOW E&M-EST. PATIENT-LVL IV: CPT | Mod: PBBFAC,,, | Performed by: PODIATRIST

## 2022-03-31 PROCEDURE — 20605 DRAIN/INJ JOINT/BURSA W/O US: CPT | Mod: LT,S$GLB,, | Performed by: PODIATRIST

## 2022-03-31 PROCEDURE — 1125F AMNT PAIN NOTED PAIN PRSNT: CPT | Mod: CPTII,S$GLB,, | Performed by: PODIATRIST

## 2022-03-31 PROCEDURE — 1157F ADVNC CARE PLAN IN RCRD: CPT | Mod: CPTII,S$GLB,, | Performed by: PODIATRIST

## 2022-03-31 PROCEDURE — 99999 PR PBB SHADOW E&M-EST. PATIENT-LVL IV: ICD-10-PCS | Mod: PBBFAC,,, | Performed by: PODIATRIST

## 2022-03-31 PROCEDURE — 3078F PR MOST RECENT DIASTOLIC BLOOD PRESSURE < 80 MM HG: ICD-10-PCS | Mod: CPTII,S$GLB,, | Performed by: PODIATRIST

## 2022-03-31 PROCEDURE — 99214 OFFICE O/P EST MOD 30 MIN: CPT | Mod: 25,S$GLB,, | Performed by: PODIATRIST

## 2022-03-31 RX ORDER — TRIAMCINOLONE ACETONIDE 40 MG/ML
40 INJECTION, SUSPENSION INTRA-ARTICULAR; INTRAMUSCULAR
Status: COMPLETED | OUTPATIENT
Start: 2022-03-31 | End: 2022-03-31

## 2022-03-31 RX ADMIN — TRIAMCINOLONE ACETONIDE 40 MG: 40 INJECTION, SUSPENSION INTRA-ARTICULAR; INTRAMUSCULAR at 09:03

## 2022-04-07 NOTE — PROGRESS NOTES
Subjective:      Patient ID: Adan Webb is a 72 y.o. male.    Chief Complaint: Foot Pain (Both feet. ) and Ankle Pain (Both ankles. Outside where ball is.)    Adan is a 72 y.o. male who presents to the podiatry clinic  with complaint of  bilateral foot pain. Onset of the symptoms was several years ago. Precipitating event: none known. Current symptoms include: ability to bear weight, but with some pain. Aggravating factors: any weight bearing. Symptoms have gradually worsened. Patient has had prior foot problems. Evaluation to date: plain films: abnormal Pes planus. Treatment to date: avoidance of offending activity.  Injections have helped him significantly in the past.  He is also in need of new orthotics.        Review of Systems   Constitutional: Negative for chills, decreased appetite, fever and malaise/fatigue.   HENT: Negative for congestion, hearing loss, nosebleeds and tinnitus.    Eyes: Negative for double vision, pain, photophobia and visual disturbance.   Cardiovascular: Negative for chest pain, claudication, cyanosis and leg swelling.   Respiratory: Negative for cough, hemoptysis, shortness of breath and wheezing.    Endocrine: Negative for cold intolerance and heat intolerance.   Hematologic/Lymphatic: Negative for adenopathy and bleeding problem.   Skin: Negative for color change, dry skin, itching, nail changes and suspicious lesions.   Musculoskeletal: Positive for arthritis, joint pain, myalgias and stiffness.   Gastrointestinal: Negative for abdominal pain, jaundice, nausea and vomiting.   Genitourinary: Negative for dysuria, frequency and hematuria.   Neurological: Negative for difficulty with concentration, loss of balance, numbness, paresthesias and sensory change.   Psychiatric/Behavioral: Negative for altered mental status, hallucinations and suicidal ideas. The patient is not nervous/anxious.    Allergic/Immunologic: Negative for environmental allergies and persistent infections.            Objective:      Physical Exam  Constitutional:       Appearance: He is well-developed.   HENT:      Head: Normocephalic and atraumatic.   Cardiovascular:      Pulses:           Dorsalis pedis pulses are 2+ on the right side and 2+ on the left side.        Posterior tibial pulses are 2+ on the right side and 2+ on the left side.   Pulmonary:      Effort: Pulmonary effort is normal.   Musculoskeletal:      Right foot: Normal range of motion. No deformity.      Left foot: Normal range of motion. No deformity.      Comments: Inspection and palpation of the muscles joints and bones of both lower extremities reveal that muscle strength for the anterior, lateral, and posterior muscle groups and intrinsic muscle groups of the foot are all 5 over 5 symmetrical. Ankle, subtalar, midtarsal, and digital joint range of motion  are within normal limits, nonpainful, without crepitus or effusion.   Flexible pes planus foot type w/ medial arch collapse and mild gastroc equinus    Feet:      Right foot:      Skin integrity: No skin breakdown or erythema.      Left foot:      Skin integrity: No skin breakdown or erythema.   Skin:     General: Skin is warm and dry.      Nails: There is no clubbing.      Comments: Skin turgor is normal bilaterally. Skin texture is well hydrated to both lower extremities. No lesions or rashes or wounds appreciated bilaterally. Nail plates 1 through 5 bilaterally are within normal limits for length and thickness. No nail clubbing or incurvation noted.   Neurological:      Mental Status: He is alert and oriented to person, place, and time.      Deep Tendon Reflexes:      Reflex Scores:       Patellar reflexes are 2+ on the right side and 2+ on the left side.       Achilles reflexes are 2+ on the right side and 2+ on the left side.     Comments: Sharp, dull, light touch, vibratory, and proprioceptive sensation are intact bilaterally. Deep tendon reflexes to patellar and Achilles tendon are  symmetrical, 2/4 bilaterally. No ankle clonus or Babinski reflexes noted bilaterally. Coordination is normal to both feet and lower extremities.   Psychiatric:         Behavior: Behavior normal.               Assessment:       Encounter Diagnoses   Name Primary?    Pes planus of both feet Yes    Sinus tarsi syndrome, left          Plan:       Adan was seen today for foot pain and ankle pain.    Diagnoses and all orders for this visit:    Pes planus of both feet    Sinus tarsi syndrome, left    Other orders  -     triamcinolone acetonide injection 40 mg      I counseled the patient on his conditions, their implications and medical management.    A steroid injection was performed at  Left sinus tarsi using 1% plain Lidocaine and  1 mL/40 mg of Kenalog. This was well tolerated.    I recommended patient be fitted for orthoses.  I explained that orthoses may improve function of the foot, reduce pain, decrease pronation, increase efficiency of muscle function of the foot and ankle and prevent surgery.  Alternative forms of biomechanical control of the foot and ankle were discussed with the patient.    Orthotics prescription dispensed today.    The nature of the condition, options for management, as well as potential risks and complications were discussed in detail with patient. Patient was amenable to my recommendations and left my office fully informed and will follow up as instructed or sooner if necessary.  Follow-up in 3 months.  .

## 2022-04-11 ENCOUNTER — PATIENT MESSAGE (OUTPATIENT)
Dept: SURGERY | Facility: CLINIC | Age: 72
End: 2022-04-11
Payer: MEDICARE

## 2022-04-27 ENCOUNTER — RESEARCH ENCOUNTER (OUTPATIENT)
Dept: RESEARCH | Facility: HOSPITAL | Age: 72
End: 2022-04-27
Payer: MEDICARE

## 2022-04-27 NOTE — PROGRESS NOTES
Avation study prescreen [id number]    Called patient today to let him know that due to presence of pacemaker he would not qualify for the study. Also offered to schedule visit with Dr. Wesley to discuss further options, patient stated he would like to wait to schedule an apointment, dealing with his wife's health issues.

## 2022-04-30 ENCOUNTER — PATIENT OUTREACH (OUTPATIENT)
Dept: ADMINISTRATIVE | Facility: OTHER | Age: 72
End: 2022-04-30
Payer: MEDICARE

## 2022-05-02 ENCOUNTER — OFFICE VISIT (OUTPATIENT)
Dept: UROLOGY | Facility: CLINIC | Age: 72
End: 2022-05-02
Payer: MEDICARE

## 2022-05-02 ENCOUNTER — CLINICAL SUPPORT (OUTPATIENT)
Dept: CARDIOLOGY | Facility: HOSPITAL | Age: 72
End: 2022-05-02
Payer: MEDICARE

## 2022-05-02 VITALS
SYSTOLIC BLOOD PRESSURE: 153 MMHG | HEART RATE: 64 BPM | BODY MASS INDEX: 38.8 KG/M2 | WEIGHT: 292.75 LBS | HEIGHT: 73 IN | DIASTOLIC BLOOD PRESSURE: 76 MMHG

## 2022-05-02 DIAGNOSIS — Z95.0 PRESENCE OF CARDIAC PACEMAKER: ICD-10-CM

## 2022-05-02 DIAGNOSIS — N32.81 OAB (OVERACTIVE BLADDER): ICD-10-CM

## 2022-05-02 DIAGNOSIS — R35.1 NOCTURIA: ICD-10-CM

## 2022-05-02 PROCEDURE — 93296 REM INTERROG EVL PM/IDS: CPT | Performed by: INTERNAL MEDICINE

## 2022-05-02 PROCEDURE — 3008F BODY MASS INDEX DOCD: CPT | Mod: CPTII,S$GLB,, | Performed by: UROLOGY

## 2022-05-02 PROCEDURE — 1159F MED LIST DOCD IN RCRD: CPT | Mod: CPTII,S$GLB,, | Performed by: UROLOGY

## 2022-05-02 PROCEDURE — 93294 CARDIAC DEVICE CHECK - REMOTE: ICD-10-PCS | Mod: ,,, | Performed by: INTERNAL MEDICINE

## 2022-05-02 PROCEDURE — 1159F PR MEDICATION LIST DOCUMENTED IN MEDICAL RECORD: ICD-10-PCS | Mod: CPTII,S$GLB,, | Performed by: UROLOGY

## 2022-05-02 PROCEDURE — 3077F PR MOST RECENT SYSTOLIC BLOOD PRESSURE >= 140 MM HG: ICD-10-PCS | Mod: CPTII,S$GLB,, | Performed by: UROLOGY

## 2022-05-02 PROCEDURE — 1157F ADVNC CARE PLAN IN RCRD: CPT | Mod: CPTII,S$GLB,, | Performed by: UROLOGY

## 2022-05-02 PROCEDURE — 99999 PR PBB SHADOW E&M-EST. PATIENT-LVL V: CPT | Mod: PBBFAC,,, | Performed by: UROLOGY

## 2022-05-02 PROCEDURE — 1101F PT FALLS ASSESS-DOCD LE1/YR: CPT | Mod: CPTII,S$GLB,, | Performed by: UROLOGY

## 2022-05-02 PROCEDURE — 3288F FALL RISK ASSESSMENT DOCD: CPT | Mod: CPTII,S$GLB,, | Performed by: UROLOGY

## 2022-05-02 PROCEDURE — 3078F PR MOST RECENT DIASTOLIC BLOOD PRESSURE < 80 MM HG: ICD-10-PCS | Mod: CPTII,S$GLB,, | Performed by: UROLOGY

## 2022-05-02 PROCEDURE — 99214 PR OFFICE/OUTPT VISIT, EST, LEVL IV, 30-39 MIN: ICD-10-PCS | Mod: S$GLB,,, | Performed by: UROLOGY

## 2022-05-02 PROCEDURE — 3077F SYST BP >= 140 MM HG: CPT | Mod: CPTII,S$GLB,, | Performed by: UROLOGY

## 2022-05-02 PROCEDURE — 93294 REM INTERROG EVL PM/LDLS PM: CPT | Mod: ,,, | Performed by: INTERNAL MEDICINE

## 2022-05-02 PROCEDURE — 3288F PR FALLS RISK ASSESSMENT DOCUMENTED: ICD-10-PCS | Mod: CPTII,S$GLB,, | Performed by: UROLOGY

## 2022-05-02 PROCEDURE — 1101F PR PT FALLS ASSESS DOC 0-1 FALLS W/OUT INJ PAST YR: ICD-10-PCS | Mod: CPTII,S$GLB,, | Performed by: UROLOGY

## 2022-05-02 PROCEDURE — 1126F AMNT PAIN NOTED NONE PRSNT: CPT | Mod: CPTII,S$GLB,, | Performed by: UROLOGY

## 2022-05-02 PROCEDURE — 99999 PR PBB SHADOW E&M-EST. PATIENT-LVL V: ICD-10-PCS | Mod: PBBFAC,,, | Performed by: UROLOGY

## 2022-05-02 PROCEDURE — 99214 OFFICE O/P EST MOD 30 MIN: CPT | Mod: S$GLB,,, | Performed by: UROLOGY

## 2022-05-02 PROCEDURE — 1126F PR PAIN SEVERITY QUANTIFIED, NO PAIN PRESENT: ICD-10-PCS | Mod: CPTII,S$GLB,, | Performed by: UROLOGY

## 2022-05-02 PROCEDURE — 1157F PR ADVANCE CARE PLAN OR EQUIV PRESENT IN MEDICAL RECORD: ICD-10-PCS | Mod: CPTII,S$GLB,, | Performed by: UROLOGY

## 2022-05-02 PROCEDURE — 3078F DIAST BP <80 MM HG: CPT | Mod: CPTII,S$GLB,, | Performed by: UROLOGY

## 2022-05-02 PROCEDURE — 3008F PR BODY MASS INDEX (BMI) DOCUMENTED: ICD-10-PCS | Mod: CPTII,S$GLB,, | Performed by: UROLOGY

## 2022-05-02 NOTE — PROGRESS NOTES
CC: urgency and urge incontinence    Adan Webb is a 72 y.o. man who is here for the evaluation of Nocturia (Patient has OAB. Unable to do the trial due to having a pacemaker. The Vesicare, Mybetriq is not working. Stopped taking the Flomax last week.)    A new pt referred by his PCP, Gina Reyes MD   He was seen by Dr. Heath and Dr. Wesley in the past.    Hx of urinary incontinence of several years duration. He reports bother is associated without urinary daytime frequency (2-3x daily though this may not be accurate), with nocturia (2-5x per night) and with urgency that results in urinary incontinence daily. He reports no stress urinary incontinence associated with exertion. He requires daily pads (3 pads/day).  He has decreased overall fluid intake. He has eliminated caffeine. He reports urinary incontinence is equally bothersome during the day and night.     He denies symptoms of irritative voiding including dysuria.  He denies symptoms of obstructive voiding including decreased stream, hesitancy, intermittency, post void dribbling and sense of incomplete emptying. Bladder scan PVR was 0 mL. His history includes no notation of urolithiasis, hematuria, prior pelvic surgery, previous prolapse or incontinence procedures or neurological symptoms/diagnoses. He denies all other prior pelvic surgeries or neurologic diagnoses.     Previous incontinence therapies:   Behavioral Therapy: (fluid/dietary modification) -  provided no benefit   Medication: (VESIcare (provided no benefit)   Myrbetriq (provided no benefit)   Flomax: Provided minimal improvement     He was studied by Dr. GUTIERREZ With urodynamic study on 10/25/21:  Adult Urodynamics. On filling phase he demonstrates early first and strong desire with a small bladder capacity. There is detrusor overactivity (DO) demonstrated on this exam (though absence of DO on urodynamic evaluation does not exclude it as causative agent of urgency symptoms). Bladder  compliance at capacity is normal. On fluoroscopy, the bladder contour is smooth. There is no VUR demonstrated on this exam.      On stress testing, with adequate cough and valsalva effort, there is no MIO demonstrated and patient reports no clinical history of MIO.     The voiding study at the time of DO was used to calculate his CALLES. This was done because flow rate was better (due to higher capacity) on his first voiding trial and demonstrated a lower BOOI than on subsequent flow studies. On voiding phase, voiding pressures are within normal range and flow rate is normal. On VCUG, the urethra appears patent throughout voiding. The patient empties completely. The patient reports this is the normal voiding pattern.      No evidence of Bladder Outlet Obstruction. Patient was offered additional pharmacologic therapies as well as tibial neuromodulation, Botox injections, or sacral neuromodulation.       Past Medical History:   Diagnosis Date    Abnormal TSH 12/9/2020    Allergic rhinitis 4/13/2016    Belching 1/22/2019    Benign prostatic hyperplasia with nocturia 8/18/2014    BPH (benign prostatic hyperplasia)     Urology Dr Zamora, OTC prostate revive helps, avodart caused chest pains    Calculus of gallbladder     US 2016    Calculus of gallbladder without cholecystitis without obstruction 4/13/2016    CT chest 2018    Cataract     Chronic pain of both ankles 12/9/2020    Podiatrist Dr Sesay    Conductive hearing loss of right ear with restricted hearing of left ear 1/22/2019    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, didn't use ear protection    Dermatitis 12/9/2020    nystat groin    DJD (degenerative joint disease) of hip 1/29/2015    Enlarged liver 4/8/2021    US 4/21     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    MCA , see MRI    Hiatal hernia 4/1/2021    CT chest tiny 2018    HTN (hypertension), benign 8/20/2020    Morbid obesity with BMI of 40.0-44.9, adult 1/29/2015    OAB (overactive  bladder) 4/1/2021    Rash with pads    Right-sided low back pain with right-sided sciatica 3/6/2019    Seasonal allergic rhinitis due to pollen 4/13/2016    Thoracic aortic aneurysm without rupture 05/27/2018    tx with Su, Cardiologist Dr Stone, 5.1 CTS Dr Duenas, follows yearly    Transient cerebral ischemia 6/6/2018     Past Surgical History:   Procedure Laterality Date    A-V CARDIAC PACEMAKER INSERTION N/A 8/5/2021    Procedure: INSERTION, CARDIAC PACEMAKER, DUAL CHAMBER;  Surgeon: Sheldon Miranda MD;  Location: [facility] EP LAB;  Service: Cardiology;  Laterality: N/A;  Near syncope,SB,Sinus Pause, RBBB,LAFB, Dual PPM, BIO, MAC, RI, 3 Prep    CATARACT EXTRACTION      CYSTOSCOPY WITH URODYNAMIC TESTING N/A 10/25/2021    Procedure: CYSTOSCOPY, WITH URODYNAMIC TESTING FLOUROSCOPIC;  Surgeon: Sancho Wesley MD;  Location: [facility] OR [address] [place] [address];  Service: Urology;  Laterality: N/A;  1hr    HERNIA REPAIR      YAG LAser Capsulotomy Bilateral     2/18/2019 OS Dr. Cornelius     Social History     Tobacco Use    Smoking status: Former Smoker     Packs/day: 6.00     Years: 1.00     Pack years: 6.00     Types: Cigarettes    Smokeless tobacco: Never Used   Substance Use Topics    Alcohol use: Yes     Comment: wine, seldom    Drug use: No     Family History   Problem Relation Age of Onset    Leukemia Father     Aneurysm Father     Diabetes Mother     Cataracts Paternal Uncle     Amblyopia Neg Hx     Blindness Neg Hx     Glaucoma Neg Hx     Macular degeneration Neg Hx     Retinal detachment Neg Hx     Strabismus Neg Hx      Allergy:  Review of patient's allergies indicates:   Allergen Reactions    Augmentin [amoxicillin-pot clavulanate] Rash     Rash, confusion, TIA like symptoms    Azithromycin Rash    Avodart [dutasteride]     House dust mite Other (See Comments)    Naproxen     Ragweed     Synthroid [levothyroxine] Rash     Outpatient Encounter Medications as of 5/2/2022   Medication Sig  Dispense Refill    albuterol (VENTOLIN HFA) 90 mcg/actuation inhaler Inhale 2 puffs into the lungs every 6 (six) hours as needed for Wheezing. Rescue 18 g 1    ALLERGY RELIEF, LORATADINE, 10 mg tablet Take 10 mg by mouth once daily.      BABY ASPIRIN ORAL Take 81 tablets by mouth every evening.       cholecalciferol, vitamin D3, 1,000 unit capsule Take 1,000 Units by mouth 2 (two) times daily.       coenzyme Q10 100 mg capsule Take by mouth once daily.      diclofenac sodium (VOLTAREN) 1 % Gel Apply 2 g topically 4 (four) times daily. 1 Tube 2    FLUAD QUAD 2020-21,65Y UP,,PF, 60 mcg (15 mcg x 4)/0.5 mL Syrg INJECT 0.5ML(CC) INTRAMUSCULARLY FOR ONE DOSE FOR FLU.      fluticasone (FLONASE) 50 mcg/actuation nasal spray PLACE 1 SPRAY INTO EACH NOSTRIL DAILY 16 g 12    glucosamine-chondroitin 500-400 mg tablet Take 1 tablet by mouth 2 (two) times daily.       ipratropium (ATROVENT) 42 mcg (0.06 %) nasal spray 2 sprays by Nasal route 3 (three) times daily. 15 mL 12    ketoconazole (NIZORAL) 2 % cream Apply topically once daily. For nail 1 Tube 2    metoprolol succinate (TOPROL-XL) 50 MG 24 hr tablet Take 1 tablet (50 mg total) by mouth once daily. 90 tablet 3    mirabegron (MYRBETRIQ) 50 mg Tb24 Take 1 tablet (50 mg total) by mouth once daily. 30 tablet 11    montelukast (SINGULAIR) 10 mg tablet Take 1 tablet (10 mg total) by mouth once daily. 90 tablet 3    MULTIVITAMIN W-MINERALS/LUTEIN (CENTRUM SILVER ORAL) Take by mouth once daily.       nystatin-triamcinolone (MYCOLOG II) cream Apply topically 4 (four) times daily. 60 g 5    omega-3 fatty acids 300 mg Cap Take 1 tablet by mouth once daily.       rosuvastatin (CRESTOR) 10 MG tablet Take 1 tablet (10 mg total) by mouth once daily. 90 tablet 3    solifenacin (VESICARE) 10 MG tablet Take 1 tablet (10 mg total) by mouth once daily. 30 tablet 11    TURMERIC ORAL Take by mouth. Pt take 1 in the evening.      UNABLE TO FIND Take by mouth nightly.  tumeric      UNABLE TO FIND 2 (two) times a day. actaline      UNABLE TO FIND Use as directed 1 tablet in the mouth or throat 2 (two) times daily. Prostate Revive manufactured by [employer]      VITAMIN B COMPLEX (SUPER B COMPLEX-B-12 ORAL) Take by mouth.      cetirizine (ZYRTEC) 10 MG tablet Take 1 tablet (10 mg total) by mouth once daily.  0    tamsulosin (FLOMAX) 0.4 mg Cap Take 1 capsule (0.4 mg total) by mouth once daily. (Patient not taking: Reported on 5/2/2022) 30 capsule 11    [DISCONTINUED] metoprolol succinate (TOPROL-XL) 50 MG 24 hr tablet Take 1 tablet (50 mg total) by mouth once daily. 90 tablet 3    [DISCONTINUED] mirabegron (MYRBETRIQ) 50 mg Tb24 Take 1 tablet (50 mg total) by mouth once daily. 30 tablet 11    [DISCONTINUED] rosuvastatin (CRESTOR) 10 MG tablet Take 1 tablet (10 mg total) by mouth once daily. 90 tablet 3     Facility-Administered Encounter Medications as of 5/2/2022   Medication Dose Route Frequency Provider Last Rate Last Admin    triamcinolone acetonide injection 40 mg  40 mg Intramuscular 1 time in Clinic/HOD Clive Sesay DPM         Review of Systems   ROS  Physical Exam     Vitals:    05/02/22 1429   BP: (!) 153/76   Pulse: 64     Physical Exam  Genitalia:  Scrotum: no rash or lesion  Normal symmetric epididymis without masses  Normal vas palpated  Normal size, symmetric testicles with no masses   Normal urethral meatus with no discharge  Normal circumcised penis with no lesion   Rectal:  Normal perineum and anus upon inspection.  Normal tone, no masses or tenderness;     LABS:  Lab Results   Component Value Date    PSA 2.7 01/20/2015    PSA 1.5 05/30/2008    PSA 2.1 05/11/2005    PSADIAG 2.5 07/16/2018    PSADIAG 2.8 06/12/2017    PSADIAG 2.7 08/18/2014     Results for orders placed or performed in visit on 07/16/18   Prostate Specific Antigen, Diagnostic   Result Value Ref Range    PSA Diagnostic 2.5 0.00 - 4.00 ng/mL   Results for orders placed or performed in visit  on 06/12/17   Prostate Specific Antigen, Diagnostic   Result Value Ref Range    PSA Diagnostic 2.8 0.00 - 4.00 ng/mL   Results for orders placed or performed in visit on 08/18/14   Prostate Specific Antigen, Diagnostic   Result Value Ref Range    PSA Diagnostic 2.7 0.00 - 4.00 ng/mL     Lab Results   Component Value Date    CREATININE 0.8 08/05/2021    CREATININE 0.8 12/02/2020    CREATININE 0.8 09/11/2019     No results found for this or any previous visit.  Urine Culture, Routine   Date Value Ref Range Status   07/23/2021 No growth  Final     Hemoglobin A1C   Date Value Ref Range Status   05/27/2018 5.3 4.0 - 5.6 % Final     Comment:     According to ADA guidelines, hemoglobin A1c <7.0% represents  optimal control in non-pregnant diabetic patients. Different  metrics may apply to specific patient populations.   Standards of Medical Care in Diabetes-2016.  For the purpose of screening for the presence of diabetes:  <5.7%     Consistent with the absence of diabetes  5.7-6.4%  Consistent with increasing risk for diabetes   (prediabetes)  >or=6.5%  Consistent with diabetes  Currently, no consensus exists for use of hemoglobin A1c  for diagnosis of diabetes for children.  This Hemoglobin A1c assay has significant interference with fetal   hemoglobin   (HbF). The results are invalid for patients with abnormal amounts of   HbF,   including those with known Hereditary Persistence   of Fetal Hemoglobin. Heterozygous hemoglobin variants (HbAS, HbAC,   HbAD, HbAE, HbA2) do not significantly interfere with this assay;   however, presence of multiple variants in a sample may impact the %   interference.         UA clear    Assessment and Plan:  Adan was seen today for nocturia.    Diagnoses and all orders for this visit:    OAB (overactive bladder)  -     Ambulatory referral/consult to Urology    Nocturia  -     Ambulatory referral/consult to Urology    based on his previous urodynamic study, I recommend either botox  injection or sacral neuromodulation.  Each therapy explained in detail.  Benefits and potential side effects explained.  Pt is not sure whether he can lie on his stomach for a hour considering he has a large belly.  So will try cysto botox injection first.  Answered all his questions and concerns.    Follow-up:  Follow up in about 23 days (around 5/25/2022), or cysto botox injection 100 units.

## 2022-05-03 ENCOUNTER — TELEPHONE (OUTPATIENT)
Dept: UROLOGY | Facility: CLINIC | Age: 72
End: 2022-05-03
Payer: MEDICARE

## 2022-05-03 DIAGNOSIS — N39.41 URGE INCONTINENCE: Primary | ICD-10-CM

## 2022-05-03 NOTE — TELEPHONE ENCOUNTER
Urge incontinence  -     Case Request Operating Room: CYSTOSCOPY,WITH BOTULINUM TOXIN INJECTION 100 units

## 2022-05-16 ENCOUNTER — PATIENT MESSAGE (OUTPATIENT)
Dept: SURGERY | Facility: HOSPITAL | Age: 72
End: 2022-05-16
Payer: MEDICARE

## 2022-05-17 DIAGNOSIS — N32.81 OAB (OVERACTIVE BLADDER): Primary | ICD-10-CM

## 2022-05-17 RX ORDER — SOLIFENACIN SUCCINATE 10 MG/1
10 TABLET, FILM COATED ORAL DAILY
Qty: 90 TABLET | Refills: 3 | Status: SHIPPED | OUTPATIENT
Start: 2022-05-17 | End: 2022-09-01 | Stop reason: SDUPTHER

## 2022-05-17 RX ORDER — MIRABEGRON 50 MG/1
50 TABLET, FILM COATED, EXTENDED RELEASE ORAL DAILY
Qty: 90 TABLET | Refills: 3 | Status: SHIPPED | OUTPATIENT
Start: 2022-05-17 | End: 2023-06-12 | Stop reason: SDUPTHER

## 2022-05-18 ENCOUNTER — PATIENT MESSAGE (OUTPATIENT)
Dept: UROLOGY | Facility: CLINIC | Age: 72
End: 2022-05-18
Payer: MEDICARE

## 2022-05-18 NOTE — TELEPHONE ENCOUNTER
OAB (overactive bladder)  -     solifenacin (VESICARE) 10 MG tablet; Take 1 tablet (10 mg total) by mouth once daily.  Dispense: 90 tablet; Refill: 3  -     mirabegron (MYRBETRIQ) 50 mg Tb24; Take 1 tablet (50 mg total) by mouth once daily.  Dispense: 90 tablet; Refill: 3

## 2022-06-20 ENCOUNTER — PATIENT MESSAGE (OUTPATIENT)
Dept: PRIMARY CARE CLINIC | Facility: CLINIC | Age: 72
End: 2022-06-20
Payer: MEDICARE

## 2022-06-21 NOTE — TELEPHONE ENCOUNTER
Yes, I have been recommending the boosters to all of my eligible patients. If you prefer to D/w Dr. Reyes she will be back in office next wk.      Dr. BASS

## 2022-06-23 ENCOUNTER — IMMUNIZATION (OUTPATIENT)
Dept: INTERNAL MEDICINE | Facility: CLINIC | Age: 72
End: 2022-06-23
Payer: MEDICARE

## 2022-06-23 DIAGNOSIS — Z23 NEED FOR VACCINATION: Primary | ICD-10-CM

## 2022-06-23 PROCEDURE — 91305 COVID-19, MRNA, LNP-S, PF, 30 MCG/0.3 ML DOSE VACCINE (PFIZER): CPT | Mod: PBBFAC | Performed by: INTERNAL MEDICINE

## 2022-06-27 ENCOUNTER — TELEPHONE (OUTPATIENT)
Dept: ADMINISTRATIVE | Facility: CLINIC | Age: 72
End: 2022-06-27
Payer: MEDICARE

## 2022-06-29 ENCOUNTER — OFFICE VISIT (OUTPATIENT)
Dept: INTERNAL MEDICINE | Facility: CLINIC | Age: 72
End: 2022-06-29
Payer: MEDICARE

## 2022-06-29 ENCOUNTER — TELEPHONE (OUTPATIENT)
Dept: ADMINISTRATIVE | Facility: CLINIC | Age: 72
End: 2022-06-29
Payer: MEDICARE

## 2022-06-29 VITALS — TEMPERATURE: 98 F | SYSTOLIC BLOOD PRESSURE: 139 MMHG | DIASTOLIC BLOOD PRESSURE: 74 MMHG

## 2022-06-29 DIAGNOSIS — Z00.00 ENCOUNTER FOR PREVENTIVE HEALTH EXAMINATION: Primary | ICD-10-CM

## 2022-06-29 DIAGNOSIS — I45.2 BIFASCICULAR BLOCK: ICD-10-CM

## 2022-06-29 DIAGNOSIS — H90.A11 CONDUCTIVE HEARING LOSS OF RIGHT EAR WITH RESTRICTED HEARING OF LEFT EAR: ICD-10-CM

## 2022-06-29 DIAGNOSIS — I70.0 AORTIC ATHEROSCLEROSIS: ICD-10-CM

## 2022-06-29 DIAGNOSIS — J84.10 CALCIFIED GRANULOMA OF LUNG: ICD-10-CM

## 2022-06-29 DIAGNOSIS — E66.01 SEVERE OBESITY (BMI 35.0-39.9) WITH COMORBIDITY: ICD-10-CM

## 2022-06-29 DIAGNOSIS — I10 HTN (HYPERTENSION), BENIGN: ICD-10-CM

## 2022-06-29 DIAGNOSIS — I71.21 ASCENDING AORTIC ANEURYSM: ICD-10-CM

## 2022-06-29 DIAGNOSIS — Z95.0 PRESENCE OF CARDIAC PACEMAKER: ICD-10-CM

## 2022-06-29 PROCEDURE — G0439 PPPS, SUBSEQ VISIT: HCPCS | Mod: 95,,, | Performed by: NURSE PRACTITIONER

## 2022-06-29 PROCEDURE — G0439 PR MEDICARE ANNUAL WELLNESS SUBSEQUENT VISIT: ICD-10-PCS | Mod: 95,,, | Performed by: NURSE PRACTITIONER

## 2022-06-29 PROCEDURE — 3078F PR MOST RECENT DIASTOLIC BLOOD PRESSURE < 80 MM HG: ICD-10-PCS | Mod: CPTII,95,, | Performed by: NURSE PRACTITIONER

## 2022-06-29 PROCEDURE — 3078F DIAST BP <80 MM HG: CPT | Mod: CPTII,95,, | Performed by: NURSE PRACTITIONER

## 2022-06-29 PROCEDURE — 3288F PR FALLS RISK ASSESSMENT DOCUMENTED: ICD-10-PCS | Mod: CPTII,95,, | Performed by: NURSE PRACTITIONER

## 2022-06-29 PROCEDURE — 1101F PT FALLS ASSESS-DOCD LE1/YR: CPT | Mod: CPTII,95,, | Performed by: NURSE PRACTITIONER

## 2022-06-29 PROCEDURE — 1126F PR PAIN SEVERITY QUANTIFIED, NO PAIN PRESENT: ICD-10-PCS | Mod: CPTII,95,, | Performed by: NURSE PRACTITIONER

## 2022-06-29 PROCEDURE — 1126F AMNT PAIN NOTED NONE PRSNT: CPT | Mod: CPTII,95,, | Performed by: NURSE PRACTITIONER

## 2022-06-29 PROCEDURE — 1157F ADVNC CARE PLAN IN RCRD: CPT | Mod: CPTII,95,, | Performed by: NURSE PRACTITIONER

## 2022-06-29 PROCEDURE — 1101F PR PT FALLS ASSESS DOC 0-1 FALLS W/OUT INJ PAST YR: ICD-10-PCS | Mod: CPTII,95,, | Performed by: NURSE PRACTITIONER

## 2022-06-29 PROCEDURE — 3075F SYST BP GE 130 - 139MM HG: CPT | Mod: CPTII,95,, | Performed by: NURSE PRACTITIONER

## 2022-06-29 PROCEDURE — 3075F PR MOST RECENT SYSTOLIC BLOOD PRESS GE 130-139MM HG: ICD-10-PCS | Mod: CPTII,95,, | Performed by: NURSE PRACTITIONER

## 2022-06-29 PROCEDURE — 1157F PR ADVANCE CARE PLAN OR EQUIV PRESENT IN MEDICAL RECORD: ICD-10-PCS | Mod: CPTII,95,, | Performed by: NURSE PRACTITIONER

## 2022-06-29 PROCEDURE — 3288F FALL RISK ASSESSMENT DOCD: CPT | Mod: CPTII,95,, | Performed by: NURSE PRACTITIONER

## 2022-06-29 NOTE — PROGRESS NOTES
The patient location is: [place]  The chief complaint leading to consultation is: HRA    Visit type: audiovisual    Face to Face time with patient: 37  55 minutes of total time spent on the encounter, which includes face to face time and non-face to face time preparing to see the patient (eg, review of tests), Obtaining and/or reviewing separately obtained history, Documenting clinical information in the electronic or other health record, Independently interpreting results (not separately reported) and communicating results to the patient/family/caregiver, or Care coordination (not separately reported).         Each patient to whom he or she provides medical services by telemedicine is:  (1) informed of the relationship between the physician and patient and the respective role of any other health care provider with respect to management of the patient; and (2) notified that he or she may decline to receive medical services by telemedicine and may withdraw from such care at any time.    Notes:       Adan Webb presented for a  Medicare AWV and comprehensive Health Risk Assessment today. The following components were reviewed and updated:    · Medical history  · Family History  · Social history  · Allergies and Current Medications  · Health Risk Assessment  · Health Maintenance  · Care Team         ** See Completed Assessments for Annual Wellness Visit within the encounter summary.**         The following assessments were completed:  · Living Situation  · CAGE  · Depression Screening  · Fall Risk Assessment (MACH 10)  · Hearing Assessment(HHI)  · Cognitive Function Screening  · Nutrition Screening  · ADL Screening  · PAQ Screening      Vitals:    06/29/22 0757   BP: 139/74   Temp: 98 °F (36.7 °C)     There is no height or weight on file to calculate BMI.  Physical Exam  Constitutional:       Appearance: Normal appearance. He is normal weight.   HENT:      Head: Normocephalic and atraumatic.      Right Ear:  External ear normal.      Left Ear: External ear normal.      Nose: Nose normal.   Pulmonary:      Effort: Pulmonary effort is normal.   Neurological:      Mental Status: He is alert and oriented to person, place, and time. Mental status is at baseline.   Psychiatric:         Mood and Affect: Mood normal.         Behavior: Behavior normal.               Diagnoses and health risks identified today and associated recommendations/orders:    1. Encounter for preventive health examination  Health Maintenance updated   Records reviewed   Exam done     2. Conductive hearing loss of right ear with restricted hearing of left ear  Stable and chronic. Followed by PCP.     3. Ascending aortic aneurysm  Stable and chronic. Continue current medications. Followed by PCP and cardiology.     4. Bifascicular block  Stable and chronic. Continue current medications. Followed by PCP and cardiology.     5. Severe obesity (BMI 35.0-39.9) with comorbidity  BMI reviewed. Discussed diet and exercise.     6. HTN (hypertension), benign  Stable, followed by PCP   Take medications as prescribed.   Monitor BP at home, goal BP < or = 140/80, call office if consistently above this range.   Follow low salt DASH diet and exercise.   BMI reviewed.     7. Presence of cardiac pacemaker  Stable and chronic. Followed by PCP and cardiology.     8. Calcified granuloma of lung  Noted on CT chest 07/26/2021. Stable and chronic. Followed by PCP.     9. Aortic atherosclerosis  Noted on CT chest 07/20/2020. Stable and chronic. Followed by PCP. On statin.   Counseling and Referral of Other Preventative  (Italic type indicates deductible and co-insurance are waived)    Patient Name: Adan Webb  Today's Date: 6/30/2022    Health Maintenance       Date Due Completion Date    Shingles Vaccine (1 of 2) Never done ---    Influenza Vaccine (Season Ended) 09/01/2022 9/17/2020    High Dose Statin 06/29/2023 6/29/2022    Lipid Panel 12/02/2025 12/2/2020    Colorectal  Cancer Screening 08/31/2027 11/11/2020    Override on 8/31/2017: Declined (give FOBT today)    TETANUS VACCINE 01/14/2029 1/14/2019        No orders of the defined types were placed in this encounter.      Provided Aadn with a 5-10 year written screening schedule and personal prevention plan. Recommendations were developed using the USPSTF age appropriate recommendations. Education, counseling, and referrals were provided as needed. After Visit Summary printed and given to patient which includes a list of additional screenings\tests needed.    Follow up in about 1 year (around 6/29/2023) for follow up with podiatry.    Leyla Figueroa, NP  I offered to discuss advanced care planning, including how to pick a person who would make decisions for you if you were unable to make them for yourself, called a health care power of , and what kind of decisions you might make such as use of life sustaining treatments such as ventilators and tube feeding when faced with a life limiting illness recorded on a living will that they will need to know. (How you want to be cared for as you near the end of your natural life)     X Patient is interested in learning more about how to make advanced directives.  I provided them paperwork and offered to discuss this with them.

## 2022-06-29 NOTE — PATIENT INSTRUCTIONS
Counseling and Referral of Other Preventative  (Italic type indicates deductible and co-insurance are waived)    Patient Name: Adan Webb  Today's Date: 6/29/2022    Health Maintenance       Date Due Completion Date    Shingles Vaccine (1 of 2) Never done ---    Influenza Vaccine (Season Ended) 09/01/2022 9/17/2020    High Dose Statin 06/29/2023 6/29/2022    Lipid Panel 12/02/2025 12/2/2020    Colorectal Cancer Screening 08/31/2027 11/11/2020    Override on 8/31/2017: Declined (give FOBT today)    TETANUS VACCINE 01/14/2029 1/14/2019        No orders of the defined types were placed in this encounter.    The following information is provided to all patients.  This information is to help you find resources for any of the problems found today that may be affecting your health:                Living healthy guide: www.[facility].[place].gov      Understanding Diabetes: www.diabetes.org      Eating healthy: www.cdc.gov/healthyweight      Formerly named [facility] home safety checklist: www.cdc.gov/steadi/patient.html      Agency on Aging: www.goea.[place].[facility]      Alcoholics anonymous (AA): www.aa.org      Physical Activity: www.donny.nih.gov/zy2pvwj      Tobacco use: www.quitwithusla.org

## 2022-06-30 ENCOUNTER — OFFICE VISIT (OUTPATIENT)
Dept: PODIATRY | Facility: CLINIC | Age: 72
End: 2022-06-30
Payer: MEDICARE

## 2022-06-30 VITALS
BODY MASS INDEX: 38.97 KG/M2 | SYSTOLIC BLOOD PRESSURE: 143 MMHG | WEIGHT: 294 LBS | HEART RATE: 76 BPM | DIASTOLIC BLOOD PRESSURE: 78 MMHG | HEIGHT: 73 IN

## 2022-06-30 DIAGNOSIS — M21.41 PES PLANUS OF BOTH FEET: Primary | ICD-10-CM

## 2022-06-30 DIAGNOSIS — M25.572 SINUS TARSI SYNDROME, LEFT: ICD-10-CM

## 2022-06-30 DIAGNOSIS — M21.42 PES PLANUS OF BOTH FEET: Primary | ICD-10-CM

## 2022-06-30 PROCEDURE — 1159F MED LIST DOCD IN RCRD: CPT | Mod: CPTII,S$GLB,, | Performed by: PODIATRIST

## 2022-06-30 PROCEDURE — 99999 PR PBB SHADOW E&M-EST. PATIENT-LVL IV: ICD-10-PCS | Mod: PBBFAC,,, | Performed by: PODIATRIST

## 2022-06-30 PROCEDURE — 99214 PR OFFICE/OUTPT VISIT, EST, LEVL IV, 30-39 MIN: ICD-10-PCS | Mod: 25,S$GLB,, | Performed by: PODIATRIST

## 2022-06-30 PROCEDURE — 3078F DIAST BP <80 MM HG: CPT | Mod: CPTII,S$GLB,, | Performed by: PODIATRIST

## 2022-06-30 PROCEDURE — 3077F PR MOST RECENT SYSTOLIC BLOOD PRESSURE >= 140 MM HG: ICD-10-PCS | Mod: CPTII,S$GLB,, | Performed by: PODIATRIST

## 2022-06-30 PROCEDURE — 3008F PR BODY MASS INDEX (BMI) DOCUMENTED: ICD-10-PCS | Mod: CPTII,S$GLB,, | Performed by: PODIATRIST

## 2022-06-30 PROCEDURE — 3008F BODY MASS INDEX DOCD: CPT | Mod: CPTII,S$GLB,, | Performed by: PODIATRIST

## 2022-06-30 PROCEDURE — 1159F PR MEDICATION LIST DOCUMENTED IN MEDICAL RECORD: ICD-10-PCS | Mod: CPTII,S$GLB,, | Performed by: PODIATRIST

## 2022-06-30 PROCEDURE — 20605 PR DRAIN/INJECT INTERMEDIATE JOINT/BURSA: ICD-10-PCS | Mod: LT,S$GLB,, | Performed by: PODIATRIST

## 2022-06-30 PROCEDURE — 3077F SYST BP >= 140 MM HG: CPT | Mod: CPTII,S$GLB,, | Performed by: PODIATRIST

## 2022-06-30 PROCEDURE — 99214 OFFICE O/P EST MOD 30 MIN: CPT | Mod: 25,S$GLB,, | Performed by: PODIATRIST

## 2022-06-30 PROCEDURE — 20605 DRAIN/INJ JOINT/BURSA W/O US: CPT | Mod: LT,S$GLB,, | Performed by: PODIATRIST

## 2022-06-30 PROCEDURE — 1125F AMNT PAIN NOTED PAIN PRSNT: CPT | Mod: CPTII,S$GLB,, | Performed by: PODIATRIST

## 2022-06-30 PROCEDURE — 3078F PR MOST RECENT DIASTOLIC BLOOD PRESSURE < 80 MM HG: ICD-10-PCS | Mod: CPTII,S$GLB,, | Performed by: PODIATRIST

## 2022-06-30 PROCEDURE — 1125F PR PAIN SEVERITY QUANTIFIED, PAIN PRESENT: ICD-10-PCS | Mod: CPTII,S$GLB,, | Performed by: PODIATRIST

## 2022-06-30 PROCEDURE — 1157F ADVNC CARE PLAN IN RCRD: CPT | Mod: CPTII,S$GLB,, | Performed by: PODIATRIST

## 2022-06-30 PROCEDURE — 1157F PR ADVANCE CARE PLAN OR EQUIV PRESENT IN MEDICAL RECORD: ICD-10-PCS | Mod: CPTII,S$GLB,, | Performed by: PODIATRIST

## 2022-06-30 PROCEDURE — 99999 PR PBB SHADOW E&M-EST. PATIENT-LVL IV: CPT | Mod: PBBFAC,,, | Performed by: PODIATRIST

## 2022-06-30 RX ORDER — TRIAMCINOLONE ACETONIDE 40 MG/ML
40 INJECTION, SUSPENSION INTRA-ARTICULAR; INTRAMUSCULAR
Status: COMPLETED | OUTPATIENT
Start: 2022-06-30 | End: 2022-06-30

## 2022-06-30 RX ADMIN — TRIAMCINOLONE ACETONIDE 40 MG: 40 INJECTION, SUSPENSION INTRA-ARTICULAR; INTRAMUSCULAR at 12:06

## 2022-07-09 NOTE — PROGRESS NOTES
Subjective:      Patient ID: Adan Webb is a 72 y.o. male.    Chief Complaint: Ankle Pain (Left foot ankle pain)    Adan is a 72 y.o. male who presents to the podiatry clinic  with complaint of  bilateral foot pain. Onset of the symptoms was several years ago. Precipitating event: none known. Current symptoms include: ability to bear weight, but with some pain. Aggravating factors: any weight bearing. Symptoms have gradually worsened. Patient has had prior foot problems. Evaluation to date: plain films: abnormal Pes planus. Treatment to date: avoidance of offending activity.  Injections have helped him significantly in the past.          Review of Systems   Constitutional: Negative for chills, decreased appetite, fever and malaise/fatigue.   HENT: Negative for congestion, hearing loss, nosebleeds and tinnitus.    Eyes: Negative for double vision, pain, photophobia and visual disturbance.   Cardiovascular: Negative for chest pain, claudication, cyanosis and leg swelling.   Respiratory: Negative for cough, hemoptysis, shortness of breath and wheezing.    Endocrine: Negative for cold intolerance and heat intolerance.   Hematologic/Lymphatic: Negative for adenopathy and bleeding problem.   Skin: Negative for color change, dry skin, itching, nail changes and suspicious lesions.   Musculoskeletal: Positive for arthritis, joint pain, myalgias and stiffness.   Gastrointestinal: Negative for abdominal pain, jaundice, nausea and vomiting.   Genitourinary: Negative for dysuria, frequency and hematuria.   Neurological: Negative for difficulty with concentration, loss of balance, numbness, paresthesias and sensory change.   Psychiatric/Behavioral: Negative for altered mental status, hallucinations and suicidal ideas. The patient is not nervous/anxious.    Allergic/Immunologic: Negative for environmental allergies and persistent infections.           Objective:      Physical Exam  Constitutional:       Appearance: He is  well-developed.   HENT:      Head: Normocephalic and atraumatic.   Cardiovascular:      Pulses:           Dorsalis pedis pulses are 2+ on the right side and 2+ on the left side.        Posterior tibial pulses are 2+ on the right side and 2+ on the left side.   Pulmonary:      Effort: Pulmonary effort is normal.   Musculoskeletal:      Right foot: Normal range of motion. No deformity.      Left foot: Normal range of motion. No deformity.      Comments: Inspection and palpation of the muscles joints and bones of both lower extremities reveal that muscle strength for the anterior, lateral, and posterior muscle groups and intrinsic muscle groups of the foot are all 5 over 5 symmetrical. Ankle, subtalar, midtarsal, and digital joint range of motion  are within normal limits, nonpainful, without crepitus or effusion.   Flexible pes planus foot type w/ medial arch collapse and mild gastroc equinus    Feet:      Right foot:      Skin integrity: No skin breakdown or erythema.      Left foot:      Skin integrity: No skin breakdown or erythema.   Skin:     General: Skin is warm and dry.      Nails: There is no clubbing.      Comments: Skin turgor is normal bilaterally. Skin texture is well hydrated to both lower extremities. No lesions or rashes or wounds appreciated bilaterally. Nail plates 1 through 5 bilaterally are within normal limits for length and thickness. No nail clubbing or incurvation noted.   Neurological:      Mental Status: He is alert and oriented to person, place, and time.      Deep Tendon Reflexes:      Reflex Scores:       Patellar reflexes are 2+ on the right side and 2+ on the left side.       Achilles reflexes are 2+ on the right side and 2+ on the left side.     Comments: Sharp, dull, light touch, vibratory, and proprioceptive sensation are intact bilaterally. Deep tendon reflexes to patellar and Achilles tendon are symmetrical, 2/4 bilaterally. No ankle clonus or Babinski reflexes noted bilaterally.  Coordination is normal to both feet and lower extremities.   Psychiatric:         Behavior: Behavior normal.               Assessment:       Encounter Diagnoses   Name Primary?    Pes planus of both feet Yes    Sinus tarsi syndrome, left          Plan:       Adan was seen today for ankle pain.    Diagnoses and all orders for this visit:    Pes planus of both feet    Sinus tarsi syndrome, left    Other orders  -     triamcinolone acetonide injection 40 mg      I counseled the patient on his conditions, their implications and medical management.    A steroid injection was performed at  Left sinus tarsi using 1% plain Lidocaine and  1 mL/40 mg of Kenalog. This was well tolerated.    I recommended patient be fitted for orthoses.  I explained that orthoses may improve function of the foot, reduce pain, decrease pronation, increase efficiency of muscle function of the foot and ankle and prevent surgery.  Alternative forms of biomechanical control of the foot and ankle were discussed with the patient.    Orthotics prescription dispensed today.    The nature of the condition, options for management, as well as potential risks and complications were discussed in detail with patient. Patient was amenable to my recommendations and left my office fully informed and will follow up as instructed or sooner if necessary.  Follow-up in 3 months.  .

## 2022-07-31 ENCOUNTER — CLINICAL SUPPORT (OUTPATIENT)
Dept: CARDIOLOGY | Facility: HOSPITAL | Age: 72
End: 2022-07-31
Payer: MEDICARE

## 2022-07-31 DIAGNOSIS — Z95.0 PRESENCE OF CARDIAC PACEMAKER: ICD-10-CM

## 2022-07-31 PROCEDURE — 93296 REM INTERROG EVL PM/IDS: CPT | Performed by: INTERNAL MEDICINE

## 2022-08-28 ENCOUNTER — PATIENT MESSAGE (OUTPATIENT)
Dept: ELECTROPHYSIOLOGY | Facility: CLINIC | Age: 72
End: 2022-08-28
Payer: MEDICARE

## 2022-08-29 ENCOUNTER — PATIENT MESSAGE (OUTPATIENT)
Dept: ELECTROPHYSIOLOGY | Facility: CLINIC | Age: 72
End: 2022-08-29
Payer: MEDICARE

## 2022-08-31 DIAGNOSIS — I10 HTN (HYPERTENSION), BENIGN: ICD-10-CM

## 2022-09-01 DIAGNOSIS — N32.81 OAB (OVERACTIVE BLADDER): ICD-10-CM

## 2022-09-01 RX ORDER — MONTELUKAST SODIUM 10 MG/1
10 TABLET ORAL DAILY
Qty: 90 TABLET | Refills: 3 | Status: SHIPPED | OUTPATIENT
Start: 2022-09-01 | End: 2023-06-08

## 2022-09-01 RX ORDER — SOLIFENACIN SUCCINATE 10 MG/1
10 TABLET, FILM COATED ORAL DAILY
Qty: 30 TABLET | Refills: 3 | Status: SHIPPED | OUTPATIENT
Start: 2022-09-01 | End: 2022-12-22

## 2022-09-01 NOTE — TELEPHONE ENCOUNTER
Care Due:                  Date            Visit Type   Department     Provider  --------------------------------------------------------------------------------                                SAME DAY -                              ESTABLISHED   [facility]  Last Visit: 02-      PATIENT      CARE           Gina Reyes  Next Visit: None Scheduled  None         None Found                                                            Last  Test          Frequency    Reason                     Performed    Due Date  --------------------------------------------------------------------------------    CMP.........  12 months..  rosuvastatin.............  12- 03-    Lipid Panel.  12 months..  rosuvastatin.............  12- 11-    [employer] Embedded Care Gaps. Reference number: [id number]. 9/01/2022   2:52:01 PM CDT

## 2022-09-29 RX ORDER — TAMSULOSIN HYDROCHLORIDE 0.4 MG/1
0.4 CAPSULE ORAL DAILY
Qty: 30 CAPSULE | Refills: 11 | Status: SHIPPED | OUTPATIENT
Start: 2022-09-29 | End: 2023-06-19 | Stop reason: SDUPTHER

## 2022-10-06 ENCOUNTER — IMMUNIZATION (OUTPATIENT)
Dept: INTERNAL MEDICINE | Facility: CLINIC | Age: 72
End: 2022-10-06
Payer: MEDICARE

## 2022-10-06 DIAGNOSIS — Z23 NEED FOR VACCINATION: Primary | ICD-10-CM

## 2022-10-06 PROCEDURE — 91312 COVID-19, MRNA, LNP-S, BIVALENT BOOSTER, PF, 30 MCG/0.3 ML DOSE: ICD-10-PCS | Mod: S$GLB,,, | Performed by: FAMILY MEDICINE

## 2022-10-06 PROCEDURE — 91312 COVID-19, MRNA, LNP-S, BIVALENT BOOSTER, PF, 30 MCG/0.3 ML DOSE: CPT | Mod: S$GLB,,, | Performed by: FAMILY MEDICINE

## 2022-10-06 PROCEDURE — 0124A COVID-19, MRNA, LNP-S, BIVALENT BOOSTER, PF, 30 MCG/0.3 ML DOSE: CPT | Mod: PBBFAC | Performed by: FAMILY MEDICINE

## 2022-10-18 ENCOUNTER — TELEPHONE (OUTPATIENT)
Dept: PRIMARY CARE CLINIC | Facility: CLINIC | Age: 72
End: 2022-10-18
Payer: MEDICARE

## 2022-10-18 NOTE — TELEPHONE ENCOUNTER
Pt having low back pain that goes around to the front. Dr Reyes do not have any available appointments and pt do not want to see another provider. Pt said he will reach out to GI because he think the pain may be coming from constipation caused by medications.

## 2022-10-18 NOTE — TELEPHONE ENCOUNTER
----- Message from Paxton Stoll sent at 10/18/2022  1:04 PM CDT -----  Contact: pt [phone]  Patient would like a call back to discuss pain in back which has moved to right side. Patient refused appointment with other available physicians. Please call and advise.    Thank you and have a great day.

## 2022-10-29 ENCOUNTER — CLINICAL SUPPORT (OUTPATIENT)
Dept: CARDIOLOGY | Facility: HOSPITAL | Age: 72
End: 2022-10-29
Payer: MEDICARE

## 2022-10-29 DIAGNOSIS — Z95.0 PRESENCE OF CARDIAC PACEMAKER: ICD-10-CM

## 2022-10-29 PROCEDURE — 93294 REM INTERROG EVL PM/LDLS PM: CPT | Mod: ,,, | Performed by: INTERNAL MEDICINE

## 2022-10-29 PROCEDURE — 93296 REM INTERROG EVL PM/IDS: CPT | Performed by: INTERNAL MEDICINE

## 2022-10-29 PROCEDURE — 93294 CARDIAC DEVICE CHECK - REMOTE: ICD-10-PCS | Mod: ,,, | Performed by: INTERNAL MEDICINE

## 2022-11-01 ENCOUNTER — OFFICE VISIT (OUTPATIENT)
Dept: PODIATRY | Facility: CLINIC | Age: 72
End: 2022-11-01
Payer: MEDICARE

## 2022-11-01 VITALS
BODY MASS INDEX: 38.95 KG/M2 | WEIGHT: 293.88 LBS | HEIGHT: 73 IN | HEART RATE: 66 BPM | SYSTOLIC BLOOD PRESSURE: 142 MMHG | DIASTOLIC BLOOD PRESSURE: 81 MMHG

## 2022-11-01 DIAGNOSIS — M25.572 SINUS TARSI SYNDROME, LEFT: ICD-10-CM

## 2022-11-01 DIAGNOSIS — M21.41 PES PLANUS OF BOTH FEET: Primary | ICD-10-CM

## 2022-11-01 DIAGNOSIS — M76.72 PERONEAL TENDINITIS OF LOWER LEG, LEFT: ICD-10-CM

## 2022-11-01 DIAGNOSIS — M21.42 PES PLANUS OF BOTH FEET: Primary | ICD-10-CM

## 2022-11-01 PROCEDURE — 1159F MED LIST DOCD IN RCRD: CPT | Mod: CPTII,S$GLB,, | Performed by: PODIATRIST

## 2022-11-01 PROCEDURE — 20550 PR INJECT TENDON SHEATH/LIGAMENT: ICD-10-PCS | Mod: LT,S$GLB,, | Performed by: PODIATRIST

## 2022-11-01 PROCEDURE — 99214 OFFICE O/P EST MOD 30 MIN: CPT | Mod: 25,S$GLB,, | Performed by: PODIATRIST

## 2022-11-01 PROCEDURE — 3008F BODY MASS INDEX DOCD: CPT | Mod: CPTII,S$GLB,, | Performed by: PODIATRIST

## 2022-11-01 PROCEDURE — 3079F DIAST BP 80-89 MM HG: CPT | Mod: CPTII,S$GLB,, | Performed by: PODIATRIST

## 2022-11-01 PROCEDURE — 1159F PR MEDICATION LIST DOCUMENTED IN MEDICAL RECORD: ICD-10-PCS | Mod: CPTII,S$GLB,, | Performed by: PODIATRIST

## 2022-11-01 PROCEDURE — 3077F PR MOST RECENT SYSTOLIC BLOOD PRESSURE >= 140 MM HG: ICD-10-PCS | Mod: CPTII,S$GLB,, | Performed by: PODIATRIST

## 2022-11-01 PROCEDURE — 1125F AMNT PAIN NOTED PAIN PRSNT: CPT | Mod: CPTII,S$GLB,, | Performed by: PODIATRIST

## 2022-11-01 PROCEDURE — 3008F PR BODY MASS INDEX (BMI) DOCUMENTED: ICD-10-PCS | Mod: CPTII,S$GLB,, | Performed by: PODIATRIST

## 2022-11-01 PROCEDURE — 99999 PR PBB SHADOW E&M-EST. PATIENT-LVL IV: CPT | Mod: PBBFAC,,, | Performed by: PODIATRIST

## 2022-11-01 PROCEDURE — 3079F PR MOST RECENT DIASTOLIC BLOOD PRESSURE 80-89 MM HG: ICD-10-PCS | Mod: CPTII,S$GLB,, | Performed by: PODIATRIST

## 2022-11-01 PROCEDURE — 1157F PR ADVANCE CARE PLAN OR EQUIV PRESENT IN MEDICAL RECORD: ICD-10-PCS | Mod: CPTII,S$GLB,, | Performed by: PODIATRIST

## 2022-11-01 PROCEDURE — 20550 NJX 1 TENDON SHEATH/LIGAMENT: CPT | Mod: LT,S$GLB,, | Performed by: PODIATRIST

## 2022-11-01 PROCEDURE — 1125F PR PAIN SEVERITY QUANTIFIED, PAIN PRESENT: ICD-10-PCS | Mod: CPTII,S$GLB,, | Performed by: PODIATRIST

## 2022-11-01 PROCEDURE — 3077F SYST BP >= 140 MM HG: CPT | Mod: CPTII,S$GLB,, | Performed by: PODIATRIST

## 2022-11-01 PROCEDURE — 99999 PR PBB SHADOW E&M-EST. PATIENT-LVL IV: ICD-10-PCS | Mod: PBBFAC,,, | Performed by: PODIATRIST

## 2022-11-01 PROCEDURE — 99214 PR OFFICE/OUTPT VISIT, EST, LEVL IV, 30-39 MIN: ICD-10-PCS | Mod: 25,S$GLB,, | Performed by: PODIATRIST

## 2022-11-01 PROCEDURE — 1157F ADVNC CARE PLAN IN RCRD: CPT | Mod: CPTII,S$GLB,, | Performed by: PODIATRIST

## 2022-11-01 RX ORDER — TRIAMCINOLONE ACETONIDE 40 MG/ML
INJECTION, SUSPENSION INTRA-ARTICULAR; INTRAMUSCULAR
COMMUNITY
Start: 2022-06-30 | End: 2023-03-09

## 2022-11-01 RX ORDER — TRIAMCINOLONE ACETONIDE 40 MG/ML
40 INJECTION, SUSPENSION INTRA-ARTICULAR; INTRAMUSCULAR
Status: COMPLETED | OUTPATIENT
Start: 2022-11-01 | End: 2022-11-01

## 2022-11-01 RX ADMIN — TRIAMCINOLONE ACETONIDE 40 MG: 40 INJECTION, SUSPENSION INTRA-ARTICULAR; INTRAMUSCULAR at 04:11

## 2022-11-07 NOTE — PROGRESS NOTES
Subjective:      Patient ID: Adan Webb is a 72 y.o. male.    Chief Complaint: Ankle Pain (Left ankle 7/10)    Adan is a 72 y.o. male who presents to the podiatry clinic  with complaint of  bilateral foot pain. Onset of the symptoms was several years ago. Precipitating event: none known. Current symptoms include: ability to bear weight, but with some pain. Aggravating factors: any weight bearing. Symptoms have gradually worsened. Patient has had prior foot problems. Evaluation to date: plain films: abnormal Pes planus . Treatment to date: avoidance of offending activity.  Injections have helped him significantly in the past.  He is having increased tenderness to the outside of the left ankle, 7/10 pain.        Review of Systems   Constitutional: Negative for chills, decreased appetite, fever and malaise/fatigue.   HENT:  Negative for congestion, hearing loss, nosebleeds and tinnitus.    Eyes:  Negative for double vision, pain, photophobia and visual disturbance.   Cardiovascular:  Negative for chest pain, claudication, cyanosis and leg swelling.   Respiratory:  Negative for cough, hemoptysis, shortness of breath and wheezing.    Endocrine: Negative for cold intolerance and heat intolerance.   Hematologic/Lymphatic: Negative for adenopathy and bleeding problem.   Skin:  Negative for color change, dry skin, itching, nail changes and suspicious lesions.   Musculoskeletal:  Positive for arthritis, joint pain, myalgias and stiffness.   Gastrointestinal:  Negative for abdominal pain, jaundice, nausea and vomiting.   Genitourinary:  Negative for dysuria, frequency and hematuria.   Neurological:  Negative for difficulty with concentration, loss of balance, numbness, paresthesias and sensory change.   Psychiatric/Behavioral:  Negative for altered mental status, hallucinations and suicidal ideas. The patient is not nervous/anxious.    Allergic/Immunologic: Negative for environmental allergies and persistent  infections.         Objective:      Physical Exam  Constitutional:       Appearance: He is well-developed.   HENT:      Head: Normocephalic and atraumatic.   Cardiovascular:      Pulses:           Dorsalis pedis pulses are 2+ on the right side and 2+ on the left side.        Posterior tibial pulses are 2+ on the right side and 2+ on the left side.   Pulmonary:      Effort: Pulmonary effort is normal.   Musculoskeletal:      Right foot: Normal range of motion. No deformity.      Left foot: Normal range of motion. No deformity.      Comments: Inspection and palpation of the muscles joints and bones of both lower extremities reveal that muscle strength for the anterior, lateral, and posterior muscle groups and intrinsic muscle groups of the foot are all 5 over 5 symmetrical. Ankle, subtalar, midtarsal, and digital joint range of motion  are within normal limits, nonpainful, without crepitus or effusion.   Flexible pes planus foot type w/ medial arch collapse and mild gastroc equinus   Moderate tenderness with palpation to the peroneal brevis tendon, left.   Feet:      Right foot:      Skin integrity: No skin breakdown or erythema.      Left foot:      Skin integrity: No skin breakdown or erythema.   Skin:     General: Skin is warm and dry.      Nails: There is no clubbing.      Comments: Skin turgor is normal bilaterally. Skin texture is well hydrated to both lower extremities. No lesions or rashes or wounds appreciated bilaterally. Nail plates 1 through 5 bilaterally are within normal limits for length and thickness. No nail clubbing or incurvation noted.   Neurological:      Mental Status: He is alert and oriented to person, place, and time.      Deep Tendon Reflexes:      Reflex Scores:       Patellar reflexes are 2+ on the right side and 2+ on the left side.       Achilles reflexes are 2+ on the right side and 2+ on the left side.     Comments: Sharp, dull, light touch, vibratory, and proprioceptive sensation are  intact bilaterally. Deep tendon reflexes to patellar and Achilles tendon are symmetrical, 2/4 bilaterally. No ankle clonus or Babinski reflexes noted bilaterally. Coordination is normal to both feet and lower extremities.   Psychiatric:         Behavior: Behavior normal.             Assessment:       Encounter Diagnoses   Name Primary?    Pes planus of both feet Yes    Sinus tarsi syndrome, left     Peroneal tendinitis of lower leg, left          Plan:       Adan was seen today for ankle pain.    Diagnoses and all orders for this visit:    Pes planus of both feet    Sinus tarsi syndrome, left    Peroneal tendinitis of lower leg, left    Other orders  -     triamcinolone acetonide injection 40 mg    I counseled the patient on his conditions, their implications and medical management.    A steroid injection was performed at  Left peroneal tendon sheath using 1% plain Lidocaine and  1 mL/40 mg of Kenalog. This was well tolerated.    The nature of the condition, options for management, as well as potential risks and complications were discussed in detail with patient. Patient was amenable to my recommendations and left my office fully informed and will follow up as instructed or sooner if necessary.  Follow-up in 3 months.  .

## 2022-12-28 RX ORDER — ROSUVASTATIN CALCIUM 10 MG/1
10 TABLET, COATED ORAL DAILY
Qty: 90 TABLET | Refills: 0 | Status: SHIPPED | OUTPATIENT
Start: 2022-12-28 | End: 2023-06-07

## 2022-12-28 NOTE — TELEPHONE ENCOUNTER
Care Due:                  Date            Visit Type   Department     Provider  --------------------------------------------------------------------------------                                SAME DAY -                              ESTABLISHED   [facility]  Last Visit: 02-      PATIENT      CARE           Gina Reyes  Next Visit: None Scheduled  None         None Found                                                            Last  Test          Frequency    Reason                     Performed    Due Date  --------------------------------------------------------------------------------    Office Visit  12 months..  albuterol, montelukast,    02- 02-                             rosuvastatin.............    CMP.........  12 months..  rosuvastatin.............  Not Found    Overdue    Lipid Panel.  12 months..  rosuvastatin.............  Not Found    Overdue    [employer] Embedded Care Gaps. Reference number: [id number]. 12/28/2022   10:58:03 AM CST

## 2022-12-28 NOTE — TELEPHONE ENCOUNTER
Refill Routing Note   Medication(s) are not appropriate for processing by [facility] for the following reason(s):      - Required laboratory values are outdated    [facility] action(s):  Defer          Medication reconciliation completed: No     Appointments  past 12m or future 3m with PCP    Date Provider   Last Visit   2/22/2022 Gina Reyes MD   Next Visit   Visit date not found Gina Reyes MD   ED visits in past 90 days: 0        Note composed:11:25 AM 12/28/2022

## 2022-12-30 ENCOUNTER — TELEPHONE (OUTPATIENT)
Dept: PODIATRY | Facility: CLINIC | Age: 72
End: 2022-12-30
Payer: MEDICARE

## 2023-01-03 ENCOUNTER — TELEPHONE (OUTPATIENT)
Dept: PRIMARY CARE CLINIC | Facility: CLINIC | Age: 73
End: 2023-01-03
Payer: MEDICARE

## 2023-01-03 DIAGNOSIS — I10 HTN (HYPERTENSION), BENIGN: ICD-10-CM

## 2023-01-03 DIAGNOSIS — R79.89 ABNORMAL TSH: Primary | ICD-10-CM

## 2023-01-03 NOTE — TELEPHONE ENCOUNTER
----- Message from Lashonda Vickers sent at 1/3/2023  2:01 PM CST -----  Please put annual labs in for patient's annual physical on 4/20/23    Thank you

## 2023-01-03 NOTE — TELEPHONE ENCOUNTER
----- Message from Lashonda Vickers sent at 1/3/2023  1:53 PM CST -----  Consult     mirabegron (MYRBETRIQ) 50 mg Tb24, solifenacin (VESICARE) 10 MG tablet, or tamsulosin (FLOMAX) 0.4 mg Cap. He thinks it is the Vesicare.     He also wants something else for constipation because, the stool softner isn't working    Pharmacy:  [facility] [place] [facility] Phone:  [phone] Fax:  [phone]

## 2023-01-04 NOTE — TELEPHONE ENCOUNTER
Pt has refills at the pharmacy. He is requesting recommendations for constipation. OTC stool softener no longer effective.

## 2023-01-05 ENCOUNTER — TELEPHONE (OUTPATIENT)
Dept: PRIMARY CARE CLINIC | Facility: CLINIC | Age: 73
End: 2023-01-05
Payer: MEDICARE

## 2023-01-05 NOTE — TELEPHONE ENCOUNTER
----- Message from Namrata Joseph sent at 1/5/2023  2:23 PM CST -----  Contact: [phone] pt  Pt is having problems with constipation. He tried taking stool softener as well as Ex lax. Pt states he is feeling bloated and painful. Pt would like a call back on today to be advised. Pt is using   [facility] [place] [address]  [address] [facility] [address]  [place] [address]  Phone: [phone] Fax: [phone]. Per pt, he called on Tuesday and still has not gotten a call back. Please call as soon as possible.         Thank you

## 2023-01-27 ENCOUNTER — CLINICAL SUPPORT (OUTPATIENT)
Dept: CARDIOLOGY | Facility: HOSPITAL | Age: 73
End: 2023-01-27
Payer: MEDICARE

## 2023-01-27 DIAGNOSIS — Z95.0 PRESENCE OF CARDIAC PACEMAKER: ICD-10-CM

## 2023-01-27 PROCEDURE — 93296 REM INTERROG EVL PM/IDS: CPT | Mod: HCNC | Performed by: INTERNAL MEDICINE

## 2023-02-07 ENCOUNTER — PATIENT MESSAGE (OUTPATIENT)
Dept: PRIMARY CARE CLINIC | Facility: CLINIC | Age: 73
End: 2023-02-07
Payer: MEDICARE

## 2023-02-07 DIAGNOSIS — Z00.00 ENCOUNTER FOR MEDICARE ANNUAL WELLNESS EXAM: ICD-10-CM

## 2023-02-09 DIAGNOSIS — Z00.00 ENCOUNTER FOR MEDICARE ANNUAL WELLNESS EXAM: ICD-10-CM

## 2023-02-19 ENCOUNTER — OFFICE VISIT (OUTPATIENT)
Dept: URGENT CARE | Facility: CLINIC | Age: 73
End: 2023-02-19
Payer: MEDICARE

## 2023-02-19 VITALS
SYSTOLIC BLOOD PRESSURE: 146 MMHG | BODY MASS INDEX: 38.83 KG/M2 | DIASTOLIC BLOOD PRESSURE: 87 MMHG | OXYGEN SATURATION: 95 % | WEIGHT: 293 LBS | TEMPERATURE: 98 F | RESPIRATION RATE: 19 BRPM | HEART RATE: 74 BPM | HEIGHT: 73 IN

## 2023-02-19 DIAGNOSIS — R05.9 COUGH, UNSPECIFIED TYPE: ICD-10-CM

## 2023-02-19 DIAGNOSIS — J00 NASOPHARYNGITIS: Primary | ICD-10-CM

## 2023-02-19 LAB
CTP QC/QA: YES
CTP QC/QA: YES
POC MOLECULAR INFLUENZA A AGN: NEGATIVE
POC MOLECULAR INFLUENZA B AGN: NEGATIVE
SARS-COV-2 AG RESP QL IA.RAPID: NEGATIVE

## 2023-02-19 PROCEDURE — 3077F SYST BP >= 140 MM HG: CPT | Mod: CPTII,S$GLB,, | Performed by: NURSE PRACTITIONER

## 2023-02-19 PROCEDURE — 99213 PR OFFICE/OUTPT VISIT, EST, LEVL III, 20-29 MIN: ICD-10-PCS | Mod: S$GLB,,, | Performed by: NURSE PRACTITIONER

## 2023-02-19 PROCEDURE — 87811 SARS-COV-2 COVID19 W/OPTIC: CPT | Mod: QW,S$GLB,, | Performed by: NURSE PRACTITIONER

## 2023-02-19 PROCEDURE — 1157F PR ADVANCE CARE PLAN OR EQUIV PRESENT IN MEDICAL RECORD: ICD-10-PCS | Mod: CPTII,S$GLB,, | Performed by: NURSE PRACTITIONER

## 2023-02-19 PROCEDURE — 1157F ADVNC CARE PLAN IN RCRD: CPT | Mod: CPTII,S$GLB,, | Performed by: NURSE PRACTITIONER

## 2023-02-19 PROCEDURE — 3079F PR MOST RECENT DIASTOLIC BLOOD PRESSURE 80-89 MM HG: ICD-10-PCS | Mod: CPTII,S$GLB,, | Performed by: NURSE PRACTITIONER

## 2023-02-19 PROCEDURE — 1160F RVW MEDS BY RX/DR IN RCRD: CPT | Mod: CPTII,S$GLB,, | Performed by: NURSE PRACTITIONER

## 2023-02-19 PROCEDURE — 1159F MED LIST DOCD IN RCRD: CPT | Mod: CPTII,S$GLB,, | Performed by: NURSE PRACTITIONER

## 2023-02-19 PROCEDURE — 3077F PR MOST RECENT SYSTOLIC BLOOD PRESSURE >= 140 MM HG: ICD-10-PCS | Mod: CPTII,S$GLB,, | Performed by: NURSE PRACTITIONER

## 2023-02-19 PROCEDURE — 87811 SARS CORONAVIRUS 2 ANTIGEN POCT, MANUAL READ: ICD-10-PCS | Mod: QW,S$GLB,, | Performed by: NURSE PRACTITIONER

## 2023-02-19 PROCEDURE — 1159F PR MEDICATION LIST DOCUMENTED IN MEDICAL RECORD: ICD-10-PCS | Mod: CPTII,S$GLB,, | Performed by: NURSE PRACTITIONER

## 2023-02-19 PROCEDURE — 3008F PR BODY MASS INDEX (BMI) DOCUMENTED: ICD-10-PCS | Mod: CPTII,S$GLB,, | Performed by: NURSE PRACTITIONER

## 2023-02-19 PROCEDURE — 3079F DIAST BP 80-89 MM HG: CPT | Mod: CPTII,S$GLB,, | Performed by: NURSE PRACTITIONER

## 2023-02-19 PROCEDURE — 3008F BODY MASS INDEX DOCD: CPT | Mod: CPTII,S$GLB,, | Performed by: NURSE PRACTITIONER

## 2023-02-19 PROCEDURE — 87502 INFLUENZA DNA AMP PROBE: CPT | Mod: QW,S$GLB,, | Performed by: NURSE PRACTITIONER

## 2023-02-19 PROCEDURE — 99213 OFFICE O/P EST LOW 20 MIN: CPT | Mod: S$GLB,,, | Performed by: NURSE PRACTITIONER

## 2023-02-19 PROCEDURE — 87502 POCT INFLUENZA A/B MOLECULAR: ICD-10-PCS | Mod: QW,S$GLB,, | Performed by: NURSE PRACTITIONER

## 2023-02-19 PROCEDURE — 1160F PR REVIEW ALL MEDS BY PRESCRIBER/CLIN PHARMACIST DOCUMENTED: ICD-10-PCS | Mod: CPTII,S$GLB,, | Performed by: NURSE PRACTITIONER

## 2023-02-19 NOTE — PATIENT INSTRUCTIONS
- Rest.    - Drink plenty of fluids.  - Viral upper respiratory infections typically run their course in 10-14 days.      - Tylenol or Ibuprofen as directed as needed for fever/pain. Avoid tylenol if you have a history of liver disease. Do not take ibuprofen if you have a history of GI bleeding, kidney disease, or if you take blood thinners.       - You can take over-the-counter claritin, zyrtec, allegra, or xyzal as directed. These are antihistamines that can help with runny nose, nasal congestion, sneezing, and helps to dry up post-nasal drip, which usually causes sore throat and cough.   - If you do NOT have high blood pressure, you may use a decongestant form (D)  of this medication (ie. Claritin- D, zyrtec-D, allegra-D) or if you do not take the D form, you can take sudafed (pseudoephedrine) over the counter, which is a decongestant. Do NOT take two decongestant (D) medications at the same time (such as mucinex-D and claritin-D or plain sudafed and claritin D)     - You can use Flonase (fluticasone) nasal spray as directed for sinus congestion and postnasal drip. This is a steroid nasal spray that works locally over time to decrease the inflammation in your nose/sinuses and help with allergic symptoms. This is not an quick- relief spray like afrin, but it works well if used daily.  Discontinue if you develop nose bleed  - use nasal saline prior to Flonase.  - Use Ocean Spray Nasal Saline 1-3 puffs each nostril every 2-3 hours then blow out onto tissue. This is to irrigate the nasal passage way to clear the sinus openings. Use until sinus problem resolved.     - you can take plain Mucinex (guaifenesin) 1200 mg twice a day to help loosen mucous.      -warm salt water gargles can help with sore throat     - warm tea with honey can help with cough. Honey is a natural cough suppressant.     - Dextromethorphan (DM) is a cough suppressant over the counter (ie. mucinex DM, robitussin, delsym; dayquil/nyquil has DM as  well.)     - Follow up with your PCP or specialty clinic as directed in the next 1-2 weeks if not improved or as needed.  You can call [phone] to schedule an appointment with the appropriate provider.       - Go to the ER if you develop new or worsening symptoms.

## 2023-02-19 NOTE — PROGRESS NOTES
"Subjective:       Patient ID: Adan Webb is a 72 y.o. male.    Vitals:  height is 6' 1" (1.854 m) and weight is 132.9 kg (293 lb). His temperature is 98.4 °F (36.9 °C). His blood pressure is 146/87 (abnormal) and his pulse is 74. His respiration is 19 and oxygen saturation is 95%.     Chief Complaint: Cough and Sinus Problem    73 yo male presents with cough, sore throat, and congestion for 4 days. Taking Mucinex with little change in symptoms.     Cough  This is a new problem. Episode onset: 4 days ago. The problem has been gradually worsening. The cough is Productive of sputum. Associated symptoms include nasal congestion, postnasal drip and a sore throat. Pertinent negatives include no chills, ear pain, fever, headaches, shortness of breath or wheezing. Nothing aggravates the symptoms. He has tried OTC cough suppressant for the symptoms. The treatment provided mild relief. There is no history of asthma, bronchitis or COPD.     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, didn't use ear protection    Dermatitis 12/9/2020    nystat groin    DJD (degenerative joint disease) of hip 1/29/2015    Enlarged liver 4/8/2021    US 4/21     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    MCA , see MRI    Hiatal hernia 4/1/2021    CT chest tiny 2018    HTN (hypertension), benign 8/20/2020    Morbid obesity with BMI of 40.0-44.9, adult 1/29/2015 "    OAB (overactive bladder) 4/1/2021    Rash with pads    Right-sided low back pain with right-sided sciatica 3/6/2019    Seasonal allergic rhinitis due to pollen 4/13/2016    Thoracic aortic aneurysm without rupture 05/27/2018    tx with Su, Cardiologist Dr Stone, 5.1 CTS Dr Duenas, follows yearly    Transient cerebral ischemia 6/6/2018     Past Surgical History:   Procedure Laterality Date    A-V CARDIAC PACEMAKER INSERTION N/A 8/5/2021    Procedure: INSERTION, CARDIAC PACEMAKER, DUAL CHAMBER;  Surgeon: Sheldon Miranda MD;  Location: [facility] EP LAB;  Service: Cardiology;  Laterality: N/A;  Near syncope,SB,Sinus Pause, RBBB,LAFB, Dual PPM, BIO, MAC, VT, 3 Prep    CATARACT EXTRACTION      CYSTOSCOPY WITH URODYNAMIC TESTING N/A 10/25/2021    Procedure: CYSTOSCOPY, WITH URODYNAMIC TESTING FLOUROSCOPIC;  Surgeon: Sancho Wesley MD;  Location: [facility] OR [address] [place] [address];  Service: Urology;  Laterality: N/A;  1hr    HERNIA REPAIR      YAG LAser Capsulotomy Bilateral     2/18/2019 OS Dr. Cornelius     Constitution: Negative for chills, fatigue and fever.   HENT:  Positive for congestion, postnasal drip and sore throat. Negative for ear pain, sinus pain and sinus pressure.    Respiratory:  Positive for cough and sputum production. Negative for shortness of breath and wheezing.    Neurological:  Negative for headaches.     Objective:      Physical Exam   Constitutional: No distress.   HENT:   Ears:   Right Ear: Hearing, tympanic membrane, external ear and ear canal normal.   Left Ear: Hearing, tympanic membrane, external ear and ear canal normal.   Nose: Mucosal edema and rhinorrhea present. Right sinus exhibits no maxillary sinus tenderness and no frontal sinus tenderness. Left sinus exhibits no maxillary sinus tenderness and no frontal sinus tenderness.   Mouth/Throat: Uvula is midline, oropharynx is clear and moist and mucous membranes are normal. No oropharyngeal exudate.   Cardiovascular: Normal rate, regular rhythm,  normal heart sounds and normal pulses.   Pulmonary/Chest: Effort normal and breath sounds normal. No respiratory distress.   Abdominal: Bowel sounds are normal. Soft. There is no abdominal tenderness.   Lymphadenopathy:     He has no cervical adenopathy.   Neurological: He is alert.   Skin: Skin is warm and dry. Capillary refill takes less than 2 seconds.   Vitals reviewed.      Results for orders placed or performed in visit on 02/19/23   SARS Coronavirus 2 Antigen, POCT Manual Read   Result Value Ref Range    SARS Coronavirus 2 Antigen Negative Negative     Acceptable Yes    POCT Influenza A/B MOLECULAR   Result Value Ref Range    POC Molecular Influenza A Ag Negative Negative, Not Reported    POC Molecular Influenza B Ag Negative Negative, Not Reported     Acceptable Yes        Assessment:       1. Nasopharyngitis    2. Cough, unspecified type          Plan:         Nasopharyngitis    Cough, unspecified type  -     SARS Coronavirus 2 Antigen, POCT Manual Read  -     POCT Influenza A/B MOLECULAR    - Rest.    - Drink plenty of fluids.  - Viral upper respiratory infections typically run their course in 10-14 days.      - Tylenol or Ibuprofen as directed as needed for fever/pain. Avoid tylenol if you have a history of liver disease. Do not take ibuprofen if you have a history of GI bleeding, kidney disease, or if you take blood thinners.       - You can take over-the-counter claritin, zyrtec, allegra, or xyzal as directed. These are antihistamines that can help with runny nose, nasal congestion, sneezing, and helps to dry up post-nasal drip, which usually causes sore throat and cough.   - If you do NOT have high blood pressure, you may use a decongestant form (D)  of this medication (ie. Claritin- D, zyrtec-D, allegra-D) or if you do not take the D form, you can take sudafed (pseudoephedrine) over the counter, which is a decongestant. Do NOT take two decongestant (D) medications at the  same time (such as mucinex-D and claritin-D or plain sudafed and claritin D)     - You can use Flonase (fluticasone) nasal spray as directed for sinus congestion and postnasal drip. This is a steroid nasal spray that works locally over time to decrease the inflammation in your nose/sinuses and help with allergic symptoms. This is not an quick- relief spray like afrin, but it works well if used daily.  Discontinue if you develop nose bleed  - use nasal saline prior to Flonase.  - Use Ocean Spray Nasal Saline 1-3 puffs each nostril every 2-3 hours then blow out onto tissue. This is to irrigate the nasal passage way to clear the sinus openings. Use until sinus problem resolved.     - you can take plain Mucinex (guaifenesin) 1200 mg twice a day to help loosen mucous.      -warm salt water gargles can help with sore throat     - warm tea with honey can help with cough. Honey is a natural cough suppressant.     - Dextromethorphan (DM) is a cough suppressant over the counter (ie. mucinex DM, robitussin, delsym; dayquil/nyquil has DM as well.)     - Follow up with your PCP or specialty clinic as directed in the next 1-2 weeks if not improved or as needed.  You can call [phone] to schedule an appointment with the appropriate provider.       - Go to the ER if you develop new or worsening symptoms.

## 2023-03-02 ENCOUNTER — LAB VISIT (OUTPATIENT)
Dept: LAB | Facility: HOSPITAL | Age: 73
End: 2023-03-02
Attending: FAMILY MEDICINE
Payer: MEDICARE

## 2023-03-02 DIAGNOSIS — I10 HTN (HYPERTENSION), BENIGN: ICD-10-CM

## 2023-03-02 LAB
ALBUMIN SERPL BCP-MCNC: 3.7 G/DL (ref 3.5–5.2)
ALP SERPL-CCNC: 88 U/L (ref 55–135)
ALT SERPL W/O P-5'-P-CCNC: 25 U/L (ref 10–44)
ANION GAP SERPL CALC-SCNC: 7 MMOL/L (ref 8–16)
AST SERPL-CCNC: 23 U/L (ref 10–40)
BASOPHILS # BLD AUTO: 0.06 K/UL (ref 0–0.2)
BASOPHILS NFR BLD: 0.8 % (ref 0–1.9)
BILIRUB SERPL-MCNC: 0.7 MG/DL (ref 0.1–1)
BUN SERPL-MCNC: 20 MG/DL (ref 8–23)
CALCIUM SERPL-MCNC: 9.7 MG/DL (ref 8.7–10.5)
CHLORIDE SERPL-SCNC: 105 MMOL/L (ref 95–110)
CHOLEST SERPL-MCNC: 124 MG/DL (ref 120–199)
CHOLEST/HDLC SERPL: 3 {RATIO} (ref 2–5)
CO2 SERPL-SCNC: 28 MMOL/L (ref 23–29)
CREAT SERPL-MCNC: 0.8 MG/DL (ref 0.5–1.4)
DIFFERENTIAL METHOD: ABNORMAL
EOSINOPHIL # BLD AUTO: 0.3 K/UL (ref 0–0.5)
EOSINOPHIL NFR BLD: 4.3 % (ref 0–8)
ERYTHROCYTE [DISTWIDTH] IN BLOOD BY AUTOMATED COUNT: 14.1 % (ref 11.5–14.5)
EST. GFR  (NO RACE VARIABLE): >60 ML/MIN/1.73 M^2
GLUCOSE SERPL-MCNC: 89 MG/DL (ref 70–110)
HCT VFR BLD AUTO: 41.1 % (ref 40–54)
HDLC SERPL-MCNC: 41 MG/DL (ref 40–75)
HDLC SERPL: 33.1 % (ref 20–50)
HGB BLD-MCNC: 12.9 G/DL (ref 14–18)
IMM GRANULOCYTES # BLD AUTO: 0.02 K/UL (ref 0–0.04)
IMM GRANULOCYTES NFR BLD AUTO: 0.3 % (ref 0–0.5)
LDLC SERPL CALC-MCNC: 70.2 MG/DL (ref 63–159)
LYMPHOCYTES # BLD AUTO: 2.2 K/UL (ref 1–4.8)
LYMPHOCYTES NFR BLD: 28.2 % (ref 18–48)
MCH RBC QN AUTO: 31.1 PG (ref 27–31)
MCHC RBC AUTO-ENTMCNC: 31.4 G/DL (ref 32–36)
MCV RBC AUTO: 99 FL (ref 82–98)
MONOCYTES # BLD AUTO: 1 K/UL (ref 0.3–1)
MONOCYTES NFR BLD: 12.4 % (ref 4–15)
NEUTROPHILS # BLD AUTO: 4.3 K/UL (ref 1.8–7.7)
NEUTROPHILS NFR BLD: 54 % (ref 38–73)
NONHDLC SERPL-MCNC: 83 MG/DL
NRBC BLD-RTO: 0 /100 WBC
PLATELET # BLD AUTO: 256 K/UL (ref 150–450)
PMV BLD AUTO: 11.1 FL (ref 9.2–12.9)
POTASSIUM SERPL-SCNC: 4.5 MMOL/L (ref 3.5–5.1)
PROT SERPL-MCNC: 7.6 G/DL (ref 6–8.4)
RBC # BLD AUTO: 4.15 M/UL (ref 4.6–6.2)
SODIUM SERPL-SCNC: 140 MMOL/L (ref 136–145)
TRIGL SERPL-MCNC: 64 MG/DL (ref 30–150)
WBC # BLD AUTO: 7.85 K/UL (ref 3.9–12.7)

## 2023-03-02 PROCEDURE — 80053 COMPREHEN METABOLIC PANEL: CPT | Mod: HCNC | Performed by: FAMILY MEDICINE

## 2023-03-02 PROCEDURE — 85025 COMPLETE CBC W/AUTO DIFF WBC: CPT | Mod: HCNC | Performed by: FAMILY MEDICINE

## 2023-03-02 PROCEDURE — 80061 LIPID PANEL: CPT | Mod: HCNC | Performed by: FAMILY MEDICINE

## 2023-03-02 PROCEDURE — 36415 COLL VENOUS BLD VENIPUNCTURE: CPT | Performed by: FAMILY MEDICINE

## 2023-03-09 ENCOUNTER — TELEPHONE (OUTPATIENT)
Dept: CARDIOTHORACIC SURGERY | Facility: CLINIC | Age: 73
End: 2023-03-09
Payer: MEDICARE

## 2023-03-09 ENCOUNTER — OFFICE VISIT (OUTPATIENT)
Dept: PRIMARY CARE CLINIC | Facility: CLINIC | Age: 73
End: 2023-03-09
Payer: MEDICARE

## 2023-03-09 ENCOUNTER — TELEPHONE (OUTPATIENT)
Dept: OTOLARYNGOLOGY | Facility: CLINIC | Age: 73
End: 2023-03-09
Payer: MEDICARE

## 2023-03-09 VITALS
HEIGHT: 73 IN | OXYGEN SATURATION: 97 % | HEART RATE: 74 BPM | SYSTOLIC BLOOD PRESSURE: 139 MMHG | DIASTOLIC BLOOD PRESSURE: 70 MMHG | BODY MASS INDEX: 39.62 KG/M2 | WEIGHT: 298.94 LBS | TEMPERATURE: 99 F

## 2023-03-09 DIAGNOSIS — E66.01 MORBID OBESITY WITH BMI OF 40.0-44.9, ADULT: ICD-10-CM

## 2023-03-09 DIAGNOSIS — Z00.00 ROUTINE GENERAL MEDICAL EXAMINATION AT A HEALTH CARE FACILITY: Primary | ICD-10-CM

## 2023-03-09 DIAGNOSIS — J84.10 CALCIFIED GRANULOMA OF LUNG: ICD-10-CM

## 2023-03-09 DIAGNOSIS — I71.20 THORACIC AORTIC ANEURYSM WITHOUT RUPTURE, UNSPECIFIED PART: ICD-10-CM

## 2023-03-09 DIAGNOSIS — E66.01 SEVERE OBESITY (BMI 35.0-39.9) WITH COMORBIDITY: ICD-10-CM

## 2023-03-09 DIAGNOSIS — I45.2 BIFASCICULAR BLOCK: ICD-10-CM

## 2023-03-09 DIAGNOSIS — I63.412 CEREBRAL INFARCTION DUE TO EMBOLISM OF LEFT MIDDLE CEREBRAL ARTERY: ICD-10-CM

## 2023-03-09 DIAGNOSIS — Z95.0 PRESENCE OF CARDIAC PACEMAKER: ICD-10-CM

## 2023-03-09 DIAGNOSIS — J32.0 CHRONIC MAXILLARY SINUSITIS: ICD-10-CM

## 2023-03-09 DIAGNOSIS — I71.21 ANEURYSM OF ASCENDING AORTA WITHOUT RUPTURE: Primary | ICD-10-CM

## 2023-03-09 DIAGNOSIS — I70.0 AORTIC ATHEROSCLEROSIS: ICD-10-CM

## 2023-03-09 DIAGNOSIS — I10 HTN (HYPERTENSION), BENIGN: ICD-10-CM

## 2023-03-09 DIAGNOSIS — J34.2 DEVIATED SEPTUM: ICD-10-CM

## 2023-03-09 PROBLEM — J98.4 CALCIFIED GRANULOMA OF LUNG: Status: ACTIVE | Noted: 2023-03-09

## 2023-03-09 PROCEDURE — 3075F PR MOST RECENT SYSTOLIC BLOOD PRESS GE 130-139MM HG: ICD-10-PCS | Mod: HCNC,CPTII,S$GLB, | Performed by: FAMILY MEDICINE

## 2023-03-09 PROCEDURE — 99999 PR PBB SHADOW E&M-EST. PATIENT-LVL V: ICD-10-PCS | Mod: PBBFAC,HCNC,, | Performed by: FAMILY MEDICINE

## 2023-03-09 PROCEDURE — 3078F DIAST BP <80 MM HG: CPT | Mod: HCNC,CPTII,S$GLB, | Performed by: FAMILY MEDICINE

## 2023-03-09 PROCEDURE — 1125F PR PAIN SEVERITY QUANTIFIED, PAIN PRESENT: ICD-10-PCS | Mod: HCNC,CPTII,S$GLB, | Performed by: FAMILY MEDICINE

## 2023-03-09 PROCEDURE — 1101F PR PT FALLS ASSESS DOC 0-1 FALLS W/OUT INJ PAST YR: ICD-10-PCS | Mod: HCNC,CPTII,S$GLB, | Performed by: FAMILY MEDICINE

## 2023-03-09 PROCEDURE — 1157F PR ADVANCE CARE PLAN OR EQUIV PRESENT IN MEDICAL RECORD: ICD-10-PCS | Mod: HCNC,CPTII,S$GLB, | Performed by: FAMILY MEDICINE

## 2023-03-09 PROCEDURE — 99397 PR PREVENTIVE VISIT,EST,65 & OVER: ICD-10-PCS | Mod: HCNC,S$GLB,, | Performed by: FAMILY MEDICINE

## 2023-03-09 PROCEDURE — 1125F AMNT PAIN NOTED PAIN PRSNT: CPT | Mod: HCNC,CPTII,S$GLB, | Performed by: FAMILY MEDICINE

## 2023-03-09 PROCEDURE — 99999 PR PBB SHADOW E&M-EST. PATIENT-LVL V: CPT | Mod: PBBFAC,HCNC,, | Performed by: FAMILY MEDICINE

## 2023-03-09 PROCEDURE — 3288F PR FALLS RISK ASSESSMENT DOCUMENTED: ICD-10-PCS | Mod: HCNC,CPTII,S$GLB, | Performed by: FAMILY MEDICINE

## 2023-03-09 PROCEDURE — 1101F PT FALLS ASSESS-DOCD LE1/YR: CPT | Mod: HCNC,CPTII,S$GLB, | Performed by: FAMILY MEDICINE

## 2023-03-09 PROCEDURE — 3075F SYST BP GE 130 - 139MM HG: CPT | Mod: HCNC,CPTII,S$GLB, | Performed by: FAMILY MEDICINE

## 2023-03-09 PROCEDURE — 3078F PR MOST RECENT DIASTOLIC BLOOD PRESSURE < 80 MM HG: ICD-10-PCS | Mod: HCNC,CPTII,S$GLB, | Performed by: FAMILY MEDICINE

## 2023-03-09 PROCEDURE — 99397 PER PM REEVAL EST PAT 65+ YR: CPT | Mod: HCNC,S$GLB,, | Performed by: FAMILY MEDICINE

## 2023-03-09 PROCEDURE — 3288F FALL RISK ASSESSMENT DOCD: CPT | Mod: HCNC,CPTII,S$GLB, | Performed by: FAMILY MEDICINE

## 2023-03-09 PROCEDURE — 1157F ADVNC CARE PLAN IN RCRD: CPT | Mod: HCNC,CPTII,S$GLB, | Performed by: FAMILY MEDICINE

## 2023-03-09 RX ORDER — AZELASTINE 1 MG/ML
1 SPRAY, METERED NASAL 2 TIMES DAILY
Qty: 30 ML | Refills: 12 | Status: SHIPPED | OUTPATIENT
Start: 2023-03-09 | End: 2024-03-20

## 2023-03-09 NOTE — TELEPHONE ENCOUNTER
Referral received by Dr. Duenas for TAA, which Dr. Duenas previously evaluated pt for.  Per Dr. Duenas, CT chest and TTE ordered to be performed at the time of pt's appt with Dr. Duenas.  Called pt and scheduled testing and appt to f/u with Dr. Duenas on 3/20.  Pt provided with appt times and locations, which he verbalized understanding to.  Appt slips for 3/20 mailed to pt.

## 2023-03-14 ENCOUNTER — OFFICE VISIT (OUTPATIENT)
Dept: OTOLARYNGOLOGY | Facility: CLINIC | Age: 73
End: 2023-03-14
Payer: MEDICARE

## 2023-03-14 VITALS — WEIGHT: 298.5 LBS | BODY MASS INDEX: 39.38 KG/M2

## 2023-03-14 DIAGNOSIS — K21.9 LARYNGOPHARYNGEAL REFLUX (LPR): Primary | ICD-10-CM

## 2023-03-14 DIAGNOSIS — J30.2 PERENNIAL ALLERGIC RHINITIS WITH SEASONAL VARIATION: ICD-10-CM

## 2023-03-14 DIAGNOSIS — R04.0 EPISTAXIS: ICD-10-CM

## 2023-03-14 DIAGNOSIS — J30.89 PERENNIAL ALLERGIC RHINITIS WITH SEASONAL VARIATION: ICD-10-CM

## 2023-03-14 DIAGNOSIS — J34.3 HYPERTROPHY OF INFERIOR NASAL TURBINATE: ICD-10-CM

## 2023-03-14 DIAGNOSIS — J34.89 NASAL VESTIBULITIS: ICD-10-CM

## 2023-03-14 DIAGNOSIS — J34.2 DEVIATED NASAL SEPTUM: ICD-10-CM

## 2023-03-14 PROCEDURE — 1159F MED LIST DOCD IN RCRD: CPT | Mod: CPTII,S$GLB,, | Performed by: PHYSICIAN ASSISTANT

## 2023-03-14 PROCEDURE — 1157F PR ADVANCE CARE PLAN OR EQUIV PRESENT IN MEDICAL RECORD: ICD-10-PCS | Mod: CPTII,S$GLB,, | Performed by: PHYSICIAN ASSISTANT

## 2023-03-14 PROCEDURE — 31575 DIAGNOSTIC LARYNGOSCOPY: CPT | Mod: S$GLB,,, | Performed by: PHYSICIAN ASSISTANT

## 2023-03-14 PROCEDURE — 99999 PR PBB SHADOW E&M-EST. PATIENT-LVL V: ICD-10-PCS | Mod: PBBFAC,,, | Performed by: PHYSICIAN ASSISTANT

## 2023-03-14 PROCEDURE — 3288F FALL RISK ASSESSMENT DOCD: CPT | Mod: CPTII,S$GLB,, | Performed by: PHYSICIAN ASSISTANT

## 2023-03-14 PROCEDURE — 99213 OFFICE O/P EST LOW 20 MIN: CPT | Mod: 25,S$GLB,, | Performed by: PHYSICIAN ASSISTANT

## 2023-03-14 PROCEDURE — 1101F PR PT FALLS ASSESS DOC 0-1 FALLS W/OUT INJ PAST YR: ICD-10-PCS | Mod: CPTII,S$GLB,, | Performed by: PHYSICIAN ASSISTANT

## 2023-03-14 PROCEDURE — 1101F PT FALLS ASSESS-DOCD LE1/YR: CPT | Mod: CPTII,S$GLB,, | Performed by: PHYSICIAN ASSISTANT

## 2023-03-14 PROCEDURE — 99999 PR PBB SHADOW E&M-EST. PATIENT-LVL V: CPT | Mod: PBBFAC,,, | Performed by: PHYSICIAN ASSISTANT

## 2023-03-14 PROCEDURE — 3288F PR FALLS RISK ASSESSMENT DOCUMENTED: ICD-10-PCS | Mod: CPTII,S$GLB,, | Performed by: PHYSICIAN ASSISTANT

## 2023-03-14 PROCEDURE — 31575 LARYNGOSCOPY: ICD-10-PCS | Mod: S$GLB,,, | Performed by: PHYSICIAN ASSISTANT

## 2023-03-14 PROCEDURE — 99213 PR OFFICE/OUTPT VISIT, EST, LEVL III, 20-29 MIN: ICD-10-PCS | Mod: 25,S$GLB,, | Performed by: PHYSICIAN ASSISTANT

## 2023-03-14 PROCEDURE — 1157F ADVNC CARE PLAN IN RCRD: CPT | Mod: CPTII,S$GLB,, | Performed by: PHYSICIAN ASSISTANT

## 2023-03-14 PROCEDURE — 1159F PR MEDICATION LIST DOCUMENTED IN MEDICAL RECORD: ICD-10-PCS | Mod: CPTII,S$GLB,, | Performed by: PHYSICIAN ASSISTANT

## 2023-03-14 RX ORDER — OMEPRAZOLE 40 MG/1
40 CAPSULE, DELAYED RELEASE ORAL EVERY MORNING
Qty: 90 CAPSULE | Refills: 2 | Status: SHIPPED | OUTPATIENT
Start: 2023-03-14 | End: 2023-08-14

## 2023-03-14 NOTE — PATIENT INSTRUCTIONS
Keep nostril moisturized by using Vaseline or Aquaphor ointment in the front part of your nostril.  Use a Qtip as I showed you in the office.     Please contact central scheduling at [phone] or [phone] to schedule your appointment if you do not hear from Allergy within the next week.      NeilMed Sinus rinse   Try purchasing this nasal rinse below.  Its over the counter and can use several times daily without any side effects, but please use at least 1-2 times daily.  Use it before applying your nasal spray medications.  This spray can help wash away mucus and crusting and allow for better absorption of the medications.  Please use the nasal saline spray about 15 minutes before applying your medicated nasal sprays. This bottle uses distilled water (NOT tap water).  The Instructions in the box are detailed so please read them before using.       Things you may be able to do to prevent reflux.  1. Do not smoke.  Smoking will worsen reflux.    2. Avoid eating at least 2-3 hours prior to bedtime.  Try to avoid very large meals at night.    4. Weight loss.  For patient's with recent weight gain, shedding a few pounds is all that is required to improve reflux.    5. Avoid reflux triggers. These can worsen acid in the stomach or even cause the esophagus muscles to relax: caffeine, soft drinks, acidic foods (tomato, lots of fruit) mints, alcoholic beverages, particularly at night, cheese, fried foods, spicy foods, eggs, and chocolate.    6. Sleep with the head of bed elevated at least 6 inches.    Treatments for LPR include: postural changes (sleeping or laying with an incline), weight reduction, diet modification, medication to reduce stomach acid and promote normal motility, and rarely: surgery to prevent reflux. Most patients will begin to notice some relief in their symptoms about 2-4 weeks after starting the medication; however it is generally recommended the medication should be continued for at least 2  "months. If the symptoms completely resolve, the medication can then be tapered.  Some people will remain symptom free while others may have relapses which required treatment again.      LARYNGOPHARYNGEAL REFLUX  (SILENT OR ATYPICAL REFLUX)      Do you have to clear your throat or cough often? Are you hoarse? Do you have trouble swallowing? If you have these or other throat symptoms, you may have acid reflux. This occurs when stomach acid flows back up and irritates your throat.    Why you have throat symptoms  There are muscles (esophageal sphincters) at both ends of the tube that carries food to your stomach (the esophagus). These muscles relax to let food pass. Then they tighten to keep stomach acid down. When the lower esophageal sphincter (LES) doesnt tighten enough, acid can flow back (reflux) from your stomach into your esophagus. This may cause heartburn. In some cases the upper esophageal sphincter (UES) also doesnt work well. Then acid can travel higher and enter your throat (pharynx). In many cases, this causes throat symptoms.  Common throat symptoms  Need to clear your throat often  Feeling like youre choking  Long-term (chronic) cough  Hoarseness  Trouble swallowing  Feel like you have a lump in your throat  Sour or acid taste  Sore throat that keeps coming back     If you have any of the following symptoms you may have laryngopharyngeal reflux (LPR):  hoarseness, thick or too much mucus, chronic throat pain/irritation, chronic throat clearing, chronic cough, especially cough that wake you up from sleep, chronic "postnasal drip" without the need to blow your nose.     Many people with LPR do not have symptoms of heartburn. Compared to the esophagus, the voice box and the back of the throat are significantly more sensitive to the effects of acid and digestive enzymes on surrounding tissue. Acid passing quickly through the esophagus does not have a chance to irritate the area for too long.  However " "acid that pools in the throat or voice box can cause prolonged irritation resulting in the symptoms of LPR.    The symptoms of LPR can consist of a dry cough and the sensation of something being stuck in the throat.  Some people will complain of heartburn while others may have intermittent hoarseness or loss of voice.  Another major symptom of LPR is "postnasal drip."  Patients are often told symptoms are due to abnormal nasal drainage or sinus infection; however this is rarely the cause of chronic throat irritation. For post nasal drip to cause the complaints described, signs and symptoms of an active nasal infection should be present.    "

## 2023-03-14 NOTE — PROCEDURES
"Laryngoscopy    Date/Time: 3/14/2023 10:00 AM  Performed by: Haroon Musa PA-C  Authorized by: Haroon Musa PA-C     Time out: Immediately prior to procedure a "time out" was called to verify the correct patient, procedure, equipment, support staff and site/side marked as required.    Consent Done?:  Yes (Verbal)  Anesthesia:     Local anesthetic:  Lidocaine 4% and Reginaldo-Synephrine 1/2%    Patient tolerance:  Patient tolerated the procedure well with no immediate complications  Laryngoscopy:     Areas examined:  Nasal cavities, nasopharynx, oropharynx, hypopharynx, larynx and vocal cords    Laryngoscope size:  4 mm  Nose Intranasal:      Mucosa no polyps     Mucosa ulcers not present     No mucosa lesions present     Septum gross deformity (L septal deviation)     Enlarged turbinates  Nasopharynx:      No mucosa lesions     Adenoids not present     Posterior choanae patent     Eustachian tube patent  Larynx/hypopharynx:      No epiglottis lesions     No epiglottis edema     No AE folds lesions     No vocal cord polyps     Equal and normal bilateral     No hypopharynx lesions     No piriform sinus pooling     No piriform sinus lesions     No post cricoid edema     No post cricoid erythema     Erythema and edema to arytenoids  R nasal epistaxis, controlled  "

## 2023-03-14 NOTE — PROGRESS NOTES
Subjective:     HPI: Adan Webb is a 73 y.o. male who was referred to me by Dr. Gina Reyes in consultation for sinusitis.      Patient reports chronic postnasal drip for 50+ years with associated globus sensation, throat clearing, and nasal congestion.  Patient reports chronic nasal obstruction (L>R) that has been improved in the past with flonase.  Throat clearing is worse with lying flat and is affecting sleep.  Patient does report a history of frontal headaches but denies any hyposmia or facial pressure recently.  Patient has tried ipratropium with minimal relief and Flonase sometimes with moderate relief.    Current sinonasal medications include flonase 2 SEN BID and antihistamine daily.  The last course of antibiotics was a long time ago.    He does not regularly use nasal decongestant sprays (afrin/oxymetazoline/phenylephrine).  He recalls previously having allergy testing, which was reportedly positive for dustmites, mold, grass, ragweed, goats.  He had allergy shots as a young adult and had some relief with this.   He denies a history of asthma.  He relates a history of reflux symptoms which is managed with over-the-counter medication as needed.    He denies a diagnosis of obstructive sleep apnea.   He has not had sinonasal surgery.    He does not recall a prior history of nasal trauma.  He has had a tonsillectomy.  He is not a tobacco smoker.   He has NOT had S. pneumo titers checked.      Past Medical/Past Surgical History  Past Medical History:   Diagnosis Date    Abnormal TSH 12/9/2020    Allergic rhinitis 4/13/2016    Belching 1/22/2019    Benign prostatic hyperplasia with nocturia 8/18/2014    BPH (benign prostatic hyperplasia)     Urology Dr Zamora, OTC prostate revive helps, avodart caused chest pains    Calculus of gallbladder     US 2016    Calculus of gallbladder without cholecystitis without obstruction 4/13/2016    CT chest 2018    Cataract     Chronic pain of both ankles 12/9/2020     Podiatrist Dr Sesay    Conductive hearing loss of right ear with restricted hearing of left ear 1/22/2019    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, didn't use ear protection    Dermatitis 12/9/2020    nystat groin    DJD (degenerative joint disease) of hip 1/29/2015    Enlarged liver 4/8/2021    US 4/21     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    MCA , see MRI    Hiatal hernia 4/1/2021    CT chest tiny 2018    HTN (hypertension), benign 8/20/2020    Morbid obesity with BMI of 40.0-44.9, adult 1/29/2015    OAB (overactive bladder) 4/1/2021    Rash with pads    Right-sided low back pain with right-sided sciatica 3/6/2019    Seasonal allergic rhinitis due to pollen 4/13/2016    Thoracic aortic aneurysm without rupture 05/27/2018    tx with Su, Cardiologist Dr Stone, 5.1 CTS Dr Duenas, follows yearly    Transient cerebral ischemia 6/6/2018     He has a past surgical history that includes Hernia repair; Cataract extraction; YAG LAser Capsulotomy (Bilateral); A-V cardiac pacemaker insertion (N/A, 8/5/2021); and Cystoscopy with urodynamic testing (N/A, 10/25/2021).    Family History/Social History  His family history includes Aneurysm in his father; Cataracts in his paternal uncle; Diabetes in his mother; Leukemia in his father.  He reports that he has quit smoking. His smoking use included cigarettes. He has a 6.00 pack-year smoking history. He has never used smokeless tobacco. He reports current alcohol use. He reports that he does not use drugs.    Allergies/Immunizations  He is allergic to augmentin [amoxicillin-pot clavulanate], azithromycin, avodart [dutasteride], house dust mite, naproxen, ragweed, and synthroid [levothyroxine].  Immunization History   Administered Date(s) Administered    COVID-19, MRNA, LN-S, PF (Pfizer) (Gray Cap) 06/23/2022    COVID-19, MRNA, LN-S, PF (Pfizer) (Purple Cap) 01/04/2021, 01/25/2021, 10/02/2021    COVID-19, mRNA, LNP-S, bivalent booster, PF (PFIZER OMICRON)  10/06/2022    Influenza (FLUAD) - Quadrivalent - Adjuvanted - PF *Preferred* (65+) 10/17/2022    Influenza (FLUAD) - Trivalent - Adjuvanted - PF (65+) 10/17/2019    Influenza - High Dose - PF (65 years and older) 10/31/2017, 01/22/2019, 09/17/2020    Influenza - Trivalent (ADULT) 10/17/2019    Pneumococcal Conjugate - 13 Valent 08/31/2017    Pneumococcal Polysaccharide - 23 Valent 07/02/2019    Tdap 01/14/2019        Medications   albuterol  ALLERGY RELIEF (LORATADINE) Tab  azelastine  BABY ASPIRIN ORAL  CENTRUM SILVER ORAL  cholecalciferol (vitamin D3)  coenzyme Q10  diclofenac sodium Gel  fluticasone propionate  glucosamine-chondroitin  ipratropium  metoprolol succinate  montelukast  MYRBETRIQ Tb24  omega-3 fatty acids Cap  rosuvastatin  solifenacin  SUPER B COMPLEX-B-12 ORAL  tamsulosin Cap  TURMERIC ORAL  UNABLE TO FIND     Review of Systems     Constitutional: Positive for fatigue.      HENT: Positive for postnasal drip.      Eyes:  Positive for eye itching.     Respiratory:  Negative for cough, shortness of breath, sleep apnea, snoring and wheezing.      Cardiovascular:  Negative for chest pain, foot swelling, irregular heartbeat and swollen veins.     Gastrointestinal:  Positive for constipation.     Genitourinary: Positive for frequent urination.     Skin: Negative for rash.     Allergy: Positive for seasonal allergies.     Endocrine: Negative for cold intolerance and heat intolerance.      Neurological: Negative for dizziness, headaches, light-headedness, seizures and tremors.      Hematologic: Negative for bruises/bleeds easily and swollen glands.      Psychiatric: Negative for decreased concentration, depression, nervous/anxious and sleep disturbance.          Objective:     Wt 135.4 kg (298 lb 8.1 oz)   BMI 39.38 kg/m²        Constitutional:   He appears well-developed and well-nourished. Normal speech.      Head:  Normocephalic and atraumatic. Facial strength is normal.      Ears:    Right Ear: No  drainage or swelling. Tympanic membrane is not perforated and not erythematous. No middle ear effusion.   Left Ear: No drainage or swelling. Tympanic membrane is not perforated and not erythematous.  No middle ear effusion.     Nose:  Mucosal edema and septal deviation (L) present. No rhinorrhea or polyps.  No foreign bodies. Turbinate hypertrophy (mild, 2+).  Turbinates normal and no turbinate masses.  Right sinus exhibits no maxillary sinus tenderness and no frontal sinus tenderness. Left sinus exhibits no maxillary sinus tenderness and no frontal sinus tenderness.   R nasal vestibule with dried blood clot near septum  Both nasal vestibules dry    Mouth/Throat  Oropharynx clear and moist without lesions or asymmetry, normal uvula midline and lips, teeth, and gums normal. Dental caries (filled cavities) present. No trismus or mucous membrane lesions. No oropharyngeal exudate, posterior oropharyngeal edema or posterior oropharyngeal erythema. Tonsils not present.      Neck:  Neck normal without thyromegaly masses, asymmetry, normal tracheal structure, crepitus, and tenderness and phonation normal.     He has no cervical adenopathy.     Pulmonary/Chest:   Effort normal.     Psychiatric:   He has a normal mood and affect. His speech is normal and behavior is normal.     Procedure    Flexible laryngoscopy performed.  See procedure note.     Large left septal sput      L MT     L ET     R nasal valve+vestibule with recent epistaxis     R IT     Larynx/hypopharynx     VF mobility    Data Reviewed  I personally reviewed the chart, including any outside records, and pertinent data below:    WBC (K/uL)   Date Value   03/02/2023 7.85     Eosinophil % (%)   Date Value   03/02/2023 4.3     Eos # (K/uL)   Date Value   03/02/2023 0.3     Platelets (K/uL)   Date Value   03/02/2023 256     Glucose (mg/dL)   Date Value   03/02/2023 89     No results found for: IGE    I independently reviewed the images of the CT head dated  "5/26/2018. Pertinent findings include overall patent sinuses.    Assessment & Plan:     1. Laryngopharyngeal reflux (LPR)  -     Discussed the etiology of LPR and management strategies including nonpharmacologic treatments: eating smaller meals, eating at least 3 hours before bed, elevation of the head of bed at night, avoidance of caffeine, chocolate, nicotine and peppermint, and avoiding tight fitting clothing.    - Will trial 3 months of PPI therapy.  No interactions with his recently started urologic meds  - May consider GI referral if symptoms are refractory to medication management.  - Follow-up in 3 months.      2. Perennial allergic rhinitis with seasonal variation  -     Ambulatory referral/consult to Allergy; Future; Expected date: 03/21/2023  - Scope without convincing evidence of allergic etiology of chronic cough  - STOP flonase and start the azelastine BID for rhinitis symptoms  - Saline rinse instructions given to patient  - Advised f/u with allergy    3. Hypertrophy of inferior nasal turbinate  4. Deviated nasal septum   - Current/chronic nasal obstructive symptoms reported    - like deviated septum as more significant cause    5. Nasal vestibulitis  7. Epistaxis   - advised hydration with nasal saline rinse and moisturizing ointment    He will Follow up in about 3 months (around 6/14/2023) for re-evaluate post treatment.  I had a discussion with the patient and his SO "Jerod"  regarding his condition and the further workup and management options.    All questions were answered, and the patient is in agreement with the above.     Disclaimer:  This note may have been prepared utilizing voice recognition software which may result in occasional typographical errors in the text such as sound alike words.   If further clarification is needed, please contact the ENT department of [facility].  "

## 2023-03-17 ENCOUNTER — TELEPHONE (OUTPATIENT)
Dept: CARDIOTHORACIC SURGERY | Facility: CLINIC | Age: 73
End: 2023-03-17
Payer: MEDICARE

## 2023-03-17 NOTE — TELEPHONE ENCOUNTER
Called and confirmed pt's appts for 3/20 with pt, including appt times and locations, which pt verbalized understanding to.

## 2023-03-20 ENCOUNTER — OFFICE VISIT (OUTPATIENT)
Dept: CARDIOTHORACIC SURGERY | Facility: CLINIC | Age: 73
End: 2023-03-20
Attending: THORACIC SURGERY (CARDIOTHORACIC VASCULAR SURGERY)
Payer: MEDICARE

## 2023-03-20 ENCOUNTER — HOSPITAL ENCOUNTER (OUTPATIENT)
Dept: CARDIOLOGY | Facility: HOSPITAL | Age: 73
Discharge: HOME | End: 2023-03-20
Attending: THORACIC SURGERY (CARDIOTHORACIC VASCULAR SURGERY)
Payer: MEDICARE

## 2023-03-20 ENCOUNTER — HOSPITAL ENCOUNTER (OUTPATIENT)
Dept: RADIOLOGY | Facility: HOSPITAL | Age: 73
Discharge: HOME | End: 2023-03-20
Attending: THORACIC SURGERY (CARDIOTHORACIC VASCULAR SURGERY)
Payer: MEDICARE

## 2023-03-20 VITALS
HEIGHT: 73 IN | SYSTOLIC BLOOD PRESSURE: 134 MMHG | BODY MASS INDEX: 39.29 KG/M2 | WEIGHT: 298 LBS | WEIGHT: 296.44 LBS | HEIGHT: 73 IN | DIASTOLIC BLOOD PRESSURE: 63 MMHG | HEART RATE: 75 BPM | BODY MASS INDEX: 39.49 KG/M2 | SYSTOLIC BLOOD PRESSURE: 150 MMHG | DIASTOLIC BLOOD PRESSURE: 82 MMHG | HEART RATE: 69 BPM | OXYGEN SATURATION: 95 %

## 2023-03-20 DIAGNOSIS — I71.21 ANEURYSM OF ASCENDING AORTA WITHOUT RUPTURE: Primary | ICD-10-CM

## 2023-03-20 DIAGNOSIS — I71.21 ANEURYSM OF ASCENDING AORTA WITHOUT RUPTURE: ICD-10-CM

## 2023-03-20 DIAGNOSIS — I71.20 THORACIC AORTIC ANEURYSM WITHOUT RUPTURE, UNSPECIFIED PART: ICD-10-CM

## 2023-03-20 LAB
ASCENDING AORTA: 5.4 CM
AV INDEX (PROSTH): 0.87
AV MEAN GRADIENT: 4 MMHG
AV PEAK GRADIENT: 7 MMHG
AV VALVE AREA: 2.97 CM2
AV VELOCITY RATIO: 0.83
BSA FOR ECHO PROCEDURE: 2.64 M2
CV ECHO LV RWT: 0.45 CM
DOP CALC AO PEAK VEL: 1.28 M/S
DOP CALC AO VTI: 27.35 CM
DOP CALC LVOT AREA: 3.4 CM2
DOP CALC LVOT DIAMETER: 2.08 CM
DOP CALC LVOT PEAK VEL: 1.06 M/S
DOP CALC LVOT STROKE VOLUME: 81.2 CM3
DOP CALCLVOT PEAK VEL VTI: 23.91 CM
E WAVE DECELERATION TIME: 231.08 MSEC
E/A RATIO: 0.85
E/E' RATIO: 9.75 M/S
ECHO LV POSTERIOR WALL: 1.09 CM (ref 0.6–1.1)
EJECTION FRACTION: 60 %
FRACTIONAL SHORTENING: 31 % (ref 28–44)
INTERVENTRICULAR SEPTUM: 0.96 CM (ref 0.6–1.1)
IVRT: 85.63 MSEC
LA MAJOR: 6.14 CM
LA MINOR: 6.11 CM
LA WIDTH: 4.87 CM
LEFT ATRIUM SIZE: 4.16 CM
LEFT ATRIUM VOLUME INDEX MOD: 27 ML/M2
LEFT ATRIUM VOLUME INDEX: 41.4 ML/M2
LEFT ATRIUM VOLUME MOD: 68.73 CM3
LEFT ATRIUM VOLUME: 105.47 CM3
LEFT INTERNAL DIMENSION IN SYSTOLE: 3.36 CM (ref 2.1–4)
LEFT VENTRICLE DIASTOLIC VOLUME INDEX: 43.15 ML/M2
LEFT VENTRICLE DIASTOLIC VOLUME: 110.04 ML
LEFT VENTRICLE MASS INDEX: 70 G/M2
LEFT VENTRICLE SYSTOLIC VOLUME INDEX: 18.1 ML/M2
LEFT VENTRICLE SYSTOLIC VOLUME: 46.18 ML
LEFT VENTRICULAR INTERNAL DIMENSION IN DIASTOLE: 4.85 CM (ref 3.5–6)
LEFT VENTRICULAR MASS: 179 G
LV LATERAL E/E' RATIO: 9.75 M/S
LV SEPTAL E/E' RATIO: 9.75 M/S
MV A" WAVE DURATION": 9.42 MSEC
MV PEAK A VEL: 0.92 M/S
MV PEAK E VEL: 0.78 M/S
MV STENOSIS PRESSURE HALF TIME: 67.01 MS
MV VALVE AREA P 1/2 METHOD: 3.28 CM2
PISA TR MAX VEL: 2.25 M/S
PULM VEIN S/D RATIO: 0.88
PV PEAK D VEL: 0.59 M/S
PV PEAK S VEL: 0.52 M/S
RA MAJOR: 5 CM
RA PRESSURE: 3 MMHG
RA WIDTH: 4.34 CM
RIGHT VENTRICULAR END-DIASTOLIC DIMENSION: 3.53 CM
RV TISSUE DOPPLER FREE WALL SYSTOLIC VELOCITY 1 (APICAL 4 CHAMBER VIEW): 13.38 CM/S
SINUS: 3.21 CM
STJ: 2.64 CM
TDI LATERAL: 0.08 M/S
TDI SEPTAL: 0.08 M/S
TDI: 0.08 M/S
TR MAX PG: 20 MMHG
TRICUSPID ANNULAR PLANE SYSTOLIC EXCURSION: 2.43 CM
TV REST PULMONARY ARTERY PRESSURE: 23 MMHG

## 2023-03-20 PROCEDURE — 99999 PR PBB SHADOW E&M-EST. PATIENT-LVL IV: CPT | Mod: PBBFAC,HCNC,, | Performed by: THORACIC SURGERY (CARDIOTHORACIC VASCULAR SURGERY)

## 2023-03-20 PROCEDURE — 71250 CT THORAX DX C-: CPT | Mod: 26,HCNC,, | Performed by: RADIOLOGY

## 2023-03-20 PROCEDURE — 3078F PR MOST RECENT DIASTOLIC BLOOD PRESSURE < 80 MM HG: ICD-10-PCS | Mod: HCNC,CPTII,S$GLB, | Performed by: THORACIC SURGERY (CARDIOTHORACIC VASCULAR SURGERY)

## 2023-03-20 PROCEDURE — 99213 OFFICE O/P EST LOW 20 MIN: CPT | Mod: HCNC,S$GLB,, | Performed by: THORACIC SURGERY (CARDIOTHORACIC VASCULAR SURGERY)

## 2023-03-20 PROCEDURE — 1157F ADVNC CARE PLAN IN RCRD: CPT | Mod: HCNC,CPTII,S$GLB, | Performed by: THORACIC SURGERY (CARDIOTHORACIC VASCULAR SURGERY)

## 2023-03-20 PROCEDURE — 1126F PR PAIN SEVERITY QUANTIFIED, NO PAIN PRESENT: ICD-10-PCS | Mod: HCNC,CPTII,S$GLB, | Performed by: THORACIC SURGERY (CARDIOTHORACIC VASCULAR SURGERY)

## 2023-03-20 PROCEDURE — 1160F RVW MEDS BY RX/DR IN RCRD: CPT | Mod: HCNC,CPTII,S$GLB, | Performed by: THORACIC SURGERY (CARDIOTHORACIC VASCULAR SURGERY)

## 2023-03-20 PROCEDURE — 1159F MED LIST DOCD IN RCRD: CPT | Mod: HCNC,CPTII,S$GLB, | Performed by: THORACIC SURGERY (CARDIOTHORACIC VASCULAR SURGERY)

## 2023-03-20 PROCEDURE — 93306 TTE W/DOPPLER COMPLETE: CPT | Mod: 26,HCNC,, | Performed by: INTERNAL MEDICINE

## 2023-03-20 PROCEDURE — 3078F DIAST BP <80 MM HG: CPT | Mod: HCNC,CPTII,S$GLB, | Performed by: THORACIC SURGERY (CARDIOTHORACIC VASCULAR SURGERY)

## 2023-03-20 PROCEDURE — 71250 CT THORAX DX C-: CPT | Mod: TC,HCNC

## 2023-03-20 PROCEDURE — 1159F PR MEDICATION LIST DOCUMENTED IN MEDICAL RECORD: ICD-10-PCS | Mod: HCNC,CPTII,S$GLB, | Performed by: THORACIC SURGERY (CARDIOTHORACIC VASCULAR SURGERY)

## 2023-03-20 PROCEDURE — 99999 PR PBB SHADOW E&M-EST. PATIENT-LVL IV: ICD-10-PCS | Mod: PBBFAC,HCNC,, | Performed by: THORACIC SURGERY (CARDIOTHORACIC VASCULAR SURGERY)

## 2023-03-20 PROCEDURE — 93306 ECHO (CUPID ONLY): ICD-10-PCS | Mod: 26,HCNC,, | Performed by: INTERNAL MEDICINE

## 2023-03-20 PROCEDURE — 3075F PR MOST RECENT SYSTOLIC BLOOD PRESS GE 130-139MM HG: ICD-10-PCS | Mod: HCNC,CPTII,S$GLB, | Performed by: THORACIC SURGERY (CARDIOTHORACIC VASCULAR SURGERY)

## 2023-03-20 PROCEDURE — 1160F PR REVIEW ALL MEDS BY PRESCRIBER/CLIN PHARMACIST DOCUMENTED: ICD-10-PCS | Mod: HCNC,CPTII,S$GLB, | Performed by: THORACIC SURGERY (CARDIOTHORACIC VASCULAR SURGERY)

## 2023-03-20 PROCEDURE — 1157F PR ADVANCE CARE PLAN OR EQUIV PRESENT IN MEDICAL RECORD: ICD-10-PCS | Mod: HCNC,CPTII,S$GLB, | Performed by: THORACIC SURGERY (CARDIOTHORACIC VASCULAR SURGERY)

## 2023-03-20 PROCEDURE — 99213 PR OFFICE/OUTPT VISIT, EST, LEVL III, 20-29 MIN: ICD-10-PCS | Mod: HCNC,S$GLB,, | Performed by: THORACIC SURGERY (CARDIOTHORACIC VASCULAR SURGERY)

## 2023-03-20 PROCEDURE — 93306 TTE W/DOPPLER COMPLETE: CPT | Mod: HCNC

## 2023-03-20 PROCEDURE — 3075F SYST BP GE 130 - 139MM HG: CPT | Mod: HCNC,CPTII,S$GLB, | Performed by: THORACIC SURGERY (CARDIOTHORACIC VASCULAR SURGERY)

## 2023-03-20 PROCEDURE — 71250 CT CHEST WITHOUT CONTRAST: ICD-10-PCS | Mod: 26,HCNC,, | Performed by: RADIOLOGY

## 2023-03-20 PROCEDURE — 1126F AMNT PAIN NOTED NONE PRSNT: CPT | Mod: HCNC,CPTII,S$GLB, | Performed by: THORACIC SURGERY (CARDIOTHORACIC VASCULAR SURGERY)

## 2023-03-20 NOTE — PROGRESS NOTES
Subjective:      Patient ID: Adan Webb is a 73 y.o. male.    Chief Complaint: No chief complaint on file.      HPI:  Adan Webb is a 73 y.o. male who presents for follow up TAA. Last seen 7/29/21. Medical history of TAA, dejenerative joint disease, seasonal allergic rhinitis, obesity, and BPH.  He was previously followed by Dr. Ewing.  Last visit, he had imaging showing TAA at 5.2. This was stable over many years so it was felt patient no longer needed to be routinely followed. Was referred back for follow up by his PCP. Repeat echo and CT scan ordered. No significant change in medical history.        Current Outpatient Medications   Medication Instructions    albuterol (VENTOLIN HFA) 90 mcg/actuation inhaler 2 puffs, Inhalation, Every 6 hours PRN, Rescue    ALLERGY RELIEF (LORATADINE) 10 mg, Oral, Daily    azelastine (ASTELIN) 137 mcg, Nasal, 2 times daily    BABY ASPIRIN ORAL 81 tablets, Oral, Nightly    cholecalciferol (vitamin D3) (VITAMIN D3) 1,000 Units, Oral, Daily    coenzyme Q10 100 mg capsule Oral, Daily    diclofenac sodium (VOLTAREN) 2 g, Topical (Top), 4 times daily    glucosamine-chondroitin 500-400 mg tablet 1 tablet, Oral, 2 times daily    metoprolol succinate (TOPROL-XL) 50 mg, Oral, Daily    montelukast (SINGULAIR) 10 mg, Oral, Daily    MULTIVITAMIN W-MINERALS/LUTEIN (CENTRUM SILVER ORAL) Oral, Daily    MYRBETRIQ 50 mg, Oral, Daily    omega-3 fatty acids 300 mg Cap 1 tablet, Oral, Daily    omeprazole (PRILOSEC) 40 mg, Oral, Every morning    rosuvastatin (CRESTOR) 10 mg, Oral, Daily    solifenacin (VESICARE) 10 mg, Oral, Daily    tamsulosin (FLOMAX) 0.4 mg, Oral, Daily    TURMERIC ORAL Oral, Pt take 1 in the evening.    UNABLE TO FIND Oral, Nightly, tumeric     VITAMIN B COMPLEX (SUPER B COMPLEX-B-12 ORAL) Oral       Family and social history reviewed    Review of patient's allergies indicates:   Allergen Reactions    Augmentin [amoxicillin-pot clavulanate] Rash     Rash, confusion,  TIA like symptoms    Azithromycin Rash    Avodart [dutasteride]     House dust mite Other (See Comments)    Naproxen     Ragweed     Synthroid [levothyroxine] Rash     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, didn't use ear protection    Dermatitis 12/9/2020    nystat groin    DJD (degenerative joint disease) of hip 1/29/2015    Enlarged liver 4/8/2021    US 4/21     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    MCA , see MRI    Hiatal hernia 4/1/2021    CT chest tiny 2018    HTN (hypertension), benign 8/20/2020    Morbid obesity with BMI of 40.0-44.9, adult 1/29/2015    OAB (overactive bladder) 4/1/2021    Rash with pads    Right-sided low back pain with right-sided sciatica 3/6/2019    Seasonal allergic rhinitis due to pollen 4/13/2016    Thoracic aortic aneurysm without rupture 05/27/2018    tx with Su, Cardiologist Dr Stone, 5.1 CTS Dr Duenas, follows yearly    Transient cerebral ischemia 6/6/2018     Past Surgical History:   Procedure Laterality Date    A-V CARDIAC PACEMAKER INSERTION N/A 8/5/2021    Procedure: INSERTION, CARDIAC PACEMAKER, DUAL CHAMBER;  Surgeon: Sheldon Miranda MD;  Location: [facility] EP LAB;  Service: Cardiology;  Laterality: N/A;  Near syncope,SB,Sinus Pause, RBBB,LAFB, Dual PPM, BIO, MAC, AK, 3 Prep    CATARACT EXTRACTION      CYSTOSCOPY WITH URODYNAMIC TESTING N/A 10/25/2021    Procedure: CYSTOSCOPY, WITH  URODYNAMIC TESTING FLOUROSCOPIC;  Surgeon: Sancho Wesley MD;  Location: [facility] OR [address] [place] [address];  Service: Urology;  Laterality: N/A;  1hr    HERNIA REPAIR      YAG LAser Capsulotomy Bilateral     2/18/2019 OS Dr. Cornelius     Family History       Problem Relation (Age of Onset)    Aneurysm Father    Cataracts Paternal Uncle    Diabetes Mother    Leukemia Father          Social History     Socioeconomic History    Marital status:    Tobacco Use    Smoking status: Former     Packs/day: 6.00     Years: 1.00     Pack years: 6.00     Types: Cigarettes    Smokeless tobacco: Never   Substance and Sexual Activity    Alcohol use: Yes     Comment: wine, seldom    Drug use: No    Sexual activity: Yes     Partners: Female   Social History Narrative     to 'T', 2 children, nonsmoker, ETOH none, never had colonscopy & declines       Current medications Reviewed    Review of Systems   Constitutional:  Negative for activity change.   HENT:  Negative for nosebleeds.    Eyes:  Negative for visual disturbance.   Respiratory:  Negative for shortness of breath.    Cardiovascular:  Negative for chest pain.   Gastrointestinal:  Negative for nausea.   Musculoskeletal:  Negative for gait problem.   Skin:  Negative for color change.   Neurological:  Negative for dizziness.   Hematological:  Does not bruise/bleed easily.   Psychiatric/Behavioral:  Negative for sleep disturbance.    Objective:   Physical Exam  Vitals reviewed.   Constitutional:       General: He is not in acute distress.     Appearance: He is well-developed. He is not diaphoretic.   HENT:      Head: Normocephalic and atraumatic.   Eyes:      Pupils: Pupils are equal, round, and reactive to light.   Neck:      Vascular: No JVD.   Cardiovascular:      Rate and Rhythm: Normal rate.   Pulmonary:      Effort: Pulmonary effort is normal. No respiratory distress.   Abdominal:      General: There is no distension.   Musculoskeletal:         General: Normal range of  motion.      Cervical back: Normal range of motion.   Skin:     Coloration: Skin is not pale.   Neurological:      Mental Status: He is alert and oriented to person, place, and time.   Psychiatric:         Speech: Speech normal.         Behavior: Behavior normal.         Thought Content: Thought content normal.         Judgment: Judgment normal.       Diagnostic Results: reviewed   TTE 3/20/23  Mild left atrial enlargement.  The left ventricle is normal in size with concentric remodeling and normal systolic function.  Indeterminate left ventricular diastolic function.  The estimated ejection fraction is 60%.  Normal right ventricular size with normal right ventricular systolic function.  Normal central venous pressure (3 mmHg).  The estimated PA systolic pressure is 23 mmHg.  The ascending aorta is severely dilated measuring 5.4 cm.  The aortic root is normal in size and the aortic valve is trileaflet.    CT 7/26/21  - Prox AAo = 4.4 cm  (axial series 2 image 51)     - Mid AAo = 5.1 cm (axial series 2 image 45), previously 5.2 cm when remeasured similarly.     - Distal AAo = 3.8 cm  (sagittal series 603 image 168), previously 3.8 cm     - Ao arch = 3.2 cm  (sagittal series 603 image 175), previously 3.2 cm     - Isthmus = 3.2 cm  (sagittal series 603 image 176), previously 3.2 cm     - Prox D'g Ao = 3.8 cm (sagittal series 603 image 165), previously 3.5 cm     - Mid D'g Ao = 3.7 cm  (sagittal series 603 image 165), previously 3.7 cm     - Distal D'g Ao = 3.6 cm  (sagittal series 603 image 165), previously 3.6 cm    TTE 1/25/21  The ascending aorta is severely dilated masuring 5.1 cm. The aortic root is normal in size.  Mild left atrial enlargement.  The left ventricle is normal in size with normal systolic function. The estimated ejection fraction is 55%  Indeterminate left ventricular diastolic function.  Normal right ventricular size with normal right ventricular systolic function.  The estimated PA systolic  pressure is 19 mmHg.  Normal central venous pressure (3 mmHg).     7/20/2020 CT Chest  Aorta: Left-sided aortic arch with normal branching pattern and mild atherosclerotic calcifications throughout its course.  There is motion artifact making measurements of the aorta suboptimal and difficult to compare on coronal images.  Aortic measurements on this noncontrast study are as follows:  - Prox AAo = 4.4 cm  (axial series 2 image 51)  - Mid AAo = 5.2 cm  (axial series 2 image 45), previously 5.1 cm when remeasured similarly.  - Distal AAo = 3.8 cm  (sagittal series 603 image 180), previously 3.7 cm  - Ao arch = 3.2 cm  (sagittal series 603 image 186), previously 3.2 cm  - Isthmus = 3.2 cm  (sagittal series 603 image 189), previously 3.3 cm  - Prox D'g Ao = 3.7 cm  (sagittal series 603 image 181), previously 3.5 cm  - Mid D'g Ao = 3.7 cm  (sagittal series 603 image 176), previously 3.6 cm  - Distal D'g Ao = 3.6 cm  (sagittal series 603 image 176), previously 3.6 cm     7/17/2019 CT Chest   Aorta: Sided aortic arch which demonstrates normal branching pattern and maintains normal contour and course.  There is abnormal calcification involving the aortic valve/annulus and mild atherosclerosis of the aorta.  Aortic measurements on this noncontrast study are as follows:  - Mid AAo = 4.9 cm  (coronal series 603 image 105)  - Distal AAo = 3.7 cm  (sagittal series 604 image 135)  - Ao arch = 3.2 cm  (sagittal series 604 image 140)  - Isthmus = 3.3 cm  (sagittal series 604 image 142)  - Prox D'g Ao = 3.5 cm  (sagittal series 604 image 138)  - Mid D'g Ao = 3.6 cm  (sagittal series 604 image 138)  - Distal D'g Ao = 3.6 cm  (sagittal series 604 image 138)  Assessment:   TAA ~5.2  Plan:   Patient was referred back for follow up by his PCP.No indication for surgical therapies.  No change in TAA size. Can follow up PRN.

## 2023-04-03 RX ORDER — NYSTATIN AND TRIAMCINOLONE ACETONIDE 100000; 1 [USP'U]/G; MG/G
CREAM TOPICAL 4 TIMES DAILY
Qty: 60 G | Refills: 5 | Status: SHIPPED | OUTPATIENT
Start: 2023-04-03

## 2023-04-03 NOTE — TELEPHONE ENCOUNTER
Confirmed with the pharmacy Nystatin was received.     Contact: [phone]  Pharm called and stated that they did not receive a rx for nystatin cream.         [facility] - [place]  [address]  [address] [address]  [place] [address]  Phone: [phone] Fax: [phone]

## 2023-04-13 ENCOUNTER — PES CALL (OUTPATIENT)
Dept: ADMINISTRATIVE | Facility: CLINIC | Age: 73
End: 2023-04-13
Payer: MEDICARE

## 2023-04-14 ENCOUNTER — TELEPHONE (OUTPATIENT)
Dept: PRIMARY CARE CLINIC | Facility: CLINIC | Age: 73
End: 2023-04-14
Payer: MEDICARE

## 2023-04-14 NOTE — TELEPHONE ENCOUNTER
----- Message from Tayler Hobbs sent at 4/13/2023  4:26 PM CDT -----  Contact: Myah Reyes   Myah Reyes is calling in regards to a PA for a referral to [facility]. Myah Reyes said the visit has been approved. Procedure codes 39962, 36401 ,61437 , 37341 all for 1 unit has been approved. REF # [id number] auth from 04/25/2023- 08/22/2023

## 2023-04-21 ENCOUNTER — PATIENT MESSAGE (OUTPATIENT)
Dept: ADMINISTRATIVE | Facility: CLINIC | Age: 73
End: 2023-04-21
Payer: MEDICARE

## 2023-04-24 ENCOUNTER — TELEPHONE (OUTPATIENT)
Dept: ADMINISTRATIVE | Facility: CLINIC | Age: 73
End: 2023-04-24
Payer: MEDICARE

## 2023-04-25 ENCOUNTER — TELEPHONE (OUTPATIENT)
Dept: ADMINISTRATIVE | Facility: CLINIC | Age: 73
End: 2023-04-25
Payer: MEDICARE

## 2023-04-27 ENCOUNTER — CLINICAL SUPPORT (OUTPATIENT)
Dept: CARDIOLOGY | Facility: HOSPITAL | Age: 73
End: 2023-04-27
Payer: MEDICARE

## 2023-04-27 DIAGNOSIS — Z95.0 PRESENCE OF CARDIAC PACEMAKER: ICD-10-CM

## 2023-04-27 PROCEDURE — 93294 CARDIAC DEVICE CHECK - REMOTE: ICD-10-PCS | Mod: HCNC,,, | Performed by: INTERNAL MEDICINE

## 2023-04-27 PROCEDURE — 93294 REM INTERROG EVL PM/LDLS PM: CPT | Mod: HCNC,,, | Performed by: INTERNAL MEDICINE

## 2023-04-27 PROCEDURE — 93296 REM INTERROG EVL PM/IDS: CPT | Mod: HCNC | Performed by: INTERNAL MEDICINE

## 2023-05-01 ENCOUNTER — LAB VISIT (OUTPATIENT)
Dept: LAB | Facility: HOSPITAL | Age: 73
End: 2023-05-01
Attending: STUDENT IN AN ORGANIZED HEALTH CARE EDUCATION/TRAINING PROGRAM
Payer: MEDICARE

## 2023-05-01 ENCOUNTER — OFFICE VISIT (OUTPATIENT)
Dept: ALLERGY | Facility: CLINIC | Age: 73
End: 2023-05-01
Payer: MEDICARE

## 2023-05-01 VITALS
HEART RATE: 67 BPM | WEIGHT: 301.56 LBS | DIASTOLIC BLOOD PRESSURE: 77 MMHG | OXYGEN SATURATION: 95 % | SYSTOLIC BLOOD PRESSURE: 118 MMHG | BODY MASS INDEX: 39.79 KG/M2

## 2023-05-01 DIAGNOSIS — I45.2 BIFASCICULAR BLOCK: ICD-10-CM

## 2023-05-01 DIAGNOSIS — I10 HTN (HYPERTENSION), BENIGN: ICD-10-CM

## 2023-05-01 DIAGNOSIS — Z88.0 PENICILLIN ALLERGY: ICD-10-CM

## 2023-05-01 DIAGNOSIS — J31.0 CHRONIC RHINITIS: ICD-10-CM

## 2023-05-01 DIAGNOSIS — R06.02 SOB (SHORTNESS OF BREATH) ON EXERTION: ICD-10-CM

## 2023-05-01 DIAGNOSIS — J31.0 CHRONIC RHINITIS: Primary | ICD-10-CM

## 2023-05-01 DIAGNOSIS — I71.21 ANEURYSM OF ASCENDING AORTA WITHOUT RUPTURE: ICD-10-CM

## 2023-05-01 PROCEDURE — 3074F SYST BP LT 130 MM HG: CPT | Mod: CPTII,S$GLB,, | Performed by: STUDENT IN AN ORGANIZED HEALTH CARE EDUCATION/TRAINING PROGRAM

## 2023-05-01 PROCEDURE — 1126F AMNT PAIN NOTED NONE PRSNT: CPT | Mod: CPTII,S$GLB,, | Performed by: STUDENT IN AN ORGANIZED HEALTH CARE EDUCATION/TRAINING PROGRAM

## 2023-05-01 PROCEDURE — 1157F PR ADVANCE CARE PLAN OR EQUIV PRESENT IN MEDICAL RECORD: ICD-10-PCS | Mod: CPTII,S$GLB,, | Performed by: STUDENT IN AN ORGANIZED HEALTH CARE EDUCATION/TRAINING PROGRAM

## 2023-05-01 PROCEDURE — 99999 PR PBB SHADOW E&M-EST. PATIENT-LVL IV: CPT | Mod: PBBFAC,,, | Performed by: STUDENT IN AN ORGANIZED HEALTH CARE EDUCATION/TRAINING PROGRAM

## 2023-05-01 PROCEDURE — 36415 COLL VENOUS BLD VENIPUNCTURE: CPT | Performed by: STUDENT IN AN ORGANIZED HEALTH CARE EDUCATION/TRAINING PROGRAM

## 2023-05-01 PROCEDURE — 1126F PR PAIN SEVERITY QUANTIFIED, NO PAIN PRESENT: ICD-10-PCS | Mod: CPTII,S$GLB,, | Performed by: STUDENT IN AN ORGANIZED HEALTH CARE EDUCATION/TRAINING PROGRAM

## 2023-05-01 PROCEDURE — 3074F PR MOST RECENT SYSTOLIC BLOOD PRESSURE < 130 MM HG: ICD-10-PCS | Mod: CPTII,S$GLB,, | Performed by: STUDENT IN AN ORGANIZED HEALTH CARE EDUCATION/TRAINING PROGRAM

## 2023-05-01 PROCEDURE — 1157F ADVNC CARE PLAN IN RCRD: CPT | Mod: CPTII,S$GLB,, | Performed by: STUDENT IN AN ORGANIZED HEALTH CARE EDUCATION/TRAINING PROGRAM

## 2023-05-01 PROCEDURE — 1160F PR REVIEW ALL MEDS BY PRESCRIBER/CLIN PHARMACIST DOCUMENTED: ICD-10-PCS | Mod: CPTII,S$GLB,, | Performed by: STUDENT IN AN ORGANIZED HEALTH CARE EDUCATION/TRAINING PROGRAM

## 2023-05-01 PROCEDURE — 1159F PR MEDICATION LIST DOCUMENTED IN MEDICAL RECORD: ICD-10-PCS | Mod: CPTII,S$GLB,, | Performed by: STUDENT IN AN ORGANIZED HEALTH CARE EDUCATION/TRAINING PROGRAM

## 2023-05-01 PROCEDURE — 1159F MED LIST DOCD IN RCRD: CPT | Mod: CPTII,S$GLB,, | Performed by: STUDENT IN AN ORGANIZED HEALTH CARE EDUCATION/TRAINING PROGRAM

## 2023-05-01 PROCEDURE — 3078F DIAST BP <80 MM HG: CPT | Mod: CPTII,S$GLB,, | Performed by: STUDENT IN AN ORGANIZED HEALTH CARE EDUCATION/TRAINING PROGRAM

## 2023-05-01 PROCEDURE — 99204 PR OFFICE/OUTPT VISIT, NEW, LEVL IV, 45-59 MIN: ICD-10-PCS | Mod: S$GLB,,, | Performed by: STUDENT IN AN ORGANIZED HEALTH CARE EDUCATION/TRAINING PROGRAM

## 2023-05-01 PROCEDURE — 1160F RVW MEDS BY RX/DR IN RCRD: CPT | Mod: CPTII,S$GLB,, | Performed by: STUDENT IN AN ORGANIZED HEALTH CARE EDUCATION/TRAINING PROGRAM

## 2023-05-01 PROCEDURE — 86003 ALLG SPEC IGE CRUDE XTRC EA: CPT | Mod: HCNC | Performed by: STUDENT IN AN ORGANIZED HEALTH CARE EDUCATION/TRAINING PROGRAM

## 2023-05-01 PROCEDURE — 99999 PR PBB SHADOW E&M-EST. PATIENT-LVL IV: ICD-10-PCS | Mod: PBBFAC,,, | Performed by: STUDENT IN AN ORGANIZED HEALTH CARE EDUCATION/TRAINING PROGRAM

## 2023-05-01 PROCEDURE — 99204 OFFICE O/P NEW MOD 45 MIN: CPT | Mod: S$GLB,,, | Performed by: STUDENT IN AN ORGANIZED HEALTH CARE EDUCATION/TRAINING PROGRAM

## 2023-05-01 PROCEDURE — 3078F PR MOST RECENT DIASTOLIC BLOOD PRESSURE < 80 MM HG: ICD-10-PCS | Mod: CPTII,S$GLB,, | Performed by: STUDENT IN AN ORGANIZED HEALTH CARE EDUCATION/TRAINING PROGRAM

## 2023-05-01 PROCEDURE — 86003 ALLG SPEC IGE CRUDE XTRC EA: CPT | Mod: 59,HCNC | Performed by: STUDENT IN AN ORGANIZED HEALTH CARE EDUCATION/TRAINING PROGRAM

## 2023-05-01 NOTE — PROGRESS NOTES
ALLERGY & IMMUNOLOGY CLINIC - INITIAL CONSULTATION      HISTORY OF PRESENT ILLNESS     Patient ID: Adan Webb is a 73 y.o. male    CC: chronic rhinitis    HPI: Adan Webb is a 73 y.o. male with a history of HTN, PVC's with bifascicular block, ascending aortic aneurism, and augmentin allergy presenting for chronic rhinitis.  He was referred by Haroon Musa PA-C (ENT).  Patient is here with his wife. History is from both the patient and his wife.    Symptoms started at 18 yo. He saw an allergist at the time. He recalls skin testing was positive to dust mites, ragweed, mold. He reports he was on allergy shots in his 20's for over a year. He says it helped at the time.  He is bothered by congestion when he goes to bed at night. He uses saline and nasal spray, and by the time he wakes up in the morning, he is all blocked up. He says if he lays flat, the mucus collects in his throat, so he sleeps on two pillows. But all night long, he is clearing his throat, which wakes up his wife.  Patient endorses congestion, sneezing (sometimes), rhinorrhea (sometimes), post nasal drip, cough, ocular pruritus (rare). He says his throat itches sometimes.   Patient denies nasal pruritus.  Symptoms are perennial. Symptoms may be worse in spring.  Symptoms occur every day.  There is no noticeable difference between indoors and outdoors.   The patient has not identified any triggers.   The patient denies frequent heartburn/reflux symptoms, but was diagnosed with LPR by ENT in March. He was prescribed omeprazole, but hasn't tried it yet. He hasn't felt like he needed it.   The patient endorses anosmia when his symptoms are bad.  He was on flonase 1 SEN BID, which was helpful. He was switched to azelastine 1 SEN BID, which he finds helpful, but says it doesn't last too long.   He is on montelukast, but doesn't know if it helps.   He was using claritin for a while, was switched to zyrtec, and then he switched back to  claritin. He is not using either right now.     He has an albuterol inhaler, which he says is for wheezing. But when asked if he ever hears a wheezing sound, he says he doesn't hear sounds, but there is a tickle in the chest. He thinks his throat dries out because he breathes through his mouth, and it feels strange. He says he only gets shortness of breath if he exercises. He uses the albuterol about once every few months for the tickle in his chest/throat. Feels like there is something there and its moving when he breathes. The albuterol helps. This is has been going on for a couple of years.    In 2018, on augmentin, after one dose, developed itching and swelling. He thinks this was within an hour of the first dose.      MEDICAL HISTORY     Vaccines:   Immunization History   Administered Date(s) Administered    COVID-19, MRNA, LN-S, PF (Pfizer) (Gray Cap) 06/23/2022    COVID-19, MRNA, LN-S, PF (Pfizer) (Purple Cap) 01/04/2021, 01/25/2021, 10/02/2021    COVID-19, mRNA, LNP-S, bivalent booster, PF (PFIZER OMICRON) 10/06/2022    Influenza (FLUAD) - Quadrivalent - Adjuvanted - PF *Preferred* (65+) 10/17/2022    Influenza (FLUAD) - Trivalent - Adjuvanted - PF (65+) 10/17/2019    Influenza - High Dose - PF (65 years and older) 10/31/2017, 01/22/2019, 09/17/2020    Influenza - Trivalent (ADULT) 10/17/2019    Pneumococcal Conjugate - 13 Valent 08/31/2017    Pneumococcal Polysaccharide - 23 Valent 07/02/2019    Tdap 01/14/2019     Medical Hx:   Patient Active Problem List   Diagnosis    Nocturia    Benign prostatic hyperplasia with nocturia    Hydrocele, right    Pseudophakia    DJD (degenerative joint disease) of hip    Seasonal allergic rhinitis due to pollen    Calculus of gallbladder without cholecystitis without obstruction    Right inguinal hernia    History of hydrocelectomy    Cerebral infarction due to embolism of left middle cerebral artery    Transient cerebral ischemia    Posterior capsular opacification, left  eye    Open angle with borderline findings and high glaucoma risk in left eye    Conductive hearing loss of right ear with restricted hearing of left ear    Belching    Blurry vision, left eye    Right-sided low back pain with right-sided sciatica    Dry eye syndrome of both eyes    Ascending aortic aneurysm    HTN (hypertension), benign    Dermatitis    Chronic pain of both ankles    Abnormal TSH    Bifascicular block    PVC (premature ventricular contraction)    Severe obesity (BMI 35.0-39.9) with comorbidity    Hiatal hernia    OAB (overactive bladder)    Enlarged liver    Near syncope    Pre-op testing    Presence of cardiac pacemaker    Calcified granuloma of lung     Surgical Hx:   Past Surgical History:   Procedure Laterality Date    A-V CARDIAC PACEMAKER INSERTION N/A 08/05/2021    Procedure: INSERTION, CARDIAC PACEMAKER, DUAL CHAMBER;  Surgeon: Sheldon Miranda MD;  Location: [facility] EP LAB;  Service: Cardiology;  Laterality: N/A;  Near syncope,SB,Sinus Pause, RBBB,LAFB, Dual PPM, BIO, MAC, TX, 3 Prep    ADENOIDECTOMY      CATARACT EXTRACTION      CYSTOSCOPY WITH URODYNAMIC TESTING N/A 10/25/2021    Procedure: CYSTOSCOPY, WITH URODYNAMIC TESTING FLOUROSCOPIC;  Surgeon: Sancho Wesley MD;  Location: [facility] OR [address] [place] [address];  Service: Urology;  Laterality: N/A;  1hr    HERNIA REPAIR      TONSILLECTOMY      YAG LAser Capsulotomy Bilateral     2/18/2019 OS Dr. Cornelius     Medications:   Current Outpatient Medications on File Prior to Visit   Medication Sig Dispense Refill    albuterol (VENTOLIN HFA) 90 mcg/actuation inhaler Inhale 2 puffs into the lungs every 6 (six) hours as needed for Wheezing. Rescue 18 g 1    azelastine (ASTELIN) 137 mcg (0.1 %) nasal spray 1 spray (137 mcg total) by Nasal route 2 (two) times daily. 30 mL 12    BABY ASPIRIN ORAL Take 81 tablets by mouth every evening.       cholecalciferol, vitamin D3, 1,000 unit capsule Take 1,000 Units by mouth once daily.      coenzyme Q10 100 mg capsule  Take by mouth once daily.      glucosamine-chondroitin 500-400 mg tablet Take 1 tablet by mouth 2 (two) times daily.       metoprolol succinate (TOPROL-XL) 50 MG 24 hr tablet TAKE 1 TABLET (50 MG TOTAL) BY MOUTH ONCE DAILY. 90 tablet 3    mirabegron (MYRBETRIQ) 50 mg Tb24 Take 1 tablet (50 mg total) by mouth once daily. 90 tablet 3    montelukast (SINGULAIR) 10 mg tablet Take 1 tablet (10 mg total) by mouth once daily. 90 tablet 3    MULTIVITAMIN W-MINERALS/LUTEIN (CENTRUM SILVER ORAL) Take by mouth once daily.       nystatin-triamcinolone (MYCOLOG II) cream APPLY TOPICALLY 4 (FOUR) TIMES DAILY. 60 g 5    omega-3 fatty acids 300 mg Cap Take 1 tablet by mouth once daily.       omeprazole (PRILOSEC) 40 MG capsule Take 1 capsule (40 mg total) by mouth every morning. 90 capsule 2    solifenacin (VESICARE) 10 MG tablet TAKE 1 TABLET (10 MG TOTAL) BY MOUTH ONCE DAILY. 30 tablet 3    tamsulosin (FLOMAX) 0.4 mg Cap Take 1 capsule (0.4 mg total) by mouth once daily. 30 capsule 11    TURMERIC ORAL Take by mouth. Pt take 1 in the evening.      UNABLE TO FIND Take by mouth nightly. tumeric      VITAMIN B COMPLEX (SUPER B COMPLEX-B-12 ORAL) Take by mouth.      ALLERGY RELIEF, LORATADINE, 10 mg tablet Take 10 mg by mouth once daily.      diclofenac sodium (VOLTAREN) 1 % Gel Apply 2 g topically 4 (four) times daily. (Patient not taking: Reported on 2/19/2023) 1 Tube 2    rosuvastatin (CRESTOR) 10 MG tablet TAKE 1 TABLET (10 MG TOTAL) BY MOUTH ONCE DAILY. 90 tablet 0     No current facility-administered medications on file prior to visit.     H/o Asthma: denies  H/o Rhinitis: endorses    Drug Allergies:   Review of patient's allergies indicates:   Allergen Reactions    Augmentin [amoxicillin-pot clavulanate] Rash     Rash, confusion, TIA like symptoms    Azithromycin Rash    Avodart [dutasteride]     House dust mite Other (See Comments)    Naproxen     Ragweed     Synthroid [levothyroxine] Rash     Env/Occ:   Pets: dog, doesn't  trigger symptoms     Infection Hx: Denies frequent infections requiring antibiotics. No history of pneumonia.    Social Hx:   Tobacco: Quit smoking in the 1960's-70's. He thinks he smoked for about 7 years.   Social History     Tobacco Use    Smoking status: Former     Packs/day: 6.00     Years: 1.00     Pack years: 6.00     Types: Cigarettes    Smokeless tobacco: Never   Substance Use Topics    Alcohol use: Yes     Comment: wine, seldom    Drug use: No     Family Hx:   Asthma: no  Allergic rhinitis: no  Family History   Problem Relation Age of Onset    Leukemia Father     Aneurysm Father     Diabetes Mother     Cataracts Paternal Uncle     Amblyopia Neg Hx     Blindness Neg Hx     Glaucoma Neg Hx     Macular degeneration Neg Hx     Retinal detachment Neg Hx     Strabismus Neg Hx       PHYSICAL EXAM     VS: /77 (BP Location: Right arm, Patient Position: Sitting, BP Method: Large (Automatic))   Pulse 67   Wt (!) 136.8 kg (301 lb 9.4 oz)   SpO2 95%   BMI 39.79 kg/m²   GENERAL: Alert, NAD, well-appearing  EYES: EOMI, no conjunctival injection, no discharge, no infraorbital shiners  EARS: external auditory canals normal B/L, TM normal B/L  NOSE: NT 2-3 + B/L, + clear stringing mucus, no polyps visualized  ORAL: MMM, no ulcers, no thrush, + cobblestoning  LUNGS: CTAB, no w/r/c, no increased WOB  HEART: RRR, normal S1/S2, no m/g/r  DERM: no rashes, no skin breaks  NEURO: normal speech, no facial asymmetry     LABORATORY STUDIES     Component      Latest Ref Rng & Units 3/2/2023 8/5/2021 4/1/2021   WBC      3.90 - 12.70 K/uL 7.85 6.92    RBC      4.60 - 6.20 M/uL 4.15 (L) 4.28 (L)    Hemoglobin      14.0 - 18.0 g/dL 12.9 (L) 13.8 (L)    Hematocrit      40.0 - 54.0 % 41.1 39.9 (L)    MCV      82 - 98 fL 99 (H) 93    MCH      27.0 - 31.0 pg 31.1 (H) 32.2 (H)    MCHC      32.0 - 36.0 g/dL 31.4 (L) 34.6    RDW      11.5 - 14.5 % 14.1 13.8    Platelets      150 - 450 K/uL 256 228    MPV      9.2 - 12.9 fL 11.1 10.9     Immature Granulocytes      0.0 - 0.5 % 0.3 0.3    Gran # (ANC)      1.8 - 7.7 K/uL 4.3 4.2    Immature Grans (Abs)      0.00 - 0.04 K/uL 0.02 0.02    Lymph #      1.0 - 4.8 K/uL 2.2 1.7    Mono #      0.3 - 1.0 K/uL 1.0 0.8    Eos #      0.0 - 0.5 K/uL 0.3 0.1    Baso #      0.00 - 0.20 K/uL 0.06 0.07    Differential Method       Automated Automated    Sodium      136 - 145 mmol/L 140 138    Potassium      3.5 - 5.1 mmol/L 4.5 4.5    Chloride      95 - 110 mmol/L 105 103    CO2      23 - 29 mmol/L 28 25    Glucose      70 - 110 mg/dL 89 101    BUN      8 - 23 mg/dL 20 15    Creatinine      0.5 - 1.4 mg/dL 0.8 0.8    Calcium      8.7 - 10.5 mg/dL 9.7 9.7    PROTEIN TOTAL      6.0 - 8.4 g/dL 7.6     Albumin      3.5 - 5.2 g/dL 3.7     BILIRUBIN TOTAL      0.1 - 1.0 mg/dL 0.7     Alkaline Phosphatase      55 - 135 U/L 88     AST      10 - 40 U/L 23     ALT      10 - 44 U/L 25     TSH      0.400 - 4.000 uIU/mL   3.536      ALLERGEN TESTING     Immunocaps: Ordered.     IMAGING & OTHER DIAGNOSTICS     CT chest 3/9/23:  FINDINGS:  No intravenous contrast was administered for this examination.  Therefore, it may have diminished sensitivity for detection of certain abnormalities.  Thyroid gland: Hypoattenuating, correlate with function.  Trachea: Within normal limits.  Esophagus: Not well distended for full evaluation, but grossly unremarkable.  Cardiovascular: Moderately dilated left atrium.No pericardial effusion.Mild bilateral coronary artery calcific disease.  Aortic diameters at multiple stations as follows-  Annulus: 2.4 cm (603-120)  Sinuses of Valsalva: 3.3 cm (603-121)  Sinotubular junction: 3.1 cm (603-120)  Mid ascending aorta: 5.1 cm (603-117)  Mid arch: 3.1 cm (604-378)  Isthmus: 3.4 cm (604-378)  Mid descending: 3.9 cm (604-378)  Juxta hiatal hiatal 2.9 cm (604-379).  Lymph nodes: None abnormally enlarged.  Lungs/pleura/airways:  No evidence of pneumonia or pulmonary edema.  Upper abdomen:  Partially  imaged.  Numerous small calcified gallstones.  Stable few low-density hepatic lesions up to 1.5 cm, favor cysts versus hemangiomas.  Stable mild perirenal soft tissue stranding, likely related to some degree of medical renal disease.  Small hiatal hernia  Bones: Unremarkable for stated age.  Other: Dual chamber pacer/ICD  Impression:  1. Stable aneurysmal dilatation of the ascending aorta, maximum diameter up to 5.1 cm.  Findings likely stable allowing for differences in technique and for inter-and intra-observer variability.  2. Other findings as above.     CHART REVIEW     Reviewed ENT note, cardiothoracic surgery note, cardiology note, labs, imaging.     ASSESSMENT & PLAN     Adan Webb is a 73 y.o. male with     # Chronic rhinitis: Main symptom are congestion and post nasal drip, worse when he tries to go to bed. His throat clearing keeps his wife up at night. Symptoms perennial, but may be worse in spring. He found flonase helpful. Was switched to azelastine nasal spray, which he also finds helpful, but doesn't feel it lasts long enough. He reports he was on allergy shots for over a year in his 20's which helped at the time.  -immunocaps for aeroallergens ordered.  -continue azelastine 1 SEN BID, can increase to 2 SEN BID if needed.  -restart flonase 1 SEN BID, can increase to 2 SEN BID if needed.  -continue singulair for now, but if immunocaps negative, will consider discontinuing (as he doesn't know if its helping).  -if immunocaps positive, consider restarting daily loratadine or cetirizine.    # Respiratory symptoms: Started a couple of years ago. Patient has an albuterol inhaler prescribed for wheezing, but says he doesn't get audible wheezing, but more of a tickle in his throat/chest, helped by albuterol. He can get shortness of breath with exercise. He requires albuterol once every few months.  -continue albuterol prn.  -if symptoms worsen or become more frequent, will do workup.    # HTN, PVC's  with bifascicular block, ascending aortic aneurism: He is on beta blocker so therefore is not a candidate for AIT injections.     # History of augmentin allergy: In 2018, reports he developed pruritus and angioedema within an hour of the first dose of the course. This is concerning for IgE-mediated allergy. Offered penicillin allergy testing, but patient declined for now.  -continue avoidance of penicillin antibiotics.    Follow up: 3 months      Wilbert Daniel MD  Allergy/Immunology

## 2023-05-04 LAB
A ALTERNATA IGE QN: <0.1 KU/L
A FUMIGATUS IGE QN: <0.1 KU/L
ALLERGEN BOXELDER MAPLE TREE IGE: <0.1 KU/L
ALLERGEN MAPLE (BOX ELDER) CLASS: NORMAL
ALLERGEN PIGWEED IGE: <0.1 KU/L
ALLERGEN WHITE ASH TREE IGE: <0.1 KU/L
BAHIA GRASS IGE QN: <0.1 KU/L
BERMUDA GRASS IGE QN: <0.1 KU/L
CAT DANDER IGE QN: <0.1 KU/L
CEDAR IGE QN: <0.1 KU/L
COMMON PIGWEED CLASS: NORMAL
COTTONWOOD IGE QN: <0.1 KU/L
D FARINAE IGE QN: <0.1 KU/L
D PTERONYSS IGE QN: <0.1 KU/L
DEPRECATED A ALTERNATA IGE RAST QL: NORMAL
DEPRECATED A FUMIGATUS IGE RAST QL: NORMAL
DEPRECATED BAHIA GRASS IGE RAST QL: NORMAL
DEPRECATED BERMUDA GRASS IGE RAST QL: NORMAL
DEPRECATED CAT DANDER IGE RAST QL: NORMAL
DEPRECATED CEDAR IGE RAST QL: NORMAL
DEPRECATED COTTONWOOD IGE RAST QL: NORMAL
DEPRECATED D FARINAE IGE RAST QL: NORMAL
DEPRECATED D PTERONYSS IGE RAST QL: NORMAL
DEPRECATED DOG DANDER IGE RAST QL: NORMAL
DEPRECATED ELDER IGE RAST QL: NORMAL
DEPRECATED ENGL PLANTAIN IGE RAST QL: NORMAL
DEPRECATED HACKBERRY TREE IGE RAST QL: NORMAL
DEPRECATED JOHNSON GRASS IGE RAST QL: NORMAL
DEPRECATED PECAN/HICK TREE IGE RAST QL: NORMAL
DEPRECATED ROACH IGE RAST QL: ABNORMAL
DEPRECATED SALTWORT IGE RAST QL: NORMAL
DEPRECATED SHEEP SORREL IGE RAST QL: NORMAL
DEPRECATED SILVER BIRCH IGE RAST QL: NORMAL
DEPRECATED TIMOTHY IGE RAST QL: NORMAL
DEPRECATED WEST RAGWEED IGE RAST QL: NORMAL
DEPRECATED WHITE OAK IGE RAST QL: NORMAL
DOG DANDER IGE QN: <0.1 KU/L
ELDER IGE QN: <0.1 KU/L
ENGL PLANTAIN IGE QN: <0.1 KU/L
HACKBERRY TREE IGE QN: <0.1 KU/L
JOHNSON GRASS IGE QN: <0.1 KU/L
PECAN/HICK TREE IGE QN: <0.1 KU/L
ROACH IGE QN: 0.24 KU/L
SALTWORT IGE QN: <0.1 KU/L
SHEEP SORREL IGE QN: <0.1 KU/L
SILVER BIRCH IGE QN: <0.1 KU/L
TIMOTHY IGE QN: <0.1 KU/L
WEST RAGWEED IGE QN: <0.1 KU/L
WHITE ASH CLASS: NORMAL
WHITE OAK IGE QN: <0.1 KU/L

## 2023-05-05 ENCOUNTER — PATIENT MESSAGE (OUTPATIENT)
Dept: ALLERGY | Facility: CLINIC | Age: 73
End: 2023-05-05
Payer: MEDICARE

## 2023-05-24 ENCOUNTER — PES CALL (OUTPATIENT)
Dept: ADMINISTRATIVE | Facility: CLINIC | Age: 73
End: 2023-05-24
Payer: COMMERCIAL

## 2023-06-02 ENCOUNTER — PATIENT OUTREACH (OUTPATIENT)
Dept: ADMINISTRATIVE | Facility: HOSPITAL | Age: 73
End: 2023-06-02
Payer: COMMERCIAL

## 2023-06-02 NOTE — PROGRESS NOTES
Patient due for the following    High Dose Statin     Shingles Vaccine (1 of 2)    COVID-19 Vaccine (6 - Pfizer series)      Immunizations: reviewed and updated  Care Everywhere: triggered  Care Teams: up to date  Outreach: completed

## 2023-06-06 DIAGNOSIS — N32.81 OAB (OVERACTIVE BLADDER): ICD-10-CM

## 2023-06-06 RX ORDER — SOLIFENACIN SUCCINATE 10 MG/1
10 TABLET, FILM COATED ORAL DAILY
Qty: 30 TABLET | Refills: 3 | OUTPATIENT
Start: 2023-06-06 | End: 2024-06-05

## 2023-06-06 RX ORDER — MIRABEGRON 50 MG/1
50 TABLET, FILM COATED, EXTENDED RELEASE ORAL DAILY
Qty: 30 TABLET | Refills: 8 | OUTPATIENT
Start: 2023-06-06 | End: 2024-06-05

## 2023-06-06 NOTE — TELEPHONE ENCOUNTER
----- Message from Kaylen Isaac sent at 6/6/2023  3:32 PM CDT -----  Regarding: Apppt needed for refill  Contact: [phone]  Hi, pt was trying to refill his medication, but was told he needs an appt to get a refill on the mirabegron (MYRBETRIQ) 50 mg Tb24 and solifenacin (VESICARE) 10 MG tablet (only has One pill Left)    Pls call the pt to get him scheduled asap at [phone]

## 2023-06-06 NOTE — TELEPHONE ENCOUNTER
Left voice message to please call our schedulers at [phone] and ask to make an ti't with one of our NP.

## 2023-06-06 NOTE — TELEPHONE ENCOUNTER
No care due was identified.  [facility] Embedded Care Due Messages. Reference number: [id number].   6/06/2023 1:16:41 PM CDT

## 2023-06-07 RX ORDER — ROSUVASTATIN CALCIUM 10 MG/1
10 TABLET, COATED ORAL DAILY
Qty: 90 TABLET | Refills: 1 | Status: SHIPPED | OUTPATIENT
Start: 2023-06-07 | End: 2023-09-21

## 2023-06-07 NOTE — TELEPHONE ENCOUNTER
Refill Routing Note   Medication(s) are not appropriate for processing by [facility] for the following reason(s):      No active prescription written by PCP    [facility] action(s):  Defer None identified          Appointments  past 12m or future 3m with PCP    Date Provider   Last Visit   3/9/2023 Gina Reyes MD   Next Visit   Visit date not found Gina Reyes MD   ED visits in past 90 days: 0        Note composed:11:51 PM 06/06/2023

## 2023-06-08 ENCOUNTER — HOSPITAL ENCOUNTER (OUTPATIENT)
Dept: RADIOLOGY | Facility: HOSPITAL | Age: 73
Discharge: HOME | End: 2023-06-08
Attending: NURSE PRACTITIONER
Payer: MEDICARE

## 2023-06-08 ENCOUNTER — OFFICE VISIT (OUTPATIENT)
Dept: INTERNAL MEDICINE | Facility: CLINIC | Age: 73
End: 2023-06-08
Payer: MEDICARE

## 2023-06-08 VITALS
TEMPERATURE: 99 F | BODY MASS INDEX: 39.62 KG/M2 | HEART RATE: 68 BPM | HEIGHT: 73 IN | WEIGHT: 298.94 LBS | OXYGEN SATURATION: 96 % | DIASTOLIC BLOOD PRESSURE: 66 MMHG | SYSTOLIC BLOOD PRESSURE: 132 MMHG | RESPIRATION RATE: 12 BRPM

## 2023-06-08 DIAGNOSIS — K59.00 CONSTIPATION, UNSPECIFIED CONSTIPATION TYPE: ICD-10-CM

## 2023-06-08 DIAGNOSIS — R10.31 RIGHT LOWER QUADRANT ABDOMINAL PAIN: ICD-10-CM

## 2023-06-08 DIAGNOSIS — M54.50 ACUTE BILATERAL LOW BACK PAIN WITHOUT SCIATICA: Primary | ICD-10-CM

## 2023-06-08 PROCEDURE — 1125F PR PAIN SEVERITY QUANTIFIED, PAIN PRESENT: ICD-10-PCS | Mod: HCNC,CPTII,S$GLB, | Performed by: NURSE PRACTITIONER

## 2023-06-08 PROCEDURE — 96372 THER/PROPH/DIAG INJ SC/IM: CPT | Mod: HCNC,S$GLB,, | Performed by: NURSE PRACTITIONER

## 2023-06-08 PROCEDURE — 96372 PR INJECTION,THERAP/PROPH/DIAG2ST, IM OR SUBCUT: ICD-10-PCS | Mod: HCNC,S$GLB,, | Performed by: NURSE PRACTITIONER

## 2023-06-08 PROCEDURE — 1125F AMNT PAIN NOTED PAIN PRSNT: CPT | Mod: HCNC,CPTII,S$GLB, | Performed by: NURSE PRACTITIONER

## 2023-06-08 PROCEDURE — 74018 RADEX ABDOMEN 1 VIEW: CPT | Mod: 26,HCNC,, | Performed by: RADIOLOGY

## 2023-06-08 PROCEDURE — 3078F DIAST BP <80 MM HG: CPT | Mod: HCNC,CPTII,S$GLB, | Performed by: NURSE PRACTITIONER

## 2023-06-08 PROCEDURE — 1157F ADVNC CARE PLAN IN RCRD: CPT | Mod: HCNC,CPTII,S$GLB, | Performed by: NURSE PRACTITIONER

## 2023-06-08 PROCEDURE — 3288F FALL RISK ASSESSMENT DOCD: CPT | Mod: HCNC,CPTII,S$GLB, | Performed by: NURSE PRACTITIONER

## 2023-06-08 PROCEDURE — 3078F PR MOST RECENT DIASTOLIC BLOOD PRESSURE < 80 MM HG: ICD-10-PCS | Mod: HCNC,CPTII,S$GLB, | Performed by: NURSE PRACTITIONER

## 2023-06-08 PROCEDURE — 1101F PR PT FALLS ASSESS DOC 0-1 FALLS W/OUT INJ PAST YR: ICD-10-PCS | Mod: HCNC,CPTII,S$GLB, | Performed by: NURSE PRACTITIONER

## 2023-06-08 PROCEDURE — 1157F PR ADVANCE CARE PLAN OR EQUIV PRESENT IN MEDICAL RECORD: ICD-10-PCS | Mod: HCNC,CPTII,S$GLB, | Performed by: NURSE PRACTITIONER

## 2023-06-08 PROCEDURE — 99999 PR PBB SHADOW E&M-EST. PATIENT-LVL V: CPT | Mod: PBBFAC,HCNC,, | Performed by: NURSE PRACTITIONER

## 2023-06-08 PROCEDURE — 74018 RADEX ABDOMEN 1 VIEW: CPT | Mod: TC,HCNC,PO

## 2023-06-08 PROCEDURE — 99213 OFFICE O/P EST LOW 20 MIN: CPT | Mod: 25,HCNC,S$GLB, | Performed by: NURSE PRACTITIONER

## 2023-06-08 PROCEDURE — 1159F MED LIST DOCD IN RCRD: CPT | Mod: HCNC,CPTII,S$GLB, | Performed by: NURSE PRACTITIONER

## 2023-06-08 PROCEDURE — 3075F SYST BP GE 130 - 139MM HG: CPT | Mod: HCNC,CPTII,S$GLB, | Performed by: NURSE PRACTITIONER

## 2023-06-08 PROCEDURE — 1160F RVW MEDS BY RX/DR IN RCRD: CPT | Mod: HCNC,CPTII,S$GLB, | Performed by: NURSE PRACTITIONER

## 2023-06-08 PROCEDURE — 1101F PT FALLS ASSESS-DOCD LE1/YR: CPT | Mod: HCNC,CPTII,S$GLB, | Performed by: NURSE PRACTITIONER

## 2023-06-08 PROCEDURE — 1160F PR REVIEW ALL MEDS BY PRESCRIBER/CLIN PHARMACIST DOCUMENTED: ICD-10-PCS | Mod: HCNC,CPTII,S$GLB, | Performed by: NURSE PRACTITIONER

## 2023-06-08 PROCEDURE — 99999 PR PBB SHADOW E&M-EST. PATIENT-LVL V: ICD-10-PCS | Mod: PBBFAC,HCNC,, | Performed by: NURSE PRACTITIONER

## 2023-06-08 PROCEDURE — 1159F PR MEDICATION LIST DOCUMENTED IN MEDICAL RECORD: ICD-10-PCS | Mod: HCNC,CPTII,S$GLB, | Performed by: NURSE PRACTITIONER

## 2023-06-08 PROCEDURE — 99213 PR OFFICE/OUTPT VISIT, EST, LEVL III, 20-29 MIN: ICD-10-PCS | Mod: 25,HCNC,S$GLB, | Performed by: NURSE PRACTITIONER

## 2023-06-08 PROCEDURE — 74018 XR ABDOMEN AP 1 VIEW: ICD-10-PCS | Mod: 26,HCNC,, | Performed by: RADIOLOGY

## 2023-06-08 PROCEDURE — 3075F PR MOST RECENT SYSTOLIC BLOOD PRESS GE 130-139MM HG: ICD-10-PCS | Mod: HCNC,CPTII,S$GLB, | Performed by: NURSE PRACTITIONER

## 2023-06-08 PROCEDURE — 3288F PR FALLS RISK ASSESSMENT DOCUMENTED: ICD-10-PCS | Mod: HCNC,CPTII,S$GLB, | Performed by: NURSE PRACTITIONER

## 2023-06-08 RX ORDER — TRIAMCINOLONE ACETONIDE 40 MG/ML
40 INJECTION, SUSPENSION INTRA-ARTICULAR; INTRAMUSCULAR
Status: COMPLETED | OUTPATIENT
Start: 2023-06-08 | End: 2023-06-08

## 2023-06-08 RX ADMIN — TRIAMCINOLONE ACETONIDE 40 MG: 40 INJECTION, SUSPENSION INTRA-ARTICULAR; INTRAMUSCULAR at 04:06

## 2023-06-09 ENCOUNTER — PATIENT MESSAGE (OUTPATIENT)
Dept: INTERNAL MEDICINE | Facility: CLINIC | Age: 73
End: 2023-06-09
Payer: COMMERCIAL

## 2023-06-09 DIAGNOSIS — N32.81 OAB (OVERACTIVE BLADDER): ICD-10-CM

## 2023-06-09 DIAGNOSIS — M54.50 ACUTE BILATERAL LOW BACK PAIN WITHOUT SCIATICA: Primary | ICD-10-CM

## 2023-06-09 RX ORDER — METHOCARBAMOL 500 MG/1
500 TABLET, FILM COATED ORAL 2 TIMES DAILY PRN
Qty: 15 TABLET | Refills: 0 | Status: SHIPPED | OUTPATIENT
Start: 2023-06-09 | End: 2023-06-21 | Stop reason: SDUPTHER

## 2023-06-09 RX ORDER — MIRABEGRON 50 MG/1
50 TABLET, FILM COATED, EXTENDED RELEASE ORAL DAILY
Qty: 30 TABLET | Refills: 8 | OUTPATIENT
Start: 2023-06-09 | End: 2024-06-08

## 2023-06-09 NOTE — PROGRESS NOTES
Subjective:       Patient ID: Adan Webb is a 73 y.o. male.    Chief Complaint: Flank Pain (Right side ) and Back Pain (Right side, lower back; pain w/activity)      Patient is a 73 y.o. male who traditionally follows with Gina Reyes MD presenting today for:    Side/Back Pain  Patient notes lower back and right side pain for months that improved with sitting and was made worse with getting up and bending. He denies trauma. Described as a sharp, sensitive pain that does not radiate or shoot down leg. He is having issues with constipation but denies other GI symptoms.     Review of patient's allergies indicates:   Allergen Reactions    Augmentin [amoxicillin-pot clavulanate] Rash     Rash, confusion, TIA like symptoms    Azithromycin Rash    Avodart [dutasteride]     House dust mite Other (See Comments)    Naproxen     Ragweed     Synthroid [levothyroxine] Rash       Medication List with Changes/Refills   Current Medications    ALBUTEROL (VENTOLIN HFA) 90 MCG/ACTUATION INHALER    Inhale 2 puffs into the lungs every 6 (six) hours as needed for Wheezing. Rescue    ALLERGY RELIEF, LORATADINE, 10 MG TABLET    Take 10 mg by mouth once daily.    AZELASTINE (ASTELIN) 137 MCG (0.1 %) NASAL SPRAY    1 spray (137 mcg total) by Nasal route 2 (two) times daily.    BABY ASPIRIN ORAL    Take 81 tablets by mouth every evening.     CHOLECALCIFEROL, VITAMIN D3, 1,000 UNIT CAPSULE    Take 1,000 Units by mouth once daily.    COENZYME Q10 100 MG CAPSULE    Take by mouth once daily.    DICLOFENAC SODIUM (VOLTAREN) 1 % GEL    Apply 2 g topically 4 (four) times daily.    GLUCOSAMINE-CHONDROITIN 500-400 MG TABLET    Take 1 tablet by mouth 2 (two) times daily.     METOPROLOL SUCCINATE (TOPROL-XL) 50 MG 24 HR TABLET    TAKE 1 TABLET (50 MG TOTAL) BY MOUTH ONCE DAILY.    MIRABEGRON (MYRBETRIQ) 50 MG TB24    Take 1 tablet (50 mg total) by mouth once daily.    MULTIVITAMIN W-MINERALS/LUTEIN (CENTRUM SILVER ORAL)    Take by mouth once  "daily.     NYSTATIN-TRIAMCINOLONE (MYCOLOG II) CREAM    APPLY TOPICALLY 4 (FOUR) TIMES DAILY.    OMEGA-3 FATTY ACIDS 300 MG CAP    Take 1 tablet by mouth once daily.     OMEPRAZOLE (PRILOSEC) 40 MG CAPSULE    Take 1 capsule (40 mg total) by mouth every morning.    ROSUVASTATIN (CRESTOR) 10 MG TABLET    Take 1 tablet (10 mg total) by mouth once daily.    SOLIFENACIN (VESICARE) 10 MG TABLET    TAKE 1 TABLET (10 MG TOTAL) BY MOUTH ONCE DAILY.    TAMSULOSIN (FLOMAX) 0.4 MG CAP    Take 1 capsule (0.4 mg total) by mouth once daily.    TURMERIC ORAL    Take by mouth. Pt take 1 in the evening.    UNABLE TO FIND    Take by mouth nightly. tumeric    VITAMIN B COMPLEX (SUPER B COMPLEX-B-12 ORAL)    Take by mouth.   Discontinued Medications    MONTELUKAST (SINGULAIR) 10 MG TABLET    Take 1 tablet (10 mg total) by mouth once daily.     Medical, social and surgical history has been reviewed with the patient.      Review of Systems   Constitutional:  Negative for chills and fever.   Gastrointestinal:  Positive for abdominal pain and constipation. Negative for diarrhea, nausea and vomiting.   Genitourinary:  Positive for flank pain. Negative for frequency and urgency.   Musculoskeletal:  Positive for back pain.      Objective:   /66 (BP Location: Right arm, Patient Position: Sitting, BP Method: Large (Manual))   Pulse 68   Temp 98.8 °F (37.1 °C) (Temporal)   Resp 12   Ht 6' 1" (1.854 m)   Wt 135.6 kg (298 lb 15.1 oz)   SpO2 96%   BMI 39.44 kg/m²     Physical Exam  Vitals reviewed.   Constitutional:       Appearance: Normal appearance.   HENT:      Head: Normocephalic and atraumatic.   Cardiovascular:      Rate and Rhythm: Normal rate and regular rhythm.      Heart sounds: Normal heart sounds. No murmur heard.  Pulmonary:      Effort: Pulmonary effort is normal.      Breath sounds: Normal breath sounds. No wheezing.   Abdominal:      General: Bowel sounds are normal.      Palpations: Abdomen is soft.      Tenderness: " There is abdominal tenderness in the right lower quadrant. There is no right CVA tenderness or left CVA tenderness.   Musculoskeletal:      Lumbar back: Tenderness present. Normal range of motion. Negative right straight leg raise test and negative left straight leg raise test.      Comments: Left hip weakness with ROM against resistance    Skin:     General: Skin is warm and dry.   Neurological:      Mental Status: He is alert and oriented to person, place, and time.     Last Labs:  Glucose   Date Value Ref Range Status   03/02/2023 89 70 - 110 mg/dL Final   08/05/2021 101 70 - 110 mg/dL Final     BUN   Date Value Ref Range Status   03/02/2023 20 8 - 23 mg/dL Final   08/05/2021 15 8 - 23 mg/dL Final     Creatinine   Date Value Ref Range Status   03/02/2023 0.8 0.5 - 1.4 mg/dL Final   08/05/2021 0.8 0.5 - 1.4 mg/dL Final     Cholesterol   Date Value Ref Range Status   03/02/2023 124 120 - 199 mg/dL Final     Comment:     The National Cholesterol Education Program (NCEP) has set the  following guidelines (reference ranges) for Cholesterol:  Optimal.....................<200 mg/dL  Borderline High.............200-239 mg/dL  High........................> or = 240 mg/dL     12/02/2020 186 120 - 199 mg/dL Final     Comment:     The National Cholesterol Education Program (NCEP) has set the  following guidelines (reference ranges) for Cholesterol:  Optimal.....................<200 mg/dL  Borderline High.............200-239 mg/dL  High........................> or = 240 mg/dL       Hemoglobin A1C   Date Value Ref Range Status   05/27/2018 5.3 4.0 - 5.6 % Final     Comment:     According to ADA guidelines, hemoglobin A1c <7.0% represents  optimal control in non-pregnant diabetic patients. Different  metrics may apply to specific patient populations.   Standards of Medical Care in Diabetes-2016.  For the purpose of screening for the presence of diabetes:  <5.7%     Consistent with the absence of diabetes  5.7-6.4%  Consistent  with increasing risk for diabetes   (prediabetes)  >or=6.5%  Consistent with diabetes  Currently, no consensus exists for use of hemoglobin A1c  for diagnosis of diabetes for children.  This Hemoglobin A1c assay has significant interference with fetal   hemoglobin   (HbF). The results are invalid for patients with abnormal amounts of   HbF,   including those with known Hereditary Persistence   of Fetal Hemoglobin. Heterozygous hemoglobin variants (HbAS, HbAC,   HbAD, HbAE, HbA2) do not significantly interfere with this assay;   however, presence of multiple variants in a sample may impact the %   interference.       Hemoglobin   Date Value Ref Range Status   03/02/2023 12.9 (L) 14.0 - 18.0 g/dL Final   08/05/2021 13.8 (L) 14.0 - 18.0 g/dL Final     Hematocrit   Date Value Ref Range Status   03/02/2023 41.1 40.0 - 54.0 % Final   08/05/2021 39.9 (L) 40.0 - 54.0 % Final       I have reviewed the following:     Details / Date    [x]   Labs     []   Micro     []   Pathology     []   Imaging     []   Cardiology Procedures     []   Other        Assessment and Plan:     1. Acute bilateral low back pain without sciatica    - triamcinolone acetonide injection 40 mg  - Urinalysis, Reflex to Urine Culture Urine, Clean Catch; Future    2. Right lower quadrant abdominal pain    Patient advised of alarming s/s that warrant ED follow up.     - X-Ray Abdomen AP 1 View; Future  - Urinalysis, Reflex to Urine Culture Urine, Clean Catch; Future    3. Constipation, unspecified constipation type    Patient advised to take Miralax daily.     - X-Ray Abdomen AP 1 View; Future

## 2023-06-09 NOTE — TELEPHONE ENCOUNTER
----- Message from Benjamín Lawton sent at 6/9/2023  3:55 PM CDT -----  Regarding: pt advice  Contact: [phone]  Pt is currrently calling for an sooner appt [facility] provider . Pt states is running out of medication and this weekend will be the last of medication. Please call

## 2023-06-09 NOTE — TELEPHONE ENCOUNTER
Left message to call our schedulers at [phone] to schedule an ti't with one of our nurse practitioners

## 2023-06-12 DIAGNOSIS — N32.81 OAB (OVERACTIVE BLADDER): ICD-10-CM

## 2023-06-12 RX ORDER — SOLIFENACIN SUCCINATE 10 MG/1
10 TABLET, FILM COATED ORAL DAILY
Qty: 30 TABLET | Refills: 11 | Status: SHIPPED | OUTPATIENT
Start: 2023-06-12 | End: 2023-06-19 | Stop reason: SDUPTHER

## 2023-06-12 RX ORDER — MIRABEGRON 50 MG/1
50 TABLET, FILM COATED, EXTENDED RELEASE ORAL DAILY
Qty: 90 TABLET | Refills: 3 | Status: SHIPPED | OUTPATIENT
Start: 2023-06-12 | End: 2023-06-19

## 2023-06-12 NOTE — TELEPHONE ENCOUNTER
----- Message from Cornelio Pena sent at 6/12/2023  1:16 PM CDT -----  Regarding: refill requesting  Contact: pt  Refill request.  Pt is scheduled for 6/19      solifenacin (VESICARE) 10 MG tablet    mirabegron (MYRBETRIQ) 50 mg Tb24        [facility] [place] [address]  [address] [facility] [address]  [facility] [address]  Phone: [phone] Fax: [phone]    Confirmed contact below:  Contact Name:Adan Jon  Phone Number: [phone]

## 2023-06-13 ENCOUNTER — OFFICE VISIT (OUTPATIENT)
Dept: ELECTROPHYSIOLOGY | Facility: CLINIC | Age: 73
End: 2023-06-13
Payer: MEDICARE

## 2023-06-13 ENCOUNTER — CLINICAL SUPPORT (OUTPATIENT)
Dept: CARDIOLOGY | Facility: HOSPITAL | Age: 73
End: 2023-06-13
Attending: INTERNAL MEDICINE
Payer: MEDICARE

## 2023-06-13 VITALS
BODY MASS INDEX: 39.33 KG/M2 | SYSTOLIC BLOOD PRESSURE: 135 MMHG | HEART RATE: 64 BPM | HEIGHT: 73 IN | WEIGHT: 296.75 LBS | DIASTOLIC BLOOD PRESSURE: 72 MMHG

## 2023-06-13 DIAGNOSIS — Z95.0 PRESENCE OF CARDIAC PACEMAKER: Primary | ICD-10-CM

## 2023-06-13 DIAGNOSIS — I10 HTN (HYPERTENSION), BENIGN: ICD-10-CM

## 2023-06-13 DIAGNOSIS — G45.9 TRANSIENT CEREBRAL ISCHEMIA, UNSPECIFIED TYPE: ICD-10-CM

## 2023-06-13 DIAGNOSIS — I49.3 PVC (PREMATURE VENTRICULAR CONTRACTION): ICD-10-CM

## 2023-06-13 DIAGNOSIS — E66.01 SEVERE OBESITY (BMI 35.0-39.9) WITH COMORBIDITY: ICD-10-CM

## 2023-06-13 DIAGNOSIS — I49.3 PVC (PREMATURE VENTRICULAR CONTRACTION): Primary | ICD-10-CM

## 2023-06-13 DIAGNOSIS — I48.91 ATRIAL FIBRILLATION, UNSPECIFIED TYPE: ICD-10-CM

## 2023-06-13 PROCEDURE — 99214 OFFICE O/P EST MOD 30 MIN: CPT | Mod: HCNC,S$GLB,, | Performed by: INTERNAL MEDICINE

## 2023-06-13 PROCEDURE — 1159F PR MEDICATION LIST DOCUMENTED IN MEDICAL RECORD: ICD-10-PCS | Mod: HCNC,CPTII,S$GLB, | Performed by: INTERNAL MEDICINE

## 2023-06-13 PROCEDURE — 1157F ADVNC CARE PLAN IN RCRD: CPT | Mod: HCNC,CPTII,S$GLB, | Performed by: INTERNAL MEDICINE

## 2023-06-13 PROCEDURE — 93005 RHYTHM STRIP: ICD-10-PCS | Mod: HCNC,S$GLB,, | Performed by: INTERNAL MEDICINE

## 2023-06-13 PROCEDURE — 93280 CARDIAC DEVICE CHECK - IN CLINIC & HOSPITAL: ICD-10-PCS | Mod: 26,HCNC,, | Performed by: INTERNAL MEDICINE

## 2023-06-13 PROCEDURE — 1126F AMNT PAIN NOTED NONE PRSNT: CPT | Mod: HCNC,CPTII,S$GLB, | Performed by: INTERNAL MEDICINE

## 2023-06-13 PROCEDURE — 99999 PR PBB SHADOW E&M-EST. PATIENT-LVL IV: ICD-10-PCS | Mod: PBBFAC,HCNC,, | Performed by: INTERNAL MEDICINE

## 2023-06-13 PROCEDURE — 93010 RHYTHM STRIP: ICD-10-PCS | Mod: HCNC,S$GLB,, | Performed by: INTERNAL MEDICINE

## 2023-06-13 PROCEDURE — 1157F PR ADVANCE CARE PLAN OR EQUIV PRESENT IN MEDICAL RECORD: ICD-10-PCS | Mod: HCNC,CPTII,S$GLB, | Performed by: INTERNAL MEDICINE

## 2023-06-13 PROCEDURE — 93010 ELECTROCARDIOGRAM REPORT: CPT | Mod: HCNC,S$GLB,, | Performed by: INTERNAL MEDICINE

## 2023-06-13 PROCEDURE — 1101F PR PT FALLS ASSESS DOC 0-1 FALLS W/OUT INJ PAST YR: ICD-10-PCS | Mod: HCNC,CPTII,S$GLB, | Performed by: INTERNAL MEDICINE

## 2023-06-13 PROCEDURE — 93280 PM DEVICE PROGR EVAL DUAL: CPT | Mod: 26,HCNC,, | Performed by: INTERNAL MEDICINE

## 2023-06-13 PROCEDURE — 1101F PT FALLS ASSESS-DOCD LE1/YR: CPT | Mod: HCNC,CPTII,S$GLB, | Performed by: INTERNAL MEDICINE

## 2023-06-13 PROCEDURE — 3288F FALL RISK ASSESSMENT DOCD: CPT | Mod: HCNC,CPTII,S$GLB, | Performed by: INTERNAL MEDICINE

## 2023-06-13 PROCEDURE — 1160F PR REVIEW ALL MEDS BY PRESCRIBER/CLIN PHARMACIST DOCUMENTED: ICD-10-PCS | Mod: HCNC,CPTII,S$GLB, | Performed by: INTERNAL MEDICINE

## 2023-06-13 PROCEDURE — 3075F SYST BP GE 130 - 139MM HG: CPT | Mod: HCNC,CPTII,S$GLB, | Performed by: INTERNAL MEDICINE

## 2023-06-13 PROCEDURE — 3078F DIAST BP <80 MM HG: CPT | Mod: HCNC,CPTII,S$GLB, | Performed by: INTERNAL MEDICINE

## 2023-06-13 PROCEDURE — 3288F PR FALLS RISK ASSESSMENT DOCUMENTED: ICD-10-PCS | Mod: HCNC,CPTII,S$GLB, | Performed by: INTERNAL MEDICINE

## 2023-06-13 PROCEDURE — 93280 PM DEVICE PROGR EVAL DUAL: CPT | Mod: HCNC

## 2023-06-13 PROCEDURE — 1160F RVW MEDS BY RX/DR IN RCRD: CPT | Mod: HCNC,CPTII,S$GLB, | Performed by: INTERNAL MEDICINE

## 2023-06-13 PROCEDURE — 1159F MED LIST DOCD IN RCRD: CPT | Mod: HCNC,CPTII,S$GLB, | Performed by: INTERNAL MEDICINE

## 2023-06-13 PROCEDURE — 3078F PR MOST RECENT DIASTOLIC BLOOD PRESSURE < 80 MM HG: ICD-10-PCS | Mod: HCNC,CPTII,S$GLB, | Performed by: INTERNAL MEDICINE

## 2023-06-13 PROCEDURE — 99214 PR OFFICE/OUTPT VISIT, EST, LEVL IV, 30-39 MIN: ICD-10-PCS | Mod: HCNC,S$GLB,, | Performed by: INTERNAL MEDICINE

## 2023-06-13 PROCEDURE — 99999 PR PBB SHADOW E&M-EST. PATIENT-LVL IV: CPT | Mod: PBBFAC,HCNC,, | Performed by: INTERNAL MEDICINE

## 2023-06-13 PROCEDURE — 3075F PR MOST RECENT SYSTOLIC BLOOD PRESS GE 130-139MM HG: ICD-10-PCS | Mod: HCNC,CPTII,S$GLB, | Performed by: INTERNAL MEDICINE

## 2023-06-13 PROCEDURE — 1126F PR PAIN SEVERITY QUANTIFIED, NO PAIN PRESENT: ICD-10-PCS | Mod: HCNC,CPTII,S$GLB, | Performed by: INTERNAL MEDICINE

## 2023-06-13 PROCEDURE — 93005 ELECTROCARDIOGRAM TRACING: CPT | Mod: HCNC,S$GLB,, | Performed by: INTERNAL MEDICINE

## 2023-06-13 NOTE — PROGRESS NOTES
Subjective:    Patient ID:  Adan Webb is a 73 y.o. male who presents for follow-up of Pacemaker Check      HPI   Prior Hx:  I had the pleasure of seeing Mr. Cain today in our electrophysiology clinic in follow-up for his PVCs. 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 He was seen in EP clinic 1/19/2021 for his PVCs. He noted only episodes of intermittent skipped heart beats with slow heart rates intermittently noted on a pulse-oximeter. I recommended Holter monitor/ECHO and likely uptitration of metoprolol. Suspect his observed bradycardia on his pulse ox was secondary to ectopy. Plan was if no bradycardia issues on holter would likely increase metoprolol. If EF abnormal, would recommend stress test and if normal consider PVC ablation.    4/2021: Mr. Cain returned for follow-up. Holter monitor noted sinus rhythm with an average rate of 69 bpm with a 1.2% PVC burden. His echocardiogram noted normal LV function with severe aortic root dilation (5.1cm). He is now on 50mg of metoprolol succinate daily. He has no current complaints besides over-active bladder.    7/6/2021: Pt experienced LOC event. Event monitor ordered.     8/4/2021:  Received an alert on patient's event monitor for sinus pause/bradycardia. Patient with symptomatic PVCs on 50mg of metoprolol who had an episode of near syncope for which a monitor was applied. For this current event, reports he just ate breakfast and was lying in his recliner when he felt a sudden wave of light-headedness from his feet to his head. His wife reports his eyes rolled back in his head and he started shaking. He is unsure if he fully lost consciousness however does not remember shaking. He reports once he became fully awake he noted he was very tired, which has persisted.   Event monitor strips reviewed and are consistent with a few  seconds of sinus rate slowing followed by several sinus pauses up to 6.5 seconds in duration with a sinus rate of ~20-30 bpm in between followed by sinus rate speeding and then normal sinus rhythm. He has a RBBB/LAFB however no complete heart block was observed. Taken together this was likely a vagal episode, unclear what the trigger was. Recommend PPM implantation.      8/5/2021: Successful implantation of PPM Dual.     3/2022: Seen by Lian Oates. Today he reports he has had no more syncopal spells device device implant. Primary complaint is back pain and nocturia. No CP, THACKER, palpitations, LH, syncope.    Interim Hx:  Mr. Webb returns for yearly PPM follow-up. He is doing well. No syncope.    In-clinic PPM check notes stable lead parameters with 72% RA and no RV pacing. No arrhythmias. Estimated battery longevity 7 years and 7 months.    My interpretation of today's in clinic ECG is sinus rhythm with RBBB/LAFB.    Review of Systems   Constitutional: Negative for fever and malaise/fatigue.   HENT:  Negative for congestion and sore throat.    Eyes:  Negative for blurred vision and visual disturbance.   Cardiovascular:  Negative for chest pain, dyspnea on exertion, irregular heartbeat, near-syncope, palpitations and syncope.   Respiratory:  Negative for cough and shortness of breath.    Hematologic/Lymphatic: Negative for bleeding problem. Does not bruise/bleed easily.   Skin: Negative.    Musculoskeletal: Negative.    Gastrointestinal:  Negative for bloating, abdominal pain, hematochezia and melena.   Neurological:  Negative for focal weakness and weakness.   Psychiatric/Behavioral: Negative.        Objective:    Physical Exam  Vitals reviewed.   Constitutional:       General: He is not in acute distress.     Appearance: He is well-developed. He is not diaphoretic.   HENT:      Head: Normocephalic and atraumatic.   Eyes:      General:         Right eye: No discharge.         Left eye: No discharge.       Conjunctiva/sclera: Conjunctivae normal.   Cardiovascular:      Rate and Rhythm: Normal rate and regular rhythm.      Heart sounds: No murmur heard.    No friction rub. No gallop.   Pulmonary:      Effort: Pulmonary effort is normal. No respiratory distress.      Breath sounds: Normal breath sounds. No wheezing or rales.   Abdominal:      General: Bowel sounds are normal. There is no distension.      Palpations: Abdomen is soft.      Tenderness: There is no abdominal tenderness.   Musculoskeletal:      Cervical back: Neck supple.   Skin:     General: Skin is warm and dry.   Neurological:      Mental Status: He is alert and oriented to person, place, and time.   Psychiatric:         Behavior: Behavior normal.         Thought Content: Thought content normal.         Judgment: Judgment normal.         Assessment:       1. Presence of cardiac pacemaker    2. HTN (hypertension), benign    3. Transient cerebral ischemia, unspecified type    4. Severe obesity (BMI 35.0-39.9) with comorbidity         Plan:       In summary, Mr. Cain is a pleasant 73 year-old man with frequent, mildly symptomatic PVCs (papillary muscle in origin?) with baseline RBBB/LAFB, AAA, and hypertension with cardioinhibitory syncope with sinus pauses now s/p dcPPM. PPM is operating normally. Continue remote checks.     RTC in 1 year    Thank you for allowing me to participate in the care of this patient. Please do not hesitate to call me with any questions or concerns.    Sheldon Miranda MD, PhD  Cardiac Electrophysiology

## 2023-06-19 ENCOUNTER — OFFICE VISIT (OUTPATIENT)
Dept: UROLOGY | Facility: CLINIC | Age: 73
End: 2023-06-19
Payer: MEDICARE

## 2023-06-19 ENCOUNTER — TELEPHONE (OUTPATIENT)
Dept: UROLOGY | Facility: CLINIC | Age: 73
End: 2023-06-19
Payer: COMMERCIAL

## 2023-06-19 VITALS
BODY MASS INDEX: 39.1 KG/M2 | WEIGHT: 295 LBS | HEART RATE: 75 BPM | DIASTOLIC BLOOD PRESSURE: 80 MMHG | SYSTOLIC BLOOD PRESSURE: 126 MMHG | HEIGHT: 73 IN

## 2023-06-19 DIAGNOSIS — N40.1 BENIGN PROSTATIC HYPERPLASIA WITH NOCTURIA: ICD-10-CM

## 2023-06-19 DIAGNOSIS — R35.1 BENIGN PROSTATIC HYPERPLASIA WITH NOCTURIA: ICD-10-CM

## 2023-06-19 DIAGNOSIS — N32.81 OAB (OVERACTIVE BLADDER): Primary | ICD-10-CM

## 2023-06-19 DIAGNOSIS — R31.29 HEMATURIA, MICROSCOPIC: ICD-10-CM

## 2023-06-19 DIAGNOSIS — R35.1 NOCTURIA: ICD-10-CM

## 2023-06-19 DIAGNOSIS — N39.41 URGE INCONTINENCE: ICD-10-CM

## 2023-06-19 PROCEDURE — 1125F AMNT PAIN NOTED PAIN PRSNT: CPT | Mod: CPTII,S$GLB,, | Performed by: UROLOGY

## 2023-06-19 PROCEDURE — 3079F PR MOST RECENT DIASTOLIC BLOOD PRESSURE 80-89 MM HG: ICD-10-PCS | Mod: CPTII,S$GLB,, | Performed by: UROLOGY

## 2023-06-19 PROCEDURE — 87086 URINE CULTURE/COLONY COUNT: CPT | Mod: HCNC | Performed by: UROLOGY

## 2023-06-19 PROCEDURE — 1101F PR PT FALLS ASSESS DOC 0-1 FALLS W/OUT INJ PAST YR: ICD-10-PCS | Mod: CPTII,S$GLB,, | Performed by: UROLOGY

## 2023-06-19 PROCEDURE — 99999 PR PBB SHADOW E&M-EST. PATIENT-LVL IV: CPT | Mod: PBBFAC,,, | Performed by: UROLOGY

## 2023-06-19 PROCEDURE — 1157F PR ADVANCE CARE PLAN OR EQUIV PRESENT IN MEDICAL RECORD: ICD-10-PCS | Mod: CPTII,S$GLB,, | Performed by: UROLOGY

## 2023-06-19 PROCEDURE — 99999 PR PBB SHADOW E&M-EST. PATIENT-LVL IV: ICD-10-PCS | Mod: PBBFAC,,, | Performed by: UROLOGY

## 2023-06-19 PROCEDURE — 99215 PR OFFICE/OUTPT VISIT, EST, LEVL V, 40-54 MIN: ICD-10-PCS | Mod: 57,S$GLB,, | Performed by: UROLOGY

## 2023-06-19 PROCEDURE — 3288F PR FALLS RISK ASSESSMENT DOCUMENTED: ICD-10-PCS | Mod: CPTII,S$GLB,, | Performed by: UROLOGY

## 2023-06-19 PROCEDURE — 1159F PR MEDICATION LIST DOCUMENTED IN MEDICAL RECORD: ICD-10-PCS | Mod: CPTII,S$GLB,, | Performed by: UROLOGY

## 2023-06-19 PROCEDURE — 3074F SYST BP LT 130 MM HG: CPT | Mod: CPTII,S$GLB,, | Performed by: UROLOGY

## 2023-06-19 PROCEDURE — 3074F PR MOST RECENT SYSTOLIC BLOOD PRESSURE < 130 MM HG: ICD-10-PCS | Mod: CPTII,S$GLB,, | Performed by: UROLOGY

## 2023-06-19 PROCEDURE — 1101F PT FALLS ASSESS-DOCD LE1/YR: CPT | Mod: CPTII,S$GLB,, | Performed by: UROLOGY

## 2023-06-19 PROCEDURE — 1157F ADVNC CARE PLAN IN RCRD: CPT | Mod: CPTII,S$GLB,, | Performed by: UROLOGY

## 2023-06-19 PROCEDURE — 1125F PR PAIN SEVERITY QUANTIFIED, PAIN PRESENT: ICD-10-PCS | Mod: CPTII,S$GLB,, | Performed by: UROLOGY

## 2023-06-19 PROCEDURE — 1159F MED LIST DOCD IN RCRD: CPT | Mod: CPTII,S$GLB,, | Performed by: UROLOGY

## 2023-06-19 PROCEDURE — 99215 OFFICE O/P EST HI 40 MIN: CPT | Mod: 57,S$GLB,, | Performed by: UROLOGY

## 2023-06-19 PROCEDURE — 3288F FALL RISK ASSESSMENT DOCD: CPT | Mod: CPTII,S$GLB,, | Performed by: UROLOGY

## 2023-06-19 PROCEDURE — 3079F DIAST BP 80-89 MM HG: CPT | Mod: CPTII,S$GLB,, | Performed by: UROLOGY

## 2023-06-19 RX ORDER — TAMSULOSIN HYDROCHLORIDE 0.4 MG/1
0.4 CAPSULE ORAL DAILY
Qty: 30 CAPSULE | Refills: 11 | Status: SHIPPED | OUTPATIENT
Start: 2023-06-19 | End: 2023-10-03 | Stop reason: SDUPTHER

## 2023-06-19 RX ORDER — MIRABEGRON 50 MG/1
50 TABLET, FILM COATED, EXTENDED RELEASE ORAL DAILY
Qty: 30 TABLET | Refills: 11 | Status: ON HOLD | OUTPATIENT
Start: 2023-06-19 | End: 2023-07-26 | Stop reason: HOSPADM

## 2023-06-19 RX ORDER — SOLIFENACIN SUCCINATE 10 MG/1
10 TABLET, FILM COATED ORAL DAILY
Qty: 30 TABLET | Refills: 11 | Status: ON HOLD | OUTPATIENT
Start: 2023-06-19 | End: 2023-07-26 | Stop reason: HOSPADM

## 2023-06-19 NOTE — PROGRESS NOTES
CC: urgency and urge incontinence    Adan Webb is a 73 y.o. man who is here for the evaluation of Follow-up    A new pt referred by his PCP, Gina Reyes MD   He was seen by Dr. Heath and Dr. Wesley in the past.    C/o urine leakage with urgency, uses 3 to 4 pads a day.  C/o nocturia 2 to 3 x,   he does not tend to leak urine at night.  He is using vesicare and myrbetriq.  He was scheduled to have cysto botox injection last year but he cancelled it.    Hx of urinary incontinence of several years duration. He reports bother is associated without urinary daytime frequency (2-3x daily though this may not be accurate), with nocturia (2-5x per night) and with urgency that results in urinary incontinence daily. He reports no stress urinary incontinence associated with exertion. He requires daily pads (3 pads/day).  He has decreased overall fluid intake. He has eliminated caffeine. He reports urinary incontinence is equally bothersome during the day and night.     He denies symptoms of irritative voiding including dysuria.  He denies symptoms of obstructive voiding including decreased stream, hesitancy, intermittency, post void dribbling and sense of incomplete emptying. Bladder scan PVR was 0 mL. His history includes no notation of urolithiasis, hematuria, prior pelvic surgery, previous prolapse or incontinence procedures or neurological symptoms/diagnoses. He denies all other prior pelvic surgeries or neurologic diagnoses.     Previous incontinence therapies:  Behavioral Therapy: (fluid/dietary modification) -  provided no benefit  Medication: (VESIcare (provided no benefit)  Myrbetriq (provided no benefit)  Flomax: Provided minimal improvement     He was studied by Dr. GUTIERREZ With urodynamic study on 10/25/21:  Adult Urodynamics. On filling phase he demonstrates early first and strong desire with a small bladder capacity. There is detrusor overactivity (DO) demonstrated on this exam (though absence of DO on  urodynamic evaluation does not exclude it as causative agent of urgency symptoms). Bladder compliance at capacity is normal. On fluoroscopy, the bladder contour is smooth. There is no VUR demonstrated on this exam.      On stress testing, with adequate cough and valsalva effort, there is no MIO demonstrated and patient reports no clinical history of MIO.     The voiding study at the time of DO was used to calculate his CALLES. This was done because flow rate was better (due to higher capacity) on his first voiding trial and demonstrated a lower BOOI than on subsequent flow studies. On voiding phase, voiding pressures are within normal range and flow rate is normal. On VCUG, the urethra appears patent throughout voiding. The patient empties completely. The patient reports this is the normal voiding pattern.      No evidence of Bladder Outlet Obstruction. Patient was offered additional pharmacologic therapies as well as tibial neuromodulation, Botox injections, or sacral neuromodulation.       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, didn't use ear protection    Dermatitis 12/9/2020    nystat groin    DJD (degenerative joint disease) of hip 1/29/2015    Enlarged liver 4/8/2021    US 4/21     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    MCA , see MRI    Hiatal hernia 4/1/2021    CT chest tiny 2018    HTN (hypertension), benign 8/20/2020     Morbid obesity with BMI of 40.0-44.9, adult 1/29/2015    OAB (overactive bladder) 4/1/2021    Rash with pads    Right-sided low back pain with right-sided sciatica 3/6/2019    Seasonal allergic rhinitis due to pollen 4/13/2016    Thoracic aortic aneurysm without rupture 05/27/2018    tx with Su, Cardiologist Dr Stone, 5.1 CTS Dr Duenas, follows yearly    Transient cerebral ischemia 6/6/2018     Past Surgical History:   Procedure Laterality Date    A-V CARDIAC PACEMAKER INSERTION N/A 08/05/2021    Procedure: INSERTION, CARDIAC PACEMAKER, DUAL CHAMBER;  Surgeon: Sheldon Miranda MD;  Location: [facility] EP LAB;  Service: Cardiology;  Laterality: N/A;  Near syncope,SB,Sinus Pause, RBBB,LAFB, Dual PPM, BIO, MAC, AL, 3 Prep    ADENOIDECTOMY      CATARACT EXTRACTION      CYSTOSCOPY WITH URODYNAMIC TESTING N/A 10/25/2021    Procedure: CYSTOSCOPY, WITH URODYNAMIC TESTING FLOUROSCOPIC;  Surgeon: Sancho Wesley MD;  Location: [facility] OR [address] [place] [address];  Service: Urology;  Laterality: N/A;  1hr    HERNIA REPAIR      TONSILLECTOMY      YAG LAser Capsulotomy Bilateral     2/18/2019 OS Dr. Cornelius     Social History     Tobacco Use    Smoking status: Former     Packs/day: 6.00     Years: 1.00     Pack years: 6.00     Types: Cigarettes    Smokeless tobacco: Never   Substance Use Topics    Alcohol use: Yes     Comment: wine, seldom    Drug use: No     Family History   Problem Relation Age of Onset    Leukemia Father     Aneurysm Father     Diabetes Mother     Cataracts Paternal Uncle     Amblyopia Neg Hx     Blindness Neg Hx     Glaucoma Neg Hx     Macular degeneration Neg Hx     Retinal detachment Neg Hx     Strabismus Neg Hx      Allergy:  Review of patient's allergies indicates:   Allergen Reactions    Augmentin [amoxicillin-pot clavulanate] Rash     Rash, confusion, TIA like symptoms    Azithromycin Rash    Avodart [dutasteride]     House dust mite Other (See Comments)    Naproxen     Ragweed     Synthroid [levothyroxine] Rash      Outpatient Encounter Medications as of 6/19/2023   Medication Sig Dispense Refill    albuterol (VENTOLIN HFA) 90 mcg/actuation inhaler Inhale 2 puffs into the lungs every 6 (six) hours as needed for Wheezing. Rescue 18 g 1    ALLERGY RELIEF, LORATADINE, 10 mg tablet Take 10 mg by mouth once daily.      azelastine (ASTELIN) 137 mcg (0.1 %) nasal spray 1 spray (137 mcg total) by Nasal route 2 (two) times daily. 30 mL 12    BABY ASPIRIN ORAL Take 81 tablets by mouth every evening.       cholecalciferol, vitamin D3, 1,000 unit capsule Take 1,000 Units by mouth once daily.      coenzyme Q10 100 mg capsule Take by mouth once daily.      diclofenac sodium (VOLTAREN) 1 % Gel Apply 2 g topically 4 (four) times daily. 1 Tube 2    glucosamine-chondroitin 500-400 mg tablet Take 1 tablet by mouth 2 (two) times daily.       methocarbamoL (ROBAXIN) 500 MG Tab Take 1 tablet (500 mg total) by mouth 2 (two) times daily as needed (Side/Back Pain). 15 tablet 0    metoprolol succinate (TOPROL-XL) 50 MG 24 hr tablet TAKE 1 TABLET (50 MG TOTAL) BY MOUTH ONCE DAILY. 90 tablet 3    MULTIVITAMIN W-MINERALS/LUTEIN (CENTRUM SILVER ORAL) Take by mouth once daily.       nystatin-triamcinolone (MYCOLOG II) cream APPLY TOPICALLY 4 (FOUR) TIMES DAILY. 60 g 5    omega-3 fatty acids 300 mg Cap Take 1 tablet by mouth once daily.       omeprazole (PRILOSEC) 40 MG capsule Take 1 capsule (40 mg total) by mouth every morning. 90 capsule 2    rosuvastatin (CRESTOR) 10 MG tablet Take 1 tablet (10 mg total) by mouth once daily. 90 tablet 1    TURMERIC ORAL Take by mouth. Pt take 1 in the evening.      UNABLE TO FIND Take by mouth nightly. tumeric      VITAMIN B COMPLEX (SUPER B COMPLEX-B-12 ORAL) Take by mouth.      [DISCONTINUED] mirabegron (MYRBETRIQ) 50 mg Tb24 Take 1 tablet (50 mg total) by mouth once daily. 90 tablet 3    [DISCONTINUED] solifenacin (VESICARE) 10 MG tablet Take 1 tablet (10 mg total) by mouth once daily. 30 tablet 11     [DISCONTINUED] tamsulosin (FLOMAX) 0.4 mg Cap Take 1 capsule (0.4 mg total) by mouth once daily. 30 capsule 11    mirabegron (MYRBETRIQ) 50 mg Tb24 Take 1 tablet (50 mg total) by mouth once daily. 30 tablet 11    solifenacin (VESICARE) 10 MG tablet Take 1 tablet (10 mg total) by mouth once daily. 30 tablet 11    tamsulosin (FLOMAX) 0.4 mg Cap Take 1 capsule (0.4 mg total) by mouth once daily. 30 capsule 11     No facility-administered encounter medications on file as of 6/19/2023.     Review of Systems   ROS  Physical Exam     Vitals:    06/19/23 0805   BP: 126/80   Pulse: 75     Physical Exam  Constitutional:       General: He is not in acute distress.     Appearance: He is well-developed. He is not diaphoretic.   HENT:      Head: Normocephalic and atraumatic.      Right Ear: External ear normal.      Left Ear: External ear normal.      Nose: Nose normal.   Eyes:      Conjunctiva/sclera: Conjunctivae normal.      Pupils: Pupils are equal, round, and reactive to light.   Neck:      Thyroid: No thyromegaly.      Vascular: No JVD.      Trachea: No tracheal deviation.   Cardiovascular:      Rate and Rhythm: Normal rate and regular rhythm.      Heart sounds: Normal heart sounds. No murmur heard.    No friction rub. No gallop.   Pulmonary:      Effort: Pulmonary effort is normal. No respiratory distress.      Breath sounds: Normal breath sounds. No wheezing.   Chest:      Chest wall: No tenderness.   Abdominal:      General: Bowel sounds are normal. There is no distension.      Palpations: Abdomen is soft. There is no mass.      Tenderness: There is no abdominal tenderness. There is no guarding or rebound.   Genitourinary:     Penis: Normal. No tenderness.       Prostate: Normal.      Rectum: Normal.   Musculoskeletal:         General: No tenderness or deformity. Normal range of motion.      Cervical back: Normal range of motion and neck supple.   Lymphadenopathy:      Cervical: No cervical adenopathy.   Skin:      General: Skin is warm and dry.   Neurological:      Mental Status: He is alert and oriented to person, place, and time.   Psychiatric:         Behavior: Behavior normal.         Thought Content: Thought content normal.     Genitalia:  Scrotum: no rash or lesion  Normal symmetric epididymis without masses  Normal vas palpated  Normal size, symmetric testicles with no masses   Normal urethral meatus with no discharge  Normal circumcised penis with no lesion         LABS:  Lab Results   Component Value Date    PSA 2.7 01/20/2015    PSA 1.5 05/30/2008    PSA 2.1 05/11/2005    PSADIAG 2.5 07/16/2018    PSADIAG 2.8 06/12/2017    PSADIAG 2.7 08/18/2014     Results for orders placed or performed in visit on 07/16/18   Prostate Specific Antigen, Diagnostic   Result Value Ref Range    PSA Diagnostic 2.5 0.00 - 4.00 ng/mL   Results for orders placed or performed in visit on 06/12/17   Prostate Specific Antigen, Diagnostic   Result Value Ref Range    PSA Diagnostic 2.8 0.00 - 4.00 ng/mL   Results for orders placed or performed in visit on 08/18/14   Prostate Specific Antigen, Diagnostic   Result Value Ref Range    PSA Diagnostic 2.7 0.00 - 4.00 ng/mL     Lab Results   Component Value Date    CREATININE 0.8 03/02/2023    CREATININE 0.8 08/05/2021    CREATININE 0.8 12/02/2020     No results found for this or any previous visit.  Urine Culture, Routine   Date Value Ref Range Status   07/23/2021 No growth  Final     Hemoglobin A1C   Date Value Ref Range Status   05/27/2018 5.3 4.0 - 5.6 % Final     Comment:     According to ADA guidelines, hemoglobin A1c <7.0% represents  optimal control in non-pregnant diabetic patients. Different  metrics may apply to specific patient populations.   Standards of Medical Care in Diabetes-2016.  For the purpose of screening for the presence of diabetes:  <5.7%     Consistent with the absence of diabetes  5.7-6.4%  Consistent with increasing risk for diabetes   (prediabetes)  >or=6.5%  Consistent  with diabetes  Currently, no consensus exists for use of hemoglobin A1c  for diagnosis of diabetes for children.  This Hemoglobin A1c assay has significant interference with fetal   hemoglobin   (HbF). The results are invalid for patients with abnormal amounts of   HbF,   including those with known Hereditary Persistence   of Fetal Hemoglobin. Heterozygous hemoglobin variants (HbAS, HbAC,   HbAD, HbAE, HbA2) do not significantly interfere with this assay;   however, presence of multiple variants in a sample may impact the %   interference.         UA clear, trace blood    Assessment and Plan:  Adan was seen today for follow-up.    Diagnoses and all orders for this visit:    OAB (overactive bladder)  -     solifenacin (VESICARE) 10 MG tablet; Take 1 tablet (10 mg total) by mouth once daily.  -     mirabegron (MYRBETRIQ) 50 mg Tb24; Take 1 tablet (50 mg total) by mouth once daily.    Urge incontinence    Benign prostatic hyperplasia with nocturia  -     tamsulosin (FLOMAX) 0.4 mg Cap; Take 1 capsule (0.4 mg total) by mouth once daily.    Nocturia    He is on maximal medical therapy including vesicare, myrbetriq and flomax  based on his previous urodynamic study, I recommend either botox injection or sacral neuromodulation.  Each therapy explained in detail.  Benefits and potential side effects explained.  Pt is not sure whether he can lie on his stomach for a hour considering he has a large belly.  He is also scared of a surgery and that is why he cancelled the botox surgery last year.  I answered all his concerns and recommend that he should try cysto botox injection as discussed.  Stop ASA 5 days before.  Kofi Feng (Queen), my surgery scheduler will schedule your surgery ([phone])    I spent 40 minutes with the patient of which more than half was spent in direct consultation with the patient in regards to our treatment and plan.     Follow-up:  Follow up in about 5 weeks (around 7/26/2023), or cysto botox  injeciton 100 units.

## 2023-06-19 NOTE — TELEPHONE ENCOUNTER
OAB (overactive bladder)  -     Case Request Operating Room: CYSTOSCOPY,WITH BOTULINUM TOXIN INJECTION

## 2023-06-20 ENCOUNTER — OFFICE VISIT (OUTPATIENT)
Dept: OTOLARYNGOLOGY | Facility: CLINIC | Age: 73
End: 2023-06-20
Payer: MEDICARE

## 2023-06-20 ENCOUNTER — TELEPHONE (OUTPATIENT)
Dept: ADMINISTRATIVE | Facility: CLINIC | Age: 73
End: 2023-06-20
Payer: COMMERCIAL

## 2023-06-20 ENCOUNTER — TELEPHONE (OUTPATIENT)
Dept: UROLOGY | Facility: CLINIC | Age: 73
End: 2023-06-20
Payer: COMMERCIAL

## 2023-06-20 ENCOUNTER — PATIENT MESSAGE (OUTPATIENT)
Dept: UROLOGY | Facility: CLINIC | Age: 73
End: 2023-06-20
Payer: COMMERCIAL

## 2023-06-20 ENCOUNTER — PATIENT MESSAGE (OUTPATIENT)
Dept: INTERNAL MEDICINE | Facility: CLINIC | Age: 73
End: 2023-06-20
Payer: COMMERCIAL

## 2023-06-20 VITALS — BODY MASS INDEX: 39.15 KG/M2 | WEIGHT: 296.75 LBS

## 2023-06-20 DIAGNOSIS — J34.3 HYPERTROPHY OF INFERIOR NASAL TURBINATE: ICD-10-CM

## 2023-06-20 DIAGNOSIS — J34.2 DEVIATED NASAL SEPTUM: ICD-10-CM

## 2023-06-20 DIAGNOSIS — J34.89 NASAL VESTIBULITIS: ICD-10-CM

## 2023-06-20 DIAGNOSIS — M54.50 ACUTE BILATERAL LOW BACK PAIN WITHOUT SCIATICA: ICD-10-CM

## 2023-06-20 DIAGNOSIS — J31.0 CHRONIC RHINITIS: Primary | ICD-10-CM

## 2023-06-20 DIAGNOSIS — N32.81 OAB (OVERACTIVE BLADDER): Primary | ICD-10-CM

## 2023-06-20 LAB — BACTERIA UR CULT: NORMAL

## 2023-06-20 PROCEDURE — 99213 PR OFFICE/OUTPT VISIT, EST, LEVL III, 20-29 MIN: ICD-10-PCS | Mod: S$GLB,,, | Performed by: PHYSICIAN ASSISTANT

## 2023-06-20 PROCEDURE — 1159F MED LIST DOCD IN RCRD: CPT | Mod: CPTII,S$GLB,, | Performed by: PHYSICIAN ASSISTANT

## 2023-06-20 PROCEDURE — 1126F PR PAIN SEVERITY QUANTIFIED, NO PAIN PRESENT: ICD-10-PCS | Mod: CPTII,S$GLB,, | Performed by: PHYSICIAN ASSISTANT

## 2023-06-20 PROCEDURE — 1126F AMNT PAIN NOTED NONE PRSNT: CPT | Mod: CPTII,S$GLB,, | Performed by: PHYSICIAN ASSISTANT

## 2023-06-20 PROCEDURE — 1157F ADVNC CARE PLAN IN RCRD: CPT | Mod: CPTII,S$GLB,, | Performed by: PHYSICIAN ASSISTANT

## 2023-06-20 PROCEDURE — 99999 PR PBB SHADOW E&M-EST. PATIENT-LVL IV: CPT | Mod: PBBFAC,,, | Performed by: PHYSICIAN ASSISTANT

## 2023-06-20 PROCEDURE — 1159F PR MEDICATION LIST DOCUMENTED IN MEDICAL RECORD: ICD-10-PCS | Mod: CPTII,S$GLB,, | Performed by: PHYSICIAN ASSISTANT

## 2023-06-20 PROCEDURE — 99213 OFFICE O/P EST LOW 20 MIN: CPT | Mod: S$GLB,,, | Performed by: PHYSICIAN ASSISTANT

## 2023-06-20 PROCEDURE — 1157F PR ADVANCE CARE PLAN OR EQUIV PRESENT IN MEDICAL RECORD: ICD-10-PCS | Mod: CPTII,S$GLB,, | Performed by: PHYSICIAN ASSISTANT

## 2023-06-20 PROCEDURE — 99999 PR PBB SHADOW E&M-EST. PATIENT-LVL IV: ICD-10-PCS | Mod: PBBFAC,,, | Performed by: PHYSICIAN ASSISTANT

## 2023-06-20 RX ORDER — FLUTICASONE PROPIONATE 50 MCG
1 SPRAY, SUSPENSION (ML) NASAL DAILY
COMMUNITY

## 2023-06-20 NOTE — PATIENT INSTRUCTIONS
[facility] Sinus rinse   Try purchasing this nasal rinse below.  Its over the counter and can use several times daily without any side effects, but please use at least 1-2 times daily.  Use it before applying your nasal spray medications.  This spray can help wash away mucus and crusting and allow for better absorption of the medications.  Please use the nasal saline spray about 15 minutes before applying your medicated nasal sprays. This bottle uses distilled water (NOT tap water).  The Instructions in the box are detailed so please read them before using.        Try using the flonase two sprays twice daily  Try using the ointment every other day for your nose hydration

## 2023-06-20 NOTE — PROGRESS NOTES
Subjective:      Adan is a 73 y.o. male with HTN, ascending aortic aneurism who comes for follow-up of  LPR .  His last visit with me was on 3/14/2023.  Laryngoscopy with evidence of LPR and rhinitis.  Patient stated on omeprazole, saline rinse and azelastine.  Patient referred to Allergy.     Patient reports significant improvement in congestion and postnasal drip after starting azelastine and Flonase. He is currently using 1 SEN BID for each spray. Patient also is using a nasal saline spray which he reports has been equally as effective.  Patient reports significant reduction in throat clearing which is improving his sleep.  Of note, patient evaluated by Allergy and overall negative allergy testing.     Per last office visit 3/14/2023 :  Patient reports chronic postnasal drip for 50+ years with associated globus sensation, throat clearing, and nasal congestion.  Patient reports chronic nasal obstruction (L>R) that has been improved in the past with flonase.  Throat clearing is worse with lying flat and is affecting sleep.  Patient does report a history of frontal headaches but denies any hyposmia or facial pressure recently.  Patient has tried ipratropium with minimal relief and Flonase sometimes with moderate relief.     Current sinonasal medications include flonase 2 SEN BID and antihistamine daily.  The last course of antibiotics was a long time ago.    He does not regularly use nasal decongestant sprays (afrin/oxymetazoline/phenylephrine).  He recalls previously having allergy testing, which was reportedly positive for dustmites, mold, grass, ragweed, goats.  He had allergy shots as a young adult and had some relief with this.   He denies a history of asthma.  He relates a history of reflux symptoms which is managed with over-the-counter medication as needed.    He denies a diagnosis of obstructive sleep apnea.   He has not had sinonasal surgery.    He does not recall a prior history of nasal trauma.  He  has had a tonsillectomy.  He is not a tobacco smoker.   He has NOT had S. pneumo titers checked.      The patient's medications, allergies, past medical, surgical, social and family histories were reviewed and updated as appropriate.    A detailed review of systems was obtained with pertinent positives as per the above HPI, and otherwise negative.        Objective:     Wt 134.6 kg (296 lb 11.8 oz)   BMI 39.15 kg/m²        Constitutional:   He appears well-developed and well-nourished. Normal speech.      Head:  Normocephalic and atraumatic. Facial strength is normal.      Ears:    Right Ear: No drainage or swelling. Tympanic membrane is not perforated and not erythematous. No middle ear effusion.   Left Ear: No drainage or swelling. Tympanic membrane is not perforated and not erythematous.  No middle ear effusion.     Nose:  Septal deviation (L) present. No mucosal edema, rhinorrhea or polyps.  No foreign bodies. Turbinate hypertrophy (2+).  Turbinates normal and no turbinate masses.  Right sinus exhibits no maxillary sinus tenderness and no frontal sinus tenderness. Left sinus exhibits no maxillary sinus tenderness and no frontal sinus tenderness.   L yellow/dry crusting in nasal vestibule    Mouth/Throat  Oropharynx clear and moist without lesions or asymmetry and normal uvula midline. No trismus or mucous membrane lesions. No oropharyngeal exudate, posterior oropharyngeal edema or posterior oropharyngeal erythema. Tonsils not present.      Neck:  Neck normal without thyromegaly masses, asymmetry, normal tracheal structure, crepitus, and tenderness and phonation normal.     Pulmonary/Chest:   Effort normal.     Psychiatric:   He has a normal mood and affect. His speech is normal and behavior is normal.     Procedure    None    Data Reviewed    WBC (K/uL)   Date Value   03/02/2023 7.85     Eosinophil % (%)   Date Value   03/02/2023 4.3     Eos # (K/uL)   Date Value   03/02/2023 0.3     Platelets (K/uL)   Date  Value   03/02/2023 256     Glucose (mg/dL)   Date Value   03/02/2023 89     No results found for: IGE    Assessment:     1. Chronic rhinitis    2. Deviated nasal septum    3. Hypertrophy of inferior nasal turbinate    4. Nasal vestibulitis         Plan:     Advised patient to attempt using nasal saline rinses  Advised patient trial of increasing Flonase to 2 sprays twice daily  Discussed improving hydration of his nose by using Vaseline ointment    He will Follow up in about 2 months (around 8/20/2023) for re-evaluate post treatment.  I had a discussion with the patient regarding his condition and the further workup and management options.    All questions were answered, and the patient is in agreement with the above.     Disclaimer:  This note may have been prepared utilizing voice recognition software which may result in occasional typographical errors in the text such as sound alike words.   If further clarification is needed, please contact the ENT department of [facility].

## 2023-06-21 ENCOUNTER — TELEPHONE (OUTPATIENT)
Dept: ADMINISTRATIVE | Facility: CLINIC | Age: 73
End: 2023-06-21
Payer: COMMERCIAL

## 2023-06-21 RX ORDER — METHOCARBAMOL 500 MG/1
500 TABLET, FILM COATED ORAL 2 TIMES DAILY PRN
Qty: 30 TABLET | Refills: 0 | Status: SHIPPED | OUTPATIENT
Start: 2023-06-21 | End: 2023-07-06

## 2023-07-06 ENCOUNTER — PES CALL (OUTPATIENT)
Dept: ADMINISTRATIVE | Facility: CLINIC | Age: 73
End: 2023-07-06
Payer: COMMERCIAL

## 2023-07-07 NOTE — PROGRESS NOTES
"Subjective:      Patient ID: Adan Webb is a 73 y.o. male.    Chief Complaint: Annual Exam    72 yo w  Ascending aortic aneurysm, aortic atherosclerosis, pacemaker, hx TIA 2018 anterior L frontal,hiatal hernia, Cholelithiasis, overactive bladder, urge incontinence,   Here today for general exam. With wife "T"    Post nasal drip at night, awakening, worse w pollen outside.  Using antihistamine alternates Zyrtec & Claritin, singulair at night.  Flonase nose spray, mucinex. My L side is always clogged w deviated septum. Considering seeing ENT for congestion.     His 79 yo brother just passed after hip fx. He & wife are incharge of moving him out of 2 apartments "he was a ".     Has seen Dr Duenas for AAA     TTE 1/25/21  The ascending aorta is severely dilated masuring 5.1 cm. The aortic root is normal in size.  Mild left atrial enlargement.  The left ventricle is normal in size with normal systolic function. The estimated ejection fraction is 55%  Indeterminate left ventricular diastolic function.  Normal right ventricular size with normal right ventricular systolic function.  The estimated PA systolic pressure is 19 mmHg.  Normal central venous pressure (3 mmHg).       CT CHEST 7/26/21  Stable fusiform dilatation of the ascending aorta measuring up to 5.1 cm, noting limited evaluation in this non contrasted exam.     Mild aortic and coronary artery atherosclerosis.     Stable findings in the abdomen including small hiatal hernia, left hepatic lobe cyst, and cholelithiasis without evidence for cholecystitis.    Pacemaker placed last year. Follows with DR Miranda, no problems    Follows w URologist Dr Zamora, but awakening several times at night for urination. Taking Flomax, vesicare, mybetriq    Labs WNL except Hgb 12.9    Denies any chest pain, shortness of breath, nausea vomiting constipation diarrhea, blood in stool, heartburn    Current Outpatient Medications   Medication Instructions    albuterol " Patient last seen 12/6/2022 for eczema by Dr. Downey.   RTC indicated in last office note as \"Call or return to clinic as needed if these symptoms worsen, fail to improve as anticipated, or if new symptoms develop\".   There has been no follow up scheduled or completed.     Per last office note \"presctiotn Hytone 2-3 days per week\"   Med last refilled on n/a. Ordered at office visit 12/6/22, no refill requests on file.    Message routed to provider for approval/denial of hydrocortisone 2.5% refill request.     Electronically Signed by:    Melissa Maldonado CMA  07/07/23         (VENTOLIN HFA) 90 mcg/actuation inhaler 2 puffs, Inhalation, Every 6 hours PRN, Rescue    ALLERGY RELIEF (LORATADINE) 10 mg, Oral, Daily    azelastine (ASTELIN) 137 mcg, Nasal, 2 times daily    BABY ASPIRIN ORAL 81 tablets, Oral, Nightly    cetirizine (ZYRTEC) 10 mg, Oral, Daily    cholecalciferol (vitamin D3) (VITAMIN D3) 1,000 Units, Oral, Daily    coenzyme Q10 100 mg capsule Oral, Daily    diclofenac sodium (VOLTAREN) 2 g, Topical (Top), 4 times daily    fluticasone (FLONASE) 50 mcg/actuation nasal spray PLACE 1 SPRAY INTO EACH NOSTRIL DAILY    glucosamine-chondroitin 500-400 mg tablet 1 tablet, Oral, 2 times daily    ipratropium (ATROVENT) 42 mcg (0.06 %) nasal spray 2 sprays, Nasal, 3 times daily    metoprolol succinate (TOPROL-XL) 50 mg, Oral, Daily    montelukast (SINGULAIR) 10 mg, Oral, Daily    MULTIVITAMIN W-MINERALS/LUTEIN (CENTRUM SILVER ORAL) Oral, Daily    MYRBETRIQ 50 mg, Oral, Daily    omega-3 fatty acids 300 mg Cap 1 tablet, Oral, Daily    rosuvastatin (CRESTOR) 10 mg, Oral, Daily    solifenacin (VESICARE) 10 mg, Oral, Daily    tamsulosin (FLOMAX) 0.4 mg, Oral, Daily    TURMERIC ORAL Oral, Pt take 1 in the evening.    UNABLE TO FIND Oral, Nightly, tumeric     VITAMIN B COMPLEX (SUPER B COMPLEX-B-12 ORAL) Oral       Lab Results   Component Value Date    HGBA1C 5.3 05/27/2018     No results found for: MICALBCREAT  Lab Results   Component Value Date    LDLCALC 70.2 03/02/2023    LDLCALC 127.0 12/02/2020    CHOL 124 03/02/2023    HDL 41 03/02/2023    TRIG 64 03/02/2023       Lab Results   Component Value Date     03/02/2023    K 4.5 03/02/2023     03/02/2023    CO2 28 03/02/2023    GLU 89 03/02/2023    BUN 20 03/02/2023    CREATININE 0.8 03/02/2023    CALCIUM 9.7 03/02/2023    PROT 7.6 03/02/2023    ALBUMIN 3.7 03/02/2023    BILITOT 0.7 03/02/2023    ALKPHOS 88 03/02/2023    AST 23 03/02/2023    ALT 25 03/02/2023    ANIONGAP 7 (L) 03/02/2023    ESTGFRAFRICA >60.0 08/05/2021    EGFRNONAA  >60.0 08/05/2021    WBC 7.85 03/02/2023    HGB 12.9 (L) 03/02/2023    HGB 13.8 (L) 08/05/2021    HCT 41.1 03/02/2023    MCV 99 (H) 03/02/2023     03/02/2023    TSH 3.536 04/01/2021    PSA 2.7 01/20/2015    PSA 1.5 05/30/2008    PSADIAG 2.5 07/16/2018    PSADIAG 2.8 06/12/2017    HEPCAB Negative 10/24/2017       Lab Results   Component Value Date    JGZYCJPB91 1249 (H) 11/11/2019         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, didn't use ear protection    Dermatitis 12/9/2020    nystat groin    DJD (degenerative joint disease) of hip 1/29/2015    Enlarged liver 4/8/2021    US 4/21     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    MCA , see MRI    Hiatal hernia 4/1/2021    CT chest tiny 2018    HTN (hypertension), benign 8/20/2020    Morbid obesity with BMI of 40.0-44.9, adult 1/29/2015    OAB (overactive bladder) 4/1/2021    Rash with pads    Right-sided low back pain with right-sided sciatica 3/6/2019    Seasonal allergic rhinitis due to pollen 4/13/2016    Thoracic aortic aneurysm without rupture 05/27/2018    tx with Su, Cardiologist Dr Stone, 5.1 CTS Dr Duenas, follows yearly    Transient cerebral ischemia 6/6/2018     Past Surgical History:   Procedure Laterality Date    A-V CARDIAC PACEMAKER INSERTION N/A 8/5/2021    Procedure: INSERTION, CARDIAC PACEMAKER, DUAL CHAMBER;  Surgeon: Sheldon Miranda MD;  Location: [facility]  "EP LAB;  Service: Cardiology;  Laterality: N/A;  Near syncope,SB,Sinus Pause, RBBB,LAFB, Dual PPM, BIO, MAC, HI, 3 Prep    CATARACT EXTRACTION      CYSTOSCOPY WITH URODYNAMIC TESTING N/A 10/25/2021    Procedure: CYSTOSCOPY, WITH URODYNAMIC TESTING FLOUROSCOPIC;  Surgeon: Sancho Wesley MD;  Location: [facility] OR [address] [place] [address];  Service: Urology;  Laterality: N/A;  1hr    HERNIA REPAIR      YAG LAser Capsulotomy Bilateral     2/18/2019 OS Dr. Cornelius     Social History     Social History Narrative     to 'T', 2 children, nonsmoker, ETOH none, never had colonscopy & declines     Family History   Problem Relation Age of Onset    Leukemia Father     Aneurysm Father     Diabetes Mother     Cataracts Paternal Uncle     Amblyopia Neg Hx     Blindness Neg Hx     Glaucoma Neg Hx     Macular degeneration Neg Hx     Retinal detachment Neg Hx     Strabismus Neg Hx      Vitals:    03/09/23 1300 03/09/23 1323   BP: (!) 148/74 139/70   Pulse: 74    Temp: 98.5 °F (36.9 °C)    TempSrc: Oral    SpO2: 97%    Weight: 135.6 kg (298 lb 15.1 oz)    Height: 6' 1" (1.854 m)    PainSc:   2    PainLoc: Ankle      Objective:   Physical Exam  Constitutional:       Appearance: He is well-developed.   HENT:      Head: Normocephalic and atraumatic.      Nose: Nose normal.      Mouth/Throat:      Mouth: Mucous membranes are moist.      Pharynx: Oropharynx is clear.   Eyes:      Pupils: Pupils are equal, round, and reactive to light.   Neck:      Thyroid: No thyromegaly.   Cardiovascular:      Rate and Rhythm: Normal rate and regular rhythm.      Heart sounds: Normal heart sounds. No murmur heard.  Pulmonary:      Effort: Pulmonary effort is normal.      Breath sounds: Normal breath sounds. No wheezing.   Abdominal:      General: Bowel sounds are normal. There is no distension.      Palpations: Abdomen is soft. There is no mass.      Tenderness: There is no abdominal tenderness. There is no guarding or rebound.   Musculoskeletal:      Cervical " back: Neck supple.   Lymphadenopathy:      Cervical: No cervical adenopathy.   Skin:     General: Skin is warm and dry.   Neurological:      Mental Status: He is alert and oriented to person, place, and time.   Psychiatric:         Behavior: Behavior normal.     Assessment:     1. Routine general medical examination at a health care facility    2. Calcified granuloma of lung    3. Severe obesity (BMI 35.0-39.9) with comorbidity    4. Aortic atherosclerosis    5. Presence of cardiac pacemaker    6. Bifascicular block    7. HTN (hypertension), benign    8. Morbid obesity with BMI of 40.0-44.9, adult    9. Cerebral infarction due to embolism of left middle cerebral artery    10. Thoracic aortic aneurysm without rupture, unspecified part    11. Chronic maxillary sinusitis    12. Deviated septum      Plan:     Orders Placed This Encounter    Ambulatory referral/consult to Cardiovascular Surgery    Ambulatory referral/consult to ENT    azelastine (ASTELIN) 137 mcg (0.1 %) nasal spray     Continue medicines    BP stable  Follow w specialists  Follow w Urologist Zamora  ================================    Your #1 MEDICINE is 10 minutes of WALKING EVERY DAY to wake up your metabolism to burn off the food you eat  Respect the #1 cause of death- cardiovascular disease (HEART ATTACK & STROKE). Keep normal blood pressure, cholesterol, sugars, & quit smoking.     VACCINES: FLU yearly, PNEUMONIA at 65,  SHINGLES at 50  COLON CANCER screening Colonoscopy at 44 yo  Eat more food grown from the earth (picked from trees or out of the ground)  PROSTATE CANCER screening  55-69 years.   If you EVER SMOKED - Abdominal Aortic Aneurysm ultrasound at 62 yo    Follow up yearly with fasting LABS ONE WEEK PRIOR so we can discuss at your visit        There are no Patient Instructions on file for this visit.

## 2023-07-24 ENCOUNTER — TELEPHONE (OUTPATIENT)
Dept: UROLOGY | Facility: CLINIC | Age: 73
End: 2023-07-24
Payer: COMMERCIAL

## 2023-07-24 ENCOUNTER — PATIENT MESSAGE (OUTPATIENT)
Dept: UROLOGY | Facility: CLINIC | Age: 73
End: 2023-07-24
Payer: COMMERCIAL

## 2023-07-25 ENCOUNTER — TELEPHONE (OUTPATIENT)
Dept: UROLOGY | Facility: CLINIC | Age: 73
End: 2023-07-25
Payer: COMMERCIAL

## 2023-07-25 ENCOUNTER — DOCUMENTATION ONLY (OUTPATIENT)
Dept: ELECTROPHYSIOLOGY | Facility: CLINIC | Age: 73
End: 2023-07-25
Payer: COMMERCIAL

## 2023-07-25 ENCOUNTER — ANESTHESIA EVENT (OUTPATIENT)
Dept: SURGERY | Facility: HOSPITAL | Age: 73
End: 2023-07-25
Payer: MEDICARE

## 2023-07-25 NOTE — PROGRESS NOTES
Pt w/ a dual chamber Biotronik pacemaker, implanted 8/5/2021 for sinus pauses, SND  Surgery planned:  cystoscope with botox injection    Pt is not pacer dependent.  No reprogramming is required.  Magnet application is not required for this procedure since the location is below the waist.

## 2023-07-25 NOTE — ANESTHESIA PREPROCEDURE EVALUATION
[facility]  Anesthesia Pre-Operative Evaluation   07/25/2023        Adan Webb, 1950  [id number]  Procedure(s) (LRB):  CYSTOSCOPY,WITH BOTULINUM TOXIN INJECTION (N/A)    Subjective    Adan Webb is a 73 y.o. male w/ a significant PMHx of GERD, asthma, BPH, hiatal hernia, CVA, bifascicular block with pacemaker(biotronic dual chamber) and HTN presenting for cystoscopy with botulinum toxin injection for urinary  Incontinence. BMI 39.2. Device last interrogated 6/5/2023    Patient now presents for above procedure(s).     Prev Airway: None documented.            Patient Active Problem List   Diagnosis    Nocturia    Benign prostatic hyperplasia with nocturia    Hydrocele, right    Pseudophakia    DJD (degenerative joint disease) of hip    Seasonal allergic rhinitis due to pollen    Calculus of gallbladder without cholecystitis without obstruction    Right inguinal hernia    History of hydrocelectomy    Cerebral infarction due to embolism of left middle cerebral artery    Transient cerebral ischemia    Posterior capsular opacification, left eye    Open angle with borderline findings and high glaucoma risk in left eye    Conductive hearing loss of right ear with restricted hearing of left ear    Belching    Blurry vision, left eye    Right-sided low back pain with right-sided sciatica    Dry eye syndrome of both eyes    Ascending aortic aneurysm    HTN (hypertension), benign    Dermatitis    Chronic pain of both ankles    Abnormal TSH    Bifascicular block    PVC (premature ventricular contraction)    Severe obesity (BMI 35.0-39.9) with comorbidity    Hiatal hernia    OAB (overactive bladder)    Enlarged liver    Near syncope    Pre-op testing    Presence of cardiac pacemaker    Calcified granuloma of lung    Urge incontinence       Review of patient's allergies indicates:   Allergen Reactions    Augmentin [amoxicillin-pot clavulanate] Rash     Rash,  confusion, TIA like symptoms    Azithromycin Rash    Avodart [dutasteride]     House dust mite Other (See Comments)    Mold     Naproxen     Ragweed     Synthroid [levothyroxine] Rash       Current Inpatient Medications:       No current facility-administered medications on file prior to encounter.     Current Outpatient Medications on File Prior to Encounter   Medication Sig Dispense Refill    BABY ASPIRIN ORAL Take 81 tablets by mouth every evening.       metoprolol succinate (TOPROL-XL) 50 MG 24 hr tablet TAKE 1 TABLET (50 MG TOTAL) BY MOUTH ONCE DAILY. 90 tablet 3    mirabegron (MYRBETRIQ) 50 mg Tb24 Take 1 tablet (50 mg total) by mouth once daily. (Patient taking differently: Take 50 mg by mouth every evening.) 30 tablet 11    rosuvastatin (CRESTOR) 10 MG tablet Take 1 tablet (10 mg total) by mouth once daily. 90 tablet 1    solifenacin (VESICARE) 10 MG tablet Take 1 tablet (10 mg total) by mouth once daily. (Patient taking differently: Take 10 mg by mouth every evening.) 30 tablet 11    tamsulosin (FLOMAX) 0.4 mg Cap Take 1 capsule (0.4 mg total) by mouth once daily. (Patient taking differently: Take 0.4 mg by mouth nightly.) 30 capsule 11    albuterol (VENTOLIN HFA) 90 mcg/actuation inhaler Inhale 2 puffs into the lungs every 6 (six) hours as needed for Wheezing. Rescue 18 g 1    ALLERGY RELIEF, LORATADINE, 10 mg tablet Take 10 mg by mouth daily as needed.      azelastine (ASTELIN) 137 mcg (0.1 %) nasal spray 1 spray (137 mcg total) by Nasal route 2 (two) times daily. 30 mL 12    cholecalciferol, vitamin D3, 1,000 unit capsule Take 1,000 Units by mouth once daily.      coenzyme Q10 100 mg capsule Take by mouth once daily.      diclofenac sodium (VOLTAREN) 1 % Gel Apply 2 g topically 4 (four) times daily. 1 Tube 2    glucosamine-chondroitin 500-400 mg tablet Take 1 tablet by mouth 2 (two) times daily.       MULTIVITAMIN W-MINERALS/LUTEIN (CENTRUM SILVER ORAL) Take by mouth once daily.        nystatin-triamcinolone (MYCOLOG II) cream APPLY TOPICALLY 4 (FOUR) TIMES DAILY. 60 g 5    omega-3 fatty acids 300 mg Cap Take 1 tablet by mouth once daily.       omeprazole (PRILOSEC) 40 MG capsule Take 1 capsule (40 mg total) by mouth every morning. 90 capsule 2    TURMERIC ORAL Take by mouth. Pt take 1 in the evening.      UNABLE TO FIND Take by mouth nightly. tumeric      VITAMIN B COMPLEX (SUPER B COMPLEX-B-12 ORAL) Take by mouth.         Past Surgical History:   Procedure Laterality Date    A-V CARDIAC PACEMAKER INSERTION N/A 08/05/2021    Procedure: INSERTION, CARDIAC PACEMAKER, DUAL CHAMBER;  Surgeon: Sheldon Miranda MD;  Location: [facility] EP LAB;  Service: Cardiology;  Laterality: N/A;  Near syncope,SB,Sinus Pause, RBBB,LAFB, Dual PPM, BIO, MAC, VT, 3 Prep    ADENOIDECTOMY      CATARACT EXTRACTION      CYSTOSCOPY WITH URODYNAMIC TESTING N/A 10/25/2021    Procedure: CYSTOSCOPY, WITH URODYNAMIC TESTING FLOUROSCOPIC;  Surgeon: Sancho Wesley MD;  Location: [facility] OR [address] [place] [address];  Service: Urology;  Laterality: N/A;  1hr    HERNIA REPAIR      TONSILLECTOMY      YAG LAser Capsulotomy Bilateral     2/18/2019 OS Dr. Cornelius       Social History:  Tobacco Use: Medium Risk    Smoking Tobacco Use: Former    Smokeless Tobacco Use: Never    Passive Exposure: Not on file       Alcohol Use: Not on file       Objective    Vital Signs Range:  BMI Readings from Last 1 Encounters:   06/20/23 39.15 kg/m²               Significant Labs:        Component Value Date/Time    WBC 7.85 03/02/2023 0747    HGB 12.9 (L) 03/02/2023 0747    HCT 41.1 03/02/2023 0747     03/02/2023 0747     03/02/2023 0747    K 4.5 03/02/2023 0747     03/02/2023 0747    CO2 28 03/02/2023 0747    GLU 89 03/02/2023 0747    BUN 20 03/02/2023 0747    CREATININE 0.8 03/02/2023 0747    CALCIUM 9.7 03/02/2023 0747    ALBUMIN 3.7 03/02/2023 0747    PROT 7.6 03/02/2023 0747    ALKPHOS 88 03/02/2023 0747    BILITOT 0.7 03/02/2023  0747    AST 23 03/02/2023 0747    ALT 25 03/02/2023 0747    INR 1.0 08/05/2021 0845    INR 1.1 05/26/2018 2134    HGBA1C 5.3 05/27/2018 0104        Please see Results Review for additional labs.     Diagnostic Studies: All relevant studies, reviewed.      EKG:   Results for orders placed or performed during the hospital encounter of 08/05/21   EKG 12-lead    Collection Time: 08/05/21  3:01 PM    Narrative    Test Reason : Z95.9,    Vent. Rate : 079 BPM     Atrial Rate : 079 BPM     P-R Int : 164 ms          QRS Dur : 142 ms      QT Int : 452 ms       P-R-T Axes : 062 -58 035 degrees     QTc Int : 518 ms    Normal sinus rhythm  Right bundle branch block  Left anterior fascicular block   Bifascicular block   Minimal voltage criteria for LVH, may be normal variant  Abnormal ECG  When compared with ECG of 05-AUG-2021 08:39,  No significant change was found  Confirmed by Lele Gutierrez MD ([id number]) on 8/5/2021 4:19:20 PM    Referred By: ERIC HAMILTON           Confirmed By:Lele Gutierrez MD       ECHO:  Results for orders placed during the hospital encounter of 03/20/23    Echo    Interpretation Summary  · Mild left atrial enlargement.  · The left ventricle is normal in size with concentric remodeling and normal systolic function.  · Indeterminate left ventricular diastolic function.  · The estimated ejection fraction is 60%.  · Normal right ventricular size with normal right ventricular systolic function.  · Normal central venous pressure (3 mmHg).  · The estimated PA systolic pressure is 23 mmHg.  · The ascending aorta is severely dilated measuring 5.4 cm.  · The aortic root is normal in size and the aortic valve is trileaflet.        Pre-op Assessment    I have reviewed the Patient Summary Reports.     I have reviewed the Nursing Notes. I have reviewed the NPO Status.   I have reviewed the Medications.     Review of Systems  Anesthesia Hx:  Neg history of prior surgery. Denies Family Hx of Anesthesia complications.   Denies  Personal Hx of Anesthesia complications.   Social:  Non-Smoker    Hematology/Oncology:         -- Denies Anemia:   Cardiovascular:   Hypertension Denies MI.   Denies CABG/stent.   Denies CHF. ECG has been reviewed.    Pulmonary:   Denies COPD.  Denies Asthma.    Renal/:   Denies Chronic Renal Disease.     Hepatic/GI:   Hiatal Hernia, Denies GERD. Denies Liver Disease.    Musculoskeletal:   Arthritis   Denies Spine Disorders    Neurological:   CVA Denies Seizures.    Endocrine:   Denies Diabetes. Denies Hypothyroidism.        Physical Exam  General: Alert and Oriented    Airway:  Mallampati: II   Mouth Opening: Normal  TM Distance: Normal  Tongue: Normal    Dental:  Periodontal disease    Chest/Lungs:  Clear to auscultation, Normal Respiratory Rate    Heart:  Rate: Normal  Rhythm: Regular Rhythm        Anesthesia Plan  Type of Anesthesia, risks & benefits discussed:    Anesthesia Type: Gen Natural Airway  Intra-op Monitoring Plan: Standard ASA Monitors  Post Op Pain Control Plan: multimodal analgesia and IV/PO Opioids PRN  Induction:  IV  Informed Consent: Informed consent signed with the Patient and all parties understand the risks and agree with anesthesia plan.  All questions answered. Patient consented to blood products? Yes  ASA Score: 3  Day of Surgery Review of History & Physical: H&P Update referred to the surgeon/provider.    Ready For Surgery From Anesthesia Perspective.     .

## 2023-07-25 NOTE — TELEPHONE ENCOUNTER
Called pt to confirm arrival time of 815am for procedure on 7/26/2023. Gave pt NPO instructions and gave pt opportunity to ask questions. Pt verbalized understanding.

## 2023-07-25 NOTE — PRE-PROCEDURE INSTRUCTIONS
PREOP INSTRUCTIONS:  No food,milk or milk products for 8 hours before surgery.  Clear liquids like water,gatorade,apple juice are allowed up until 2 hours before surgery.  Instructed to follow the surgeon's instructions if they differ from these.  Shower instructions as well as directions to the Surgery Center were given.  Encouraged to wear loose fitting,comfortable clothing.  Medication instructions for pm prior to and am of procedure reviewed.  Instructed to avoid taking vitamins,supplements,aspirin and ibuprofen the morning of surgery.    PATIENT HAS A BIOTRONIK PACEMAKER.Carmen MENDOZA IN THE [facility] NOTIFIED AT 1135 ON 7/25/2023 AND REPLIED A NOTE FROM CARDIOLOGY WILL BE PLACED IN THE CHART    Patient denies any side effects or issues with anesthesia or sedation.

## 2023-07-26 ENCOUNTER — HOSPITAL ENCOUNTER (OUTPATIENT)
Facility: HOSPITAL | Age: 73
Discharge: HOME | End: 2023-07-26
Attending: UROLOGY | Admitting: UROLOGY
Payer: MEDICARE

## 2023-07-26 ENCOUNTER — ANESTHESIA (OUTPATIENT)
Dept: SURGERY | Facility: HOSPITAL | Age: 73
End: 2023-07-26
Payer: MEDICARE

## 2023-07-26 ENCOUNTER — CLINICAL SUPPORT (OUTPATIENT)
Dept: CARDIOLOGY | Facility: HOSPITAL | Age: 73
End: 2023-07-26
Payer: COMMERCIAL

## 2023-07-26 VITALS
OXYGEN SATURATION: 97 % | TEMPERATURE: 99 F | HEIGHT: 73 IN | BODY MASS INDEX: 39.33 KG/M2 | DIASTOLIC BLOOD PRESSURE: 72 MMHG | RESPIRATION RATE: 26 BRPM | SYSTOLIC BLOOD PRESSURE: 151 MMHG | WEIGHT: 296.75 LBS | HEART RATE: 69 BPM

## 2023-07-26 DIAGNOSIS — N32.81 OAB (OVERACTIVE BLADDER): Primary | ICD-10-CM

## 2023-07-26 DIAGNOSIS — N39.41 URGE INCONTINENCE: ICD-10-CM

## 2023-07-26 DIAGNOSIS — Z95.0 PRESENCE OF CARDIAC PACEMAKER: ICD-10-CM

## 2023-07-26 PROCEDURE — 52287 CYSTOSCOPY CHEMODENERVATION: CPT | Mod: ,,, | Performed by: UROLOGY

## 2023-07-26 PROCEDURE — 25000003 PHARM REV CODE 250: Mod: HCNC

## 2023-07-26 PROCEDURE — 36000707: Mod: HCNC | Performed by: UROLOGY

## 2023-07-26 PROCEDURE — 25000003 PHARM REV CODE 250: Mod: HCNC | Performed by: UROLOGY

## 2023-07-26 PROCEDURE — 63600175 PHARM REV CODE 636 W HCPCS: Mod: HCNC

## 2023-07-26 PROCEDURE — D9220A PRA ANESTHESIA: ICD-10-PCS | Mod: HCNC,,, | Performed by: STUDENT IN AN ORGANIZED HEALTH CARE EDUCATION/TRAINING PROGRAM

## 2023-07-26 PROCEDURE — 71000044 HC DOSC ROUTINE RECOVERY FIRST HOUR: Mod: HCNC | Performed by: UROLOGY

## 2023-07-26 PROCEDURE — 93296 REM INTERROG EVL PM/IDS: CPT | Mod: HCNC | Performed by: INTERNAL MEDICINE

## 2023-07-26 PROCEDURE — 37000008 HC ANESTHESIA 1ST 15 MINUTES: Mod: HCNC | Performed by: UROLOGY

## 2023-07-26 PROCEDURE — 63600175 PHARM REV CODE 636 W HCPCS: Mod: HCNC | Performed by: STUDENT IN AN ORGANIZED HEALTH CARE EDUCATION/TRAINING PROGRAM

## 2023-07-26 PROCEDURE — D9220A PRA ANESTHESIA: Mod: HCNC,,, | Performed by: STUDENT IN AN ORGANIZED HEALTH CARE EDUCATION/TRAINING PROGRAM

## 2023-07-26 PROCEDURE — 37000009 HC ANESTHESIA EA ADD 15 MINS: Mod: HCNC | Performed by: UROLOGY

## 2023-07-26 PROCEDURE — 25000003 PHARM REV CODE 250: Mod: HCNC | Performed by: STUDENT IN AN ORGANIZED HEALTH CARE EDUCATION/TRAINING PROGRAM

## 2023-07-26 PROCEDURE — 36000706: Mod: HCNC | Performed by: UROLOGY

## 2023-07-26 PROCEDURE — 52287 PR CYSTOURETHROSCOPY WITH INJ FOR CHEMODENERVATION: ICD-10-PCS | Mod: ,,, | Performed by: UROLOGY

## 2023-07-26 PROCEDURE — 71000015 HC POSTOP RECOV 1ST HR: Mod: HCNC | Performed by: UROLOGY

## 2023-07-26 RX ORDER — LIDOCAINE HYDROCHLORIDE 10 MG/ML
1 INJECTION, SOLUTION EPIDURAL; INFILTRATION; INTRACAUDAL; PERINEURAL ONCE
Status: COMPLETED | OUTPATIENT
Start: 2023-07-26 | End: 2023-07-26

## 2023-07-26 RX ORDER — PROPOFOL 10 MG/ML
INJECTION, EMULSION INTRAVENOUS CONTINUOUS PRN
Status: DISCONTINUED | OUTPATIENT
Start: 2023-07-26 | End: 2023-07-26

## 2023-07-26 RX ORDER — FENTANYL CITRATE 50 UG/ML
INJECTION, SOLUTION INTRAMUSCULAR; INTRAVENOUS
Status: DISCONTINUED | OUTPATIENT
Start: 2023-07-26 | End: 2023-07-26

## 2023-07-26 RX ORDER — SODIUM CHLORIDE 0.9 % (FLUSH) 0.9 %
10 SYRINGE (ML) INJECTION
Status: DISCONTINUED | OUTPATIENT
Start: 2023-07-26 | End: 2023-07-26 | Stop reason: HOSPADM

## 2023-07-26 RX ORDER — SODIUM CHLORIDE 9 MG/ML
INJECTION, SOLUTION INTRAVENOUS ONCE
Status: COMPLETED | OUTPATIENT
Start: 2023-07-26 | End: 2023-07-26

## 2023-07-26 RX ORDER — PROPOFOL 10 MG/ML
VIAL (ML) INTRAVENOUS
Status: DISCONTINUED | OUTPATIENT
Start: 2023-07-26 | End: 2023-07-26

## 2023-07-26 RX ORDER — SULFAMETHOXAZOLE AND TRIMETHOPRIM 800; 160 MG/1; MG/1
1 TABLET ORAL 2 TIMES DAILY
Qty: 6 TABLET | Refills: 0 | Status: SHIPPED | OUTPATIENT
Start: 2023-07-26 | End: 2023-07-29

## 2023-07-26 RX ORDER — KETAMINE HCL IN 0.9 % NACL 50 MG/5 ML
SYRINGE (ML) INTRAVENOUS
Status: DISCONTINUED | OUTPATIENT
Start: 2023-07-26 | End: 2023-07-26

## 2023-07-26 RX ORDER — HALOPERIDOL 5 MG/ML
0.5 INJECTION INTRAMUSCULAR EVERY 10 MIN PRN
Status: DISCONTINUED | OUTPATIENT
Start: 2023-07-26 | End: 2023-07-26 | Stop reason: HOSPADM

## 2023-07-26 RX ORDER — HYDROMORPHONE HYDROCHLORIDE 1 MG/ML
0.2 INJECTION, SOLUTION INTRAMUSCULAR; INTRAVENOUS; SUBCUTANEOUS EVERY 5 MIN PRN
Status: DISCONTINUED | OUTPATIENT
Start: 2023-07-26 | End: 2023-07-26 | Stop reason: HOSPADM

## 2023-07-26 RX ADMIN — FENTANYL CITRATE 25 MCG: 50 INJECTION, SOLUTION INTRAMUSCULAR; INTRAVENOUS at 09:07

## 2023-07-26 RX ADMIN — CEFAZOLIN 3 G: 2 INJECTION, POWDER, FOR SOLUTION INTRAMUSCULAR; INTRAVENOUS at 09:07

## 2023-07-26 RX ADMIN — PROPOFOL 50 MG: 10 INJECTION, EMULSION INTRAVENOUS at 09:07

## 2023-07-26 RX ADMIN — SODIUM CHLORIDE: 9 INJECTION, SOLUTION INTRAVENOUS at 09:07

## 2023-07-26 RX ADMIN — PROPOFOL 30 MG: 10 INJECTION, EMULSION INTRAVENOUS at 09:07

## 2023-07-26 RX ADMIN — Medication 20 MG: at 09:07

## 2023-07-26 RX ADMIN — LIDOCAINE HYDROCHLORIDE 1 MG: 10 INJECTION, SOLUTION EPIDURAL; INFILTRATION; INTRACAUDAL; PERINEURAL at 09:07

## 2023-07-26 RX ADMIN — PROPOFOL 75 MCG/KG/MIN: 10 INJECTION, EMULSION INTRAVENOUS at 09:07

## 2023-07-26 RX ADMIN — Medication 10 MG: at 09:07

## 2023-07-26 NOTE — ANESTHESIA POSTPROCEDURE EVALUATION
Anesthesia Post Evaluation    Patient: Adan Webb    Procedure(s) Performed: Procedure(s) (LRB):  CYSTOSCOPY (N/A)  INJECTION, BOTULINUM TOXIN, TYPE A (N/A)    Final Anesthesia Type: general      Patient location during evaluation: PACU  Patient participation: Yes- Able to Participate  Level of consciousness: awake  Post-procedure vital signs: reviewed and stable  Pain management: adequate  Airway patency: patent    PONV status at discharge: No PONV  Anesthetic complications: no      Cardiovascular status: blood pressure returned to baseline  Respiratory status: unassisted, spontaneous ventilation and room air            Vitals Value Taken Time   /72 07/26/23 1046   Temp 37.2 °C (99 °F) 07/26/23 1006   Pulse 64 07/26/23 1058   Resp 17 07/26/23 1058   SpO2 98 % 07/26/23 1058   Vitals shown include unvalidated device data.      No case tracking events are documented in the log.      Pain/Caterina Score: Caterina Score: 10 (7/26/2023 10:15 AM)

## 2023-07-26 NOTE — PATIENT INSTRUCTIONS
Post Cystoscopy Instructions  Do not strain to have a bowel movement  No strenuous exercise x 7 days  No driving while you are on narcotic pain medications or if your churchill  catheter is in place    You can expect:  To pass stone fragments if you had a stone procedure  Have pain when you void from your stent if you have a stent in place  See blood in your urine if you have a stent in place    If you have a catheter, please return to the ER if your catheter stops draining or you are having abdominal pain.    Call the doctor if:  Temperature is greater than 101F  Persistent vomiting and inability to keep food down  Inability to void if you do not have a catheter

## 2023-07-26 NOTE — OP NOTE
[facility]  Operative Note    Date: 07/26/2023    Pre-Op Diagnosis: Overactive bladder    Post-Op Diagnosis: same    Procedure(s) Performed:   1.  Cystoscopy with bladder botox injection    Specimen(s): None    Staff Surgeon:  Jamal Zamora MD    Assistant Surgeon: DO Neftaly Swenson MD    Anesthesia: Monitored Anesthesia Care    Indications: Adan Webb is a 73 y.o. male with OAB    Findings: Normal Bladder Mucosa, Left lateral lobe hyperplasia, would be a candidate for Rezum therapy.   100u successfully injected into bladder mucosa    Estimated Blood Loss: min    Drains: None    Procedure in Detail:  After informed consent was obtained the patient was brought to the cystoscopy suite and placed in the supine position.  SCDs were applied and working.  Anesthesia was administered.  When the patient was adequately sedated he was placed in the dorsal lithotomy position and prepped and draped in the usual sterile fashion.      A rigid cystoscope in a 22 Fr sheath was introduced into the patients's bladder via the urethra.  This passed easily.  Formal cystoscopy was performed which revealed the ureteral orifices in their normal anatomic position bilaterally.  No bladder masses, trabeculations, stones or diverticuli were seen.      100 units of botox was injected into the detrusor muscle throughout the bladder.  Good wheals were raised.  The patient's bladder was drained and the cystoscope was removed.     The patient tolerated the procedure well and was transferred to the recovery room in stable condition.      Disposition:  The patient will follow up with Dr. Zamora in 3 months.  He was given prescriptions for Bactrim post-operatively.  He knows how to self-cath should she have any issues with retention.      Robert Olson DO  PGY-1

## 2023-07-26 NOTE — BRIEF OP NOTE
[facility] - [facility] (1st Fl)  Brief Operative Note    Surgery Date: 7/26/2023     Surgeon(s) and Role:     * Jamal Zamora MD - Primary     * Neftaly Sheriff MD - Resident - Assisting     * Robert Olson DO - Resident - Assisting        Pre-op Diagnosis:  OAB (overactive bladder) [N32.81]    Post-op Diagnosis:  Post-Op Diagnosis Codes:     * OAB (overactive bladder) [N32.81]    Procedure(s) (LRB):  CYSTOSCOPY (N/A)  INJECTION, BOTULINUM TOXIN, TYPE A (N/A)    Anesthesia: General/MAC    Operative Findings: Normal Bladder mucosa. No abnormalities. 100u Botox injected into 10 separate sites spread evenly throughout the bladder.     Estimated Blood Loss: * No values recorded between 7/26/2023  9:44 AM and 7/26/2023 10:01 AM *         Specimens:   Specimen (24h ago, onward)      None              Discharge Note    OUTCOME: Patient tolerated treatment/procedure well without complication and is now ready for discharge.    DISPOSITION: Home or Self Care    FINAL DIAGNOSIS:  <principal problem not specified>    FOLLOWUP: In clinic    DISCHARGE INSTRUCTIONS:  No discharge procedures on file.

## 2023-07-26 NOTE — H&P
Urology ([facility]) H&P    HPI:  Adan Webb is a 73 y.o. male.    C/o urine leakage with urgency, uses 3 to 4 pads a day.  C/o nocturia 2 to 3 x,   he does not tend to leak urine at night.  He is using vesicare and myrbetriq.  He was scheduled to have cysto botox injection last year but he cancelled it.     Hx of urinary incontinence of several years duration. He reports bother is associated without urinary daytime frequency (2-3x daily though this may not be accurate), with nocturia (2-5x per night) and with urgency that results in urinary incontinence daily. He reports no stress urinary incontinence associated with exertion. He requires daily pads (3 pads/day).  He has decreased overall fluid intake. He has eliminated caffeine. He reports urinary incontinence is equally bothersome during the day and night.      He denies symptoms of irritative voiding including dysuria.  He denies symptoms of obstructive voiding including decreased stream, hesitancy, intermittency, post void dribbling and sense of incomplete emptying. Bladder scan PVR was 0 mL. His history includes no notation of urolithiasis, hematuria, prior pelvic surgery, previous prolapse or incontinence procedures or neurological symptoms/diagnoses. He denies all other prior pelvic surgeries or neurologic diagnoses.      Previous incontinence therapies:  Behavioral Therapy: (fluid/dietary modification) -  provided no benefit  Medication: (VESIcare (provided no benefit)  Myrbetriq (provided no benefit)  Flomax: Provided minimal improvement      He was studied by Dr. GUTIERREZ With urodynamic study on 10/25/21:  Adult Urodynamics. On filling phase he demonstrates early first and strong desire with a small bladder capacity. There is detrusor overactivity (DO) demonstrated on this exam (though absence of DO on urodynamic evaluation does not exclude it as causative agent of urgency symptoms). Bladder compliance at capacity is normal. On fluoroscopy, the bladder  contour is smooth. There is no VUR demonstrated on this exam.      On stress testing, with adequate cough and valsalva effort, there is no MIO demonstrated and patient reports no clinical history of MIO.     The voiding study at the time of DO was used to calculate his CALLES. This was done because flow rate was better (due to higher capacity) on his first voiding trial and demonstrated a lower BOOI than on subsequent flow studies. On voiding phase, voiding pressures are within normal range and flow rate is normal. On VCUG, the urethra appears patent throughout voiding. The patient empties completely. The patient reports this is the normal voiding pattern.      No evidence of Bladder Outlet Obstruction. Patient was offered additional pharmacologic therapies as well as tibial neuromodulation, Botox injections, or sacral neuromodulation.     Here today for botox 100u      ROS:  Neg except per HPI    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, didn't use ear protection    Dermatitis 12/9/2020    nystat groin    DJD (degenerative joint disease) of hip 1/29/2015    Enlarged liver 4/8/2021    US 4/21     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    MCA , see MRI    Hiatal hernia 4/1/2021    CT chest tiny 2018    HTN (hypertension), benign 8/20/2020    Morbid obesity with BMI of 40.0-44.9, adult 1/29/2015    OAB (overactive bladder) 4/1/2021    Rash  with pads    Right-sided low back pain with right-sided sciatica 3/6/2019    Seasonal allergic rhinitis due to pollen 4/13/2016    Thoracic aortic aneurysm without rupture 05/27/2018    tx with Su, Cardiologist Dr Stone, 5.1 CTS Dr Duenas, follows yearly    Transient cerebral ischemia 6/6/2018       Past Surgical History:   Procedure Laterality Date    A-V CARDIAC PACEMAKER INSERTION N/A 08/05/2021    Procedure: INSERTION, CARDIAC PACEMAKER, DUAL CHAMBER;  Surgeon: Sheldon Miranda MD;  Location: [facility] EP LAB;  Service: Cardiology;  Laterality: N/A;  Near syncope,SB,Sinus Pause, RBBB,LAFB, Dual PPM, BIO, MAC, NH, 3 Prep    ADENOIDECTOMY      CATARACT EXTRACTION      CYSTOSCOPY WITH URODYNAMIC TESTING N/A 10/25/2021    Procedure: CYSTOSCOPY, WITH URODYNAMIC TESTING FLOUROSCOPIC;  Surgeon: Sancho Wesley MD;  Location: [facility] OR [address] [place] [address];  Service: Urology;  Laterality: N/A;  1hr    HERNIA REPAIR      TONSILLECTOMY      YAG LAser Capsulotomy Bilateral     2/18/2019 OS Dr. Cornelius       Social History     Socioeconomic History    Marital status:    Tobacco Use    Smoking status: Former     Packs/day: 6.00     Years: 1.00     Pack years: 6.00     Types: Cigarettes    Smokeless tobacco: Never   Substance and Sexual Activity    Alcohol use: Yes     Comment: wine, seldom    Drug use: No    Sexual activity: Yes     Partners: Female   Social History Narrative     to 'T', 2 children, nonsmoker, ETOH none, never had colonscopy & declines       Family History   Problem Relation Age of Onset    Leukemia Father     Aneurysm Father     Diabetes Mother     Cataracts Paternal Uncle     Amblyopia Neg Hx     Blindness Neg Hx     Glaucoma Neg Hx     Macular degeneration Neg Hx     Retinal detachment Neg Hx     Strabismus Neg Hx        Review of patient's allergies indicates:   Allergen Reactions    Augmentin [amoxicillin-pot clavulanate] Rash     Rash, confusion, TIA like symptoms    Azithromycin Rash     Avodart [dutasteride]     House dust mite Other (See Comments)    Mold     Naproxen     Ragweed     Synthroid [levothyroxine] Rash       No current facility-administered medications on file prior to encounter.     Current Outpatient Medications on File Prior to Encounter   Medication Sig Dispense Refill    azelastine (ASTELIN) 137 mcg (0.1 %) nasal spray 1 spray (137 mcg total) by Nasal route 2 (two) times daily. 30 mL 12    BABY ASPIRIN ORAL Take 81 tablets by mouth every evening.       coenzyme Q10 100 mg capsule Take by mouth once daily.      glucosamine-chondroitin 500-400 mg tablet Take 1 tablet by mouth 2 (two) times daily.       metoprolol succinate (TOPROL-XL) 50 MG 24 hr tablet TAKE 1 TABLET (50 MG TOTAL) BY MOUTH ONCE DAILY. 90 tablet 3    mirabegron (MYRBETRIQ) 50 mg Tb24 Take 1 tablet (50 mg total) by mouth once daily. (Patient taking differently: Take 50 mg by mouth every evening.) 30 tablet 11    MULTIVITAMIN W-MINERALS/LUTEIN (CENTRUM SILVER ORAL) Take by mouth once daily.       omega-3 fatty acids 300 mg Cap Take 1 tablet by mouth once daily.       rosuvastatin (CRESTOR) 10 MG tablet Take 1 tablet (10 mg total) by mouth once daily. 90 tablet 1    solifenacin (VESICARE) 10 MG tablet Take 1 tablet (10 mg total) by mouth once daily. (Patient taking differently: Take 10 mg by mouth every evening.) 30 tablet 11    tamsulosin (FLOMAX) 0.4 mg Cap Take 1 capsule (0.4 mg total) by mouth once daily. (Patient taking differently: Take 0.4 mg by mouth nightly.) 30 capsule 11    TURMERIC ORAL Take by mouth. Pt take 1 in the evening.      albuterol (VENTOLIN HFA) 90 mcg/actuation inhaler Inhale 2 puffs into the lungs every 6 (six) hours as needed for Wheezing. Rescue 18 g 1    ALLERGY RELIEF, LORATADINE, 10 mg tablet Take 10 mg by mouth daily as needed.      cholecalciferol, vitamin D3, 1,000 unit capsule Take 1,000 Units by mouth once daily.      diclofenac sodium (VOLTAREN) 1 % Gel Apply 2 g topically 4 (four)  times daily. 1 Tube 2    nystatin-triamcinolone (MYCOLOG II) cream APPLY TOPICALLY 4 (FOUR) TIMES DAILY. 60 g 5    omeprazole (PRILOSEC) 40 MG capsule Take 1 capsule (40 mg total) by mouth every morning. 90 capsule 2    UNABLE TO FIND Take by mouth nightly. tumeric      VITAMIN B COMPLEX (SUPER B COMPLEX-B-12 ORAL) Take by mouth.         Anticoagulation:  No    Physical Exam:  General: No acute distress, well developed. AAOx3  Head: Normocephalic, Atraumatic  Eyes: Extra-occular movements intact, No discharge  Neck: supple, symmetrical, trachea midline  Lungs: normal respiratory effort, no respiratory distress, no wheezes  CV: regular rate, 2+ pulses  Abdomen: soft, non-tender, non-distended, no organomegaly  MSK: no edema, no deformities, normal ROM  Skin: skin color, texture, turgor normal.  Neurologic: no focal deficits, sensation intact    Labs:    Urine dipstick today - negative for all compoinents    Lab Results   Component Value Date    WBC 7.85 03/02/2023    HGB 12.9 (L) 03/02/2023    HCT 41.1 03/02/2023    MCV 99 (H) 03/02/2023     03/02/2023           BMP  Lab Results   Component Value Date     03/02/2023    K 4.5 03/02/2023     03/02/2023    CO2 28 03/02/2023    BUN 20 03/02/2023    CREATININE 0.8 03/02/2023    CALCIUM 9.7 03/02/2023    ANIONGAP 7 (L) 03/02/2023    EGFRNORACEVR >60.0 03/02/2023       Lab Results   Component Value Date    PSA 2.7 01/20/2015    PSA 1.5 05/30/2008    PSA 2.1 05/11/2005         Assessment: Adan Webb is a 73 y.o. male with OAB    Plan:     1. To OR on 7/26/23 for 100u intravesical botox  2. Consents signed   3. I have explained the risk, benefits, and alternatives of the procedure in detail. The patient voices understanding and all questions have been answered. The patient agrees to proceed as planned.       Neftaly Sheriff MD

## 2023-07-26 NOTE — TRANSFER OF CARE
"Anesthesia Transfer of Care Note    Patient: Adan Webb    Procedure(s) Performed: Procedure(s) (LRB):  CYSTOSCOPY (N/A)  INJECTION, BOTULINUM TOXIN, TYPE A (N/A)    Patient location: [facility]    Anesthesia Type: MAC    Transport from OR: Transported from OR on 6-10 L/min O2 by face mask with adequate spontaneous ventilation    Post pain: adequate analgesia    Post assessment: no apparent anesthetic complications and tolerated procedure well    Post vital signs: stable    Level of consciousness: responds to stimulation and sedated    Nausea/Vomiting: no nausea/vomiting    Complications: none    Transfer of care protocol was followed      Last vitals:   Visit Vitals  BP (!) 115/58 (BP Location: Right arm, Patient Position: Lying)   Pulse 67   Temp 37.2 °C (99 °F) (Temporal)   Resp 20   Ht 6' 1" (1.854 m)   Wt 134.6 kg (296 lb 11.8 oz)   SpO2 98%   BMI 39.15 kg/m²     "

## 2023-08-07 ENCOUNTER — TELEPHONE (OUTPATIENT)
Dept: ADMINISTRATIVE | Facility: CLINIC | Age: 73
End: 2023-08-07
Payer: COMMERCIAL

## 2023-08-09 ENCOUNTER — TELEPHONE (OUTPATIENT)
Dept: ADMINISTRATIVE | Facility: CLINIC | Age: 73
End: 2023-08-09
Payer: COMMERCIAL

## 2023-08-09 ENCOUNTER — OFFICE VISIT (OUTPATIENT)
Dept: FAMILY MEDICINE | Facility: CLINIC | Age: 73
End: 2023-08-09
Payer: MEDICARE

## 2023-08-09 ENCOUNTER — TELEPHONE (OUTPATIENT)
Dept: PRIMARY CARE CLINIC | Facility: CLINIC | Age: 73
End: 2023-08-09
Payer: COMMERCIAL

## 2023-08-09 VITALS
BODY MASS INDEX: 22.26 KG/M2 | TEMPERATURE: 98 F | HEIGHT: 73 IN | DIASTOLIC BLOOD PRESSURE: 84 MMHG | SYSTOLIC BLOOD PRESSURE: 137 MMHG | HEART RATE: 62 BPM | WEIGHT: 168 LBS | OXYGEN SATURATION: 95 %

## 2023-08-09 DIAGNOSIS — I70.0 AORTIC ATHEROSCLEROSIS: ICD-10-CM

## 2023-08-09 DIAGNOSIS — I71.21 ASCENDING AORTIC ANEURYSM, UNSPECIFIED WHETHER RUPTURED: ICD-10-CM

## 2023-08-09 DIAGNOSIS — Z00.00 ENCOUNTER FOR PREVENTIVE HEALTH EXAMINATION: Primary | ICD-10-CM

## 2023-08-09 DIAGNOSIS — J84.10 CALCIFIED GRANULOMA OF LUNG: ICD-10-CM

## 2023-08-09 DIAGNOSIS — Z00.00 ENCOUNTER FOR MEDICARE ANNUAL WELLNESS EXAM: ICD-10-CM

## 2023-08-09 PROBLEM — I63.412 CEREBRAL INFARCTION DUE TO EMBOLISM OF LEFT MIDDLE CEREBRAL ARTERY: Status: RESOLVED | Noted: 2018-05-26 | Resolved: 2023-08-09

## 2023-08-09 PROBLEM — E66.01 SEVERE OBESITY (BMI 35.0-39.9) WITH COMORBIDITY: Status: RESOLVED | Noted: 2021-01-13 | Resolved: 2023-08-09

## 2023-08-09 PROBLEM — G45.9 TRANSIENT CEREBRAL ISCHEMIA: Status: RESOLVED | Noted: 2018-06-06 | Resolved: 2023-08-09

## 2023-08-09 PROCEDURE — 1160F RVW MEDS BY RX/DR IN RCRD: CPT | Mod: HCNC,CPTII,95, | Performed by: NURSE PRACTITIONER

## 2023-08-09 PROCEDURE — 1125F PR PAIN SEVERITY QUANTIFIED, PAIN PRESENT: ICD-10-PCS | Mod: HCNC,CPTII,95, | Performed by: NURSE PRACTITIONER

## 2023-08-09 PROCEDURE — 3079F PR MOST RECENT DIASTOLIC BLOOD PRESSURE 80-89 MM HG: ICD-10-PCS | Mod: HCNC,CPTII,95, | Performed by: NURSE PRACTITIONER

## 2023-08-09 PROCEDURE — 1157F PR ADVANCE CARE PLAN OR EQUIV PRESENT IN MEDICAL RECORD: ICD-10-PCS | Mod: HCNC,CPTII,95, | Performed by: NURSE PRACTITIONER

## 2023-08-09 PROCEDURE — 1101F PR PT FALLS ASSESS DOC 0-1 FALLS W/OUT INJ PAST YR: ICD-10-PCS | Mod: HCNC,CPTII,95, | Performed by: NURSE PRACTITIONER

## 2023-08-09 PROCEDURE — 3075F SYST BP GE 130 - 139MM HG: CPT | Mod: HCNC,CPTII,95, | Performed by: NURSE PRACTITIONER

## 2023-08-09 PROCEDURE — 1125F AMNT PAIN NOTED PAIN PRSNT: CPT | Mod: HCNC,CPTII,95, | Performed by: NURSE PRACTITIONER

## 2023-08-09 PROCEDURE — 3079F DIAST BP 80-89 MM HG: CPT | Mod: HCNC,CPTII,95, | Performed by: NURSE PRACTITIONER

## 2023-08-09 PROCEDURE — 1101F PT FALLS ASSESS-DOCD LE1/YR: CPT | Mod: HCNC,CPTII,95, | Performed by: NURSE PRACTITIONER

## 2023-08-09 PROCEDURE — 3075F PR MOST RECENT SYSTOLIC BLOOD PRESS GE 130-139MM HG: ICD-10-PCS | Mod: HCNC,CPTII,95, | Performed by: NURSE PRACTITIONER

## 2023-08-09 PROCEDURE — 1157F ADVNC CARE PLAN IN RCRD: CPT | Mod: HCNC,CPTII,95, | Performed by: NURSE PRACTITIONER

## 2023-08-09 PROCEDURE — G0439 PPPS, SUBSEQ VISIT: HCPCS | Mod: HCNC,95,, | Performed by: NURSE PRACTITIONER

## 2023-08-09 PROCEDURE — 3288F PR FALLS RISK ASSESSMENT DOCUMENTED: ICD-10-PCS | Mod: HCNC,CPTII,95, | Performed by: NURSE PRACTITIONER

## 2023-08-09 PROCEDURE — 3288F FALL RISK ASSESSMENT DOCD: CPT | Mod: HCNC,CPTII,95, | Performed by: NURSE PRACTITIONER

## 2023-08-09 PROCEDURE — G0439 PR MEDICARE ANNUAL WELLNESS SUBSEQUENT VISIT: ICD-10-PCS | Mod: HCNC,95,, | Performed by: NURSE PRACTITIONER

## 2023-08-09 PROCEDURE — 1170F FXNL STATUS ASSESSED: CPT | Mod: HCNC,CPTII,95, | Performed by: NURSE PRACTITIONER

## 2023-08-09 PROCEDURE — 1160F PR REVIEW ALL MEDS BY PRESCRIBER/CLIN PHARMACIST DOCUMENTED: ICD-10-PCS | Mod: HCNC,CPTII,95, | Performed by: NURSE PRACTITIONER

## 2023-08-09 PROCEDURE — 1159F MED LIST DOCD IN RCRD: CPT | Mod: HCNC,CPTII,95, | Performed by: NURSE PRACTITIONER

## 2023-08-09 PROCEDURE — 1159F PR MEDICATION LIST DOCUMENTED IN MEDICAL RECORD: ICD-10-PCS | Mod: HCNC,CPTII,95, | Performed by: NURSE PRACTITIONER

## 2023-08-09 PROCEDURE — 1170F PR FUNCTIONAL STATUS ASSESSED: ICD-10-PCS | Mod: HCNC,CPTII,95, | Performed by: NURSE PRACTITIONER

## 2023-08-09 NOTE — PROGRESS NOTES
"The patient location is: [place]  The chief complaint leading to consultation is: Medicare AWV  Visit type: audiovisual    Face to Face time with patient: 37 minutes  50 minutes of total time spent on the encounter, which includes face to face time and non-face to face time preparing to see the patient (eg, review of tests), Obtaining and/or reviewing separately obtained history, Documenting clinical information in the electronic or other health record, Independently interpreting results (not separately reported) and communicating results to the patient/family/caregiver, or Care coordination (not separately reported).     Each patient to whom he or she provides medical services by telemedicine is:  (1) informed of the relationship between the physician and patient and the respective role of any other health care provider with respect to management of the patient; and (2) notified that he or she may decline to receive medical services by telemedicine and may withdraw from such care at any time.    Adan Webb presented for a  Medicare AWV and comprehensive Health Risk Assessment today. The following components were reviewed and updated:    Medical history  Family History  Social history  Allergies and Current Medications  Health Risk Assessment  Health Maintenance  Care Team         ** See Completed Assessments for Annual Wellness Visit within the encounter summary.**         The following assessments were completed:  Living Situation  CAGE  Depression Screening  Fall Risk Assessment (MACH 10)  Hearing Assessment(HHI)  Cognitive Function Screening  Nutrition Screening  ADL Screening  PAQ Screening      Vitals:    08/09/23 0955   BP: 137/84   BP Location: Left arm   Patient Position: Sitting   Pulse: 62   Temp: 97.6 °F (36.4 °C)   TempSrc: Oral   SpO2: 95%   Weight: 76.2 kg (167 lb 15.9 oz)   Height: 6' 1" (1.854 m)     Body mass index is 22.16 kg/m².    Physical Exam  Constitutional:       General: He is not in " acute distress.     Appearance: Normal appearance. He is well-developed and well-groomed.   HENT:      Head: Normocephalic.   Pulmonary:      Effort: Pulmonary effort is normal. No respiratory distress.   Skin:     Coloration: Skin is not pale.   Neurological:      Mental Status: He is alert and oriented to person, place, and time.   Psychiatric:         Attention and Perception: Attention normal.         Mood and Affect: Mood and affect normal.         Speech: Speech normal.         Behavior: Behavior normal. Behavior is cooperative.         Thought Content: Thought content normal.     Physical exam limited d/t virtual visit. Patient does not appear to be in any respiratory distress. Able to speak in complete sentences without becoming short of breath.          Diagnoses and health risks identified today and associated recommendations/orders:    1. Encounter for preventive health examination  Pain level assessed and reviewed. Patient is not taking any opioids; at low risk for opioid use disorder. Follow up with PCP.    2. Ascending aortic aneurysm, unspecified whether ruptured  Chronic; stable. As seen on previous imaging. Followed by Cardiology.    3. Aortic atherosclerosis  Chronic; stable. As seen on previous imaging. Followed by Cardiology.    4. Calcified granuloma of lung  Chronic; stable. As seen on previous imaging. Follow up with PCP.    5. Encounter for Medicare annual wellness exam  - Ambulatory Referral/Consult to Enhanced Annual Wellness Visit (eAWV)      Provided Adan with a 5-10 year written screening schedule and personal prevention plan. Recommendations were developed using the USPSTF age appropriate recommendations. Education, counseling, and referrals were provided as needed. After Visit Summary given to patient which includes a list of additional screenings/tests needed.    Follow up for your next annual wellness visit.    Katlyn Kidd NP        I offered to discuss advanced care  planning, including how to pick a person who would make decisions for you if you were unable to make them for yourself, called a health care power of , and what kind of decisions you might make such as use of life sustaining treatments such as ventilators and tube feeding when faced with a life limiting illness recorded on a living will that they will need to know. (How you want to be cared for as you near the end of your natural life)     X  Patient has advanced directives on file, which we reviewed, and they do not wish to make changes.

## 2023-08-09 NOTE — TELEPHONE ENCOUNTER
----- Message from Diana Moore sent at 8/9/2023 10:28 AM CDT -----  Good morning  Patient came for a Wellness visit today.  Per provider patient needs an annual.  Patient ep with Dr Reyes. Can you please assist scheduling patient.   Thanks    Diana

## 2023-08-09 NOTE — PATIENT INSTRUCTIONS
Counseling and Referral of Other Preventative  (Italic type indicates deductible and co-insurance are waived)    Patient Name: Adan Webb  Today's Date: 8/9/2023    Health Maintenance       Date Due Completion Date    Shingles Vaccine (1 of 2) 09/08/2023 (Originally 2/[birth year]/1998) ---    COVID-19 Vaccine (6 - Pfizer series) 09/08/2023 (Originally 2/6/2023) 10/6/2022    Influenza Vaccine (1) 09/01/2023 10/17/2022    High Dose Statin 07/26/2024 7/26/2023    Colorectal Cancer Screening 08/31/2027 11/11/2020    Override on 8/31/2017: Declined (give FOBT today)    Lipid Panel 03/02/2028 3/2/2023    TETANUS VACCINE 01/14/2029 1/14/2019        No orders of the defined types were placed in this encounter.      The following information is provided to all patients.  This information is to help you find resources for any of the problems found today that may be affecting your health:                Living healthy guide: www.[facility].[place].gov      Understanding Diabetes: www.diabetes.org      Eating healthy: www.cdc.gov/healthyweight      CDC home safety checklist: www.cdc.gov/steadi/patient.html      Agency on Aging: www.goea.[place].gov      Alcoholics anonymous (AA): www.aa.org      Physical Activity: www.donny.nih.gov/jx2tteh      Tobacco use: www.quitwithusla.org

## 2023-08-14 ENCOUNTER — OFFICE VISIT (OUTPATIENT)
Dept: PRIMARY CARE CLINIC | Facility: CLINIC | Age: 73
End: 2023-08-14
Payer: MEDICARE

## 2023-08-14 ENCOUNTER — NURSE TRIAGE (OUTPATIENT)
Dept: ADMINISTRATIVE | Facility: CLINIC | Age: 73
End: 2023-08-14
Payer: COMMERCIAL

## 2023-08-14 ENCOUNTER — LAB VISIT (OUTPATIENT)
Dept: LAB | Facility: HOSPITAL | Age: 73
End: 2023-08-14
Attending: INTERNAL MEDICINE
Payer: COMMERCIAL

## 2023-08-14 VITALS
HEART RATE: 82 BPM | OXYGEN SATURATION: 99 % | SYSTOLIC BLOOD PRESSURE: 132 MMHG | WEIGHT: 295.44 LBS | TEMPERATURE: 98 F | BODY MASS INDEX: 39.16 KG/M2 | RESPIRATION RATE: 18 BRPM | HEIGHT: 73 IN | DIASTOLIC BLOOD PRESSURE: 70 MMHG

## 2023-08-14 DIAGNOSIS — R42 DIZZINESS: ICD-10-CM

## 2023-08-14 DIAGNOSIS — R30.0 DYSURIA: ICD-10-CM

## 2023-08-14 DIAGNOSIS — R30.0 DYSURIA: Primary | ICD-10-CM

## 2023-08-14 LAB
ANION GAP SERPL CALC-SCNC: 9 MMOL/L (ref 8–16)
BASOPHILS # BLD AUTO: 0.08 K/UL (ref 0–0.2)
BASOPHILS NFR BLD: 1.1 % (ref 0–1.9)
BILIRUB UR QL STRIP: NEGATIVE
BUN SERPL-MCNC: 19 MG/DL (ref 8–23)
CALCIUM SERPL-MCNC: 9 MG/DL (ref 8.7–10.5)
CHLORIDE SERPL-SCNC: 105 MMOL/L (ref 95–110)
CLARITY UR REFRACT.AUTO: CLEAR
CO2 SERPL-SCNC: 23 MMOL/L (ref 23–29)
COLOR UR AUTO: YELLOW
CREAT SERPL-MCNC: 0.7 MG/DL (ref 0.5–1.4)
DIFFERENTIAL METHOD: ABNORMAL
EOSINOPHIL # BLD AUTO: 0.2 K/UL (ref 0–0.5)
EOSINOPHIL NFR BLD: 2 % (ref 0–8)
ERYTHROCYTE [DISTWIDTH] IN BLOOD BY AUTOMATED COUNT: 14.1 % (ref 11.5–14.5)
EST. GFR  (NO RACE VARIABLE): >60 ML/MIN/1.73 M^2
GLUCOSE SERPL-MCNC: 92 MG/DL (ref 70–110)
GLUCOSE UR QL STRIP: NEGATIVE
HCT VFR BLD AUTO: 40.8 % (ref 40–54)
HGB BLD-MCNC: 13.3 G/DL (ref 14–18)
HGB UR QL STRIP: NEGATIVE
IMM GRANULOCYTES # BLD AUTO: 0.02 K/UL (ref 0–0.04)
IMM GRANULOCYTES NFR BLD AUTO: 0.3 % (ref 0–0.5)
KETONES UR QL STRIP: NEGATIVE
LEUKOCYTE ESTERASE UR QL STRIP: NEGATIVE
LYMPHOCYTES # BLD AUTO: 2 K/UL (ref 1–4.8)
LYMPHOCYTES NFR BLD: 26.1 % (ref 18–48)
MCH RBC QN AUTO: 30.9 PG (ref 27–31)
MCHC RBC AUTO-ENTMCNC: 32.6 G/DL (ref 32–36)
MCV RBC AUTO: 95 FL (ref 82–98)
MONOCYTES # BLD AUTO: 0.9 K/UL (ref 0.3–1)
MONOCYTES NFR BLD: 12 % (ref 4–15)
NEUTROPHILS # BLD AUTO: 4.5 K/UL (ref 1.8–7.7)
NEUTROPHILS NFR BLD: 58.5 % (ref 38–73)
NITRITE UR QL STRIP: NEGATIVE
NRBC BLD-RTO: 0 /100 WBC
PH UR STRIP: 7 [PH] (ref 5–8)
PLATELET # BLD AUTO: 219 K/UL (ref 150–450)
PMV BLD AUTO: 11.5 FL (ref 9.2–12.9)
POTASSIUM SERPL-SCNC: 4.4 MMOL/L (ref 3.5–5.1)
PROT UR QL STRIP: ABNORMAL
RBC # BLD AUTO: 4.3 M/UL (ref 4.6–6.2)
SODIUM SERPL-SCNC: 137 MMOL/L (ref 136–145)
SP GR UR STRIP: 1.02 (ref 1–1.03)
URN SPEC COLLECT METH UR: ABNORMAL
WBC # BLD AUTO: 7.59 K/UL (ref 3.9–12.7)

## 2023-08-14 PROCEDURE — 1157F ADVNC CARE PLAN IN RCRD: CPT | Mod: HCNC,CPTII,S$GLB, | Performed by: INTERNAL MEDICINE

## 2023-08-14 PROCEDURE — 36415 COLL VENOUS BLD VENIPUNCTURE: CPT | Mod: HCNC,PN | Performed by: INTERNAL MEDICINE

## 2023-08-14 PROCEDURE — 3078F PR MOST RECENT DIASTOLIC BLOOD PRESSURE < 80 MM HG: ICD-10-PCS | Mod: HCNC,CPTII,S$GLB, | Performed by: INTERNAL MEDICINE

## 2023-08-14 PROCEDURE — 1157F PR ADVANCE CARE PLAN OR EQUIV PRESENT IN MEDICAL RECORD: ICD-10-PCS | Mod: HCNC,CPTII,S$GLB, | Performed by: INTERNAL MEDICINE

## 2023-08-14 PROCEDURE — 81003 URINALYSIS AUTO W/O SCOPE: CPT | Mod: HCNC | Performed by: INTERNAL MEDICINE

## 2023-08-14 PROCEDURE — 1159F PR MEDICATION LIST DOCUMENTED IN MEDICAL RECORD: ICD-10-PCS | Mod: HCNC,CPTII,S$GLB, | Performed by: INTERNAL MEDICINE

## 2023-08-14 PROCEDURE — 3288F FALL RISK ASSESSMENT DOCD: CPT | Mod: HCNC,CPTII,S$GLB, | Performed by: INTERNAL MEDICINE

## 2023-08-14 PROCEDURE — 1101F PT FALLS ASSESS-DOCD LE1/YR: CPT | Mod: HCNC,CPTII,S$GLB, | Performed by: INTERNAL MEDICINE

## 2023-08-14 PROCEDURE — 1160F RVW MEDS BY RX/DR IN RCRD: CPT | Mod: HCNC,CPTII,S$GLB, | Performed by: INTERNAL MEDICINE

## 2023-08-14 PROCEDURE — 1125F AMNT PAIN NOTED PAIN PRSNT: CPT | Mod: HCNC,CPTII,S$GLB, | Performed by: INTERNAL MEDICINE

## 2023-08-14 PROCEDURE — 80048 BASIC METABOLIC PNL TOTAL CA: CPT | Mod: HCNC | Performed by: INTERNAL MEDICINE

## 2023-08-14 PROCEDURE — 3075F PR MOST RECENT SYSTOLIC BLOOD PRESS GE 130-139MM HG: ICD-10-PCS | Mod: HCNC,CPTII,S$GLB, | Performed by: INTERNAL MEDICINE

## 2023-08-14 PROCEDURE — 1160F PR REVIEW ALL MEDS BY PRESCRIBER/CLIN PHARMACIST DOCUMENTED: ICD-10-PCS | Mod: HCNC,CPTII,S$GLB, | Performed by: INTERNAL MEDICINE

## 2023-08-14 PROCEDURE — 99214 PR OFFICE/OUTPT VISIT, EST, LEVL IV, 30-39 MIN: ICD-10-PCS | Mod: HCNC,S$GLB,, | Performed by: INTERNAL MEDICINE

## 2023-08-14 PROCEDURE — 99999 PR PBB SHADOW E&M-EST. PATIENT-LVL V: ICD-10-PCS | Mod: PBBFAC,HCNC,, | Performed by: INTERNAL MEDICINE

## 2023-08-14 PROCEDURE — 1125F PR PAIN SEVERITY QUANTIFIED, PAIN PRESENT: ICD-10-PCS | Mod: HCNC,CPTII,S$GLB, | Performed by: INTERNAL MEDICINE

## 2023-08-14 PROCEDURE — 99999 PR PBB SHADOW E&M-EST. PATIENT-LVL V: CPT | Mod: PBBFAC,HCNC,, | Performed by: INTERNAL MEDICINE

## 2023-08-14 PROCEDURE — 3078F DIAST BP <80 MM HG: CPT | Mod: HCNC,CPTII,S$GLB, | Performed by: INTERNAL MEDICINE

## 2023-08-14 PROCEDURE — 3288F PR FALLS RISK ASSESSMENT DOCUMENTED: ICD-10-PCS | Mod: HCNC,CPTII,S$GLB, | Performed by: INTERNAL MEDICINE

## 2023-08-14 PROCEDURE — 3075F SYST BP GE 130 - 139MM HG: CPT | Mod: HCNC,CPTII,S$GLB, | Performed by: INTERNAL MEDICINE

## 2023-08-14 PROCEDURE — 1101F PR PT FALLS ASSESS DOC 0-1 FALLS W/OUT INJ PAST YR: ICD-10-PCS | Mod: HCNC,CPTII,S$GLB, | Performed by: INTERNAL MEDICINE

## 2023-08-14 PROCEDURE — 1159F MED LIST DOCD IN RCRD: CPT | Mod: HCNC,CPTII,S$GLB, | Performed by: INTERNAL MEDICINE

## 2023-08-14 PROCEDURE — 99214 OFFICE O/P EST MOD 30 MIN: CPT | Mod: HCNC,S$GLB,, | Performed by: INTERNAL MEDICINE

## 2023-08-14 PROCEDURE — 85025 COMPLETE CBC W/AUTO DIFF WBC: CPT | Mod: HCNC | Performed by: INTERNAL MEDICINE

## 2023-08-14 NOTE — PROGRESS NOTES
Subjective:      Patient ID: Adan Webb is a 73 y.o. male.    Chief Complaint: Dizziness    Presents today with dizziness and dysuria. He reports that it happened today. He was getting out of bed and felt a wave of dizziness. At the time, he did not have any CP/SOB/lightheadedness/associated HA. He reports that he felt dizzy, denies sensation of room spinning. He took his BP at that time which was elevated. The episode did not last for a long time. He then had another episode of dizziness when he was bent over picking something up around noontime. Of note, he has been having issues with dysuria and frequency. He has been staying well hydrated. Will check UA today with culture, CBC and BMP.     Denies any chest pain, shortness of breath, nausea vomiting constipation diarrhea, blood in stool, heartburn    Review of Systems   Constitutional:  Negative for chills, fever and weight loss.   HENT:  Negative for congestion, ear pain and sore throat.    Eyes:  Negative for double vision.   Respiratory:  Negative for cough and shortness of breath.    Cardiovascular:  Negative for chest pain, palpitations and leg swelling.   Gastrointestinal:  Negative for abdominal pain, heartburn, nausea and vomiting.   Genitourinary:  Positive for dysuria.   Skin:  Negative for rash.   Neurological:  Negative for dizziness, tingling and headaches.   Psychiatric/Behavioral:  Negative for depression.          Current Outpatient Medications:     albuterol (VENTOLIN HFA) 90 mcg/actuation inhaler, Inhale 2 puffs into the lungs every 6 (six) hours as needed for Wheezing. Rescue, Disp: 18 g, Rfl: 1    ALLERGY RELIEF, LORATADINE, 10 mg tablet, Take 10 mg by mouth daily as needed., Disp: , Rfl:     azelastine (ASTELIN) 137 mcg (0.1 %) nasal spray, 1 spray (137 mcg total) by Nasal route 2 (two) times daily., Disp: 30 mL, Rfl: 12    BABY ASPIRIN ORAL, Take 81 tablets by mouth every evening. , Disp: , Rfl:     cholecalciferol, vitamin D3, 1,000  "unit capsule, Take 1,000 Units by mouth once daily., Disp: , Rfl:     coenzyme Q10 100 mg capsule, Take by mouth once daily., Disp: , Rfl:     diclofenac sodium (VOLTAREN) 1 % Gel, Apply 2 g topically 4 (four) times daily., Disp: 1 Tube, Rfl: 2    fluticasone propionate (FLONASE) 50 mcg/actuation nasal spray, 1 spray by Each Nostril route once daily., Disp: , Rfl:     glucosamine-chondroitin 500-400 mg tablet, Take 1 tablet by mouth 2 (two) times daily. , Disp: , Rfl:     metoprolol succinate (TOPROL-XL) 50 MG 24 hr tablet, TAKE 1 TABLET (50 MG TOTAL) BY MOUTH ONCE DAILY., Disp: 90 tablet, Rfl: 3    MULTIVITAMIN W-MINERALS/LUTEIN (CENTRUM SILVER ORAL), Take by mouth once daily. , Disp: , Rfl:     omega-3 fatty acids 300 mg Cap, Take 1 tablet by mouth once daily. , Disp: , Rfl:     rosuvastatin (CRESTOR) 10 MG tablet, Take 1 tablet (10 mg total) by mouth once daily., Disp: 90 tablet, Rfl: 1    tamsulosin (FLOMAX) 0.4 mg Cap, Take 1 capsule (0.4 mg total) by mouth once daily., Disp: 30 capsule, Rfl: 11    TURMERIC ORAL, Take by mouth. Pt take 1 in the evening., Disp: , Rfl:     VITAMIN B COMPLEX (SUPER B COMPLEX-B-12 ORAL), Take by mouth., Disp: , Rfl:     nystatin-triamcinolone (MYCOLOG II) cream, APPLY TOPICALLY 4 (FOUR) TIMES DAILY. (Patient not taking: Reported on 8/9/2023), Disp: 60 g, Rfl: 5    Lab Results   Component Value Date    HGBA1C 5.3 05/27/2018     No results found for: "MICALBCREAT"  Lab Results   Component Value Date    LDLCALC 70.2 03/02/2023    LDLCALC 127.0 12/02/2020    CHOL 124 03/02/2023    HDL 41 03/02/2023    TRIG 64 03/02/2023       Lab Results   Component Value Date     03/02/2023    K 4.5 03/02/2023     03/02/2023    CO2 28 03/02/2023    GLU 89 03/02/2023    BUN 20 03/02/2023    CREATININE 0.8 03/02/2023    CALCIUM 9.7 03/02/2023    PROT 7.6 03/02/2023    ALBUMIN 3.7 03/02/2023    BILITOT 0.7 03/02/2023    ALKPHOS 88 03/02/2023    AST 23 03/02/2023    ALT 25 03/02/2023    " ANIONGAP 7 (L) 03/02/2023    ESTGFRAFRICA >60.0 08/05/2021    EGFRNONAA >60.0 08/05/2021    WBC 7.85 03/02/2023    HGB 12.9 (L) 03/02/2023    HGB 13.8 (L) 08/05/2021    HCT 41.1 03/02/2023    MCV 99 (H) 03/02/2023     03/02/2023    TSH 3.536 04/01/2021    PSA 2.7 01/20/2015    PSA 1.5 05/30/2008    PSADIAG 2.5 07/16/2018    PSADIAG 2.8 06/12/2017    HEPCAB Negative 10/24/2017       Lab Results   Component Value Date    LEIDHZBI32 1249 (H) 11/11/2019         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, didn't use ear protection    Dermatitis 12/9/2020    nystat groin    DJD (degenerative joint disease) of hip 1/29/2015    Enlarged liver 4/8/2021    US 4/21     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    MCA , see MRI    Hiatal hernia 4/1/2021    CT chest tiny 2018    HTN (hypertension), benign 8/20/2020    Morbid obesity with BMI of 40.0-44.9, adult 1/29/2015    OAB (overactive bladder) 4/1/2021    Rash with pads    Right-sided low back pain with right-sided sciatica 3/6/2019    Seasonal allergic rhinitis due to pollen 4/13/2016    Thoracic aortic aneurysm without rupture 05/27/2018    tx with Su, Cardiologist Dr Stone, 5.1 CTS Dr Duenas, follows yearly    Transient cerebral ischemia 6/6/2018     Past Surgical History:   Procedure Laterality Date    A-V CARDIAC PACEMAKER INSERTION N/A 08/05/2021    Procedure: INSERTION,  "CARDIAC PACEMAKER, DUAL CHAMBER;  Surgeon: Sheldon Miranda MD;  Location: [facility] EP LAB;  Service: Cardiology;  Laterality: N/A;  Near syncope,SB,Sinus Pause, RBBB,LAFB, Dual PPM, BIO, MAC, MA, 3 Prep    ADENOIDECTOMY      CATARACT EXTRACTION      CYSTOSCOPY N/A 7/26/2023    Procedure: CYSTOSCOPY;  Surgeon: Jamal Zamora MD;  Location: [facility] OR [address] [place] [address];  Service: Urology;  Laterality: N/A;    CYSTOSCOPY WITH URODYNAMIC TESTING N/A 10/25/2021    Procedure: CYSTOSCOPY, WITH URODYNAMIC TESTING FLOUROSCOPIC;  Surgeon: Sancho Wesley MD;  Location: [facility] OR [address] [place] [address];  Service: Urology;  Laterality: N/A;  1hr    HERNIA REPAIR      INJECTION OF BOTULINUM TOXIN TYPE A N/A 7/26/2023    Procedure: INJECTION, BOTULINUM TOXIN, TYPE A;  Surgeon: Jamal Zamora MD;  Location: [facility] OR [address] [place] [address];  Service: Urology;  Laterality: N/A;  100 units    TONSILLECTOMY      YAG LAser Capsulotomy Bilateral     2/18/2019 OS Dr. Cornelius     Social History     Social History Narrative     to 'T', 2 children, nonsmoker, ETOH none, never had colonscopy & declines     Family History   Problem Relation Age of Onset    Diabetes Mother     Leukemia Father     Aneurysm Father     No Known Problems Daughter     No Known Problems Son     Cataracts Paternal Uncle     Amblyopia Neg Hx     Blindness Neg Hx     Glaucoma Neg Hx     Macular degeneration Neg Hx     Retinal detachment Neg Hx     Strabismus Neg Hx      Vitals:    08/14/23 1417   BP: 132/70   Pulse: 82   Resp: 18   Temp: 98 °F (36.7 °C)   SpO2: 99%   Weight: 134 kg (295 lb 6.7 oz)   Height: 6' 1" (1.854 m)   PainSc:   2     Objective:   Physical Exam  Vitals reviewed.   Constitutional:       Appearance: Normal appearance.   HENT:      Head: Normocephalic.   Eyes:      Pupils: Pupils are equal, round, and reactive to light.   Neck:      Vascular: No carotid bruit.   Cardiovascular:      Rate and Rhythm: Normal rate.      Pulses: Normal pulses.      Heart sounds: Normal heart sounds. "   Pulmonary:      Effort: Pulmonary effort is normal. No respiratory distress.      Breath sounds: Normal breath sounds.   Abdominal:      General: Bowel sounds are normal. There is no distension.      Palpations: Abdomen is soft.      Tenderness: There is no right CVA tenderness or left CVA tenderness.   Musculoskeletal:         General: Normal range of motion.   Skin:     General: Skin is warm.   Neurological:      General: No focal deficit present.      Mental Status: He is alert. Mental status is at baseline.   Psychiatric:         Mood and Affect: Mood normal.       Assessment:     1. Dysuria    2. Dizziness      Plan:     Orders Placed This Encounter    Urinalysis, Reflex to Urine Culture Urine, Clean Catch    Basic Metabolic Panel    CBC Auto Differential       There are no Patient Instructions on file for this visit.  All of your core healthy metrics are met.

## 2023-08-14 NOTE — TELEPHONE ENCOUNTER
Patient states he has been dizzy when bending over and when getting out of bed this morning. He is still able to stand and walk without support. BP is currently 158/79 with a pulse of 70. He took his BP medication this morning. BP before taking medication was 153/91. He has had two episodes of diarrhea. Pt wishes to be seen in the office today. Scheduled an appt for this afternoon.     Reason for Disposition   Patient wants to be seen    Additional Information   Negative: SEVERE difficulty breathing (e.g., struggling for each breath, speaks in single words)   Negative: Shock suspected (e.g., cold/pale/clammy skin, too weak to stand, low BP, rapid pulse)   Negative: Difficult to awaken or acting confused (e.g., disoriented, slurred speech)   Negative: Fainted, and still feels dizzy afterwards   Negative: Overdose (accidental or intentional) of medications   Negative: New neurologic deficit that is present now: * Weakness of the face, arm, or leg on one side of the body * Numbness of the face, arm, or leg on one side of the body * Loss of speech or garbled speech   Negative: Heart beating < 50 beats per minute OR > 140 beats per minute   Negative: Sounds like a life-threatening emergency to the triager   Negative: SEVERE dizziness (e.g., unable to stand, requires support to walk, feels like passing out now)   Negative: SEVERE headache or neck pain   Negative: Spinning or tilting sensation (vertigo) present now and one or more stroke risk factors (i.e., hypertension, diabetes mellitus, prior stroke/TIA, heart attack, age over 60) (Exception: Prior physician evaluation for this AND no different/worse than usual.)   Negative: Neurologic deficit that was brief (now gone), ANY of the following:* Weakness of the face, arm, or leg on one side of the body* Numbness of the face, arm, or leg on one side of the body* Loss of speech or garbled speech   Negative: Loss of vision or double vision  (Exception: Similar to previous  migraines.)   Negative: Extra heartbeats, irregular heart beating, or heart is beating very fast (i.e., 'palpitations')   Negative: Difficulty breathing   Negative: Drinking very little and dehydration suspected (e.g., no urine > 12 hours, very dry mouth, very lightheaded)   Negative: Follows bleeding (e.g., stomach, rectum, vagina)  (Exception: Became dizzy from sight of small amount blood.)   Negative: Patient sounds very sick or weak to the triager   Negative: Lightheadedness (dizziness) present now, after 2 hours of rest and fluids   Negative: Spinning or tilting sensation (vertigo) present now   Negative: Fever > 103 F (39.4 C)   Negative: Fever > 100.0 F (37.8 C) and has diabetes mellitus or a weak immune system (e.g., HIV positive, cancer chemotherapy, organ transplant, splenectomy, chronic steroids)   Negative: MODERATE dizziness (e.g., interferes with normal activities)  (Exception: Dizziness caused by heat exposure, sudden standing, or poor fluid intake.)   Negative: Vomiting occurs with dizziness    Protocols used: Dizziness-A-OH

## 2023-08-15 NOTE — PROGRESS NOTES
Your blood count (CBC) is stable    Your sugar number (Glucose) is within normal limits.  Rest of your electrolytes are unremarkable.    Your kidney (BUN, Creatinine and GFT) function is unremarkable.

## 2023-08-16 ENCOUNTER — TELEPHONE (OUTPATIENT)
Dept: PRIMARY CARE CLINIC | Facility: CLINIC | Age: 73
End: 2023-08-16
Payer: COMMERCIAL

## 2023-08-16 NOTE — TELEPHONE ENCOUNTER
----- Message from Pedro Oneil sent at 8/16/2023 12:13 PM CDT -----  Contact: self [phone]  Pt requesting to discuss meds.    Please call and advise

## 2023-08-17 ENCOUNTER — TELEPHONE (OUTPATIENT)
Dept: PRIMARY CARE CLINIC | Facility: CLINIC | Age: 73
End: 2023-08-17
Payer: COMMERCIAL

## 2023-08-17 NOTE — TELEPHONE ENCOUNTER
Pt calling with concerns that Flomax, Azelastine nasal spray and Flonase nasal spray combined is causing dizziness and fatigue.  He was seen in office on 8/14/23 by Dr Inman for dizziness.  He want to know if you think these medications could be the cause. He said he just don't feel right. Please advise.

## 2023-08-17 NOTE — TELEPHONE ENCOUNTER
----- Message from Irving Wise sent at 8/16/2023  3:52 PM CDT -----  Contact: [phone]  Patient is returning a phone call.  Who left a message for the patient:Althea   Does patient know what this is regarding:  n/a   Would you like a call back, or a response through your [facility] portal?:   call back   Comments:      Pt missed a call and would like a call back.

## 2023-08-17 NOTE — TELEPHONE ENCOUNTER
It's possible. He can stop all 3 & see how he feels next week    Drink LOTS of water, NO sugar, NO caffeine & see how this helps for the next 5 days    Be evaluated by doctor if he feels worse

## 2023-08-24 ENCOUNTER — PATIENT MESSAGE (OUTPATIENT)
Dept: UROLOGY | Facility: CLINIC | Age: 73
End: 2023-08-24
Payer: COMMERCIAL

## 2023-08-25 ENCOUNTER — TELEPHONE (OUTPATIENT)
Dept: UROLOGY | Facility: CLINIC | Age: 73
End: 2023-08-25
Payer: COMMERCIAL

## 2023-08-25 ENCOUNTER — PATIENT MESSAGE (OUTPATIENT)
Dept: UROLOGY | Facility: CLINIC | Age: 73
End: 2023-08-25
Payer: COMMERCIAL

## 2023-08-25 DIAGNOSIS — R35.1 NOCTURIA: ICD-10-CM

## 2023-08-25 DIAGNOSIS — N32.81 OAB (OVERACTIVE BLADDER): ICD-10-CM

## 2023-08-25 DIAGNOSIS — R35.1 BENIGN PROSTATIC HYPERPLASIA WITH NOCTURIA: Primary | ICD-10-CM

## 2023-08-25 DIAGNOSIS — N39.41 URGE INCONTINENCE: ICD-10-CM

## 2023-08-25 DIAGNOSIS — N40.1 BENIGN PROSTATIC HYPERPLASIA WITH NOCTURIA: Primary | ICD-10-CM

## 2023-08-25 NOTE — TELEPHONE ENCOUNTER
Adan Webb is a 73 y.o. male with OAB  Procedure(s) Performed: 11172  1.  Cystoscopy with bladder botox injection  Findings: Normal Bladder Mucosa, Left lateral lobe hyperplasia, would be a candidate for Rezum therapy.   100u successfully injected into bladder mucosa    Per FUDS done by Dr.[place] in 10/25/21  There is detrusor overactivity (DO) demonstrated on this exam (though absence of DO on urodynamic evaluation does not exclude it as causative agent of urgency symptoms). Bladder compliance at capacity is normal. On fluoroscopy, the bladder contour is smooth. There is no VUR demonstrated on this exam.      On stress testing, with adequate cough and valsalva effort, there is no MIO demonstrated and patient reports no clinical history of MIO.     The voiding study at the time of DO was used to calculate his CALLES. This was done because flow rate was better (due to higher capacity) on his first voiding trial and demonstrated a lower BOOI than on subsequent flow studies. On voiding phase, voiding pressures are within normal range and flow rate is normal. On VCUG, the urethra appears patent throughout voiding. The patient empties completely. The patient reports this is the normal voiding pattern.      No evidence of Bladder Outlet Obstruction. Patient was offered additional pharmacologic therapies as well as tibial neuromodulation, Botox injections, or sacral neuromodulation.     Benign prostatic hyperplasia with nocturia    Nocturia    OAB (overactive bladder)    Urge incontinence     S/p Botox injection.  Still complain of nocturia, which may be due to other factors.  Unable to reach him by his phone as he requested.    He can follow up with an JASON to make aba that he is emptying the bladder well following botox injection.    I am going to recommend the followings:  Continue flomax in the evening time.  Need to keep a voiding diary night vs day time for 3 days in a row to see how much urine he makes  at night.  He can come to urology clinic to  a urinal if needed.  Will see him in 1 month.

## 2023-08-29 ENCOUNTER — OFFICE VISIT (OUTPATIENT)
Dept: UROLOGY | Facility: CLINIC | Age: 73
End: 2023-08-29
Payer: MEDICARE

## 2023-08-29 DIAGNOSIS — N40.1 BENIGN PROSTATIC HYPERPLASIA WITH NOCTURIA: ICD-10-CM

## 2023-08-29 DIAGNOSIS — N39.41 URGE INCONTINENCE: ICD-10-CM

## 2023-08-29 DIAGNOSIS — R35.1 BENIGN PROSTATIC HYPERPLASIA WITH NOCTURIA: ICD-10-CM

## 2023-08-29 DIAGNOSIS — N32.81 OAB (OVERACTIVE BLADDER): ICD-10-CM

## 2023-08-29 DIAGNOSIS — R35.1 NOCTURIA: Primary | ICD-10-CM

## 2023-08-29 LAB
BILIRUB SERPL-MCNC: NORMAL MG/DL
BLOOD URINE, POC: NORMAL
CLARITY, POC UA: CLEAR
COLOR, POC UA: NORMAL
GLUCOSE UR QL STRIP: NORMAL
KETONES UR QL STRIP: NORMAL
LEUKOCYTE ESTERASE URINE, POC: NORMAL
NITRITE, POC UA: NORMAL
PH, POC UA: 5
POC RESIDUAL URINE VOLUME: 0 ML (ref 0–100)
PROTEIN, POC: NORMAL
SPECIFIC GRAVITY, POC UA: 1.02
UROBILINOGEN, POC UA: NORMAL

## 2023-08-29 PROCEDURE — 99024 PR POST-OP FOLLOW-UP VISIT: ICD-10-PCS | Mod: HCNC,S$GLB,,

## 2023-08-29 PROCEDURE — 99999 PR PBB SHADOW E&M-EST. PATIENT-LVL III: ICD-10-PCS | Mod: PBBFAC,HCNC,,

## 2023-08-29 PROCEDURE — 51798 US URINE CAPACITY MEASURE: CPT | Mod: HCNC,S$GLB,,

## 2023-08-29 PROCEDURE — 1157F PR ADVANCE CARE PLAN OR EQUIV PRESENT IN MEDICAL RECORD: ICD-10-PCS | Mod: HCNC,CPTII,S$GLB,

## 2023-08-29 PROCEDURE — 1159F PR MEDICATION LIST DOCUMENTED IN MEDICAL RECORD: ICD-10-PCS | Mod: HCNC,CPTII,S$GLB,

## 2023-08-29 PROCEDURE — 81002 URINALYSIS NONAUTO W/O SCOPE: CPT | Mod: HCNC,S$GLB,,

## 2023-08-29 PROCEDURE — 1160F RVW MEDS BY RX/DR IN RCRD: CPT | Mod: HCNC,CPTII,S$GLB,

## 2023-08-29 PROCEDURE — 99024 POSTOP FOLLOW-UP VISIT: CPT | Mod: HCNC,S$GLB,,

## 2023-08-29 PROCEDURE — 1159F MED LIST DOCD IN RCRD: CPT | Mod: HCNC,CPTII,S$GLB,

## 2023-08-29 PROCEDURE — 51798 POCT BLADDER SCAN: ICD-10-PCS | Mod: HCNC,S$GLB,,

## 2023-08-29 PROCEDURE — 1157F ADVNC CARE PLAN IN RCRD: CPT | Mod: HCNC,CPTII,S$GLB,

## 2023-08-29 PROCEDURE — 99999 PR PBB SHADOW E&M-EST. PATIENT-LVL III: CPT | Mod: PBBFAC,HCNC,,

## 2023-08-29 PROCEDURE — 81002 POCT URINE DIPSTICK WITHOUT MICROSCOPE: ICD-10-PCS | Mod: HCNC,S$GLB,,

## 2023-08-29 PROCEDURE — 1160F PR REVIEW ALL MEDS BY PRESCRIBER/CLIN PHARMACIST DOCUMENTED: ICD-10-PCS | Mod: HCNC,CPTII,S$GLB,

## 2023-08-29 NOTE — PATIENT INSTRUCTIONS
Need to keep a voiding diary night vs day time for 3 days in a row to see how much urine he makes at night.  He can come to urology clinic to  a urinal if needed.  Will see him in 1 month.    Avoid Bladder Irritants: Tea, coffee, caffeine, alcohol, artificial sweeteners, citrus, spicy foods, acidic foods,chocolate, tomato-based foods, smoking.    Limit fluids 2 hours before bedtime.  Elevate legs 2 hours before bedtime.  No caffeine, cokes, teas after 2 pm in the afternoon.

## 2023-08-29 NOTE — PROGRESS NOTES
CHIEF COMPLAINT:  Nocturia       HISTORY OF PRESENTING ILLINESS:  Adan Webb is a 73 y.o. male established to urology. Presents today due to nocturia x4-5 and urgency. Pt states there has been no improvement in urinary symptoms since cysto with Botox 7/26/2023 with Dr. Zamora. Pt also reports pain at the tip of the penis and intermittent suprapubic pain (not today). He is currently not taking Flomax. He has not been on Flomax x 2 weeks. States symptoms do not change or improve with Flomax. He experienced an episode of hypertension one morning with dizziness 2 weeks ago. States he was not on myrbetriq, vesicare or Flomax at the time of this episode. He only drinks water. He stops fluid intake 2 hours prior to bedtime and elevates his legs 2 hours before bedtime. He takes a variety of vitamins throughout the day. He wears pads due to urgency. PMHx listed below.         REVIEW OF SYSTEMS:  Review of Systems   Constitutional:  Negative for chills and fever.   HENT:  Negative for congestion and sore throat.    Respiratory:  Negative for cough and shortness of breath.    Cardiovascular:  Negative for chest pain and palpitations.   Gastrointestinal:  Negative for nausea and vomiting.   Genitourinary:  Positive for urgency. Negative for dysuria, flank pain, frequency and hematuria.        +nocturia   Neurological:  Negative for dizziness and headaches.         PATIENT HISTORY:    Past Medical History:   Diagnosis Date    Abnormal TSH 12/9/2020    Allergic rhinitis 4/13/2016    Belching 1/22/2019    Benign prostatic hyperplasia with nocturia 8/18/2014    BPH (benign prostatic hyperplasia)     Urology Dr Zamora, OTC prostate revive helps, avodart caused chest pains    Calculus of gallbladder     US 2016    Calculus of gallbladder without cholecystitis without obstruction 4/13/2016    CT chest 2018    Cataract     Chronic pain of both ankles 12/9/2020    Podiatrist Dr Sesay    Conductive hearing loss of right ear with  restricted hearing of left ear 1/22/2019    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, didn't use ear protection    Dermatitis 12/9/2020    nystat groin    DJD (degenerative joint disease) of hip 1/29/2015    Enlarged liver 4/8/2021    US 4/21     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    MCA , see MRI    Hiatal hernia 4/1/2021    CT chest tiny 2018    HTN (hypertension), benign 8/20/2020    Morbid obesity with BMI of 40.0-44.9, adult 1/29/2015    OAB (overactive bladder) 4/1/2021    Rash with pads    Right-sided low back pain with right-sided sciatica 3/6/2019    Seasonal allergic rhinitis due to pollen 4/13/2016    Thoracic aortic aneurysm without rupture 05/27/2018    tx with Su, Cardiologist Dr Stone, 5.1 CTS Dr Duenas, follows yearly    Transient cerebral ischemia 6/6/2018       Past Surgical History:   Procedure Laterality Date    A-V CARDIAC PACEMAKER INSERTION N/A 08/05/2021    Procedure: INSERTION, CARDIAC PACEMAKER, DUAL CHAMBER;  Surgeon: Sheldon Miranda MD;  Location: [facility] EP LAB;  Service: Cardiology;  Laterality: N/A;  Near syncope,SB,Sinus Pause, RBBB,LAFB, Dual PPM, BIO, MAC, MS, 3 Prep    ADENOIDECTOMY      CATARACT EXTRACTION      CYSTOSCOPY N/A 7/26/2023    Procedure: CYSTOSCOPY;  Surgeon: Jamal Zamora MD;  Location: [address];  Service: Urology;  Laterality: N/A;    CYSTOSCOPY WITH URODYNAMIC TESTING N/A 10/25/2021    Procedure: CYSTOSCOPY, WITH URODYNAMIC TESTING FLOUROSCOPIC;  Surgeon: Sancho Wesley MD;  Location: [facility] OR [address] [place] [address];  Service: Urology;  Laterality: N/A;  1hr    HERNIA REPAIR      INJECTION OF BOTULINUM TOXIN TYPE A N/A 7/26/2023    Procedure: INJECTION, BOTULINUM TOXIN, TYPE A;  Surgeon: Jamal Zamora MD;  Location: [facility] OR [address] [place] [address];  Service: Urology;  Laterality: N/A;  100 units    TONSILLECTOMY      YAG LAser Capsulotomy Bilateral     2/18/2019 OS Dr. Cornelius       Family History   Problem Relation Age of Onset    Diabetes Mother     Leukemia  Father     Aneurysm Father     No Known Problems Daughter     No Known Problems Son     Cataracts Paternal Uncle     Amblyopia Neg Hx     Blindness Neg Hx     Glaucoma Neg Hx     Macular degeneration Neg Hx     Retinal detachment Neg Hx     Strabismus Neg Hx        Social History     Socioeconomic History    Marital status:    Tobacco Use    Smoking status: Former     Types: Cigarettes     Start date:      Quit date:      Years since quittin.6    Smokeless tobacco: Never   Substance and Sexual Activity    Alcohol use: Yes     Comment: wine, seldom    Drug use: No    Sexual activity: Yes     Partners: Female   Social History Narrative     to 'T', 2 children, nonsmoker, ETOH none, never had colonscopy & declines     Social Determinants of Health     Financial Resource Strain: Low Risk  (2023)    Overall Financial Resource Strain (CARDIA)     Difficulty of Paying Living Expenses: Not hard at all   Food Insecurity: No Food Insecurity (2023)    Hunger Vital Sign     Worried About Running Out of Food in the Last Year: Never true     Ran Out of Food in the Last Year: Never true   Transportation Needs: No Transportation Needs (2023)    PRAPARE - Transportation     Lack of Transportation (Medical): No     Lack of Transportation (Non-Medical): No   Physical Activity: Inactive (2023)    Exercise Vital Sign     Days of Exercise per Week: 0 days     Minutes of Exercise per Session: 0 min   Stress: No Stress Concern Present (2023)    [employer] - Occupational Stress Questionnaire     Feeling of Stress : Not at all   Social Connections: Socially Integrated (2023)    Social Connection and Isolation Panel [NHANES]     Frequency of Communication with Friends and Family: More than three times a week     Frequency of Social Gatherings with Friends and Family: More than three times a week     Attends Anglican Services: More than 4 times per year     Active  Member of Clubs or Organizations: Yes     Attends Club or Organization Meetings: More than 4 times per year     Marital Status:    Housing Stability: Low Risk  (8/9/2023)    Housing Stability Vital Sign     Unable to Pay for Housing in the Last Year: No     Number of Places Lived in the Last Year: 1     Unstable Housing in the Last Year: No       Allergies:  Augmentin [amoxicillin-pot clavulanate], Azithromycin, Avodart [dutasteride], House dust mite, Mold, Naproxen, Ragweed, and Synthroid [levothyroxine]    Medications:    Current Outpatient Medications:     albuterol (VENTOLIN HFA) 90 mcg/actuation inhaler, Inhale 2 puffs into the lungs every 6 (six) hours as needed for Wheezing. Rescue, Disp: 18 g, Rfl: 1    ALLERGY RELIEF, LORATADINE, 10 mg tablet, Take 10 mg by mouth daily as needed., Disp: , Rfl:     azelastine (ASTELIN) 137 mcg (0.1 %) nasal spray, 1 spray (137 mcg total) by Nasal route 2 (two) times daily., Disp: 30 mL, Rfl: 12    BABY ASPIRIN ORAL, Take 81 tablets by mouth every evening. , Disp: , Rfl:     cholecalciferol, vitamin D3, 1,000 unit capsule, Take 1,000 Units by mouth once daily., Disp: , Rfl:     coenzyme Q10 100 mg capsule, Take by mouth once daily., Disp: , Rfl:     diclofenac sodium (VOLTAREN) 1 % Gel, Apply 2 g topically 4 (four) times daily., Disp: 1 Tube, Rfl: 2    fluticasone propionate (FLONASE) 50 mcg/actuation nasal spray, 1 spray by Each Nostril route once daily., Disp: , Rfl:     glucosamine-chondroitin 500-400 mg tablet, Take 1 tablet by mouth 2 (two) times daily. , Disp: , Rfl:     metoprolol succinate (TOPROL-XL) 50 MG 24 hr tablet, TAKE 1 TABLET (50 MG TOTAL) BY MOUTH ONCE DAILY., Disp: 90 tablet, Rfl: 3    MULTIVITAMIN W-MINERALS/LUTEIN (CENTRUM SILVER ORAL), Take by mouth once daily. , Disp: , Rfl:     nystatin-triamcinolone (MYCOLOG II) cream, APPLY TOPICALLY 4 (FOUR) TIMES DAILY. (Patient not taking: Reported on 8/9/2023), Disp: 60 g, Rfl: 5    omega-3 fatty acids 300  mg Cap, Take 1 tablet by mouth once daily. , Disp: , Rfl:     rosuvastatin (CRESTOR) 10 MG tablet, Take 1 tablet (10 mg total) by mouth once daily., Disp: 90 tablet, Rfl: 1    tamsulosin (FLOMAX) 0.4 mg Cap, Take 1 capsule (0.4 mg total) by mouth once daily., Disp: 30 capsule, Rfl: 11    TURMERIC ORAL, Take by mouth. Pt take 1 in the evening., Disp: , Rfl:     VITAMIN B COMPLEX (SUPER B COMPLEX-B-12 ORAL), Take by mouth., Disp: , Rfl:         PHYSICAL EXAMINATION:  Physical Exam  Constitutional:       Appearance: Normal appearance.   HENT:      Head: Normocephalic and atraumatic.      Right Ear: External ear normal.      Left Ear: External ear normal.   Pulmonary:      Effort: Pulmonary effort is normal. No respiratory distress.   Skin:     General: Skin is warm and dry.   Neurological:      General: No focal deficit present.      Mental Status: He is alert and oriented to person, place, and time.   Psychiatric:         Mood and Affect: Mood normal.         Behavior: Behavior normal.         LABS:  UA WNL  PVR 0 ml      Lab Results   Component Value Date    PSA 2.7 01/20/2015    PSA 1.5 05/30/2008    PSA 2.1 05/11/2005    PSADIAG 2.5 07/16/2018    PSADIAG 2.8 06/12/2017    PSADIAG 2.7 08/18/2014       Lab Results   Component Value Date    CREATININE 0.7 08/14/2023    EGFRNORACEVR >60.0 08/14/2023         IMPRESSION:  Encounter Diagnoses   Name Primary?    Nocturia Yes         Assessment:       1. Nocturia        Plan:   - Voiding diaries provided, reviewed instructions. Urinals provided.   - DC Flomax, pt hesitant to re-start.  - Avoid known bladder irritants.   - Nocturia precautions provided.  - Reassurance provided.     RTC for follow up with Dr. Zamora in October. Informed to bring voiding diaries to apt.     I spent 30 minutes with the patient of which more than half was spent in direct consultation with the patient in regards to our treatment and plan.  We addressed the office findings and recent labs.    Education and recommendations of today's plan of care including home remedies and needed follow up with PCP.   We discussed the chief complaint; reviewed the LUTS and the possible contributory factors.

## 2023-09-27 ENCOUNTER — OFFICE VISIT (OUTPATIENT)
Dept: PODIATRY | Facility: CLINIC | Age: 73
End: 2023-09-27
Payer: MEDICARE

## 2023-09-27 VITALS
BODY MASS INDEX: 39.1 KG/M2 | OXYGEN SATURATION: 95 % | TEMPERATURE: 99 F | RESPIRATION RATE: 18 BRPM | WEIGHT: 295 LBS | HEART RATE: 68 BPM | DIASTOLIC BLOOD PRESSURE: 86 MMHG | HEIGHT: 73 IN | SYSTOLIC BLOOD PRESSURE: 154 MMHG

## 2023-09-27 DIAGNOSIS — M79.674 PAIN IN TOES OF BOTH FEET: ICD-10-CM

## 2023-09-27 DIAGNOSIS — M25.572 SINUS TARSI SYNDROME, LEFT: ICD-10-CM

## 2023-09-27 DIAGNOSIS — M25.572 SINUS TARSI SYNDROME OF LEFT FOOT: Primary | ICD-10-CM

## 2023-09-27 DIAGNOSIS — B35.1 TINEA UNGUIUM: ICD-10-CM

## 2023-09-27 DIAGNOSIS — M79.675 PAIN IN TOES OF BOTH FEET: ICD-10-CM

## 2023-09-27 PROCEDURE — 3288F FALL RISK ASSESSMENT DOCD: CPT | Mod: CPTII,S$GLB,, | Performed by: STUDENT IN AN ORGANIZED HEALTH CARE EDUCATION/TRAINING PROGRAM

## 2023-09-27 PROCEDURE — 1159F PR MEDICATION LIST DOCUMENTED IN MEDICAL RECORD: ICD-10-PCS | Mod: CPTII,S$GLB,, | Performed by: STUDENT IN AN ORGANIZED HEALTH CARE EDUCATION/TRAINING PROGRAM

## 2023-09-27 PROCEDURE — 3079F DIAST BP 80-89 MM HG: CPT | Mod: CPTII,S$GLB,, | Performed by: STUDENT IN AN ORGANIZED HEALTH CARE EDUCATION/TRAINING PROGRAM

## 2023-09-27 PROCEDURE — 1101F PR PT FALLS ASSESS DOC 0-1 FALLS W/OUT INJ PAST YR: ICD-10-PCS | Mod: CPTII,S$GLB,, | Performed by: STUDENT IN AN ORGANIZED HEALTH CARE EDUCATION/TRAINING PROGRAM

## 2023-09-27 PROCEDURE — 11721 ROUTINE FOOT CARE: ICD-10-PCS | Mod: 59,S$GLB,, | Performed by: STUDENT IN AN ORGANIZED HEALTH CARE EDUCATION/TRAINING PROGRAM

## 2023-09-27 PROCEDURE — 1157F ADVNC CARE PLAN IN RCRD: CPT | Mod: CPTII,S$GLB,, | Performed by: STUDENT IN AN ORGANIZED HEALTH CARE EDUCATION/TRAINING PROGRAM

## 2023-09-27 PROCEDURE — 99213 OFFICE O/P EST LOW 20 MIN: CPT | Mod: 25,S$GLB,, | Performed by: STUDENT IN AN ORGANIZED HEALTH CARE EDUCATION/TRAINING PROGRAM

## 2023-09-27 PROCEDURE — 99999 PR PBB SHADOW E&M-EST. PATIENT-LVL IV: ICD-10-PCS | Mod: PBBFAC,,, | Performed by: STUDENT IN AN ORGANIZED HEALTH CARE EDUCATION/TRAINING PROGRAM

## 2023-09-27 PROCEDURE — 20605 DRAIN/INJ JOINT/BURSA W/O US: CPT | Mod: LT,S$GLB,, | Performed by: STUDENT IN AN ORGANIZED HEALTH CARE EDUCATION/TRAINING PROGRAM

## 2023-09-27 PROCEDURE — 3077F PR MOST RECENT SYSTOLIC BLOOD PRESSURE >= 140 MM HG: ICD-10-PCS | Mod: CPTII,S$GLB,, | Performed by: STUDENT IN AN ORGANIZED HEALTH CARE EDUCATION/TRAINING PROGRAM

## 2023-09-27 PROCEDURE — 3079F PR MOST RECENT DIASTOLIC BLOOD PRESSURE 80-89 MM HG: ICD-10-PCS | Mod: CPTII,S$GLB,, | Performed by: STUDENT IN AN ORGANIZED HEALTH CARE EDUCATION/TRAINING PROGRAM

## 2023-09-27 PROCEDURE — 1159F MED LIST DOCD IN RCRD: CPT | Mod: CPTII,S$GLB,, | Performed by: STUDENT IN AN ORGANIZED HEALTH CARE EDUCATION/TRAINING PROGRAM

## 2023-09-27 PROCEDURE — 3077F SYST BP >= 140 MM HG: CPT | Mod: CPTII,S$GLB,, | Performed by: STUDENT IN AN ORGANIZED HEALTH CARE EDUCATION/TRAINING PROGRAM

## 2023-09-27 PROCEDURE — 11721 DEBRIDE NAIL 6 OR MORE: CPT | Mod: 59,S$GLB,, | Performed by: STUDENT IN AN ORGANIZED HEALTH CARE EDUCATION/TRAINING PROGRAM

## 2023-09-27 PROCEDURE — 1101F PT FALLS ASSESS-DOCD LE1/YR: CPT | Mod: CPTII,S$GLB,, | Performed by: STUDENT IN AN ORGANIZED HEALTH CARE EDUCATION/TRAINING PROGRAM

## 2023-09-27 PROCEDURE — 99999 PR PBB SHADOW E&M-EST. PATIENT-LVL IV: CPT | Mod: PBBFAC,,, | Performed by: STUDENT IN AN ORGANIZED HEALTH CARE EDUCATION/TRAINING PROGRAM

## 2023-09-27 PROCEDURE — 1157F PR ADVANCE CARE PLAN OR EQUIV PRESENT IN MEDICAL RECORD: ICD-10-PCS | Mod: CPTII,S$GLB,, | Performed by: STUDENT IN AN ORGANIZED HEALTH CARE EDUCATION/TRAINING PROGRAM

## 2023-09-27 PROCEDURE — 99213 PR OFFICE/OUTPT VISIT, EST, LEVL III, 20-29 MIN: ICD-10-PCS | Mod: 25,S$GLB,, | Performed by: STUDENT IN AN ORGANIZED HEALTH CARE EDUCATION/TRAINING PROGRAM

## 2023-09-27 PROCEDURE — 20605 PR DRAIN/INJECT INTERMEDIATE JOINT/BURSA: ICD-10-PCS | Mod: LT,S$GLB,, | Performed by: STUDENT IN AN ORGANIZED HEALTH CARE EDUCATION/TRAINING PROGRAM

## 2023-09-27 PROCEDURE — 1125F PR PAIN SEVERITY QUANTIFIED, PAIN PRESENT: ICD-10-PCS | Mod: CPTII,S$GLB,, | Performed by: STUDENT IN AN ORGANIZED HEALTH CARE EDUCATION/TRAINING PROGRAM

## 2023-09-27 PROCEDURE — 1125F AMNT PAIN NOTED PAIN PRSNT: CPT | Mod: CPTII,S$GLB,, | Performed by: STUDENT IN AN ORGANIZED HEALTH CARE EDUCATION/TRAINING PROGRAM

## 2023-09-27 PROCEDURE — 3288F PR FALLS RISK ASSESSMENT DOCUMENTED: ICD-10-PCS | Mod: CPTII,S$GLB,, | Performed by: STUDENT IN AN ORGANIZED HEALTH CARE EDUCATION/TRAINING PROGRAM

## 2023-09-27 NOTE — PROCEDURES
Injection Procedure    After time out was performed, the procedure site was marked and the patient was prepped aseptically. A diagnostic and therapeutic injection of 8 mg dexamethasone and 1 mL 1% lidocaine plain was given under sterile technique using a 25g x 1.5 needle into the left foot within the sinus tarsi/STJ. Adhesive bandage applied.     The patient had no adverse reactions to the medication. Pain improved. Patient was reminded to call the clinic immediately for any adverse side effects as explained in clinic today.

## 2023-09-27 NOTE — PROCEDURES
"Routine Foot Care    Date/Time: 9/27/2023 11:45 AM    Performed by: Jaden Villeda DPM  Authorized by: Jaden Villeda DPM    Time out: Immediately prior to procedure a "time out" was called to verify the correct patient, procedure, equipment, support staff and site/side marked as required.    Consent Done?:  Yes (Verbal)    Nail Care Type:  Debride  Location(s): All  (Left 1st Toe, Left 3rd Toe, Left 2nd Toe, Left 4th Toe, Left 5th Toe, Right 1st Toe, Right 2nd Toe, Right 3rd Toe, Right 4th Toe and Right 5th Toe)  Patient tolerance:  Patient tolerated the procedure well with no immediate complications    "

## 2023-10-03 ENCOUNTER — OFFICE VISIT (OUTPATIENT)
Dept: UROLOGY | Facility: CLINIC | Age: 73
End: 2023-10-03
Payer: MEDICARE

## 2023-10-03 VITALS
SYSTOLIC BLOOD PRESSURE: 141 MMHG | HEART RATE: 67 BPM | DIASTOLIC BLOOD PRESSURE: 70 MMHG | WEIGHT: 295 LBS | BODY MASS INDEX: 39.1 KG/M2 | HEIGHT: 73 IN

## 2023-10-03 DIAGNOSIS — R35.81 NOCTURNAL POLYURIA: ICD-10-CM

## 2023-10-03 DIAGNOSIS — N40.1 BENIGN PROSTATIC HYPERPLASIA WITH NOCTURIA: ICD-10-CM

## 2023-10-03 DIAGNOSIS — Z95.0 PRESENCE OF CARDIAC PACEMAKER: ICD-10-CM

## 2023-10-03 DIAGNOSIS — N39.41 URGE INCONTINENCE: Primary | ICD-10-CM

## 2023-10-03 DIAGNOSIS — R35.1 NOCTURIA: ICD-10-CM

## 2023-10-03 DIAGNOSIS — N32.81 OAB (OVERACTIVE BLADDER): ICD-10-CM

## 2023-10-03 DIAGNOSIS — R35.1 BENIGN PROSTATIC HYPERPLASIA WITH NOCTURIA: ICD-10-CM

## 2023-10-03 PROCEDURE — 51798 US URINE CAPACITY MEASURE: CPT | Mod: HCNC,S$GLB,, | Performed by: UROLOGY

## 2023-10-03 PROCEDURE — 99215 OFFICE O/P EST HI 40 MIN: CPT | Mod: 25,HCNC,S$GLB, | Performed by: UROLOGY

## 2023-10-03 PROCEDURE — 3288F FALL RISK ASSESSMENT DOCD: CPT | Mod: HCNC,CPTII,S$GLB, | Performed by: UROLOGY

## 2023-10-03 PROCEDURE — 1159F MED LIST DOCD IN RCRD: CPT | Mod: HCNC,CPTII,S$GLB, | Performed by: UROLOGY

## 2023-10-03 PROCEDURE — 1101F PT FALLS ASSESS-DOCD LE1/YR: CPT | Mod: HCNC,CPTII,S$GLB, | Performed by: UROLOGY

## 2023-10-03 PROCEDURE — 3078F DIAST BP <80 MM HG: CPT | Mod: HCNC,CPTII,S$GLB, | Performed by: UROLOGY

## 2023-10-03 PROCEDURE — 1101F PR PT FALLS ASSESS DOC 0-1 FALLS W/OUT INJ PAST YR: ICD-10-PCS | Mod: HCNC,CPTII,S$GLB, | Performed by: UROLOGY

## 2023-10-03 PROCEDURE — 1126F AMNT PAIN NOTED NONE PRSNT: CPT | Mod: HCNC,CPTII,S$GLB, | Performed by: UROLOGY

## 2023-10-03 PROCEDURE — 1157F ADVNC CARE PLAN IN RCRD: CPT | Mod: HCNC,CPTII,S$GLB, | Performed by: UROLOGY

## 2023-10-03 PROCEDURE — 1157F PR ADVANCE CARE PLAN OR EQUIV PRESENT IN MEDICAL RECORD: ICD-10-PCS | Mod: HCNC,CPTII,S$GLB, | Performed by: UROLOGY

## 2023-10-03 PROCEDURE — 99215 PR OFFICE/OUTPT VISIT, EST, LEVL V, 40-54 MIN: ICD-10-PCS | Mod: 25,HCNC,S$GLB, | Performed by: UROLOGY

## 2023-10-03 PROCEDURE — 51798 PR MEAS,POST-VOID RES,US,NON-IMAGING: ICD-10-PCS | Mod: HCNC,S$GLB,, | Performed by: UROLOGY

## 2023-10-03 PROCEDURE — 99999 PR PBB SHADOW E&M-EST. PATIENT-LVL IV: ICD-10-PCS | Mod: PBBFAC,HCNC,, | Performed by: UROLOGY

## 2023-10-03 PROCEDURE — 3077F SYST BP >= 140 MM HG: CPT | Mod: HCNC,CPTII,S$GLB, | Performed by: UROLOGY

## 2023-10-03 PROCEDURE — 1126F PR PAIN SEVERITY QUANTIFIED, NO PAIN PRESENT: ICD-10-PCS | Mod: HCNC,CPTII,S$GLB, | Performed by: UROLOGY

## 2023-10-03 PROCEDURE — 1159F PR MEDICATION LIST DOCUMENTED IN MEDICAL RECORD: ICD-10-PCS | Mod: HCNC,CPTII,S$GLB, | Performed by: UROLOGY

## 2023-10-03 PROCEDURE — 3078F PR MOST RECENT DIASTOLIC BLOOD PRESSURE < 80 MM HG: ICD-10-PCS | Mod: HCNC,CPTII,S$GLB, | Performed by: UROLOGY

## 2023-10-03 PROCEDURE — 99999 PR PBB SHADOW E&M-EST. PATIENT-LVL IV: CPT | Mod: PBBFAC,HCNC,, | Performed by: UROLOGY

## 2023-10-03 PROCEDURE — 3077F PR MOST RECENT SYSTOLIC BLOOD PRESSURE >= 140 MM HG: ICD-10-PCS | Mod: HCNC,CPTII,S$GLB, | Performed by: UROLOGY

## 2023-10-03 PROCEDURE — 3288F PR FALLS RISK ASSESSMENT DOCUMENTED: ICD-10-PCS | Mod: HCNC,CPTII,S$GLB, | Performed by: UROLOGY

## 2023-10-03 RX ORDER — DEXAMETHASONE SODIUM PHOSPHATE 4 MG/ML
4 INJECTION, SOLUTION INTRA-ARTICULAR; INTRALESIONAL; INTRAMUSCULAR; INTRAVENOUS; SOFT TISSUE
Status: COMPLETED | OUTPATIENT
Start: 2023-10-03 | End: 2023-10-03

## 2023-10-03 RX ORDER — IMIPRAMINE HYDROCHLORIDE 25 MG/1
25 TABLET, FILM COATED ORAL NIGHTLY
Qty: 30 TABLET | Refills: 11 | Status: SHIPPED | OUTPATIENT
Start: 2023-10-03 | End: 2024-10-02

## 2023-10-03 RX ORDER — IMIPRAMINE HYDROCHLORIDE 25 MG/1
25 TABLET, FILM COATED ORAL NIGHTLY
Qty: 30 TABLET | Refills: 11 | Status: SHIPPED | OUTPATIENT
Start: 2023-10-03 | End: 2024-02-06

## 2023-10-03 RX ORDER — DOXYCYCLINE HYCLATE 100 MG
100 TABLET ORAL ONCE
Status: CANCELLED | OUTPATIENT
Start: 2023-10-03 | End: 2023-10-03

## 2023-10-03 RX ORDER — LIDOCAINE HYDROCHLORIDE 20 MG/ML
JELLY TOPICAL ONCE
Status: CANCELLED | OUTPATIENT
Start: 2023-10-03 | End: 2023-10-03

## 2023-10-03 RX ORDER — TAMSULOSIN HYDROCHLORIDE 0.4 MG/1
0.4 CAPSULE ORAL DAILY
Qty: 30 CAPSULE | Refills: 11 | Status: SHIPPED | OUTPATIENT
Start: 2023-10-03 | End: 2024-10-02

## 2023-10-03 RX ADMIN — DEXAMETHASONE SODIUM PHOSPHATE 4 MG: 4 INJECTION, SOLUTION INTRA-ARTICULAR; INTRALESIONAL; INTRAMUSCULAR; INTRAVENOUS; SOFT TISSUE at 03:10

## 2023-10-03 NOTE — PROGRESS NOTES
CHIEF COMPLAINT:  Follow up Nocturia with voiding diary      HISTORY OF PRESENTING ILLINESS:  Adan Webb is a 73 y.o. male established to urology. Presents today due to nocturia x4-5 and urgency. Pt states there has been no improvement in urinary symptoms since cysto with Botox 7/26/2023 with Dr. Zamora. Pt also reports pain at the tip of the penis and intermittent suprapubic pain (not today). He is currently not taking Flomax. He has not been on Flomax x 2 weeks. States symptoms do not change or improve with Flomax. He experienced an episode of hypertension one morning with dizziness 2 weeks ago. States he was not on myrbetriq, vesicare or Flomax at the time of this episode. He only drinks water. He stops fluid intake 2 hours prior to bedtime and elevates his legs 2 hours before bedtime. He takes a variety of vitamins throughout the day. He wears pads due to urgency. PMHx listed below.     Date: 07/26/2023  Pre-Op Diagnosis: Overactive bladder  Post-Op Diagnosis: same  Procedure(s) Performed:   1.  Cystoscopy with bladder botox injection  Findings: Normal Bladder Mucosa, Left lateral lobe hyperplasia, would be a candidate for Rezum therapy.   100u successfully injected into bladder mucosa    Pt states that botox injection did not resolve his urinary problems.  He still gets up 2 to 10 x a night.  Voiding diary revealed that he voids 50 to 150 ml during the day time ( 8 x a day) and night time 125 ml to 300 ml ( 6 x a night).  He definitely produces more urine volume at night than during the day time.  C/o some urgency, sensation of urge radiating to the tip of the penisl  Burt dysuria, fever, chills etc.    Had question about Candida infection he read about in the news.        REVIEW OF SYSTEMS:  Review of Systems   Constitutional:  Negative for chills and fever.   HENT:  Negative for congestion and sore throat.    Respiratory:  Negative for cough and shortness of breath.    Cardiovascular:  Negative for  chest pain and palpitations.   Gastrointestinal:  Negative for nausea and vomiting.   Genitourinary:  Positive for urgency. Negative for dysuria, flank pain, frequency and hematuria.        +nocturia   Neurological:  Negative for dizziness and headaches.         PATIENT HISTORY:    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, didn't use ear protection    Dermatitis 12/9/2020    nystat groin    DJD (degenerative joint disease) of hip 1/29/2015    Enlarged liver 4/8/2021    US 4/21     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    MCA , see MRI    Hiatal hernia 4/1/2021    CT chest tiny 2018    HTN (hypertension), benign 8/20/2020    Morbid obesity with BMI of 40.0-44.9, adult 1/29/2015    OAB (overactive bladder) 4/1/2021    Rash with pads    Right-sided low back pain with right-sided sciatica 3/6/2019    Seasonal allergic rhinitis due to pollen 4/13/2016    Thoracic aortic aneurysm without rupture 05/27/2018    tx with Su, Cardiologist Dr Stone, 5.1 CTS Dr Duenas, follows yearly    Transient cerebral ischemia 6/6/2018       Past Surgical History:   Procedure Laterality Date    A-V CARDIAC PACEMAKER INSERTION N/A 08/05/2021    Procedure: INSERTION, CARDIAC PACEMAKER, DUAL CHAMBER;  Surgeon: Sheldon Miranda MD;  Location: [facility] EP LAB;  Service: Cardiology;  Laterality: N/A;  Near syncope,SB,Sinus Pause, RBBB,LAFB, Dual PPM, BIO, MAC,  AL, 3 Prep    ADENOIDECTOMY      CATARACT EXTRACTION      CYSTOSCOPY N/A 2023    Procedure: CYSTOSCOPY;  Surgeon: Jamal Zamora MD;  Location: [facility] OR [address] [place] [address];  Service: Urology;  Laterality: N/A;    CYSTOSCOPY WITH URODYNAMIC TESTING N/A 10/25/2021    Procedure: CYSTOSCOPY, WITH URODYNAMIC TESTING FLOUROSCOPIC;  Surgeon: Sancho Wesley MD;  Location: [facility] OR [address] [place] [address];  Service: Urology;  Laterality: N/A;  1hr    HERNIA REPAIR      INJECTION OF BOTULINUM TOXIN TYPE A N/A 2023    Procedure: INJECTION, BOTULINUM TOXIN, TYPE A;  Surgeon: Jamal Zamora MD;  Location: [facility] OR [address] [place] [address];  Service: Urology;  Laterality: N/A;  100 units    TONSILLECTOMY      YAG LAser Capsulotomy Bilateral     2019 OS Dr. Cornelius       Family History   Problem Relation Age of Onset    Diabetes Mother     Leukemia Father     Aneurysm Father     No Known Problems Daughter     No Known Problems Son     Cataracts Paternal Uncle     Amblyopia Neg Hx     Blindness Neg Hx     Glaucoma Neg Hx     Macular degeneration Neg Hx     Retinal detachment Neg Hx     Strabismus Neg Hx        Social History     Socioeconomic History    Marital status:    Tobacco Use    Smoking status: Former     Types: Cigarettes     Start date:      Quit date:      Years since quittin.7    Smokeless tobacco: Never   Substance and Sexual Activity    Alcohol use: Yes     Comment: wine, seldom    Drug use: No    Sexual activity: Yes     Partners: Female   Social History Narrative     to 'T', 2 children, nonsmoker, ETOH none, never had colonscopy & declines     Social Determinants of Health     Financial Resource Strain: Low Risk  (2023)    Overall Financial Resource Strain (CARDIA)     Difficulty of Paying Living Expenses: Not hard at all   Food Insecurity: No Food Insecurity (2023)    Hunger Vital Sign     Worried About Running Out of Food in the Last Year: Never true     Ran Out of Food in the Last Year: Never true    Transportation Needs: No Transportation Needs (8/9/2023)    PRAPARE - Transportation     Lack of Transportation (Medical): No     Lack of Transportation (Non-Medical): No   Physical Activity: Inactive (8/9/2023)    Exercise Vital Sign     Days of Exercise per Week: 0 days     Minutes of Exercise per Session: 0 min   Stress: No Stress Concern Present (8/9/2023)    [employer] - Occupational Stress Questionnaire     Feeling of Stress : Not at all   Social Connections: Socially Integrated (8/9/2023)    Social Connection and Isolation Panel [NHANES]     Frequency of Communication with Friends and Family: More than three times a week     Frequency of Social Gatherings with Friends and Family: More than three times a week     Attends Yazidi Services: More than 4 times per year     Active Member of Clubs or Organizations: Yes     Attends Club or Organization Meetings: More than 4 times per year     Marital Status:    Housing Stability: Low Risk  (8/9/2023)    Housing Stability Vital Sign     Unable to Pay for Housing in the Last Year: No     Number of Places Lived in the Last Year: 1     Unstable Housing in the Last Year: No       Allergies:  Augmentin [amoxicillin-pot clavulanate], Azithromycin, Avodart [dutasteride], House dust mite, Mold, Naproxen, Ragweed, and Synthroid [levothyroxine]    Medications:    Current Outpatient Medications:     albuterol (VENTOLIN HFA) 90 mcg/actuation inhaler, Inhale 2 puffs into the lungs every 6 (six) hours as needed for Wheezing. Rescue, Disp: 18 g, Rfl: 1    ALLERGY RELIEF, LORATADINE, 10 mg tablet, Take 10 mg by mouth daily as needed., Disp: , Rfl:     azelastine (ASTELIN) 137 mcg (0.1 %) nasal spray, 1 spray (137 mcg total) by Nasal route 2 (two) times daily., Disp: 30 mL, Rfl: 12    BABY ASPIRIN ORAL, Take 81 tablets by mouth every evening. , Disp: , Rfl:     cholecalciferol, vitamin D3, 1,000 unit capsule, Take 1,000 Units by mouth once  daily., Disp: , Rfl:     coenzyme Q10 100 mg capsule, Take by mouth once daily., Disp: , Rfl:     diclofenac sodium (VOLTAREN) 1 % Gel, Apply 2 g topically 4 (four) times daily., Disp: 1 Tube, Rfl: 2    fluticasone propionate (FLONASE) 50 mcg/actuation nasal spray, 1 spray by Each Nostril route once daily., Disp: , Rfl:     glucosamine-chondroitin 500-400 mg tablet, Take 1 tablet by mouth 2 (two) times daily. , Disp: , Rfl:     metoprolol succinate (TOPROL-XL) 50 MG 24 hr tablet, TAKE 1 TABLET (50 MG TOTAL) BY MOUTH ONCE DAILY., Disp: 90 tablet, Rfl: 3    MULTIVITAMIN W-MINERALS/LUTEIN (CENTRUM SILVER ORAL), Take by mouth once daily. , Disp: , Rfl:     nystatin-triamcinolone (MYCOLOG II) cream, APPLY TOPICALLY 4 (FOUR) TIMES DAILY., Disp: 60 g, Rfl: 5    omega-3 fatty acids 300 mg Cap, Take 1 tablet by mouth once daily. , Disp: , Rfl:     rosuvastatin (CRESTOR) 10 MG tablet, TAKE 1 TABLET (10 MG TOTAL) BY MOUTH ONCE DAILY., Disp: 90 tablet, Rfl: 1    TURMERIC ORAL, Take by mouth. Pt take 1 in the evening., Disp: , Rfl:     VITAMIN B COMPLEX (SUPER B COMPLEX-B-12 ORAL), Take by mouth., Disp: , Rfl:     imipramine (TOFRANIL) 25 MG tablet, Take 1 tablet (25 mg total) by mouth every evening., Disp: 30 tablet, Rfl: 11    imipramine (TOFRANIL) 25 MG tablet, Take 1 tablet (25 mg total) by mouth every evening., Disp: 30 tablet, Rfl: 11    tamsulosin (FLOMAX) 0.4 mg Cap, Take 1 capsule (0.4 mg total) by mouth once daily., Disp: 30 capsule, Rfl: 11  No current facility-administered medications for this visit.        PHYSICAL EXAMINATION:  Physical Exam  Constitutional:       Appearance: Normal appearance.   HENT:      Head: Normocephalic and atraumatic.      Right Ear: External ear normal.      Left Ear: External ear normal.   Pulmonary:      Effort: Pulmonary effort is normal. No respiratory distress.   Skin:     General: Skin is warm and dry.   Neurological:      General: No focal deficit present.      Mental Status: He is  alert and oriented to person, place, and time.   Psychiatric:         Mood and Affect: Mood normal.         Behavior: Behavior normal.         LABS:  UA WNL  PVR 0 ml      Lab Results   Component Value Date    PSA 2.7 01/20/2015    PSA 1.5 05/30/2008    PSA 2.1 05/11/2005    PSADIAG 2.5 07/16/2018    PSADIAG 2.8 06/12/2017    PSADIAG 2.7 08/18/2014       Lab Results   Component Value Date    CREATININE 0.7 08/14/2023    EGFRNORACEVR >60.0 08/14/2023     UA today pH 5    PVR bladder scan: 63 ml.    IMPRESSION:  Encounter Diagnoses   Name Primary?    Urge incontinence Yes    OAB (overactive bladder)     Presence of cardiac pacemaker     Nocturia     Nocturnal polyuria     Benign prostatic hyperplasia with nocturia            Assessment:     Urge incontinence  -     Simple Urodynamics w/ Cysto; Future    OAB (overactive bladder)  -     Simple Urodynamics w/ Cysto; Future    Presence of cardiac pacemaker    Nocturia  -     Simple Urodynamics w/ Cysto; Future  -     imipramine (TOFRANIL) 25 MG tablet; Take 1 tablet (25 mg total) by mouth every evening.  Dispense: 30 tablet; Refill: 11    Nocturnal polyuria  -     Simple Urodynamics w/ Cysto; Future  -     Urine Culture High Risk; Standing  -     POCT Urinalysis; Standing  -     imipramine (TOFRANIL) 25 MG tablet; Take 1 tablet (25 mg total) by mouth every evening.  Dispense: 30 tablet; Refill: 11    Benign prostatic hyperplasia with nocturia  -     tamsulosin (FLOMAX) 0.4 mg Cap; Take 1 capsule (0.4 mg total) by mouth once daily.  Dispense: 30 capsule; Refill: 11    Other orders  -     LIDOcaine HCl 2% urojet  -     doxycycline tablet 100 mg             Plan:   -based on his urinary symptoms, mainly bothersome nocturia, I explained that he has Nocturnal polyuria.  Therefore, as long as his body makes more urine than his bladder can hold, he will get up at night and urinate.    Options of treatment including botox injection ( higher dose of 200 untis) sacral  neuromodulation explained in depth.  May evaluate him with SUDS cysto to determine what else is going on.  Try imipramine 25 mg q hs to see whether it will help him with nocturia.  All questions answered.  Fluid restriction especially 4 hours before going to bed.  Low salt diet.  R/o sleep apnea if indicated.    At the end, he would like to resume Flomax in the evening time and try Imipramine at bed time to see whether these meds will improve his nocturia.  All questions answered.  I spent 40 minutes with the patient of which more than half was spent in direct consultation with the patient in regards to our treatment and plan.     Follow up in about 4 months (around 2/3/2024).

## 2023-10-24 ENCOUNTER — CLINICAL SUPPORT (OUTPATIENT)
Dept: CARDIOLOGY | Facility: HOSPITAL | Age: 73
End: 2023-10-24
Payer: MEDICARE

## 2023-10-24 DIAGNOSIS — Z95.0 PRESENCE OF CARDIAC PACEMAKER: ICD-10-CM

## 2023-10-24 PROCEDURE — 93294 REM INTERROG EVL PM/LDLS PM: CPT | Mod: HCNC,,, | Performed by: INTERNAL MEDICINE

## 2023-10-24 PROCEDURE — 93296 REM INTERROG EVL PM/IDS: CPT | Mod: HCNC | Performed by: INTERNAL MEDICINE

## 2023-10-24 PROCEDURE — 93294 CARDIAC DEVICE CHECK - REMOTE: ICD-10-PCS | Mod: HCNC,,, | Performed by: INTERNAL MEDICINE

## 2023-12-18 ENCOUNTER — HOSPITAL ENCOUNTER (INPATIENT)
Facility: HOSPITAL | Age: 73
LOS: 1 days | Discharge: HOME | DRG: 179 | End: 2023-12-20
Attending: EMERGENCY MEDICINE | Admitting: HOSPITALIST
Payer: MEDICARE

## 2023-12-18 DIAGNOSIS — R07.9 CHEST PAIN: ICD-10-CM

## 2023-12-18 DIAGNOSIS — R53.83 FATIGUE: ICD-10-CM

## 2023-12-18 DIAGNOSIS — U07.1 COVID-19: Primary | ICD-10-CM

## 2023-12-18 LAB
ALBUMIN SERPL BCP-MCNC: 3.7 G/DL (ref 3.5–5.2)
ALP SERPL-CCNC: 87 U/L (ref 55–135)
ALT SERPL W/O P-5'-P-CCNC: 25 U/L (ref 10–44)
ANION GAP SERPL CALC-SCNC: 12 MMOL/L (ref 8–16)
AST SERPL-CCNC: 25 U/L (ref 10–40)
BACTERIA #/AREA URNS HPF: ABNORMAL /HPF
BASOPHILS # BLD AUTO: 0.06 K/UL (ref 0–0.2)
BASOPHILS NFR BLD: 1.2 % (ref 0–1.9)
BILIRUB SERPL-MCNC: 0.5 MG/DL (ref 0.1–1)
BILIRUB UR QL STRIP: NEGATIVE
BUN SERPL-MCNC: 12 MG/DL (ref 8–23)
CALCIUM SERPL-MCNC: 9.3 MG/DL (ref 8.7–10.5)
CHLORIDE SERPL-SCNC: 103 MMOL/L (ref 95–110)
CLARITY UR: CLEAR
CO2 SERPL-SCNC: 22 MMOL/L (ref 23–29)
COLOR UR: YELLOW
CREAT SERPL-MCNC: 0.8 MG/DL (ref 0.5–1.4)
CTP QC/QA: YES
DIFFERENTIAL METHOD: ABNORMAL
EOSINOPHIL # BLD AUTO: 0.2 K/UL (ref 0–0.5)
EOSINOPHIL NFR BLD: 3.1 % (ref 0–8)
ERYTHROCYTE [DISTWIDTH] IN BLOOD BY AUTOMATED COUNT: 13.3 % (ref 11.5–14.5)
EST. GFR  (NO RACE VARIABLE): >60 ML/MIN/1.73 M^2
GLUCOSE SERPL-MCNC: 129 MG/DL (ref 70–110)
GLUCOSE UR QL STRIP: NEGATIVE
HCT VFR BLD AUTO: 41.4 % (ref 40–54)
HGB BLD-MCNC: 14.2 G/DL (ref 14–18)
HGB UR QL STRIP: ABNORMAL
HYALINE CASTS #/AREA URNS LPF: 1 /LPF
IMM GRANULOCYTES # BLD AUTO: 0.02 K/UL (ref 0–0.04)
IMM GRANULOCYTES NFR BLD AUTO: 0.4 % (ref 0–0.5)
INFLUENZA A, MOLECULAR: NEGATIVE
INFLUENZA B, MOLECULAR: NEGATIVE
KETONES UR QL STRIP: ABNORMAL
LEUKOCYTE ESTERASE UR QL STRIP: NEGATIVE
LYMPHOCYTES # BLD AUTO: 0.3 K/UL (ref 1–4.8)
LYMPHOCYTES NFR BLD: 6.6 % (ref 18–48)
MAGNESIUM SERPL-MCNC: 1.9 MG/DL (ref 1.6–2.6)
MCH RBC QN AUTO: 32.2 PG (ref 27–31)
MCHC RBC AUTO-ENTMCNC: 34.3 G/DL (ref 32–36)
MCV RBC AUTO: 94 FL (ref 82–98)
MICROSCOPIC COMMENT: ABNORMAL
MONOCYTES # BLD AUTO: 0.9 K/UL (ref 0.3–1)
MONOCYTES NFR BLD: 18.3 % (ref 4–15)
NEUTROPHILS # BLD AUTO: 3.6 K/UL (ref 1.8–7.7)
NEUTROPHILS NFR BLD: 70.4 % (ref 38–73)
NITRITE UR QL STRIP: NEGATIVE
NRBC BLD-RTO: 0 /100 WBC
PH UR STRIP: 7 [PH] (ref 5–8)
PLATELET # BLD AUTO: 185 K/UL (ref 150–450)
PMV BLD AUTO: 10.7 FL (ref 9.2–12.9)
POTASSIUM SERPL-SCNC: 3.7 MMOL/L (ref 3.5–5.1)
PROT SERPL-MCNC: 7.8 G/DL (ref 6–8.4)
PROT UR QL STRIP: ABNORMAL
RBC # BLD AUTO: 4.41 M/UL (ref 4.6–6.2)
RBC #/AREA URNS HPF: 60 /HPF (ref 0–4)
SARS-COV-2 RDRP RESP QL NAA+PROBE: POSITIVE
SODIUM SERPL-SCNC: 137 MMOL/L (ref 136–145)
SP GR UR STRIP: 1.02 (ref 1–1.03)
SPECIMEN SOURCE: NORMAL
UNIDENT CRYS URNS QL MICRO: 4
URN SPEC COLLECT METH UR: ABNORMAL
UROBILINOGEN UR STRIP-ACNC: NEGATIVE EU/DL
WBC # BLD AUTO: 5.13 K/UL (ref 3.9–12.7)
WBC #/AREA URNS HPF: 1 /HPF (ref 0–5)
YEAST URNS QL MICRO: ABNORMAL

## 2023-12-18 PROCEDURE — 87635 SARS-COV-2 COVID-19 AMP PRB: CPT | Mod: HCNC | Performed by: EMERGENCY MEDICINE

## 2023-12-18 PROCEDURE — 81000 URINALYSIS NONAUTO W/SCOPE: CPT | Mod: HCNC | Performed by: EMERGENCY MEDICINE

## 2023-12-18 PROCEDURE — G0378 HOSPITAL OBSERVATION PER HR: HCPCS | Mod: HCNC

## 2023-12-18 PROCEDURE — 87502 INFLUENZA DNA AMP PROBE: CPT | Mod: HCNC | Performed by: EMERGENCY MEDICINE

## 2023-12-18 PROCEDURE — 25000003 PHARM REV CODE 250: Performed by: EMERGENCY MEDICINE

## 2023-12-18 PROCEDURE — 80053 COMPREHEN METABOLIC PANEL: CPT | Mod: HCNC | Performed by: EMERGENCY MEDICINE

## 2023-12-18 PROCEDURE — 99285 EMERGENCY DEPT VISIT HI MDM: CPT | Mod: 25,HCNC

## 2023-12-18 PROCEDURE — 85025 COMPLETE CBC W/AUTO DIFF WBC: CPT | Mod: HCNC | Performed by: EMERGENCY MEDICINE

## 2023-12-18 PROCEDURE — 93005 ELECTROCARDIOGRAM TRACING: CPT | Mod: HCNC

## 2023-12-18 PROCEDURE — 96361 HYDRATE IV INFUSION ADD-ON: CPT | Mod: HCNC

## 2023-12-18 PROCEDURE — 87502 INFLUENZA DNA AMP PROBE: CPT | Mod: HCNC

## 2023-12-18 PROCEDURE — 83735 ASSAY OF MAGNESIUM: CPT | Mod: HCNC | Performed by: EMERGENCY MEDICINE

## 2023-12-18 RX ORDER — TAMSULOSIN HYDROCHLORIDE 0.4 MG/1
0.4 CAPSULE ORAL DAILY
Status: DISCONTINUED | OUTPATIENT
Start: 2023-12-19 | End: 2023-12-20 | Stop reason: HOSPADM

## 2023-12-18 RX ORDER — ACETAMINOPHEN 500 MG
1000 TABLET ORAL
Status: COMPLETED | OUTPATIENT
Start: 2023-12-18 | End: 2023-12-18

## 2023-12-18 RX ORDER — FLUTICASONE PROPIONATE 50 MCG
1 SPRAY, SUSPENSION (ML) NASAL DAILY
Status: DISCONTINUED | OUTPATIENT
Start: 2023-12-19 | End: 2023-12-20 | Stop reason: HOSPADM

## 2023-12-18 RX ORDER — SIMETHICONE 80 MG
1 TABLET,CHEWABLE ORAL 4 TIMES DAILY PRN
Status: DISCONTINUED | OUTPATIENT
Start: 2023-12-19 | End: 2023-12-20 | Stop reason: HOSPADM

## 2023-12-18 RX ORDER — HEPARIN SODIUM 5000 [USP'U]/ML
5000 INJECTION, SOLUTION INTRAVENOUS; SUBCUTANEOUS EVERY 8 HOURS
Status: DISCONTINUED | OUTPATIENT
Start: 2023-12-19 | End: 2023-12-20 | Stop reason: HOSPADM

## 2023-12-18 RX ORDER — NAPROXEN SODIUM 220 MG/1
81 TABLET, FILM COATED ORAL NIGHTLY
Status: DISCONTINUED | OUTPATIENT
Start: 2023-12-19 | End: 2023-12-20 | Stop reason: HOSPADM

## 2023-12-18 RX ORDER — CETIRIZINE HYDROCHLORIDE 10 MG/1
10 TABLET ORAL DAILY
Status: DISCONTINUED | OUTPATIENT
Start: 2023-12-19 | End: 2023-12-20 | Stop reason: HOSPADM

## 2023-12-18 RX ORDER — AZELASTINE 1 MG/ML
1 SPRAY, METERED NASAL 2 TIMES DAILY
Status: DISCONTINUED | OUTPATIENT
Start: 2023-12-19 | End: 2023-12-19

## 2023-12-18 RX ORDER — IPRATROPIUM BROMIDE AND ALBUTEROL SULFATE 2.5; .5 MG/3ML; MG/3ML
3 SOLUTION RESPIRATORY (INHALATION) EVERY 4 HOURS PRN
Status: DISCONTINUED | OUTPATIENT
Start: 2023-12-19 | End: 2023-12-20 | Stop reason: HOSPADM

## 2023-12-18 RX ORDER — POLYETHYLENE GLYCOL 3350 17 G/17G
17 POWDER, FOR SOLUTION ORAL DAILY
Status: DISCONTINUED | OUTPATIENT
Start: 2023-12-19 | End: 2023-12-20 | Stop reason: HOSPADM

## 2023-12-18 RX ORDER — NALOXONE HCL 0.4 MG/ML
0.02 VIAL (ML) INJECTION
Status: DISCONTINUED | OUTPATIENT
Start: 2023-12-19 | End: 2023-12-20 | Stop reason: HOSPADM

## 2023-12-18 RX ORDER — ACETAMINOPHEN 325 MG/1
650 TABLET ORAL EVERY 4 HOURS PRN
Status: DISCONTINUED | OUTPATIENT
Start: 2023-12-19 | End: 2023-12-20 | Stop reason: HOSPADM

## 2023-12-18 RX ORDER — METOPROLOL SUCCINATE 50 MG/1
50 TABLET, EXTENDED RELEASE ORAL DAILY
Status: DISCONTINUED | OUTPATIENT
Start: 2023-12-19 | End: 2023-12-20 | Stop reason: HOSPADM

## 2023-12-18 RX ORDER — ACETAMINOPHEN 325 MG/1
650 TABLET ORAL EVERY 8 HOURS PRN
Status: DISCONTINUED | OUTPATIENT
Start: 2023-12-19 | End: 2023-12-20 | Stop reason: HOSPADM

## 2023-12-18 RX ORDER — IBUPROFEN 200 MG
16 TABLET ORAL
Status: DISCONTINUED | OUTPATIENT
Start: 2023-12-19 | End: 2023-12-19 | Stop reason: SDUPTHER

## 2023-12-18 RX ORDER — ONDANSETRON 2 MG/ML
4 INJECTION INTRAMUSCULAR; INTRAVENOUS EVERY 8 HOURS PRN
Status: DISCONTINUED | OUTPATIENT
Start: 2023-12-19 | End: 2023-12-20 | Stop reason: HOSPADM

## 2023-12-18 RX ORDER — TALC
6 POWDER (GRAM) TOPICAL NIGHTLY PRN
Status: DISCONTINUED | OUTPATIENT
Start: 2023-12-19 | End: 2023-12-20 | Stop reason: HOSPADM

## 2023-12-18 RX ORDER — SODIUM CHLORIDE 9 MG/ML
500 INJECTION, SOLUTION INTRAVENOUS
Status: COMPLETED | OUTPATIENT
Start: 2023-12-18 | End: 2023-12-18

## 2023-12-18 RX ORDER — GLUCAGON 1 MG
1 KIT INJECTION
Status: DISCONTINUED | OUTPATIENT
Start: 2023-12-19 | End: 2023-12-19 | Stop reason: SDUPTHER

## 2023-12-18 RX ORDER — IBUPROFEN 200 MG
24 TABLET ORAL
Status: DISCONTINUED | OUTPATIENT
Start: 2023-12-19 | End: 2023-12-19 | Stop reason: SDUPTHER

## 2023-12-18 RX ORDER — ATORVASTATIN CALCIUM 10 MG/1
10 TABLET, FILM COATED ORAL DAILY
Status: DISCONTINUED | OUTPATIENT
Start: 2023-12-19 | End: 2023-12-20 | Stop reason: HOSPADM

## 2023-12-18 RX ORDER — SODIUM CHLORIDE 0.9 % (FLUSH) 0.9 %
10 SYRINGE (ML) INJECTION EVERY 12 HOURS PRN
Status: DISCONTINUED | OUTPATIENT
Start: 2023-12-19 | End: 2023-12-20 | Stop reason: HOSPADM

## 2023-12-18 RX ADMIN — ACETAMINOPHEN 1000 MG: 500 TABLET ORAL at 11:12

## 2023-12-18 RX ADMIN — SODIUM CHLORIDE 500 ML: 9 INJECTION, SOLUTION INTRAVENOUS at 09:12

## 2023-12-19 ENCOUNTER — TELEPHONE (OUTPATIENT)
Dept: PRIMARY CARE CLINIC | Facility: CLINIC | Age: 73
End: 2023-12-19
Payer: MEDICARE

## 2023-12-19 PROBLEM — J96.01 ACUTE HYPOXIC RESPIRATORY FAILURE: Chronic | Status: ACTIVE | Noted: 2023-12-19

## 2023-12-19 PROBLEM — E78.5 HYPERLIPIDEMIA: Status: ACTIVE | Noted: 2023-12-19

## 2023-12-19 PROBLEM — R53.83 FATIGUE: Status: ACTIVE | Noted: 2023-12-19

## 2023-12-19 PROBLEM — J96.01 ACUTE HYPOXIC RESPIRATORY FAILURE: Status: ACTIVE | Noted: 2023-12-19

## 2023-12-19 PROBLEM — J12.82 PNEUMONIA DUE TO COVID-19 VIRUS: Status: ACTIVE | Noted: 2023-12-19

## 2023-12-19 PROBLEM — U07.1 PNEUMONIA DUE TO COVID-19 VIRUS: Status: ACTIVE | Noted: 2023-12-19

## 2023-12-19 LAB
ALBUMIN SERPL BCP-MCNC: 3.2 G/DL (ref 3.5–5.2)
ALP SERPL-CCNC: 77 U/L (ref 55–135)
ALT SERPL W/O P-5'-P-CCNC: 27 U/L (ref 10–44)
ANION GAP SERPL CALC-SCNC: 9 MMOL/L (ref 8–16)
AST SERPL-CCNC: 30 U/L (ref 10–40)
BASOPHILS # BLD AUTO: 0.05 K/UL (ref 0–0.2)
BASOPHILS NFR BLD: 1.3 % (ref 0–1.9)
BILIRUB SERPL-MCNC: 0.4 MG/DL (ref 0.1–1)
BUN SERPL-MCNC: 13 MG/DL (ref 8–23)
CALCIUM SERPL-MCNC: 8.7 MG/DL (ref 8.7–10.5)
CHLORIDE SERPL-SCNC: 104 MMOL/L (ref 95–110)
CO2 SERPL-SCNC: 24 MMOL/L (ref 23–29)
CREAT SERPL-MCNC: 0.8 MG/DL (ref 0.5–1.4)
DIFFERENTIAL METHOD: ABNORMAL
EOSINOPHIL # BLD AUTO: 0 K/UL (ref 0–0.5)
EOSINOPHIL NFR BLD: 0.5 % (ref 0–8)
ERYTHROCYTE [DISTWIDTH] IN BLOOD BY AUTOMATED COUNT: 13.4 % (ref 11.5–14.5)
EST. GFR  (NO RACE VARIABLE): >60 ML/MIN/1.73 M^2
GLUCOSE SERPL-MCNC: 101 MG/DL (ref 70–110)
HCT VFR BLD AUTO: 37.9 % (ref 40–54)
HGB BLD-MCNC: 12.5 G/DL (ref 14–18)
IMM GRANULOCYTES # BLD AUTO: 0.01 K/UL (ref 0–0.04)
IMM GRANULOCYTES NFR BLD AUTO: 0.3 % (ref 0–0.5)
LYMPHOCYTES # BLD AUTO: 0.4 K/UL (ref 1–4.8)
LYMPHOCYTES NFR BLD: 11 % (ref 18–48)
MCH RBC QN AUTO: 31.6 PG (ref 27–31)
MCHC RBC AUTO-ENTMCNC: 33 G/DL (ref 32–36)
MCV RBC AUTO: 96 FL (ref 82–98)
MONOCYTES # BLD AUTO: 1.1 K/UL (ref 0.3–1)
MONOCYTES NFR BLD: 26.9 % (ref 4–15)
NEUTROPHILS # BLD AUTO: 2.3 K/UL (ref 1.8–7.7)
NEUTROPHILS NFR BLD: 60 % (ref 38–73)
NRBC BLD-RTO: 0 /100 WBC
PLATELET # BLD AUTO: 176 K/UL (ref 150–450)
PMV BLD AUTO: 10.5 FL (ref 9.2–12.9)
POTASSIUM SERPL-SCNC: 3.7 MMOL/L (ref 3.5–5.1)
PROT SERPL-MCNC: 6.8 G/DL (ref 6–8.4)
RBC # BLD AUTO: 3.95 M/UL (ref 4.6–6.2)
SODIUM SERPL-SCNC: 137 MMOL/L (ref 136–145)
WBC # BLD AUTO: 3.9 K/UL (ref 3.9–12.7)

## 2023-12-19 PROCEDURE — 97535 SELF CARE MNGMENT TRAINING: CPT | Mod: HCNC

## 2023-12-19 PROCEDURE — 97165 OT EVAL LOW COMPLEX 30 MIN: CPT | Mod: HCNC

## 2023-12-19 PROCEDURE — 63600175 PHARM REV CODE 636 W HCPCS: Mod: HCNC | Performed by: STUDENT IN AN ORGANIZED HEALTH CARE EDUCATION/TRAINING PROGRAM

## 2023-12-19 PROCEDURE — 97530 THERAPEUTIC ACTIVITIES: CPT | Mod: HCNC

## 2023-12-19 PROCEDURE — 80053 COMPREHEN METABOLIC PANEL: CPT | Mod: HCNC | Performed by: STUDENT IN AN ORGANIZED HEALTH CARE EDUCATION/TRAINING PROGRAM

## 2023-12-19 PROCEDURE — 25000003 PHARM REV CODE 250: Mod: HCNC | Performed by: STUDENT IN AN ORGANIZED HEALTH CARE EDUCATION/TRAINING PROGRAM

## 2023-12-19 PROCEDURE — 11000001 HC ACUTE MED/SURG PRIVATE ROOM: Mod: HCNC

## 2023-12-19 PROCEDURE — 27000207 HC ISOLATION: Mod: HCNC

## 2023-12-19 PROCEDURE — 25000242 PHARM REV CODE 250 ALT 637 W/ HCPCS: Mod: HCNC | Performed by: STUDENT IN AN ORGANIZED HEALTH CARE EDUCATION/TRAINING PROGRAM

## 2023-12-19 PROCEDURE — 36415 COLL VENOUS BLD VENIPUNCTURE: CPT | Mod: HCNC | Performed by: STUDENT IN AN ORGANIZED HEALTH CARE EDUCATION/TRAINING PROGRAM

## 2023-12-19 PROCEDURE — 96375 TX/PRO/DX INJ NEW DRUG ADDON: CPT

## 2023-12-19 PROCEDURE — 25000003 PHARM REV CODE 250: Mod: HCNC | Performed by: HOSPITALIST

## 2023-12-19 PROCEDURE — 96365 THER/PROPH/DIAG IV INF INIT: CPT

## 2023-12-19 PROCEDURE — 97161 PT EVAL LOW COMPLEX 20 MIN: CPT | Mod: HCNC

## 2023-12-19 PROCEDURE — 96372 THER/PROPH/DIAG INJ SC/IM: CPT | Performed by: STUDENT IN AN ORGANIZED HEALTH CARE EDUCATION/TRAINING PROGRAM

## 2023-12-19 PROCEDURE — 85025 COMPLETE CBC W/AUTO DIFF WBC: CPT | Mod: HCNC | Performed by: STUDENT IN AN ORGANIZED HEALTH CARE EDUCATION/TRAINING PROGRAM

## 2023-12-19 RX ORDER — ASCORBIC ACID 500 MG
500 TABLET ORAL 2 TIMES DAILY
Status: DISCONTINUED | OUTPATIENT
Start: 2023-12-19 | End: 2023-12-20 | Stop reason: HOSPADM

## 2023-12-19 RX ORDER — BUTALBITAL, ACETAMINOPHEN AND CAFFEINE 50; 325; 40 MG/1; MG/1; MG/1
1 TABLET ORAL EVERY 4 HOURS PRN
Status: DISCONTINUED | OUTPATIENT
Start: 2023-12-19 | End: 2023-12-20 | Stop reason: HOSPADM

## 2023-12-19 RX ORDER — GLUCAGON 1 MG
1 KIT INJECTION
Status: DISCONTINUED | OUTPATIENT
Start: 2023-12-19 | End: 2023-12-20 | Stop reason: HOSPADM

## 2023-12-19 RX ORDER — MORPHINE SULFATE 2 MG/ML
2 INJECTION, SOLUTION INTRAMUSCULAR; INTRAVENOUS ONCE
Status: COMPLETED | OUTPATIENT
Start: 2023-12-19 | End: 2023-12-19

## 2023-12-19 RX ORDER — AZELASTINE 1 MG/ML
1 SPRAY, METERED NASAL 2 TIMES DAILY
Status: DISCONTINUED | OUTPATIENT
Start: 2023-12-19 | End: 2023-12-20 | Stop reason: HOSPADM

## 2023-12-19 RX ORDER — IBUPROFEN 200 MG
16 TABLET ORAL
Status: DISCONTINUED | OUTPATIENT
Start: 2023-12-19 | End: 2023-12-20 | Stop reason: HOSPADM

## 2023-12-19 RX ORDER — ACETAMINOPHEN 325 MG/1
650 TABLET ORAL ONCE
Status: COMPLETED | OUTPATIENT
Start: 2023-12-19 | End: 2023-12-19

## 2023-12-19 RX ORDER — IBUPROFEN 200 MG
24 TABLET ORAL
Status: DISCONTINUED | OUTPATIENT
Start: 2023-12-19 | End: 2023-12-20 | Stop reason: HOSPADM

## 2023-12-19 RX ORDER — SODIUM CHLORIDE 0.9 % (FLUSH) 0.9 %
10 SYRINGE (ML) INJECTION
Status: DISCONTINUED | OUTPATIENT
Start: 2023-12-19 | End: 2023-12-20 | Stop reason: HOSPADM

## 2023-12-19 RX ADMIN — HEPARIN SODIUM 5000 UNITS: 5000 INJECTION INTRAVENOUS; SUBCUTANEOUS at 05:12

## 2023-12-19 RX ADMIN — METOPROLOL SUCCINATE 50 MG: 50 TABLET, EXTENDED RELEASE ORAL at 10:12

## 2023-12-19 RX ADMIN — MORPHINE SULFATE 2 MG: 2 INJECTION, SOLUTION INTRAMUSCULAR; INTRAVENOUS at 02:12

## 2023-12-19 RX ADMIN — OXYCODONE HYDROCHLORIDE AND ACETAMINOPHEN 500 MG: 500 TABLET ORAL at 08:12

## 2023-12-19 RX ADMIN — REMDESIVIR 200 MG: 100 INJECTION, POWDER, LYOPHILIZED, FOR SOLUTION INTRAVENOUS at 02:12

## 2023-12-19 RX ADMIN — DEXAMETHASONE 6 MG: 4 TABLET ORAL at 10:12

## 2023-12-19 RX ADMIN — AZELASTINE HYDROCHLORIDE 137 MCG: 137 SPRAY, METERED NASAL at 10:12

## 2023-12-19 RX ADMIN — CETIRIZINE HYDROCHLORIDE 10 MG: 10 TABLET, FILM COATED ORAL at 10:12

## 2023-12-19 RX ADMIN — HEPARIN SODIUM 5000 UNITS: 5000 INJECTION INTRAVENOUS; SUBCUTANEOUS at 04:12

## 2023-12-19 RX ADMIN — ACETAMINOPHEN 650 MG: 325 TABLET ORAL at 10:12

## 2023-12-19 RX ADMIN — AZELASTINE HYDROCHLORIDE 137 MCG: 137 SPRAY, METERED NASAL at 02:12

## 2023-12-19 RX ADMIN — MULTIPLE VITAMINS W/ MINERALS TAB 1 TABLET: TAB at 10:12

## 2023-12-19 RX ADMIN — AZELASTINE HYDROCHLORIDE 137 MCG: 137 SPRAY, METERED NASAL at 08:12

## 2023-12-19 RX ADMIN — ASPIRIN 81 MG CHEWABLE TABLET 81 MG: 81 TABLET CHEWABLE at 08:12

## 2023-12-19 RX ADMIN — FLUTICASONE PROPIONATE 50 MCG: 50 SPRAY, METERED NASAL at 10:12

## 2023-12-19 RX ADMIN — OXYCODONE HYDROCHLORIDE AND ACETAMINOPHEN 500 MG: 500 TABLET ORAL at 10:12

## 2023-12-19 RX ADMIN — ATORVASTATIN CALCIUM 10 MG: 10 TABLET, FILM COATED ORAL at 10:12

## 2023-12-19 RX ADMIN — ACETAMINOPHEN 650 MG: 325 TABLET ORAL at 08:12

## 2023-12-19 RX ADMIN — TAMSULOSIN HYDROCHLORIDE 0.4 MG: 0.4 CAPSULE ORAL at 10:12

## 2023-12-19 NOTE — ASSESSMENT & PLAN NOTE
Patient is identified as Severe COVID-19 based on hypoxemia with O2 saturations <94% on room air or on ambulation   Initiate standard COVID protocols; COVID-19 testing ,Infection Control notification  and isolation- respiratory, contact and droplet per protocol    Diagnostics: CBC, CMP, and Portable CXR    Management: Inhaled bronchodilators as needed for shortness of breath., IV remdesivir, dexamethasone    Advance Care Planning  Current advance care plan has not been discussed with patient/family/POA and patient currently wishes Full Code.

## 2023-12-19 NOTE — PLAN OF CARE
SW communicated with pt  to discuss dc planning. Pt confirmed information on chart. Pt resides in home with his wife. Pt is independent in ADLs. Pt has no HH services at home. Pt has rw at home but hardly uses it. Pt reports that he recently used rw due to being weak from covid. Pt expressed upon dc his wife will provide transport home. SW will continue to follow pt throughout his transitions of care and assist with any dc needs.     Anastasiia Cain (Spouse)  [phone]      SARABJIT requested PCP follow up.      12/19/23 0906   Discharge Assessment   Assessment Type Discharge Planning Assessment   Confirmed/corrected address, phone number and insurance Yes   Confirmed Demographics Correct on Facesheet   Source of Information patient   Communicated BOGDAN with patient/caregiver Yes   Reason For Admission Acute hypoxic respiratory failure (Chronic)   People in Home spouse   Do you expect to return to your current living situation? Yes   Do you have help at home or someone to help you manage your care at home? Yes   Who are your caregiver(s) and their phone number(s)? Anastasiia Cain (Spouse) [phone]   Prior to hospitilization cognitive status: Alert/Oriented   Current cognitive status: Alert/Oriented   Home Layout Able to live on 1st floor   Equipment Currently Used at Home walker, rolling   Readmission within 30 days? No   Patient currently being followed by outpatient case management? No   Do you currently have service(s) that help you manage your care at home? No   Do you take prescription medications? Yes   Do you have prescription coverage? Yes   Coverage [employer] MANAGED MEDICARE - [employer] TOTAL CARE ADVANTAGE - CAPITATED   Do you have any problems affording any of your prescribed medications? No   Is the patient taking medications as prescribed? yes   Who is going to help you get home at discharge? Anastasiia Cain (Spouse) [phone]   How do you get to doctors appointments? car, drives self;family or friend will  provide   Are you on dialysis? No   Do you take coumadin? No   Discharge Plan A Home with family   Discharge Plan discussed with: Patient   Transition of Care Barriers None   Social Connections   Are you , , , , never , or living with a partner?    Alcohol Use   Q1: How often do you have a drink containing alcohol? Monthly or l   Q2: How many drinks containing alcohol do you have on a typical day when you are drinking? 1 or 2   Q3: How often do you have six or more drinks on one occasion? Less than mo   OTHER   Name(s) of People in Home Anastasiia Cain (Spouse) [phone]

## 2023-12-19 NOTE — TELEPHONE ENCOUNTER
----- Message from Zulma Johnson sent at 12/19/2023 11:48 AM CST -----  Regarding: HFU  Patient is being discharged from [facility] and is requiring a hospital follow up appointment with their Primary Care Provider in 7 days. Patient is established. I am unable to schedule an appointment in that time frame. Please schedule patient a sooner appointment and message me back so Discharge Nurse can advise patient prior to discharge.    DX:Acute hypoxic respiratory failure      Thank you, Liliana  Physician Referral Specialist/Discharge

## 2023-12-19 NOTE — HPI
Adan Cain is a 73-year-old man with a past medical history of BPH with nocturia, hyperlipidemia, hypertension, allergies who presents with fatigue and leg weakness.    Patient states that over the past week he has had cough, congestion, significant headache as well as fatigue.  Over the past day he has had significant weakness.  He also reports associated nausea but no vomiting.  As he attempted to walk, he fell which is very abnormal for him.  He presents by EMS for further evaluation.    Triage vitals were fairly unremarkable except brief hypoxia, eventually requiring 3 L. CBC was fairly unremarkable.  CMP was significant for slight elevations in glucose.  COVID was noted to be positive.    UA was significant for occult blood, rare yeast, WBCs.    Chest x-ray was fairly unremarkable.    Patient admitted for hypoxemia from COVID and fatigue

## 2023-12-19 NOTE — PLAN OF CARE
Problem: Physical Therapy  Goal: Physical Therapy Goal  Description: Goals to be met by: 2024     Patient will increase functional independence with mobility by performin. Supine to sit with Modified [place]  2. Sit to supine with Modified [place]  3. Rolling with Modified [place].  4. Sit to stand transfer with Modified [place] with or without AD  5. Bed to chair transfer with Modified [place] with or without AD  6. Gait  x 150 feet with Modified [place] with or without AD  7. Ascend/descend 4 stair with bilateral Handrails SBA  8. Lower extremity exercise program x10 reps with independence    Outcome: Ongoing, Progressing   Patient co-evaluated by PT/OT in anticipation of decreased tolerance to activity; pt's baseline of function is independent/Jeremie with ADLs, amb mainly without AD; shares household responsibilities with wife; drives during the day and not at night 2/2 h/o L eye with decreased vision; currently, pt is on 2L of O2, SpO2 97-99% with elevated HR 90s-110s throughout session while reporting very slight SOB with extended activity/amb without AD and HHA/[place]; pt exhibits improved balance and decreased exertion with use of RW though requires CGA for amb and mod VCs for keeping RW within good proximity; pt required CG/SBA for 2 min standing activity at sink; acute PT services will follow to maximize functional status while in hospital; recommend low intensity therapy (home health PT/OT); has access to RW, raised toilet seat, grab bar in shower with BIS, and shower chair.

## 2023-12-19 NOTE — PLAN OF CARE
OT / PT coeval completed this date. Patient with prehospitalization baseline function of modified independence / independence in ADLs, functional mobility primarily without an assistive device, shares IADL home management with spouse, and performs daytime driving (hx of left eye decreased vision - does not drive at night). Does not wear O2 at home. Endorses intermittent decreased continence / increased frequency of urination.    On evaluation this date patient on 2L of O2, SpO2 97-99%, heartrate elevated at 90s - 110s bpm, throughout with reports of very slight breathlessness with prolonged activity. Patient with increased steadiness / decreased exertion with use of rolling walker, but requiring moderate verbal cues for proximity and minimal assist for in room mobility. LB ADLs requiring increased assist but patient able to stand x2 minutes during grooming at sink this date with CGA - SBA for balance. Skilled acute OT to follow while hospitalized. Recommend low intensity therapy, home health OT / PT. No dme needs.    Problem: Occupational Therapy  Goal: Occupational Therapy Goal  Description: Goals to be met by: 1/16/2024     Patient will increase functional independence with ADLs by performing:    Dressing regimen inclusive of retrieving / transporting clothing and putting on clothing with Modified [place], use of least restrictive device.  Grooming while standing at sink with [place].  Toileting from toilet with Modified [place] for hygiene and clothing management inclusive of self managing decreased continence episodes.   Verbalized teachback for x2 hour toileting schedule (ie. Attempt urinating every 2 hours).  Toilet transfer to toilet with Modified [place] with use of least restrictive device.  Functional mobility x 5 minutes with supervision with use of least restrictive device while maintaining vitals WFL  100% reciprocation of energy conservation and fall risk reduction strategies to  support d/c to least restrictive environment.    Outcome: Ongoing, Progressing

## 2023-12-19 NOTE — PROGRESS NOTES
[facility] Medicine  Progress Note    Patient Name: Adan Cain  MRN: [id number]  Patient Class: IP- Inpatient   Admission Date: 12/18/2023  Length of Stay: 0 days  Attending Physician: Trent Herman MD  Primary Care Provider: Gina Reyes MD        Subjective:     Principal Problem:Acute hypoxic respiratory failure        HPI:  Adan Cain is a 73-year-old man with a past medical history of BPH with nocturia, hyperlipidemia, hypertension, allergies who presents with fatigue and leg weakness.    Patient states that over the past week he has had cough, congestion, significant headache as well as fatigue.  Over the past day he has had significant weakness.  He also reports associated nausea but no vomiting.  As he attempted to walk, he fell which is very abnormal for him.  He presents by EMS for further evaluation.    Triage vitals were fairly unremarkable except brief hypoxia, eventually requiring 3 L. CBC was fairly unremarkable.  CMP was significant for slight elevations in glucose.  COVID was noted to be positive.    UA was significant for occult blood, rare yeast, WBCs.    Chest x-ray was fairly unremarkable.    Patient admitted for hypoxemia from COVID and fatigue        Overview/Hospital Course:  No notes on file    Interval History: No acute events overnight  Patient reports headache but has no other complaints  Denies dyspnea or chest pain  Ambulating well  Tolerating PO diet well    Review of Systems   Constitutional:  Negative for chills, fatigue and fever.   Respiratory:  Negative for cough and shortness of breath.    Cardiovascular:  Negative for chest pain and palpitations.   Gastrointestinal:  Negative for abdominal pain, constipation, diarrhea and nausea.     Objective:     Vital Signs (Most Recent):  Temp: 98.9 °F (37.2 °C) (12/19/23 1218)  Pulse: 82 (12/19/23 1218)  Resp: 18 (12/19/23 1218)  BP: (!) 143/69 (12/19/23 1218)  SpO2: 95 % (12/19/23 1218) Vital Signs (24h  Range):  Temp:  [98 °F (36.7 °C)-100 °F (37.8 °C)] 98.9 °F (37.2 °C)  Pulse:  [] 82  Resp:  [18-23] 18  SpO2:  [89 %-99 %] 95 %  BP: (129-175)/(62-80) 143/69     Weight: 132 kg (291 lb)  Body mass index is 38.39 kg/m².    Intake/Output Summary (Last 24 hours) at 12/19/2023 1327  Last data filed at 12/18/2023 2233  Gross per 24 hour   Intake 500 ml   Output --   Net 500 ml         Physical Exam  Constitutional:       Appearance: Normal appearance.   HENT:      Mouth/Throat:      Mouth: Mucous membranes are moist.      Pharynx: Oropharynx is clear.   Eyes:      Extraocular Movements: Extraocular movements intact.      Pupils: Pupils are equal, round, and reactive to light.   Cardiovascular:      Rate and Rhythm: Normal rate and regular rhythm.   Pulmonary:      Effort: Pulmonary effort is normal.      Breath sounds: Normal breath sounds.   Abdominal:      General: Bowel sounds are normal.      Palpations: Abdomen is soft.   Musculoskeletal:         General: Normal range of motion.   Skin:     General: Skin is warm and dry.   Neurological:      General: No focal deficit present.      Mental Status: He is alert and oriented to person, place, and time.   Psychiatric:         Mood and Affect: Mood normal.         Behavior: Behavior normal.         Thought Content: Thought content normal.             Significant Labs: All pertinent labs within the past 24 hours have been reviewed.    Significant Imaging: I have reviewed all pertinent imaging results/findings within the past 24 hours.    Assessment/Plan:      * Acute hypoxic respiratory failure  Patient with Hypoxic Respiratory failure which is Acute.  he is not on home oxygen. Supplemental oxygen was provided and noted-      .   Signs/symptoms of respiratory failure include- tachypnea and increased work of breathing. Contributing diagnoses includes - Pneumonia Labs and images were reviewed. Patient Has not had a recent ABG. Will treat underlying causes and adjust  management of respiratory failure as follows- steroids, breathing treatments as needed    Fatigue  Likely due to COVID pneumonia    Plan:  PT/OT      Hyperlipidemia  May continue home meds       Pneumonia due to COVID-19 virus  Patient is identified as Severe COVID-19 based on hypoxemia with O2 saturations <94% on room air or on ambulation   Initiate standard COVID protocols; COVID-19 testing ,Infection Control notification  and isolation- respiratory, contact and droplet per protocol    Diagnostics: CBC, CMP, and Portable CXR    Management: Inhaled bronchodilators as needed for shortness of breath., IV remdesivir, dexamethasone    Advance Care Planning Current advance care plan has not been discussed with patient/family/POA and patient currently wishes Full Code.     Allergies  May continue home meds      Benign prostatic hyperplasia with nocturia  May continue home meds      Nocturia  Significant issue for patient.  Currently seeing Urology outpatient but not particularly pleased  Recently underwent Botox with minimal improvement    Plan:  Continue home meds  Consider Urology consultation        VTE Risk Mitigation (From admission, onward)           Ordered     heparin (porcine) injection 5,000 Units  Every 8 hours         12/18/23 2312     IP VTE HIGH RISK PATIENT  Once         12/18/23 2312     Place sequential compression device  Until discontinued         12/18/23 2312                    Discharge Planning   BOGDAN:      Code Status: Full Code   Is the patient medically ready for discharge?:     Reason for patient still in hospital (select all that apply): Patient trending condition, Laboratory test, and Treatment  Discharge Plan A: Home with family                  Trent Herman MD  Department of Hospital Medicine   [facility]

## 2023-12-19 NOTE — H&P
[facility] Medicine  History & Physical    Patient Name: Adan Webb  MRN: [id number]  Patient Class: OP- Observation  Admission Date: 12/18/2023  Attending Physician: Makenzie Wiseman MD   Primary Care Provider: Gina Reyes MD         Patient information was obtained from patient and ER records.     Subjective:     Principal Problem:<principal problem not specified>    Chief Complaint:   Chief Complaint   Patient presents with    Fatigue     Patient bib ems from home complaining of weakness that started today. Patient also reports episode of nausea earlier with no vomiting, recent UTI with unresolved urinary frequency.  Denies vomiting, pain, fever. IV initiated by ems enrt. Patient oriented x 4.         HPI: Adan Webb is a 73-year-old man with a past medical history of BPH with nocturia, hyperlipidemia, hypertension, allergies who presents with fatigue and leg weakness.    Patient states that over the past week he has had cough, congestion, significant headache as well as fatigue.  Over the past day he has had significant weakness.  He also reports associated nausea but no vomiting.  As he attempted to walk, he fell which is very abnormal for him.  He presents by EMS for further evaluation.    Triage vitals were fairly unremarkable except brief hypoxia, eventually requiring 3 L. CBC was fairly unremarkable.  CMP was significant for slight elevations in glucose.  COVID was noted to be positive.    UA was significant for occult blood, rare yeast, WBCs.    Chest x-ray was fairly unremarkable.    Patient admitted for hypoxemia from COVID and fatigue        Past Medical History:   Diagnosis Date    Abnormal TSH 12/9/2020    Allergic rhinitis 4/13/2016    Belching 1/22/2019    Benign prostatic hyperplasia with nocturia 8/18/2014    BPH (benign prostatic hyperplasia)     Urology Dr Zamora, [facility] prostate revive helps, avodart caused chest pains    Calculus of gallbladder     US 2016     Calculus of gallbladder without cholecystitis without obstruction 4/13/2016    CT chest 2018    Cataract     Chronic pain of both ankles 12/9/2020    Podiatrist Dr Sesay    Conductive hearing loss of right ear with restricted hearing of left ear 1/22/2019    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, didn't use ear protection    Dermatitis 12/9/2020    nystat groin    DJD (degenerative joint disease) of hip 1/29/2015    Enlarged liver 4/8/2021    US 4/21     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    MCA , see MRI    Hiatal hernia 4/1/2021    CT chest tiny 2018    HTN (hypertension), benign 8/20/2020    Morbid obesity with BMI of 40.0-44.9, adult 1/29/2015    OAB (overactive bladder) 4/1/2021    Rash with pads    Right-sided low back pain with right-sided sciatica 3/6/2019    Seasonal allergic rhinitis due to pollen 4/13/2016    Thoracic aortic aneurysm without rupture 05/27/2018    tx with Su, Cardiologist Dr Stone, 5.1 CTS Dr Duenas, follows yearly    Transient cerebral ischemia 6/6/2018       Past Surgical History:   Procedure Laterality Date    A-V CARDIAC PACEMAKER INSERTION N/A 08/05/2021    Procedure: INSERTION, CARDIAC PACEMAKER, DUAL CHAMBER;  Surgeon: Sheldon Miranda MD;  Location: [facility] EP LAB;  Service: Cardiology;  Laterality: N/A;  Near syncope,SB,Sinus Pause, RBBB,LAFB, Dual PPM, BIO, MAC, DE, 3 Prep    ADENOIDECTOMY      CATARACT EXTRACTION      CYSTOSCOPY N/A 7/26/2023    Procedure: CYSTOSCOPY;  Surgeon: Jamal Zamora MD;  Location: [facility] OR [address] [place] [address];  Service: Urology;  Laterality: N/A;    CYSTOSCOPY WITH URODYNAMIC TESTING N/A 10/25/2021    Procedure: CYSTOSCOPY, WITH URODYNAMIC TESTING FLOUROSCOPIC;  Surgeon: Sancho Wesley MD;  Location: [facility] OR [address] [place] [address];  Service: Urology;  Laterality: N/A;  1hr    HERNIA REPAIR      INJECTION OF BOTULINUM TOXIN TYPE A N/A 7/26/2023    Procedure: INJECTION, BOTULINUM TOXIN, TYPE A;  Surgeon: Jamal Zamora MD;  Location: [facility] OR [address] [place] [address];  Service:  Urology;  Laterality: N/A;  100 units    TONSILLECTOMY      YAG LAser Capsulotomy Bilateral     2/18/2019 OS Dr. Cornelius       Review of patient's allergies indicates:   Allergen Reactions    Augmentin [amoxicillin-pot clavulanate] Rash     Rash, confusion, TIA like symptoms    Azithromycin Rash    Avodart [dutasteride]     House dust mite Other (See Comments)    Mold     Naproxen     Ragweed     Synthroid [levothyroxine] Rash       No current facility-administered medications on file prior to encounter.     Current Outpatient Medications on File Prior to Encounter   Medication Sig    albuterol (VENTOLIN HFA) 90 mcg/actuation inhaler Inhale 2 puffs into the lungs every 6 (six) hours as needed for Wheezing. Rescue    ALLERGY RELIEF, LORATADINE, 10 mg tablet Take 10 mg by mouth daily as needed.    azelastine (ASTELIN) 137 mcg (0.1 %) nasal spray 1 spray (137 mcg total) by Nasal route 2 (two) times daily.    BABY ASPIRIN ORAL Take 81 tablets by mouth every evening.     cholecalciferol, vitamin D3, 1,000 unit capsule Take 1,000 Units by mouth once daily.    coenzyme Q10 100 mg capsule Take by mouth once daily.    diclofenac sodium (VOLTAREN) 1 % Gel Apply 2 g topically 4 (four) times daily.    fluticasone propionate (FLONASE) 50 mcg/actuation nasal spray 1 spray by Each Nostril route once daily.    metoprolol succinate (TOPROL-XL) 50 MG 24 hr tablet TAKE 1 TABLET (50 MG TOTAL) BY MOUTH ONCE DAILY.    MULTIVITAMIN W-MINERALS/LUTEIN (CENTRUM SILVER ORAL) Take by mouth once daily.     omega-3 fatty acids 300 mg Cap Take 1 tablet by mouth once daily.     rosuvastatin (CRESTOR) 10 MG tablet TAKE 1 TABLET (10 MG TOTAL) BY MOUTH ONCE DAILY.    tamsulosin (FLOMAX) 0.4 mg Cap Take 1 capsule (0.4 mg total) by mouth once daily.    glucosamine-chondroitin 500-400 mg tablet Take 1 tablet by mouth 2 (two) times daily.     imipramine (TOFRANIL) 25 MG tablet Take 1 tablet (25 mg total) by mouth every evening.    imipramine  (TOFRANIL) 25 MG tablet Take 1 tablet (25 mg total) by mouth every evening.    nystatin-triamcinolone (MYCOLOG II) cream APPLY TOPICALLY 4 (FOUR) TIMES DAILY.    TURMERIC ORAL Take by mouth. Pt take 1 in the evening.    VITAMIN B COMPLEX (SUPER B COMPLEX-B-12 ORAL) Take by mouth.     Family History       Problem Relation (Age of Onset)    Aneurysm Father    Cataracts Paternal Uncle    Diabetes Mother    Leukemia Father    No Known Problems Daughter, Son          Tobacco Use    Smoking status: Former     Types: Cigarettes     Start date:      Quit date:      Years since quittin.0    Smokeless tobacco: Never   Substance and Sexual Activity    Alcohol use: Yes     Comment: wine, seldom    Drug use: No    Sexual activity: Yes     Partners: Female     Review of Systems   Constitutional:  Positive for fatigue. Negative for activity change and appetite change.   HENT:  Negative for ear discharge and ear pain.    Eyes:  Negative for pain and redness.   Respiratory:  Negative for chest tightness.    Cardiovascular:  Negative for chest pain.   Gastrointestinal:  Negative for anal bleeding and blood in stool.   Endocrine: Negative for polydipsia and polyphagia.   Genitourinary:  Positive for dysuria. Negative for frequency and genital sores.        Reports difficulty controlling nocturnal urine emission   Musculoskeletal:  Negative for gait problem and joint swelling.   Skin:  Negative for pallor and rash.   Allergic/Immunologic: Negative for environmental allergies and food allergies.   Neurological:  Positive for weakness. Negative for seizures and numbness.   Hematological:  Negative for adenopathy. Does not bruise/bleed easily.   Psychiatric/Behavioral:  Negative for confusion and decreased concentration.      Objective:     Vital Signs (Most Recent):  Temp: 100 °F (37.8 °C) (23)  Pulse: 95 (23)  Resp: 20 (23)  BP: 134/65 (23)  SpO2: (!) 92 % (23) Vital  Signs (24h Range):  Temp:  [99.1 °F (37.3 °C)-100 °F (37.8 °C)] 100 °F (37.8 °C)  Pulse:  [] 95  Resp:  [18-23] 20  SpO2:  [91 %-93 %] 92 %  BP: (129-142)/(62-80) 134/65     Weight: 132 kg (291 lb)  Body mass index is 38.39 kg/m².     Physical Exam  Constitutional:       General: He is not in acute distress.     Appearance: Normal appearance. He is not ill-appearing.   HENT:      Head: Normocephalic and atraumatic.      Right Ear: There is no impacted cerumen.      Left Ear: Tympanic membrane normal. There is no impacted cerumen.      Nose: No congestion or rhinorrhea.      Mouth/Throat:      Pharynx: No oropharyngeal exudate or posterior oropharyngeal erythema.   Eyes:      General:         Right eye: No discharge.         Left eye: No discharge.   Cardiovascular:      Heart sounds: No murmur heard.     No gallop.   Pulmonary:      Breath sounds: No wheezing.      Comments: On 3L of oxygen  Abdominal:      Tenderness: There is no abdominal tenderness. There is no guarding or rebound.      Hernia: No hernia is present.   Musculoskeletal:         General: No deformity.      Cervical back: No rigidity.      Right lower leg: No edema.   Lymphadenopathy:      Cervical: No cervical adenopathy.   Skin:     Findings: No bruising or lesion.   Neurological:      Mental Status: He is alert.      Motor: No weakness.      Gait: Gait normal.   Psychiatric:         Mood and Affect: Mood normal.         Behavior: Behavior normal.                Significant Labs: All pertinent labs within the past 24 hours have been reviewed.  BMP:   Recent Labs   Lab 12/18/23 2103   *      K 3.7      CO2 22*   BUN 12   CREATININE 0.8   CALCIUM 9.3   MG 1.9     CBC:   Recent Labs   Lab 12/18/23 2103   WBC 5.13   HGB 14.2   HCT 41.4        CMP:   Recent Labs   Lab 12/18/23 2103      K 3.7      CO2 22*   *   BUN 12   CREATININE 0.8   CALCIUM 9.3   PROT 7.8   ALBUMIN 3.7   BILITOT 0.5   ALKPHOS 87  "  AST 25   ALT 25   ANIONGAP 12     Coagulation: No results for input(s): "PT", "INR", "APTT" in the last 48 hours.  Lactic Acid: No results for input(s): "LACTATE" in the last 48 hours.  Lipase: No results for input(s): "LIPASE" in the last 48 hours.  Lipid Panel: No results for input(s): "CHOL", "HDL", "LDLCALC", "TRIG", "CHOLHDL" in the last 48 hours.  Troponin: No results for input(s): "TROPONINI", "TROPONINIHS" in the last 48 hours.  Urine Culture: No results for input(s): "LABURIN" in the last 48 hours.  Urine Studies:   Recent Labs   Lab 12/18/23  2223   COLORU Yellow   APPEARANCEUA Clear   PHUR 7.0   SPECGRAV 1.020   PROTEINUA 1+*   GLUCUA Negative   KETONESU 1+*   BILIRUBINUA Negative   OCCULTUA 2+*   NITRITE Negative   UROBILINOGEN Negative   LEUKOCYTESUR Negative   RBCUA 60*   WBCUA 1   BACTERIA None   HYALINECASTS 1       Significant Imaging: I have reviewed all pertinent imaging results/findings within the past 24 hours.  I have reviewed and interpreted all pertinent imaging results/findings within the past 24 hours.  Assessment/Plan:     Hyperlipidemia  May continue home meds       Pneumonia due to COVID-19 virus  Patient is identified as Severe COVID-19 based on hypoxemia with O2 saturations <94% on room air or on ambulation   Initiate standard COVID protocols; COVID-19 testing ,Infection Control notification  and isolation- respiratory, contact and droplet per protocol    Diagnostics: CBC, CMP, and Portable CXR    Management: Inhaled bronchodilators as needed for shortness of breath., IV remdesivir, dexamethasone    Advance Care Planning Current advance care plan has not been discussed with patient/family/POA and patient currently wishes Full Code.     Allergies  May continue home meds      Benign prostatic hyperplasia with nocturia  May continue home meds      Nocturia  Significant issue for patient.  Currently seeing Urology outpatient but not particularly pleased  Recently underwent Botox with " minimal improvement    Plan:  Continue home meds  Consider Urology consultation        VTE Risk Mitigation (From admission, onward)           Ordered     heparin (porcine) injection 5,000 Units  Every 8 hours         12/18/23 2312     IP VTE HIGH RISK PATIENT  Once         12/18/23 2312     Place sequential compression device  Until discontinued         12/18/23 2312                       On 12/19/2023, patient should be placed in hospital observation services under my care.             Monique Vyas MD  Department of Hospital Medicine  [facility] - Telemetry

## 2023-12-19 NOTE — ED PROVIDER NOTES
Encounter Date: 12/18/2023       History     Chief Complaint   Patient presents with    Fatigue     Patient bib ems from home complaining of weakness that started today. Patient also reports episode of nausea earlier with no vomiting, recent UTI with unresolved urinary frequency.  Denies vomiting, pain, fever. IV initiated by ems enrt. Patient oriented x 4.      73-year-old male presenting from home with EMS for complaints of fatigue and bilateral leg weakness that started today.  Patient says he has had a cough and congestion and frontal headache for the past week.  Says he might have had fever earlier today.  No abdominal pain, vomiting or diarrhea.  No shortness of breath or chest pain.      Review of patient's allergies indicates:   Allergen Reactions    Augmentin [amoxicillin-pot clavulanate] Rash     Rash, confusion, TIA like symptoms    Azithromycin Rash    Avodart [dutasteride]     House dust mite Other (See Comments)    Mold     Naproxen     Ragweed     Synthroid [levothyroxine] Rash     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, didn't use ear protection    Dermatitis 12/9/2020    nystat groin    DJD (degenerative joint disease) of hip 1/29/2015    Enlarged liver 4/8/2021    US 4/21     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    MCA , see MRI    Hiatal hernia 4/1/2021    CT chest tiny 2018    HTN (hypertension), benign  8/20/2020    Morbid obesity with BMI of 40.0-44.9, adult 1/29/2015    OAB (overactive bladder) 4/1/2021    Rash with pads    Right-sided low back pain with right-sided sciatica 3/6/2019    Seasonal allergic rhinitis due to pollen 4/13/2016    Thoracic aortic aneurysm without rupture 05/27/2018    tx with Su, Cardiologist Dr Stone, 5.1 CTS Dr Duenas, follows yearly    Transient cerebral ischemia 6/6/2018     Past Surgical History:   Procedure Laterality Date    A-V CARDIAC PACEMAKER INSERTION N/A 08/05/2021    Procedure: INSERTION, CARDIAC PACEMAKER, DUAL CHAMBER;  Surgeon: Sheldon Miranda MD;  Location: [facility] EP LAB;  Service: Cardiology;  Laterality: N/A;  Near syncope,SB,Sinus Pause, RBBB,LAFB, Dual PPM, BIO, MAC, GA, 3 Prep    ADENOIDECTOMY      CATARACT EXTRACTION      CYSTOSCOPY N/A 7/26/2023    Procedure: CYSTOSCOPY;  Surgeon: Jamal Zamora MD;  Location: [facility] OR [address] [place] [address];  Service: Urology;  Laterality: N/A;    CYSTOSCOPY WITH URODYNAMIC TESTING N/A 10/25/2021    Procedure: CYSTOSCOPY, WITH URODYNAMIC TESTING FLOUROSCOPIC;  Surgeon: Sancho Wesley MD;  Location: [facility] OR [address] [place] [address];  Service: Urology;  Laterality: N/A;  1hr    HERNIA REPAIR      INJECTION OF BOTULINUM TOXIN TYPE A N/A 7/26/2023    Procedure: INJECTION, BOTULINUM TOXIN, TYPE A;  Surgeon: Jamal aZmora MD;  Location: [facility] OR [address] [place] [address];  Service: Urology;  Laterality: N/A;  100 units    TONSILLECTOMY      YAG LAser Capsulotomy Bilateral     2/18/2019 OS Dr. Cornelius     Family History   Problem Relation Age of Onset    Diabetes Mother     Leukemia Father     Aneurysm Father     No Known Problems Daughter     No Known Problems Son     Cataracts Paternal Uncle     Amblyopia Neg Hx     Blindness Neg Hx     Glaucoma Neg Hx     Macular degeneration Neg Hx     Retinal detachment Neg Hx     Strabismus Neg Hx      Social History     Tobacco Use    Smoking status: Former     Types: Cigarettes     Start date: 1975     Quit date: 1967     Years  since quittin.0    Smokeless tobacco: Never   Substance Use Topics    Alcohol use: Yes     Comment: wine, seldom    Drug use: No     Review of Systems   Constitutional:  Positive for fatigue. Negative for appetite change.   HENT:  Positive for congestion and sore throat.    Eyes:  Negative for pain.   Respiratory:  Positive for cough. Negative for shortness of breath.    Cardiovascular:  Negative for chest pain.   Gastrointestinal:  Negative for abdominal pain, nausea and vomiting.   Genitourinary:  Negative for frequency.   Musculoskeletal:  Negative for arthralgias and neck pain.   Neurological:  Negative for headaches.   Psychiatric/Behavioral:  Negative for confusion.        Physical Exam     Initial Vitals [23]   BP Pulse Resp Temp SpO2   129/80 104 18 99.1 °F (37.3 °C) (!) 93 %      MAP       --         Physical Exam    Nursing note and vitals reviewed.  HENT:   Head: Atraumatic.   Eyes: Conjunctivae and EOM are normal.   Neck:   Normal range of motion.  Cardiovascular:      Exam reveals no gallop and no friction rub.       No murmur heard.  Mild tachycardia   Pulmonary/Chest: Breath sounds normal. No respiratory distress. He has no wheezes. He has no rales.   Abdominal: Abdomen is soft. Bowel sounds are normal. He exhibits no distension. There is no abdominal tenderness.   Musculoskeletal:         General: No edema. Normal range of motion.      Cervical back: Normal range of motion.     Neurological: He is alert and oriented to person, place, and time.   Skin: Skin is warm and dry.   Psychiatric: He has a normal mood and affect.         ED Course   Procedures  Labs Reviewed   COMPREHENSIVE METABOLIC PANEL - Abnormal; Notable for the following components:       Result Value    CO2 22 (*)     Glucose 129 (*)     All other components within normal limits   CBC W/ AUTO DIFFERENTIAL - Abnormal; Notable for the following components:    RBC 4.41 (*)     MCH 32.2 (*)     Lymph # 0.3 (*)     Lymph %  6.6 (*)     Mono % 18.3 (*)     All other components within normal limits   URINALYSIS, REFLEX TO URINE CULTURE - Abnormal; Notable for the following components:    Protein, UA 1+ (*)     Ketones, UA 1+ (*)     Occult Blood UA 2+ (*)     All other components within normal limits    Narrative:     Specimen Source->Urine   URINALYSIS MICROSCOPIC - Abnormal; Notable for the following components:    RBC, UA 60 (*)     Yeast, UA Rare (*)     All other components within normal limits    Narrative:     Specimen Source->Urine   SARS-COV-2 RDRP GENE - Abnormal; Notable for the following components:    POC Rapid COVID Positive (*)     All other components within normal limits   INFLUENZA A & B BY MOLECULAR   MAGNESIUM     EKG Readings: (Independently Interpreted)   Initial Reading: No STEMI. Rhythm: Sinus Tachycardia. Heart Rate: 106. Ectopy: No Ectopy. Conduction: Normal.       Imaging Results              X-Ray Chest AP Portable (Final result)  Result time 12/18/23 21:50:19      Final result by Gilles Cage DO (12/18/23 21:50:19)                   Impression:      No acute abnormality.      Electronically signed by: Gilles Cage  Date:    12/18/2023  Time:    21:50               Narrative:    EXAMINATION:  XR CHEST AP PORTABLE    CLINICAL HISTORY:  sob;    TECHNIQUE:  Single frontal view of the chest was performed.    COMPARISON:  08/05/2021.    FINDINGS:  There is a cardiac pacer, unchanged in position.  The lungs are well expanded clear.  No focal opacities are seen.  The pleural spaces are clear.  The cardiac silhouette is enlarged, unchanged.  Osseous structures are intact.  There are calcifications of the aortic arch.                                       Medications   acetaminophen tablet 1,000 mg (has no administration in time range)   sodium chloride 0.9% flush 10 mL (has no administration in time range)   naloxone 0.4 mg/mL injection 0.02 mg (has no administration in time range)   glucose chewable tablet 16  g (has no administration in time range)   glucose chewable tablet 24 g (has no administration in time range)   glucagon (human recombinant) injection 1 mg (has no administration in time range)   heparin (porcine) injection 5,000 Units (has no administration in time range)   acetaminophen tablet 650 mg (has no administration in time range)   polyethylene glycol packet 17 g (has no administration in time range)   ondansetron injection 4 mg (has no administration in time range)   melatonin tablet 6 mg (has no administration in time range)   acetaminophen tablet 650 mg (has no administration in time range)   simethicone chewable tablet 80 mg (has no administration in time range)   dextrose 10% bolus 125 mL 125 mL (has no administration in time range)   dextrose 10% bolus 250 mL 250 mL (has no administration in time range)   cetirizine tablet 10 mg (has no administration in time range)   tamsulosin 24 hr capsule 0.4 mg (has no administration in time range)   atorvastatin tablet 10 mg (has no administration in time range)   metoprolol succinate (TOPROL-XL) 24 hr tablet 50 mg (has no administration in time range)   fluticasone propionate 50 mcg/actuation nasal spray 50 mcg (has no administration in time range)   azelastine 137 mcg (0.1 %) nasal spray 137 mcg (has no administration in time range)   aspirin chewable tablet 81 mg (has no administration in time range)   albuterol-ipratropium 2.5 mg-0.5 mg/3 mL nebulizer solution 3 mL (has no administration in time range)   0.9%  NaCl infusion (0 mLs Intravenous Stopped 12/18/23 2233)     Medical Decision Making  73-year-old male presenting with weakness today with fatigue.  Had URI symptoms earlier this week.  Vital signs notable for mild tachycardia.  O2 sats on room air 90%.    Amount and/or Complexity of Data Reviewed  Labs: ordered. Decision-making details documented in ED Course.  Radiology: ordered.    Risk  OTC drugs.  Prescription drug management.               ED  Course as of 12/18/23 2317   Mon Dec 18, 2023   2127 CBC Auto Differential(!) [SN]   2136 Comprehensive Metabolic Panel(!) [SN]   2136 Magnesium : 1.9 [SN]   2205 Influenza A & B by Molecular [SN]   2234 SARS-CoV-2 RNA, Amplification, Qual(!): Positive [SN]   2316 Patient's O2 sats 92% on room air.  Discussed case with hospital medicine for admission due to patient's low O2 sats as well as generalized weakness.  Patient would benefit from admission for respiratory treatment as well as PT [SN]      ED Course User Index  [SN] Viral Roque MD                           Clinical Impression:  Final diagnoses:  [R53.83] Fatigue  [U07.1] COVID-19 (Primary)          ED Disposition Condition    Observation                 Viral Roque MD  12/18/23 2317

## 2023-12-19 NOTE — PLAN OF CARE
VIRTUAL NURSE:  Cued into patient's room.  Permission received per patient to turn camera to view patient.  Introduced as VN that will be working with floor nurse and nursing assistant.  Educated patient on VN's role in patient care and  VIP model.  Plan of care reviewed with patient.  Education per flowsheet.   Informed patient that staff will round on them every 2 hours but to use call light for any other needs they may have; informed of fall risk and fall precautions.  Patient verbalized understanding.  Call light within reach; bed siderails up x3.  Opportunity given for questions and questions answered.  Admission assessment questions answered.  Patient denies complaints or any needs at this time. Instructed to call for assistance.  Will cont to monitor and intervene as needed.    Labs, notes, orders, and careplan reviewed.       12/19/23 0311   Admission   Initial VN Admission Questions Complete   Shift   Virtual Nurse - Rounding Complete   Virtual Nurse - Patient Verbalized Approval Of Camera Use;VN Rounding   Type of Frequent Check   Type Patient Rounds   Safety/Activity   Patient Rounds bed in low position;bed wheels locked;call light in patient/parent reach;clutter free environment maintained;ID band on;visualized patient;toileting offered;placement of personal items at bedside   Safety Promotion/Fall Prevention assistive device/personal item within reach   Safety Precautions emergency equipment at bedside   Activity Assistance Provided assistance, 1 person   Positioning   Body Position position changed independently

## 2023-12-19 NOTE — PLAN OF CARE
12/19/23 1448   Post-Acute Status   Post-Acute Authorization Home Health   Home Health Status Pending Clinical Review  (HH referral sent via [facility]. Assigned CM alerted.)

## 2023-12-19 NOTE — ASSESSMENT & PLAN NOTE
Significant issue for patient.  Currently seeing Urology outpatient but not particularly pleased  Recently underwent Botox with minimal improvement    Plan:  Continue home meds  Consider Urology consultation

## 2023-12-19 NOTE — PT/OT/SLP EVAL
Occupational Therapy   Evaluation and Treatment    Name: Adan Webb  MRN: [id number]  Admitting Diagnosis: Acute hypoxic respiratory failure  Recent Surgery: * No surgery found *      Recommendations:     Discharge Recommendations: Low Intensity Therapy (home health OT PT)  Discharge Equipment Recommendations:  none  Barriers to discharge:  Other (Comment) (decreased activity tolerance)    Assessment:     Adan Webb is a 73 y.o. male with a medical diagnosis of Acute hypoxic respiratory failure.  He presents with ..The primary encounter diagnosis was COVID-19. Diagnoses of Fatigue and Chest pain were also pertinent to this visit.    . Performance deficits affecting function: weakness, impaired endurance, impaired self care skills, impaired functional mobility, gait instability, impaired balance, pain, impaired cardiopulmonary response to activity, decreased safety awareness.      OT  / PT coeval completed this date. Patient with prehospitalization baseline function of modified independence / independence in ADLs, functional mobility primarily without an assistive device, shares IADL home management with spouse, and performs daytime driving (hx of left eye decreased vision - does not drive at night). Does not wear O2 at home. Endorses intermittent decreased continence / increased frequency of urination.     On evaluation this date patient on 2L of O2, SpO2 97-99%, heartrate elevated at 90s - 110s bpm, throughout with reports of very slight breathlessness with prolonged activity. Patient with increased steadiness / decreased exertion with use of rolling walker, but requiring moderate verbal cues for proximity and minimal assist for in room mobility. LB ADLs requiring increased assist but patient able to stand x2 minutes during grooming at sink this date with CGA - SBA for balance. Skilled acute OT to follow while hospitalized. Recommend low intensity therapy, home health OT / PT. No dme needs.    Rehab  Prognosis: Good; patient would benefit from acute skilled OT services to address these deficits and reach maximum level of function.       Plan:     Patient to be seen 3 x/week to address the above listed problems via self-care/home management, therapeutic activities, therapeutic exercises  Plan of Care Expires: 01/16/24  Plan of Care Reviewed with: patient    Subjective     Chief Complaint: indicates  mild fatigue at end of session  Patient/Family Comments/goals: indicates fall that occurred was due to not paying attention, and due to being distracted by their dog    Occupational Profile:  Living Environment: Patient resides in a single story home, 4 steps to enter, bilateral hand rails, walk in seat with built in seat  Previous level of function: Patient with prehospitalization baseline function of modified independence / independence in ADLs, functional mobility primarily without an assistive device, shares IADL home management with spouse, and performs daytime driving (hx of left eye decreased vision - does not drive at night). Decreased hearing. Does not wear O2 at home. Endorses intermittent decreased continence / increased frequency of urination. Spouse performs meal prep and is the primary .   Roles and Routines: retired   Equipment Used at Home: walker, rolling, other (see comments), grab bar (built in shower seat)  Assistance upon Discharge: spouse    Pain/Comfort:  Pain Rating 1: 0/10  Pain Addressed 1: Reposition  Pain Rating Post-Intervention 1: 0/10      Objective:     Communicated with: nursing prior to session.  Patient found HOB elevated with bed alarm, telemetry, peripheral IV, Condom Catheter, oxygen upon OT entry to room.    General Precautions: Standard, fall, airborne, contact, droplet, vision impaired  Orthopedic Precautions: N/A  Braces: N/A  Respiratory Status: Nasal cannula, flow 2 L/min  - SpO2 97-99%, heartrate elevated at 90s - 110s bpm, throughout with reports of  very slight breathlessness with prolonged activity.     Occupational Performance:    Bed Mobility:    Patient completed Supine to Sit with stand by assistance  Patient completed Sit to Supine with contact guard assistance    Functional Mobility/Transfers:  Patient completed Sit <> Stand Transfer with contact guard assistance  with  no DME and with rolling walker   Functional Mobility: 1) no device - Brief functional mobility with handheld assist in room, short steps, mild unsteadiness, flexed posture. 2) with rolling walker - in room mobility to door and to sink area with CGA/ standing at sink x2 minutes with SBA. 3) rolling walker - in room mobility around bed, forward, side steps, backward with CGA  Moderate verbal cueing throughout for proximity to walker    Activities of Daily Living:  Feeding:  independence    Upper Body Dressing: set up  Lower Body Dressing: minimum assistance and effortful/ self brings LE onto bed to adjust on; adaptive instruction  Toileting: dependence; condom cath ; therapist commnicated with RN / MD regarding recommendation for considering removing condom cath to support increased mobility out of bed / decreased reliance on external methods to support improved independence    Cognitive/Visual Perceptual:  Cognitive/Psychosocial Skills:     -       Oriented to: Person, Place, Time, and Situation   -       Follows Commands/attention:Follows multistep  commands  -       Memory: functional cognition intact  -       Safety awareness/insight to disability: intact   -       Mood/Affect/Coping skills/emotional control: Cooperative  Visual/Perceptual:      -decreased left eye visual acuity      Physical Exam:  Balance:    -       [facility] Standing Balance Scale: 3+  Dominant hand:    -       right  Upper Extremity Range of Motion:     -       Right Upper Extremity: WFL  -       Left Upper Extremity: WFL  Upper Extremity Strength:    -       Right Upper Extremity: WFL  -       Left Upper  Extremity: WFL    [facility] 6 Click ADL:  AMPA Total Score: 20    Treatment & Education:  Patient educated on role of OT/ POC development.   Occupational profile developed via interview.   Patient instructed on pursed lip breathing, self awareness of exertion, and adaptive techniques. Patient guided to transition eob for assessment.   Initiated ADL / functional mobility training as above with instruction on walker management/ proximity to walker.   Encouraged out of bed to chair; however, patient requesting instead for bed to be placed in chair position at end of session.  Educated patient on importance of out of bed mobility, movement/ repositioning throughout the day, and self-initiated ADLs.   Answered questions within scope.      Patient left with bed in chair position with all lines intact, call button in reach, bed alarm on, and nursing notified    GOALS:   Multidisciplinary Problems       Occupational Therapy Goals          Problem: Occupational Therapy    Goal Priority Disciplines Outcome Interventions   Occupational Therapy Goal     OT, PT/OT Ongoing, Progressing    Description: Goals to be met by: 1/16/2024     Patient will increase functional independence with ADLs by performing:    Dressing regimen inclusive of retrieving / transporting clothing and putting on clothing with Modified [place], use of least restrictive device.  Grooming while standing at sink with [place].  Toileting from toilet with Modified [place] for hygiene and clothing management inclusive of self managing decreased continence episodes.   Verbalized teachback for x2 hour toileting schedule (ie. Attempt urinating every 2 hours).  Toilet transfer to toilet with Modified [place] with use of least restrictive device.  Functional mobility x 5 minutes with supervision with use of least restrictive device while maintaining vitals WFL  100% reciprocation of energy conservation and fall risk reduction strategies to support d/c  to least restrictive environment.                         History:     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, didn't use ear protection    Dermatitis 12/9/2020    nystat groin    DJD (degenerative joint disease) of hip 1/29/2015    Enlarged liver 4/8/2021    US 4/21     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    MCA , see MRI    Hiatal hernia 4/1/2021    CT chest tiny 2018    HTN (hypertension), benign 8/20/2020    Morbid obesity with BMI of 40.0-44.9, adult 1/29/2015    OAB (overactive bladder) 4/1/2021    Rash with pads    Right-sided low back pain with right-sided sciatica 3/6/2019    Seasonal allergic rhinitis due to pollen 4/13/2016    Thoracic aortic aneurysm without rupture 05/27/2018    tx with Su, Cardiologist Dr Stone, 5.1 CTS Dr Duenas, follows yearly    Transient cerebral ischemia 6/6/2018         Past Surgical History:   Procedure Laterality Date    A-V CARDIAC PACEMAKER INSERTION N/A 08/05/2021    Procedure: INSERTION, CARDIAC PACEMAKER, DUAL CHAMBER;  Surgeon: Sheldon Miranda MD;  Location: [facility] EP LAB;  Service: Cardiology;  Laterality: N/A;  Near syncope,SB,Sinus Pause, RBBB,LAFB, Dual PPM, BIO, MAC, LA, 3 Prep    ADENOIDECTOMY      CATARACT EXTRACTION      CYSTOSCOPY N/A 7/26/2023    Procedure: CYSTOSCOPY;  Surgeon: Jamal Zamora MD;  Location: [facility] OR [address] [place] [address];  Service: Urology;  Laterality: N/A;     CYSTOSCOPY WITH URODYNAMIC TESTING N/A 10/25/2021    Procedure: CYSTOSCOPY, WITH URODYNAMIC TESTING FLOUROSCOPIC;  Surgeon: Sancho Wesley MD;  Location: [address];  Service: Urology;  Laterality: N/A;  1hr    HERNIA REPAIR      INJECTION OF BOTULINUM TOXIN TYPE A N/A 7/26/2023    Procedure: INJECTION, BOTULINUM TOXIN, TYPE A;  Surgeon: Jamal Zamora MD;  Location: [address];  Service: Urology;  Laterality: N/A;  100 units    TONSILLECTOMY      YAG LAser Capsulotomy Bilateral     2/18/2019 OS Dr. Cornelius       Time Tracking:     OT Date of Treatment: 12/19/23  OT Start Time: 0913  OT Stop Time: 0952  OT Total Time (min): 39 min total time; overlap PT  Overlap with PT for portions of session due to complex nature of patient and for safety with mobility to decrease fall risk for patient and caregiver injury requiring two skilled therapists to provide different interventions.      Billable Minutes:Evaluation 10 min  Self Care/Home Management 15 min  Therapeutic Activity 10 min    12/19/2023

## 2023-12-19 NOTE — TELEPHONE ENCOUNTER
Pt will be able to schedule with NP upon discharge.  Unable to schedule because discharge info is needed.

## 2023-12-19 NOTE — ASSESSMENT & PLAN NOTE
Patient with Hypoxic Respiratory failure which is Acute.  he is not on home oxygen. Supplemental oxygen was provided and noted-      .   Signs/symptoms of respiratory failure include- tachypnea and increased work of breathing. Contributing diagnoses includes - Pneumonia Labs and images were reviewed. Patient Has not had a recent ABG. Will treat underlying causes and adjust management of respiratory failure as follows- steroids, breathing treatments as needed

## 2023-12-19 NOTE — PT/OT/SLP EVAL
Physical Therapy Evaluation    Patient Name:  Adan Webb   MRN:  [id number]    Recommendations:     Discharge Recommendations: Low Intensity Therapy (home health PT/OT)   Discharge Equipment Recommendations: none   Barriers to discharge:  decreased activity tolerance    Assessment:     Adan Webb is a 73 y.o. male admitted with a medical diagnosis of Acute hypoxic respiratory failure.  He presents with the following impairments/functional limitations: weakness, impaired endurance, impaired self care skills, impaired functional mobility, gait instability, impaired balance, pain, impaired cardiopulmonary response to activity, decreased safety awareness Currently, pt is on 2L of O2, SpO2 97-99% with elevated HR 90s-110s throughout session while reporting very slight SOB with extended activity/amb without AD and HHA/[place]; pt exhibits improved balance and decreased exertion with use of RW though requires  CGA for amb and mod VCs for keeping RW within good proximity; will cont with POC.. .    Rehab Prognosis: Good; patient would benefit from acute skilled PT services to address these deficits and reach maximum level of function.    Recent Surgery: * No surgery found *      Plan:     During this hospitalization, patient to be seen 4 x/week to address the identified rehab impairments via gait training, therapeutic activities, therapeutic exercises, neuromuscular re-education and progress toward the following goals:    Plan of Care Expires:  01/19/24    Subjective     Chief Complaint: minimal fatigue at end of session  Patient/Family Comments/goals: pt reports falling 2/2 him being distracted by his dog  Pain/Comfort:  Pain Rating 1: 0/10  Pain Rating Post-Intervention 1: 0/10    Patients cultural, spiritual, Orthodox conflicts given the current situation: no    Living Environment:  Patient resides in a single story home, 4 steps to enter, bilateral hand rails, walk in seat with built in seat  Previous level of  function: Patient with prehospitalization baseline function of modified independence / independence in ADLs, functional mobility primarily without an assistive device, shares IADL home management with spouse, and performs daytime driving (hx of left eye decreased vision - does not drive at night). Decreased hearing. Does not wear O2 at home. Endorses intermittent decreased continence / increased frequency of urination. Spouse performs meal prep and is the primary .   Roles and Routines: retired   Equipment Used at Home: walker, rolling, other (see comments), grab bar (built in shower seat)  Assistance upon Discharge: spouse    Objective:     Communicated with nurse prior to session.  Patient found HOB elevated with bed alarm, telemetry, peripheral IV, Condom Catheter, oxygen  upon PT entry to room.    General Precautions: Standard, fall, contact, droplet, airborne, hearing impaired, vision impaired  Orthopedic Precautions:N/A   Braces: N/A  Respiratory Status: Nasal cannula, flow 2 L/min    Exams:  Cognitive Exam:  Patient is oriented to Person, Place, Time, Situation, and follows multistep commands  Gross Motor Coordination:  WFL  Postural Exam:  Patient presented with the following abnormalities:    -       Rounded shoulders  Sensation:    -       Intact  RLE ROM: WFL  RLE Strength: WFL  LLE ROM: WFL  LLE Strength: WFL    Functional Mobility:  Bed Mobility:     Supine to Sit: stand by assistance  Sit to Supine: contact guard assistance  Transfers:     Sit to Stand:  contact guard assistance with rolling walker and no DME  Gait: Patient amb ~10ft without AD using HHA in room with short step length, increased lateral sway, min to mod increased forward trunk flexion, decreased floor clearance; pt amb 18ft using RW to sink then around to other side of bed including forward, side steps, back steps with CGA  Balance: sit~good, stand~fair, amb~fair with RW      AM-PAC 6 CLICK MOBILITY  Total  Score:17       Treatment & Education:  Patient educated on role of PT/POC; initiated interview; pt instructed on pursed lip breathing for increased SOB; educated on self assessment of exertional level; pt transitioned to EOB; pt instructed in use of RW for transfers and amb in keeping RW close to give the needed support and safety for balance; pt requested bed be placed in chair position; educated pt on importance of OOB activity as sitting in bedside chair; educated in BLE/BUE AROM ex to be done intermittently throughout the day along with repositioning and ADLs; refer to assessment for SpO2 during session.    Patient left with bed in chair position with all lines intact, call button in reach, bed alarm on, and nurse notified.    GOALS:   Multidisciplinary Problems       Physical Therapy Goals          Problem: Physical Therapy    Goal Priority Disciplines Outcome Goal Variances Interventions   Physical Therapy Goal     PT, PT/OT Ongoing, Progressing     Description: Goals to be met by: 2024     Patient will increase functional independence with mobility by performin. Supine to sit with Modified [place]  2. Sit to supine with Modified [place]  3. Rolling with Modified [place].  4. Sit to stand transfer with Modified [place] with or without AD  5. Bed to chair transfer with Modified [place] with or without AD  6. Gait  x 150 feet with Modified [place] with or without AD  7. Ascend/descend 4 stair with bilateral Handrails SBA  8. Lower extremity exercise program x10 reps with independence                         History:     Past Medical History:   Diagnosis Date    Abnormal TSH 2020    Allergic rhinitis 2016    Belching 2019    Benign prostatic hyperplasia with nocturia 2014    BPH (benign prostatic hyperplasia)     Urology Dr Zamora, OTC prostate revive helps, avodart caused chest pains    Calculus of gallbladder     US 2016    Calculus of gallbladder  without cholecystitis without obstruction 4/13/2016    CT chest 2018    Cataract     Chronic pain of both ankles 12/9/2020    Podiatrist Dr Sesay    Conductive hearing loss of right ear with restricted hearing of left ear 1/22/2019    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, didn't use ear protection    Dermatitis 12/9/2020    nystat groin    DJD (degenerative joint disease) of hip 1/29/2015    Enlarged liver 4/8/2021    US 4/21     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    MCA , see MRI    Hiatal hernia 4/1/2021    CT chest tiny 2018    HTN (hypertension), benign 8/20/2020    Morbid obesity with BMI of 40.0-44.9, adult 1/29/2015    OAB (overactive bladder) 4/1/2021    Rash with pads    Right-sided low back pain with right-sided sciatica 3/6/2019    Seasonal allergic rhinitis due to pollen 4/13/2016    Thoracic aortic aneurysm without rupture 05/27/2018    tx with Su, Cardiologist Dr Stone, 5.1 CTS Dr Duenas, follows yearly    Transient cerebral ischemia 6/6/2018       Past Surgical History:   Procedure Laterality Date    A-V CARDIAC PACEMAKER INSERTION N/A 08/05/2021    Procedure: INSERTION, CARDIAC PACEMAKER, DUAL CHAMBER;  Surgeon: Sheldon Miranda MD;  Location: [facility] EP LAB;  Service: Cardiology;  Laterality: N/A;  Near syncope,SB,Sinus Pause, RBBB,LAFB, Dual PPM, BIO, MAC, [place], 3 Prep    ADENOIDECTOMY      CATARACT EXTRACTION      CYSTOSCOPY N/A 7/26/2023    Procedure: CYSTOSCOPY;  Surgeon: Jamal Zamora MD;  Location: [facility] OR [address] [place] [address];  Service: Urology;  Laterality: N/A;    CYSTOSCOPY WITH URODYNAMIC TESTING N/A 10/25/2021    Procedure: CYSTOSCOPY, WITH URODYNAMIC TESTING FLOUROSCOPIC;  Surgeon: Sancho Wesley MD;  Location: [facility] OR [address] [place] [address];  Service: Urology;  Laterality: N/A;  1hr    HERNIA REPAIR      INJECTION OF BOTULINUM TOXIN TYPE A N/A 7/26/2023    Procedure: INJECTION, BOTULINUM TOXIN, TYPE A;  Surgeon: Jamal Zamora MD;  Location: [facility] OR [address] [place] [address];  Service: Urology;  Laterality:  N/A;  100 units    TONSILLECTOMY      YAG LAser Capsulotomy Bilateral     2/18/2019 OS Dr. Cornelius       Time Tracking:     PT Received On: 12/19/23  PT Start Time: 0913     PT Stop Time: 0952  PT Total Time (min): 39 min Cotx with OT for session due to complex nature of patient and for safety with mobility to decrease fall risk for patient and caregiver injury requiring two skilled therapists to provide different interventions.      Billable Minutes: Evaluation 10 and Therapeutic Activity 15  Self Care 10      12/19/2023   The patient is a 69-year-old female, , retired (, non-caregiver domiciled at home with her  with a prior psychiatric diagnosis of ANSON and MDD, 2 previous inpatient psychiatric hospitalizations (most recently [facility] 2/2020), prior SA in 11/2019 by overdosing on pills, no history of NSSIB, no history of violence/aggression, no hx of legal issues, daily marijuana use, PMH of HTN, BIB self due to worsening depression and anxiety in the setting of death of long time therapist one month ago.    Continue escitalopram, sleeping better with  Mirtazapine to 15 mg qhs, follow GI symptoms,  Melatonin and titrate meds based on response. Ativan am dose to 6 am and continue 9 pm. Gabapentin 100mg at 2:00pm for anxiety.

## 2023-12-19 NOTE — SUBJECTIVE & OBJECTIVE
Diagnosis: Persistent Afib.  Goal is 2.0 - 3.0     Patient comes to clinic for follow up anticoagulation visit.   Last INR 8/9/19 was 2.1.  Dose maintained per protocol.   Today's INR is 2.6 and is within goal range.    Current warfarin dosing verified with patient. Patient was informed that their INR result is within therapeutic range and instructed to maintain current dose per protocol. Discussed dose and return date for next INR.  See Ambulatory Anticoagulation Flowsheet    AVS was declined by the patient.     Dr. Soliman is in the office today supervising the treatment. Note forwarded to physician for review.    Patient was instructed to contact the clinic with any unusual bleeding or bruising, any changes in medications, diet, health status, lifestyle, or any other changes, questions or concerns. Patient verbalized understanding of all discussed.      Interval History: No acute events overnight  Patient reports headache but has no other complaints  Denies dyspnea or chest pain  Ambulating well  Tolerating PO diet well    Review of Systems   Constitutional:  Negative for chills, fatigue and fever.   Respiratory:  Negative for cough and shortness of breath.    Cardiovascular:  Negative for chest pain and palpitations.   Gastrointestinal:  Negative for abdominal pain, constipation, diarrhea and nausea.     Objective:     Vital Signs (Most Recent):  Temp: 98.9 °F (37.2 °C) (12/19/23 1218)  Pulse: 82 (12/19/23 1218)  Resp: 18 (12/19/23 1218)  BP: (!) 143/69 (12/19/23 1218)  SpO2: 95 % (12/19/23 1218) Vital Signs (24h Range):  Temp:  [98 °F (36.7 °C)-100 °F (37.8 °C)] 98.9 °F (37.2 °C)  Pulse:  [] 82  Resp:  [18-23] 18  SpO2:  [89 %-99 %] 95 %  BP: (129-175)/(62-80) 143/69     Weight: 132 kg (291 lb)  Body mass index is 38.39 kg/m².    Intake/Output Summary (Last 24 hours) at 12/19/2023 1327  Last data filed at 12/18/2023 2233  Gross per 24 hour   Intake 500 ml   Output --   Net 500 ml         Physical Exam  Constitutional:       Appearance: Normal appearance.   HENT:      Mouth/Throat:      Mouth: Mucous membranes are moist.      Pharynx: Oropharynx is clear.   Eyes:      Extraocular Movements: Extraocular movements intact.      Pupils: Pupils are equal, round, and reactive to light.   Cardiovascular:      Rate and Rhythm: Normal rate and regular rhythm.   Pulmonary:      Effort: Pulmonary effort is normal.      Breath sounds: Normal breath sounds.   Abdominal:      General: Bowel sounds are normal.      Palpations: Abdomen is soft.   Musculoskeletal:         General: Normal range of motion.   Skin:     General: Skin is warm and dry.   Neurological:      General: No focal deficit present.      Mental Status: He is alert and oriented to person, place, and time.   Psychiatric:         Mood and Affect: Mood normal.         Behavior: Behavior normal.         Thought  Content: Thought content normal.             Significant Labs: All pertinent labs within the past 24 hours have been reviewed.    Significant Imaging: I have reviewed all pertinent imaging results/findings within the past 24 hours.

## 2023-12-19 NOTE — PLAN OF CARE
Problem: Adult Inpatient Plan of Care  Goal: Plan of Care Review  Outcome: Ongoing, Progressing  Goal: Readiness for Transition of Care  Outcome: Ongoing, Progressing     Problem: Skin Injury Risk Increased  Goal: Skin Health and Integrity  Outcome: Ongoing, Progressing

## 2023-12-19 NOTE — SUBJECTIVE & OBJECTIVE
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, didn't use ear protection    Dermatitis 12/9/2020    nystat groin    DJD (degenerative joint disease) of hip 1/29/2015    Enlarged liver 4/8/2021    US 4/21     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    MCA , see MRI    Hiatal hernia 4/1/2021    CT chest tiny 2018    HTN (hypertension), benign 8/20/2020    Morbid obesity with BMI of 40.0-44.9, adult 1/29/2015    OAB (overactive bladder) 4/1/2021    Rash with pads    Right-sided low back pain with right-sided sciatica 3/6/2019    Seasonal allergic rhinitis due to pollen 4/13/2016    Thoracic aortic aneurysm without rupture 05/27/2018    tx with Su, Cardiologist Dr Stone, 5.1 CTS Dr Duenas, follows yearly    Transient cerebral ischemia 6/6/2018       Past Surgical History:   Procedure Laterality Date    A-V CARDIAC PACEMAKER INSERTION N/A 08/05/2021    Procedure: INSERTION, CARDIAC PACEMAKER, DUAL CHAMBER;  Surgeon: Sheldon Miranda MD;  Location: [facility] EP LAB;  Service: Cardiology;  Laterality: N/A;  Near syncope,SB,Sinus Pause, RBBB,LAFB, Dual PPM, BIO, MAC, MD, 3 Prep    ADENOIDECTOMY      CATARACT EXTRACTION      CYSTOSCOPY N/A 7/26/2023    Procedure: CYSTOSCOPY;  Surgeon: Jamal Zamora MD;  Location: [facility] OR [address] [place] [address];  Service: Urology;  Laterality: N/A;    CYSTOSCOPY WITH URODYNAMIC TESTING N/A 10/25/2021    Procedure: CYSTOSCOPY,  WITH URODYNAMIC TESTING FLOUROSCOPIC;  Surgeon: Sancho Wesley MD;  Location: [facility] OR [address] [place] [address];  Service: Urology;  Laterality: N/A;  1hr    HERNIA REPAIR      INJECTION OF BOTULINUM TOXIN TYPE A N/A 7/26/2023    Procedure: INJECTION, BOTULINUM TOXIN, TYPE A;  Surgeon: Jamal Zamora MD;  Location: [facility] OR [address] [place] [address];  Service: Urology;  Laterality: N/A;  100 units    TONSILLECTOMY      YAG LAser Capsulotomy Bilateral     2/18/2019 OS Dr. Cornelius       Review of patient's allergies indicates:   Allergen Reactions    Augmentin [amoxicillin-pot clavulanate] Rash     Rash, confusion, TIA like symptoms    Azithromycin Rash    Avodart [dutasteride]     House dust mite Other (See Comments)    Mold     Naproxen     Ragweed     Synthroid [levothyroxine] Rash       No current facility-administered medications on file prior to encounter.     Current Outpatient Medications on File Prior to Encounter   Medication Sig    albuterol (VENTOLIN HFA) 90 mcg/actuation inhaler Inhale 2 puffs into the lungs every 6 (six) hours as needed for Wheezing. Rescue    ALLERGY RELIEF, LORATADINE, 10 mg tablet Take 10 mg by mouth daily as needed.    azelastine (ASTELIN) 137 mcg (0.1 %) nasal spray 1 spray (137 mcg total) by Nasal route 2 (two) times daily.    BABY ASPIRIN ORAL Take 81 tablets by mouth every evening.     cholecalciferol, vitamin D3, 1,000 unit capsule Take 1,000 Units by mouth once daily.    coenzyme Q10 100 mg capsule Take by mouth once daily.    diclofenac sodium (VOLTAREN) 1 % Gel Apply 2 g topically 4 (four) times daily.    fluticasone propionate (FLONASE) 50 mcg/actuation nasal spray 1 spray by Each Nostril route once daily.    metoprolol succinate (TOPROL-XL) 50 MG 24 hr tablet TAKE 1 TABLET (50 MG TOTAL) BY MOUTH ONCE DAILY.    MULTIVITAMIN W-MINERALS/LUTEIN (CENTRUM SILVER ORAL) Take by mouth once daily.     omega-3 fatty acids 300 mg Cap Take 1 tablet by mouth once daily.     rosuvastatin (CRESTOR) 10 MG tablet TAKE  1 TABLET (10 MG TOTAL) BY MOUTH ONCE DAILY.    tamsulosin (FLOMAX) 0.4 mg Cap Take 1 capsule (0.4 mg total) by mouth once daily.    glucosamine-chondroitin 500-400 mg tablet Take 1 tablet by mouth 2 (two) times daily.     imipramine (TOFRANIL) 25 MG tablet Take 1 tablet (25 mg total) by mouth every evening.    imipramine (TOFRANIL) 25 MG tablet Take 1 tablet (25 mg total) by mouth every evening.    nystatin-triamcinolone (MYCOLOG II) cream APPLY TOPICALLY 4 (FOUR) TIMES DAILY.    TURMERIC ORAL Take by mouth. Pt take 1 in the evening.    VITAMIN B COMPLEX (SUPER B COMPLEX-B-12 ORAL) Take by mouth.     Family History       Problem Relation (Age of Onset)    Aneurysm Father    Cataracts Paternal Uncle    Diabetes Mother    Leukemia Father    No Known Problems Daughter, Son          Tobacco Use    Smoking status: Former     Types: Cigarettes     Start date:      Quit date:      Years since quittin.0    Smokeless tobacco: Never   Substance and Sexual Activity    Alcohol use: Yes     Comment: wine, seldom    Drug use: No    Sexual activity: Yes     Partners: Female     Review of Systems   Constitutional:  Positive for fatigue. Negative for activity change and appetite change.   HENT:  Negative for ear discharge and ear pain.    Eyes:  Negative for pain and redness.   Respiratory:  Negative for chest tightness.    Cardiovascular:  Negative for chest pain.   Gastrointestinal:  Negative for anal bleeding and blood in stool.   Endocrine: Negative for polydipsia and polyphagia.   Genitourinary:  Positive for dysuria. Negative for frequency and genital sores.        Reports difficulty controlling nocturnal urine emission   Musculoskeletal:  Negative for gait problem and joint swelling.   Skin:  Negative for pallor and rash.   Allergic/Immunologic: Negative for environmental allergies and food allergies.   Neurological:  Positive for weakness. Negative for seizures and numbness.   Hematological:  Negative for  adenopathy. Does not bruise/bleed easily.   Psychiatric/Behavioral:  Negative for confusion and decreased concentration.      Objective:     Vital Signs (Most Recent):  Temp: 100 °F (37.8 °C) (12/19/23 0052)  Pulse: 95 (12/19/23 0052)  Resp: 20 (12/19/23 0212)  BP: 134/65 (12/19/23 0052)  SpO2: (!) 92 % (12/19/23 0052) Vital Signs (24h Range):  Temp:  [99.1 °F (37.3 °C)-100 °F (37.8 °C)] 100 °F (37.8 °C)  Pulse:  [] 95  Resp:  [18-23] 20  SpO2:  [91 %-93 %] 92 %  BP: (129-142)/(62-80) 134/65     Weight: 132 kg (291 lb)  Body mass index is 38.39 kg/m².     Physical Exam  Constitutional:       General: He is not in acute distress.     Appearance: Normal appearance. He is not ill-appearing.   HENT:      Head: Normocephalic and atraumatic.      Right Ear: There is no impacted cerumen.      Left Ear: Tympanic membrane normal. There is no impacted cerumen.      Nose: No congestion or rhinorrhea.      Mouth/Throat:      Pharynx: No oropharyngeal exudate or posterior oropharyngeal erythema.   Eyes:      General:         Right eye: No discharge.         Left eye: No discharge.   Cardiovascular:      Heart sounds: No murmur heard.     No gallop.   Pulmonary:      Breath sounds: No wheezing.      Comments: On 3L of oxygen  Abdominal:      Tenderness: There is no abdominal tenderness. There is no guarding or rebound.      Hernia: No hernia is present.   Musculoskeletal:         General: No deformity.      Cervical back: No rigidity.      Right lower leg: No edema.   Lymphadenopathy:      Cervical: No cervical adenopathy.   Skin:     Findings: No bruising or lesion.   Neurological:      Mental Status: He is alert.      Motor: No weakness.      Gait: Gait normal.   Psychiatric:         Mood and Affect: Mood normal.         Behavior: Behavior normal.                Significant Labs: All pertinent labs within the past 24 hours have been reviewed.  BMP:   Recent Labs   Lab 12/18/23  2103   *      K 3.7     "  CO2 22*   BUN 12   CREATININE 0.8   CALCIUM 9.3   MG 1.9     CBC:   Recent Labs   Lab 12/18/23 2103   WBC 5.13   HGB 14.2   HCT 41.4        CMP:   Recent Labs   Lab 12/18/23 2103      K 3.7      CO2 22*   *   BUN 12   CREATININE 0.8   CALCIUM 9.3   PROT 7.8   ALBUMIN 3.7   BILITOT 0.5   ALKPHOS 87   AST 25   ALT 25   ANIONGAP 12     Coagulation: No results for input(s): "PT", "INR", "APTT" in the last 48 hours.  Lactic Acid: No results for input(s): "LACTATE" in the last 48 hours.  Lipase: No results for input(s): "LIPASE" in the last 48 hours.  Lipid Panel: No results for input(s): "CHOL", "HDL", "LDLCALC", "TRIG", "CHOLHDL" in the last 48 hours.  Troponin: No results for input(s): "TROPONINI", "TROPONINIHS" in the last 48 hours.  Urine Culture: No results for input(s): "LABURIN" in the last 48 hours.  Urine Studies:   Recent Labs   Lab 12/18/23 2223   COLORU Yellow   APPEARANCEUA Clear   PHUR 7.0   SPECGRAV 1.020   PROTEINUA 1+*   GLUCUA Negative   KETONESU 1+*   BILIRUBINUA Negative   OCCULTUA 2+*   NITRITE Negative   UROBILINOGEN Negative   LEUKOCYTESUR Negative   RBCUA 60*   WBCUA 1   BACTERIA None   HYALINECASTS 1       Significant Imaging: I have reviewed all pertinent imaging results/findings within the past 24 hours.  I have reviewed and interpreted all pertinent imaging results/findings within the past 24 hours.  "

## 2023-12-20 ENCOUNTER — TELEPHONE (OUTPATIENT)
Dept: PRIMARY CARE CLINIC | Facility: CLINIC | Age: 73
End: 2023-12-20
Payer: MEDICARE

## 2023-12-20 VITALS
HEIGHT: 73 IN | DIASTOLIC BLOOD PRESSURE: 77 MMHG | RESPIRATION RATE: 19 BRPM | BODY MASS INDEX: 38.57 KG/M2 | HEART RATE: 69 BPM | TEMPERATURE: 98 F | OXYGEN SATURATION: 94 % | SYSTOLIC BLOOD PRESSURE: 159 MMHG | WEIGHT: 291 LBS

## 2023-12-20 DIAGNOSIS — U07.1 COVID-19 VIRUS DETECTED: ICD-10-CM

## 2023-12-20 LAB
ALBUMIN SERPL BCP-MCNC: 3.1 G/DL (ref 3.5–5.2)
ALP SERPL-CCNC: 73 U/L (ref 55–135)
ALT SERPL W/O P-5'-P-CCNC: 40 U/L (ref 10–44)
ANION GAP SERPL CALC-SCNC: 7 MMOL/L (ref 8–16)
AST SERPL-CCNC: 45 U/L (ref 10–40)
BASOPHILS # BLD AUTO: 0.01 K/UL (ref 0–0.2)
BASOPHILS NFR BLD: 0.3 % (ref 0–1.9)
BILIRUB SERPL-MCNC: 0.3 MG/DL (ref 0.1–1)
BUN SERPL-MCNC: 19 MG/DL (ref 8–23)
CALCIUM SERPL-MCNC: 8.6 MG/DL (ref 8.7–10.5)
CHLORIDE SERPL-SCNC: 106 MMOL/L (ref 95–110)
CO2 SERPL-SCNC: 24 MMOL/L (ref 23–29)
CREAT SERPL-MCNC: 0.8 MG/DL (ref 0.5–1.4)
DIFFERENTIAL METHOD: ABNORMAL
EOSINOPHIL # BLD AUTO: 0 K/UL (ref 0–0.5)
EOSINOPHIL NFR BLD: 0 % (ref 0–8)
ERYTHROCYTE [DISTWIDTH] IN BLOOD BY AUTOMATED COUNT: 13.3 % (ref 11.5–14.5)
EST. GFR  (NO RACE VARIABLE): >60 ML/MIN/1.73 M^2
GLUCOSE SERPL-MCNC: 118 MG/DL (ref 70–110)
HCT VFR BLD AUTO: 37.8 % (ref 40–54)
HGB BLD-MCNC: 12.9 G/DL (ref 14–18)
IMM GRANULOCYTES # BLD AUTO: 0.02 K/UL (ref 0–0.04)
IMM GRANULOCYTES NFR BLD AUTO: 0.6 % (ref 0–0.5)
LYMPHOCYTES # BLD AUTO: 0.8 K/UL (ref 1–4.8)
LYMPHOCYTES NFR BLD: 24.8 % (ref 18–48)
MCH RBC QN AUTO: 31.4 PG (ref 27–31)
MCHC RBC AUTO-ENTMCNC: 34.1 G/DL (ref 32–36)
MCV RBC AUTO: 92 FL (ref 82–98)
MONOCYTES # BLD AUTO: 0.8 K/UL (ref 0.3–1)
MONOCYTES NFR BLD: 22.9 % (ref 4–15)
NEUTROPHILS # BLD AUTO: 1.7 K/UL (ref 1.8–7.7)
NEUTROPHILS NFR BLD: 51.4 % (ref 38–73)
NRBC BLD-RTO: 0 /100 WBC
PLATELET # BLD AUTO: 179 K/UL (ref 150–450)
PMV BLD AUTO: 10.5 FL (ref 9.2–12.9)
POCT GLUCOSE: 117 MG/DL (ref 70–110)
POTASSIUM SERPL-SCNC: 4.1 MMOL/L (ref 3.5–5.1)
PROT SERPL-MCNC: 6.9 G/DL (ref 6–8.4)
RBC # BLD AUTO: 4.11 M/UL (ref 4.6–6.2)
SODIUM SERPL-SCNC: 137 MMOL/L (ref 136–145)
WBC # BLD AUTO: 3.27 K/UL (ref 3.9–12.7)

## 2023-12-20 PROCEDURE — 80053 COMPREHEN METABOLIC PANEL: CPT | Mod: HCNC | Performed by: STUDENT IN AN ORGANIZED HEALTH CARE EDUCATION/TRAINING PROGRAM

## 2023-12-20 PROCEDURE — 25000003 PHARM REV CODE 250: Mod: HCNC | Performed by: STUDENT IN AN ORGANIZED HEALTH CARE EDUCATION/TRAINING PROGRAM

## 2023-12-20 PROCEDURE — 27000221 HC OXYGEN, UP TO 24 HOURS: Mod: HCNC

## 2023-12-20 PROCEDURE — 97530 THERAPEUTIC ACTIVITIES: CPT | Mod: HCNC,CO

## 2023-12-20 PROCEDURE — 99900035 HC TECH TIME PER 15 MIN (STAT): Mod: HCNC

## 2023-12-20 PROCEDURE — 85025 COMPLETE CBC W/AUTO DIFF WBC: CPT | Mod: HCNC | Performed by: STUDENT IN AN ORGANIZED HEALTH CARE EDUCATION/TRAINING PROGRAM

## 2023-12-20 PROCEDURE — 36415 COLL VENOUS BLD VENIPUNCTURE: CPT | Mod: HCNC | Performed by: STUDENT IN AN ORGANIZED HEALTH CARE EDUCATION/TRAINING PROGRAM

## 2023-12-20 PROCEDURE — 97116 GAIT TRAINING THERAPY: CPT | Mod: HCNC,CQ

## 2023-12-20 PROCEDURE — 94761 N-INVAS EAR/PLS OXIMETRY MLT: CPT | Mod: HCNC

## 2023-12-20 PROCEDURE — 63600175 PHARM REV CODE 636 W HCPCS: Mod: HCNC | Performed by: STUDENT IN AN ORGANIZED HEALTH CARE EDUCATION/TRAINING PROGRAM

## 2023-12-20 PROCEDURE — 97530 THERAPEUTIC ACTIVITIES: CPT | Mod: HCNC,CQ

## 2023-12-20 RX ORDER — DEXAMETHASONE 6 MG/1
6 TABLET ORAL DAILY
Qty: 8 TABLET | Refills: 0 | Status: SHIPPED | OUTPATIENT
Start: 2023-12-21 | End: 2023-12-29

## 2023-12-20 RX ADMIN — METOPROLOL SUCCINATE 50 MG: 50 TABLET, EXTENDED RELEASE ORAL at 09:12

## 2023-12-20 RX ADMIN — ATORVASTATIN CALCIUM 10 MG: 10 TABLET, FILM COATED ORAL at 09:12

## 2023-12-20 RX ADMIN — TAMSULOSIN HYDROCHLORIDE 0.4 MG: 0.4 CAPSULE ORAL at 09:12

## 2023-12-20 RX ADMIN — OXYCODONE HYDROCHLORIDE AND ACETAMINOPHEN 500 MG: 500 TABLET ORAL at 09:12

## 2023-12-20 RX ADMIN — AZELASTINE HYDROCHLORIDE 137 MCG: 137 SPRAY, METERED NASAL at 09:12

## 2023-12-20 RX ADMIN — HEPARIN SODIUM 5000 UNITS: 5000 INJECTION INTRAVENOUS; SUBCUTANEOUS at 12:12

## 2023-12-20 RX ADMIN — CETIRIZINE HYDROCHLORIDE 10 MG: 10 TABLET, FILM COATED ORAL at 09:12

## 2023-12-20 RX ADMIN — FLUTICASONE PROPIONATE 50 MCG: 50 SPRAY, METERED NASAL at 09:12

## 2023-12-20 RX ADMIN — DEXAMETHASONE 6 MG: 4 TABLET ORAL at 09:12

## 2023-12-20 RX ADMIN — HEPARIN SODIUM 5000 UNITS: 5000 INJECTION INTRAVENOUS; SUBCUTANEOUS at 09:12

## 2023-12-20 RX ADMIN — REMDESIVIR 100 MG: 100 INJECTION, POWDER, LYOPHILIZED, FOR SOLUTION INTRAVENOUS at 09:12

## 2023-12-20 RX ADMIN — MULTIPLE VITAMINS W/ MINERALS TAB 1 TABLET: TAB at 09:12

## 2023-12-20 NOTE — PLAN OF CARE
Problem: Physical Therapy  Goal: Physical Therapy Goal  Description: Goals to be met by: 2024     Patient will increase functional independence with mobility by performin. Supine to sit with Modified [place]  2. Sit to supine with Modified [place]  3. Rolling with Modified [place].  4. Sit to stand transfer with Modified [place] with or without AD  5. Bed to chair transfer with Modified [place] with or without AD  6. Gait  x 150 feet with Modified [place] with or without AD  7. Ascend/descend 4 stair with bilateral Handrails SBA  8. Lower extremity exercise program x10 reps with independence    Outcome: Ongoing, Progressing

## 2023-12-20 NOTE — PLAN OF CARE
Pt will dc with no needs noted. Pts son will provide transport home. Pt declined HH services. SW will continue to follow pt throughout his transitions of care and assist with any dc needs.     Cleared from CM . Bedside Nurse and VN notified.    Future Appointments   Date Time Provider Department Center   12/26/2023 10:00 AM Love Sanchez NP [facility]   12/27/2023 10:30 AM Jaden Villeda, BRYAN [facility]        12/20/23 1445   Final Note   Assessment Type Final Discharge Note   Anticipated Discharge Disposition Home   Hospital Resources/Appts/Education Provided Appointments scheduled and added to AVS   Post-Acute Status   Discharge Delays None known at this time

## 2023-12-20 NOTE — DISCHARGE SUMMARY
[facility] Medicine  Discharge Summary      Patient Name: Adan Cain  MRN: [id number]  PHIL: [id number]  Patient Class: IP- Inpatient  Admission Date: 12/18/2023  Hospital Length of Stay: 1 days  Discharge Date and Time:  12/20/2023 5:08 PM  Attending Physician: Kierra att. providers found   Discharging Provider: Trent Herman MD  Primary Care Provider: Gina Reyes MD    Primary Care Team: Networked reference to record PCT     HPI:   Adan Cain is a 73-year-old man with a past medical history of BPH with nocturia, hyperlipidemia, hypertension, allergies who presents with fatigue and leg weakness.    Patient states that over the past week he has had cough, congestion, significant headache as well as fatigue.  Over the past day he has had significant weakness.  He also reports associated nausea but no vomiting.  As he attempted to walk, he fell which is very abnormal for him.  He presents by EMS for further evaluation.    Triage vitals were fairly unremarkable except brief hypoxia, eventually requiring 3 L. CBC was fairly unremarkable.  CMP was significant for slight elevations in glucose.  COVID was noted to be positive.    UA was significant for occult blood, rare yeast, WBCs.    Chest x-ray was fairly unremarkable.    Patient admitted for hypoxemia from COVID and fatigue        * No surgery found *      Hospital Course:   The patient presented with acute hypoxic respiratory failure due to COVID pneumonia. The management plan involved remdesivir, steroids and providing breathing treatments as needed.A home oxygen evaluation revealed that the patient no longer required oxygen therapy, and thus, he was discharged home.  The patient was discharged with a 10-day course of Decadron.     The patient's fatigue was attributed to COVID pneumonia. The plan included PT/OT to address this issue.     Additionally, the patient had benign prostatic hyperplasia with nocturia, for which home  medications were also to be continued. Nocturia was a significant issue for the patient, who had been receiving outpatient urology care with limited improvement following a recent Botox procedure. The plan included continuing home medications and considering a urology consultation for further evaluation and management.     Goals of Care Treatment Preferences:  Code Status: Full Code    Living Will: Yes              Consults:     No new Assessment & Plan notes have been filed under this hospital service since the last note was generated.  Service: Hospital Medicine    Final Active Diagnoses:    Diagnosis Date Noted POA    PRINCIPAL PROBLEM:  Acute hypoxic respiratory failure [J96.01] 12/19/2023 Yes     Chronic    Pneumonia due to COVID-19 virus [U07.1, J12.82] 12/19/2023 Yes    Hyperlipidemia [E78.5] 12/19/2023 Unknown    Fatigue [R53.83] 12/19/2023 Unknown    Allergies [T78.40XA] 05/27/2018 Yes    Benign prostatic hyperplasia with nocturia [N40.1, R35.1] 08/18/2014 Yes    Nocturia [R35.1] 08/18/2014 Yes      Problems Resolved During this Admission:       Discharged Condition: good    Disposition: Home or Self Care    Follow Up:    Patient Instructions:   No discharge procedures on file.    Significant Diagnostic Studies: Labs: BMP:   Recent Labs   Lab 12/18/23 2103 12/19/23  0410 12/20/23  0250   * 101 118*    137 137   K 3.7 3.7 4.1    104 106   CO2 22* 24 24   BUN 12 13 19   CREATININE 0.8 0.8 0.8   CALCIUM 9.3 8.7 8.6*   MG 1.9  --   --     and CMP   Recent Labs   Lab 12/18/23 2103 12/19/23  0410 12/20/23  0250    137 137   K 3.7 3.7 4.1    104 106   CO2 22* 24 24   * 101 118*   BUN 12 13 19   CREATININE 0.8 0.8 0.8   CALCIUM 9.3 8.7 8.6*   PROT 7.8 6.8 6.9   ALBUMIN 3.7 3.2* 3.1*   BILITOT 0.5 0.4 0.3   ALKPHOS 87 77 73   AST 25 30 45*   ALT 25 27 40   ANIONGAP 12 9 7*       Pending Diagnostic Studies:       None           Medications:  Reconciled Home Medications:       Medication List        START taking these medications      dexAMETHasone 6 MG tablet  Commonly known as: DECADRON  Take 1 tablet (6 mg total) by mouth once daily. for 8 days  Start taking on: December 21, 2023            CONTINUE taking these medications      albuterol 90 mcg/actuation inhaler  Commonly known as: VENTOLIN HFA  Inhale 2 puffs into the lungs every 6 (six) hours as needed for Wheezing. Rescue     ALLERGY RELIEF (LORATADINE) 10 mg tablet  Generic drug: loratadine  Take 10 mg by mouth daily as needed.     azelastine 137 mcg (0.1 %) nasal spray  Commonly known as: ASTELIN  1 spray (137 mcg total) by Nasal route 2 (two) times daily.     BABY ASPIRIN ORAL  Take 81 tablets by mouth every evening.     CENTRUM SILVER ORAL  Take by mouth once daily.     cholecalciferol (vitamin D3) 25 mcg (1,000 unit) capsule  Commonly known as: VITAMIN D3  Take 1,000 Units by mouth once daily.     coenzyme Q10 100 mg capsule  Take by mouth once daily.     diclofenac sodium 1 % Gel  Commonly known as: VOLTAREN  Apply 2 g topically 4 (four) times daily.     fluticasone propionate 50 mcg/actuation nasal spray  Commonly known as: FLONASE  1 spray by Each Nostril route once daily.     glucosamine-chondroitin 500-400 mg tablet  Take 1 tablet by mouth 2 (two) times daily.     * imipramine 25 MG tablet  Commonly known as: TOFRANIL  Take 1 tablet (25 mg total) by mouth every evening.     * imipramine 25 MG tablet  Commonly known as: TOFRANIL  Take 1 tablet (25 mg total) by mouth every evening.     metoprolol succinate 50 MG 24 hr tablet  Commonly known as: TOPROL-XL  TAKE 1 TABLET (50 MG TOTAL) BY MOUTH ONCE DAILY.     nystatin-triamcinolone cream  Commonly known as: MYCOLOG II  APPLY TOPICALLY 4 (FOUR) TIMES DAILY.     omega-3 fatty acids 300 mg Cap  Take 1 tablet by mouth once daily.     rosuvastatin 10 MG tablet  Commonly known as: CRESTOR  TAKE 1 TABLET (10 MG TOTAL) BY MOUTH ONCE DAILY.     SUPER B COMPLEX-B-12 ORAL  Take by  mouth.     tamsulosin 0.4 mg Cap  Commonly known as: FLOMAX  Take 1 capsule (0.4 mg total) by mouth once daily.     TURMERIC ORAL  Take by mouth. Pt take 1 in the evening.           * This list has 2 medication(s) that are the same as other medications prescribed for you. Read the directions carefully, and ask your doctor or other care provider to review them with you.                  Indwelling Lines/Drains at time of discharge:   Lines/Drains/Airways       None                   Time spent on the discharge of patient: 35 minutes         Trent Herman MD  Department of Hospital Medicine  [facility]

## 2023-12-20 NOTE — HOSPITAL COURSE
The patient presented with acute hypoxic respiratory failure due to COVID pneumonia. The management plan involved remdesivir, steroids and providing breathing treatments as needed.A home oxygen evaluation revealed that the patient no longer required oxygen therapy, and thus, he was discharged home.  The patient was discharged with a 10-day course of Decadron.     The patient's fatigue was attributed to COVID pneumonia. The plan included PT/OT to address this issue.     Additionally, the patient had benign prostatic hyperplasia with nocturia, for which home medications were also to be continued. Nocturia was a significant issue for the patient, who had been receiving outpatient urology care with limited improvement following a recent Botox procedure. The plan included continuing home medications and considering a urology consultation for further evaluation and management.

## 2023-12-20 NOTE — NURSING
Home Oxygen Evaluation    Date Performed: 2023    1) Patient's Home O2 Sat on room air, while at rest: 96%        If O2 sats on room air at rest are 88% or below, patient qualifies. No additional testing needed. Document N/A in steps 2 and 3. If 89% or above, complete steps 2.      2) Patient's O2 Sat on room air while exercisin% to 96%        If O2 sats on room air while exercising remain 89% or above patient does not qualify, no further testing needed Document N/A in step 3. If O2 sats on room air while exercising are 88% or below, continue to step 3.      3) Patient's O2 Sat while exercising on O2: N/A at N/A LPM         (Must show improvement from #2 for patients to qualify)    If O2 sats improve on oxygen, patient qualifies for portable oxygen. If not, the patient does not qualify.

## 2023-12-20 NOTE — PT/OT/SLP PROGRESS
Physical Therapy Treatment    Patient Name:  Adan Webb   MRN:  [id number]    Recommendations:     Discharge Recommendations: Low Intensity Therapy  Discharge Equipment Recommendations: none  Barriers to discharge:  decreased mobility,strength and endurance    Assessment:     Adan Webb is a 73 y.o. male admitted with a medical diagnosis of Acute hypoxic respiratory failure.  He presents with the following impairments/functional limitations: weakness, impaired endurance, impaired functional mobility, impaired balance, decreased lower extremity function, impaired coordination, impaired skin,pt with improving status and mobility and will benefit from low intensity therapy upon discharge to address above functional limitations.    Rehab Prognosis: Good; patient would benefit from acute skilled PT services to address these deficits and reach maximum level of function.    Recent Surgery: * No surgery found *      Plan:     During this hospitalization, patient to be seen 4 x/week to address the identified rehab impairments via gait training, therapeutic activities, therapeutic exercises, neuromuscular re-education and progress toward the following goals:    Plan of Care Expires:  01/19/24    Subjective     Chief Complaint: n/a  Patient/Family Comments/goals: pt mat leave today.  Pain/Comfort:  Pain Rating 1: 0/10      Objective:     Communicated with nsg prior to session.  Patient found supine with Condom Catheter, peripheral IV, telemetry upon PT entry to room.     General Precautions: Standard, airborne, contact, droplet, hearing impaired, vision impaired  Orthopedic Precautions: N/A  Braces: N/A  Respiratory Status: Room air     Functional Mobility:  Bed Mobility:     Supine to Sit: modified independence  Sit to Supine: modified independence  Transfers:     Sit to Stand:  modified independence with no AD  Gait: amb ~90' w/o AD and Mod I/S with Purewick in tact  Balance: fair standing balance      AM-PAC 6  CLICK MOBILITY  Turning over in bed (including adjusting bedclothes, sheets and blankets)?: 4  Sitting down on and standing up from a chair with arms (e.g., wheelchair, bedside commode, etc.): 4  Moving from lying on back to sitting on the side of the bed?: 4  Moving to and from a bed to a chair (including a wheelchair)?: 4  Need to walk in hospital room?: 3  Climbing 3-5 steps with a railing?: 2  Basic Mobility Total Score: 21       Treatment & Education:pt sat EOB ~5 mins with Mod I,reviewed pt safety      Patient left supine with all lines intact, call button in reach, and nsg notified..    GOALS: see general POC  Multidisciplinary Problems       Physical Therapy Goals          Problem: Physical Therapy    Goal Priority Disciplines Outcome Goal Variances Interventions   Physical Therapy Goal     PT, PT/OT Ongoing, Progressing     Description: Goals to be met by: 2024     Patient will increase functional independence with mobility by performin. Supine to sit with Modified [place]  2. Sit to supine with Modified [place]  3. Rolling with Modified [place].  4. Sit to stand transfer with Modified [place] with or without AD  5. Bed to chair transfer with Modified [place] with or without AD  6. Gait  x 150 feet with Modified [place] with or without AD  7. Ascend/descend 4 stair with bilateral Handrails SBA  8. Lower extremity exercise program x10 reps with independence                         Time Tracking:     PT Received On: 23  PT Start Time: 1332     PT Stop Time: 1355  PT Total Time (min): 23 min     Billable Minutes: Gait Training 15 and Therapeutic Activity 8    Treatment Type: Treatment  PT/PTA: PTA     Number of PTA visits since last PT visit: 2023

## 2023-12-20 NOTE — TELEPHONE ENCOUNTER
----- Message from Zulma Johnson sent at 12/20/2023  8:59 AM CST -----  Regarding: FW: HFU  Good Morning.  I was unable to reply on message you sent.  What discharge information to you need to set up HFU? Thanks!  ----- Message -----  From: Zulma Johnson  Sent: 12/19/2023  11:49 AM CST  To: Eric BASS Staff  Subject: HFU                                              Patient is being discharged from [facility] and is requiring a hospital follow up appointment with their Primary Care Provider in 7 days. Patient is established. I am unable to schedule an appointment in that time frame. Please schedule patient a sooner appointment and message me back so Discharge Nurse can advise patient prior to discharge.    DX:Acute hypoxic respiratory failure      Thank you, Liliana  Physician Referral Specialist/Discharge         Abdomen , soft, nontender, nondistended , no guarding or rigidity , no masses palpable , normal bowel sounds , Liver and Spleen , no hepatomegaly present , no hepatosplenomegaly , liver nontender , spleen not palpable

## 2023-12-20 NOTE — PT/OT/SLP PROGRESS
"Occupational Therapy   Treatment    Name: Adan Webb  MRN: [id number]  Admitting Diagnosis:  Acute hypoxic respiratory failure       Recommendations:     Discharge Recommendations:  (home health PT/OT)  Discharge Equipment Recommendations:  none  Barriers to discharge:   (decreased activity tolerance)    Assessment:     Adan Webb is a 73 y.o. male with a medical diagnosis of Acute hypoxic respiratory failure.  Performance deficits affecting function are weakness, impaired endurance, impaired self care skills, impaired functional mobility, gait instability, impaired cardiopulmonary response to activity, decreased safety awareness.     Rehab Prognosis:  Good; patient would benefit from acute skilled OT services to address these deficits and reach maximum level of function.       Plan:     Patient to be seen 3 x/week to address the above listed problems via self-care/home management, therapeutic activities, therapeutic exercises  Plan of Care Expires: 01/16/24  Plan of Care Reviewed with: patient    Subjective     Chief Complaint: "Well I don't know when I'm going home"  Patient/Family Comments/goals: return to PLOF  Pain/Comfort:  Pain Rating 1: 0/10  Pain Rating Post-Intervention 1: 0/10    Objective:     Communicated with: nurseGrazyna prior to session.  Patient found supine with bed alarm, telemetry, peripheral IV, oxygen, Condom Catheter upon OT entry to room.    General Precautions: Standard, airborne, contact, droplet, fall, hearing impaired, vision impaired    Orthopedic Precautions:N/A  Braces: N/A  Respiratory Status: Nasal cannula, flow 1 L/min    AMPAC 6 Click ADL: 21    Treatment & Education:  Educated on purpose/role of OT  Declined EOB/OOB at this time  Issued handout on energy conservation and PLB technique; reviewed with patient  All questions answered within OT scope of practice    Patient left supine with all lines intact, call button in reach, and bed alarm on    GOALS: "   Multidisciplinary Problems       Occupational Therapy Goals          Problem: Occupational Therapy    Goal Priority Disciplines Outcome Interventions   Occupational Therapy Goal     OT, PT/OT Ongoing, Progressing    Description: Goals to be met by: 1/16/2024     Patient will increase functional independence with ADLs by performing:    Dressing regimen inclusive of retrieving / transporting clothing and putting on clothing with Modified [place], use of least restrictive device.  Grooming while standing at sink with [place].  Toileting from toilet with Modified [place] for hygiene and clothing management inclusive of self managing decreased continence episodes.   Verbalized teachback for x2 hour toileting schedule (ie. Attempt urinating every 2 hours).  Toilet transfer to toilet with Modified [place] with use of least restrictive device.  Functional mobility x 5 minutes with supervision with use of least restrictive device while maintaining vitals WFL  100% reciprocation of energy conservation and fall risk reduction strategies to support d/c to least restrictive environment.                         Time Tracking:     OT Date of Treatment: 12/20/23  OT Start Time: 0936  OT Stop Time: 0953  OT Total Time (min): 17 min    Billable Minutes:Therapeutic Activity 17    OT/TOY: TOY     Number of TOY visits since last OT visit: 1    12/20/2023

## 2023-12-20 NOTE — PLAN OF CARE
12/20/23 1543   AVS Confirmation   Discharge instructions and AVS given to and reviewed with patient and/or significant other. Yes           AVS printed and handed to patient by bedside nurse. VN reviewed discharge instructions with patient and son using teachback method.  Allowed time for questions, all questions answered.  Patient verbalized complete understanding of discharge instructions and voices no concerns. Discharge instructions complete.  Bedside nurse notified.

## 2023-12-22 ENCOUNTER — PATIENT OUTREACH (OUTPATIENT)
Dept: ADMINISTRATIVE | Facility: CLINIC | Age: 73
End: 2023-12-22
Payer: MEDICARE

## 2023-12-22 ENCOUNTER — PATIENT MESSAGE (OUTPATIENT)
Dept: ADMINISTRATIVE | Facility: CLINIC | Age: 73
End: 2023-12-22
Payer: MEDICARE

## 2023-12-22 NOTE — PROGRESS NOTES
C3 nurse attempted to contact Adan Webb for a TCC post hospital discharge follow up call. No answer. Left voicemail with callback information. The patient has a scheduled HOSFU appointment with Love Sanchez on 12/26/2023 @ 0770.

## 2023-12-22 NOTE — PHYSICIAN QUERY
PT Name: Adan Webb  MR #: [id number]     DOCUMENTATION CLARIFICATION     CDS: Osiris Varela RN, CCDS   Contact Information: [email or web address]    This form is a permanent document in the medical record.     Query Date: December 22, 2023    By submitting this query, we are merely seeking further clarification of documentation.  Please utilize your independent clinical judgment when addressing the question(s) below.  The Medical Record contains the following   Indicators   Supporting Clinical Findings Location in Medical Record   x Documentation of Respiratory Failure, ARDS Hypoxic Respiratory Failure which is Acute 12/19-12/20 Hospital Medicine   x Subjective Respiratory Signs/Symptoms No shortness of breath.  Negative for shortness of breath.  12/18 ED   x Objective Respiratory Signs/Symptoms Breath sounds normal. No respiratory distress. He has no wheezes. He has no rales.  12/18 ED   x RR     O2 sat     O2 use 12/18 2041: R 18, SpO2 93% on Room Air  12/19 0015: R 23, SpO2 89% no oxygen documented  12/19 0534: R 20, SpO2 98% on 2L via Nasal Cannula  12/20 0746: R 19, SpO2 94% on 2L via Nasal Cannula Flowsheets   x Hypoxia/Hypercapnia Triage vitals were fairly unremarkable except brief hypoxia  12/18 H&P   x Acute/Chronic Illness 73-year-old male presenting from home with EMS for complaints of fatigue and bilateral leg weakness that started today.   COVID-19 12/18 ED   x Treatment Inhaled bronchodilators as needed for shortness of breath., IV remdesivir, dexamethasone  12/18 H&P       The clinical guidelines noted are only a system guideline. It does not replace the providers clinical judgment.    [facility] Approved Diagnostic Criteria      Acute Respiratory Failure    Hypoxic: ABG pO2<60 mmHg or O2 sat of <91% on RA   AND/OR   Hypercapnic: ABG pCO2>50 mmHg with pH <7.35   AND   Respiratory symptoms documented (Subjective: SOB; Objective: Tachypnea, respiratory distress, increased work of  breathing, unable to speak in complete sentences, labored breathing, use of accessory muscles, RR>26, cyanosis, dyspnea, wheezing, stridor, lethargy)      Chronic Respiratory Failure   Hypoxic: Continuous home oxygen    AND/OR   Hypercapnic: Normal pH with high CO2 (ex. COPD)      Acute on Chronic Respiratory Failure   Hypoxic: ABG pO2>10mmHg below baseline OR ABG pO2<60 mmHg OR SpO2<91% on usual home O2  OR O2>2L/min over baseline home O2   AND/OR   Hypercapnic: ABG pCO2>50 mmHg OR pCO2>10mmHg over baseline and pH <7.35   AND   Respiratory symptoms documented      Acute Respiratory Distress Syndrome (ARDS) - an acute, diffuse, inflammatory form of lung injury. Suspect with progressive dyspnea, hypoxemic respiratory failure, and bilateral alveolar infiltrates on chest imaging within 6 to 72 hours of an inciting event.   Acute Respiratory Distress - Generally describes less severe respiratory symptoms (tachypnea, in respiratory distress, increased work of breathing, unable to speak in complete sentences, labored breathing, use of accessory muscles, RR> 24, cyanosis, dyspnea, wheezing, stridor, lethargy) without sufficient measurements (pO2, SpO2, pH, and pCO2) to meet criteria for respiratory failure)   Acute Respiratory Insufficiency - Generally describes less severe respiratory symptoms and measurements (pO2, SpO2, pH, and pCO2) not meeting criteria for respiratory failure         Due to the conflicting clinical picture, please clinically validate the diagnosis of Hypoxic Respiratory Failure which is Acute.    If validated, please provide additional clinical support for the diagnosis.     [   x ] Acute Hypoxic Respiratory Failure has been ruled out   [    ] Acute Hypoxic Respiratory Failure has been ruled out, other diagnosis ruled in:   [   ] Hypoxia only   [   ] Other (please specify):_____________   [    ] Acute Respiratory Failure with Hypoxia diagnosis is confirmed and additional clinical  support/decision-making indicators for the diagnosis include (please specify):_______________________________________________   [    ] Other clarification (please specify): ___________________     Please document in your progress notes daily for the duration of treatment until resolved and include in your discharge summary.     Reference:    ANDER Stroud MD. (2020, March 13). Acute respiratory distress syndrome: Clinical features, diagnosis, and complications in adults ([id number] KELSEY Soto MD & [id number] MATT Medellin MD, Eds.). Retrieved November 13, 2020, from [email or web address]/[email or web address]-respiratory-distress-syndrome-clinical-features-diagnosis-and-complications-in-adults?search=ards&source=search_result&selectedTitle=1~150&usage_type=default&display_rank=1  Form No. [id number]

## 2023-12-22 NOTE — PHYSICIAN QUERY
PT Name: Adan Webb  MR #: [id number]     Documentation Clarification      CDS: Osiris Varela RN, CCDS   Contact Information: [email or web address]  This form is a permanent document in the medical record.     Query Date: December 22, 2023    By submitting this query, we are merely seeking further clarification of documentation. Please utilize your independent clinical judgment when addressing the question(s) below.    The Medical Record reflects the following:    Clinical Findings Location in Medical Records   Pneumonia due to COVID-19 virus  12/18-12/20 Hospital Medicine   The lungs are well expanded clear.  No focal opacities are seen.  The pleural spaces are clear. No acute abnormality. 12/18 CXR   Breath sounds normal. No respiratory distress. He has no wheezes. He has no rales.  12/18 ED   WBC: 5.13 12/18 Labs   Inhaled bronchodilators as needed for shortness of breath., IV remdesivir, dexamethasone   12/18 H&P     73-year-old male presenting from home with EMS for complaints of fatigue and bilateral leg weakness that started today.   COVID-19 12/18 ED     Due to the conflicting clinical picture, please clinically validate the diagnosis of Pneumonia.    If validated, please provide additional clinical support for the diagnosis.     [  x ] Pneumonia has been ruled out   [   ] Pneumonia has been ruled out, other diagnosis ruled in (please specify):___________   [   ] Pneumonia is confirmed. Please specify clinical support (signs, symptoms, and treatment) for  the confirmed diagnosis: ____________________   [   ] Other clarification (please specify): ___________________     Form No. [id number]

## 2023-12-26 NOTE — TELEPHONE ENCOUNTER
----- Message from Dejah De Anda sent at 12/26/2023  1:50 PM CST -----  Contact: Fabien Mcmahon/Kenzie [phone]  Requesting an RX refill or new RX.  Is this a refill or new RX: refill  RX name and strength (copy/paste from chart):  albuterol (VENTOLIN HFA) 90 mcg/actuation inhaler  Is this a 30 day or 90 day RX: 30  Pharmacy name and phone # (copy/paste from chart):    [facility] [place]  [address] [place] [address]  [address] [facility] [address]  [place] [id number]  Phone: [phone] Fax: [phone]  The doctors have asked that we provide their patients with the following 2 reminders -- prescription refills can take up to 72 hours, and a friendly reminder that in the future you can use your [facility] account to request refills: yes

## 2023-12-26 NOTE — TELEPHONE ENCOUNTER
Care Due:                  Date            Visit Type   Department     Provider  --------------------------------------------------------------------------------                                SAME DAY -                              ESTABLISHED   [facility]  Last Visit: 08-      PATIENT      CARE           Selina H Storm  Next Visit: None Scheduled  None         None Found                                                            Last  Test          Frequency    Reason                     Performed    Due Date  --------------------------------------------------------------------------------    Lipid Panel.  12 months..  rosuvastatin.............  03- 02-    [facility] Embedded Care Due Messages. Reference number: [id number].   12/26/2023 1:59:48 PM CST

## 2023-12-26 NOTE — PROGRESS NOTES
C3 nurse spoke with Adan Webb for a TCC post hospital discharge follow up call. Nurse offered to scheduled TCC hospital follow-up appointment. The patient declined appointment at this time.

## 2023-12-27 RX ORDER — ALBUTEROL SULFATE 90 UG/1
2 AEROSOL, METERED RESPIRATORY (INHALATION) EVERY 6 HOURS PRN
Qty: 18 G | Refills: 1 | Status: SHIPPED | OUTPATIENT
Start: 2023-12-27

## 2023-12-28 ENCOUNTER — TELEPHONE (OUTPATIENT)
Dept: PODIATRY | Facility: CLINIC | Age: 73
End: 2023-12-28
Payer: MEDICARE

## 2024-01-19 ENCOUNTER — HOSPITAL ENCOUNTER (OUTPATIENT)
Dept: RADIOLOGY | Facility: HOSPITAL | Age: 74
Discharge: HOME | End: 2024-01-19
Attending: STUDENT IN AN ORGANIZED HEALTH CARE EDUCATION/TRAINING PROGRAM
Payer: MEDICARE

## 2024-01-19 ENCOUNTER — PROCEDURE VISIT (OUTPATIENT)
Dept: PODIATRY | Facility: CLINIC | Age: 74
End: 2024-01-19
Payer: MEDICARE

## 2024-01-19 VITALS
DIASTOLIC BLOOD PRESSURE: 82 MMHG | TEMPERATURE: 98 F | HEART RATE: 83 BPM | HEIGHT: 73 IN | RESPIRATION RATE: 18 BRPM | SYSTOLIC BLOOD PRESSURE: 139 MMHG | BODY MASS INDEX: 38.57 KG/M2 | WEIGHT: 291 LBS

## 2024-01-19 DIAGNOSIS — M25.571 CHRONIC PAIN OF BOTH ANKLES: Primary | ICD-10-CM

## 2024-01-19 DIAGNOSIS — M25.572 CHRONIC PAIN OF BOTH ANKLES: ICD-10-CM

## 2024-01-19 DIAGNOSIS — B35.1 TINEA UNGUIUM: ICD-10-CM

## 2024-01-19 DIAGNOSIS — M25.571 CHRONIC PAIN OF BOTH ANKLES: ICD-10-CM

## 2024-01-19 DIAGNOSIS — M19.079 ARTHRITIS OF ANKLE JOINT: ICD-10-CM

## 2024-01-19 DIAGNOSIS — G89.29 CHRONIC PAIN OF BOTH ANKLES: Primary | ICD-10-CM

## 2024-01-19 DIAGNOSIS — G89.29 CHRONIC PAIN OF BOTH ANKLES: ICD-10-CM

## 2024-01-19 DIAGNOSIS — M25.572 CHRONIC PAIN OF BOTH ANKLES: Primary | ICD-10-CM

## 2024-01-19 DIAGNOSIS — M79.674 PAIN IN TOES OF BOTH FEET: ICD-10-CM

## 2024-01-19 DIAGNOSIS — M79.675 PAIN IN TOES OF BOTH FEET: ICD-10-CM

## 2024-01-19 DIAGNOSIS — S92.342A CLOSED DISPLACED FRACTURE OF FOURTH METATARSAL BONE OF LEFT FOOT, INITIAL ENCOUNTER: ICD-10-CM

## 2024-01-19 DIAGNOSIS — S92.332A CLOSED DISPLACED FRACTURE OF THIRD METATARSAL BONE OF LEFT FOOT, INITIAL ENCOUNTER: ICD-10-CM

## 2024-01-19 PROCEDURE — 73610 X-RAY EXAM OF ANKLE: CPT | Mod: 26,50,, | Performed by: RADIOLOGY

## 2024-01-19 PROCEDURE — 73610 X-RAY EXAM OF ANKLE: CPT | Mod: TC,50

## 2024-01-19 PROCEDURE — 11721 DEBRIDE NAIL 6 OR MORE: CPT | Mod: 59,S$GLB,, | Performed by: STUDENT IN AN ORGANIZED HEALTH CARE EDUCATION/TRAINING PROGRAM

## 2024-01-19 PROCEDURE — 20605 DRAIN/INJ JOINT/BURSA W/O US: CPT | Mod: LT,S$GLB,, | Performed by: STUDENT IN AN ORGANIZED HEALTH CARE EDUCATION/TRAINING PROGRAM

## 2024-01-19 PROCEDURE — 73630 X-RAY EXAM OF FOOT: CPT | Mod: TC,50

## 2024-01-19 PROCEDURE — 73630 X-RAY EXAM OF FOOT: CPT | Mod: 26,50,, | Performed by: RADIOLOGY

## 2024-01-19 PROCEDURE — 99214 OFFICE O/P EST MOD 30 MIN: CPT | Mod: 25,S$GLB,, | Performed by: STUDENT IN AN ORGANIZED HEALTH CARE EDUCATION/TRAINING PROGRAM

## 2024-01-19 RX ORDER — DULOXETIN HYDROCHLORIDE 20 MG/1
20 CAPSULE, DELAYED RELEASE ORAL DAILY
Qty: 30 CAPSULE | Refills: 11 | Status: SHIPPED | OUTPATIENT
Start: 2024-01-19 | End: 2024-02-20

## 2024-01-19 RX ORDER — DEXAMETHASONE SODIUM PHOSPHATE 4 MG/ML
4 INJECTION, SOLUTION INTRA-ARTICULAR; INTRALESIONAL; INTRAMUSCULAR; INTRAVENOUS; SOFT TISSUE
Status: DISCONTINUED | OUTPATIENT
Start: 2024-01-19 | End: 2024-01-19 | Stop reason: HOSPADM

## 2024-01-19 RX ADMIN — DEXAMETHASONE SODIUM PHOSPHATE 4 MG: 4 INJECTION, SOLUTION INTRA-ARTICULAR; INTRALESIONAL; INTRAMUSCULAR; INTRAVENOUS; SOFT TISSUE at 10:01

## 2024-01-19 NOTE — PROCEDURES
Subjective:     Patient    Adan Webb is a 73 y.o. male.    Problem    09/27/23: Presents for thick discolored nails causing pain, especially at 1st toes. Previously tried antifungals which did not help. Also has had left STJ/sinus tarsi pain for years, has had 2x steroid shots at the sinus tarsi which helped for a period of weeks to months each. Has had custom inserts in his shoes for a couple of years and recently had them adjusted, they help. Does not take medication for arthritis. Has attempted Voltaren without improvement.      01/19/24: Returns for thick discolored toenails causing pain, still primarily at 1st toes. Also with recurrent left STJ/sinus tarsi pain and now with pain at left lateral Kager's triangle; also with similar right sided pain but this does not bother him as much. Also with left forefoot pain and swelling for the past month or so, does not recall an injury, the pain has improved but the swelling is lingering.     History    History obtained from patient and review of medical records.     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, didn't use ear protection    Dermatitis 12/9/2020    nystat groin    DJD (degenerative joint disease) of hip 1/29/2015    Enlarged liver 4/8/2021    US 4/21     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    MCA , see MRI    Hiatal hernia 4/1/2021    CT chest tiny 2018    HTN  (hypertension), benign 8/20/2020    Morbid obesity with BMI of 40.0-44.9, adult 1/29/2015    OAB (overactive bladder) 4/1/2021    Rash with pads    Right-sided low back pain with right-sided sciatica 3/6/2019    Seasonal allergic rhinitis due to pollen 4/13/2016    Thoracic aortic aneurysm without rupture 05/27/2018    tx with Su, Cardiologist Dr Stone, 5.1 CTS Dr Duenas, follows yearly    Transient cerebral ischemia 6/6/2018       Past Surgical History:   Procedure Laterality Date    A-V CARDIAC PACEMAKER INSERTION N/A 08/05/2021    Procedure: INSERTION, CARDIAC PACEMAKER, DUAL CHAMBER;  Surgeon: Sheldon Miranda MD;  Location: [facility] EP LAB;  Service: Cardiology;  Laterality: N/A;  Near syncope,SB,Sinus Pause, RBBB,LAFB, Dual PPM, BIO, MAC, [place], 3 Prep    ADENOIDECTOMY      CATARACT EXTRACTION      CYSTOSCOPY N/A 7/26/2023    Procedure: CYSTOSCOPY;  Surgeon: Jamal Zamora MD;  Location: [facility] OR [address] [place] [address];  Service: Urology;  Laterality: N/A;    CYSTOSCOPY WITH URODYNAMIC TESTING N/A 10/25/2021    Procedure: CYSTOSCOPY, WITH URODYNAMIC TESTING FLOUROSCOPIC;  Surgeon: Sancho Wesley MD;  Location: [facility] OR [address] [place] [address];  Service: Urology;  Laterality: N/A;  1hr    HERNIA REPAIR      INJECTION OF BOTULINUM TOXIN TYPE A N/A 7/26/2023    Procedure: INJECTION, BOTULINUM TOXIN, TYPE A;  Surgeon: Jamal Zamora MD;  Location: [facility] OR [address] [place] [address];  Service: Urology;  Laterality: N/A;  100 units    TONSILLECTOMY      YAG LAser Capsulotomy Bilateral     2/18/2019 OS Dr. Cornelius        Objective:     Vitals  Wt Readings from Last 1 Encounters:   01/19/24 132 kg (291 lb)     Temp Readings from Last 1 Encounters:   01/19/24 98 °F (36.7 °C)     BP Readings from Last 1 Encounters:   01/19/24 139/82     Pulse Readings from Last 1 Encounters:   01/19/24 83       Dermatological Exam    Skin:  Pedal hair growth diminished and Pedal skin thin and shiny on right  Pedal hair growth diminished and Pedal skin thin and shiny on  left    Nails:  10 nail(s) elongated, 10 nail(s) thickened, and 10 nail(s) discolored; tender along medial and lateral borders of both 1st toenails, not clinically infected    Vascular Exam    Arteries:  Posterior tibial artery palpable on right  Dorsalis pedis artery palpable on right  Posterior tibial artery palpable on left  Dorsalis pedis artery palpable on left    Veins:  Superficial veins unremarkable on right  Superficial veins unremarkable on left    Swellin+ pitting on right  1+ pitting on left; 2+ at left forefoot    Neurological Exam    [place] touch test:  6/6 sites sensed, light touch intact     Musculoskeletal Exam    Footwear:  Casual on right  Casual on left    Gait Exam:   Ambulatory Status: Ambulatory  Gait: Normal  Assistive Devices: None    Foot Progression Angle:  Normal on right  Normal on left     Right Lower Extremity Additional Findings:  Mild pain on palpation lateral STJ, sinus tarsi, Kager triangle  Right foot and ankle function, strength, and range of motion unremarkable except as noted above.     Left Lower Extremity Additional Findings:  Moderate pain on palpation lateral STJ, sinus tarsi, Kager triangle  Left foot and ankle function, strength, and range of motion unremarkable except as noted above.    Imaging and Other Tests    Imaging:  Independently reviewed and interpreted imaging, findings are as follows:     24 B foot and ankle X rays: bilateral STJ severe arthritis vs STJ coalition;      Assessment:     Encounter Diagnoses   Name Primary?    Chronic pain of both ankles Yes    Arthritis of ankle joint     Closed displaced fracture of fourth metatarsal bone of left foot, initial encounter     Closed displaced fracture of third metatarsal bone of left foot, initial encounter     Tinea unguium     Pain in toes of both feet           Plan:     I counseled the patient on his conditions, their implications and medical management.    Bilateral ankle pain, arthritis: chronic  stable  -Patient requested left ankle injection. Injection performed, see procedure note.  -Ordered duloxetine.      Tinea unguium, pain: chronic stable   -Discussed treatment options including (1) monitoring, (2) debridement, (3) topical antifungals, (4) oral antifungals. Discussed potential risks, benefits, alternatives to each. Patient opted for debridements only.  -Nails debrided x10, thickness and length reduced using sterile nail nippers, see procedure note.      Left 3rd and 4th metatarsal fractures: subacute  -Displaced but minimally symptomatic and appear to be healing on X ray.  -Continue physical activity as tolerated in standard footwear, will monitor.  -Anticipate swelling will improve as fractures heal.       Return to clinic in 1 month for chronic pain and left foot fractures, x rays prior, call sooner PRN.

## 2024-01-19 NOTE — PROCEDURES
Routine Foot Care    Date/Time: 1/19/2024 10:45 AM    Performed by: Jaden Villeda DPM  Authorized by: Jaden Villeda DPM    Consent Done?:  Yes (Verbal)  Hyperkeratotic Skin Lesions?: No      Nail Care Type:  Debride  Location(s): All  (Left 1st Toe, Left 3rd Toe, Left 2nd Toe, Left 4th Toe, Left 5th Toe, Right 1st Toe, Right 2nd Toe, Right 3rd Toe, Right 4th Toe and Right 5th Toe)  Patient tolerance:  Patient tolerated the procedure well with no immediate complications

## 2024-01-19 NOTE — PROCEDURES
Intermediate Joint Aspiration/Injection: L ankle    Date/Time: 1/19/2024 10:45 AM    Performed by: Jaden Villeda DPM  Authorized by: Jaden Villeda DPM    Consent Done?:  Yes (Verbal)  Indications:  Arthritis and pain  Timeout: Prior to procedure the correct patient, procedure, and site was verified      Location:  Ankle  Site:  L ankle  Approach:  Posterolateral  Medications:  4 mg dexAMETHasone 4 mg/mL  Patient tolerance:  Patient tolerated the procedure well with no immediate complications

## 2024-01-23 ENCOUNTER — CLINICAL SUPPORT (OUTPATIENT)
Dept: CARDIOLOGY | Facility: HOSPITAL | Age: 74
End: 2024-01-23
Attending: INTERNAL MEDICINE
Payer: MEDICARE

## 2024-01-23 ENCOUNTER — CLINICAL SUPPORT (OUTPATIENT)
Dept: CARDIOLOGY | Facility: HOSPITAL | Age: 74
End: 2024-01-23
Payer: MEDICARE

## 2024-01-23 DIAGNOSIS — I45.2 BIFASCICULAR BLOCK: ICD-10-CM

## 2024-01-23 PROCEDURE — 93294 REM INTERROG EVL PM/LDLS PM: CPT | Mod: HCNC,,, | Performed by: INTERNAL MEDICINE

## 2024-01-23 PROCEDURE — 93296 REM INTERROG EVL PM/IDS: CPT | Mod: HCNC | Performed by: INTERNAL MEDICINE

## 2024-01-31 LAB
OHS CV AF BURDEN PERCENT: < 1
OHS CV DC REMOTE DEVICE TYPE: NORMAL
OHS CV RV PACING PERCENT: 1 %

## 2024-02-05 NOTE — PROGRESS NOTES
"Adan Webb  1950        Subjective     Chief Complaint: Hosp F/u    History of Present Illness:  Mr. Adan Webb is a 73 y.o. male who presents to clinic for hospital follow-up.     December 18th admitted to [facility] for Covid-19. Dx on 12/20. Was on 3L nasal O2 while in the hospital. Given Remdesivir and steroids. D/c on steroids too. Not on oxygen after d/c. Doing well now.     Had some elevated LFT (mild), anemia    Has been walking at [email or web address] and [place]. Some pain with walking (broken metatarsals) but no SOB or chest pain.    Hx of BPH, sees Dr. Zamora in Urology. Last appt in 10/2023. Urodynamics planned. Having trouble sleeping due to urination. No help with Flomax/Tofranil. Wife and pt would like second opinion.     Per that note-->"Options of treatment including botox injection ( higher dose of 200 untis) sacral neuromodulation explained in depth.  May evaluate him with SUDS cysto to determine what else is going on.  Try imipramine 25 mg q hs to see whether it will help him with nocturia.  All questions answered.  Fluid restriction especially 4 hours before going to bed.  Low salt diet.  R/o sleep apnea if indicated.     At the end, he would like to resume Flomax in the evening time and try Imipramine at bed time to see whether these meds will improve his nocturia."    Having tremors in both hands. Having trouble writing, eating. Sometimes at rest. Sometimes with intention.   No fam hx of Parkinson's.  Having issues with urination.   No constipation.  No memory loss.  +falls (with Covid).  Answers submitted by the patient for this visit:  Review of Systems Questionnaire (Submitted on 2/6/2024)  activity change: Yes  unexpected weight change: No  rhinorrhea: Yes  trouble swallowing: No  visual disturbance: No  chest tightness: No  polyuria: No  difficulty urinating: No  joint swelling: No  arthralgias: Yes  confusion: No  dysphoric mood: No      Review of Systems   Constitutional:  Positive " for malaise/fatigue. Negative for chills and fever.   HENT:  Negative for congestion and hearing loss.    Eyes:  Negative for discharge.   Respiratory:  Positive for wheezing. Negative for cough.    Cardiovascular:  Negative for chest pain and palpitations.   Gastrointestinal:  Negative for blood in stool, constipation, diarrhea and vomiting.   Genitourinary:  Positive for urgency. Negative for hematuria.   Musculoskeletal:  Negative for neck pain.   Neurological:  Negative for weakness and headaches.   Endo/Heme/Allergies:  Negative for polydipsia.   Psychiatric/Behavioral:  The patient is not nervous/anxious.         PAST HISTORY:     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, didn't use ear protection    Dermatitis 12/9/2020    nystat groin    DJD (degenerative joint disease) of hip 1/29/2015    Enlarged liver 4/8/2021    US 4/21     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    MCA , see MRI    Hiatal hernia 4/1/2021    CT chest tiny 2018    HTN (hypertension), benign 8/20/2020    Morbid obesity with BMI of 40.0-44.9, adult 1/29/2015    OAB (overactive bladder) 4/1/2021    Rash with pads    Right-sided low back pain with right-sided sciatica 3/6/2019    Seasonal allergic rhinitis due to pollen 4/13/2016    Thoracic aortic aneurysm without rupture 05/27/2018    tx with Su, Cardiologist Dr Stone, 5.1 CTS Dr Duenas, follows yearly     Transient cerebral ischemia 2018       Past Surgical History:   Procedure Laterality Date    A-V CARDIAC PACEMAKER INSERTION N/A 2021    Procedure: INSERTION, CARDIAC PACEMAKER, DUAL CHAMBER;  Surgeon: Sheldon Miranda MD;  Location: [facility] EP LAB;  Service: Cardiology;  Laterality: N/A;  Near syncope,SB,Sinus Pause, RBBB,LAFB, Dual PPM, BIO, MAC, [place], 3 Prep    ADENOIDECTOMY      CATARACT EXTRACTION      CYSTOSCOPY N/A 2023    Procedure: CYSTOSCOPY;  Surgeon: Jamal Zamora MD;  Location: [facility] OR [address] [place] [address];  Service: Urology;  Laterality: N/A;    CYSTOSCOPY WITH URODYNAMIC TESTING N/A 10/25/2021    Procedure: CYSTOSCOPY, WITH URODYNAMIC TESTING FLOUROSCOPIC;  Surgeon: Sancho Wesley MD;  Location: [facility] OR [address] [place] [address];  Service: Urology;  Laterality: N/A;  1hr    HERNIA REPAIR      INJECTION OF BOTULINUM TOXIN TYPE A N/A 2023    Procedure: INJECTION, BOTULINUM TOXIN, TYPE A;  Surgeon: Jamal Zamora MD;  Location: [facility] OR [address] [place] [address];  Service: Urology;  Laterality: N/A;  100 units    TONSILLECTOMY      YAG LAser Capsulotomy Bilateral     2019 OS Dr. Cornelius       Family History   Problem Relation Age of Onset    Diabetes Mother     Leukemia Father     Aneurysm Father     No Known Problems Daughter     No Known Problems Son     Cataracts Paternal Uncle     Amblyopia Neg Hx     Blindness Neg Hx     Glaucoma Neg Hx     Macular degeneration Neg Hx     Retinal detachment Neg Hx     Strabismus Neg Hx        Social History     Socioeconomic History    Marital status:    Tobacco Use    Smoking status: Former     Types: Cigarettes     Start date:      Quit date:      Years since quittin.1    Smokeless tobacco: Never   Substance and Sexual Activity    Alcohol use: Yes     Comment: wine, seldom    Drug use: No    Sexual activity: Yes     Partners: Female   Social History Narrative     to 'T', 2 children, nonsmoker, ETOH none, never had colonscopy & declines     Social  Determinants of Health     Financial Resource Strain: Low Risk  (2/6/2024)    Overall Financial Resource Strain (CARDIA)     Difficulty of Paying Living Expenses: Not hard at all   Food Insecurity: No Food Insecurity (2/6/2024)    Hunger Vital Sign     Worried About Running Out of Food in the Last Year: Never true     Ran Out of Food in the Last Year: Never true   Transportation Needs: No Transportation Needs (2/6/2024)    PRAPARE - Transportation     Lack of Transportation (Medical): No     Lack of Transportation (Non-Medical): No   Physical Activity: Inactive (2/6/2024)    Exercise Vital Sign     Days of Exercise per Week: 0 days     Minutes of Exercise per Session: 0 min   Stress: No Stress Concern Present (2/6/2024)    [employer] - Occupational Stress Questionnaire     Feeling of Stress : Not at all   Social Connections: Socially Integrated (2/6/2024)    Social Connection and Isolation Panel [NHANES]     Frequency of Communication with Friends and Family: More than three times a week     Frequency of Social Gatherings with Friends and Family: More than three times a week     Attends Baptism Services: More than 4 times per year     Active Member of Clubs or Organizations: Yes     Attends Club or Organization Meetings: 1 to 4 times per year     Marital Status:    Housing Stability: Low Risk  (2/6/2024)    Housing Stability Vital Sign     Unable to Pay for Housing in the Last Year: No     Number of Places Lived in the Last Year: 1     Unstable Housing in the Last Year: No       MEDICATIONS & ALLERGIES:     Current Outpatient Medications on File Prior to Visit   Medication Sig    albuterol (VENTOLIN HFA) 90 mcg/actuation inhaler Inhale 2 puffs into the lungs every 6 (six) hours as needed for Wheezing. Rescue    ALLERGY RELIEF, LORATADINE, 10 mg tablet Take 10 mg by mouth daily as needed.    azelastine (ASTELIN) 137 mcg (0.1 %) nasal spray 1 spray (137 mcg total) by Nasal route  2 (two) times daily.    BABY ASPIRIN ORAL Take 81 tablets by mouth every evening.     cholecalciferol, vitamin D3, 1,000 unit capsule Take 1,000 Units by mouth once daily.    coenzyme Q10 100 mg capsule Take by mouth once daily.    diclofenac sodium (VOLTAREN) 1 % Gel Apply 2 g topically 4 (four) times daily.    DULoxetine (CYMBALTA) 20 MG capsule Take 1 capsule (20 mg total) by mouth once daily.    fluticasone propionate (FLONASE) 50 mcg/actuation nasal spray 1 spray by Each Nostril route once daily.    glucosamine-chondroitin 500-400 mg tablet Take 1 tablet by mouth 2 (two) times daily.     imipramine (TOFRANIL) 25 MG tablet Take 1 tablet (25 mg total) by mouth every evening.    metoprolol succinate (TOPROL-XL) 50 MG 24 hr tablet TAKE 1 TABLET (50 MG TOTAL) BY MOUTH ONCE DAILY.    MULTIVITAMIN W-MINERALS/LUTEIN (CENTRUM SILVER ORAL) Take by mouth once daily.     nystatin-triamcinolone (MYCOLOG II) cream APPLY TOPICALLY 4 (FOUR) TIMES DAILY.    omega-3 fatty acids 300 mg Cap Take 1 tablet by mouth once daily.     rosuvastatin (CRESTOR) 10 MG tablet TAKE 1 TABLET (10 MG TOTAL) BY MOUTH ONCE DAILY.    tamsulosin (FLOMAX) 0.4 mg Cap Take 1 capsule (0.4 mg total) by mouth once daily.    TURMERIC ORAL Take by mouth. Pt take 1 in the evening.    VITAMIN B COMPLEX (SUPER B COMPLEX-B-12 ORAL) Take by mouth.     No current facility-administered medications on file prior to visit.       Review of patient's allergies indicates:   Allergen Reactions    Augmentin [amoxicillin-pot clavulanate] Rash     Rash, confusion, TIA like symptoms    Azithromycin Rash    Avodart [dutasteride]     House dust mite Other (See Comments)    Mold     Naproxen     Ragweed     Synthroid [levothyroxine] Rash       OBJECTIVE:     Vital Signs:  Vitals:    02/06/24 1026 02/06/24 1117   BP: 132/84    BP Location: Left arm    Patient Position: Sitting    BP Method: Large (Manual)    Pulse: (!) 116 88   SpO2: 99%    Weight: (!) 138.8 kg (306 lb)   "  Height: 6' 1" (1.854 m)        Body mass index is 40.37 kg/m².     Physical Exam:  Physical Exam  Vitals and nursing note reviewed.   Constitutional:       General: He is not in acute distress.     Appearance: Normal appearance. He is not ill-appearing, toxic-appearing or diaphoretic.   HENT:      Head: Normocephalic and atraumatic.   Eyes:      General: No scleral icterus.     Conjunctiva/sclera: Conjunctivae normal.   Cardiovascular:      Rate and Rhythm: Normal rate.      Heart sounds: Normal heart sounds. No murmur heard.  Pulmonary:      Effort: Pulmonary effort is normal. No respiratory distress.      Breath sounds: Normal breath sounds. No wheezing or rales.   Musculoskeletal:         General: Normal range of motion.   Skin:     General: Skin is warm and dry.   Neurological:      Mental Status: He is alert and oriented to person, place, and time.      Cranial Nerves: No dysarthria or facial asymmetry.      Motor: Tremor (intention and on exam, no resting tremor noted today) present.      Coordination: Finger-Nose-Finger Test abnormal.   Psychiatric:         Mood and Affect: Mood normal.         Behavior: Behavior normal.            Laboratory  Lab Results   Component Value Date    WBC 8.74 02/06/2024    HGB 13.6 (L) 02/06/2024    HCT 41.3 02/06/2024    MCV 97 02/06/2024     02/06/2024     Lab Results   Component Value Date    GLU 98 02/06/2024     02/06/2024    K 5.1 02/06/2024     02/06/2024    CO2 25 02/06/2024    BUN 23 02/06/2024    CREATININE 0.7 02/06/2024    CALCIUM 9.7 02/06/2024    MG 1.9 12/18/2023     Lab Results   Component Value Date    INR 1.0 08/05/2021    INR 1.0 05/26/2018    INR 1.1 05/26/2018     Lab Results   Component Value Date    HGBA1C 5.3 05/27/2018           Health Maintenance         Date Due Completion Date    Shingles Vaccine (1 of 2) Never done ---    RSV Vaccine (Age 60+ and Pregnant patients) (1 - 1-dose 60+ series) Never done ---    Influenza Vaccine (1) " 09/01/2023 10/17/2022    COVID-19 Vaccine (6 - 2023-24 season) 09/01/2023 10/6/2022    High Dose Statin 02/06/2025 2/6/2024    Colorectal Cancer Screening 08/31/2027 11/11/2020    Override on 8/31/2017: Declined (give FOBT today)    Lipid Panel 03/02/2028 3/2/2023    TETANUS VACCINE 01/14/2029 1/14/2019              ASSESSMENT & PLAN:   Mr. Adan Webb is a 73 y.o. male who was seen today in clinic for follow-up. Doing better from the Covid standpoint, has urinary concerns and tremors. Discussed signs/symptoms of parkinson's. Will refer to Neuro for evaluation.      1. Hospital discharge follow-up  -     CBC W/ AUTO DIFFERENTIAL; Future; Expected date: 02/06/2024  -     COMPREHENSIVE METABOLIC PANEL; Future; Expected date: 02/06/2024    2. Pneumonia due to COVID-19 virus  -     CBC W/ AUTO DIFFERENTIAL; Future; Expected date: 02/06/2024  -     COMPREHENSIVE METABOLIC PANEL; Future; Expected date: 02/06/2024    3. History of acute respiratory failure    4. Tremor  -     Ambulatory referral/consult to Neurology; Future; Expected date: 02/13/2024  -     CT Head Without Contrast; Future; Expected date: 02/06/2024    5. HTN (hypertension), benign    6. Benign prostatic hyperplasia with nocturia  -     Ambulatory referral/consult to Urology; Future; Expected date: 02/13/2024    7. Aortic atherosclerosis    8. Nocturia  -     Ambulatory referral/consult to Urology; Future; Expected date: 02/13/2024    9. OAB (overactive bladder):  -     Ambulatory referral/consult to Urology; Future; Expected date: 02/13/2024           Frida Grant MD  Internal Medicine         Portions of this note may have been generated using voice recognition software.  Please excuse any spelling/grammatical errors. Occasional wrong-word or sound-a-like substitutions may have also occurred due to the inherent limitations of voice recognition software. Please read the chart carefully and recognize, using context, where substitutions have  occurred.

## 2024-02-06 ENCOUNTER — LAB VISIT (OUTPATIENT)
Dept: LAB | Facility: HOSPITAL | Age: 74
End: 2024-02-06
Payer: MEDICARE

## 2024-02-06 ENCOUNTER — OFFICE VISIT (OUTPATIENT)
Dept: PRIMARY CARE CLINIC | Facility: CLINIC | Age: 74
End: 2024-02-06
Payer: MEDICARE

## 2024-02-06 VITALS
BODY MASS INDEX: 40.56 KG/M2 | WEIGHT: 306 LBS | OXYGEN SATURATION: 99 % | SYSTOLIC BLOOD PRESSURE: 132 MMHG | DIASTOLIC BLOOD PRESSURE: 84 MMHG | HEIGHT: 73 IN | HEART RATE: 88 BPM

## 2024-02-06 DIAGNOSIS — N40.1 BENIGN PROSTATIC HYPERPLASIA WITH NOCTURIA: ICD-10-CM

## 2024-02-06 DIAGNOSIS — N32.81 OAB (OVERACTIVE BLADDER): ICD-10-CM

## 2024-02-06 DIAGNOSIS — I70.0 AORTIC ATHEROSCLEROSIS: ICD-10-CM

## 2024-02-06 DIAGNOSIS — J12.82 PNEUMONIA DUE TO COVID-19 VIRUS: ICD-10-CM

## 2024-02-06 DIAGNOSIS — U07.1 PNEUMONIA DUE TO COVID-19 VIRUS: ICD-10-CM

## 2024-02-06 DIAGNOSIS — R35.1 NOCTURIA: ICD-10-CM

## 2024-02-06 DIAGNOSIS — Z09 HOSPITAL DISCHARGE FOLLOW-UP: ICD-10-CM

## 2024-02-06 DIAGNOSIS — Z87.09 HISTORY OF ACUTE RESPIRATORY FAILURE: ICD-10-CM

## 2024-02-06 DIAGNOSIS — I10 HTN (HYPERTENSION), BENIGN: ICD-10-CM

## 2024-02-06 DIAGNOSIS — R35.1 BENIGN PROSTATIC HYPERPLASIA WITH NOCTURIA: ICD-10-CM

## 2024-02-06 DIAGNOSIS — Z09 HOSPITAL DISCHARGE FOLLOW-UP: Primary | ICD-10-CM

## 2024-02-06 DIAGNOSIS — R25.1 TREMOR: ICD-10-CM

## 2024-02-06 LAB
ALBUMIN SERPL BCP-MCNC: 3.5 G/DL (ref 3.5–5.2)
ALP SERPL-CCNC: 98 U/L (ref 55–135)
ALT SERPL W/O P-5'-P-CCNC: 24 U/L (ref 10–44)
ANION GAP SERPL CALC-SCNC: 8 MMOL/L (ref 8–16)
AST SERPL-CCNC: 23 U/L (ref 10–40)
BASOPHILS # BLD AUTO: 0.09 K/UL (ref 0–0.2)
BASOPHILS NFR BLD: 1 % (ref 0–1.9)
BILIRUB SERPL-MCNC: 0.4 MG/DL (ref 0.1–1)
BUN SERPL-MCNC: 23 MG/DL (ref 8–23)
CALCIUM SERPL-MCNC: 9.7 MG/DL (ref 8.7–10.5)
CHLORIDE SERPL-SCNC: 105 MMOL/L (ref 95–110)
CO2 SERPL-SCNC: 25 MMOL/L (ref 23–29)
CREAT SERPL-MCNC: 0.7 MG/DL (ref 0.5–1.4)
DIFFERENTIAL METHOD BLD: ABNORMAL
EOSINOPHIL # BLD AUTO: 0.3 K/UL (ref 0–0.5)
EOSINOPHIL NFR BLD: 3.3 % (ref 0–8)
ERYTHROCYTE [DISTWIDTH] IN BLOOD BY AUTOMATED COUNT: 14.6 % (ref 11.5–14.5)
EST. GFR  (NO RACE VARIABLE): >60 ML/MIN/1.73 M^2
GLUCOSE SERPL-MCNC: 98 MG/DL (ref 70–110)
HCT VFR BLD AUTO: 41.3 % (ref 40–54)
HGB BLD-MCNC: 13.6 G/DL (ref 14–18)
IMM GRANULOCYTES # BLD AUTO: 0.03 K/UL (ref 0–0.04)
IMM GRANULOCYTES NFR BLD AUTO: 0.3 % (ref 0–0.5)
LYMPHOCYTES # BLD AUTO: 2.1 K/UL (ref 1–4.8)
LYMPHOCYTES NFR BLD: 23.9 % (ref 18–48)
MCH RBC QN AUTO: 31.9 PG (ref 27–31)
MCHC RBC AUTO-ENTMCNC: 32.9 G/DL (ref 32–36)
MCV RBC AUTO: 97 FL (ref 82–98)
MONOCYTES # BLD AUTO: 1 K/UL (ref 0.3–1)
MONOCYTES NFR BLD: 10.9 % (ref 4–15)
NEUTROPHILS # BLD AUTO: 5.3 K/UL (ref 1.8–7.7)
NEUTROPHILS NFR BLD: 60.6 % (ref 38–73)
NRBC BLD-RTO: 0 /100 WBC
PLATELET # BLD AUTO: 223 K/UL (ref 150–450)
PMV BLD AUTO: 11 FL (ref 9.2–12.9)
POTASSIUM SERPL-SCNC: 5.1 MMOL/L (ref 3.5–5.1)
PROT SERPL-MCNC: 7.4 G/DL (ref 6–8.4)
RBC # BLD AUTO: 4.27 M/UL (ref 4.6–6.2)
SODIUM SERPL-SCNC: 138 MMOL/L (ref 136–145)
WBC # BLD AUTO: 8.74 K/UL (ref 3.9–12.7)

## 2024-02-06 PROCEDURE — 3288F FALL RISK ASSESSMENT DOCD: CPT | Mod: HCNC,CPTII,S$GLB, | Performed by: STUDENT IN AN ORGANIZED HEALTH CARE EDUCATION/TRAINING PROGRAM

## 2024-02-06 PROCEDURE — 99999 PR PBB SHADOW E&M-EST. PATIENT-LVL V: CPT | Mod: PBBFAC,HCNC,, | Performed by: STUDENT IN AN ORGANIZED HEALTH CARE EDUCATION/TRAINING PROGRAM

## 2024-02-06 PROCEDURE — 1160F RVW MEDS BY RX/DR IN RCRD: CPT | Mod: HCNC,CPTII,S$GLB, | Performed by: STUDENT IN AN ORGANIZED HEALTH CARE EDUCATION/TRAINING PROGRAM

## 2024-02-06 PROCEDURE — 80053 COMPREHEN METABOLIC PANEL: CPT | Mod: HCNC | Performed by: STUDENT IN AN ORGANIZED HEALTH CARE EDUCATION/TRAINING PROGRAM

## 2024-02-06 PROCEDURE — 36415 COLL VENOUS BLD VENIPUNCTURE: CPT | Mod: HCNC,PN | Performed by: STUDENT IN AN ORGANIZED HEALTH CARE EDUCATION/TRAINING PROGRAM

## 2024-02-06 PROCEDURE — 1125F AMNT PAIN NOTED PAIN PRSNT: CPT | Mod: HCNC,CPTII,S$GLB, | Performed by: STUDENT IN AN ORGANIZED HEALTH CARE EDUCATION/TRAINING PROGRAM

## 2024-02-06 PROCEDURE — 99214 OFFICE O/P EST MOD 30 MIN: CPT | Mod: HCNC,S$GLB,, | Performed by: STUDENT IN AN ORGANIZED HEALTH CARE EDUCATION/TRAINING PROGRAM

## 2024-02-06 PROCEDURE — 1100F PTFALLS ASSESS-DOCD GE2>/YR: CPT | Mod: HCNC,CPTII,S$GLB, | Performed by: STUDENT IN AN ORGANIZED HEALTH CARE EDUCATION/TRAINING PROGRAM

## 2024-02-06 PROCEDURE — 1159F MED LIST DOCD IN RCRD: CPT | Mod: HCNC,CPTII,S$GLB, | Performed by: STUDENT IN AN ORGANIZED HEALTH CARE EDUCATION/TRAINING PROGRAM

## 2024-02-06 PROCEDURE — 85025 COMPLETE CBC W/AUTO DIFF WBC: CPT | Mod: HCNC | Performed by: STUDENT IN AN ORGANIZED HEALTH CARE EDUCATION/TRAINING PROGRAM

## 2024-02-06 PROCEDURE — 1157F ADVNC CARE PLAN IN RCRD: CPT | Mod: HCNC,CPTII,S$GLB, | Performed by: STUDENT IN AN ORGANIZED HEALTH CARE EDUCATION/TRAINING PROGRAM

## 2024-02-06 PROCEDURE — 3079F DIAST BP 80-89 MM HG: CPT | Mod: HCNC,CPTII,S$GLB, | Performed by: STUDENT IN AN ORGANIZED HEALTH CARE EDUCATION/TRAINING PROGRAM

## 2024-02-06 PROCEDURE — 3075F SYST BP GE 130 - 139MM HG: CPT | Mod: HCNC,CPTII,S$GLB, | Performed by: STUDENT IN AN ORGANIZED HEALTH CARE EDUCATION/TRAINING PROGRAM

## 2024-02-12 ENCOUNTER — PATIENT MESSAGE (OUTPATIENT)
Dept: PRIMARY CARE CLINIC | Facility: CLINIC | Age: 74
End: 2024-02-12
Payer: MEDICARE

## 2024-02-20 ENCOUNTER — HOSPITAL ENCOUNTER (OUTPATIENT)
Dept: RADIOLOGY | Facility: HOSPITAL | Age: 74
Discharge: HOME | End: 2024-02-20
Attending: STUDENT IN AN ORGANIZED HEALTH CARE EDUCATION/TRAINING PROGRAM
Payer: MEDICARE

## 2024-02-20 ENCOUNTER — OFFICE VISIT (OUTPATIENT)
Dept: PODIATRY | Facility: CLINIC | Age: 74
End: 2024-02-20
Payer: MEDICARE

## 2024-02-20 VITALS
BODY MASS INDEX: 40.82 KG/M2 | DIASTOLIC BLOOD PRESSURE: 81 MMHG | HEIGHT: 73 IN | WEIGHT: 308 LBS | SYSTOLIC BLOOD PRESSURE: 158 MMHG | HEART RATE: 77 BPM | RESPIRATION RATE: 18 BRPM

## 2024-02-20 DIAGNOSIS — M19.079 ARTHRITIS OF ANKLE JOINT: ICD-10-CM

## 2024-02-20 DIAGNOSIS — R25.1 TREMOR: ICD-10-CM

## 2024-02-20 DIAGNOSIS — S92.332A CLOSED DISPLACED FRACTURE OF THIRD METATARSAL BONE OF LEFT FOOT, INITIAL ENCOUNTER: ICD-10-CM

## 2024-02-20 DIAGNOSIS — S92.342A CLOSED DISPLACED FRACTURE OF FOURTH METATARSAL BONE OF LEFT FOOT, INITIAL ENCOUNTER: ICD-10-CM

## 2024-02-20 DIAGNOSIS — S92.332D CLOSED DISPLACED FRACTURE OF THIRD METATARSAL BONE OF LEFT FOOT WITH ROUTINE HEALING, SUBSEQUENT ENCOUNTER: Primary | ICD-10-CM

## 2024-02-20 DIAGNOSIS — M25.571 CHRONIC PAIN OF BOTH ANKLES: ICD-10-CM

## 2024-02-20 DIAGNOSIS — G89.29 CHRONIC PAIN OF BOTH ANKLES: ICD-10-CM

## 2024-02-20 DIAGNOSIS — M25.572 CHRONIC PAIN OF BOTH ANKLES: ICD-10-CM

## 2024-02-20 DIAGNOSIS — S92.342D CLOSED DISPLACED FRACTURE OF FOURTH METATARSAL BONE OF LEFT FOOT WITH ROUTINE HEALING, SUBSEQUENT ENCOUNTER: ICD-10-CM

## 2024-02-20 PROCEDURE — 1159F MED LIST DOCD IN RCRD: CPT | Mod: CPTII,S$GLB,, | Performed by: STUDENT IN AN ORGANIZED HEALTH CARE EDUCATION/TRAINING PROGRAM

## 2024-02-20 PROCEDURE — 1125F AMNT PAIN NOTED PAIN PRSNT: CPT | Mod: CPTII,S$GLB,, | Performed by: STUDENT IN AN ORGANIZED HEALTH CARE EDUCATION/TRAINING PROGRAM

## 2024-02-20 PROCEDURE — 70450 CT HEAD/BRAIN W/O DYE: CPT | Mod: TC

## 2024-02-20 PROCEDURE — 99999 PR PBB SHADOW E&M-EST. PATIENT-LVL IV: CPT | Mod: PBBFAC,,, | Performed by: STUDENT IN AN ORGANIZED HEALTH CARE EDUCATION/TRAINING PROGRAM

## 2024-02-20 PROCEDURE — 3077F SYST BP >= 140 MM HG: CPT | Mod: CPTII,S$GLB,, | Performed by: STUDENT IN AN ORGANIZED HEALTH CARE EDUCATION/TRAINING PROGRAM

## 2024-02-20 PROCEDURE — 1157F ADVNC CARE PLAN IN RCRD: CPT | Mod: CPTII,S$GLB,, | Performed by: STUDENT IN AN ORGANIZED HEALTH CARE EDUCATION/TRAINING PROGRAM

## 2024-02-20 PROCEDURE — 1101F PT FALLS ASSESS-DOCD LE1/YR: CPT | Mod: CPTII,S$GLB,, | Performed by: STUDENT IN AN ORGANIZED HEALTH CARE EDUCATION/TRAINING PROGRAM

## 2024-02-20 PROCEDURE — 73630 X-RAY EXAM OF FOOT: CPT | Mod: TC,LT

## 2024-02-20 PROCEDURE — 99214 OFFICE O/P EST MOD 30 MIN: CPT | Mod: S$GLB,,, | Performed by: STUDENT IN AN ORGANIZED HEALTH CARE EDUCATION/TRAINING PROGRAM

## 2024-02-20 PROCEDURE — 3288F FALL RISK ASSESSMENT DOCD: CPT | Mod: CPTII,S$GLB,, | Performed by: STUDENT IN AN ORGANIZED HEALTH CARE EDUCATION/TRAINING PROGRAM

## 2024-02-20 PROCEDURE — 3079F DIAST BP 80-89 MM HG: CPT | Mod: CPTII,S$GLB,, | Performed by: STUDENT IN AN ORGANIZED HEALTH CARE EDUCATION/TRAINING PROGRAM

## 2024-02-20 PROCEDURE — 73630 X-RAY EXAM OF FOOT: CPT | Mod: 26,LT,, | Performed by: RADIOLOGY

## 2024-02-20 PROCEDURE — 70450 CT HEAD/BRAIN W/O DYE: CPT | Mod: 26,,, | Performed by: RADIOLOGY

## 2024-02-20 RX ORDER — DULOXETIN HYDROCHLORIDE 20 MG/1
20 CAPSULE, DELAYED RELEASE ORAL 2 TIMES DAILY
Qty: 60 CAPSULE | Refills: 11 | Status: SHIPPED | OUTPATIENT
Start: 2024-02-20 | End: 2025-02-19

## 2024-02-20 NOTE — PROGRESS NOTES
Subjective:     Patient    Adan Webb is a 73 y.o. male.    Problem    09/27/23: Presents for thick discolored nails causing pain, especially at 1st toes. Previously tried antifungals which did not help. Also has had left STJ/sinus tarsi pain for years, has had 2x steroid shots at the sinus tarsi which helped for a period of weeks to months each. Has had custom inserts in his shoes for a couple of years and recently had them adjusted, they help. Does not take medication for arthritis. Has attempted Voltaren without improvement.      01/19/24: Returns for thick discolored toenails causing pain, still primarily at 1st toes. Also with recurrent left STJ/sinus tarsi pain and now with pain at left lateral Kager's triangle; also with similar right sided pain but this does not bother him as much. Also with left forefoot pain and swelling for the past month or so, does not recall an injury, the pain has improved but the swelling is lingering.     02/20/24: Returns for followup of left metatarsal fractures; first seen on imaging 1 month ago although at that time they were already subacute with signs of healing. Has been walking normally, standard footwear, has been avoiding exercises. Also still with ongoing chronic left ankle pain. Did not respond well to last attempt at steroid injection.     History    History obtained from patient and review of medical records.     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, didn't use ear protection    Dermatitis 12/9/2020    nystat groin    DJD (degenerative joint disease) of hip 1/29/2015    Enlarged liver 4/8/2021    US 4/21     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    MCA , see MRI    Hiatal hernia 4/1/2021    CT chest tiny 2018    HTN (hypertension), benign 8/20/2020    Morbid obesity with BMI of 40.0-44.9, adult 1/29/2015    OAB (overactive bladder) 4/1/2021    Rash with pads    Right-sided low back pain with right-sided sciatica 3/6/2019    Seasonal allergic rhinitis due to pollen 4/13/2016    Thoracic aortic aneurysm without rupture 05/27/2018    tx with Su, Cardiologist Dr Stone, 5.1 CTS Dr Duenas, follows yearly    Transient cerebral ischemia 6/6/2018       Past Surgical History:   Procedure Laterality Date    A-V CARDIAC PACEMAKER INSERTION N/A 08/05/2021    Procedure: INSERTION, CARDIAC PACEMAKER, DUAL CHAMBER;  Surgeon: Sheldon Miranda MD;  Location: [facility] EP LAB;  Service: Cardiology;  Laterality: N/A;  Near syncope,SB,Sinus Pause, RBBB,LAFB, Dual PPM, BIO, MAC, [place], 3 Prep    ADENOIDECTOMY      CATARACT EXTRACTION      CYSTOSCOPY N/A 7/26/2023    Procedure: CYSTOSCOPY;  Surgeon: Jamal Zamora MD;  Location: [facility] OR [address] [place] [address];  Service: Urology;  Laterality: N/A;    CYSTOSCOPY WITH URODYNAMIC TESTING N/A 10/25/2021    Procedure: CYSTOSCOPY, WITH URODYNAMIC TESTING FLOUROSCOPIC;  Surgeon: Sancho Wesley MD;  Location: [facility] OR [address] [place] [address];  Service: Urology;  Laterality: N/A;  1hr    HERNIA REPAIR      INJECTION OF BOTULINUM TOXIN TYPE A N/A 7/26/2023    Procedure: INJECTION, BOTULINUM TOXIN, TYPE A;  Surgeon: Jamal Zamora MD;  Location: [facility] OR [address] [place] [address];  Service: Urology;  Laterality: N/A;  100 units    TONSILLECTOMY      YAG LAser Capsulotomy Bilateral     2/18/2019 OS Dr. Cornelius        Objective:     Vitals  Wt Readings from Last 1 Encounters:   02/20/24 (!) 139.7 kg (307 lb  15.7 oz)     Temp Readings from Last 1 Encounters:   24 98 °F (36.7 °C)     BP Readings from Last 1 Encounters:   24 (!) 158/81     Pulse Readings from Last 1 Encounters:   24 77       Dermatological Exam    Skin:  Pedal hair growth diminished and Pedal skin thin and shiny on right  Pedal hair growth diminished and Pedal skin thin and shiny on left    Nails:  10 nail(s) elongated, 10 nail(s) thickened, and 10 nail(s) discolored; tender along medial and lateral borders of both 1st toenails, not clinically infected    Vascular Exam    Arteries:  Posterior tibial artery palpable on right  Dorsalis pedis artery palpable on right  Posterior tibial artery palpable on left  Dorsalis pedis artery palpable on left    Veins:  Superficial veins unremarkable on right  Superficial veins unremarkable on left    Swellin+ pitting on right  1+ pitting on left; 2+ at left forefoot    Neurological Exam    [place] touch test:  6/6 sites sensed, light touch intact     Musculoskeletal Exam    Footwear:  Casual on right  Casual on left    Gait Exam:   Ambulatory Status: Ambulatory  Gait: Normal  Assistive Devices: None    Foot Progression Angle:  Normal on right  Normal on left     Right Lower Extremity Additional Findings:  Mild pain on palpation lateral STJ, sinus tarsi, Kager triangle  Right foot and ankle function, strength, and range of motion unremarkable except as noted above.     Left Lower Extremity Additional Findings:  Severe pain on palpation lateral STJ, sinus tarsi, Kager triangle  Left foot and ankle function, strength, and range of motion unremarkable except as noted above.    Imaging and Other Tests    Imagin24 B foot and ankle X rays: bilateral STJ severe arthritis vs STJ coalition; left 3rd and 4th metatarsal fractures with signs of healing    Independently reviewed and interpreted imaging, findings are as follows:     24 left foot X rays: interval healing of 3rd and 4th metatarsal  fractures, alignment relatively unchanged     Assessment:     Encounter Diagnoses   Name Primary?    Closed displaced fracture of third metatarsal bone of left foot with routine healing, subsequent encounter Yes    Closed displaced fracture of fourth metatarsal bone of left foot with routine healing, subsequent encounter     Chronic pain of both ankles     Arthritis of ankle joint             Plan:     I counseled the patient on his conditions, their implications and medical management.    Bilateral ankle pain, arthritis: chronic stable  -Increase duloxetine to 40 mg/day.     Tinea unguium, pain: chronic stable   -Previously discussed treatment options including (1) monitoring, (2) debridement, (3) topical antifungals, (4) oral antifungals. Discussed potential risks, benefits, alternatives to each. Patient opted for debridements only.  -Will return for routine foot care.      Left 3rd and 4th metatarsal fractures: subacute improving  -X rays reviewed and interpreted as above.   -Should continue to heal. Can resume normal activity including exercise. No further monitoring needed.       Return to clinic in 1-2 months for routine foot care and chronic ankle pain, call sooner PRN.

## 2024-02-22 DIAGNOSIS — G96.08 SUBDURAL HYGROMA: Primary | ICD-10-CM

## 2024-02-22 NOTE — PROGRESS NOTES
Hi,     Could you please contact the patient and let them know that their results?    No acute concerning findings for a bleed or recent infarct, but he does have some bilateral fluid collections with some shifting.  I am going to put in a referral for Neurosurgery to see if any follow up as necessary.

## 2024-03-04 ENCOUNTER — PATIENT MESSAGE (OUTPATIENT)
Dept: UROLOGY | Facility: CLINIC | Age: 74
End: 2024-03-04
Payer: MEDICARE

## 2024-03-18 ENCOUNTER — TELEPHONE (OUTPATIENT)
Dept: ADMINISTRATIVE | Facility: CLINIC | Age: 74
End: 2024-03-18
Payer: MEDICARE

## 2024-03-18 NOTE — TELEPHONE ENCOUNTER
Called pt; informed pt I was calling to confirm his virtual EAWV on 3/20/24 at 10:00am and to see if he needed any help; pt stated he did not need any help and completed his e-pre check already; pt informed to login 10 minutes prior to appt time

## 2024-03-20 ENCOUNTER — OFFICE VISIT (OUTPATIENT)
Dept: INTERNAL MEDICINE | Facility: CLINIC | Age: 74
End: 2024-03-20
Payer: MEDICARE

## 2024-03-20 VITALS — SYSTOLIC BLOOD PRESSURE: 140 MMHG | DIASTOLIC BLOOD PRESSURE: 83 MMHG | HEART RATE: 67 BPM

## 2024-03-20 DIAGNOSIS — J84.10 CALCIFIED GRANULOMA OF LUNG: ICD-10-CM

## 2024-03-20 DIAGNOSIS — Z95.0 PRESENCE OF CARDIAC PACEMAKER: ICD-10-CM

## 2024-03-20 DIAGNOSIS — E66.01 SEVERE OBESITY (BMI 35.0-39.9) WITH COMORBIDITY: ICD-10-CM

## 2024-03-20 DIAGNOSIS — Z00.00 ENCOUNTER FOR PREVENTIVE HEALTH EXAMINATION: Primary | ICD-10-CM

## 2024-03-20 DIAGNOSIS — I70.0 AORTIC ATHEROSCLEROSIS: ICD-10-CM

## 2024-03-20 DIAGNOSIS — I45.2 BIFASCICULAR BLOCK: ICD-10-CM

## 2024-03-20 PROBLEM — I48.91 ATRIAL FIBRILLATION, UNSPECIFIED TYPE: Status: ACTIVE | Noted: 2024-03-20

## 2024-03-20 PROCEDURE — 1101F PT FALLS ASSESS-DOCD LE1/YR: CPT | Mod: HCNC,CPTII,95, | Performed by: NURSE PRACTITIONER

## 2024-03-20 PROCEDURE — 3288F FALL RISK ASSESSMENT DOCD: CPT | Mod: HCNC,CPTII,95, | Performed by: NURSE PRACTITIONER

## 2024-03-20 PROCEDURE — 1157F ADVNC CARE PLAN IN RCRD: CPT | Mod: HCNC,CPTII,95, | Performed by: NURSE PRACTITIONER

## 2024-03-20 PROCEDURE — 3079F DIAST BP 80-89 MM HG: CPT | Mod: HCNC,CPTII,95, | Performed by: NURSE PRACTITIONER

## 2024-03-20 PROCEDURE — 3077F SYST BP >= 140 MM HG: CPT | Mod: HCNC,CPTII,95, | Performed by: NURSE PRACTITIONER

## 2024-03-20 PROCEDURE — 1159F MED LIST DOCD IN RCRD: CPT | Mod: HCNC,CPTII,95, | Performed by: NURSE PRACTITIONER

## 2024-03-20 PROCEDURE — G0439 PPPS, SUBSEQ VISIT: HCPCS | Mod: HCNC,95,, | Performed by: NURSE PRACTITIONER

## 2024-03-20 PROCEDURE — 1160F RVW MEDS BY RX/DR IN RCRD: CPT | Mod: HCNC,CPTII,95, | Performed by: NURSE PRACTITIONER

## 2024-03-20 PROCEDURE — 1170F FXNL STATUS ASSESSED: CPT | Mod: HCNC,CPTII,95, | Performed by: NURSE PRACTITIONER

## 2024-03-20 NOTE — PATIENT INSTRUCTIONS
Counseling and Referral of Other Preventative  (Italic type indicates deductible and co-insurance are waived)    Patient Name: Adan Webb  Today's Date: 3/20/2024    Health Maintenance       Date Due Completion Date    Shingles Vaccine (1 of 2) Never done ---    RSV Vaccine (Age 60+ and Pregnant patients) (1 - 1-dose 60+ series) Never done ---    Influenza Vaccine (1) 09/01/2023 10/17/2022    COVID-19 Vaccine (6 - 2023-24 season) 09/01/2023 10/6/2022    Lipid Panel 03/02/2028 3/2/2023    TETANUS VACCINE 01/14/2029 1/14/2019        No orders of the defined types were placed in this encounter.      The following information is provided to all patients.  This information is to help you find resources for any of the problems found today that may be affecting your health:                  Living healthy guide: www.[facility].[place]      Understanding Diabetes: www.diabetes.org      Eating healthy: www.cdc.gov/healthyweight      [facility] home safety checklist: www.cdc.gov/steadi/patient.html      Agency on Aging: www.goea.[place].[facility]      Alcoholics anonymous (AA): www.aa.org      Physical Activity: www.donny.nih.gov/ns7tlru      Tobacco use: www.quitwithusla.org

## 2024-03-20 NOTE — PROGRESS NOTES
The patient location is: [place]  The chief complaint leading to consultation is: HRA    Visit type: audiovisual    Face to Face time with patient: 20  35 minutes of total time spent on the encounter, which includes face to face time and non-face to face time preparing to see the patient (eg, review of tests), Obtaining and/or reviewing separately obtained history, Documenting clinical information in the electronic or other health record, Independently interpreting results (not separately reported) and communicating results to the patient/family/caregiver, or Care coordination (not separately reported).         Each patient to whom he or she provides medical services by telemedicine is:  (1) informed of the relationship between the physician and patient and the respective role of any other health care provider with respect to management of the patient; and (2) notified that he or she may decline to receive medical services by telemedicine and may withdraw from such care at any time.    Notes:       Adan Webb presented for a  Medicare AWV and comprehensive Health Risk Assessment today. The following components were reviewed and updated:    Medical history  Family History  Social history  Allergies and Current Medications  Health Risk Assessment  Health Maintenance  Care Team         ** See Completed Assessments for Annual Wellness Visit within the encounter summary.**         The following assessments were completed:  Living Situation  CAGE  Depression Screening  Fall Risk Assessment (MACH 10)  Hearing Assessment(HHI)  Cognitive Function Screening  Nutrition Screening  ADL Screening  PAQ Screening    Opioid documentation:      Patient does not have a current opioid prescription.        Vitals:    03/20/24 1017   BP: (!) 140/83   Pulse: 67     There is no height or weight on file to calculate BMI.  Physical Exam  Constitutional:       Appearance: Normal appearance.   HENT:      Head: Normocephalic and  atraumatic.      Right Ear: External ear normal.      Left Ear: External ear normal.      Nose: Nose normal.   Pulmonary:      Effort: Pulmonary effort is normal.   Neurological:      Mental Status: He is alert and oriented to person, place, and time.   Psychiatric:         Behavior: Behavior normal.               Diagnoses and health risks identified today and associated recommendations/orders:    1. Encounter for preventive health examination  Health Maintenance updated   Records reviewed   Exam done     2. 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  Followed by PCP.     3. Severe obesity (BMI 35.0-39.9) with comorbidity  BMI reviewed.     4. Bifascicular block  Stable and chronic. Followed by PCP.    5. Presence of cardiac pacemaker  Stable and chronic. Followed by PCP.     6. Aortic atherosclerosis  Stable and chronic. Continue current medications. Followed by PCP.       Counseling and Referral of Other Preventative  (Italic type indicates deductible and co-insurance are waived)    Patient Name: Adna Webb  Today's Date: 3/20/2024    Health Maintenance         Date Due Completion Date    Shingles Vaccine (1 of 2) Never done ---    RSV Vaccine (Age 60+ and Pregnant patients) (1 - 1-dose 60+ series) Never done ---    Influenza Vaccine (1) 09/01/2023 10/17/2022    COVID-19 Vaccine (6 - 2023-24 season) 09/01/2023 10/6/2022    Lipid Panel 03/02/2028 3/2/2023    TETANUS VACCINE 01/14/2029 1/14/2019          No orders of the defined types were placed in this encounter.      Provided Adan with a 5-10 year written screening schedule and personal prevention plan. Recommendations were developed using the USPSTF age appropriate recommendations. Education, counseling, and referrals were provided as needed. After Visit Summary printed and given to patient which includes a list of additional screenings\tests needed.    No follow-ups on file.    Leyla Figueroa NP  I offered to discuss advanced care planning,  including how to pick a person who would make decisions for you if you were unable to make them for yourself, called a health care power of , and what kind of decisions you might make such as use of life sustaining treatments such as ventilators and tube feeding when faced with a life limiting illness recorded on a living will that they will need to know. (How you want to be cared for as you near the end of your natural life)     X  Patient has advanced directives on file, which we reviewed, and they do not wish to make changes.

## 2024-03-25 PROBLEM — J12.82 PNEUMONIA DUE TO COVID-19 VIRUS: Status: RESOLVED | Noted: 2023-12-19 | Resolved: 2024-03-25

## 2024-03-25 PROBLEM — U07.1 PNEUMONIA DUE TO COVID-19 VIRUS: Status: RESOLVED | Noted: 2023-12-19 | Resolved: 2024-03-25

## 2024-03-25 PROBLEM — J96.01 ACUTE HYPOXIC RESPIRATORY FAILURE: Chronic | Status: RESOLVED | Noted: 2023-12-19 | Resolved: 2024-03-25

## 2024-03-27 ENCOUNTER — TELEPHONE (OUTPATIENT)
Dept: PRIMARY CARE CLINIC | Facility: CLINIC | Age: 74
End: 2024-03-27
Payer: MEDICARE

## 2024-03-27 DIAGNOSIS — I63.412 CEREBRAL INFARCTION DUE TO EMBOLISM OF LEFT MIDDLE CEREBRAL ARTERY: ICD-10-CM

## 2024-03-27 NOTE — TELEPHONE ENCOUNTER
Pt calling stating his pharmacy has been sending over refill request for metoprolol succinate (TOPROL-XL) 50 MG 24 hr tablet and no one has responded to the request. I informed him that there is no record of a refill request to Dr Reyes's office for metoprolol succinate. I told him the last doctor that prescribed the medication was Sheldon Miranda MD ( Cardiology) in 2022. Pt state he is going to call the pharmacy to see what provider they have been reaching out to. I offered to send a refill request to Dr Reyes but he stated that he will contact cardiology for a refill request first.

## 2024-03-27 NOTE — TELEPHONE ENCOUNTER
----- Message from Lashonda Vickers sent at 3/27/2024  3:50 PM CDT -----  Requesting an RX refill or new RX.  Is this a refill or new RX: Refill  RX name and strength metoprolol succinate (TOPROL-XL) 50 MG 24 hr tablet  Is this a 30 day or 90 day RX:   Pharmacy name and phone # [facility] - [place] - [address] [place] [address] Phone: [phone] Fax: [phone]

## 2024-03-27 NOTE — TELEPHONE ENCOUNTER
Care Due:                  Date            Visit Type   Department     Provider  --------------------------------------------------------------------------------                                MYCHART                              ANNUAL                              CHECKUP/PHY  [facility]  Last Visit: 02-      S            MALORIE Grant  Next Visit: None Scheduled  None         None Found                                                            Last  Test          Frequency    Reason                     Performed    Due Date  --------------------------------------------------------------------------------    Lipid Panel.  12 months..  rosuvastatin.............  03- 02-    Health [facility] Embedded Care Due Messages. Reference number: [id number].   3/27/2024 4:13:30 PM CDT

## 2024-03-28 DIAGNOSIS — I63.412 CEREBRAL INFARCTION DUE TO EMBOLISM OF LEFT MIDDLE CEREBRAL ARTERY: ICD-10-CM

## 2024-03-28 DIAGNOSIS — Z95.0 PRESENCE OF CARDIAC PACEMAKER: ICD-10-CM

## 2024-03-28 DIAGNOSIS — I49.3 PVC (PREMATURE VENTRICULAR CONTRACTION): Primary | ICD-10-CM

## 2024-03-28 RX ORDER — METOPROLOL SUCCINATE 50 MG/1
50 TABLET, EXTENDED RELEASE ORAL DAILY
Qty: 90 TABLET | Refills: 2 | Status: SHIPPED | OUTPATIENT
Start: 2024-03-28

## 2024-03-28 RX ORDER — METOPROLOL SUCCINATE 50 MG/1
50 TABLET, EXTENDED RELEASE ORAL DAILY
Qty: 90 TABLET | Refills: 3 | Status: SHIPPED | OUTPATIENT
Start: 2024-03-28 | End: 2024-04-09 | Stop reason: SDUPTHER

## 2024-04-02 DIAGNOSIS — I49.3 PVC (PREMATURE VENTRICULAR CONTRACTION): Primary | ICD-10-CM

## 2024-04-08 NOTE — PROGRESS NOTES
Mr. Webb is a patient of Dr. Miranda and was last seen in clinic 6/13/2023.      Subjective:   Patient ID:  Adan Webb is a 74 y.o. male who presents for follow up of Pacemaker Check  .     HPI:    Mr. Webb is a 74 y.o. male with PVC, bifascicular block, HTN, sinus pauses,. PPM here for follow up.     Background:    Mr. Cain has a hx of frequent, mildly symptomatic PVCs (papillary muscle in origin?) with baseline RBBB/LAFB, and hypertension. He was seen in EP clinic 1/19/2021 for his PVCs. He noted only episodes of intermittent skipped heart beats with slow heart rates intermittently noted on a pulse-oximeter. I recommended Holter monitor/ECHO and likely uptitration of metoprolol. Suspect his observed bradycardia on his pulse ox was secondary to ectopy. Plan was if no bradycardia issues on holter would likely increase metoprolol. If EF abnormal, would recommend stress test and if normal consider PVC ablation.    4/2021: Mr. Cain returned for follow-up. Holter monitor noted sinus rhythm with an average rate of 69 bpm with a 1.2% PVC burden. His echocardiogram noted normal LV function with severe aortic root dilation (5.1cm). He is now on 50mg of metoprolol succinate daily. He has no current complaints besides over-active bladder.    7/6/2021: Pt experienced LOC event. Event monitor ordered.     8/4/2021: Received an alert on patient's event monitor for sinus pause/bradycardia. Patient with symptomatic PVCs on 50mg of metoprolol who had an episode of near syncope for which a monitor was applied. For this current event, reports he just ate breakfast and was lying in his recliner when he felt a sudden wave of light-headedness from his feet to his head. His wife reports his eyes rolled back in his head and he started shaking. He is unsure if he fully lost consciousness however does not remember shaking. He reports once he became fully awake he noted he was very tired, which has persisted.    Event monitor strips reviewed and are consistent with a few seconds of sinus rate slowing followed by several sinus pauses up to 6.5 seconds in duration with a sinus rate of ~20-30 bpm in between followed by sinus rate speeding and then normal sinus rhythm. He has a RBBB/LAFB however no complete heart block was observed. Taken together this was likely a vagal episode, unclear what the trigger was. Recommend PPM implantation.      8/5/2021: Successful implantation of PPM Dual.     3/2022: Seen by Lian Oates. Today he reports he has had no more syncopal spells device device implant. Primary complaint is back pain and nocturia. No CP, THACKER, palpitations, LH, syncope.    6/13/2023: PPM is operating normally. Continue remote checks. ECG is sinus rhythm with RBBB/LAFB.    Update (04/09/2024):    12/20/2023: ED with acute hypoxic respiratory failure due to COVID pneumonia.     Today his primary complaint is fatigue due to nocturia interrupting his sleep. No cardiac complaints. Says he walks for exercise. Cleans pool daily. No THACKER, CP, LH. No syncope since PPM implant.   Did have a fall back in December when sick with covid and broke his foot.    Device Interrogation (4/9/2024) reveals an intrinsic SR with stable lead and device function. No arrhythmias or treated episodes were noted. PVC 2%. He paces 62% in the RA and 2% in the RV. Estimated battery longevity 7.1 years.     I have personally reviewed the patient's EKG today, which shows SR with RBBB/LAFB and PVC at 85bpm. NJ interval is 186. QRS is 146. QTc is 490.    Relevant Cardiac Test Results:    2D Echo (3/20/2023):  Mild left atrial enlargement.  The left ventricle is normal in size with concentric remodeling and normal systolic function.  Indeterminate left ventricular diastolic function.  The estimated ejection fraction is 60%.  Normal right ventricular size with normal right ventricular systolic function.  Normal central venous pressure (3 mmHg).  The  estimated PA systolic pressure is 23 mmHg.  The ascending aorta is severely dilated measuring 5.4 cm.  The aortic root is normal in size and the aortic valve is trileaflet.    Current Outpatient Medications   Medication Sig    albuterol (VENTOLIN HFA) 90 mcg/actuation inhaler Inhale 2 puffs into the lungs every 6 (six) hours as needed for Wheezing. Rescue    ALLERGY RELIEF, LORATADINE, 10 mg tablet Take 10 mg by mouth daily as needed.    azelastine (ASTELIN) 137 mcg (0.1 %) nasal spray 1 spray (137 mcg total) by Nasal route 2 (two) times daily.    BABY ASPIRIN ORAL Take 81 tablets by mouth every evening.     cholecalciferol, vitamin D3, 1,000 unit capsule Take 1,000 Units by mouth once daily.    coenzyme Q10 100 mg capsule Take by mouth once daily.    diclofenac sodium (VOLTAREN) 1 % Gel Apply 2 g topically 4 (four) times daily.    DULoxetine (CYMBALTA) 20 MG capsule Take 1 capsule (20 mg total) by mouth 2 (two) times daily.    fluticasone propionate (FLONASE) 50 mcg/actuation nasal spray 1 spray by Each Nostril route once daily.    glucosamine-chondroitin 500-400 mg tablet Take 1 tablet by mouth 2 (two) times daily.     imipramine (TOFRANIL) 25 MG tablet Take 1 tablet (25 mg total) by mouth every evening.    metoprolol succinate (TOPROL-XL) 50 MG 24 hr tablet Take 1 tablet (50 mg total) by mouth once daily.    metoprolol succinate (TOPROL-XL) 50 MG 24 hr tablet Take 1 tablet (50 mg total) by mouth once daily.    MULTIVITAMIN W-MINERALS/LUTEIN (CENTRUM SILVER ORAL) Take by mouth once daily.     nystatin-triamcinolone (MYCOLOG II) cream APPLY TOPICALLY 4 (FOUR) TIMES DAILY.    omega-3 fatty acids 300 mg Cap Take 1 tablet by mouth once daily.     rosuvastatin (CRESTOR) 10 MG tablet TAKE 1 TABLET (10 MG TOTAL) BY MOUTH ONCE DAILY.    tamsulosin (FLOMAX) 0.4 mg Cap Take 1 capsule (0.4 mg total) by mouth once daily.    TURMERIC ORAL Take by mouth. Pt take 1 in the evening.    VITAMIN B COMPLEX (SUPER B COMPLEX-B-12 ORAL)  "Take by mouth.     No current facility-administered medications for this visit.       Review of Systems   Constitutional: Negative for malaise/fatigue.   Cardiovascular:  Negative for chest pain, dyspnea on exertion, irregular heartbeat, leg swelling and palpitations.   Respiratory:  Negative for shortness of breath.    Hematologic/Lymphatic: Negative for bleeding problem.   Skin:  Negative for rash.   Musculoskeletal:  Negative for myalgias.   Gastrointestinal:  Negative for hematemesis, hematochezia and nausea.   Genitourinary:  Negative for hematuria.   Neurological:  Negative for light-headedness.   Psychiatric/Behavioral:  Negative for altered mental status.    Allergic/Immunologic: Negative for persistent infections.       Objective:          /63   Pulse 85   Ht 6' 1" (1.854 m)   Wt (!) 139.4 kg (307 lb 5.1 oz)   BMI 40.55 kg/m²     Physical Exam  Vitals and nursing note reviewed.   Constitutional:       Appearance: Normal appearance. He is well-developed.   HENT:      Head: Normocephalic.      Nose: Nose normal.   Eyes:      Pupils: Pupils are equal, round, and reactive to light.   Cardiovascular:      Rate and Rhythm: Normal rate and regular rhythm.   Pulmonary:      Effort: No respiratory distress.      Breath sounds: Normal breath sounds.   Chest:      Comments: Device to LUCW. Incision and pocket in good repair.    Musculoskeletal:         General: Normal range of motion.   Skin:     General: Skin is warm and dry.      Findings: No erythema.   Neurological:      Mental Status: He is alert and oriented to person, place, and time.   Psychiatric:         Speech: Speech normal.         Behavior: Behavior normal.           Lab Results   Component Value Date     02/06/2024    K 5.1 02/06/2024    MG 1.9 12/18/2023    BUN 23 02/06/2024    CREATININE 0.7 02/06/2024    ALT 24 02/06/2024    AST 23 02/06/2024    HGB 13.6 (L) 02/06/2024    HCT 41.3 02/06/2024    TSH 3.536 04/01/2021    LDLCALC 70.2 " 03/02/2023       Recent Labs   Lab 08/05/21  0845   INR 1.0       Assessment:     1. Presence of cardiac pacemaker    2. Atrial fibrillation, unspecified type    3. Bifascicular block    4. HTN (hypertension), benign    5. PVC (premature ventricular contraction)      Plan:     In summary, Mr. Webb is a 74 y.o. male with PVC, bifascicular block, HTN, sinus pauses,. PPM here for follow up.   Mr. Webb is doing well from a device perspective with stable lead and device function. No arrhythmia noted. RV pacing 2%. Echo 3/2023 normal LVEF. No CHF symptoms. Treated for covid PNA in December.  PVC 2%. On toprol 50mg daily. Stable bifascicular block on ECG. Urology appt this afternoon for nocturia. RTC 1 yr.     Continue current medication regimen and device settings.   Follow up in device clinic as scheduled.   Follow up in EP clinic in 1 year, sooner as needed.     *A copy of this note has been sent to Dr. Miranda*    Follow up in about 1 year (around 4/9/2025).    ------------------------------------------------------------------    HENRRY Reynoso, NP-C  Cardiac Electrophysiology

## 2024-04-09 ENCOUNTER — HOSPITAL ENCOUNTER (OUTPATIENT)
Dept: CARDIOLOGY | Facility: CLINIC | Age: 74
Discharge: HOME | End: 2024-04-09
Payer: MEDICARE

## 2024-04-09 ENCOUNTER — CLINICAL SUPPORT (OUTPATIENT)
Dept: CARDIOLOGY | Facility: HOSPITAL | Age: 74
End: 2024-04-09
Attending: INTERNAL MEDICINE
Payer: MEDICARE

## 2024-04-09 ENCOUNTER — OFFICE VISIT (OUTPATIENT)
Dept: ELECTROPHYSIOLOGY | Facility: CLINIC | Age: 74
End: 2024-04-09
Payer: MEDICARE

## 2024-04-09 ENCOUNTER — OFFICE VISIT (OUTPATIENT)
Dept: UROLOGY | Facility: CLINIC | Age: 74
End: 2024-04-09
Payer: MEDICARE

## 2024-04-09 VITALS
BODY MASS INDEX: 40.73 KG/M2 | HEIGHT: 73 IN | WEIGHT: 307.31 LBS | DIASTOLIC BLOOD PRESSURE: 63 MMHG | HEART RATE: 85 BPM | SYSTOLIC BLOOD PRESSURE: 139 MMHG

## 2024-04-09 VITALS
WEIGHT: 286.63 LBS | BODY MASS INDEX: 37.99 KG/M2 | HEIGHT: 73 IN | DIASTOLIC BLOOD PRESSURE: 77 MMHG | SYSTOLIC BLOOD PRESSURE: 148 MMHG | HEART RATE: 83 BPM

## 2024-04-09 DIAGNOSIS — Z95.0 PRESENCE OF CARDIAC PACEMAKER: Primary | ICD-10-CM

## 2024-04-09 DIAGNOSIS — G47.30 SLEEP APNEA, UNSPECIFIED TYPE: ICD-10-CM

## 2024-04-09 DIAGNOSIS — I49.3 PVC (PREMATURE VENTRICULAR CONTRACTION): ICD-10-CM

## 2024-04-09 DIAGNOSIS — R35.1 NOCTURIA: Primary | ICD-10-CM

## 2024-04-09 DIAGNOSIS — Z95.0 PRESENCE OF CARDIAC PACEMAKER: ICD-10-CM

## 2024-04-09 DIAGNOSIS — I10 HTN (HYPERTENSION), BENIGN: ICD-10-CM

## 2024-04-09 DIAGNOSIS — I45.2 BIFASCICULAR BLOCK: ICD-10-CM

## 2024-04-09 LAB
OHS QRS DURATION: 146 MS
OHS QTC CALCULATION: 490 MS

## 2024-04-09 PROCEDURE — 93010 ELECTROCARDIOGRAM REPORT: CPT | Mod: S$GLB,,, | Performed by: INTERNAL MEDICINE

## 2024-04-09 PROCEDURE — 99214 OFFICE O/P EST MOD 30 MIN: CPT | Mod: S$GLB,,, | Performed by: NURSE PRACTITIONER

## 2024-04-09 PROCEDURE — 93280 PM DEVICE PROGR EVAL DUAL: CPT | Mod: 26,,, | Performed by: INTERNAL MEDICINE

## 2024-04-09 PROCEDURE — 1157F ADVNC CARE PLAN IN RCRD: CPT | Mod: CPTII,S$GLB,, | Performed by: NURSE PRACTITIONER

## 2024-04-09 PROCEDURE — 3288F FALL RISK ASSESSMENT DOCD: CPT | Mod: CPTII,S$GLB,, | Performed by: UROLOGY

## 2024-04-09 PROCEDURE — 3077F SYST BP >= 140 MM HG: CPT | Mod: CPTII,S$GLB,, | Performed by: UROLOGY

## 2024-04-09 PROCEDURE — 1125F AMNT PAIN NOTED PAIN PRSNT: CPT | Mod: CPTII,S$GLB,, | Performed by: NURSE PRACTITIONER

## 2024-04-09 PROCEDURE — 99999 PR PBB SHADOW E&M-EST. PATIENT-LVL IV: CPT | Mod: PBBFAC,,, | Performed by: NURSE PRACTITIONER

## 2024-04-09 PROCEDURE — 99215 OFFICE O/P EST HI 40 MIN: CPT | Mod: S$GLB,,, | Performed by: UROLOGY

## 2024-04-09 PROCEDURE — 1101F PT FALLS ASSESS-DOCD LE1/YR: CPT | Mod: CPTII,S$GLB,, | Performed by: NURSE PRACTITIONER

## 2024-04-09 PROCEDURE — 1125F AMNT PAIN NOTED PAIN PRSNT: CPT | Mod: CPTII,S$GLB,, | Performed by: UROLOGY

## 2024-04-09 PROCEDURE — 3075F SYST BP GE 130 - 139MM HG: CPT | Mod: CPTII,S$GLB,, | Performed by: NURSE PRACTITIONER

## 2024-04-09 PROCEDURE — 3078F DIAST BP <80 MM HG: CPT | Mod: CPTII,S$GLB,, | Performed by: UROLOGY

## 2024-04-09 PROCEDURE — 93005 ELECTROCARDIOGRAM TRACING: CPT | Mod: S$GLB,,, | Performed by: INTERNAL MEDICINE

## 2024-04-09 PROCEDURE — 99999 PR PBB SHADOW E&M-EST. PATIENT-LVL IV: CPT | Mod: PBBFAC,,, | Performed by: UROLOGY

## 2024-04-09 PROCEDURE — 3078F DIAST BP <80 MM HG: CPT | Mod: CPTII,S$GLB,, | Performed by: NURSE PRACTITIONER

## 2024-04-09 PROCEDURE — 1159F MED LIST DOCD IN RCRD: CPT | Mod: CPTII,S$GLB,, | Performed by: NURSE PRACTITIONER

## 2024-04-09 PROCEDURE — 93280 PM DEVICE PROGR EVAL DUAL: CPT

## 2024-04-09 PROCEDURE — 3288F FALL RISK ASSESSMENT DOCD: CPT | Mod: CPTII,S$GLB,, | Performed by: NURSE PRACTITIONER

## 2024-04-09 PROCEDURE — 1159F MED LIST DOCD IN RCRD: CPT | Mod: CPTII,S$GLB,, | Performed by: UROLOGY

## 2024-04-09 PROCEDURE — 1157F ADVNC CARE PLAN IN RCRD: CPT | Mod: CPTII,S$GLB,, | Performed by: UROLOGY

## 2024-04-09 PROCEDURE — 1101F PT FALLS ASSESS-DOCD LE1/YR: CPT | Mod: CPTII,S$GLB,, | Performed by: UROLOGY

## 2024-04-09 RX ORDER — LIDOCAINE HYDROCHLORIDE 20 MG/ML
JELLY TOPICAL ONCE
Status: CANCELLED | OUTPATIENT
Start: 2024-04-09 | End: 2024-04-09

## 2024-04-09 RX ORDER — DOXYCYCLINE HYCLATE 100 MG
100 TABLET ORAL ONCE
Status: CANCELLED | OUTPATIENT
Start: 2024-04-09 | End: 2024-04-09

## 2024-04-09 NOTE — PROGRESS NOTES
CHIEF COMPLAINT:  Follow up Nocturia with voiding diary      HISTORY OF PRESENTING ILLINESS:  Adan Webb is a 74 y.o. male established to urology. Presents today due to nocturia x4-5 and urgency. Pt states there has been no improvement in urinary symptoms since cysto with Botox 7/26/2023 with Dr. Zamora. Pt also reports pain at the tip of the penis and intermittent suprapubic pain (not today). He is currently not taking Flomax. He has not been on Flomax x 2 weeks. States symptoms do not change or improve with Flomax. He experienced an episode of hypertension one morning with dizziness 2 weeks ago. States he was not on myrbetriq, vesicare or Flomax at the time of this episode. He only drinks water. He stops fluid intake 2 hours prior to bedtime and elevates his legs 2 hours before bedtime. He takes a variety of vitamins throughout the day. He wears pads due to urgency. PMHx listed below.     Date: 07/26/2023  Pre-Op Diagnosis: Overactive bladder  Post-Op Diagnosis: same  Procedure(s) Performed:   1.  Cystoscopy with bladder botox injection  Findings: Normal Bladder Mucosa, Left lateral lobe hyperplasia, would be a candidate for Rezum therapy.   100u successfully injected into bladder mucosa    Pt states that botox injection did not resolve his urinary problems.  He still gets up 2 to 10 x a night.  Voiding diary revealed that he voids 50 to 150 ml during the day time ( 8 x a day) and night time 125 ml to 300 ml ( 6 x a night).  He definitely produces more urine volume at night than during the day time.  C/o some urgency, sensation of urge radiating to the tip of the penisl  Burt dysuria, fever, chills etc.    Had question about Candida infection he read about in the news.        REVIEW OF SYSTEMS:  Review of Systems   Constitutional:  Negative for chills and fever.   HENT:  Negative for congestion and sore throat.    Respiratory:  Negative for cough and shortness of breath.    Cardiovascular:  Negative for  chest pain and palpitations.   Gastrointestinal:  Negative for nausea and vomiting.   Genitourinary:  Positive for urgency. Negative for dysuria, flank pain, frequency and hematuria.        +nocturia   Neurological:  Negative for dizziness and headaches.         PATIENT HISTORY:    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, didn't use ear protection    Dermatitis 12/9/2020    nystat groin    DJD (degenerative joint disease) of hip 1/29/2015    Enlarged liver 4/8/2021    US 4/21     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    MCA , see MRI    Hiatal hernia 4/1/2021    CT chest tiny 2018    HTN (hypertension), benign 8/20/2020    Morbid obesity with BMI of 40.0-44.9, adult 1/29/2015    OAB (overactive bladder) 4/1/2021    Rash with pads    Right-sided low back pain with right-sided sciatica 3/6/2019    Seasonal allergic rhinitis due to pollen 4/13/2016    Thoracic aortic aneurysm without rupture 05/27/2018    tx with Su, Cardiologist Dr Stone, 5.1 CTS Dr Deunas, follows yearly    Transient cerebral ischemia 6/6/2018       Past Surgical History:   Procedure Laterality Date    A-V CARDIAC PACEMAKER INSERTION N/A 08/05/2021    Procedure: INSERTION, CARDIAC PACEMAKER, DUAL CHAMBER;  Surgeon: Sheldon Miranda MD;  Location: [facility];  Service: Cardiology;  Laterality: N/A;  Near syncope,SB,Sinus Pause, RBBB,LAFB, Dual PPM, BIO, MAC,  KS, 3 Prep    ADENOIDECTOMY      CATARACT EXTRACTION      CYSTOSCOPY N/A 2023    Procedure: CYSTOSCOPY;  Surgeon: Jamal Zamora MD;  Location: [facility] OR [address] [place] [address];  Service: Urology;  Laterality: N/A;    CYSTOSCOPY WITH URODYNAMIC TESTING N/A 10/25/2021    Procedure: CYSTOSCOPY, WITH URODYNAMIC TESTING FLOUROSCOPIC;  Surgeon: Sancho Wesley MD;  Location: [facility] OR [address] [place] [address];  Service: Urology;  Laterality: N/A;  1hr    HERNIA REPAIR      INJECTION OF BOTULINUM TOXIN TYPE A N/A 2023    Procedure: INJECTION, BOTULINUM TOXIN, TYPE A;  Surgeon: Jamal Zamora MD;  Location: [facility] OR [address] [place] [address];  Service: Urology;  Laterality: N/A;  100 units    TONSILLECTOMY      YAG LAser Capsulotomy Bilateral     2019 OS Dr. Cornelius       Family History   Problem Relation Age of Onset    Diabetes Mother     Leukemia Father     Aneurysm Father     No Known Problems Daughter     No Known Problems Son     Cataracts Paternal Uncle     Amblyopia Neg Hx     Blindness Neg Hx     Glaucoma Neg Hx     Macular degeneration Neg Hx     Retinal detachment Neg Hx     Strabismus Neg Hx        Social History     Socioeconomic History    Marital status:    Tobacco Use    Smoking status: Former     Types: Cigarettes     Start date:      Quit date:      Years since quittin.3    Smokeless tobacco: Never   Substance and Sexual Activity    Alcohol use: Yes     Comment: wine, seldom    Drug use: No    Sexual activity: Yes     Partners: Female   Social History Narrative     to 'T', 2 children, nonsmoker, ETOH none, never had colonscopy & declines     Social Determinants of Health     Financial Resource Strain: Low Risk  (2024)    Overall Financial Resource Strain (CARDIA)     Difficulty of Paying Living Expenses: Not hard at all   Food Insecurity: No Food Insecurity (2024)    Hunger Vital Sign     Worried About Running Out of Food in the Last Year: Never true     Ran Out of Food in the Last Year: Never true    Transportation Needs: No Transportation Needs (2/6/2024)    PRAPARE - Transportation     Lack of Transportation (Medical): No     Lack of Transportation (Non-Medical): No   Physical Activity: Inactive (2/6/2024)    Exercise Vital Sign     Days of Exercise per Week: 0 days     Minutes of Exercise per Session: 0 min   Stress: No Stress Concern Present (2/6/2024)    [employer] - Occupational Stress Questionnaire     Feeling of Stress : Not at all   Social Connections: Socially Integrated (2/6/2024)    Social Connection and Isolation Panel [NHANES]     Frequency of Communication with Friends and Family: More than three times a week     Frequency of Social Gatherings with Friends and Family: More than three times a week     Attends Holiness Services: More than 4 times per year     Active Member of Clubs or Organizations: Yes     Attends Club or Organization Meetings: 1 to 4 times per year     Marital Status:    Housing Stability: Low Risk  (2/6/2024)    Housing Stability Vital Sign     Unable to Pay for Housing in the Last Year: No     Number of Places Lived in the Last Year: 1     Unstable Housing in the Last Year: No       Allergies:  Augmentin [amoxicillin-pot clavulanate], Azithromycin, Avodart [dutasteride], House dust mite, Mold, Naproxen, Ragweed, and Synthroid [levothyroxine]    Medications:    Current Outpatient Medications:     albuterol (VENTOLIN HFA) 90 mcg/actuation inhaler, Inhale 2 puffs into the lungs every 6 (six) hours as needed for Wheezing. Rescue, Disp: 18 g, Rfl: 1    ALLERGY RELIEF, LORATADINE, 10 mg tablet, Take 10 mg by mouth daily as needed., Disp: , Rfl:     azelastine (ASTELIN) 137 mcg (0.1 %) nasal spray, 1 spray (137 mcg total) by Nasal route 2 (two) times daily., Disp: 30 mL, Rfl: 12    BABY ASPIRIN ORAL, Take 81 tablets by mouth every evening. , Disp: , Rfl:     cholecalciferol, vitamin D3, 1,000 unit capsule, Take 1,000 Units by mouth once daily.,  Disp: , Rfl:     coenzyme Q10 100 mg capsule, Take by mouth once daily., Disp: , Rfl:     diclofenac sodium (VOLTAREN) 1 % Gel, Apply 2 g topically 4 (four) times daily., Disp: 1 Tube, Rfl: 2    DULoxetine (CYMBALTA) 20 MG capsule, Take 1 capsule (20 mg total) by mouth 2 (two) times daily., Disp: 60 capsule, Rfl: 11    fluticasone propionate (FLONASE) 50 mcg/actuation nasal spray, 1 spray by Each Nostril route once daily., Disp: , Rfl:     glucosamine-chondroitin 500-400 mg tablet, Take 1 tablet by mouth 2 (two) times daily. , Disp: , Rfl:     imipramine (TOFRANIL) 25 MG tablet, Take 1 tablet (25 mg total) by mouth every evening., Disp: 30 tablet, Rfl: 11    metoprolol succinate (TOPROL-XL) 50 MG 24 hr tablet, Take 1 tablet (50 mg total) by mouth once daily., Disp: 90 tablet, Rfl: 2    MULTIVITAMIN W-MINERALS/LUTEIN (CENTRUM SILVER ORAL), Take by mouth once daily. , Disp: , Rfl:     nystatin-triamcinolone (MYCOLOG II) cream, APPLY TOPICALLY 4 (FOUR) TIMES DAILY., Disp: 60 g, Rfl: 5    omega-3 fatty acids 300 mg Cap, Take 1 tablet by mouth once daily. , Disp: , Rfl:     rosuvastatin (CRESTOR) 10 MG tablet, TAKE 1 TABLET (10 MG TOTAL) BY MOUTH ONCE DAILY., Disp: 90 tablet, Rfl: 1    tamsulosin (FLOMAX) 0.4 mg Cap, Take 1 capsule (0.4 mg total) by mouth once daily., Disp: 30 capsule, Rfl: 11    TURMERIC ORAL, Take by mouth. Pt take 1 in the evening., Disp: , Rfl:     VITAMIN B COMPLEX (SUPER B COMPLEX-B-12 ORAL), Take by mouth., Disp: , Rfl:         PHYSICAL EXAMINATION:  Physical Exam  Constitutional:       Appearance: Normal appearance.   HENT:      Head: Normocephalic and atraumatic.      Right Ear: External ear normal.      Left Ear: External ear normal.   Pulmonary:      Effort: Pulmonary effort is normal. No respiratory distress.   Skin:     General: Skin is warm and dry.   Neurological:      General: No focal deficit present.      Mental Status: He is alert and oriented to person, place, and time.   Psychiatric:          Mood and Affect: Mood normal.         Behavior: Behavior normal.         LABS:  UA WNL  PVR 0 ml      Lab Results   Component Value Date    PSA 2.7 01/20/2015    PSA 1.5 05/30/2008    PSA 2.1 05/11/2005    PSADIAG 2.5 07/16/2018    PSADIAG 2.8 06/12/2017    PSADIAG 2.7 08/18/2014       Lab Results   Component Value Date    CREATININE 0.7 02/06/2024    EGFRNORACEVR >60.0 02/06/2024     UA today pH 5    PVR bladder scan: 63 ml.    IMPRESSION:  Encounter Diagnoses   Name Primary?    Nocturia Yes    Sleep apnea, unspecified type              Assessment:     Nocturia  -     Ambulatory referral/consult to Sleep Disorders; Future; Expected date: 04/16/2024  -     Simple Urodynamics w/ Cysto; Future  -     Urine Culture High Risk; Standing  -     POCT Urinalysis; Standing    Sleep apnea, unspecified type  -     Ambulatory referral/consult to Sleep Disorders; Future; Expected date: 04/16/2024    Other orders  -     LIDOcaine HCl 2% urojet  -     doxycycline tablet 100 mg               Plan:   -based on his urinary symptoms, mainly bothersome nocturia, I explained that he has Nocturnal polyuria.  Therefore, as long as his body makes more urine than his bladder can hold, he will get up at night and urinate.    Options of treatment including botox injection ( higher dose of 200 untis) sacral neuromodulation explained in depth.  May evaluate him with SUDS cysto to determine what else is going on.  He tried imipramine 25 mg q hs and noticed minimal improvement.  His nocturnal polyuria is decreased from 6 x to 5 x.    All questions answered.  Fluid restriction especially 4 hours before going to bed.  Low salt diet.  R/o sleep apnea if indicated.    At the end, he would like to resume Flomax in the evening time and try Imipramine at bed time to see whether these meds will improve his nocturia.  All questions answered.  I spent 40 minutes with the patient of which more than half was spent in direct consultation with the  patient in regards to our treatment and plan.     Follow up for Urodynamic Study (SUDS) cysto.

## 2024-04-17 LAB
OHS CV DC REMOTE DEVICE TYPE: NORMAL
OHS CV RV PACING PERCENT: 2 %

## 2024-04-24 ENCOUNTER — CLINICAL SUPPORT (OUTPATIENT)
Dept: CARDIOLOGY | Facility: HOSPITAL | Age: 74
End: 2024-04-24
Attending: INTERNAL MEDICINE
Payer: MEDICARE

## 2024-04-24 DIAGNOSIS — I45.2 BIFASCICULAR BLOCK: ICD-10-CM

## 2024-04-24 PROCEDURE — 93296 REM INTERROG EVL PM/IDS: CPT | Mod: HCNC | Performed by: INTERNAL MEDICINE

## 2024-04-24 PROCEDURE — 93294 REM INTERROG EVL PM/LDLS PM: CPT | Mod: HCNC,,, | Performed by: INTERNAL MEDICINE

## 2024-04-30 ENCOUNTER — TELEPHONE (OUTPATIENT)
Dept: PRIMARY CARE CLINIC | Facility: CLINIC | Age: 74
End: 2024-04-30
Payer: MEDICARE

## 2024-04-30 NOTE — TELEPHONE ENCOUNTER
I sw pt. He states that he has no idea what I am talking about. States that did not contact the office requesting anything today. States he prev seen Neurology and had a CT head and has another upcoming appt with them

## 2024-04-30 NOTE — TELEPHONE ENCOUNTER
----- Message from Nay Romo sent at 4/30/2024  9:21 AM CDT -----  Pt is requesting a call back to discuss referral to Neurosurgery. Pt can be reached at [phone].      Thank you

## 2024-05-10 ENCOUNTER — TELEPHONE (OUTPATIENT)
Dept: UROLOGY | Facility: CLINIC | Age: 74
End: 2024-05-10
Payer: MEDICARE

## 2024-05-10 ENCOUNTER — PROCEDURE VISIT (OUTPATIENT)
Dept: UROLOGY | Facility: CLINIC | Age: 74
End: 2024-05-10
Payer: MEDICARE

## 2024-05-10 VITALS
BODY MASS INDEX: 40.29 KG/M2 | HEIGHT: 73 IN | HEART RATE: 82 BPM | WEIGHT: 304 LBS | TEMPERATURE: 97 F | DIASTOLIC BLOOD PRESSURE: 80 MMHG | SYSTOLIC BLOOD PRESSURE: 173 MMHG

## 2024-05-10 DIAGNOSIS — N13.8 BPH WITH URINARY OBSTRUCTION: Primary | ICD-10-CM

## 2024-05-10 DIAGNOSIS — N39.41 URGE INCONTINENCE: ICD-10-CM

## 2024-05-10 DIAGNOSIS — N40.1 BPH WITH URINARY OBSTRUCTION: Primary | ICD-10-CM

## 2024-05-10 DIAGNOSIS — N32.81 OAB (OVERACTIVE BLADDER): ICD-10-CM

## 2024-05-10 DIAGNOSIS — R35.1 NOCTURIA: ICD-10-CM

## 2024-05-10 DIAGNOSIS — N32.89 BLADDER SPASMS: ICD-10-CM

## 2024-05-10 PROCEDURE — 51741 ELECTRO-UROFLOWMETRY FIRST: CPT | Mod: 26,HCNC,51,S$GLB | Performed by: UROLOGY

## 2024-05-10 PROCEDURE — 51728 CYSTOMETROGRAM W/VP: CPT | Mod: 26,HCNC,S$GLB, | Performed by: UROLOGY

## 2024-05-10 PROCEDURE — 51797 INTRAABDOMINAL PRESSURE TEST: CPT | Mod: 26,HCNC,S$GLB, | Performed by: UROLOGY

## 2024-05-10 PROCEDURE — 87086 URINE CULTURE/COLONY COUNT: CPT | Mod: HCNC | Performed by: UROLOGY

## 2024-05-10 PROCEDURE — 52000 CYSTOURETHROSCOPY: CPT | Mod: HCNC,59,S$GLB, | Performed by: UROLOGY

## 2024-05-10 PROCEDURE — 51784 ANAL/URINARY MUSCLE STUDY: CPT | Mod: 26,HCNC,51,S$GLB | Performed by: UROLOGY

## 2024-05-10 RX ORDER — DOXYCYCLINE HYCLATE 100 MG
100 TABLET ORAL ONCE
Status: COMPLETED | OUTPATIENT
Start: 2024-05-10 | End: 2024-05-10

## 2024-05-10 RX ORDER — FINASTERIDE 5 MG/1
5 TABLET, FILM COATED ORAL DAILY
Qty: 90 TABLET | Refills: 3 | Status: SHIPPED | OUTPATIENT
Start: 2024-05-10 | End: 2025-05-10

## 2024-05-10 RX ORDER — LIDOCAINE HYDROCHLORIDE 20 MG/ML
JELLY TOPICAL ONCE
Status: COMPLETED | OUTPATIENT
Start: 2024-05-10 | End: 2024-05-10

## 2024-05-10 RX ADMIN — Medication 100 MG: at 09:05

## 2024-05-10 RX ADMIN — LIDOCAINE HYDROCHLORIDE: 20 JELLY TOPICAL at 09:05

## 2024-05-10 NOTE — TELEPHONE ENCOUNTER
Spoke to the pt while in clinic to offer surgery date of 5/22, pt accepted. Went over pre-op instructions while in clinic and informed the pt I will call the day before surgery with arrival time and pre op instructions after 2pm. Pt verbalized understanding.

## 2024-05-10 NOTE — PROCEDURES
Simple Urodynamics w/ Cysto    Date/Time: 5/10/2024 8:00 AM    Performed by: Jamal Zamora MD  Authorized by: Jamal Zamora MD  Comments: Procedure Date:  05/10/2024    Procedure:   Diagnostic Cystourethroscopy   Complex Cystometrogram   Voiding / Pressure Study with Intrarectal Balloon   Complex Uroflow   Electromyogram of Anal Sphincter.     Pre-OP Diagnosis:   OAB, nocturia, BPH with obstruction   Post-OP Diagnosis:   Same, bladder spasm   Anesthesia:   Anesthesia Administered:   Intraurethral instillation of 10 mL 2% lidocaine (Xylocaine) jelly.   Findings:   Uroflow: voided 9 ml at Q max 2.8 ml/sec  --- Bladder ---   CYSTOMETROGRAM ( Filling Phase ):   Cystometric Numeric Data:   - First Desire (Sensation): 74 mL.   - Normal Desire: 125 mL   - Strong Desire: 240 mL .   - Urgency (Imminent Void) : 240 mL    - Maximum Cystometric Capacity: 240 mL.   Compliance:   - low.   Leak Point Pressure:   - Valsalva ( Abdominal ) Leak Point Pressure: none.   UROFLOW:   - Voided Volume: 109 mL.   - Residual Urine: 20 mL.   - Maximum Flow Rate: 7.2 mL/sec.   - Flow Pattern: weak and low amplitude  VOIDING PRESSURE STUDY ( Voiding Phase ):   Detrusor Pressure:   - Maximum Detrusor Pressure: 56 cm of water.   - Detrusor Pressure at Maximum Flow: 43 cm of water.   - Detrusor Contraction Characteristics: Sustained contraction(s).   ELECTROMYOGRAM:   - normal.     ---Diagnostic Cystourethroscopy ---   Normal urethra.    Prostate: 4 cm bilateral obstruction  Width of Bladder Neck Opening: Approximately 18 Fr.   Normal bladder. 2 + trabeculation. No lesion or tumor seen.  Normal ureteral orifices bilaterally.       Description of Procedure:   Informed Consent:   - Risks, benefits and alternatives of procedure discussed with   patient and informed consent obtained.   Patient Position:   - Supine.   --- Bladder ---   Prep and Drape:   - Patient prepped and draped in usual sterile fashion using povidone   iodine (Betadine).   ---  Diagnostic Cystourethroscopy ---   Instruments:   - 16 Fr flexible cystoscope with 0 degree lens.   Procedure Details:   - Cystoscope passed under vision into bladder.   - Bladder and urethra examined in their entirety with findings as   above.   --- Urodynamic Studies ---   Procedure Details:   Cystometrogram:   - Catheter(s) passed into the bladder.   - Rectal balloon inserted.   - Catheter(s) connected to infusion medium and to pressure recording   device.   - Infusion Rate: 30 mL / min.   Electromyogram:   - Perineal electromyogram pad placed and connected to electromyogram   recording device.   Equipment:   - Catheters: Double lumen catheter.   - Medium: Liquid.   - Pressure Recording Device: Calibrated electronic equipment.   Complications:   No immediate complications.    CONCLUSIONS:   1. BPH with obstruction  2. Decreased bladder capacity with bladder spasm.  He voided spontaneously at 125 ml bladder capacity.  3. OAB and nocturia    Had cysto botox injection back in 7/26/23.  So far he failed to respond to flomax and imipramine for his LUTS.  Will plan Rezum Therapy and combined with cysto botox injections 100 units.  OK to continue daily baby ASA.    Post-OP Plan:   Patient was discharged home in a stable condition.  Medications: doxy  Follow up:  surgery         BPH with urinary obstruction  -     finasteride (PROSCAR) 5 mg tablet; Take 1 tablet (5 mg total) by mouth once daily.  Dispense: 90 tablet; Refill: 3    Nocturia  -     Simple Urodynamics w/ Cysto    Other orders  -     LIDOcaine HCl 2% urojet  -     doxycycline tablet 100 mg

## 2024-05-10 NOTE — PATIENT INSTRUCTIONS
_                                                                                                                                                                                             If any problems after hours or weekends, you may call [phone] and ask for the urology resident on call.SIMPLE URODYNAMIC STUDY (SUDS) & CYSTOSCOPY  UROLOGY CLINIC DISCHARGE INSTRUCTIONS    You have had a procedure that will require time to properly heal. Follow the instructions you have been given on how to care for yourself once you are home. Below is additional information to help in your recovery.    ACTIVITY  There are no restrictions in activity. Start doing again the things you did before the procedure.  You may experience a slight burning sensation. You may notice a small amount of blood in your urine. This will clear up within a day. Call the clinic if this continues beyond 48 hours.    DIET  Continue your normal diet. You may eat the same foods you ate before your procedure.  Drink plenty of fluids during the first 24-48 hours following your procedure.    MEDICATIONS  Resume all other previous medications from your prescribing physician.  Continue any pre=procedure antibiotics until they are all gone.    SIGNS AND SYMPTOMS TO REPORT TO THE DOCTOR  Chills or fever greater than 101° F within 24 hours of procedure.  Changes in urination, such as increased bleeding, foul smell, cloudy urine, or painful urination.  Call your doctor with any questions or concerns.    For any emergency situation, call 911 immediately or go to your nearest emergency room.    [facility]  [phone]

## 2024-05-11 LAB — BACTERIA UR CULT: NO GROWTH

## 2024-05-13 LAB
OHS CV AF BURDEN PERCENT: < 1
OHS CV DC REMOTE DEVICE TYPE: NORMAL
OHS CV RV PACING PERCENT: 2 %

## 2024-05-13 NOTE — TELEPHONE ENCOUNTER
BPH with urinary obstruction  -     Case Request Operating Room: CYSTOSCOPY WITH TRANSURETHRAL DESTRUCTION OF PROSTATE,RFA, CYSTOSCOPY,WITH BOTULINUM TOXIN INJECTION    OAB (overactive bladder)  -     Case Request Operating Room: CYSTOSCOPY WITH TRANSURETHRAL DESTRUCTION OF PROSTATE,RFA, CYSTOSCOPY,WITH BOTULINUM TOXIN INJECTION    Urge incontinence  -     Case Request Operating Room: CYSTOSCOPY WITH TRANSURETHRAL DESTRUCTION OF PROSTATE,RFA, CYSTOSCOPY,WITH BOTULINUM TOXIN INJECTION    Bladder spasms  -     Case Request Operating Room: CYSTOSCOPY WITH TRANSURETHRAL DESTRUCTION OF PROSTATE,RFA, CYSTOSCOPY,WITH BOTULINUM TOXIN INJECTION

## 2024-05-14 NOTE — ANESTHESIA PAT ROS NOTE
05/14/2024  Adan Webb is a 74 y.o., male.      Pre-op Assessment    I have reviewed the NPO Status.   I have reviewed the Medications.     Review of Systems  Anesthesia Hx:  No problems with previous Anesthesia             Denies Family Hx of Anesthesia complications.    Denies Personal Hx of Anesthesia complications.                    Social:  Non-Smoker, Former Smoker, Social Alcohol Use       Hematology/Oncology:  Hematology Normal   Oncology Normal                                   EENT/Dental:  chronic allergic rhinitis  Eyes: Visual Impairment   Has S/P Extraction - Bilateral and Bilateral Catarract                  Cardiovascular:  Exercise tolerance: good  Pacemaker (pt s/p AV PPM 08/05/21) Hypertension   Denies MI.     Dysrhythmias (RBBB, LAFB)    Denies CHF.       ECG has been reviewed.             Carotoid Artery Disease (hemisperic carotid artery syndrome)             Other Cardiac Conditions Aortic atherosclerosis  Pulmonary:  Pulmonary Normal   Denies COPD.  Denies Asthma.                    Renal/:    BPH  Renal Symptoms/Infections/Stones: nocturia, incontinence, urgency, frequency.             Hepatic/GI:    Hiatal Hernia,  Liver Disease, (enlarged liver)      Hernia, Hiatal Hernia  Biliary Disease Pt h/o calculus of gall bladder    Musculoskeletal:  Arthritis   Right low back pain with right sciatica, chronic pain bilateral ankles, DJD and OA hip       Spine Disorders:  Chronic Pain and Degenerative disease           Neurological:  TIA,  Neuromuscular Disease,   Denies Seizures.    Pt h/o hemispheric carotid artery syndrome and h/o transient cerebral ischemia                            Endocrine:  Denies Diabetes.         Morbid Obesity / BMI > 40  Dermatological:  Skin Normal    Psych:  Psychiatric Normal                         Anesthesia Assessment: Preoperative  EQUATION    Planned Procedure: Procedure(s) (LRB):  CYSTOSCOPY WITH TRANSURETHRAL DESTRUCTION OF PROSTATE,RFA (N/A)  CYSTOSCOPY,WITH BOTULINUM TOXIN INJECTION (N/A)  Requested Anesthesia Type:Monitor Anesthesia Care  Surgeon: Jamal Zamora MD  Service: Urology  Known or anticipated Date of Surgery:5/22/2024    Surgeon notes: reviewed    Electronic QUestionnaire Assessment completed via nurse interview with patient.        Triage considerations:     The patient has no apparent active cardiac condition (No unstable coronary Syndrome such as severe unstable angina or recent [<1 month] myocardial infarction, decompensated CHF, severe valvular   disease or significant arrhythmia)    Previous anesthesia records:GETA and No problems  07/26/23 CYSTOSCOPY (Bladder), INJECTION, BOTULINUM TOXIN, TYPE A (Bladder), gen anes, ASA 3     Last PCP note: > 1 year ago , within [facility]   Subspecialty notes: Urology    Other important co-morbidities: HTN, Morbid Obesity, and BPH with urinary obstruction, OAB, urge incontinence, bladder spasms, nocturia 4-5x a night, h/o cysto with botox 07/26/23 but pt reports no relief, h/o abn TSH, allergic rhinitis, chronic pain bilateral ankles, OA and DJD hip, right low back pain with right sciatica, hemispheric carotid artery syndrome, transient cerebral ischemia, thoracic AA without rupture, h/o RBBB and LAFB, pt s/p PPM, aortic atherosclerosis, hiatal hernia, bilateral cataract s/p extraction       Tests already available:  Available tests,  within 3 months , within [facility] .   04/09/24 EKG              Instructions given. (See in Nurse's note)    Optimization:  Anesthesia Preop Clinic Assessment Indicated:     --phone screen       Plan:    Testing:  CMP, Hematology Profile, PT/INR, and PTT (DOS)       Navigation:  Straight Line to surgery.               No tests, anesthesia preop clinic visit, or consult required.

## 2024-05-21 ENCOUNTER — TELEPHONE (OUTPATIENT)
Dept: UROLOGY | Facility: CLINIC | Age: 74
End: 2024-05-21
Payer: MEDICARE

## 2024-05-21 ENCOUNTER — PATIENT MESSAGE (OUTPATIENT)
Dept: UROLOGY | Facility: CLINIC | Age: 74
End: 2024-05-21
Payer: MEDICARE

## 2024-05-21 ENCOUNTER — ANESTHESIA EVENT (OUTPATIENT)
Dept: SURGERY | Facility: HOSPITAL | Age: 74
End: 2024-05-21
Payer: MEDICARE

## 2024-05-22 ENCOUNTER — HOSPITAL ENCOUNTER (OUTPATIENT)
Facility: HOSPITAL | Age: 74
Discharge: HOME | End: 2024-05-22
Attending: UROLOGY | Admitting: UROLOGY
Payer: MEDICARE

## 2024-05-22 ENCOUNTER — ANESTHESIA (OUTPATIENT)
Dept: SURGERY | Facility: HOSPITAL | Age: 74
End: 2024-05-22
Payer: MEDICARE

## 2024-05-22 VITALS
DIASTOLIC BLOOD PRESSURE: 74 MMHG | SYSTOLIC BLOOD PRESSURE: 160 MMHG | TEMPERATURE: 98 F | HEART RATE: 65 BPM | OXYGEN SATURATION: 96 % | RESPIRATION RATE: 20 BRPM

## 2024-05-22 DIAGNOSIS — N40.0 BPH (BENIGN PROSTATIC HYPERPLASIA): ICD-10-CM

## 2024-05-22 DIAGNOSIS — R16.0 ENLARGED LIVER: Primary | ICD-10-CM

## 2024-05-22 DIAGNOSIS — Z01.818 PREOP TESTING: ICD-10-CM

## 2024-05-22 DIAGNOSIS — R35.1 NOCTURIA: ICD-10-CM

## 2024-05-22 PROCEDURE — 36000706: Mod: HCNC | Performed by: UROLOGY

## 2024-05-22 PROCEDURE — 37000008 HC ANESTHESIA 1ST 15 MINUTES: Mod: HCNC | Performed by: UROLOGY

## 2024-05-22 PROCEDURE — 37000009 HC ANESTHESIA EA ADD 15 MINS: Mod: HCNC | Performed by: UROLOGY

## 2024-05-22 PROCEDURE — 71000044 HC DOSC ROUTINE RECOVERY FIRST HOUR: Mod: HCNC | Performed by: UROLOGY

## 2024-05-22 PROCEDURE — 25000003 PHARM REV CODE 250: Mod: HCNC | Performed by: UROLOGY

## 2024-05-22 PROCEDURE — 52287 CYSTOSCOPY CHEMODENERVATION: CPT | Mod: 51,,, | Performed by: UROLOGY

## 2024-05-22 PROCEDURE — 25000003 PHARM REV CODE 250: Mod: HCNC | Performed by: NURSE ANESTHETIST, CERTIFIED REGISTERED

## 2024-05-22 PROCEDURE — 27201423 OPTIME MED/SURG SUP & DEVICES STERILE SUPPLY: Mod: HCNC | Performed by: UROLOGY

## 2024-05-22 PROCEDURE — 53854 TRURL DSTRJ PRST8 TISS RF WV: CPT | Mod: ,,, | Performed by: UROLOGY

## 2024-05-22 PROCEDURE — 71000016 HC POSTOP RECOV ADDL HR: Mod: HCNC | Performed by: UROLOGY

## 2024-05-22 PROCEDURE — 71000015 HC POSTOP RECOV 1ST HR: Mod: HCNC | Performed by: UROLOGY

## 2024-05-22 PROCEDURE — 63600175 PHARM REV CODE 636 W HCPCS: Mod: JZ,JG,HCNC | Performed by: UROLOGY

## 2024-05-22 PROCEDURE — 36000707: Mod: HCNC | Performed by: UROLOGY

## 2024-05-22 PROCEDURE — D9220A PRA ANESTHESIA: Mod: HCNC,ANES,, | Performed by: STUDENT IN AN ORGANIZED HEALTH CARE EDUCATION/TRAINING PROGRAM

## 2024-05-22 PROCEDURE — 25000003 PHARM REV CODE 250: Mod: HCNC | Performed by: STUDENT IN AN ORGANIZED HEALTH CARE EDUCATION/TRAINING PROGRAM

## 2024-05-22 PROCEDURE — 63600175 PHARM REV CODE 636 W HCPCS: Mod: HCNC | Performed by: NURSE ANESTHETIST, CERTIFIED REGISTERED

## 2024-05-22 PROCEDURE — D9220A PRA ANESTHESIA: Mod: HCNC,CRNA,, | Performed by: NURSE ANESTHETIST, CERTIFIED REGISTERED

## 2024-05-22 RX ORDER — IBUPROFEN 800 MG/1
800 TABLET ORAL EVERY 6 HOURS PRN
Qty: 28 TABLET | Refills: 0 | Status: SHIPPED | OUTPATIENT
Start: 2024-05-22 | End: 2024-05-29

## 2024-05-22 RX ORDER — OXYCODONE HYDROCHLORIDE 5 MG/1
5 TABLET ORAL
Status: DISCONTINUED | OUTPATIENT
Start: 2024-05-22 | End: 2024-05-22 | Stop reason: HOSPADM

## 2024-05-22 RX ORDER — OXYCODONE HYDROCHLORIDE 5 MG/1
10 TABLET ORAL EVERY 4 HOURS PRN
Status: DISCONTINUED | OUTPATIENT
Start: 2024-05-22 | End: 2024-05-22 | Stop reason: HOSPADM

## 2024-05-22 RX ORDER — PHENAZOPYRIDINE HYDROCHLORIDE 100 MG/1
100 TABLET, FILM COATED ORAL 3 TIMES DAILY PRN
Qty: 30 TABLET | Refills: 0 | Status: SHIPPED | OUTPATIENT
Start: 2024-05-22 | End: 2024-06-01

## 2024-05-22 RX ORDER — CEFAZOLIN SODIUM 1 G/3ML
INJECTION, POWDER, FOR SOLUTION INTRAMUSCULAR; INTRAVENOUS
Status: DISCONTINUED | OUTPATIENT
Start: 2024-05-22 | End: 2024-05-22

## 2024-05-22 RX ORDER — DEXMEDETOMIDINE HYDROCHLORIDE 100 UG/ML
INJECTION, SOLUTION INTRAVENOUS
Status: DISCONTINUED | OUTPATIENT
Start: 2024-05-22 | End: 2024-05-22

## 2024-05-22 RX ORDER — FENTANYL CITRATE 50 UG/ML
INJECTION, SOLUTION INTRAMUSCULAR; INTRAVENOUS
Status: DISCONTINUED | OUTPATIENT
Start: 2024-05-22 | End: 2024-05-22

## 2024-05-22 RX ORDER — IPRATROPIUM BROMIDE AND ALBUTEROL SULFATE 2.5; .5 MG/3ML; MG/3ML
3 SOLUTION RESPIRATORY (INHALATION) EVERY 4 HOURS PRN
Status: DISCONTINUED | OUTPATIENT
Start: 2024-05-22 | End: 2024-05-22 | Stop reason: HOSPADM

## 2024-05-22 RX ORDER — ONDANSETRON HYDROCHLORIDE 2 MG/ML
INJECTION, SOLUTION INTRAVENOUS
Status: DISCONTINUED | OUTPATIENT
Start: 2024-05-22 | End: 2024-05-22

## 2024-05-22 RX ORDER — ACETAMINOPHEN 500 MG
500 TABLET ORAL EVERY 6 HOURS PRN
Qty: 30 TABLET | Refills: 0 | Status: SHIPPED | OUTPATIENT
Start: 2024-05-22

## 2024-05-22 RX ORDER — HALOPERIDOL 5 MG/ML
0.5 INJECTION INTRAMUSCULAR EVERY 10 MIN PRN
Status: DISCONTINUED | OUTPATIENT
Start: 2024-05-22 | End: 2024-05-22 | Stop reason: HOSPADM

## 2024-05-22 RX ORDER — LIDOCAINE HYDROCHLORIDE 20 MG/ML
INJECTION INTRAVENOUS
Status: DISCONTINUED | OUTPATIENT
Start: 2024-05-22 | End: 2024-05-22

## 2024-05-22 RX ORDER — PHENAZOPYRIDINE HYDROCHLORIDE 100 MG/1
TABLET, FILM COATED ORAL
Status: DISCONTINUED
Start: 2024-05-22 | End: 2024-05-22 | Stop reason: HOSPADM

## 2024-05-22 RX ORDER — LIDOCAINE HYDROCHLORIDE 20 MG/ML
JELLY TOPICAL
Status: DISCONTINUED | OUTPATIENT
Start: 2024-05-22 | End: 2024-05-22 | Stop reason: HOSPADM

## 2024-05-22 RX ORDER — HYDROMORPHONE HYDROCHLORIDE 1 MG/ML
0.2 INJECTION, SOLUTION INTRAMUSCULAR; INTRAVENOUS; SUBCUTANEOUS EVERY 5 MIN PRN
Status: DISCONTINUED | OUTPATIENT
Start: 2024-05-22 | End: 2024-05-22 | Stop reason: HOSPADM

## 2024-05-22 RX ORDER — PROPOFOL 10 MG/ML
VIAL (ML) INTRAVENOUS CONTINUOUS PRN
Status: DISCONTINUED | OUTPATIENT
Start: 2024-05-22 | End: 2024-05-22

## 2024-05-22 RX ORDER — SULFAMETHOXAZOLE AND TRIMETHOPRIM 800; 160 MG/1; MG/1
1 TABLET ORAL 2 TIMES DAILY
Qty: 20 TABLET | Refills: 0 | Status: SHIPPED | OUTPATIENT
Start: 2024-05-22 | End: 2024-06-01

## 2024-05-22 RX ADMIN — DEXMEDETOMIDINE 8 MCG: 100 INJECTION, SOLUTION, CONCENTRATE INTRAVENOUS at 11:05

## 2024-05-22 RX ADMIN — PROPOFOL 30 MG: 10 INJECTION, EMULSION INTRAVENOUS at 11:05

## 2024-05-22 RX ADMIN — PROPOFOL 150 MCG/KG/MIN: 10 INJECTION, EMULSION INTRAVENOUS at 10:05

## 2024-05-22 RX ADMIN — FENTANYL CITRATE 25 MCG: 50 INJECTION, SOLUTION INTRAMUSCULAR; INTRAVENOUS at 10:05

## 2024-05-22 RX ADMIN — LIDOCAINE HYDROCHLORIDE 40 MG: 20 INJECTION INTRAVENOUS at 10:05

## 2024-05-22 RX ADMIN — SODIUM CHLORIDE: 0.9 INJECTION, SOLUTION INTRAVENOUS at 10:05

## 2024-05-22 RX ADMIN — ONDANSETRON 4 MG: 2 INJECTION INTRAMUSCULAR; INTRAVENOUS at 10:05

## 2024-05-22 RX ADMIN — OXYCODONE 5 MG: 5 TABLET ORAL at 12:05

## 2024-05-22 RX ADMIN — PROPOFOL 30 MG: 10 INJECTION, EMULSION INTRAVENOUS at 10:05

## 2024-05-22 RX ADMIN — FENTANYL CITRATE 50 MCG: 50 INJECTION, SOLUTION INTRAMUSCULAR; INTRAVENOUS at 10:05

## 2024-05-22 RX ADMIN — CEFAZOLIN 2 G: 330 INJECTION, POWDER, FOR SOLUTION INTRAMUSCULAR; INTRAVENOUS at 10:05

## 2024-05-22 NOTE — DISCHARGE SUMMARY
[facility] - [facility] (1st Fl)  Discharge Note  Short Stay    Procedure(s) (LRB):  CYSTOSCOPY WITH TRANSURETHRAL DESTRUCTION OF PROSTATE,RFA (N/A)  CYSTOSCOPY,WITH BOTULINUM TOXIN INJECTION (N/A)      OUTCOME: Patient tolerated treatment/procedure well without complication and is now ready for discharge.    DISPOSITION: Home or Self Care    FINAL DIAGNOSIS:  <principal problem not specified>    FOLLOWUP: In clinic The patient will follow up in clinic in one week for a voiding trial and then in three months  with Dr. Zamora.     DISCHARGE INSTRUCTIONS:  No discharge procedures on file.     TIME SPENT ON DISCHARGE: 15 minutes

## 2024-05-22 NOTE — DISCHARGE INSTRUCTIONS
Post Cystoscopy and Rezum  Do not strain to have a bowel movement  No strenuous exercise x 7 days  No driving while you are on narcotic pain medications or if your [place]  catheter is in place    You can expect:  To see blood in your urine.    Go to the ER if:  You are having severe abdominal pain  Inability to urinate if you do not have a catheter  Catheter stops draining if you have one in place.    Call the doctor if:  Temperature is greater than 101F  Persistent vomiting and inability to keep food down

## 2024-05-22 NOTE — TRANSFER OF CARE
Anesthesia Transfer of Care Note    Patient: Adan Webb    Procedure(s) Performed: Procedure(s) (LRB):  CYSTOSCOPY WITH TRANSURETHRAL DESTRUCTION OF PROSTATE,RFA (N/A)  CYSTOSCOPY,WITH BOTULINUM TOXIN INJECTION (N/A)    Patient location: PACU    Anesthesia Type: general    Transport from OR: Transported from OR on 6-10 L/min O2 by face mask with adequate spontaneous ventilation    Post pain: adequate analgesia    Post assessment: no apparent anesthetic complications    Post vital signs: stable    Level of consciousness: awake    Nausea/Vomiting: no nausea/vomiting    Complications: none    Transfer of care protocol was followed      Last vitals: Visit Vitals  BP (!) 140/69 (BP Location: Left forearm, Patient Position: Sitting)   Pulse 81   Temp 36.4 °C (97.5 °F) (Temporal)   Resp 18   SpO2 97%

## 2024-05-22 NOTE — H&P
Urology [facility]    HPI:  Adan Webb is a 74 y.o. male with overactive bladder and lower urinary tract obstruction who presents today for botox bladder injections and Rezum therapy.     He has a recent urine culture on 5/10 which showed no growth.     The patient denies a recent history of fevers, chills, nausea, vomiting and dysuria.     He would like to proceed with the planned procedure.       ROS:  Neg except per HPI    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, didn't use ear protection    Dermatitis 12/9/2020    nystat groin    DJD (degenerative joint disease) of hip 1/29/2015    Enlarged liver 4/8/2021    US 4/21     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    MCA , see MRI    Hiatal hernia 4/1/2021    CT chest tiny 2018    HTN (hypertension), benign 8/20/2020    Morbid obesity with BMI of 40.0-44.9, adult 1/29/2015    OAB (overactive bladder) 4/1/2021    Rash with pads    Right-sided low back pain with right-sided sciatica 3/6/2019    Seasonal allergic rhinitis due to pollen 4/13/2016    Thoracic aortic aneurysm without rupture 05/27/2018    tx with Su, Cardiologist Dr Stone, 5.1 CTS Dr Duenas, follows yearly    Transient cerebral ischemia 6/6/2018       Past Surgical History:   Procedure Laterality Date    A-V CARDIAC PACEMAKER INSERTION N/A 08/05/2021    Procedure: INSERTION, CARDIAC PACEMAKER, DUAL CHAMBER;   Surgeon: Sheldon Miranda MD;  Location: [facility] EP LAB;  Service: Cardiology;  Laterality: N/A;  Near syncope,SB,Sinus Pause, RBBB,LAFB, Dual PPM, BIO, MAC, AK, 3 Prep    ADENOIDECTOMY      CATARACT EXTRACTION      CYSTOSCOPY N/A 2023    Procedure: CYSTOSCOPY;  Surgeon: Jamal Zamora MD;  Location: [facility] OR [address] [place] [address];  Service: Urology;  Laterality: N/A;    CYSTOSCOPY WITH URODYNAMIC TESTING N/A 10/25/2021    Procedure: CYSTOSCOPY, WITH URODYNAMIC TESTING FLOUROSCOPIC;  Surgeon: Sancho Wesley MD;  Location: [facility] OR [address] [place] [address];  Service: Urology;  Laterality: N/A;  1hr    HERNIA REPAIR      INJECTION OF BOTULINUM TOXIN TYPE A N/A 2023    Procedure: INJECTION, BOTULINUM TOXIN, TYPE A;  Surgeon: Jamal Zamora MD;  Location: [facility] OR [address] [place] [address];  Service: Urology;  Laterality: N/A;  100 units    TONSILLECTOMY      YAG LAser Capsulotomy Bilateral     2019 OS Dr. Cornelius       Social History     Socioeconomic History    Marital status:    Tobacco Use    Smoking status: Former     Types: Cigarettes     Start date:      Quit date:      Years since quittin.4    Smokeless tobacco: Never   Substance and Sexual Activity    Alcohol use: Yes     Comment: wine, seldom    Drug use: No    Sexual activity: Yes     Partners: Female   Social History Narrative     to 'T', 2 children, nonsmoker, ETOH none, never had colonscopy & declines     Social Determinants of Health     Financial Resource Strain: Low Risk  (2024)    Overall Financial Resource Strain (CARDIA)     Difficulty of Paying Living Expenses: Not hard at all   Food Insecurity: No Food Insecurity (2024)    Hunger Vital Sign     Worried About Running Out of Food in the Last Year: Never true     Ran Out of Food in the Last Year: Never true   Transportation Needs: No Transportation Needs (2024)    PRAPARE - Transportation     Lack of Transportation (Medical): No     Lack of Transportation (Non-Medical): No   Physical Activity:  Inactive (2/6/2024)    Exercise Vital Sign     Days of Exercise per Week: 0 days     Minutes of Exercise per Session: 0 min   Stress: No Stress Concern Present (2/6/2024)    [employer] - Occupational Stress Questionnaire     Feeling of Stress : Not at all   Housing Stability: Low Risk  (2/6/2024)    Housing Stability Vital Sign     Unable to Pay for Housing in the Last Year: No     Number of Places Lived in the Last Year: 1     Unstable Housing in the Last Year: No       Family History   Problem Relation Name Age of Onset    Diabetes Mother      Leukemia Father      Aneurysm Father      No Known Problems Daughter x1     No Known Problems Son x1     Cataracts Paternal Uncle      Amblyopia Neg Hx      Blindness Neg Hx      Glaucoma Neg Hx      Macular degeneration Neg Hx      Retinal detachment Neg Hx      Strabismus Neg Hx         Review of patient's allergies indicates:   Allergen Reactions    Augmentin [amoxicillin-pot clavulanate] Rash     Rash, confusion, TIA like symptoms    Azithromycin Rash    Avodart [dutasteride]     House dust mite Other (See Comments)    Mold     Naproxen     Ragweed     Synthroid [levothyroxine] Rash       No current facility-administered medications on file prior to encounter.     Current Outpatient Medications on File Prior to Encounter   Medication Sig Dispense Refill    ALLERGY RELIEF, LORATADINE, 10 mg tablet Take 10 mg by mouth daily as needed.      BABY ASPIRIN ORAL Take 81 tablets by mouth every evening.       cholecalciferol, vitamin D3, 1,000 unit capsule Take 1,000 Units by mouth once daily.      coenzyme Q10 100 mg capsule Take by mouth once daily.      diclofenac sodium (VOLTAREN) 1 % Gel Apply 2 g topically 4 (four) times daily. 1 Tube 2    DULoxetine (CYMBALTA) 20 MG capsule Take 1 capsule (20 mg total) by mouth 2 (two) times daily. 60 capsule 11    fluticasone propionate (FLONASE) 50 mcg/actuation nasal spray 1 spray by Each Nostril route once  "daily.      glucosamine-chondroitin 500-400 mg tablet Take 1 tablet by mouth 2 (two) times daily.       imipramine (TOFRANIL) 25 MG tablet Take 1 tablet (25 mg total) by mouth every evening. 30 tablet 11    metoprolol succinate (TOPROL-XL) 50 MG 24 hr tablet Take 1 tablet (50 mg total) by mouth once daily. 90 tablet 2    MULTIVITAMIN W-MINERALS/LUTEIN (CENTRUM SILVER ORAL) Take by mouth once daily.       nystatin-triamcinolone (MYCOLOG II) cream APPLY TOPICALLY 4 (FOUR) TIMES DAILY. 60 g 5    omega-3 fatty acids 300 mg Cap Take 1 tablet by mouth once daily.       rosuvastatin (CRESTOR) 10 MG tablet TAKE 1 TABLET (10 MG TOTAL) BY MOUTH ONCE DAILY. 90 tablet 1    tamsulosin (FLOMAX) 0.4 mg Cap Take 1 capsule (0.4 mg total) by mouth once daily. 30 capsule 11    TURMERIC ORAL Take by mouth. Pt take 1 in the evening.      VITAMIN B COMPLEX (SUPER B COMPLEX-B-12 ORAL) Take by mouth.      albuterol (VENTOLIN HFA) 90 mcg/actuation inhaler Inhale 2 puffs into the lungs every 6 (six) hours as needed for Wheezing. Rescue 18 g 1    azelastine (ASTELIN) 137 mcg (0.1 %) nasal spray 1 spray (137 mcg total) by Nasal route 2 (two) times daily. 30 mL 12    finasteride (PROSCAR) 5 mg tablet Take 1 tablet (5 mg total) by mouth once daily. 90 tablet 3         Physical Exam:  Estimated body mass index is 40.11 kg/m² as calculated from the following:    Height as of 5/10/24: 6' 1" (1.854 m).    Weight as of 5/10/24: 137.9 kg (304 lb).    General: No acute distress, well developed. AAOx3  Head: Normocephalic, Atraumatic  Eyes: Extra-occular movements intact, No discharge  Neck: supple, symmetrical, trachea midline  Lungs: normal respiratory effort, no respiratory distress, no wheezes  CV: regular rate, 2+ pulses  Abdomen: soft, non-tender, non-distended, no organomegaly    MSK: no edema, no deformities, normal ROM  Skin: skin color, texture, turgor normal.  Neurologic: no focal deficits, sensation intact    Labs:    Urine culture (5/10) - " No growth     Lab Results   Component Value Date    WBC 8.74 02/06/2024    HGB 13.6 (L) 02/06/2024    HCT 41.3 02/06/2024    MCV 97 02/06/2024     02/06/2024           BMP  Lab Results   Component Value Date     02/06/2024    K 5.1 02/06/2024     02/06/2024    CO2 25 02/06/2024    BUN 23 02/06/2024    CREATININE 0.7 02/06/2024    CALCIUM 9.7 02/06/2024    ANIONGAP 8 02/06/2024    EGFRNORACEVR >60.0 02/06/2024         Assessment: Adan Webb is a 74 y.o. male with OAB and lower urinary tract obstruction.     Plan:     1. To OR for cystoscopy with Botox injections along with a Rezum with Dr. Zamora.   2. Consents signed   3. I have explained the risk, benefits, and alternatives of the procedure in detail. The patient voices understanding and all questions have been answered. The patient agrees to proceed as planned.       Eligio Lizama MD

## 2024-05-22 NOTE — ANESTHESIA PREPROCEDURE EVALUATION
[facility]  Anesthesia Pre-Operative Evaluation         Patient Name: Adan Webb  YOB: 1950  MRN: [id number]    SUBJECTIVE:     05/22/2024    Procedure(s) (LRB):  CYSTOSCOPY WITH TRANSURETHRAL DESTRUCTION OF PROSTATE,RFA (N/A)  CYSTOSCOPY,WITH BOTULINUM TOXIN INJECTION (N/A)    Adan Webb is a 74 y.o. male here for Procedure(s) (LRB):  CYSTOSCOPY WITH TRANSURETHRAL DESTRUCTION OF PROSTATE,RFA (N/A)  CYSTOSCOPY,WITH BOTULINUM TOXIN INJECTION (N/A)    Drips:     Patient Active Problem List   Diagnosis    Nocturia    Benign prostatic hyperplasia with nocturia    Hydrocele, right    Pseudophakia    DJD (degenerative joint disease) of hip    Seasonal allergic rhinitis due to pollen    Calculus of gallbladder without cholecystitis without obstruction    Right inguinal hernia    History of hydrocelectomy    Allergies    Posterior capsular opacification, left eye    Open angle with borderline findings and high glaucoma risk in left eye    Conductive hearing loss of right ear with restricted hearing of left ear    Blurry vision, left eye    Right-sided low back pain with right-sided sciatica    Dry eye syndrome of both eyes    Ascending aortic aneurysm    HTN (hypertension), benign    Dermatitis    Chronic pain of both ankles    Abnormal TSH    Bifascicular block    PVC (premature ventricular contraction)    Hiatal hernia    OAB (overactive bladder)    Enlarged liver    Near syncope    Presence of cardiac pacemaker    Calcified granuloma of lung    Urge incontinence    Aortic atherosclerosis    Nocturnal polyuria    Hyperlipidemia    Fatigue    Tremor    Subdural hygroma    Atrial fibrillation, unspecified type       Review of patient's allergies indicates:   Allergen Reactions    Augmentin [amoxicillin-pot clavulanate] Rash     Rash, confusion, TIA like symptoms    Azithromycin Rash    Avodart [dutasteride]     House dust mite Other (See Comments)    Mold     Naproxen     Ragweed      Synthroid [levothyroxine] Rash       No current facility-administered medications on file prior to encounter.     Current Outpatient Medications on File Prior to Encounter   Medication Sig Dispense Refill    ALLERGY RELIEF, LORATADINE, 10 mg tablet Take 10 mg by mouth daily as needed.      BABY ASPIRIN ORAL Take 81 tablets by mouth every evening.       cholecalciferol, vitamin D3, 1,000 unit capsule Take 1,000 Units by mouth once daily.      coenzyme Q10 100 mg capsule Take by mouth once daily.      diclofenac sodium (VOLTAREN) 1 % Gel Apply 2 g topically 4 (four) times daily. 1 Tube 2    DULoxetine (CYMBALTA) 20 MG capsule Take 1 capsule (20 mg total) by mouth 2 (two) times daily. 60 capsule 11    fluticasone propionate (FLONASE) 50 mcg/actuation nasal spray 1 spray by Each Nostril route once daily.      glucosamine-chondroitin 500-400 mg tablet Take 1 tablet by mouth 2 (two) times daily.       imipramine (TOFRANIL) 25 MG tablet Take 1 tablet (25 mg total) by mouth every evening. 30 tablet 11    metoprolol succinate (TOPROL-XL) 50 MG 24 hr tablet Take 1 tablet (50 mg total) by mouth once daily. 90 tablet 2    MULTIVITAMIN W-MINERALS/LUTEIN (CENTRUM SILVER ORAL) Take by mouth once daily.       nystatin-triamcinolone (MYCOLOG II) cream APPLY TOPICALLY 4 (FOUR) TIMES DAILY. 60 g 5    omega-3 fatty acids 300 mg Cap Take 1 tablet by mouth once daily.       rosuvastatin (CRESTOR) 10 MG tablet TAKE 1 TABLET (10 MG TOTAL) BY MOUTH ONCE DAILY. 90 tablet 1    tamsulosin (FLOMAX) 0.4 mg Cap Take 1 capsule (0.4 mg total) by mouth once daily. 30 capsule 11    TURMERIC ORAL Take by mouth. Pt take 1 in the evening.      VITAMIN B COMPLEX (SUPER B COMPLEX-B-12 ORAL) Take by mouth.      albuterol (VENTOLIN HFA) 90 mcg/actuation inhaler Inhale 2 puffs into the lungs every 6 (six) hours as needed for Wheezing. Rescue 18 g 1    azelastine (ASTELIN) 137 mcg (0.1 %) nasal spray 1 spray (137 mcg total) by Nasal route 2 (two) times daily.  30 mL 12    finasteride (PROSCAR) 5 mg tablet Take 1 tablet (5 mg total) by mouth once daily. 90 tablet 3       Past Surgical History:   Procedure Laterality Date    A-V CARDIAC PACEMAKER INSERTION N/A 2021    Procedure: INSERTION, CARDIAC PACEMAKER, DUAL CHAMBER;  Surgeon: Sheldon Miranda MD;  Location: [facility] EP LAB;  Service: Cardiology;  Laterality: N/A;  Near syncope,SB,Sinus Pause, RBBB,LAFB, Dual PPM, BIO, MAC, VT, 3 Prep    ADENOIDECTOMY      CATARACT EXTRACTION      CYSTOSCOPY N/A 2023    Procedure: CYSTOSCOPY;  Surgeon: Jamal Zamora MD;  Location: [facility] OR [address] [place] [address];  Service: Urology;  Laterality: N/A;    CYSTOSCOPY WITH URODYNAMIC TESTING N/A 10/25/2021    Procedure: CYSTOSCOPY, WITH URODYNAMIC TESTING FLOUROSCOPIC;  Surgeon: Sancho Wesley MD;  Location: [facility] OR [address] [place] [address];  Service: Urology;  Laterality: N/A;  1hr    HERNIA REPAIR      INJECTION OF BOTULINUM TOXIN TYPE A N/A 2023    Procedure: INJECTION, BOTULINUM TOXIN, TYPE A;  Surgeon: Jamal Zamora MD;  Location: [facility] OR [address] [place] [address];  Service: Urology;  Laterality: N/A;  100 units    TONSILLECTOMY      YAG LAser Capsulotomy Bilateral     2019 OS Dr. Cornelius       Social History     Socioeconomic History    Marital status:    Tobacco Use    Smoking status: Former     Types: Cigarettes     Start date:      Quit date:      Years since quittin.4    Smokeless tobacco: Never   Substance and Sexual Activity    Alcohol use: Yes     Comment: wine, seldom    Drug use: No    Sexual activity: Yes     Partners: Female   Social History Narrative     to 'T', 2 children, nonsmoker, ETOH none, never had colonscopy & declines     Social Determinants of Health     Financial Resource Strain: Low Risk  (2024)    Overall Financial Resource Strain (CARDIA)     Difficulty of Paying Living Expenses: Not hard at all   Food Insecurity: No Food Insecurity (2024)    Hunger Vital Sign     Worried About Running Out of Food  in the Last Year: Never true     Ran Out of Food in the Last Year: Never true   Transportation Needs: No Transportation Needs (2/6/2024)    PRAPARE - Transportation     Lack of Transportation (Medical): No     Lack of Transportation (Non-Medical): No   Physical Activity: Inactive (2/6/2024)    Exercise Vital Sign     Days of Exercise per Week: 0 days     Minutes of Exercise per Session: 0 min   Stress: No Stress Concern Present (2/6/2024)    [employer] [employer] - Occupational Stress Questionnaire     Feeling of Stress : Not at all   Housing Stability: Low Risk  (2/6/2024)    Housing Stability Vital Sign     Unable to Pay for Housing in the Last Year: No     Number of Places Lived in the Last Year: 1     Unstable Housing in the Last Year: No         OBJECTIVE:     Vital Signs Range (Last 24H):  Temp:  [36.4 °C (97.5 °F)] 36.4 °C (97.5 °F)  Pulse:  [81] 81  Resp:  [18] 18  SpO2:  [97 %] 97 %  BP: (140)/(69) 140/69    Significant Labs:  Lab Results   Component Value Date    WBC 8.74 02/06/2024    HGB 13.6 (L) 02/06/2024    HCT 41.3 02/06/2024     02/06/2024    CHOL 124 03/02/2023    TRIG 64 03/02/2023    HDL 41 03/02/2023    ALT 24 02/06/2024    AST 23 02/06/2024     02/06/2024    K 5.1 02/06/2024     02/06/2024    CREATININE 0.7 02/06/2024    BUN 23 02/06/2024    CO2 25 02/06/2024    TSH 3.536 04/01/2021    PSA 2.7 01/20/2015    INR 1.0 08/05/2021    GLUF 87 05/11/2005    HGBA1C 5.3 05/27/2018       Diagnostic Studies:    EKG:   Results for orders placed or performed during the hospital encounter of 12/18/23   EKG 12-lead    Collection Time: 12/18/23  8:51 PM    Narrative    Test Reason : R53.83,    Vent. Rate : 106 BPM     Atrial Rate : 106 BPM     P-R Int : 188 ms          QRS Dur : 148 ms      QT Int : 376 ms       P-R-T Axes : 070 -63 076 degrees     QTc Int : 499 ms    Sinus tachycardia  Possible Left atrial enlargement  Right bundle branch block  Left anterior fascicular  "block   Bifascicular block   LVH with repolarization abnormality  Cannot rule out Septal infarct ,age undetermined  Abnormal ECG  When compared with ECG of 13-JUN-2023 10:16,  Sinus rhythm has replaced Electronic atrial pacemaker  Vent. rate has increased BY  42 BPM  T wave inversion no longer evident in Inferior leads  T wave inversion now evident in Anterior-lateral leads  Confirmed by Irene Hood MD, Jose ([id number]) on 12/21/2023 3:59:42 PM    Referred By: [email or web address]   SELF           Confirmed By:Lele Hood,       2D ECHO:  TTE:  No results found for this or any previous visit.  Results for orders placed or performed during the hospital encounter of 03/20/23   Echo   Result Value Ref Range    Ascending aorta 5.40 cm    STJ 2.64 cm    AV mean gradient 4 mmHg    Ao peak robert 1.28 m/s    Ao VTI 27.35 cm    IVRT 85.63 msec    IVS 0.96 0.6 - 1.1 cm    LA size 4.16 cm    Left Atrium Major Axis 6.14 cm    Left Atrium Minor Axis 6.11 cm    LVIDd 4.85 3.5 - 6.0 cm    LVIDs 3.36 2.1 - 4.0 cm    LVOT diameter 2.08 cm    LVOT peak VTI 23.91 cm    Posterior Wall 1.09 0.6 - 1.1 cm    MV Peak A Robert 0.92 m/s    E wave deceleration time 231.08 msec    MV Peak E Robert 0.78 m/s    PV Peak D Robert 0.59 m/s    PV Peak S Robert 0.52 m/s    RA Major Axis 5.00 cm    RA Width 4.34 cm    RVDD 3.53 cm    Sinus 3.21 cm    TAPSE 2.43 cm    TR Max Robert 2.25 m/s    TDI LATERAL 0.08 m/s    TDI SEPTAL 0.08 m/s    LA WIDTH 4.87 cm    MV stenosis pressure 1/2 time 67.01 ms    LV Diastolic Volume 110.04 mL    LV Systolic Volume 46.18 mL    RV S' 13.38 cm/s    LVOT peak robert 1.06 m/s    LA volume (mod) 68.73 cm3    MV "A" wave duration 9.42 msec    LV LATERAL E/E' RATIO 9.75 m/s    LV SEPTAL E/E' RATIO 9.75 m/s    FS 31 %    LA volume 105.47 cm3    LV mass 179.00 g    Left Ventricle Relative Wall Thickness 0.45 cm    AV valve area 2.97 cm2    AV Velocity Ratio 0.83     AV index (prosthetic) 0.87     MV valve area p 1/2 method 3.28 cm2    E/A ratio " 0.85     Mean e' 0.08 m/s    Pulm vein S/D ratio 0.88     LVOT area 3.4 cm2    LVOT stroke volume 81.20 cm3    AV peak gradient 7 mmHg    E/E' ratio 9.75 m/s    Triscuspid Valve Regurgitation Peak Gradient 20 mmHg    BSA 2.64 m2    LV Systolic Volume Index 18.1 mL/m2    LV Diastolic Volume Index 43.15 mL/m2    LA Volume Index 41.4 mL/m2    LV Mass Index 70 g/m2    LA Volume Index (Mod) 27.0 mL/m2    Right Atrial Pressure (from IVC) 3 mmHg    EF 60 %    TV resting pulmonary artery pressure 23 mmHg    Narrative    · Mild left atrial enlargement.  · The left ventricle is normal in size with concentric remodeling and   normal systolic function.  · Indeterminate left ventricular diastolic function.  · The estimated ejection fraction is 60%.  · Normal right ventricular size with normal right ventricular systolic   function.  · Normal central venous pressure (3 mmHg).  · The estimated PA systolic pressure is 23 mmHg.  · The ascending aorta is severely dilated measuring 5.4 cm.  · The aortic root is normal in size and the aortic valve is trileaflet.        Device interrogation:     4/24/2024    FlashSoft device   Battery Status: OK  Battery Percentage: 80 %  AT/AF [place]: 0 %  AP: 50 %  RVp: 2 %  Programmed Settings:  Mode: DDDR  Lower Rate: 60 bpm  Upper Tracking Rate: 130 bpm  Upper Sensor Rate: 120 bpm          Pre-op Assessment    I have reviewed the Patient Summary Reports.     I have reviewed the Nursing Notes. I have reviewed the NPO Status.   I have reviewed the Medications.     Review of Systems  Anesthesia Hx:  No problems with previous Anesthesia   History of prior surgery of interest to airway management or planning:          Denies Family Hx of Anesthesia complications.    Denies Personal Hx of Anesthesia complications.                    Social:  Non-Smoker, Former Smoker, Social Alcohol Use       Hematology/Oncology:  Hematology Normal   Oncology Normal                                    EENT/Dental:  chronic allergic rhinitis  Eyes: Visual Impairment   Has S/P Extraction - Bilateral and Bilateral Catarract                  Cardiovascular:  Exercise tolerance: good  Pacemaker (pt s/p AV PPM 08/05/21) Hypertension   Denies MI.     Dysrhythmias (RBBB, LAFB)    Denies CHF.       ECG has been reviewed.             Carotoid Artery Disease (hemisperic carotid artery syndrome)             Other Cardiac Conditions Aortic atherosclerosis  Pulmonary:  Pulmonary Normal   Denies COPD.  Denies Asthma.                    Renal/:    BPH  Renal Symptoms/Infections/Stones: nocturia, incontinence, urgency, frequency.             Hepatic/GI:    Hiatal Hernia,  Liver Disease, (enlarged liver)      Hernia, Hiatal Hernia  Biliary Disease Pt h/o calculus of gall bladder    Musculoskeletal:  Arthritis   Right low back pain with right sciatica, chronic pain bilateral ankles, DJD and OA hip       Spine Disorders:  Chronic Pain and Degenerative disease           Neurological:  TIA,  Neuromuscular Disease,   Denies Seizures.    Pt h/o hemispheric carotid artery syndrome and h/o transient cerebral ischemia                            Endocrine:  Denies Diabetes.         Morbid Obesity / BMI > 40  Dermatological:  Skin Normal    Psych:  Psychiatric Normal                    Physical Exam  General: Well nourished, Cooperative, Alert and Oriented    Airway:  Mallampati: II   Mouth Opening: Normal  TM Distance: Normal  Tongue: Normal  Neck ROM: Normal ROM    Dental:  Caps / Implants        Anesthesia Plan  Type of Anesthesia, risks & benefits discussed:    Anesthesia Type: Gen Natural Airway  Intra-op Monitoring Plan: Standard ASA Monitors  Post Op Pain Control Plan: multimodal analgesia and IV/PO Opioids PRN  Induction:  IV  Informed Consent: Informed consent signed with the Patient and all parties understand the risks and agree with anesthesia plan.  All questions answered.   ASA Score: 3  Day of Surgery Review of History &  Physical: H&P Update referred to the surgeon/provider.    Ready For Surgery From Anesthesia Perspective.     .

## 2024-05-22 NOTE — ANESTHESIA POSTPROCEDURE EVALUATION
Anesthesia Post Evaluation    Patient: Adan Webb    Procedure(s) Performed: Procedure(s) (LRB):  CYSTOSCOPY WITH TRANSURETHRAL DESTRUCTION OF PROSTATE,RFA (N/A)  CYSTOSCOPY,WITH BOTULINUM TOXIN INJECTION (N/A)    Final Anesthesia Type: general      Patient location during evaluation: PACU  Patient participation: Yes- Able to Participate  Level of consciousness: awake  Post-procedure vital signs: reviewed and stable  Pain management: adequate  Airway patency: patent    PONV status at discharge: No PONV  Anesthetic complications: no      Cardiovascular status: blood pressure returned to baseline  Respiratory status: unassisted  Hydration status: euvolemic  Follow-up not needed.              Vitals Value Taken Time   /74 05/22/24 1330   Temp 36.6 °C (97.8 °F) 05/22/24 1330   Pulse 65 05/22/24 1330   Resp 20 05/22/24 1330   SpO2 96 % 05/22/24 1330         No case tracking events are documented in the log.      Pain/Caterina Score: Pain Rating Prior to Med Admin: 7 (5/22/2024 12:50 PM)  Caterina Score: 10 (5/22/2024 11:45 AM)

## 2024-05-22 NOTE — BRIEF OP NOTE
[facility] (1st Fl)  Brief Operative Note    SUMMARY     Surgery Date: 5/22/2024     Surgeons and Role:     * Jamal Zamora MD - Primary     * Salvatore Roche MD - Resident - Assisting     * Eligio Lizama MD - Resident - Assisting        Pre-op Diagnosis:  BPH with urinary obstruction [N40.1, N13.8]  OAB (overactive bladder) [N32.81]  Urge incontinence [N39.41]  Bladder spasms [N32.89]    Post-op Diagnosis:  Post-Op Diagnosis Codes:     * BPH with urinary obstruction [N40.1, N13.8]     * OAB (overactive bladder) [N32.81]     * Urge incontinence [N39.41]     * Bladder spasms [N32.89]    Procedure(s) (LRB):  CYSTOSCOPY WITH TRANSURETHRAL DESTRUCTION OF PROSTATE,RFA (N/A)  CYSTOSCOPY,WITH BOTULINUM TOXIN INJECTION (N/A)    Anesthesia: General/MAC    Implants:  * No implants in log *    Operative Findings: Bilobar prostatic hypertrophy, slightly large on the right.     Estimated Blood Loss: * No values recorded between 5/22/2024 10:58 AM and 5/22/2024 11:19 AM *    Estimated Blood Loss has not been documented. EBL = Min.         Specimens:   Specimen (24h ago, onward)      None            [id number]

## 2024-05-22 NOTE — OP NOTE
[facility]  Operative Note     Date: 05/22/2024     Pre-Op Diagnosis: BPH with obstructive urinary symptoms, OAB    Patient Active Problem List   Diagnosis    Nocturia    Benign prostatic hyperplasia with nocturia    Hydrocele, right    Pseudophakia    DJD (degenerative joint disease) of hip    Seasonal allergic rhinitis due to pollen    Calculus of gallbladder without cholecystitis without obstruction    Right inguinal hernia    History of hydrocelectomy    Allergies    Posterior capsular opacification, left eye    Open angle with borderline findings and high glaucoma risk in left eye    Conductive hearing loss of right ear with restricted hearing of left ear    Blurry vision, left eye    Right-sided low back pain with right-sided sciatica    Dry eye syndrome of both eyes    Ascending aortic aneurysm    HTN (hypertension), benign    Dermatitis    Chronic pain of both ankles    Abnormal TSH    Bifascicular block    PVC (premature ventricular contraction)    Hiatal hernia    OAB (overactive bladder)    Enlarged liver    Near syncope    Presence of cardiac pacemaker    Calcified granuloma of lung    Urge incontinence    Aortic atherosclerosis    Nocturnal polyuria    Hyperlipidemia    Fatigue    Tremor    Subdural hygroma    Atrial fibrillation, unspecified type        Post-Op Diagnosis: same     Procedure(s) Performed:   1. Transurethral destruction of prostate; radiofrequency thermotherapy  2. Bladder Botox Injection      Specimen(s): none     Staff Surgeon: Jamal Zamora MD     Assistant Surgeon: MD Salvatore Salas MD     Anesthesia:  General with Natural airway      Indications: Adan Webb is a 74 y.o. male with BPH presenting for surgical intervention.         Findings:    1. Bilobar prostatic hypertrophy, slightly large on the right.   2. Small capacity bladder capacity  with mild trabeculations      Estimated Blood Loss: minimal     Drains:   1.  18 Fr churchill catheter      Procedure in detail: After informed consent was obtained and all questions were answered, the patient was brought to the operating suite and placed in the lithotomy position. General anesthesia was administered. SCDs were applied and working prior to induction of anesthesia. That patient was then prepped and draped in the usual sterile fashion. Time out was performed and preoperative antibiotics were confirmed.       A rigid cystoscope in a 22 Fr sheath was introduced into the patients's bladder via the urethra.  This passed easily.  Formal cystoscopy was performed which revealed the ureteral orifices in their normal anatomic position bilaterally.  No bladder masses, trabeculations, stones or diverticuli were seen.      100 units of botox was injected into the detrusor muscle throughout the bladder.  Good wheals were raised.  The patient's bladder was drained and the cystoscope was removed.     We then started the Rezum     We began the case by inserting the Rezum scope into the bladder. No urethral strictures were seen and scope entered easily.     Next, we examined the prostate and found bilobar hyperplasia more significant on the right. RF was then used to create thermal energy to selectively ablate prostate tissue 1 cm from the bladder neck to the proximal end of the veru spaced 1 cm apart.      10 total treatments were given. Specifically 6 treatments were given to 9 o'clock position, and four were given to the 3 o'clock position.     At the completion of the procedure the device was removed. There was a careful check that there were no clots in the bladder or injury to the bladder, prostate or urethra.      18 fr churchill catheter was placed with 10 mL of sterile water in the balloon     The patient tolerated the procedure well and was transferred to the PACU in stable condition.       Disposition:  The patient will be discharged home with 5 days of antibiotic therapy, 1 week of pyridium, 1 week of ditropan, 2  weeks of NSAIDS, and pain medications. He will follow up with Dr. Zamora in clinic in one week to have his churchill catheter removed.

## 2024-05-23 ENCOUNTER — TELEPHONE (OUTPATIENT)
Dept: UROLOGY | Facility: CLINIC | Age: 74
End: 2024-05-23
Payer: MEDICARE

## 2024-05-23 NOTE — TELEPHONE ENCOUNTER
----- Message from Nay Watts sent at 5/23/2024 10:12 AM CDT -----  Regarding: Wife called states shes having an issues with the catherter wld likew to speak with someone  Contact: [phone]  Name of Who is Calling:   CHINO MARTINEZ [[id number]]     What is the request in detail:Wife called states shes having an issues with the catherter wld like to speak with someone regarding this matter. Pleased advise         Can the clinic reply by [facility]:No        What Number to Call Back if not in [facility]: Telephone Information:  Mobile          [phone]

## 2024-05-24 NOTE — TELEPHONE ENCOUNTER
Called pt in regards to message in portal regarding bowel movements. No answer, left detailed voicemail for pt informing him that he can take Miralax over the counter or Magnesium citrate . Also informed pt that if he is still taking pain medication he can take a stool soften with it.

## 2024-05-24 NOTE — TELEPHONE ENCOUNTER
----- Message from Adali Abreu sent at 5/24/2024  3:10 PM CDT -----  Regarding: pt advice  Contact: [phone]  Pt calling in regards to not be able to pass a bowel movement since after procedure, pt wanting to know can pt take something to be able to. Pls call

## 2024-05-29 ENCOUNTER — TELEPHONE (OUTPATIENT)
Dept: UROLOGY | Facility: CLINIC | Age: 74
End: 2024-05-29

## 2024-05-29 ENCOUNTER — OFFICE VISIT (OUTPATIENT)
Dept: UROLOGY | Facility: CLINIC | Age: 74
End: 2024-05-29
Payer: MEDICARE

## 2024-05-29 VITALS
SYSTOLIC BLOOD PRESSURE: 141 MMHG | HEART RATE: 81 BPM | BODY MASS INDEX: 41.36 KG/M2 | WEIGHT: 312.06 LBS | DIASTOLIC BLOOD PRESSURE: 68 MMHG | HEIGHT: 73 IN

## 2024-05-29 DIAGNOSIS — N13.8 BPH WITH URINARY OBSTRUCTION: Primary | ICD-10-CM

## 2024-05-29 DIAGNOSIS — N40.1 BPH WITH URINARY OBSTRUCTION: Primary | ICD-10-CM

## 2024-05-29 DIAGNOSIS — N32.81 OAB (OVERACTIVE BLADDER): ICD-10-CM

## 2024-05-29 DIAGNOSIS — N39.41 URGE INCONTINENCE: ICD-10-CM

## 2024-05-29 PROCEDURE — 1126F AMNT PAIN NOTED NONE PRSNT: CPT | Mod: HCNC,CPTII,S$GLB,

## 2024-05-29 PROCEDURE — 3078F DIAST BP <80 MM HG: CPT | Mod: HCNC,CPTII,S$GLB,

## 2024-05-29 PROCEDURE — 1157F ADVNC CARE PLAN IN RCRD: CPT | Mod: HCNC,CPTII,S$GLB,

## 2024-05-29 PROCEDURE — 1101F PT FALLS ASSESS-DOCD LE1/YR: CPT | Mod: HCNC,CPTII,S$GLB,

## 2024-05-29 PROCEDURE — 3288F FALL RISK ASSESSMENT DOCD: CPT | Mod: HCNC,CPTII,S$GLB,

## 2024-05-29 PROCEDURE — 3077F SYST BP >= 140 MM HG: CPT | Mod: HCNC,CPTII,S$GLB,

## 2024-05-29 PROCEDURE — 99999 PR PBB SHADOW E&M-EST. PATIENT-LVL V: CPT | Mod: PBBFAC,HCNC,,

## 2024-05-29 PROCEDURE — 1159F MED LIST DOCD IN RCRD: CPT | Mod: HCNC,CPTII,S$GLB,

## 2024-05-29 PROCEDURE — 99024 POSTOP FOLLOW-UP VISIT: CPT | Mod: HCNC,S$GLB,,

## 2024-05-29 PROCEDURE — 1160F RVW MEDS BY RX/DR IN RCRD: CPT | Mod: HCNC,CPTII,S$GLB,

## 2024-05-29 NOTE — PATIENT INSTRUCTIONS
For several days after surgery, you may feel burning when you urinate. Your urine may be pink for 1 to 3 weeks after surgery. You also may have bladder cramps, or spasms.     You may still feel like you need to urinate often in the weeks after your surgery. It often takes up to 6 weeks for this to get better. After you have healed, you may have less trouble urinating. You may have better control over starting and stopping your urine stream. And you may feel like you get more relief when you urinate.    After laser TURP, most people can return to work or many of their usual tasks in a few days. But for about 2 weeks, try to avoid heavy lifting and strenuous activities that might put extra pressure on your bladder.    Most people still can have erections after surgery (if they were able to have them before surgery). But they may not ejaculate when they have an orgasm. Semen may go into the bladder instead of out through the penis. This is called retrograde ejaculation. It does not hurt and is not harmful to your health.    This care sheet gives you a general idea about how long it will take for you to recover. But each person recovers at a different pace. Follow the steps below to get better as quickly as possible.    How can you care for yourself at home?  Activity  Rest when you feel tired.  Be active. Walking is a good choice.  Allow your body to heal. Don't move quickly or lift anything heavy until you are feeling better.  Ask your doctor when you can drive again.  Many people are able to return to work within a few days after surgery.  Do not put anything in your rectum, such as an enema or suppository, for 4 to 6 weeks after the surgery.  You may shower and take baths when your doctor says it is okay.  Ask your doctor when it is okay for you to have sex.    Diet  You can eat your normal diet. If your stomach is upset, try bland, low-fat foods like plain rice, broiled chicken, toast, and yogurt.  If your bowel  movements are not regular right after surgery, try to avoid constipation and straining. Drink plenty of water. Your doctor may suggest fibre, a stool softener, or a mild laxative.    Medicines  Your doctor will tell you if and when you can restart your medicines. He or she will also give you instructions about taking any new medicines.  If you take aspirin or some other blood thinner, be sure to talk to your doctor. He or she will tell you if and when to start taking those medicines again. Make sure that you understand exactly what your doctor wants you to do.  Be safe with medicines. Read and follow all instructions on the label.  If the doctor gave you a prescription medicine for pain, take it as prescribed.  If you are not taking a prescription pain medicine, ask your doctor if you can take an over-the-counter medicine.  Take your antibiotics as directed. Do not stop taking them just because you feel better. You need to take the full course of antibiotics.

## 2024-05-29 NOTE — PROGRESS NOTES
CHIEF COMPLAINT:  BPH      HISTORY OF PRESENTING ILLINESS:  Adan Webb is a 74 y.o. male established patient of Dr. Zamora. He is s/p Rezum and 100 units bladder botox 5/22/2024. Presents today for VT. Last BM 2 days ago, feels constipated. He has used miralax x4 days, once daily.     Rezum: 10 total treatments were given. Specifically 6 treatments were given to 9 o'clock position, and four were given to the 3 o'clock position.         PATIENT HISTORY:    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, didn't use ear protection    Dermatitis 12/9/2020    nystat groin    DJD (degenerative joint disease) of hip 1/29/2015    Enlarged liver 4/8/2021    US 4/21     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    MCA , see MRI    Hiatal hernia 4/1/2021    CT chest tiny 2018    HTN (hypertension), benign 8/20/2020    Morbid obesity with BMI of 40.0-44.9, adult 1/29/2015    OAB (overactive bladder) 4/1/2021    Rash with pads    Right-sided low back pain with right-sided sciatica 3/6/2019    Seasonal allergic rhinitis due to pollen 4/13/2016    Thoracic aortic aneurysm without rupture 05/27/2018    tx with Su, Cardiologist Dr Stone, 5.1 CTS Dr Duenas, follows yearly    Transient cerebral ischemia 6/6/2018       Past Surgical History:   Procedure Laterality Date    A-V CARDIAC PACEMAKER INSERTION N/A 08/05/2021    Procedure: INSERTION, CARDIAC  PACEMAKER, DUAL CHAMBER;  Surgeon: Sheldon Miranda MD;  Location: [facility] EP LAB;  Service: Cardiology;  Laterality: N/A;  Near syncope,SB,Sinus Pause, RBBB,LAFB, Dual PPM, BIO, MAC, NH, 3 Prep    ADENOIDECTOMY      CATARACT EXTRACTION      CYSTOSCOPY N/A 7/26/2023    Procedure: CYSTOSCOPY;  Surgeon: Jamal Zamora MD;  Location: [facility] OR [address] [place] [address];  Service: Urology;  Laterality: N/A;    CYSTOSCOPY WITH TRANSURETHRAL DESTRUCTION OF PROSTATE,RFA N/A 5/22/2024    Procedure: CYSTOSCOPY WITH TRANSURETHRAL DESTRUCTION OF PROSTATE,RFA;  Surgeon: Jamal Zamora MD;  Location: [facility] OR [address] [place] [address];  Service: Urology;  Laterality: N/A;    CYSTOSCOPY WITH URODYNAMIC TESTING N/A 10/25/2021    Procedure: CYSTOSCOPY, WITH URODYNAMIC TESTING FLOUROSCOPIC;  Surgeon: Sancho Wesley MD;  Location: [address];  Service: Urology;  Laterality: N/A;  1hr    CYSTOSCOPY,WITH BOTULINUM TOXIN INJECTION N/A 5/22/2024    Procedure: CYSTOSCOPY,WITH BOTULINUM TOXIN INJECTION;  Surgeon: Jamal Zamora MD;  Location: [facility] OR [address] [place] [address];  Service: Urology;  Laterality: N/A;  100 units    HERNIA REPAIR      INJECTION OF BOTULINUM TOXIN TYPE A N/A 7/26/2023    Procedure: INJECTION, BOTULINUM TOXIN, TYPE A;  Surgeon: Jamal Zamora MD;  Location: [address];  Service: Urology;  Laterality: N/A;  100 units    TONSILLECTOMY      YAG LAser Capsulotomy Bilateral     2/18/2019 OS Dr. Cornelius       Family History   Problem Relation Name Age of Onset    Diabetes Mother      Leukemia Father      Aneurysm Father      No Known Problems Daughter x1     No Known Problems Son x1     Cataracts Paternal Uncle      Amblyopia Neg Hx      Blindness Neg Hx      Glaucoma Neg Hx      Macular degeneration Neg Hx      Retinal detachment Neg Hx      Strabismus Neg Hx         Social History     Socioeconomic History    Marital status:    Tobacco Use    Smoking status: Former     Types: Cigarettes     Start date: 1975     Quit date: 1967     Years since  quittin.4    Smokeless tobacco: Never   Substance and Sexual Activity    Alcohol use: Yes     Comment: wine, seldom    Drug use: No    Sexual activity: Yes     Partners: Female   Social History Narrative     to 'T', 2 children, nonsmoker, ETOH none, never had colonscopy & declines     Social Determinants of Health     Financial Resource Strain: Low Risk  (2024)    Overall Financial Resource Strain (CARDIA)     Difficulty of Paying Living Expenses: Not hard at all   Food Insecurity: No Food Insecurity (2024)    Hunger Vital Sign     Worried About Running Out of Food in the Last Year: Never true     Ran Out of Food in the Last Year: Never true   Transportation Needs: No Transportation Needs (2024)    PRAPARE - Transportation     Lack of Transportation (Medical): No     Lack of Transportation (Non-Medical): No   Physical Activity: Inactive (2024)    Exercise Vital Sign     Days of Exercise per Week: 0 days     Minutes of Exercise per Session: 0 min   Stress: No Stress Concern Present (2024)    [employer] [place] [employer] - Occupational Stress Questionnaire     Feeling of Stress : Not at all   Housing Stability: Low Risk  (2024)    Housing Stability Vital Sign     Unable to Pay for Housing in the Last Year: No     Number of Places Lived in the Last Year: 1     Unstable Housing in the Last Year: No       Allergies:  Augmentin [amoxicillin-pot clavulanate], Azithromycin, Avodart [dutasteride], House dust mite, Mold, Naproxen, Ragweed, and Synthroid [levothyroxine]    Medications:    Current Outpatient Medications:     acetaminophen (TYLENOL) 500 MG tablet, Take 1 tablet (500 mg total) by mouth every 6 (six) hours as needed for Pain., Disp: 30 tablet, Rfl: 0    albuterol (VENTOLIN HFA) 90 mcg/actuation inhaler, Inhale 2 puffs into the lungs every 6 (six) hours as needed for Wheezing. Rescue, Disp: 18 g, Rfl: 1    ALLERGY RELIEF, LORATADINE, 10 mg tablet, Take 10 mg by mouth  daily as needed., Disp: , Rfl:     azelastine (ASTELIN) 137 mcg (0.1 %) nasal spray, 1 spray (137 mcg total) by Nasal route 2 (two) times daily., Disp: 30 mL, Rfl: 12    BABY ASPIRIN ORAL, Take 81 tablets by mouth every evening. , Disp: , Rfl:     cholecalciferol, vitamin D3, 1,000 unit capsule, Take 1,000 Units by mouth once daily., Disp: , Rfl:     coenzyme Q10 100 mg capsule, Take by mouth once daily., Disp: , Rfl:     diclofenac sodium (VOLTAREN) 1 % Gel, Apply 2 g topically 4 (four) times daily., Disp: 1 Tube, Rfl: 2    DULoxetine (CYMBALTA) 20 MG capsule, Take 1 capsule (20 mg total) by mouth 2 (two) times daily., Disp: 60 capsule, Rfl: 11    finasteride (PROSCAR) 5 mg tablet, Take 1 tablet (5 mg total) by mouth once daily., Disp: 90 tablet, Rfl: 3    fluticasone propionate (FLONASE) 50 mcg/actuation nasal spray, 1 spray by Each Nostril route once daily., Disp: , Rfl:     glucosamine-chondroitin 500-400 mg tablet, Take 1 tablet by mouth 2 (two) times daily. , Disp: , Rfl:     ibuprofen (ADVIL,MOTRIN) 800 MG tablet, Take 1 tablet (800 mg total) by mouth every 6 (six) hours as needed for Pain., Disp: 28 tablet, Rfl: 0    imipramine (TOFRANIL) 25 MG tablet, Take 1 tablet (25 mg total) by mouth every evening., Disp: 30 tablet, Rfl: 11    metoprolol succinate (TOPROL-XL) 50 MG 24 hr tablet, Take 1 tablet (50 mg total) by mouth once daily., Disp: 90 tablet, Rfl: 2    MULTIVITAMIN W-MINERALS/LUTEIN (CENTRUM SILVER ORAL), Take by mouth once daily. , Disp: , Rfl:     nystatin-triamcinolone (MYCOLOG II) cream, APPLY TOPICALLY 4 (FOUR) TIMES DAILY., Disp: 60 g, Rfl: 5    omega-3 fatty acids 300 mg Cap, Take 1 tablet by mouth once daily. , Disp: , Rfl:     phenazopyridine (PYRIDIUM) 100 MG tablet, Take 1 tablet (100 mg total) by mouth 3 (three) times daily as needed for Pain., Disp: 30 tablet, Rfl: 0    rosuvastatin (CRESTOR) 10 MG tablet, TAKE 1 TABLET (10 MG TOTAL) BY MOUTH ONCE DAILY., Disp: 90 tablet, Rfl: 1     sulfamethoxazole-trimethoprim 800-160mg (BACTRIM DS) 800-160 mg Tab, Take 1 tablet by mouth 2 (two) times daily. for 10 days, Disp: 20 tablet, Rfl: 0    tamsulosin (FLOMAX) 0.4 mg Cap, Take 1 capsule (0.4 mg total) by mouth once daily., Disp: 30 capsule, Rfl: 11    TURMERIC ORAL, Take by mouth. Pt take 1 in the evening., Disp: , Rfl:     VITAMIN B COMPLEX (SUPER B COMPLEX-B-12 ORAL), Take by mouth., Disp: , Rfl:         Lab Results   Component Value Date    PSA 2.7 01/20/2015    PSA 1.5 05/30/2008    PSA 2.1 05/11/2005    PSADIAG 2.5 07/16/2018    PSADIAG 2.8 06/12/2017    PSADIAG 2.7 08/18/2014       Lab Results   Component Value Date    CREATININE 0.7 02/06/2024    EGFRNORACEVR >60.0 02/06/2024               IMPRESSION:    Encounter Diagnoses   Name Primary?    BPH with urinary obstruction Yes    OAB (overactive bladder)     Urge incontinence          Assessment:       1. BPH with urinary obstruction    2. OAB (overactive bladder)    3. Urge incontinence        Plan:   - Post op Rezum expectations discussed. Medications discussed: complete antibiotic, continue Proscar/Flomax, pyridium PRN, DC ditropan.     Voiding trial performed by Nurse Gerri.  120 ml of sterile water was instilled into bladder.  Churchill catheter was removed. Patient urinated 200 ml without difficulty.     Voiding trial passed  Patient was instructed to drink plenty of fluids today.  Instructed patient to call at 1p.m. to give an update on urine output.  I instructed patient to return to clinic or emergency department (if after clinic hours) to have churchill catheter put back in if unable to urinate within 5 hours of churchill catheter removal or starts to experience bladder pressure/pain, decrease flow, straining/difficulty urinating, urinary frequency.    Patient voiced understanding.    Return to clinic August for post op apt with Dr. Zamora or sooner PRN    I spent 30 minutes with the patient of which more than half was spent in direct consultation  with the patient in regards to our treatment and plan.  We addressed the office findings and recent labs; any need to go ER today.   Education and recommendations of today's plan of care including home remedies and needed follow up with PCP.   We discussed the chief complaints; reviewed the LUTS and the possible contributory factors.   Reassurance no infection or visible blood seen in today's urine sample

## 2024-05-30 ENCOUNTER — PATIENT MESSAGE (OUTPATIENT)
Dept: UROLOGY | Facility: CLINIC | Age: 74
End: 2024-05-30

## 2024-05-30 ENCOUNTER — OFFICE VISIT (OUTPATIENT)
Dept: UROLOGY | Facility: CLINIC | Age: 74
End: 2024-05-30
Payer: MEDICARE

## 2024-05-30 DIAGNOSIS — N30.90 CYSTITIS: Primary | ICD-10-CM

## 2024-05-30 DIAGNOSIS — R33.9 URINARY RETENTION: ICD-10-CM

## 2024-05-30 DIAGNOSIS — Z90.79 S/P TURP: ICD-10-CM

## 2024-05-30 PROCEDURE — 99499 UNLISTED E&M SERVICE: CPT | Mod: HCNC,S$GLB,, | Performed by: UROLOGY

## 2024-05-30 PROCEDURE — 99024 POSTOP FOLLOW-UP VISIT: CPT | Mod: HCNC,S$GLB,, | Performed by: UROLOGY

## 2024-05-30 PROCEDURE — 87086 URINE CULTURE/COLONY COUNT: CPT | Mod: HCNC | Performed by: UROLOGY

## 2024-05-30 PROCEDURE — 99999 PR PBB SHADOW E&M-EST. PATIENT-LVL I: CPT | Mod: PBBFAC,HCNC,, | Performed by: UROLOGY

## 2024-05-30 PROCEDURE — 1157F ADVNC CARE PLAN IN RCRD: CPT | Mod: HCNC,CPTII,S$GLB, | Performed by: UROLOGY

## 2024-05-30 RX ORDER — TRIMETHOPRIM 100 MG/1
100 TABLET ORAL 2 TIMES DAILY
Qty: 60 TABLET | Refills: 0 | Status: SHIPPED | OUTPATIENT
Start: 2024-05-30 | End: 2024-06-29

## 2024-05-30 NOTE — PROGRESS NOTES
CHIEF COMPLAINT:  C/p lower abdominal pain, Possible urinary retention      HISTORY OF PRESENTING ILLINESS:  Adan Webb is a 74 y.o. male established patient of Dr. Zamora. He is s/p Rezum and 100 units bladder botox 5/22/2024. He passed a voiding trial successfully yesterday on 5/29/24.  Then Presents today for lower abdominal pain with urine leakage. Last BM 2 days ago, feels constipated. He has used miralax x4 days, once daily.     Rezum: 10 total treatments were given. Specifically 6 treatments were given to 9 o'clock position, and four were given to the 3 o'clock position.         PATIENT HISTORY:    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, didn't use ear protection    Dermatitis 12/9/2020    nystat groin    DJD (degenerative joint disease) of hip 1/29/2015    Enlarged liver 4/8/2021    US 4/21     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    MCA , see MRI    Hiatal hernia 4/1/2021    CT chest tiny 2018    HTN (hypertension), benign 8/20/2020    Morbid obesity with BMI of 40.0-44.9, adult 1/29/2015    OAB (overactive bladder) 4/1/2021    Rash with pads    Right-sided low back pain with right-sided sciatica 3/6/2019    Seasonal allergic rhinitis due to pollen 4/13/2016    Thoracic aortic aneurysm without rupture 05/27/2018    tx with Su, Cardiologist Dr Stone, 5.1 CTS Dr Duenas, follows yearly    Transient cerebral  ischemia 6/6/2018       Past Surgical History:   Procedure Laterality Date    A-V CARDIAC PACEMAKER INSERTION N/A 08/05/2021    Procedure: INSERTION, CARDIAC PACEMAKER, DUAL CHAMBER;  Surgeon: Sheldon Miranda MD;  Location: [facility] EP LAB;  Service: Cardiology;  Laterality: N/A;  Near syncope,SB,Sinus Pause, RBBB,LAFB, Dual PPM, BIO, MAC, NC, 3 Prep    ADENOIDECTOMY      CATARACT EXTRACTION      CYSTOSCOPY N/A 7/26/2023    Procedure: CYSTOSCOPY;  Surgeon: Jamal Zamora MD;  Location: [address];  Service: Urology;  Laterality: N/A;    CYSTOSCOPY WITH TRANSURETHRAL DESTRUCTION OF PROSTATE,RFA N/A 5/22/2024    Procedure: CYSTOSCOPY WITH TRANSURETHRAL DESTRUCTION OF PROSTATE,RFA;  Surgeon: Jamal Zamora MD;  Location: [address];  Service: Urology;  Laterality: N/A;    CYSTOSCOPY WITH URODYNAMIC TESTING N/A 10/25/2021    Procedure: CYSTOSCOPY, WITH URODYNAMIC TESTING FLOUROSCOPIC;  Surgeon: Sancho Wesley MD;  Location: [address];  Service: Urology;  Laterality: N/A;  1hr    CYSTOSCOPY,WITH BOTULINUM TOXIN INJECTION N/A 5/22/2024    Procedure: CYSTOSCOPY,WITH BOTULINUM TOXIN INJECTION;  Surgeon: Jamal Zamora MD;  Location: [address];  Service: Urology;  Laterality: N/A;  100 units    HERNIA REPAIR      INJECTION OF BOTULINUM TOXIN TYPE A N/A 7/26/2023    Procedure: INJECTION, BOTULINUM TOXIN, TYPE A;  Surgeon: Jamal Zamora MD;  Location: [address];  Service: Urology;  Laterality: N/A;  100 units    TONSILLECTOMY      YAG LAser Capsulotomy Bilateral     2/18/2019 OS Dr. Cornelius       Family History   Problem Relation Name Age of Onset    Diabetes Mother      Leukemia Father      Aneurysm Father      No Known Problems Daughter x1     No Known Problems Son x1     Cataracts Paternal Uncle      Amblyopia Neg Hx      Blindness Neg Hx      Glaucoma Neg Hx      Macular degeneration Neg Hx      Retinal detachment Neg Hx      Strabismus Neg Hx         Social History     Socioeconomic  History    Marital status:    Tobacco Use    Smoking status: Former     Types: Cigarettes     Start date:      Quit date:      Years since quittin.4    Smokeless tobacco: Never   Substance and Sexual Activity    Alcohol use: Yes     Comment: wine, seldom    Drug use: No    Sexual activity: Yes     Partners: Female   Social History Narrative     to 'T', 2 children, nonsmoker, ETOH none, never had colonscopy & declines     Social Determinants of Health     Financial Resource Strain: Low Risk  (2024)    Overall Financial Resource Strain (CARDIA)     Difficulty of Paying Living Expenses: Not hard at all   Food Insecurity: No Food Insecurity (2024)    Hunger Vital Sign     Worried About Running Out of Food in the Last Year: Never true     Ran Out of Food in the Last Year: Never true   Transportation Needs: No Transportation Needs (2024)    PRAPARE - Transportation     Lack of Transportation (Medical): No     Lack of Transportation (Non-Medical): No   Physical Activity: Inactive (2024)    Exercise Vital Sign     Days of Exercise per Week: 0 days     Minutes of Exercise per Session: 0 min   Stress: No Stress Concern Present (2024)    [employer] - Occupational Stress Questionnaire     Feeling of Stress : Not at all   Housing Stability: Low Risk  (2024)    Housing Stability Vital Sign     Unable to Pay for Housing in the Last Year: No     Number of Places Lived in the Last Year: 1     Unstable Housing in the Last Year: No       Allergies:  Augmentin [amoxicillin-pot clavulanate], Azithromycin, Avodart [dutasteride], House dust mite, Mold, Naproxen, Ragweed, and Synthroid [levothyroxine]    Medications:    Current Outpatient Medications:     acetaminophen (TYLENOL) 500 MG tablet, Take 1 tablet (500 mg total) by mouth every 6 (six) hours as needed for Pain., Disp: 30 tablet, Rfl: 0    albuterol (VENTOLIN HFA) 90 mcg/actuation inhaler, Inhale 2  puffs into the lungs every 6 (six) hours as needed for Wheezing. Rescue, Disp: 18 g, Rfl: 1    ALLERGY RELIEF, LORATADINE, 10 mg tablet, Take 10 mg by mouth daily as needed., Disp: , Rfl:     azelastine (ASTELIN) 137 mcg (0.1 %) nasal spray, 1 spray (137 mcg total) by Nasal route 2 (two) times daily., Disp: 30 mL, Rfl: 12    BABY ASPIRIN ORAL, Take 81 tablets by mouth every evening. , Disp: , Rfl:     cholecalciferol, vitamin D3, 1,000 unit capsule, Take 1,000 Units by mouth once daily., Disp: , Rfl:     coenzyme Q10 100 mg capsule, Take by mouth once daily., Disp: , Rfl:     diclofenac sodium (VOLTAREN) 1 % Gel, Apply 2 g topically 4 (four) times daily., Disp: 1 Tube, Rfl: 2    DULoxetine (CYMBALTA) 20 MG capsule, Take 1 capsule (20 mg total) by mouth 2 (two) times daily., Disp: 60 capsule, Rfl: 11    finasteride (PROSCAR) 5 mg tablet, Take 1 tablet (5 mg total) by mouth once daily., Disp: 90 tablet, Rfl: 3    fluticasone propionate (FLONASE) 50 mcg/actuation nasal spray, 1 spray by Each Nostril route once daily., Disp: , Rfl:     glucosamine-chondroitin 500-400 mg tablet, Take 1 tablet by mouth 2 (two) times daily. , Disp: , Rfl:     imipramine (TOFRANIL) 25 MG tablet, Take 1 tablet (25 mg total) by mouth every evening., Disp: 30 tablet, Rfl: 11    metoprolol succinate (TOPROL-XL) 50 MG 24 hr tablet, Take 1 tablet (50 mg total) by mouth once daily., Disp: 90 tablet, Rfl: 2    MULTIVITAMIN W-MINERALS/LUTEIN (CENTRUM SILVER ORAL), Take by mouth once daily. , Disp: , Rfl:     nystatin-triamcinolone (MYCOLOG II) cream, APPLY TOPICALLY 4 (FOUR) TIMES DAILY., Disp: 60 g, Rfl: 5    omega-3 fatty acids 300 mg Cap, Take 1 tablet by mouth once daily. , Disp: , Rfl:     phenazopyridine (PYRIDIUM) 100 MG tablet, Take 1 tablet (100 mg total) by mouth 3 (three) times daily as needed for Pain., Disp: 30 tablet, Rfl: 0    rosuvastatin (CRESTOR) 10 MG tablet, TAKE 1 TABLET (10 MG TOTAL) BY MOUTH ONCE DAILY., Disp: 90 tablet, Rfl:  1    sulfamethoxazole-trimethoprim 800-160mg (BACTRIM DS) 800-160 mg Tab, Take 1 tablet by mouth 2 (two) times daily. for 10 days, Disp: 20 tablet, Rfl: 0    tamsulosin (FLOMAX) 0.4 mg Cap, Take 1 capsule (0.4 mg total) by mouth once daily., Disp: 30 capsule, Rfl: 11    trimethoprim (TRIMPEX) 100 mg Tab, Take 1 tablet (100 mg total) by mouth 2 (two) times daily., Disp: 60 tablet, Rfl: 0    TURMERIC ORAL, Take by mouth. Pt take 1 in the evening., Disp: , Rfl:     VITAMIN B COMPLEX (SUPER B COMPLEX-B-12 ORAL), Take by mouth., Disp: , Rfl:         Lab Results   Component Value Date    PSA 2.7 01/20/2015    PSA 1.5 05/30/2008    PSA 2.1 05/11/2005    PSADIAG 2.5 07/16/2018    PSADIAG 2.8 06/12/2017    PSADIAG 2.7 08/18/2014       Lab Results   Component Value Date    CREATININE 0.7 02/06/2024    EGFRNORACEVR >60.0 02/06/2024               IMPRESSION:    Encounter Diagnoses   Name Primary?    Cystitis Yes    Urinary retention     S/P TURP            Assessment:       Cystitis  -     Urine culture  -     trimethoprim (TRIMPEX) 100 mg Tab; Take 1 tablet (100 mg total) by mouth 2 (two) times daily.  Dispense: 60 tablet; Refill: 0    Urinary retention    S/P TURP       Plan:   - Post op Rezum and botox injection  Developed acute urinary retention.    Procedure:  Churchill catheter placement    The penis was prepped and draped in a sterile fashion using betadine swaps.  2% lidocaine jelly was injected into the urethra as a local anesthetics.  A new 18 F coude tip churchill catheter was inserted into the bladder in a sterile fashion without any difficulty.  A return of urine was confirmed and the catheter balloon was inflated with 10 cc water.  The catheter was secured with a catheter sims and was connected to a leg bag.  A large bag was also provided to pt for night time use.  He tolerated a procedure well.     1,100 ml PVR noted.  Urine culture today.  Continue flomax, finasteride.  Will see him in 2 wk for voiding trial with  JASON.    Continue suppressive abx daily while on catheter.      Follow up in about 2 weeks (around 6/13/2024), or voiding trial in 2 wks.

## 2024-05-31 ENCOUNTER — TELEPHONE (OUTPATIENT)
Dept: NEUROLOGY | Facility: CLINIC | Age: 74
End: 2024-05-31
Payer: MEDICARE

## 2024-05-31 LAB — BACTERIA UR CULT: NO GROWTH

## 2024-06-13 ENCOUNTER — TELEPHONE (OUTPATIENT)
Dept: UROLOGY | Facility: CLINIC | Age: 74
End: 2024-06-13
Payer: MEDICARE

## 2024-06-13 ENCOUNTER — OFFICE VISIT (OUTPATIENT)
Dept: UROLOGY | Facility: CLINIC | Age: 74
End: 2024-06-13
Payer: MEDICARE

## 2024-06-13 VITALS
DIASTOLIC BLOOD PRESSURE: 81 MMHG | HEART RATE: 88 BPM | WEIGHT: 312.19 LBS | BODY MASS INDEX: 41.38 KG/M2 | HEIGHT: 73 IN | SYSTOLIC BLOOD PRESSURE: 134 MMHG

## 2024-06-13 DIAGNOSIS — N13.8 BPH WITH URINARY OBSTRUCTION: ICD-10-CM

## 2024-06-13 DIAGNOSIS — N40.1 BPH WITH URINARY OBSTRUCTION: ICD-10-CM

## 2024-06-13 DIAGNOSIS — R33.9 URINARY RETENTION: Primary | ICD-10-CM

## 2024-06-13 DIAGNOSIS — Z90.79 S/P TURP: ICD-10-CM

## 2024-06-13 PROCEDURE — 1101F PT FALLS ASSESS-DOCD LE1/YR: CPT | Mod: HCNC,CPTII,S$GLB,

## 2024-06-13 PROCEDURE — 1159F MED LIST DOCD IN RCRD: CPT | Mod: HCNC,CPTII,S$GLB,

## 2024-06-13 PROCEDURE — 3288F FALL RISK ASSESSMENT DOCD: CPT | Mod: HCNC,CPTII,S$GLB,

## 2024-06-13 PROCEDURE — 1126F AMNT PAIN NOTED NONE PRSNT: CPT | Mod: HCNC,CPTII,S$GLB,

## 2024-06-13 PROCEDURE — 3075F SYST BP GE 130 - 139MM HG: CPT | Mod: HCNC,CPTII,S$GLB,

## 2024-06-13 PROCEDURE — 1157F ADVNC CARE PLAN IN RCRD: CPT | Mod: HCNC,CPTII,S$GLB,

## 2024-06-13 PROCEDURE — 99999 PR PBB SHADOW E&M-EST. PATIENT-LVL IV: CPT | Mod: PBBFAC,HCNC,,

## 2024-06-13 PROCEDURE — 3079F DIAST BP 80-89 MM HG: CPT | Mod: HCNC,CPTII,S$GLB,

## 2024-06-13 PROCEDURE — 1160F RVW MEDS BY RX/DR IN RCRD: CPT | Mod: HCNC,CPTII,S$GLB,

## 2024-06-13 PROCEDURE — 99024 POSTOP FOLLOW-UP VISIT: CPT | Mod: HCNC,S$GLB,,

## 2024-06-13 NOTE — TELEPHONE ENCOUNTER
Spoke to pt he stae he has urinated another 250cc urine.  Instructed if having any problems to report to DOMONIQUE Chand LPN

## 2024-06-13 NOTE — PROGRESS NOTES
CHIEF COMPLAINT:  VT      HISTORY OF PRESENTING ILLINESS:  Adan Webb is a 74 y.o. male established patient of Dr. Zamora. He is s/p Rezum and 100 units bladder botox 5/22/2024. He passed a voiding trial successfully on 5/29/24.  Then presented to Dr. Zamora's office 5/30/24 for lower abdominal pain with urine leakage, PVR 1,100 in office. Wolf catheter placed and repeat VT scheduled. He is on trimethoprim until 6/29/24.continues with Flomax and Proscar.     He was also constipated 5/29. Now resolved.     Rezum: 10 total treatments were given. Specifically 6 treatments were given to 9 o'clock position, and four were given to the 3 o'clock position.            PATIENT HISTORY:    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, didn't use ear protection    Dermatitis 12/9/2020    nystat groin    DJD (degenerative joint disease) of hip 1/29/2015    Enlarged liver 4/8/2021    US 4/21     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    MCA , see MRI    Hiatal hernia 4/1/2021    CT chest tiny 2018    HTN (hypertension), benign 8/20/2020    Morbid obesity with BMI of 40.0-44.9, adult 1/29/2015    OAB (overactive bladder) 4/1/2021    Rash with pads    Right-sided low back pain with right-sided sciatica 3/6/2019    Seasonal allergic rhinitis due to pollen 4/13/2016    Thoracic aortic aneurysm without rupture 05/27/2018    tx with Su, Cardiologist  Dr Stone, 5.1 CTS Dr Duenas, follows yearly    Transient cerebral ischemia 6/6/2018       Past Surgical History:   Procedure Laterality Date    A-V CARDIAC PACEMAKER INSERTION N/A 08/05/2021    Procedure: INSERTION, CARDIAC PACEMAKER, DUAL CHAMBER;  Surgeon: Sheldon Miranda MD;  Location: [facility] EP LAB;  Service: Cardiology;  Laterality: N/A;  Near syncope,SB,Sinus Pause, RBBB,LAFB, Dual PPM, BIO, MAC, MD, 3 Prep    ADENOIDECTOMY      CATARACT EXTRACTION      CYSTOSCOPY N/A 7/26/2023    Procedure: CYSTOSCOPY;  Surgeon: Jamal Zamora MD;  Location: [facility] OR [address] [place] [address];  Service: Urology;  Laterality: N/A;    CYSTOSCOPY WITH TRANSURETHRAL DESTRUCTION OF PROSTATE,RFA N/A 5/22/2024    Procedure: CYSTOSCOPY WITH TRANSURETHRAL DESTRUCTION OF PROSTATE,RFA;  Surgeon: Jamal Zamora MD;  Location: [address];  Service: Urology;  Laterality: N/A;    CYSTOSCOPY WITH URODYNAMIC TESTING N/A 10/25/2021    Procedure: CYSTOSCOPY, WITH URODYNAMIC TESTING FLOUROSCOPIC;  Surgeon: Sancho Wesley MD;  Location: [address];  Service: Urology;  Laterality: N/A;  1hr    CYSTOSCOPY,WITH BOTULINUM TOXIN INJECTION N/A 5/22/2024    Procedure: CYSTOSCOPY,WITH BOTULINUM TOXIN INJECTION;  Surgeon: Jamal Zamora MD;  Location: [facility] OR [address] [place] [address];  Service: Urology;  Laterality: N/A;  100 units    HERNIA REPAIR      INJECTION OF BOTULINUM TOXIN TYPE A N/A 7/26/2023    Procedure: INJECTION, BOTULINUM TOXIN, TYPE A;  Surgeon: Jamal Zamora MD;  Location: [address];  Service: Urology;  Laterality: N/A;  100 units    TONSILLECTOMY      YAG LAser Capsulotomy Bilateral     2/18/2019 OS Dr. Cornelius       Family History   Problem Relation Name Age of Onset    Diabetes Mother      Leukemia Father      Aneurysm Father      No Known Problems Daughter x1     No Known Problems Son x1     Cataracts Paternal Uncle      Amblyopia Neg Hx      Blindness Neg Hx      Glaucoma Neg Hx      Macular degeneration Neg Hx      Retinal detachment Neg Hx       Strabismus Neg Hx         Social History     Socioeconomic History    Marital status:    Tobacco Use    Smoking status: Former     Types: Cigarettes     Start date:      Quit date:      Years since quittin.4    Smokeless tobacco: Never   Substance and Sexual Activity    Alcohol use: Yes     Comment: wine, seldom    Drug use: No    Sexual activity: Yes     Partners: Female   Social History Narrative     to 'T', 2 children, nonsmoker, ETOH none, never had colonscopy & declines     Social Determinants of Health     Financial Resource Strain: Low Risk  (2024)    Overall Financial Resource Strain (CARDIA)     Difficulty of Paying Living Expenses: Not hard at all   Food Insecurity: No Food Insecurity (2024)    Hunger Vital Sign     Worried About Running Out of Food in the Last Year: Never true     Ran Out of Food in the Last Year: Never true   Transportation Needs: No Transportation Needs (2024)    PRAPARE - Transportation     Lack of Transportation (Medical): No     Lack of Transportation (Non-Medical): No   Physical Activity: Inactive (2024)    Exercise Vital Sign     Days of Exercise per Week: 0 days     Minutes of Exercise per Session: 0 min   Stress: No Stress Concern Present (2024)    [employer] - Occupational Stress Questionnaire     Feeling of Stress : Not at all   Housing Stability: Low Risk  (2024)    Housing Stability Vital Sign     Unable to Pay for Housing in the Last Year: No     Number of Places Lived in the Last Year: 1     Unstable Housing in the Last Year: No       Allergies:  Augmentin [amoxicillin-pot clavulanate], Azithromycin, Avodart [dutasteride], House dust mite, Mold, Naproxen, Ragweed, and Synthroid [levothyroxine]    Medications:    Current Outpatient Medications:     acetaminophen (TYLENOL) 500 MG tablet, Take 1 tablet (500 mg total) by mouth every 6 (six) hours as needed for Pain., Disp: 30 tablet, Rfl: 0     albuterol (VENTOLIN HFA) 90 mcg/actuation inhaler, Inhale 2 puffs into the lungs every 6 (six) hours as needed for Wheezing. Rescue, Disp: 18 g, Rfl: 1    ALLERGY RELIEF, LORATADINE, 10 mg tablet, Take 10 mg by mouth daily as needed., Disp: , Rfl:     BABY ASPIRIN ORAL, Take 81 tablets by mouth every evening. , Disp: , Rfl:     cholecalciferol, vitamin D3, 1,000 unit capsule, Take 1,000 Units by mouth once daily., Disp: , Rfl:     coenzyme Q10 100 mg capsule, Take by mouth once daily., Disp: , Rfl:     diclofenac sodium (VOLTAREN) 1 % Gel, Apply 2 g topically 4 (four) times daily., Disp: 1 Tube, Rfl: 2    DULoxetine (CYMBALTA) 20 MG capsule, Take 1 capsule (20 mg total) by mouth 2 (two) times daily., Disp: 60 capsule, Rfl: 11    finasteride (PROSCAR) 5 mg tablet, Take 1 tablet (5 mg total) by mouth once daily., Disp: 90 tablet, Rfl: 3    fluticasone propionate (FLONASE) 50 mcg/actuation nasal spray, 1 spray by Each Nostril route once daily., Disp: , Rfl:     glucosamine-chondroitin 500-400 mg tablet, Take 1 tablet by mouth 2 (two) times daily. , Disp: , Rfl:     imipramine (TOFRANIL) 25 MG tablet, Take 1 tablet (25 mg total) by mouth every evening., Disp: 30 tablet, Rfl: 11    metoprolol succinate (TOPROL-XL) 50 MG 24 hr tablet, Take 1 tablet (50 mg total) by mouth once daily., Disp: 90 tablet, Rfl: 2    MULTIVITAMIN W-MINERALS/LUTEIN (CENTRUM SILVER ORAL), Take by mouth once daily. , Disp: , Rfl:     nystatin-triamcinolone (MYCOLOG II) cream, APPLY TOPICALLY 4 (FOUR) TIMES DAILY., Disp: 60 g, Rfl: 5    omega-3 fatty acids 300 mg Cap, Take 1 tablet by mouth once daily. , Disp: , Rfl:     rosuvastatin (CRESTOR) 10 MG tablet, TAKE 1 TABLET (10 MG TOTAL) BY MOUTH ONCE DAILY., Disp: 90 tablet, Rfl: 1    tamsulosin (FLOMAX) 0.4 mg Cap, Take 1 capsule (0.4 mg total) by mouth once daily., Disp: 30 capsule, Rfl: 11    trimethoprim (TRIMPEX) 100 mg Tab, Take 1 tablet (100 mg total) by mouth 2 (two) times daily., Disp: 60  tablet, Rfl: 0    TURMERIC ORAL, Take by mouth. Pt take 1 in the evening., Disp: , Rfl:     VITAMIN B COMPLEX (SUPER B COMPLEX-B-12 ORAL), Take by mouth., Disp: , Rfl:     azelastine (ASTELIN) 137 mcg (0.1 %) nasal spray, 1 spray (137 mcg total) by Nasal route 2 (two) times daily., Disp: 30 mL, Rfl: 12        Lab Results   Component Value Date    PSA 2.7 01/20/2015    PSA 1.5 05/30/2008    PSA 2.1 05/11/2005    PSADIAG 2.5 07/16/2018    PSADIAG 2.8 06/12/2017    PSADIAG 2.7 08/18/2014       Lab Results   Component Value Date    CREATININE 0.7 02/06/2024    EGFRNORACEVR >60.0 02/06/2024           IMPRESSION:    Encounter Diagnoses   Name Primary?    Urinary retention Yes    S/P TURP     BPH with urinary obstruction          Assessment:       1. Urinary retention    2. S/P TURP    3. BPH with urinary obstruction        Plan:   Voiding trial performed by Nurse Michael.  100 ml of sterile water was instilled into bladder.  Churchill catheter was removed. Patient urinated 100 ml without difficulty.     Voiding trial passed.  Continue Flomax, Proscar, and trimethoprim.  Patient was instructed to drink plenty of fluids today.  Instructed patient to call at 1p.m. to give an update on urine output.  I instructed patient to return to clinic or emergency department (if after clinic hours) to have churchill catheter put back in if unable to urinate within 5 hours of churchill catheter removal or starts to experience bladder pressure/pain, decrease flow, straining/difficulty urinating, urinary frequency.    Patient voiced understanding.  Called patient at 1312, left voicemail with my number to call.   Spoke with his wife Anastasiia at 1325   Patient has only urinated 100 ml  Drank 950 ml water around 11  Called again at 1430, patient urinated an additional 250 ml. He is not experiencing s/s of retention.       RTC 8/23/2024    I spent 30 minutes with the patient of which more than half was spent in direct consultation with the patient in regards to  our treatment and plan.

## 2024-06-26 RX ORDER — ROSUVASTATIN CALCIUM 10 MG/1
10 TABLET, COATED ORAL DAILY
Qty: 90 TABLET | Refills: 0 | Status: SHIPPED | OUTPATIENT
Start: 2024-06-26

## 2024-06-26 NOTE — TELEPHONE ENCOUNTER
----- Message from Josekurtis Anand sent at 6/26/2024  9:32 AM CDT -----  Contact: @[facility] [phone]  Requesting an RX refill or new RX.    Is this a refill or new RX: refill    RX name and strength (copy/paste from chart):  rosuvastatin (CRESTOR) 10 MG tablet     Is this a 30 day or 90 day RX: 90    Pharmacy name and phone # (copy/paste from chart):      [facility] [address]  [address] [facility] [address]  [place] [address]  Phone: [phone] Fax: [phone]

## 2024-06-26 NOTE — TELEPHONE ENCOUNTER
Care Due:                  Date            Visit Type   Department     Provider  --------------------------------------------------------------------------------                                MYCHART                              ANNUAL                              CHECKUP/PHY  [facility]  Last Visit: 02-      S            MALORIE Grant  Next Visit: None Scheduled  None         None Found                                                            Last  Test          Frequency    Reason                     Performed    Due Date  --------------------------------------------------------------------------------    Lipid Panel.  12 months..  rosuvastatin.............  03- 02-    [facility] Embedded Care Due Messages. Reference number: [id number].   6/26/2024 9:59:58 AM CDT

## 2024-06-26 NOTE — TELEPHONE ENCOUNTER
I refilled medicine   Please offer appointment for follow up (last seen in office w me March 2023)   No labs needed

## 2024-06-28 ENCOUNTER — TELEPHONE (OUTPATIENT)
Dept: UROLOGY | Facility: CLINIC | Age: 74
End: 2024-06-28
Payer: MEDICARE

## 2024-06-28 NOTE — TELEPHONE ENCOUNTER
----- Message from Kaye Foley sent at 6/28/2024  1:24 PM CDT -----  Regarding: Antibiotic  Contact: pt @ [phone]  Pt is calling to see if provider want him to continue medication. Pt called on Wednesday and no one has responded. Asking for an urgent call back

## 2024-06-28 NOTE — TELEPHONE ENCOUNTER
Left voice message that he does not need a refill that he only needed to be on the antibiotic while he had the churchill cathter in

## 2024-07-08 ENCOUNTER — TELEPHONE (OUTPATIENT)
Dept: UROLOGY | Facility: CLINIC | Age: 74
End: 2024-07-08
Payer: MEDICARE

## 2024-07-08 NOTE — TELEPHONE ENCOUNTER
----- Message from Anamika Wang MA sent at 2024  1:54 PM CDT -----  Regarding: FW: returning missed call  Contact: Pt @[phone]    ----- Message -----  From: Fern Silva  Sent: 2024   1:45 PM CDT  To: [facility] CELEST[facility] Staff  Subject: returning missed call                            Pt is returning a missed call from someone in the office and is asking for a return call back soon. Thanks.         Reason for call: returning missed call from Stella         Patient's DX: in regards to antibiotics and urinary leakage         Patient requesting call back or [facility] [phone]

## 2024-07-16 ENCOUNTER — TELEPHONE (OUTPATIENT)
Dept: UROLOGY | Facility: CLINIC | Age: 74
End: 2024-07-16
Payer: MEDICARE

## 2024-07-16 NOTE — TELEPHONE ENCOUNTER
----- Message from Nahomy Pena sent at 7/16/2024 10:42 AM CDT -----  Regarding: Urgent Advice  Contact: [phone]  Who call ?  PT wife Anastasiia     What is the request Details :  Pt wife  calling to speak with someone in provider office regarding procedure on 5/22. States pt has not got any better , pt urinating on his self cannot sleep at night . She called 4 times  haven't received a call.       Can clinic  use patient portal  : No     What number to call back : [phone]

## 2024-07-16 NOTE — TELEPHONE ENCOUNTER
I have returned the call every time and I have documented that there is no answer and I have left messages. I just called him again -it rang one time and went straight to voice mail. I left another message.

## 2024-07-17 NOTE — TELEPHONE ENCOUNTER
Spoke to pt in regards to scheduling appt on Friday 7/19/24 at 8:40 am w/Dr Zamora. Pt states he will take this appt. He also stated that when he stands up her starts to urinate on himself. Informed pt that appt will be at [facility] 4th floor Urology. Pt voiced understanding.

## 2024-07-17 NOTE — TELEPHONE ENCOUNTER
----- Message from Stella Costello LPN sent at 7/16/2024  3:48 PM CDT -----  The pt complained that I have not called him back--I have left him several messages. If he calls tomorrow--tell him to come in this Friday at 840 am and just let me know so I can over ride him.thanks

## 2024-07-18 ENCOUNTER — TELEPHONE (OUTPATIENT)
Dept: PODIATRY | Facility: CLINIC | Age: 74
End: 2024-07-18
Payer: MEDICARE

## 2024-07-18 NOTE — TELEPHONE ENCOUNTER
Contacted pt to assist in scheduling an appointment with Dr. Villeda for left ankle pain,  Pt unavailable, left vm to contact Jessica at [phone].

## 2024-07-19 ENCOUNTER — OFFICE VISIT (OUTPATIENT)
Dept: UROLOGY | Facility: CLINIC | Age: 74
End: 2024-07-19
Payer: MEDICARE

## 2024-07-19 VITALS
DIASTOLIC BLOOD PRESSURE: 70 MMHG | HEART RATE: 80 BPM | WEIGHT: 315 LBS | BODY MASS INDEX: 41.75 KG/M2 | SYSTOLIC BLOOD PRESSURE: 125 MMHG | HEIGHT: 73 IN

## 2024-07-19 DIAGNOSIS — Z90.79 S/P TURP: ICD-10-CM

## 2024-07-19 DIAGNOSIS — N40.1 BENIGN PROSTATIC HYPERPLASIA WITH NOCTURIA: ICD-10-CM

## 2024-07-19 DIAGNOSIS — R35.1 BENIGN PROSTATIC HYPERPLASIA WITH NOCTURIA: ICD-10-CM

## 2024-07-19 DIAGNOSIS — R35.1 NOCTURIA: ICD-10-CM

## 2024-07-19 DIAGNOSIS — N32.81 OAB (OVERACTIVE BLADDER): Primary | ICD-10-CM

## 2024-07-19 DIAGNOSIS — R35.81 NOCTURNAL POLYURIA: ICD-10-CM

## 2024-07-19 PROCEDURE — 99999 PR PBB SHADOW E&M-EST. PATIENT-LVL III: CPT | Mod: PBBFAC,HCNC,, | Performed by: UROLOGY

## 2024-07-19 PROCEDURE — 99499 UNLISTED E&M SERVICE: CPT | Mod: HCNC,S$GLB,, | Performed by: UROLOGY

## 2024-07-19 PROCEDURE — 51798 US URINE CAPACITY MEASURE: CPT | Mod: HCNC,S$GLB,, | Performed by: UROLOGY

## 2024-07-19 NOTE — PROGRESS NOTES
CHIEF COMPLAINT:  S/p Rezum Therapy and botox injection 100 units on 5/22/24,      HISTORY OF PRESENTING ILLINESS:  Adan Webb is a 74 y.o. male established patient of Dr. Zamora. He is s/p Rezum and 100 units bladder botox 5/22/2024. He passed a voiding trial successfully yesterday on 5/29/24.    Rezum: 10 total treatments were given. Specifically 6 treatments were given to 9 o'clock position, and four were given to the 3 o'clock position.   Overall he is voiding better with better urine flow.  His nocturia decreased from 6 x to 3 x a night.  He is on flomax, imipramine q hs and Avodart.    He is here with his wife.      PATIENT HISTORY:    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, didn't use ear protection    Dermatitis 12/9/2020    nystat groin    DJD (degenerative joint disease) of hip 1/29/2015    Enlarged liver 4/8/2021    US 4/21     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    MCA , see MRI    Hiatal hernia 4/1/2021    CT chest tiny 2018    HTN (hypertension), benign 8/20/2020    Morbid obesity with BMI of 40.0-44.9, adult 1/29/2015    OAB (overactive bladder) 4/1/2021    Rash with pads    Right-sided low back pain with right-sided sciatica 3/6/2019    Seasonal allergic rhinitis due to pollen 4/13/2016    Thoracic aortic aneurysm without rupture 05/27/2018    tx with Su, Cardiologist Dr Stone, 5.1 CTS Dr Duenas,  follows yearly    Transient cerebral ischemia 6/6/2018       Past Surgical History:   Procedure Laterality Date    A-V CARDIAC PACEMAKER INSERTION N/A 08/05/2021    Procedure: INSERTION, CARDIAC PACEMAKER, DUAL CHAMBER;  Surgeon: Sheldon Miranda MD;  Location: [facility] EP [facility];  Service: Cardiology;  Laterality: N/A;  Near syncope,SB,Sinus Pause, RBBB,LAFB, Dual PPM, BIO, MAC, [place], 3 Prep    ADENOIDECTOMY      CATARACT EXTRACTION      CYSTOSCOPY N/A 7/26/2023    Procedure: CYSTOSCOPY;  Surgeon: Jamal Zamora MD;  Location: [address];  Service: Urology;  Laterality: N/A;    CYSTOSCOPY WITH TRANSURETHRAL DESTRUCTION OF PROSTATE,RFA N/A 5/22/2024    Procedure: CYSTOSCOPY WITH TRANSURETHRAL DESTRUCTION OF PROSTATE,RFA;  Surgeon: Jamal Zamora MD;  Location: [address];  Service: Urology;  Laterality: N/A;    CYSTOSCOPY WITH URODYNAMIC TESTING N/A 10/25/2021    Procedure: CYSTOSCOPY, WITH URODYNAMIC TESTING FLOUROSCOPIC;  Surgeon: Sancho Wesley MD;  Location: [address];  Service: Urology;  Laterality: N/A;  1hr    CYSTOSCOPY,WITH BOTULINUM TOXIN INJECTION N/A 5/22/2024    Procedure: CYSTOSCOPY,WITH BOTULINUM TOXIN INJECTION;  Surgeon: Jamal Zamora MD;  Location: [address];  Service: Urology;  Laterality: N/A;  100 units    HERNIA REPAIR      INJECTION OF BOTULINUM TOXIN TYPE A N/A 7/26/2023    Procedure: INJECTION, BOTULINUM TOXIN, TYPE A;  Surgeon: Jamal Zamora MD;  Location: [address];  Service: Urology;  Laterality: N/A;  100 units    TONSILLECTOMY      YAG LAser Capsulotomy Bilateral     2/18/2019 OS Dr. Cornelius       Family History   Problem Relation Name Age of Onset    Diabetes Mother      Leukemia Father      Aneurysm Father      No Known Problems Daughter x1     No Known Problems Son x1     Cataracts Paternal Uncle      Amblyopia Neg Hx      Blindness Neg Hx      Glaucoma Neg Hx      Macular degeneration Neg Hx      Retinal detachment Neg Hx      Strabismus Neg Hx          Social History     Socioeconomic History    Marital status:    Tobacco Use    Smoking status: Former     Types: Cigarettes     Start date:      Quit date:      Years since quittin.5    Smokeless tobacco: Never   Substance and Sexual Activity    Alcohol use: Yes     Comment: wine, seldom    Drug use: No    Sexual activity: Yes     Partners: Female   Social History Narrative     to 'T', 2 children, nonsmoker, ETOH none, never had colonscopy & declines     Social Determinants of Health     Financial Resource Strain: Low Risk  (2024)    Overall Financial Resource Strain (CARDIA)     Difficulty of Paying Living Expenses: Not hard at all   Food Insecurity: No Food Insecurity (2024)    Hunger Vital Sign     Worried About Running Out of Food in the Last Year: Never true     Ran Out of Food in the Last Year: Never true   Transportation Needs: No Transportation Needs (2024)    PRAPARE - Transportation     Lack of Transportation (Medical): No     Lack of Transportation (Non-Medical): No   Physical Activity: Inactive (2024)    Exercise Vital Sign     Days of Exercise per Week: 0 days     Minutes of Exercise per Session: 0 min   Stress: No Stress Concern Present (2024)    [employer] - Occupational Stress Questionnaire     Feeling of Stress : Not at all   Housing Stability: Low Risk  (2024)    Housing Stability Vital Sign     Unable to Pay for Housing in the Last Year: No     Number of Places Lived in the Last Year: 1     Unstable Housing in the Last Year: No       Allergies:  Augmentin [amoxicillin-pot clavulanate], Azithromycin, Avodart [dutasteride], House dust mite, Mold, Naproxen, Ragweed, and Synthroid [levothyroxine]    Medications:    Current Outpatient Medications:     acetaminophen (TYLENOL) 500 MG tablet, Take 1 tablet (500 mg total) by mouth every 6 (six) hours as needed for Pain., Disp: 30 tablet, Rfl: 0    albuterol (VENTOLIN HFA)  90 mcg/actuation inhaler, Inhale 2 puffs into the lungs every 6 (six) hours as needed for Wheezing. Rescue, Disp: 18 g, Rfl: 1    ALLERGY RELIEF, LORATADINE, 10 mg tablet, Take 10 mg by mouth daily as needed., Disp: , Rfl:     azelastine (ASTELIN) 137 mcg (0.1 %) nasal spray, 1 spray (137 mcg total) by Nasal route 2 (two) times daily., Disp: 30 mL, Rfl: 12    BABY ASPIRIN ORAL, Take 81 tablets by mouth every evening. , Disp: , Rfl:     cholecalciferol, vitamin D3, 1,000 unit capsule, Take 1,000 Units by mouth once daily., Disp: , Rfl:     coenzyme Q10 100 mg capsule, Take by mouth once daily., Disp: , Rfl:     diclofenac sodium (VOLTAREN) 1 % Gel, Apply 2 g topically 4 (four) times daily., Disp: 1 Tube, Rfl: 2    DULoxetine (CYMBALTA) 20 MG capsule, Take 1 capsule (20 mg total) by mouth 2 (two) times daily., Disp: 60 capsule, Rfl: 11    finasteride (PROSCAR) 5 mg tablet, Take 1 tablet (5 mg total) by mouth once daily., Disp: 90 tablet, Rfl: 3    fluticasone propionate (FLONASE) 50 mcg/actuation nasal spray, 1 spray by Each Nostril route once daily., Disp: , Rfl:     glucosamine-chondroitin 500-400 mg tablet, Take 1 tablet by mouth 2 (two) times daily. , Disp: , Rfl:     imipramine (TOFRANIL) 25 MG tablet, Take 1 tablet (25 mg total) by mouth every evening., Disp: 30 tablet, Rfl: 11    metoprolol succinate (TOPROL-XL) 50 MG 24 hr tablet, Take 1 tablet (50 mg total) by mouth once daily., Disp: 90 tablet, Rfl: 2    MULTIVITAMIN W-MINERALS/LUTEIN (CENTRUM SILVER ORAL), Take by mouth once daily. , Disp: , Rfl:     nystatin-triamcinolone (MYCOLOG II) cream, APPLY TOPICALLY 4 (FOUR) TIMES DAILY., Disp: 60 g, Rfl: 5    omega-3 fatty acids 300 mg Cap, Take 1 tablet by mouth once daily. , Disp: , Rfl:     rosuvastatin (CRESTOR) 10 MG tablet, Take 1 tablet (10 mg total) by mouth once daily., Disp: 90 tablet, Rfl: 0    tamsulosin (FLOMAX) 0.4 mg Cap, Take 1 capsule (0.4 mg total) by mouth once daily., Disp: 30 capsule, Rfl:  11    TURMERIC ORAL, Take by mouth. Pt take 1 in the evening., Disp: , Rfl:     vibegron 75 mg Tab, Take 1 tablet (75 mg total) by mouth once daily., Disp: 30 tablet, Rfl: 11    VITAMIN B COMPLEX (SUPER B COMPLEX-B-12 ORAL), Take by mouth., Disp: , Rfl:         Lab Results   Component Value Date    PSA 2.7 01/20/2015    PSA 1.5 05/30/2008    PSA 2.1 05/11/2005    PSADIAG 2.5 07/16/2018    PSADIAG 2.8 06/12/2017    PSADIAG 2.7 08/18/2014       Lab Results   Component Value Date    CREATININE 0.7 02/06/2024    EGFRNORACEVR >60.0 02/06/2024               IMPRESSION:    Encounter Diagnoses   Name Primary?    OAB (overactive bladder) Yes    Nocturnal polyuria     Nocturia     Benign prostatic hyperplasia with nocturia     S/P Rezum Therapy            Assessment:       OAB (overactive bladder)  -     vibegron 75 mg Tab; Take 1 tablet (75 mg total) by mouth once daily.  Dispense: 30 tablet; Refill: 11    Nocturnal polyuria    Nocturia    Benign prostatic hyperplasia with nocturia    S/P Rezum Therapy       Plan:   - Post op Rezum and botox injection  Developed acute urinary retention and managed with churchill catheter for a short while.  He is now voiding much better.    PVR today is 47 ml per bladder scan.    Recommend that he can try Gemtesa to control his urgency and urge incontinence in addition to flomax and finasteride.    Also discussed alternative therapy such as sacral neuromodulation.      Follow up in about 5 weeks (around 8/23/2024).

## 2024-07-22 ENCOUNTER — TELEPHONE (OUTPATIENT)
Dept: PODIATRY | Facility: CLINIC | Age: 74
End: 2024-07-22
Payer: MEDICARE

## 2024-07-22 NOTE — TELEPHONE ENCOUNTER
Returned pts call, to assist in scheduling an appointment with .  Pt unvailable, ler miguel to contact Jessica

## 2024-07-24 ENCOUNTER — CLINICAL SUPPORT (OUTPATIENT)
Dept: CARDIOLOGY | Facility: HOSPITAL | Age: 74
End: 2024-07-24
Payer: MEDICARE

## 2024-07-24 ENCOUNTER — CLINICAL SUPPORT (OUTPATIENT)
Dept: CARDIOLOGY | Facility: HOSPITAL | Age: 74
End: 2024-07-24
Attending: INTERNAL MEDICINE
Payer: MEDICARE

## 2024-07-24 DIAGNOSIS — I45.2 BIFASCICULAR BLOCK: ICD-10-CM

## 2024-07-24 PROCEDURE — 93294 REM INTERROG EVL PM/LDLS PM: CPT | Mod: HCNC,,, | Performed by: INTERNAL MEDICINE

## 2024-07-24 PROCEDURE — 93296 REM INTERROG EVL PM/IDS: CPT | Mod: HCNC | Performed by: INTERNAL MEDICINE

## 2024-08-05 ENCOUNTER — TELEPHONE (OUTPATIENT)
Dept: PODIATRY | Facility: CLINIC | Age: 74
End: 2024-08-05
Payer: MEDICARE

## 2024-08-09 ENCOUNTER — OFFICE VISIT (OUTPATIENT)
Dept: PODIATRY | Facility: CLINIC | Age: 74
End: 2024-08-09
Payer: MEDICARE

## 2024-08-09 VITALS
SYSTOLIC BLOOD PRESSURE: 125 MMHG | BODY MASS INDEX: 41.75 KG/M2 | DIASTOLIC BLOOD PRESSURE: 70 MMHG | HEIGHT: 73 IN | HEART RATE: 80 BPM | WEIGHT: 315 LBS | OXYGEN SATURATION: 98 %

## 2024-08-09 DIAGNOSIS — G57.81 SURAL NEURITIS, RIGHT: ICD-10-CM

## 2024-08-09 DIAGNOSIS — M19.071 ARTHRITIS OF BOTH ANKLES: ICD-10-CM

## 2024-08-09 DIAGNOSIS — M19.072 ARTHRITIS OF BOTH ANKLES: ICD-10-CM

## 2024-08-09 DIAGNOSIS — M25.571 CHRONIC PAIN OF BOTH ANKLES: Primary | ICD-10-CM

## 2024-08-09 DIAGNOSIS — M25.572 CHRONIC PAIN OF BOTH ANKLES: Primary | ICD-10-CM

## 2024-08-09 DIAGNOSIS — G89.29 CHRONIC PAIN OF BOTH ANKLES: Primary | ICD-10-CM

## 2024-08-09 PROCEDURE — 99999 PR PBB SHADOW E&M-EST. PATIENT-LVL IV: CPT | Mod: PBBFAC,,, | Performed by: STUDENT IN AN ORGANIZED HEALTH CARE EDUCATION/TRAINING PROGRAM

## 2024-08-09 RX ORDER — DEXAMETHASONE 4 MG/1
4 TABLET ORAL DAILY
Qty: 7 TABLET | Refills: 0 | Status: SHIPPED | OUTPATIENT
Start: 2024-08-09

## 2024-08-09 RX ORDER — AMITRIPTYLINE HYDROCHLORIDE 25 MG/1
25 TABLET, FILM COATED ORAL NIGHTLY
Qty: 30 TABLET | Refills: 11 | Status: SHIPPED | OUTPATIENT
Start: 2024-08-09 | End: 2025-08-09

## 2024-08-15 ENCOUNTER — OFFICE VISIT (OUTPATIENT)
Dept: PRIMARY CARE CLINIC | Facility: CLINIC | Age: 74
End: 2024-08-15
Payer: MEDICARE

## 2024-08-15 VITALS
SYSTOLIC BLOOD PRESSURE: 130 MMHG | WEIGHT: 315 LBS | OXYGEN SATURATION: 97 % | BODY MASS INDEX: 41.75 KG/M2 | TEMPERATURE: 98 F | HEIGHT: 73 IN | HEART RATE: 77 BPM | DIASTOLIC BLOOD PRESSURE: 76 MMHG

## 2024-08-15 DIAGNOSIS — Z00.00 ROUTINE GENERAL MEDICAL EXAMINATION AT A HEALTH CARE FACILITY: Primary | ICD-10-CM

## 2024-08-15 DIAGNOSIS — N32.81 OAB (OVERACTIVE BLADDER): ICD-10-CM

## 2024-08-15 DIAGNOSIS — I10 HTN (HYPERTENSION), BENIGN: ICD-10-CM

## 2024-08-15 DIAGNOSIS — G96.08 SUBDURAL HYGROMA: ICD-10-CM

## 2024-08-15 DIAGNOSIS — M25.562 ACUTE PAIN OF LEFT KNEE: ICD-10-CM

## 2024-08-15 DIAGNOSIS — M25.572 ACUTE LEFT ANKLE PAIN: ICD-10-CM

## 2024-08-15 DIAGNOSIS — Z95.0 PRESENCE OF CARDIAC PACEMAKER: ICD-10-CM

## 2024-08-15 PROBLEM — T78.40XA ALLERGIES: Status: RESOLVED | Noted: 2018-05-27 | Resolved: 2024-08-15

## 2024-08-15 PROBLEM — I48.91 ATRIAL FIBRILLATION, UNSPECIFIED TYPE: Status: RESOLVED | Noted: 2024-03-20 | Resolved: 2024-08-15

## 2024-08-15 PROBLEM — R79.89 ABNORMAL TSH: Status: RESOLVED | Noted: 2020-12-09 | Resolved: 2024-08-15

## 2024-08-15 LAB
OHS CV AF BURDEN PERCENT: < 1
OHS CV DC REMOTE DEVICE TYPE: NORMAL
OHS CV RV PACING PERCENT: 3 %

## 2024-08-15 PROCEDURE — 1125F AMNT PAIN NOTED PAIN PRSNT: CPT | Mod: HCNC,CPTII,S$GLB, | Performed by: FAMILY MEDICINE

## 2024-08-15 PROCEDURE — 3075F SYST BP GE 130 - 139MM HG: CPT | Mod: HCNC,CPTII,S$GLB, | Performed by: FAMILY MEDICINE

## 2024-08-15 PROCEDURE — 1159F MED LIST DOCD IN RCRD: CPT | Mod: HCNC,CPTII,S$GLB, | Performed by: FAMILY MEDICINE

## 2024-08-15 PROCEDURE — 99397 PER PM REEVAL EST PAT 65+ YR: CPT | Mod: HCNC,S$GLB,, | Performed by: FAMILY MEDICINE

## 2024-08-15 PROCEDURE — 99999 PR PBB SHADOW E&M-EST. PATIENT-LVL V: CPT | Mod: PBBFAC,HCNC,, | Performed by: FAMILY MEDICINE

## 2024-08-15 PROCEDURE — 3288F FALL RISK ASSESSMENT DOCD: CPT | Mod: HCNC,CPTII,S$GLB, | Performed by: FAMILY MEDICINE

## 2024-08-15 PROCEDURE — 3078F DIAST BP <80 MM HG: CPT | Mod: HCNC,CPTII,S$GLB, | Performed by: FAMILY MEDICINE

## 2024-08-15 PROCEDURE — 1101F PT FALLS ASSESS-DOCD LE1/YR: CPT | Mod: HCNC,CPTII,S$GLB, | Performed by: FAMILY MEDICINE

## 2024-08-15 PROCEDURE — 1157F ADVNC CARE PLAN IN RCRD: CPT | Mod: HCNC,CPTII,S$GLB, | Performed by: FAMILY MEDICINE

## 2024-08-15 NOTE — PROGRESS NOTES
Assessment:     1. Routine general medical examination at a health care facility    2. HTN (hypertension), benign    3. Presence of cardiac pacemaker    4. OAB (overactive bladder)    5. Subdural hygroma              Plan:     Routine general medical examination at a health care facility  MOVE more, sit less  Eat more food grown from the earth (picked from trees or out of the ground)  High fiber, good fat (avocado, olive oil, nuts) foods  If you urinate more than 2 times a night (prostate symptoms) let me know  COLON CANCER screening starting at 46 yo  Follow up yearly with LABS ONE WEEK PRIOR so we can discuss at your visit      HTN (hypertension), benign  Stable , Continue Metoprolol 50 daily      Try to stop these things that can elevate blood pressure: ALCOHOL, salt, caffeine, energy drinks, diet pills, sudafed, taking NSAIDS daily (advil, alleve, ibuprofen, naproxen) and birth control  DECREASE SALT (fast foods, frozen, canned, processed foods, ham, turkey, fried foods, chips, crackers, etc) & drink 8 glasses of water a day with minimal caffeine   Your HEART is RELAXED when Blood pressure < 129/79.   Your heart thickens & weakens when BP is always > 140/90      Presence of cardiac pacemaker  Bifasicular block. Stable. Follow w EP NP Иван    OAB (overactive bladder)  Stable, continue meds, has upcoming appt w URologist DR Zamora    Subdural hygroma  CT head - 2024 Stable bilateral thin extra-axial hypodense collections, presumably subdural hygromas, with mild leftward midline shift.     No symptoms.  He does have appt w Neurology 9/26/24    Chronic L ankle pain & L knee pain  Hx fracture 3&4th metatarsals, evaluated by Podiatrist, Refer Sports Med      HPI: Adan Webb is a 74 y.o. male with is here today for general exam.     He has  Ascending aortic aneurysm, aortic atherosclerosis, pacemaker, hx TIA 2018 anterior L frontal,hiatal hernia, Cholelithiasis, overactive bladder, urge incontinence,  "  Here today for general exam. With wife "TRISH"    Denies chest pain, shortness of breath    Current Outpatient Medications   Medication Instructions    acetaminophen (TYLENOL) 500 mg, Oral, Every 6 hours PRN    albuterol (VENTOLIN HFA) 90 mcg/actuation inhaler 2 puffs, Inhalation, Every 6 hours PRN, Rescue    ALLERGY RELIEF (LORATADINE) 10 mg, Oral, Daily PRN    amitriptyline (ELAVIL) 25 mg, Oral, Nightly    azelastine (ASTELIN) 137 mcg, Nasal, 2 times daily    BABY ASPIRIN ORAL 81 tablets, Oral, Nightly    cholecalciferol (vitamin D3) (VITAMIN D3) 1,000 Units, Oral, Daily    coenzyme Q10 100 mg capsule Oral, Daily    diclofenac sodium (VOLTAREN) 2 g, Topical (Top), 4 times daily    finasteride (PROSCAR) 5 mg, Oral, Daily    fluticasone propionate (FLONASE) 50 mcg/actuation nasal spray 1 spray, Each Nostril, Daily    glucosamine-chondroitin 500-400 mg tablet 1 tablet, Oral, 2 times daily    imipramine (TOFRANIL) 25 mg, Oral, Nightly    metoprolol succinate (TOPROL-XL) 50 mg, Oral, Daily    MULTIVITAMIN W-MINERALS/LUTEIN (CENTRUM SILVER ORAL) Oral, Daily    nystatin-triamcinolone (MYCOLOG II) cream Topical (Top), 4 times daily    omega-3 fatty acids 300 mg Cap 1 tablet, Oral, Daily    rosuvastatin (CRESTOR) 10 mg, Oral, Daily    tamsulosin (FLOMAX) 0.4 mg, Oral, Daily    TURMERIC ORAL Oral, Pt take 1 in the evening.    vibegron 75 mg, Oral, Daily    VITAMIN B COMPLEX (SUPER B COMPLEX-B-12 ORAL) Oral       Lab Results   Component Value Date    HGBA1C 5.3 05/27/2018     No results found for: "MICALBCREAT"  Lab Results   Component Value Date    LDLCALC 70.2 03/02/2023    LDLCALC 127.0 12/02/2020    CHOL 124 03/02/2023    HDL 41 03/02/2023    TRIG 64 03/02/2023       Lab Results   Component Value Date     02/06/2024    K 5.1 02/06/2024     02/06/2024    CO2 25 02/06/2024    GLU 98 02/06/2024    BUN 23 02/06/2024    CREATININE 0.7 02/06/2024    CALCIUM 9.7 02/06/2024    PROT 7.4 02/06/2024    ALBUMIN 3.5 " 02/06/2024    BILITOT 0.4 02/06/2024    ALKPHOS 98 02/06/2024    AST 23 02/06/2024    ALT 24 02/06/2024    ANIONGAP 8 02/06/2024    ESTGFRAFRICA >60.0 08/05/2021    EGFRNONAA >60.0 08/05/2021    WBC 8.74 02/06/2024    HGB 13.6 (L) 02/06/2024    HGB 12.9 (L) 12/20/2023    HCT 41.3 02/06/2024    MCV 97 02/06/2024     02/06/2024    TSH 3.536 04/01/2021    PSA 2.7 01/20/2015    PSA 1.5 05/30/2008    PSADIAG 2.5 07/16/2018    PSADIAG 2.8 06/12/2017    HEPCAB Negative 10/24/2017       Lab Results   Component Value Date    QGIRNRKH51 1249 (H) 11/11/2019         Past Medical History:   Diagnosis Date    Benign prostatic hyperplasia with nocturia 08/18/2014    BPH (benign prostatic hyperplasia)     Urology Dr Zamoar, OTC prostate revive helps, avodart caused chest pains    Calculus of gallbladder     US 2016    Calculus of gallbladder without cholecystitis without obstruction 04/13/2016    CT chest 2018    Cataract     Chronic pain of both ankles 12/09/2020    Podiatrist Dr Sesay    Conductive hearing loss of right ear with restricted hearing of left ear 01/22/2019    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, didn't use ear protection    Dermatitis 12/09/2020    nystat groin    DJD (degenerative joint disease) of hip 01/29/2015    Enlarged liver 04/08/2021    US 4/21     Hemispheric carotid artery syndrome 06/06/2018    MCA , see MRI    Hiatal hernia 04/01/2021    CT chest tiny 2018    HTN (hypertension), benign 08/20/2020    Morbid obesity with BMI of 40.0-44.9, adult 01/29/2015    OAB (overactive bladder) 04/01/2021    Rash with pads    Right-sided low back pain with right-sided sciatica 03/06/2019    Seasonal allergic rhinitis due to pollen 04/13/2016    Thoracic aortic aneurysm without rupture 05/27/2018    tx with Su, Cardiologist Dr Stone, 5.1 CTS Dr Duenas, follows yearly    Transient cerebral ischemia 06/06/2018     Past Surgical History:   Procedure Laterality Date    A-V CARDIAC PACEMAKER INSERTION N/A  "08/05/2021    Procedure: INSERTION, CARDIAC PACEMAKER, DUAL CHAMBER;  Surgeon: Sheldon Miranda MD;  Location: [facility] EP LAB;  Service: Cardiology;  Laterality: N/A;  Near syncope,SB,Sinus Pause, RBBB,LAFB, Dual PPM, BIO, MAC, [place], 3 Prep    ADENOIDECTOMY      CATARACT EXTRACTION      CYSTOSCOPY N/A 7/26/2023    Procedure: CYSTOSCOPY;  Surgeon: Jamal Zamora MD;  Location: [facility] OR [address] [place] [address];  Service: Urology;  Laterality: N/A;    CYSTOSCOPY WITH TRANSURETHRAL DESTRUCTION OF PROSTATE,RFA N/A 5/22/2024    Procedure: CYSTOSCOPY WITH TRANSURETHRAL DESTRUCTION OF PROSTATE,RFA;  Surgeon: Jamal Zamora MD;  Location: [facility] OR [address] [place] [address];  Service: Urology;  Laterality: N/A;    CYSTOSCOPY WITH URODYNAMIC TESTING N/A 10/25/2021    Procedure: CYSTOSCOPY, WITH URODYNAMIC TESTING FLOUROSCOPIC;  Surgeon: Sancho Wesley MD;  Location: [facility] OR [address] [place] [address];  Service: Urology;  Laterality: N/A;  1hr    CYSTOSCOPY,WITH BOTULINUM TOXIN INJECTION N/A 5/22/2024    Procedure: CYSTOSCOPY,WITH BOTULINUM TOXIN INJECTION;  Surgeon: Jamal Zamora MD;  Location: [facility] OR [address] [place] [address];  Service: Urology;  Laterality: N/A;  100 units    HERNIA REPAIR      INJECTION OF BOTULINUM TOXIN TYPE A N/A 7/26/2023    Procedure: INJECTION, BOTULINUM TOXIN, TYPE A;  Surgeon: Jamal Zamora MD;  Location: [facility] OR [address] [place] [address];  Service: Urology;  Laterality: N/A;  100 units    TONSILLECTOMY      YAG LAser Capsulotomy Bilateral     2/18/2019 OS Dr. Cornelius     Vitals:    08/15/24 1253   BP: 130/76   Pulse: 77   Temp: 98 °F (36.7 °C)   SpO2: 97%   Weight: (!) 143 kg (315 lb 4.1 oz)   Height: 6' 1" (1.854 m)   PainSc:   4     Wt Readings from Last 5 Encounters:   08/15/24 (!) 143 kg (315 lb 4.1 oz)   08/09/24 (!) 143 kg (315 lb 4.1 oz)   07/19/24 (!) 143 kg (315 lb 4.1 oz)   06/13/24 (!) 141.6 kg (312 lb 2.7 oz)   05/29/24 (!) 141.6 kg (312 lb 1 oz)     Objective:   Physical Exam  Constitutional:       Appearance: He is well-developed.   Eyes:      Pupils: Pupils are " equal, round, and reactive to light.   Cardiovascular:      Rate and Rhythm: Normal rate and regular rhythm.      Heart sounds: Normal heart sounds. No murmur heard.  Pulmonary:      Effort: Pulmonary effort is normal.      Breath sounds: Normal breath sounds. No wheezing.   Abdominal:      General: Bowel sounds are normal. There is no distension.      Palpations: Abdomen is soft. There is no mass.      Tenderness: There is no abdominal tenderness. There is no guarding or rebound.   Musculoskeletal:      Cervical back: Neck supple.      Comments: Limping, swelling L ankle & L knee., nontender to palpations, trace edema bilateral ankles   Skin:     General: Skin is warm and dry.   Neurological:      Mental Status: He is alert.      Comments:   Cranial nerves III through XII grossly intact, neck is supple, Nontender Cervical spine,  Can touch chin to  chest and to both shoulders     Psychiatric:         Behavior: Behavior normal.

## 2024-08-15 NOTE — ASSESSMENT & PLAN NOTE
CT head - 2024 Stable bilateral thin extra-axial hypodense collections, presumably subdural hygromas, with mild leftward midline shift.     No symptoms.  He does have appt w Neurology 9/26/24

## 2024-08-15 NOTE — ASSESSMENT & PLAN NOTE
MOVE more, sit less  Eat more food grown from the earth (picked from trees or out of the ground)  High fiber, good fat (avocado, olive oil, nuts) foods  If you urinate more than 2 times a night (prostate symptoms) let me know  COLON CANCER screening starting at 44 yo  Follow up yearly with LABS ONE WEEK PRIOR so we can discuss at your visit

## 2024-08-21 ENCOUNTER — TELEPHONE (OUTPATIENT)
Dept: PODIATRY | Facility: CLINIC | Age: 74
End: 2024-08-21
Payer: MEDICARE

## 2024-08-21 ENCOUNTER — TELEPHONE (OUTPATIENT)
Dept: UROLOGY | Facility: CLINIC | Age: 74
End: 2024-08-21
Payer: MEDICARE

## 2024-08-21 NOTE — TELEPHONE ENCOUNTER
Attempted to contact pt and his wife regarding message left regarding broken toe.  Pt nor spouse available, unable to leave vm.

## 2024-08-21 NOTE — TELEPHONE ENCOUNTER
Attempted to contact pt to confirm appt on 8/23/24 at 9:20 am w/Dr Zamora. No answer, left detailed voicemail for pt.

## 2024-08-22 ENCOUNTER — HOSPITAL ENCOUNTER (OUTPATIENT)
Dept: RADIOLOGY | Facility: HOSPITAL | Age: 74
Discharge: HOME | End: 2024-08-22
Attending: STUDENT IN AN ORGANIZED HEALTH CARE EDUCATION/TRAINING PROGRAM
Payer: MEDICARE

## 2024-08-22 ENCOUNTER — OFFICE VISIT (OUTPATIENT)
Dept: PODIATRY | Facility: CLINIC | Age: 74
End: 2024-08-22
Payer: MEDICARE

## 2024-08-22 VITALS
OXYGEN SATURATION: 98 % | WEIGHT: 315 LBS | SYSTOLIC BLOOD PRESSURE: 130 MMHG | HEIGHT: 73 IN | DIASTOLIC BLOOD PRESSURE: 76 MMHG | BODY MASS INDEX: 41.75 KG/M2 | HEART RATE: 77 BPM

## 2024-08-22 DIAGNOSIS — S92.512A CLOSED FRACTURE OF PROXIMAL PHALANX OF LESSER TOE OF LEFT FOOT, INITIAL ENCOUNTER: Primary | ICD-10-CM

## 2024-08-22 DIAGNOSIS — M79.672 LEFT FOOT PAIN: ICD-10-CM

## 2024-08-22 PROCEDURE — 3075F SYST BP GE 130 - 139MM HG: CPT | Mod: CPTII,S$GLB,, | Performed by: STUDENT IN AN ORGANIZED HEALTH CARE EDUCATION/TRAINING PROGRAM

## 2024-08-22 PROCEDURE — 73630 X-RAY EXAM OF FOOT: CPT | Mod: 26,LT,, | Performed by: RADIOLOGY

## 2024-08-22 PROCEDURE — 99213 OFFICE O/P EST LOW 20 MIN: CPT | Mod: S$GLB,,, | Performed by: STUDENT IN AN ORGANIZED HEALTH CARE EDUCATION/TRAINING PROGRAM

## 2024-08-22 PROCEDURE — 3288F FALL RISK ASSESSMENT DOCD: CPT | Mod: CPTII,S$GLB,, | Performed by: STUDENT IN AN ORGANIZED HEALTH CARE EDUCATION/TRAINING PROGRAM

## 2024-08-22 PROCEDURE — 73630 X-RAY EXAM OF FOOT: CPT | Mod: TC,LT

## 2024-08-22 PROCEDURE — 3078F DIAST BP <80 MM HG: CPT | Mod: CPTII,S$GLB,, | Performed by: STUDENT IN AN ORGANIZED HEALTH CARE EDUCATION/TRAINING PROGRAM

## 2024-08-22 PROCEDURE — 1159F MED LIST DOCD IN RCRD: CPT | Mod: CPTII,S$GLB,, | Performed by: STUDENT IN AN ORGANIZED HEALTH CARE EDUCATION/TRAINING PROGRAM

## 2024-08-22 PROCEDURE — 1125F AMNT PAIN NOTED PAIN PRSNT: CPT | Mod: CPTII,S$GLB,, | Performed by: STUDENT IN AN ORGANIZED HEALTH CARE EDUCATION/TRAINING PROGRAM

## 2024-08-22 PROCEDURE — 99999 PR PBB SHADOW E&M-EST. PATIENT-LVL IV: CPT | Mod: PBBFAC,,, | Performed by: STUDENT IN AN ORGANIZED HEALTH CARE EDUCATION/TRAINING PROGRAM

## 2024-08-22 PROCEDURE — 1100F PTFALLS ASSESS-DOCD GE2>/YR: CPT | Mod: CPTII,S$GLB,, | Performed by: STUDENT IN AN ORGANIZED HEALTH CARE EDUCATION/TRAINING PROGRAM

## 2024-08-22 PROCEDURE — 1157F ADVNC CARE PLAN IN RCRD: CPT | Mod: CPTII,S$GLB,, | Performed by: STUDENT IN AN ORGANIZED HEALTH CARE EDUCATION/TRAINING PROGRAM

## 2024-08-22 NOTE — PROGRESS NOTES
Subjective:     Patient    Adan Webb is a 74 y.o. male.    Problem    09/27/23: Presents for thick discolored nails causing pain, especially at 1st toes. Previously tried antifungals which did not help. Also has had left STJ/sinus tarsi pain for years, has had 2x steroid shots at the sinus tarsi which helped for a period of weeks to months each. Has had custom inserts in his shoes for a couple of years and recently had them adjusted, they help. Does not take medication for arthritis. Has attempted Voltaren without improvement.      01/19/24: Returns for thick discolored toenails causing pain, still primarily at 1st toes. Also with recurrent left STJ/sinus tarsi pain and now with pain at left lateral Kager's triangle; also with similar right sided pain but this does not bother him as much. Also with left forefoot pain and swelling for the past month or so, does not recall an injury, the pain has improved but the swelling is lingering.     02/20/24: Returns for followup of left metatarsal fractures; first seen on imaging 1 month ago although at that time they were already subacute with signs of healing. Has been walking normally, standard footwear, has been avoiding exercises. Also still with ongoing chronic left ankle pain. Did not respond well to last attempt at steroid injection.     08/09/24: Returns for routine foot care, nails are long and painful, worst at left 1st toenail. Also with newer onset right lateral heel pain since walking with family a lot recently. Pain not well controlled on duloxetine. Reports poor sleep.      08/22/24: Returns for left forefoot injury related to a fall in which the toes were bent back and he sat on them. Now with bruising, swelling, pain throughout left forefoot. Right heel pain improved with prior injection. Still having poorly controlled left ankle pain, has not started the amitriptyline.     History    History obtained from patient and review of medical records.      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, didn't use ear protection    Dermatitis 12/09/2020    nystat groin    DJD (degenerative joint disease) of hip 01/29/2015    Enlarged liver 04/08/2021    US 4/21     Hemispheric carotid artery syndrome 06/06/2018    MCA , see MRI    Hiatal hernia 04/01/2021    CT chest tiny 2018    HTN (hypertension), benign 08/20/2020    Morbid obesity with BMI of 40.0-44.9, adult 01/29/2015    OAB (overactive bladder) 04/01/2021    Rash with pads    Right-sided low back pain with right-sided sciatica 03/06/2019    Seasonal allergic rhinitis due to pollen 04/13/2016    Thoracic aortic aneurysm without rupture 05/27/2018    tx with Su, Cardiologist Dr Stone, 5.1 CTS Dr Duenas, follows yearly    Transient cerebral ischemia 06/06/2018       Past Surgical History:   Procedure Laterality Date    A-V CARDIAC PACEMAKER INSERTION N/A 08/05/2021    Procedure: INSERTION, CARDIAC PACEMAKER, DUAL CHAMBER;  Surgeon: Sheldon Miranda MD;  Location: [facility] EP LAB;  Service: Cardiology;  Laterality: N/A;  Near syncope,SB,Sinus Pause, RBBB,LAFB, Dual PPM, BIO, MAC, MI, 3 Prep    ADENOIDECTOMY      CATARACT EXTRACTION      CYSTOSCOPY N/A 7/26/2023    Procedure: CYSTOSCOPY;  Surgeon: Jamal Zamora MD;  [facility];  Service: Urology;  Laterality: N/A;    CYSTOSCOPY WITH TRANSURETHRAL DESTRUCTION OF PROSTATE,RFA N/A 5/22/2024    Procedure: CYSTOSCOPY WITH TRANSURETHRAL DESTRUCTION OF  PROSTATE,RFA;  Surgeon: Jamal Zamora MD;  Location: [facility] OR [address] [place] [address];  Service: Urology;  Laterality: N/A;    CYSTOSCOPY WITH URODYNAMIC TESTING N/A 10/25/2021    Procedure: CYSTOSCOPY, WITH URODYNAMIC TESTING FLOUROSCOPIC;  Surgeon: Sancho Wesley MD;  Location: [facility] OR [address] [place] [address];  Service: Urology;  Laterality: N/A;  1hr    CYSTOSCOPY,WITH BOTULINUM TOXIN INJECTION N/A 2024    Procedure: CYSTOSCOPY,WITH BOTULINUM TOXIN INJECTION;  Surgeon: Jamal Zamora MD;  Location: [facility] OR [address] [place] [address];  Service: Urology;  Laterality: N/A;  100 units    HERNIA REPAIR      INJECTION OF BOTULINUM TOXIN TYPE A N/A 2023    Procedure: INJECTION, BOTULINUM TOXIN, TYPE A;  Surgeon: Jamal Zamora MD;  Location: [facility] OR [address] [place] [address];  Service: Urology;  Laterality: N/A;  100 units    TONSILLECTOMY      YAG LAser Capsulotomy Bilateral     2019 OS Dr. Cornelius        Objective:     Vitals  Wt Readings from Last 1 Encounters:   24 (!) 143 kg (315 lb 4.1 oz)     Temp Readings from Last 1 Encounters:   08/15/24 98 °F (36.7 °C)     BP Readings from Last 1 Encounters:   24 130/76     Pulse Readings from Last 1 Encounters:   24 77       Dermatological Exam    Skin:  Pedal hair growth diminished and Pedal skin thin and shiny on right  Pedal hair growth diminished and Pedal skin thin and shiny on left    Nails:  10 nail(s) elongated, 10 nail(s) thickened, and 10 nail(s) discolored; tender throughout, worst at left 1st toenail    Vascular Exam    Arteries:  Posterior tibial artery palpable on right  Dorsalis pedis artery palpable on right  Posterior tibial artery palpable on left  Dorsalis pedis artery palpable on left    Veins:  Superficial veins unremarkable on right  Superficial veins unremarkable on left    Swellin+ pitting on right  1+ pitting on left; 2+ at left forefoot    Neurological Exam    Gary touch test:  6/6 sites sensed, light touch intact     Other:  + provocation test sural nerve lateral  heel right    Musculoskeletal Exam    Footwear:  Casual on right  Casual on left    Gait Exam:   Ambulatory Status: Ambulatory  Gait: Normal  Assistive Devices: None    Foot Progression Angle:  Normal on right  Normal on left     Right Lower Extremity Additional Findings:  Mild pain on palpation lateral STJ, sinus tarsi, Kager triangle  Mild pain on palpation right lateral heel  Right foot and ankle function, strength, and range of motion unremarkable except as noted above.     Left Lower Extremity Additional Findings:  Mild pain on palpation lateral STJ, sinus tarsi, Kager triangle  Moderate pain on palpation 2nd and 4th MTPJs  Left foot and ankle function, strength, and range of motion unremarkable except as noted above.    Imaging and Other Tests    Imagin24 B foot and ankle X rays: bilateral STJ severe arthritis vs STJ coalition; left 3rd and 4th metatarsal fractures with signs of healing    24 left foot X rays: interval healing of 3rd and 4th metatarsal fractures, alignment relatively unchanged    Independently reviewed and interpreted imaging, findings are as follows:     24 left foot X rays: prior 3rd and 4th metatarsal fractures appear healed, new nondisplaced fracture of 4th proximal phalanx     Assessment:     Encounter Diagnoses   Name Primary?    Closed fracture of proximal phalanx of lesser toe of left foot, initial encounter Yes    Left foot pain           Plan:     I counseled the patient on his conditions, their implications and medical management.    Bilateral ankle pain, arthritis: chronic stable  -Start amitriptyline. Previously failed duloxetine.      Tinea unguium, pain: chronic stable   -Previously discussed treatment options including (1) monitoring, (2) debridement, (3) topical antifungals, (4) oral antifungals. Discussed potential risks, benefits, alternatives to each. Patient opted for debridements only.  -Will return for routine foot care.     Left 4th proximal  phalanx fracture: acute  -X rays ordered, reviewed, interpreted as above.   -Continue footwear with stiff sole, wide flexible toe box.   -Amitriptyline + OTC pain medication PRN.       Return to clinic in 3-4 weeks for fracture, call sooner PRN.

## 2024-08-23 ENCOUNTER — OFFICE VISIT (OUTPATIENT)
Dept: UROLOGY | Facility: CLINIC | Age: 74
End: 2024-08-23
Payer: MEDICARE

## 2024-08-23 VITALS
WEIGHT: 313.25 LBS | HEIGHT: 73 IN | BODY MASS INDEX: 41.52 KG/M2 | DIASTOLIC BLOOD PRESSURE: 66 MMHG | HEART RATE: 79 BPM | SYSTOLIC BLOOD PRESSURE: 134 MMHG

## 2024-08-23 DIAGNOSIS — R35.1 NOCTURIA: ICD-10-CM

## 2024-08-23 DIAGNOSIS — Z90.79 S/P TURP: ICD-10-CM

## 2024-08-23 DIAGNOSIS — H40.022 OPEN ANGLE WITH BORDERLINE FINDINGS AND HIGH GLAUCOMA RISK IN LEFT EYE: ICD-10-CM

## 2024-08-23 DIAGNOSIS — N39.41 URGE INCONTINENCE: Primary | ICD-10-CM

## 2024-08-23 DIAGNOSIS — N32.81 OAB (OVERACTIVE BLADDER): ICD-10-CM

## 2024-08-23 PROCEDURE — 99999 PR PBB SHADOW E&M-EST. PATIENT-LVL IV: CPT | Mod: PBBFAC,,, | Performed by: UROLOGY

## 2024-09-03 ENCOUNTER — PATIENT MESSAGE (OUTPATIENT)
Dept: SPORTS MEDICINE | Facility: CLINIC | Age: 74
End: 2024-09-03
Payer: MEDICARE

## 2024-09-03 DIAGNOSIS — M25.562 LEFT KNEE PAIN, UNSPECIFIED CHRONICITY: Primary | ICD-10-CM

## 2024-09-09 ENCOUNTER — OFFICE VISIT (OUTPATIENT)
Dept: URGENT CARE | Facility: CLINIC | Age: 74
End: 2024-09-09
Payer: MEDICARE

## 2024-09-09 VITALS
HEART RATE: 72 BPM | HEIGHT: 73 IN | OXYGEN SATURATION: 97 % | SYSTOLIC BLOOD PRESSURE: 127 MMHG | TEMPERATURE: 98 F | WEIGHT: 313.06 LBS | DIASTOLIC BLOOD PRESSURE: 68 MMHG | BODY MASS INDEX: 41.49 KG/M2 | RESPIRATION RATE: 16 BRPM

## 2024-09-09 DIAGNOSIS — J30.9 ALLERGIC CONJUNCTIVITIS AND RHINITIS, UNSPECIFIED LATERALITY: ICD-10-CM

## 2024-09-09 DIAGNOSIS — H10.10 ALLERGIC CONJUNCTIVITIS AND RHINITIS, UNSPECIFIED LATERALITY: ICD-10-CM

## 2024-09-09 DIAGNOSIS — Z87.898 HX OF WHEEZING: ICD-10-CM

## 2024-09-09 DIAGNOSIS — S05.02XA CORNEAL ABRASION, LEFT, INITIAL ENCOUNTER: Primary | ICD-10-CM

## 2024-09-09 PROCEDURE — 99213 OFFICE O/P EST LOW 20 MIN: CPT | Mod: S$GLB,,, | Performed by: PHYSICIAN ASSISTANT

## 2024-09-09 RX ORDER — OLOPATADINE HYDROCHLORIDE 1 MG/ML
1 SOLUTION/ DROPS OPHTHALMIC 2 TIMES DAILY PRN
Qty: 5 ML | Refills: 0 | Status: SHIPPED | OUTPATIENT
Start: 2024-09-09 | End: 2024-10-09

## 2024-09-09 RX ORDER — ALBUTEROL SULFATE 90 UG/1
2 INHALANT RESPIRATORY (INHALATION) EVERY 4 HOURS PRN
Qty: 18 G | Refills: 0 | Status: SHIPPED | OUTPATIENT
Start: 2024-09-09 | End: 2024-10-09

## 2024-09-09 RX ORDER — OFLOXACIN 3 MG/ML
2 SOLUTION/ DROPS OPHTHALMIC 4 TIMES DAILY
Qty: 10 ML | Refills: 0 | Status: SHIPPED | OUTPATIENT
Start: 2024-09-09 | End: 2024-09-14

## 2024-09-09 RX ORDER — LORATADINE 10 MG/1
10 TABLET ORAL DAILY PRN
Qty: 30 TABLET | Refills: 0 | Status: SHIPPED | OUTPATIENT
Start: 2024-09-09 | End: 2024-10-09

## 2024-09-09 RX ORDER — FLUTICASONE PROPIONATE 50 MCG
2 SPRAY, SUSPENSION (ML) NASAL DAILY PRN
Qty: 15.8 ML | Refills: 0 | Status: SHIPPED | OUTPATIENT
Start: 2024-09-09 | End: 2024-10-09

## 2024-09-09 NOTE — PATIENT INSTRUCTIONS
"Prevention includes avoidance of the specific allergens that are causing symptoms in a specific patient. For those allergic to dust mites, helpful measures include replacing old pillows, blankets, and mattresses; using dust mite allergen impermeable covers for pillows, comforters, and mattresses; frequently washing beddings; reducing humidity; and frequently vacuuming and dusting the home. Additionally, other reservoirs of dust should be removed, such as old carpets, old furniture, and old curtains or drapes. When the culprit allergen is animal dander, the animal may need to be removed from the home, and old carpets, furniture, and curtains should be removed or cleaned thoroughly.     Common side effects include stinging and burning upon instillation. Patients may find it helpful to refrigerate the drops and/or use refrigerated artificial tears before using these medications. Other adverse effects include headache and increased ocular dryness.     When using eye drops for infection, do not touch your good eye after touching your infected eye. Also, do not touch the bottle or dropper directly onto one eye and then use it in the other. Doing these things can cause the infection to spread from one eye to the other.    If you wear contact lenses and you have symptoms of pink eye, it is really important to have a doctor look at your eyes. In people who wear contacts, the symptoms of pink eye can be caused by "corneal abrasion." Corneal abrasion is a scratch on the eye and can be a serious problem.. If your contacts are disposable, you will want to throw them away and start fresh. If you contacts are not disposable, you will need to carefully clean them. You should also throw away your contact lens case and get a new one.    Apply cool compresses for discomfort.      _____________________________________________________  Recommend oral antihistamine (Claritin/zyrtec) +/- oral decongestant (pseudoephedrine) for rhinorrhea, " steroid nasal spray (flonase),Tylenol (Acetaminophen) and/or Motrin (Ibuprofen) as directed for control of pain and/or fever.    For nose bleeds; recommend nasal irrigation with a saline spray/gel (AYR nasal gel) or Netti Pot like device per their directions is also recommended.  Please drink plenty of fluids.  Please get plenty of rest.  If you  smoke, please stop smoking.      Discussed prescriptions and over-the-counter medicines to help with patient's symptoms:  A steroid nose spray (flonase) can help with a stuffy nose. It can also help with drainage down the back of your throat.  An antihistamine (loratadine,zyrtec,allegra, xyzal) can help with itching, sneezing, or runny nose.  An antihistamine eye drop (patanol) can help with itchy eyes.  A decongestant (pseudoephedrine,  Phenylephrine) can help with a stuffy nose. Take <10 days for congestion and rhinorrhea. Once symptoms improve, proceed with loratadine/zyrtec once a day. These ingredients can keep you up all night, decrease appetite, feel jittery, and raise blood pressure with long term use.      Please remember that you have received care at an urgent care today. Urgent cares are not emergency rooms and are not equipped to handle life threatening emergencies and cannot rule in or out certain medical conditions and you may be released before all of your medical problems are known or treated. Please arrange follow up with your primary care physician or speciality clinic (optometry or opthalmology)  within 2-5 days if your signs and symptoms have not resolved or worsen. Patient can call our Referral Hotline at [phone] to make an appointment.    Please return here or go to the Emergency Department for any concerns or worsening of condition.Patient was educated on signs/symptoms (Worsening vision, eye pain, sensitivity to light, increased eye discharge) that would warrant emergent medical attention. Patient verbalized understanding.

## 2024-09-09 NOTE — LETTER
"  September 9, 2024      [facility] [place]  [place] [address]  Phone: [phone]  Fax: [phone]       Patient: Adan Webb   YOB: 1950  Date of Visit: 09/09/2024    To Whom It May Concern:    Ender Webb  was at [facility] on 09/09/2024. The patient may return to work/school on 9/11/24 with no restrictions. If you have any questions or concerns, or if I can be of further assistance, please do not hesitate to contact me.    Sincerely,      Yolanda Persaud PA-C (Jackie)       "

## 2024-09-09 NOTE — PROGRESS NOTES
"Subjective:      Patient ID: Adan Webb is a 74 y.o. male.    Vitals:  height is 6' 1" (1.854 m) and weight is 142 kg (313 lb 0.9 oz) (abnormal). His oral temperature is 97.9 °F (36.6 °C). His blood pressure is 127/68 and his pulse is 72. His respiration is 16 and oxygen saturation is 97%.     Chief Complaint: Foreign Body in Eye    Adan Webb is a 74 y.o. male who complains of  possible foreign body in LT eye having pain. Sx started 2 days ago. Tx include nothing at home. Pt states eye pain is a 5/10. Reports itchiness to left inner eyelid. He reports hx of allergic rhinitis; has itchy eyes and might have scratched eye.     Foreign Body in Eye  This is a new problem. The current episode started in the past 7 days. The problem occurs constantly. The problem has been gradually worsening. Associated symptoms include congestion. Pertinent negatives include no abdominal pain, anorexia, arthralgias, change in bowel habit, chest pain, chills, coughing, diaphoresis, fatigue, fever, headaches, joint swelling, myalgias, nausea, neck pain, numbness, rash, sore throat, swollen glands, urinary symptoms, vertigo, vomiting or weakness. Nothing aggravates the symptoms. He has tried nothing for the symptoms. The treatment provided no relief.       Constitution: Negative for chills, sweating, fatigue, fever and generalized weakness.   HENT:  Positive for congestion and postnasal drip. Negative for sinus pain, sinus pressure, sore throat, trouble swallowing and voice change.    Neck: Negative for neck pain.   Cardiovascular:  Negative for chest pain.   Eyes:  Positive for foreign body in eye, eye itching, eye pain and eye redness. Negative for eye trauma, eye discharge, photophobia, vision loss, double vision, blurred vision and eyelid swelling.   Respiratory:  Positive for wheezing. Negative for cough, sputum production, shortness of breath and asthma.    Gastrointestinal:  Negative for abdominal pain, nausea and " vomiting.   Musculoskeletal:  Negative for joint pain, joint swelling and muscle ache.   Skin:  Negative for rash and erythema.   Allergic/Immunologic: Positive for environmental allergies, seasonal allergies and itching. Negative for eczema, asthma, recurrent sinus infections and sneezing.   Neurological:  Negative for history of vertigo, headaches and numbness.      Objective:     Physical Exam   Constitutional: He is oriented to person, place, and time. No distress.      Comments:Patient is awake and alert, sitting up in exam chair, speaking and answering in complete sentences     normal  HENT:   Head: Normocephalic and atraumatic.      Comments: Patient observed frequently clearing his throat.    Ears:   Right Ear: Tympanic membrane, external ear and ear canal normal.   Left Ear: Tympanic membrane, external ear and ear canal normal.   Nose: No rhinorrhea or congestion.   Mouth/Throat: Uvula is midline, oropharynx is clear and moist and mucous membranes are normal. Mucous membranes are moist. No oropharyngeal exudate or posterior oropharyngeal erythema. Tonsils are 0 on the right. Tonsils are 0 on the left. Oropharynx is clear.      Comments:  postnasal discharge noted on the posterior pharyngeal wall    Eyes: Right eye visual fields normal and left eye visual fields normal. Conjunctivae and lids are normal. Pupils are equal, round, and reactive to light. Lids are everted and swept, no foreign bodies found. Left eye exhibits no discharge. No foreign body present in the left eye.   Slit lamp exam:       The left eye shows corneal abrasion and fluorescein uptake. Extraocular movement intact vision grossly intact gaze aligned appropriately periorbital hyperpigmentation   Neck: Neck supple.   Cardiovascular: Normal rate, regular rhythm, normal heart sounds and normal pulses.   Pulmonary/Chest: Effort normal and breath sounds normal. No respiratory distress. He has no wheezes. He has no rhonchi. He has no rales.    Abdominal: Normal appearance.   Musculoskeletal: Normal range of motion.         General: Normal range of motion.      Cervical back: He exhibits no tenderness.   Lymphadenopathy:     He has no cervical adenopathy.   Neurological: He is alert and oriented to person, place, and time.   Skin: Skin is warm. No erythema   Psychiatric: His behavior is normal. Mood, judgment and thought content normal.   Nursing note and vitals reviewed.      Vision Screening    Right eye Left eye Both eyes   Without correction      With correction 20/30 20/40 20/25       Assessment:     1. Corneal abrasion, left, initial encounter    2. Allergic conjunctivitis and rhinitis, unspecified laterality    3. Hx of wheezing      Patient presents with clinical exam findings and history consistent with above.      On exam, patient is nontoxic appearing and vitals are stable.      Diagnostic testing results were reviewed and discussed with patient/guardian.   Tests ordered in clinic: None  Previous progress notes/admissions/labs and medications were reviewed.    Plan:   In cases of eye pain of unknown origin, proparacaine ophthalmic solution was used to rule out surface causes of pain (i.e. corneal or epithelial abrasion). Patient reported eye pain resolved a few seconds after 1-2 drops of proparacaine.     abrasions noted to cornea during fluorescein eye stain test. Proparacaine hydrochloride ophthalmic solution for >5 minutes, altafluor benox ophthalmic solution.      Corneal abrasion, left, initial encounter  -     ofloxacin (OCUFLOX) 0.3 % ophthalmic solution; Place 2 drops into the left eye 4 (four) times daily. for 5 days  Dispense: 10 mL; Refill: 0  -     Ambulatory referral/consult to Optometry    Allergic conjunctivitis and rhinitis, unspecified laterality  -     olopatadine (PATANOL) 0.1 % ophthalmic solution; Place 1 drop into both eyes 2 (two) times daily as needed for Allergies.  Dispense: 5 mL; Refill: 0  -     fluticasone  "propionate (FLONASE) 50 mcg/actuation nasal spray; 2 sprays (100 mcg total) by Each Nostril route daily as needed for Allergies or Rhinitis.  Dispense: 15.8 mL; Refill: 0  -     loratadine (CLARITIN) 10 mg tablet; Take 1 tablet (10 mg total) by mouth daily as needed for Allergies.  Dispense: 30 tablet; Refill: 0    Hx of wheezing  -     albuterol (PROVENTIL/VENTOLIN HFA) 90 mcg/actuation inhaler; Inhale 2 puffs into the lungs every 4 (four) hours as needed for Wheezing or Shortness of Breath.  Dispense: 18 g; Refill: 0                    1) See orders for this visit as documented in the electronic medical record.  2) Symptomatic therapy suggested: use acetaminophen/ibuprofen every 6-8 hours prn pain or fever, push fluids.   3) Call or return to clinic prn if these symptoms worsen or fail to improve as anticipated.    Discussed results/diagnosis/plan with patient in clinic.  We had shared decision making for patient's treatment. Patient verbalized understanding and in agreement with current treatment plan.     Patient was instructed to return for re-evaluation with urgent care or PCP for continued outpatient workup and management if symptoms do not improve/worsening symptoms. Strict ED versus clinic precautions given in depth.    Discharge and follow-up instructions given verbally/printed with the patient who expressed understanding. The instructions and results are also available on [facility].              Yolanda "Zaira Persaud PA-C          Patient Instructions   Prevention includes avoidance of the specific allergens that are causing symptoms in a specific patient. For those allergic to dust mites, helpful measures include replacing old pillows, blankets, and mattresses; using dust mite allergen impermeable covers for pillows, comforters, and mattresses; frequently washing beddings; reducing humidity; and frequently vacuuming and dusting the home. Additionally, other reservoirs of dust should be removed, such as old " "carpets, old furniture, and old curtains or drapes. When the culprit allergen is animal dander, the animal may need to be removed from the home, and old carpets, furniture, and curtains should be removed or cleaned thoroughly.     Common side effects include stinging and burning upon instillation. Patients may find it helpful to refrigerate the drops and/or use refrigerated artificial tears before using these medications. Other adverse effects include headache and increased ocular dryness.     When using eye drops for infection, do not touch your good eye after touching your infected eye. Also, do not touch the bottle or dropper directly onto one eye and then use it in the other. Doing these things can cause the infection to spread from one eye to the other.    If you wear contact lenses and you have symptoms of pink eye, it is really important to have a doctor look at your eyes. In people who wear contacts, the symptoms of pink eye can be caused by "corneal abrasion." Corneal abrasion is a scratch on the eye and can be a serious problem.. If your contacts are disposable, you will want to throw them away and start fresh. If you contacts are not disposable, you will need to carefully clean them. You should also throw away your contact lens case and get a new one.    Apply cool compresses for discomfort.      _____________________________________________________  Recommend oral antihistamine (Claritin/zyrtec) +/- oral decongestant (pseudoephedrine) for rhinorrhea, steroid nasal spray (flonase),Tylenol (Acetaminophen) and/or Motrin (Ibuprofen) as directed for control of pain and/or fever.    For nose bleeds; recommend nasal irrigation with a saline spray/gel (AYR nasal gel) or Netti Pot like device per their directions is also recommended.  Please drink plenty of fluids.  Please get plenty of rest.  If you  smoke, please stop smoking.      Discussed prescriptions and over-the-counter medicines to help with patient's " symptoms:  A steroid nose spray (flonase) can help with a stuffy nose. It can also help with drainage down the back of your throat.  An antihistamine (loratadine,zyrtec,allegra, xyzal) can help with itching, sneezing, or runny nose.  An antihistamine eye drop (patanol) can help with itchy eyes.  A decongestant (pseudoephedrine,  Phenylephrine) can help with a stuffy nose. Take <10 days for congestion and rhinorrhea. Once symptoms improve, proceed with loratadine/zyrtec once a day. These ingredients can keep you up all night, decrease appetite, feel jittery, and raise blood pressure with long term use.      Please remember that you have received care at an urgent care today. Urgent cares are not emergency rooms and are not equipped to handle life threatening emergencies and cannot rule in or out certain medical conditions and you may be released before all of your medical problems are known or treated. Please arrange follow up with your primary care physician or speciality clinic (optometry or opthalmology)  within 2-5 days if your signs and symptoms have not resolved or worsen. Patient can call our Referral Hotline at [phone] to make an appointment.    Please return here or go to the Emergency Department for any concerns or worsening of condition.Patient was educated on signs/symptoms (Worsening vision, eye pain, sensitivity to light, increased eye discharge) that would warrant emergent medical attention. Patient verbalized understanding.

## 2024-09-13 NOTE — TELEPHONE ENCOUNTER
Care Due:                  Date            Visit Type   Department     Provider  --------------------------------------------------------------------------------                                EP -                              PRIMARY      [facility] PRIMARY  Last Visit: 08-      CARE ([facility])   MALORIE Reyes  Next Visit: None Scheduled  None         None Found                                                            Last  Test          Frequency    Reason                     Performed    Due Date  --------------------------------------------------------------------------------    Lipid Panel.  12 months..  rosuvastatin.............  03- 02-    [facility] Embedded Care Due Messages. Reference number: [id number].   9/13/2024 5:12:55 PM CDT

## 2024-09-15 NOTE — TELEPHONE ENCOUNTER
Refill Routing Note   Medication(s) are not appropriate for processing by [facility] for the following reason(s):        Required labs outdated    [facility] action(s):  Defer     Requires labs : Yes             Appointments  past 12m or future 3m with PCP    Date Provider   Last Visit   8/15/2024 Gina Reyes MD   Next Visit   Visit date not found Gina Reyes MD   ED visits in past 90 days: 0        Note composed:9:36 PM 09/14/2024

## 2024-09-16 ENCOUNTER — TELEPHONE (OUTPATIENT)
Dept: SPORTS MEDICINE | Facility: CLINIC | Age: 74
End: 2024-09-16
Payer: MEDICARE

## 2024-09-16 NOTE — TELEPHONE ENCOUNTER
Returned call, pt elected to be r/s to 9/24 at [facility]. Advised pt to arrive 15 mins early for XR.    Fauzia Christine MS, OTC  Clinical Assistant to Dr. Samanta Felix      ----- Message from Fern Silva sent at 9/16/2024  9:43 AM CDT -----  Regarding: Schedule X-ray and Appt  Contact: Pt @ [phone]  Pt called in to schedule an appt; no available appts in Epic. Pt is asking for a call back soon to schedule. Thanks.             Patient's DX:pain in left knee and left ankle

## 2024-09-17 RX ORDER — ROSUVASTATIN CALCIUM 10 MG/1
10 TABLET, COATED ORAL DAILY
Qty: 90 TABLET | Refills: 2 | Status: SHIPPED | OUTPATIENT
Start: 2024-09-17

## 2024-09-19 ENCOUNTER — OFFICE VISIT (OUTPATIENT)
Dept: PODIATRY | Facility: CLINIC | Age: 74
End: 2024-09-19
Payer: MEDICARE

## 2024-09-19 VITALS
HEART RATE: 72 BPM | HEIGHT: 73 IN | SYSTOLIC BLOOD PRESSURE: 127 MMHG | DIASTOLIC BLOOD PRESSURE: 68 MMHG | OXYGEN SATURATION: 98 % | WEIGHT: 313.06 LBS | BODY MASS INDEX: 41.49 KG/M2

## 2024-09-19 DIAGNOSIS — M79.672 LEFT FOOT PAIN: Primary | ICD-10-CM

## 2024-09-19 DIAGNOSIS — G60.9 IDIOPATHIC PERIPHERAL NEUROPATHY: ICD-10-CM

## 2024-09-19 DIAGNOSIS — G89.29 CHRONIC PAIN OF BOTH ANKLES: ICD-10-CM

## 2024-09-19 DIAGNOSIS — S92.512D CLOSED DISPLACED FRACTURE OF PROXIMAL PHALANX OF LESSER TOE OF LEFT FOOT WITH ROUTINE HEALING, SUBSEQUENT ENCOUNTER: ICD-10-CM

## 2024-09-19 DIAGNOSIS — M25.572 CHRONIC PAIN OF BOTH ANKLES: ICD-10-CM

## 2024-09-19 DIAGNOSIS — M25.571 CHRONIC PAIN OF BOTH ANKLES: ICD-10-CM

## 2024-09-19 DIAGNOSIS — B35.1 TINEA UNGUIUM: ICD-10-CM

## 2024-09-19 PROCEDURE — 99999 PR PBB SHADOW E&M-EST. PATIENT-LVL IV: CPT | Mod: PBBFAC,,, | Performed by: STUDENT IN AN ORGANIZED HEALTH CARE EDUCATION/TRAINING PROGRAM

## 2024-09-19 RX ORDER — DEXAMETHASONE 4 MG/1
4 TABLET ORAL DAILY
Qty: 30 TABLET | Refills: 0 | Status: SHIPPED | OUTPATIENT
Start: 2024-09-19

## 2024-09-19 RX ORDER — PREGABALIN 75 MG/1
75 CAPSULE ORAL 2 TIMES DAILY
Qty: 60 CAPSULE | Refills: 5 | Status: SHIPPED | OUTPATIENT
Start: 2024-09-19 | End: 2025-03-20

## 2024-09-19 NOTE — PROCEDURES
"Routine Foot Care    Date/Time: 9/19/2024 11:00 AM    Performed by: Jaden Villeda DPM  Authorized by: Jaden Villeda DPM    Time out: Immediately prior to procedure a "time out" was called to verify the correct patient, procedure, equipment, support staff and site/side marked as required.    Consent Done?:  Yes (Verbal)  Hyperkeratotic Skin Lesions?: No      Nail Care Type:  Debride  Location(s): All  (Left 1st Toe, Left 3rd Toe, Left 2nd Toe, Left 4th Toe, Left 5th Toe, Right 1st Toe, Right 2nd Toe, Right 3rd Toe, Right 4th Toe and Right 5th Toe)  Patient tolerance:  Patient tolerated the procedure well with no immediate complications    "

## 2024-09-19 NOTE — PROGRESS NOTES
Subjective:     Patient    Adan Webb is a 74 y.o. male.    Problem    09/27/23: Presents for thick discolored nails causing pain, especially at 1st toes. Previously tried antifungals which did not help. Also has had left STJ/sinus tarsi pain for years, has had 2x steroid shots at the sinus tarsi which helped for a period of weeks to months each. Has had custom inserts in his shoes for a couple of years and recently had them adjusted, they help. Does not take medication for arthritis. Has attempted Voltaren without improvement.      01/19/24: Returns for thick discolored toenails causing pain, still primarily at 1st toes. Also with recurrent left STJ/sinus tarsi pain and now with pain at left lateral Kager's triangle; also with similar right sided pain but this does not bother him as much. Also with left forefoot pain and swelling for the past month or so, does not recall an injury, the pain has improved but the swelling is lingering.     02/20/24: Returns for followup of left metatarsal fractures; first seen on imaging 1 month ago although at that time they were already subacute with signs of healing. Has been walking normally, standard footwear, has been avoiding exercises. Also still with ongoing chronic left ankle pain. Did not respond well to last attempt at steroid injection.     08/09/24: Returns for routine foot care, nails are long and painful, worst at left 1st toenail. Also with newer onset right lateral heel pain since walking with family a lot recently. Pain not well controlled on duloxetine. Reports poor sleep.      08/22/24: Returns for left forefoot injury related to a fall in which the toes were bent back and he sat on them. Now with bruising, swelling, pain throughout left forefoot. Right heel pain improved with prior injection. Still having poorly controlled left ankle pain, has not started the amitriptyline.     09/19/24: Returns for followup of left foot fracture and chronic pain of  both ankles. Also requesting routine foot care. Admits to numbness, tingling, burning in feet.     History    History obtained from patient and review of medical records.     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, didn't use ear protection    Dermatitis 12/09/2020    nystat groin    DJD (degenerative joint disease) of hip 01/29/2015    Enlarged liver 04/08/2021    US 4/21     Hemispheric carotid artery syndrome 06/06/2018    MCA , see MRI    Hiatal hernia 04/01/2021    CT chest tiny 2018    HTN (hypertension), benign 08/20/2020    Morbid obesity with BMI of 40.0-44.9, adult 01/29/2015    OAB (overactive bladder) 04/01/2021    Rash with pads    Right-sided low back pain with right-sided sciatica 03/06/2019    Seasonal allergic rhinitis due to pollen 04/13/2016    Thoracic aortic aneurysm without rupture 05/27/2018    tx with Su, Cardiologist Dr Stone, 5.1 CTS Dr Duenas, follows yearly    Transient cerebral ischemia 06/06/2018       Past Surgical History:   Procedure Laterality Date    A-V CARDIAC PACEMAKER INSERTION N/A 08/05/2021    Procedure: INSERTION, CARDIAC PACEMAKER, DUAL CHAMBER;  Surgeon: Sheldon Miranda MD;  Location: [facility] EP LAB;  Service: Cardiology;  Laterality: N/A;  Near syncope,SB,Sinus Pause, RBBB,LAFB, Dual PPM, BIO, MAC, AR, 3 Prep    ADENOIDECTOMY      CATARACT EXTRACTION      CYSTOSCOPY N/A 7/26/2023    Procedure: CYSTOSCOPY;  Surgeon: Jamal Zamora MD;  [facility];   Service: Urology;  Laterality: N/A;    CYSTOSCOPY WITH TRANSURETHRAL DESTRUCTION OF PROSTATE,RFA N/A 2024    Procedure: CYSTOSCOPY WITH TRANSURETHRAL DESTRUCTION OF PROSTATE,RFA;  Surgeon: Jamal Zamora MD;  Location: [facility] OR [address] [place] [address];  Service: Urology;  Laterality: N/A;    CYSTOSCOPY WITH URODYNAMIC TESTING N/A 10/25/2021    Procedure: CYSTOSCOPY, WITH URODYNAMIC TESTING FLOUROSCOPIC;  Surgeon: Sancho Wesley MD;  Location: [facility] OR [address] [place] [address];  Service: Urology;  Laterality: N/A;  1hr    CYSTOSCOPY,WITH BOTULINUM TOXIN INJECTION N/A 2024    Procedure: CYSTOSCOPY,WITH BOTULINUM TOXIN INJECTION;  Surgeon: Jamal Zamora MD;  Location: [facility] OR [address] [place] [address];  Service: Urology;  Laterality: N/A;  100 units    HERNIA REPAIR      INJECTION OF BOTULINUM TOXIN TYPE A N/A 2023    Procedure: INJECTION, BOTULINUM TOXIN, TYPE A;  Surgeon: Jamal Zamora MD;  Location: [facility] OR [address] [place] [address];  Service: Urology;  Laterality: N/A;  100 units    TONSILLECTOMY      YAG LAser Capsulotomy Bilateral     2019 OS Dr. Cornelius        Objective:     Vitals  Wt Readings from Last 1 Encounters:   24 (!) 142 kg (313 lb 0.9 oz)     Temp Readings from Last 1 Encounters:   24 97.9 °F (36.6 °C) (Oral)     BP Readings from Last 1 Encounters:   24 127/68     Pulse Readings from Last 1 Encounters:   24 72       Dermatological Exam    Skin:  Pedal hair growth diminished and Pedal skin thin and shiny on right  Pedal hair growth diminished and Pedal skin thin and shiny on left    Nails:  10 nail(s) elongated, 10 nail(s) thickened, and 10 nail(s) discolored; tender throughout, worst at left 1st toenail    Vascular Exam    Arteries:  Posterior tibial artery palpable on right  Dorsalis pedis artery palpable on right  Posterior tibial artery palpable on left  Dorsalis pedis artery palpable on left    Veins:  Superficial veins unremarkable on right  Superficial veins unremarkable on left    Swellin+ pitting on  right  1+ pitting on left     Neurological Exam    Brent touch test:  6/6 sites sensed, light touch intact     Other:  + provocation test sural nerve lateral heel right    Musculoskeletal Exam    Footwear:  Casual on right  Casual on left    Gait Exam:   Ambulatory Status: Ambulatory  Gait: Normal  Assistive Devices: None    Foot Progression Angle:  Normal on right  Normal on left     Right Lower Extremity Additional Findings:  Mild pain on palpation lateral STJ, sinus tarsi, Kager triangle  Mild pain on palpation right lateral heel  Right foot and ankle function, strength, and range of motion unremarkable except as noted above.     Left Lower Extremity Additional Findings:  Mild pain on palpation lateral STJ, sinus tarsi, Kager triangle  Mild pain on palpation 2nd and 4th MTPJs  Left foot and ankle function, strength, and range of motion unremarkable except as noted above.    Imaging and Other Tests    Imagin24 B foot and ankle X rays: bilateral STJ severe arthritis vs STJ coalition; left 3rd and 4th metatarsal fractures with signs of healing    24 left foot X rays: interval healing of 3rd and 4th metatarsal fractures, alignment relatively unchanged    24 left foot X rays: prior 3rd and 4th metatarsal fractures appear healed, new nondisplaced fracture of 4th proximal phalanx    Independently reviewed and interpreted imaging, findings are as follows: N/A     Assessment:     Encounter Diagnoses   Name Primary?    Left foot pain Yes    Chronic pain of both ankles     Idiopathic peripheral neuropathy     Closed displaced fracture of proximal phalanx of lesser toe of left foot with routine healing, subsequent encounter     Tinea unguium           Plan:     I counseled the patient on his conditions, their implications and medical management.    Bilateral ankle pain, arthritis: chronic stable  -Previously failed duloxetine. Worried about trying amitriptyline due to fall risk.   -Start pregabalin.      Tinea unguium, neuropathy: chronic stable   -Previously discussed treatment options including (1) monitoring, (2) debridement, (3) topical antifungals, (4) oral antifungals. Discussed potential risks, benefits, alternatives to each. Patient opted for debridements only.  -Debrided nails x10, see procedure note.     Left 4th proximal phalanx fracture: acute  -Physical activity as tolerated.       Return to clinic in 1 month for chronic pain, call sooner PRN.

## 2024-09-24 ENCOUNTER — HOSPITAL ENCOUNTER (OUTPATIENT)
Dept: RADIOLOGY | Facility: HOSPITAL | Age: 74
Discharge: HOME | End: 2024-09-24
Attending: STUDENT IN AN ORGANIZED HEALTH CARE EDUCATION/TRAINING PROGRAM
Payer: MEDICARE

## 2024-09-24 ENCOUNTER — OFFICE VISIT (OUTPATIENT)
Dept: SPORTS MEDICINE | Facility: CLINIC | Age: 74
End: 2024-09-24
Payer: MEDICARE

## 2024-09-24 VITALS — SYSTOLIC BLOOD PRESSURE: 130 MMHG | BODY MASS INDEX: 41.59 KG/M2 | DIASTOLIC BLOOD PRESSURE: 68 MMHG | WEIGHT: 315 LBS

## 2024-09-24 DIAGNOSIS — M25.572 CHRONIC PAIN OF LEFT ANKLE: ICD-10-CM

## 2024-09-24 DIAGNOSIS — M25.562 LEFT KNEE PAIN, UNSPECIFIED CHRONICITY: ICD-10-CM

## 2024-09-24 DIAGNOSIS — M25.562 CHRONIC PAIN OF LEFT KNEE: Primary | ICD-10-CM

## 2024-09-24 DIAGNOSIS — M21.42 PES PLANUS OF LEFT FOOT: ICD-10-CM

## 2024-09-24 DIAGNOSIS — M17.12 PRIMARY OSTEOARTHRITIS OF LEFT KNEE: ICD-10-CM

## 2024-09-24 DIAGNOSIS — G89.29 CHRONIC PAIN OF LEFT KNEE: Primary | ICD-10-CM

## 2024-09-24 DIAGNOSIS — G89.29 CHRONIC PAIN OF LEFT ANKLE: ICD-10-CM

## 2024-09-24 PROCEDURE — 73564 X-RAY EXAM KNEE 4 OR MORE: CPT | Mod: TC,HCNC,PN,LT

## 2024-09-24 PROCEDURE — 3288F FALL RISK ASSESSMENT DOCD: CPT | Mod: HCNC,CPTII,S$GLB, | Performed by: STUDENT IN AN ORGANIZED HEALTH CARE EDUCATION/TRAINING PROGRAM

## 2024-09-24 PROCEDURE — 99999 PR PBB SHADOW E&M-EST. PATIENT-LVL IV: CPT | Mod: PBBFAC,HCNC,, | Performed by: STUDENT IN AN ORGANIZED HEALTH CARE EDUCATION/TRAINING PROGRAM

## 2024-09-24 PROCEDURE — 1157F ADVNC CARE PLAN IN RCRD: CPT | Mod: HCNC,CPTII,S$GLB, | Performed by: STUDENT IN AN ORGANIZED HEALTH CARE EDUCATION/TRAINING PROGRAM

## 2024-09-24 PROCEDURE — 3078F DIAST BP <80 MM HG: CPT | Mod: HCNC,CPTII,S$GLB, | Performed by: STUDENT IN AN ORGANIZED HEALTH CARE EDUCATION/TRAINING PROGRAM

## 2024-09-24 PROCEDURE — 1125F AMNT PAIN NOTED PAIN PRSNT: CPT | Mod: HCNC,CPTII,S$GLB, | Performed by: STUDENT IN AN ORGANIZED HEALTH CARE EDUCATION/TRAINING PROGRAM

## 2024-09-24 PROCEDURE — 20611 DRAIN/INJ JOINT/BURSA W/US: CPT | Mod: HCNC,LT,S$GLB, | Performed by: STUDENT IN AN ORGANIZED HEALTH CARE EDUCATION/TRAINING PROGRAM

## 2024-09-24 PROCEDURE — 99204 OFFICE O/P NEW MOD 45 MIN: CPT | Mod: 25,HCNC,S$GLB, | Performed by: STUDENT IN AN ORGANIZED HEALTH CARE EDUCATION/TRAINING PROGRAM

## 2024-09-24 PROCEDURE — 1100F PTFALLS ASSESS-DOCD GE2>/YR: CPT | Mod: HCNC,CPTII,S$GLB, | Performed by: STUDENT IN AN ORGANIZED HEALTH CARE EDUCATION/TRAINING PROGRAM

## 2024-09-24 PROCEDURE — 1159F MED LIST DOCD IN RCRD: CPT | Mod: HCNC,CPTII,S$GLB, | Performed by: STUDENT IN AN ORGANIZED HEALTH CARE EDUCATION/TRAINING PROGRAM

## 2024-09-24 PROCEDURE — 1160F RVW MEDS BY RX/DR IN RCRD: CPT | Mod: HCNC,CPTII,S$GLB, | Performed by: STUDENT IN AN ORGANIZED HEALTH CARE EDUCATION/TRAINING PROGRAM

## 2024-09-24 PROCEDURE — 73562 X-RAY EXAM OF KNEE 3: CPT | Mod: 26,59,HCNC,RT | Performed by: RADIOLOGY

## 2024-09-24 PROCEDURE — 3075F SYST BP GE 130 - 139MM HG: CPT | Mod: HCNC,CPTII,S$GLB, | Performed by: STUDENT IN AN ORGANIZED HEALTH CARE EDUCATION/TRAINING PROGRAM

## 2024-09-24 PROCEDURE — 73564 X-RAY EXAM KNEE 4 OR MORE: CPT | Mod: 26,HCNC,LT, | Performed by: RADIOLOGY

## 2024-09-24 RX ORDER — TRIAMCINOLONE ACETONIDE 40 MG/ML
40 INJECTION, SUSPENSION INTRA-ARTICULAR; INTRAMUSCULAR
Status: DISCONTINUED | OUTPATIENT
Start: 2024-09-24 | End: 2024-09-24 | Stop reason: HOSPADM

## 2024-09-24 RX ADMIN — TRIAMCINOLONE ACETONIDE 40 MG: 40 INJECTION, SUSPENSION INTRA-ARTICULAR; INTRAMUSCULAR at 02:09

## 2024-09-24 NOTE — PROGRESS NOTES
"CC: left knee pain, left ankle pain    74 y.o. Male presents today for evaluation of his left knee and ankle pain. Pt reports prev hx of injury to left knee when he was 18 yrs old (hit his knee on a curb) and states he has had pain ever since. Pt reports prev hx of ankle injury in 2020 (twisted his ankle going up the stairs). Pt's wife reports prev hx of 3 broken toes in the left foot. Pt localizes pain to anteromedial knee and lateral ankle. Pt reports pain is 3-4/10 today, but states this is due to minimal walking today. Pt's wife states "most of the time he can't walk." Pt reports cracking in the knee. Pt reports numbness in plantar aspect of the left foot "most of the time." Pt reports significant pain with sit-to-stand.     SYMPTOMS:   Pain Score: 3-4/10  Pain location: anteromedial knee, lateral ankle  Time of onset: 56 yrs ago (knee), 4 yrs ago (ankle)   Trauma, injury: hit knee on curb, twisted ankle going up stairs (dog tripped him)    Audible pop: "cracking"  Clicking: no  Catching: no  Locking: no  Giving out, instability: yes (knee and ankle)  Swelling: none  Theater sign: no  Problems with stairs: yes (knee and ankle)    INTERVENTIONS:   Medications tried: cymbalta (no relief), tylenol extra strength 1000mg PRN   Braces/devices: none  Physical therapy: none  Injections: CSI with Podiatry about 1 yr ago (less than 3 months of relief)    RELEVANT HISTORY:   Imaging to date: 8/22/24, 9/24/24  Previous significant knee/ankle injuries: none  Previous knee/ankle surgeries: none    Occupation: retired     REVIEW OF SYSTEMS:   Constitution: Patient denies fever or chills.  Eyes: Patient denies eye pain or vision changes.  HEENT: Patient denies ear pain, sore throat, or nasal discharge.  CVS: Patient denies chest pain.  Lungs: Patient denies shortness of breath or cough.  Abdomen: Patient denies any stomach pain, nausea, vomiting, or diarrhea  Skin: Patient denies skin rash or itching.    Musculoskeletal: " Patient denies recent injuries or trauma.  Neuro: Patient denies any numbness or tingling in upper or lower extremities.  Psych: Patient denies any current anxiety or nervousness.    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, didn't use ear protection    Dermatitis 12/09/2020    nystat groin    DJD (degenerative joint disease) of hip 01/29/2015    Enlarged liver 04/08/2021    US 4/21     Hemispheric carotid artery syndrome 06/06/2018    MCA , see MRI    Hiatal hernia 04/01/2021    CT chest tiny 2018    HTN (hypertension), benign 08/20/2020    Morbid obesity with BMI of 40.0-44.9, adult 01/29/2015    OAB (overactive bladder) 04/01/2021    Rash with pads    Right-sided low back pain with right-sided sciatica 03/06/2019    Seasonal allergic rhinitis due to pollen 04/13/2016    Thoracic aortic aneurysm without rupture 05/27/2018    tx with Su, Cardiologist Dr Stone, 5.1 CTS Dr Duenas, follows yearly    Transient cerebral ischemia 06/06/2018       PAST SURGICAL HISTORY:  Past Surgical History:   Procedure Laterality Date    A-V CARDIAC PACEMAKER INSERTION N/A 08/05/2021    Procedure: INSERTION, CARDIAC PACEMAKER, DUAL CHAMBER;  Surgeon: Sheldon Miranda MD;  Location: [facility] EP LAB;  Service: Cardiology;  Laterality: N/A;  Near syncope,SB,Sinus Pause, RBBB,LAFB, Dual PPM, BIO, MAC, TN, 3 Prep    ADENOIDECTOMY      CATARACT EXTRACTION      CYSTOSCOPY N/A 7/26/2023    Procedure: CYSTOSCOPY;   Surgeon: Jamal Zamora MD;  Location: [facility] OR [address] [place] [address];  Service: Urology;  Laterality: N/A;    CYSTOSCOPY WITH TRANSURETHRAL DESTRUCTION OF PROSTATE,RFA N/A 2024    Procedure: CYSTOSCOPY WITH TRANSURETHRAL DESTRUCTION OF PROSTATE,RFA;  Surgeon: Jamal Zamora MD;  Location: [facility] OR [address] [place] [address];  Service: Urology;  Laterality: N/A;    CYSTOSCOPY WITH URODYNAMIC TESTING N/A 10/25/2021    Procedure: CYSTOSCOPY, WITH URODYNAMIC TESTING FLOUROSCOPIC;  Surgeon: Sancho Wesley MD;  Location: [facility] OR [address] [place] [address];  Service: Urology;  Laterality: N/A;  1hr    CYSTOSCOPY,WITH BOTULINUM TOXIN INJECTION N/A 2024    Procedure: CYSTOSCOPY,WITH BOTULINUM TOXIN INJECTION;  Surgeon: Jamal Zamora MD;  Location: [facility] OR [address] [place] [address];  Service: Urology;  Laterality: N/A;  100 units    HERNIA REPAIR      INJECTION OF BOTULINUM TOXIN TYPE A N/A 2023    Procedure: INJECTION, BOTULINUM TOXIN, TYPE A;  Surgeon: Jamal Zamora MD;  Location: [facility] OR [address] [place] [address];  Service: Urology;  Laterality: N/A;  100 units    TONSILLECTOMY      YAG LAser Capsulotomy Bilateral     2019 OS Dr. Cornelius       FAMILY HISTORY:  Family History   Problem Relation Name Age of Onset    Diabetes Mother      Leukemia Father      Aneurysm Father      No Known Problems Daughter x1     No Known Problems Son x1     Cataracts Paternal Uncle      Amblyopia Neg Hx      Blindness Neg Hx      Glaucoma Neg Hx      Macular degeneration Neg Hx      Retinal detachment Neg Hx      Strabismus Neg Hx         SOCIAL HISTORY:  Social History     Socioeconomic History    Marital status:    Tobacco Use    Smoking status: Former     Types: Cigarettes     Start date:      Quit date:      Years since quittin.7    Smokeless tobacco: Never   Substance and Sexual Activity    Alcohol use: Yes     Comment: wine, seldom    Drug use: No    Sexual activity: Yes     Partners: Female   Social History Narrative     to 'T', 2 children, nonsmoker, ETOH none,  never had colonscopy & declines     Social Determinants of Health     Financial Resource Strain: Low Risk  (2/6/2024)    Overall Financial Resource Strain (CARDIA)     Difficulty of Paying Living Expenses: Not hard at all   Food Insecurity: No Food Insecurity (2/6/2024)    Hunger Vital Sign     Worried About Running Out of Food in the Last Year: Never true     Ran Out of Food in the Last Year: Never true   Transportation Needs: No Transportation Needs (2/6/2024)    PRAPARE - Transportation     Lack of Transportation (Medical): No     Lack of Transportation (Non-Medical): No   Physical Activity: Inactive (2/6/2024)    Exercise Vital Sign     Days of Exercise per Week: 0 days     Minutes of Exercise per Session: 0 min   Stress: No Stress Concern Present (2/6/2024)    [employer] [place] [employer] - Occupational Stress Questionnaire     Feeling of Stress : Not at all   Housing Stability: Low Risk  (2/6/2024)    Housing Stability Vital Sign     Unable to Pay for Housing in the Last Year: No     Number of Places Lived in the Last Year: 1     Unstable Housing in the Last Year: No       MEDICATIONS:     Current Outpatient Medications:     acetaminophen (TYLENOL) 500 MG tablet, Take 1 tablet (500 mg total) by mouth every 6 (six) hours as needed for Pain., Disp: 30 tablet, Rfl: 0    albuterol (PROVENTIL/VENTOLIN HFA) 90 mcg/actuation inhaler, Inhale 2 puffs into the lungs every 4 (four) hours as needed for Wheezing or Shortness of Breath., Disp: 18 g, Rfl: 0    albuterol (VENTOLIN HFA) 90 mcg/actuation inhaler, Inhale 2 puffs into the lungs every 6 (six) hours as needed for Wheezing. Rescue, Disp: 18 g, Rfl: 1    BABY ASPIRIN ORAL, Take 81 tablets by mouth every evening. , Disp: , Rfl:     cholecalciferol, vitamin D3, 1,000 unit capsule, Take 1,000 Units by mouth once daily., Disp: , Rfl:     coenzyme Q10 100 mg capsule, Take by mouth once daily., Disp: , Rfl:     dexAMETHasone (DECADRON) 4 MG Tab, Take 1 tablet  (4 mg total) by mouth once daily., Disp: 30 tablet, Rfl: 0    finasteride (PROSCAR) 5 mg tablet, Take 1 tablet (5 mg total) by mouth once daily., Disp: 90 tablet, Rfl: 3    fluticasone propionate (FLONASE) 50 mcg/actuation nasal spray, 2 sprays (100 mcg total) by Each Nostril route daily as needed for Allergies or Rhinitis., Disp: 15.8 mL, Rfl: 0    glucosamine-chondroitin 500-400 mg tablet, Take 1 tablet by mouth 2 (two) times daily. , Disp: , Rfl:     imipramine (TOFRANIL) 25 MG tablet, Take 1 tablet (25 mg total) by mouth every evening., Disp: 30 tablet, Rfl: 11    loratadine (CLARITIN) 10 mg tablet, Take 1 tablet (10 mg total) by mouth daily as needed for Allergies., Disp: 30 tablet, Rfl: 0    metoprolol succinate (TOPROL-XL) 50 MG 24 hr tablet, Take 1 tablet (50 mg total) by mouth once daily., Disp: 90 tablet, Rfl: 2    MULTIVITAMIN W-MINERALS/LUTEIN (CENTRUM SILVER ORAL), Take by mouth once daily. , Disp: , Rfl:     omega-3 fatty acids 300 mg Cap, Take 1 tablet by mouth once daily. , Disp: , Rfl:     pregabalin (LYRICA) 75 MG capsule, Take 1 capsule (75 mg total) by mouth 2 (two) times daily., Disp: 60 capsule, Rfl: 5    rosuvastatin (CRESTOR) 10 MG tablet, TAKE 1 TABLET (10 MG TOTAL) BY MOUTH ONCE DAILY., Disp: 90 tablet, Rfl: 2    tamsulosin (FLOMAX) 0.4 mg Cap, Take 1 capsule (0.4 mg total) by mouth once daily., Disp: 30 capsule, Rfl: 11    TURMERIC ORAL, Take by mouth. Pt take 1 in the evening., Disp: , Rfl:     vibegron 75 mg Tab, Take 1 tablet (75 mg total) by mouth once daily., Disp: 30 tablet, Rfl: 11    VITAMIN B COMPLEX (SUPER B COMPLEX-B-12 ORAL), Take by mouth., Disp: , Rfl:     azelastine (ASTELIN) 137 mcg (0.1 %) nasal spray, 1 spray (137 mcg total) by Nasal route 2 (two) times daily., Disp: 30 mL, Rfl: 12    diclofenac sodium (VOLTAREN) 1 % Gel, Apply 2 g topically 4 (four) times daily. (Patient not taking: Reported on 9/24/2024), Disp: 1 Tube, Rfl: 2    nystatin-triamcinolone (MYCOLOG II)  cream, APPLY TOPICALLY 4 (FOUR) TIMES DAILY. (Patient not taking: Reported on 9/24/2024), Disp: 60 g, Rfl: 5    olopatadine (PATANOL) 0.1 % ophthalmic solution, Place 1 drop into both eyes 2 (two) times daily as needed for Allergies. (Patient not taking: Reported on 9/24/2024), Disp: 5 mL, Rfl: 0    ALLERGIES:   Review of patient's allergies indicates:   Allergen Reactions    Augmentin [amoxicillin-pot clavulanate] Rash     Rash, confusion, TIA like symptoms    Azithromycin Rash    Avodart [dutasteride]     House dust mite Other (See Comments)    Mold     Naproxen     Ragweed     Synthroid [levothyroxine] Rash        PHYSICAL EXAMINATION:  /68   Wt (!) 143 kg (315 lb 4.1 oz)   BMI 41.59 kg/m²   Vitals signs and nursing note have been reviewed.    General: In no acute distress, well developed, well nourished, no diaphoresis  Eyes: EOM full and smooth, no eye redness or discharge  HEENT: normocephalic and atraumatic, neck supple, trachea midline, no nasal discharge  Cardiovascular: no LE edema  Lungs: respirations non-labored, no conversational dyspnea   Neuro: AAOx3, CN2-12 grossly intact  Skin: No rashes, warm and dry  Psychiatric: cooperative, pleasant, mood and affect appropriate for age    Left Knee:   Gait: antalgic    Inspection/Palpation:   -Rubor   -Calor  -Effusion   -Patella ballotable   -Patellar apprehension  -Retinacular tenderness   +Patellar crepitus   Patellar tilt grossly normal     TTP at:  +Medial Joint line   -MCL   -LCL   -Popliteal region   -Quad tendon   -Patella  -Pat tendon  -Pat border  -Med condyle   -Lat condyle   -Pes   -Prox fibula   -Tib tub  -Gerdy's tubercle  -Distal Hamstring tendons  -Proximal Hamstrings/Ischial tuberosity  -ITB    ROM:   Ext: 0°   Flex:1250°   Popliteal Angle: 30°   +Discomfort w/ full flex   -Bounce-home discomfort     Ligamentous:   -Ant drawer   -Post drawer   -Lachman's   Good endpoints & no pain w/ valgus & varus stress    Meniscal:  -Axel's    -Ksenia   -Thessaly   -Pain w/ squat     Other:  -Patellar apprehension  -Patellar grind  -Skaggs's   -J sign  -Prasanna's  Abductors 5/5    Left Ankle   Inspection/Palpation:   +Advanced Pes Planus and pronation  -Swelling   -Ecchymosis   -Skin wounds     TTP over:   -Peroneal tendons  -Medial ankle ligaments   -Lateral ankle ligaments   -Medial malleolus  -Lateral malleolus   -Base of 5th metatarsal   -Midfoot zone / navicular   -Malleolar zone   -Proximal fibula   -Pain with high ankle compression   -Calcaneal squeeze test   -Achilles insertion    ROM:     Decreased active & passively vs contralateral side in dorsiflexion, plantarflexion, inversion, eversion, internal rotation, external rotation   -Talar tilt   -Watts  -Silverskold  -Parker's deformity  -Anterior drawer     NV:   No cutaneous sensation deficit of any region of foot   Pulses palpable at DP & PT B/L    IMAGIN. Knee X-ray ordered due to left knee pain. 4 views taken today.   2. X-ray images were reviewed personally by me and then directly with patient.  3. FINDINGS: Joint spaces are maintained. Bony structures are intact. No joint effusion is seen on the.     4. IMPRESSION:  Kellgren Rikki grade 2 osteoarthritic changes.    IMAGIN. Ankle X-ray ordered due to left ankle pain. 3 views taken on 24.   2. X-ray images were reviewed personally by me and then directly with patient.  3. FINDINGS: No fracture or dislocation. No bone destruction identified. Bilateral pes planus     4. IMPRESSION:  See above       ASSESSMENT:      ICD-10-CM ICD-9-CM   1. Chronic pain of left knee  M25.562 719.46    G89.29 338.29   2. Primary osteoarthritis of left knee  M17.12 715.16   3. Chronic pain of left ankle  M25.572 719.47    G89.29 338.29   4. Pes planus of left foot  M21.42 734   5. BMI 40.0-44.9, adult  Z68.41 V85.41         PLAN:  1-2:  Lengthy discussion was had with patient today regarding the various treatment options for osteoarthritis  of the knee, which is thought to be the primary pain generator for the patient.  These options included:  - corticosteroid injection with triamcinolone  - hyaluronic acid injections  - long-acting corticosteroid injection with Zilretta  - platelet rich plasma  - Iovera  - genicular nerve ablation  - definitive treatment with total knee arthroplasty    We will move forward with ultrasound-guided triamcinolone injection to the left knee joint at today's visit.    3-4:  Based on patient history, physical exam findings, and imaging I believe patient's ankle pain is secondary to his advanced pes planus at the ankle/foot.  Patient encouraged to follow up with his podiatrist for further evaluation and treatment.        All questions were answered to the best of my ability and all concerns were addressed at this time.    Follow up virtually in 6 weeks, or sooner if need be.        This note is dictated using the M*Modal Fluency Direct word recognition program. There are word recognition mistakes that are occasionally missed on review.

## 2024-09-24 NOTE — PROCEDURES
Large Joint Aspiration/Injection: L knee    Date/Time: 9/24/2024 2:30 PM    Performed by: Samanta Felix MD  Authorized by: Samanta Felix MD    Consent Done?:  Yes (Verbal)  Indications:  Arthritis and pain  Site marked: the procedure site was marked    Timeout: prior to procedure the correct patient, procedure, and site was verified    Prep: patient was prepped and draped in usual sterile fashion      Local anesthesia used?: Yes    Anesthesia:  Local infiltration  Local anesthetic:  Co-phenylcaine spray    Details:  Needle Size:  22 G  Ultrasonic Guidance for needle placement?: Yes (Ultrasound guidance used to avoid neurovascular injury and/or to improve accuracy given body habitus.)    Images are saved and documented.  Approach: Superolateral.  Location:  Knee  Site:  L knee  Medications:  40 mg triamcinolone acetonide 40 mg/mL  Medications comment:  Ropivacaine 0.2% 2mL  Patient tolerance:  Patient tolerated the procedure well with no immediate complications     TECHNIQUE: Real time ultrasound examination of the left knee was performed with Son[employer] Edge 2, 9-L MHz linear probe(s). Ultrasound guidance was used for needle localization. Images were saved and stored for documentation. Dynamic visualization of the needle was continuous throughout the procedures and maintained in good position.

## 2024-09-26 ENCOUNTER — OFFICE VISIT (OUTPATIENT)
Dept: NEUROLOGY | Facility: CLINIC | Age: 74
End: 2024-09-26
Payer: MEDICARE

## 2024-09-26 DIAGNOSIS — R25.1 TREMOR: Primary | ICD-10-CM

## 2024-09-26 RX ORDER — PROPRANOLOL HYDROCHLORIDE 60 MG/1
60 CAPSULE, EXTENDED RELEASE ORAL DAILY
Qty: 30 CAPSULE | Refills: 11 | Status: SHIPPED | OUTPATIENT
Start: 2024-09-26 | End: 2025-09-26

## 2024-09-26 NOTE — PROGRESS NOTES
[facility]  Clinic Note    Date of Service: 9/26/2024  Patient seen at the request of: Frida Grant MD    Reason for Consultation  Hand tremor    Assessment:  Adan Webb is a 74 y.o. male who presents with tremor in the right arm, started 1 year ago and steadily worsened, reported only with action when he tried to write or eat sometimes he would drop the fork, no other part of the body involved.  Denies gait problems, fall, stiffness or became slow.  Reported no resting tremor while he is sitting and calm. Unfortunately could not examine him due to visual visit.  No family history resting tremor    We discussed the medication options and none medication option as well.  Patient currently on metoprolol succinate which make it difficult to start propranolol trial I will discuss with primary doctor regarding switching if possible.  If that is not possible to switch beta blocker we will start a trial of primidone and monitor.  Also discussed nonpharmacological option like steady wear gloves and heavy utensil, link sent to the patient to review.     Plan:    - discussed with PCP Dr. Reyes regarding switching from metoprolol to propranolol.  We will start propranolol 60 mg daily and discontinue metoprolol succinate 50 mg daily.  Patient to monitor for side effects.  If no side effect and improving in tremor can consider increase to 80 daily next visit  -I would like to see the patient in person to get a good exam.   -we discussed nonpharmacological options like study loss in heavy utensils, leg sent the patient to review.     - RTC in 1 month      Signed:    Esteban Zaragoza MD  Neurology/Vascular neurology   09/26/2024 9:32 AM      HPI:  Adan Webb is a 74 y.o. 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, didn't use ear protection    Dermatitis 12/09/2020    nystat groin    DJD (degenerative joint disease) of hip 01/29/2015    Enlarged liver 04/08/2021    US 4/21     Hemispheric carotid artery syndrome 06/06/2018    MCA , see MRI    Hiatal hernia 04/01/2021    CT chest tiny 2018    HTN (hypertension), benign 08/20/2020    Morbid obesity with BMI of 40.0-44.9, adult 01/29/2015    OAB (overactive bladder) 04/01/2021    Rash with pads    Right-sided low back pain with right-sided sciatica 03/06/2019    Seasonal allergic rhinitis due to pollen 04/13/2016    Thoracic aortic aneurysm without rupture 05/27/2018    tx with Su, Cardiologist Dr Stone, 5.1 CTS Dr Duenas, follows yearly    Transient cerebral ischemia 06/06/2018     He is with multiple medical problems and polypharmacy, presented as a video visit for tremor in the right arm starting around a year ago, getting worse slowly.  Stated interfere with his writing, still able to eat but sometimes he will drop the fork, no other area of the body involved, reported no stiffness, moving slow, no fall, no issues with walking her knee pain problem.  Reported no family history of the same problem, does not drink or smoke.                   This is the extent of the patient's complaints at this time.    TSH   Date Value Ref Range Status   04/01/2021 3.536 0.400 - 4.000 uIU/mL Final   11/11/2019 5.426 (H) 0.400 - 4.000 uIU/mL Final     Vitamin B-12   Date Value Ref Range Status   11/11/2019 1249 (H) 210 - 950 pg/mL Final         Review of Systems:  ROS negative unless noted in HPI    Past Surgical History:  Past Surgical History:   Procedure Laterality Date     A-V CARDIAC PACEMAKER INSERTION N/A 08/05/2021    Procedure: INSERTION, CARDIAC PACEMAKER, DUAL CHAMBER;  Surgeon: Sheldon Miranda MD;  Location: [facility] EP LAB;  Service: Cardiology;  Laterality: N/A;  Near syncope,SB,Sinus Pause, RBBB,LAFB, Dual PPM, BIO, MAC, CT, 3 Prep    ADENOIDECTOMY      CATARACT EXTRACTION      CYSTOSCOPY N/A 7/26/2023    Procedure: CYSTOSCOPY;  Surgeon: Jamal Zamora MD;  Location: [address];  Service: Urology;  Laterality: N/A;    CYSTOSCOPY WITH TRANSURETHRAL DESTRUCTION OF PROSTATE,RFA N/A 5/22/2024    Procedure: CYSTOSCOPY WITH TRANSURETHRAL DESTRUCTION OF PROSTATE,RFA;  Surgeon: Jamal Zamora MD;  Location: [address];  Service: Urology;  Laterality: N/A;    CYSTOSCOPY WITH URODYNAMIC TESTING N/A 10/25/2021    Procedure: CYSTOSCOPY, WITH URODYNAMIC TESTING FLOUROSCOPIC;  Surgeon: Sancho Wesley MD;  Location: [address];  Service: Urology;  Laterality: N/A;  1hr    CYSTOSCOPY,WITH BOTULINUM TOXIN INJECTION N/A 5/22/2024    Procedure: CYSTOSCOPY,WITH BOTULINUM TOXIN INJECTION;  Surgeon: Jamal Zamora MD;  Location: [address];  Service: Urology;  Laterality: N/A;  100 units    HERNIA REPAIR      INJECTION OF BOTULINUM TOXIN TYPE A N/A 7/26/2023    Procedure: INJECTION, BOTULINUM TOXIN, TYPE A;  Surgeon: Jamal Zamora MD;  Location: [address];  Service: Urology;  Laterality: N/A;  100 units    TONSILLECTOMY      YAG LAser Capsulotomy Bilateral     2/18/2019 OS Dr. Cornelius       Family History:  Family History   Problem Relation Name Age of Onset    Diabetes Mother      Leukemia Father      Aneurysm Father      No Known Problems Daughter x1     No Known Problems Son x1     Cataracts Paternal Uncle      Amblyopia Neg Hx      Blindness Neg Hx      Glaucoma Neg Hx      Macular degeneration Neg Hx      Retinal detachment Neg Hx      Strabismus Neg Hx         Social History:  Social History     Tobacco Use    Smoking status: Former     Types: Cigarettes      Start date:      Quit date:      Years since quittin.7    Smokeless tobacco: Never   Substance Use Topics    Alcohol use: Yes     Comment: wine, seldom    Drug use: No       Allergies:  Augmentin [amoxicillin-pot clavulanate], Azithromycin, Avodart [dutasteride], House dust mite, Mold, Naproxen, Ragweed, and Synthroid [levothyroxine]    Outpatient Medications:  Prior to Admission medications    Medication Sig Start Date End Date Taking? Authorizing Provider   acetaminophen (TYLENOL) 500 MG tablet Take 1 tablet (500 mg total) by mouth every 6 (six) hours as needed for Pain. 24   Eligio Lizama MD   albuterol (PROVENTIL/VENTOLIN HFA) 90 mcg/actuation inhaler Inhale 2 puffs into the lungs every 4 (four) hours as needed for Wheezing or Shortness of Breath. 9/9/24 10/9/24  Yolanda Persaud PA-C   albuterol (VENTOLIN HFA) 90 mcg/actuation inhaler Inhale 2 puffs into the lungs every 6 (six) hours as needed for Wheezing. Rescue 23   Gina Reyes MD   azelastine (ASTELIN) 137 mcg (0.1 %) nasal spray 1 spray (137 mcg total) by Nasal route 2 (two) times daily. 3/9/23 4/9/24  Gina Reyes MD   BABY ASPIRIN ORAL Take 81 tablets by mouth every evening.     Provider, Historical   cholecalciferol, vitamin D3, 1,000 unit capsule Take 1,000 Units by mouth once daily.    Provider, Historical   coenzyme Q10 100 mg capsule Take by mouth once daily.    Provider, Historical   dexAMETHasone (DECADRON) 4 MG Tab Take 1 tablet (4 mg total) by mouth once daily. 24   Jaden Villeda DPM   diclofenac sodium (VOLTAREN) 1 % Gel Apply 2 g topically 4 (four) times daily.  Patient not taking: Reported on 20   Clive Sesay DPM   finasteride (PROSCAR) 5 mg tablet Take 1 tablet (5 mg total) by mouth once daily. 5/10/24 5/10/25  Jamal Zamora MD   fluticasone propionate (FLONASE) 50 mcg/actuation nasal spray 2 sprays (100 mcg total) by Each Nostril route daily as needed for Allergies or  Rhinitis. 9/9/24 10/9/24  Yolanda Persaud PA-C   glucosamine-chondroitin 500-400 mg tablet Take 1 tablet by mouth 2 (two) times daily.     Provider, Historical   imipramine (TOFRANIL) 25 MG tablet Take 1 tablet (25 mg total) by mouth every evening. 10/3/23 10/2/24  Jamal Zamora MD   loratadine (CLARITIN) 10 mg tablet Take 1 tablet (10 mg total) by mouth daily as needed for Allergies. 9/9/24 10/9/24  Yolanda Persaud PA-C   metoprolol succinate (TOPROL-XL) 50 MG 24 hr tablet Take 1 tablet (50 mg total) by mouth once daily. 3/28/24   Gina Reyes MD   MULTIVITAMIN W-MINERALS/LUTEIN (CENTRUM SILVER ORAL) Take by mouth once daily.     Provider, Historical   nystatin-triamcinolone (MYCOLOG II) cream APPLY TOPICALLY 4 (FOUR) TIMES DAILY.  Patient not taking: Reported on 9/24/2024 4/3/23   Gina Reyes MD   olopatadine (PATANOL) 0.1 % ophthalmic solution Place 1 drop into both eyes 2 (two) times daily as needed for Allergies.  Patient not taking: Reported on 9/24/2024 9/9/24 10/9/24  Yolanda Persaud PA-C   omega-3 fatty acids 300 mg Cap Take 1 tablet by mouth once daily.     Provider, Historical   pregabalin (LYRICA) 75 MG capsule Take 1 capsule (75 mg total) by mouth 2 (two) times daily. 9/19/24 3/20/25  Jaden Villeda, DPEDMAR   rosuvastatin (CRESTOR) 10 MG tablet TAKE 1 TABLET (10 MG TOTAL) BY MOUTH ONCE DAILY. 9/17/24   Gina Reyes MD   tamsulosin (FLOMAX) 0.4 mg Cap Take 1 capsule (0.4 mg total) by mouth once daily. 10/3/23 10/2/24  Jamal Zamora MD   TURMERIC ORAL Take by mouth. Pt take 1 in the evening.    Provider, Historical   vibegron 75 mg Tab Take 1 tablet (75 mg total) by mouth once daily. 7/19/24 7/19/25  Jamal Zamora MD   VITAMIN B COMPLEX (SUPER B COMPLEX-B-12 ORAL) Take by mouth.    Provider, Historical       Physical exam:    Vitals: There were no vitals taken for this visit.    No exam done, virtual visit      Imaging:  All pertinent imaging was personally reviewed.        Results for orders  placed during the hospital encounter of 09/25/19    MRI Brain Without Contrast    Narrative  EXAMINATION:  MRI BRAIN WITHOUT CONTRAST    CLINICAL HISTORY:  blurry vision; history of CVA;.  Unspecified visual disturbance    TECHNIQUE:  Multiplanar multisequence MR imaging of the brain was performed without contrast.    COMPARISON:  MRI 05/28/2018    FINDINGS:  Intracranial compartment:    Ventricles are normal in size and configuration without evidence of hydrocephalus.  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  .    There are few scattered punctate T2/FLAIR signal hyperintensities throughout the supratentorial white matter, nonspecific in appearance but likely representing chronic microvascular ischemic change.    No evidence of acute infarction,.  No abnormal parenchymal susceptibility to suggest parenchymal hemorrhage.  No significant change in midline shift with approximately 4 mm of rightward midline shift similar to prior.    Major intracranial T2 flow voids are present.    Few patchy opacities in the ethmoid air cells bilaterally.    Impression  No significant change from prior.  Continued prominence extra-axial spaces overlying the cerebral hemispheres suggestive for subdural hygromas.  Mass effect with slight 4 mm of rightward midline shift similar to prior.    No evidence for new abnormal parenchymal attenuation with scattered punctate foci of T2 FLAIR signal hyperintensity supratentorial white matter while nonspecific suggestive for mild chronic ischemic change.  There is no restricted diffusion to suggest acute infarction.    Clinical correlation and follow-up advised    Electronically signed by resident: Duke Bardales  Date:    10/15/2019  Time:    10:57    Electronically signed by: Landry Wilkinson DO  Date:    10/15/2019  Time:    11:15        I spent a total of 45  minutes on the day of the visit. This includes face to face time and non-face to face time preparing to see the patient (eg, review of tests), obtaining and/or reviewing separately obtained history, documenting clinical information in the electronic or other health record, independently interpreting results and communicating results to the patient/family/caregiver, or care coordinator.

## 2024-10-22 ENCOUNTER — OFFICE VISIT (OUTPATIENT)
Dept: PODIATRY | Facility: CLINIC | Age: 74
End: 2024-10-22
Payer: MEDICARE

## 2024-10-22 VITALS
HEIGHT: 73 IN | BODY MASS INDEX: 41.75 KG/M2 | WEIGHT: 315 LBS | HEART RATE: 72 BPM | OXYGEN SATURATION: 98 % | SYSTOLIC BLOOD PRESSURE: 130 MMHG | DIASTOLIC BLOOD PRESSURE: 68 MMHG

## 2024-10-22 DIAGNOSIS — G89.29 CHRONIC PAIN OF LEFT ANKLE: ICD-10-CM

## 2024-10-22 DIAGNOSIS — M25.572 CHRONIC PAIN OF LEFT ANKLE: ICD-10-CM

## 2024-10-22 DIAGNOSIS — M79.672 CHRONIC PAIN IN LEFT FOOT: ICD-10-CM

## 2024-10-22 DIAGNOSIS — R35.1 NOCTURIA: ICD-10-CM

## 2024-10-22 DIAGNOSIS — G89.29 CHRONIC PAIN IN LEFT FOOT: ICD-10-CM

## 2024-10-22 DIAGNOSIS — M25.572 SINUS TARSI SYNDROME OF LEFT FOOT: Primary | ICD-10-CM

## 2024-10-22 DIAGNOSIS — M19.072 ARTHRITIS OF LEFT ANKLE: ICD-10-CM

## 2024-10-22 PROCEDURE — 3288F FALL RISK ASSESSMENT DOCD: CPT | Mod: CPTII,S$GLB,, | Performed by: STUDENT IN AN ORGANIZED HEALTH CARE EDUCATION/TRAINING PROGRAM

## 2024-10-22 PROCEDURE — 3075F SYST BP GE 130 - 139MM HG: CPT | Mod: CPTII,S$GLB,, | Performed by: STUDENT IN AN ORGANIZED HEALTH CARE EDUCATION/TRAINING PROGRAM

## 2024-10-22 PROCEDURE — 3078F DIAST BP <80 MM HG: CPT | Mod: CPTII,S$GLB,, | Performed by: STUDENT IN AN ORGANIZED HEALTH CARE EDUCATION/TRAINING PROGRAM

## 2024-10-22 PROCEDURE — 99999 PR PBB SHADOW E&M-EST. PATIENT-LVL IV: CPT | Mod: PBBFAC,,, | Performed by: STUDENT IN AN ORGANIZED HEALTH CARE EDUCATION/TRAINING PROGRAM

## 2024-10-22 PROCEDURE — 1125F AMNT PAIN NOTED PAIN PRSNT: CPT | Mod: CPTII,S$GLB,, | Performed by: STUDENT IN AN ORGANIZED HEALTH CARE EDUCATION/TRAINING PROGRAM

## 2024-10-22 PROCEDURE — 99213 OFFICE O/P EST LOW 20 MIN: CPT | Mod: S$GLB,,, | Performed by: STUDENT IN AN ORGANIZED HEALTH CARE EDUCATION/TRAINING PROGRAM

## 2024-10-22 PROCEDURE — 1157F ADVNC CARE PLAN IN RCRD: CPT | Mod: CPTII,S$GLB,, | Performed by: STUDENT IN AN ORGANIZED HEALTH CARE EDUCATION/TRAINING PROGRAM

## 2024-10-22 PROCEDURE — 1159F MED LIST DOCD IN RCRD: CPT | Mod: CPTII,S$GLB,, | Performed by: STUDENT IN AN ORGANIZED HEALTH CARE EDUCATION/TRAINING PROGRAM

## 2024-10-22 PROCEDURE — 1101F PT FALLS ASSESS-DOCD LE1/YR: CPT | Mod: CPTII,S$GLB,, | Performed by: STUDENT IN AN ORGANIZED HEALTH CARE EDUCATION/TRAINING PROGRAM

## 2024-10-22 RX ORDER — IMIPRAMINE HYDROCHLORIDE 25 MG/1
25 TABLET, FILM COATED ORAL NIGHTLY
Qty: 30 TABLET | Refills: 11 | Status: SHIPPED | OUTPATIENT
Start: 2024-10-22 | End: 2025-10-22

## 2024-10-22 NOTE — PROGRESS NOTES
Subjective:     Patient    Adan Webb is a 74 y.o. male.    Problem    09/27/23: Presents for thick discolored nails causing pain, especially at 1st toes. Previously tried antifungals which did not help. Also has had left STJ/sinus tarsi pain for years, has had 2x steroid shots at the sinus tarsi which helped for a period of weeks to months each. Has had custom inserts in his shoes for a couple of years and recently had them adjusted, they help. Does not take medication for arthritis. Has attempted Voltaren without improvement.      01/19/24: Returns for thick discolored toenails causing pain, still primarily at 1st toes. Also with recurrent left STJ/sinus tarsi pain and now with pain at left lateral Kager's triangle; also with similar right sided pain but this does not bother him as much. Also with left forefoot pain and swelling for the past month or so, does not recall an injury, the pain has improved but the swelling is lingering.     02/20/24: Returns for followup of left metatarsal fractures; first seen on imaging 1 month ago although at that time they were already subacute with signs of healing. Has been walking normally, standard footwear, has been avoiding exercises. Also still with ongoing chronic left ankle pain. Did not respond well to last attempt at steroid injection.     08/09/24: Returns for routine foot care, nails are long and painful, worst at left 1st toenail. Also with newer onset right lateral heel pain since walking with family a lot recently. Pain not well controlled on duloxetine. Reports poor sleep.      08/22/24: Returns for left forefoot injury related to a fall in which the toes were bent back and he sat on them. Now with bruising, swelling, pain throughout left forefoot. Right heel pain improved with prior injection. Still having poorly controlled left ankle pain, has not started the amitriptyline.     09/19/24: Returns for followup of left foot fracture and chronic pain of  both ankles. Also requesting routine foot care. Admits to numbness, tingling, burning in feet.     10/22/24: Returns for chronic bilateral ankle pain, was prescribed pregabalin at last visit but did not start it. Chronic pain in both ankles-rearfoot is the same as before, slightly worse on left. He is hesitant to take more nerve medications but may fill the pregabalin and try it.     History    History obtained from patient and review of medical records.     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, didn't use ear protection    Dermatitis 12/09/2020    nystat groin    DJD (degenerative joint disease) of hip 01/29/2015    Enlarged liver 04/08/2021    US 4/21     Hemispheric carotid artery syndrome 06/06/2018    MCA , see MRI    Hiatal hernia 04/01/2021    CT chest tiny 2018    HTN (hypertension), benign 08/20/2020    Morbid obesity with BMI of 40.0-44.9, adult 01/29/2015    OAB (overactive bladder) 04/01/2021    Rash with pads    Right-sided low back pain with right-sided sciatica 03/06/2019    Seasonal allergic rhinitis due to pollen 04/13/2016    Thoracic aortic aneurysm without rupture 05/27/2018    tx with Su, Cardiologist Dr Stone, 5.1 CTS Dr Duenas, follows yearly    Transient cerebral ischemia 06/06/2018       Past Surgical History:   Procedure Laterality Date    A-V CARDIAC PACEMAKER INSERTION N/A 08/05/2021    Procedure: INSERTION, CARDIAC PACEMAKER, DUAL CHAMBER;  Surgeon: Sheldon Miranda MD;   Location: [facility] EP LAB;  Service: Cardiology;  Laterality: N/A;  Near syncope,SB,Sinus Pause, RBBB,LAFB, Dual PPM, BIO, MAC, [place], 3 Prep    ADENOIDECTOMY      CATARACT EXTRACTION      CYSTOSCOPY N/A 7/26/2023    Procedure: CYSTOSCOPY;  Surgeon: Jamal Zamora MD;  Location: [facility] OR [address] [place] [address];  Service: Urology;  Laterality: N/A;    CYSTOSCOPY WITH TRANSURETHRAL DESTRUCTION OF PROSTATE,RFA N/A 5/22/2024    Procedure: CYSTOSCOPY WITH TRANSURETHRAL DESTRUCTION OF PROSTATE,RFA;  Surgeon: Jamal Zamora MD;  Location: [facility] OR [address] [place] [address];  Service: Urology;  Laterality: N/A;    CYSTOSCOPY WITH URODYNAMIC TESTING N/A 10/25/2021    Procedure: CYSTOSCOPY, WITH URODYNAMIC TESTING FLOUROSCOPIC;  Surgeon: Sancho Wesley MD;  Location: [facility] OR [address] [place] [address];  Service: Urology;  Laterality: N/A;  1hr    CYSTOSCOPY,WITH BOTULINUM TOXIN INJECTION N/A 5/22/2024    Procedure: CYSTOSCOPY,WITH BOTULINUM TOXIN INJECTION;  Surgeon: Jamal Zamora MD;  Location: [facility] OR [address] [place] [address];  Service: Urology;  Laterality: N/A;  100 units    HERNIA REPAIR      INJECTION OF BOTULINUM TOXIN TYPE A N/A 7/26/2023    Procedure: INJECTION, BOTULINUM TOXIN, TYPE A;  Surgeon: Jamal Zamora MD;  Location: [address];  Service: Urology;  Laterality: N/A;  100 units    TONSILLECTOMY      YAG LAser Capsulotomy Bilateral     2/18/2019 OS Dr. Cornelius        Objective:     Vitals  Wt Readings from Last 1 Encounters:   10/22/24 (!) 143 kg (315 lb 4.1 oz)     Temp Readings from Last 1 Encounters:   09/09/24 97.9 °F (36.6 °C) (Oral)     BP Readings from Last 1 Encounters:   10/22/24 130/68     Pulse Readings from Last 1 Encounters:   10/22/24 72       Dermatological Exam    Skin:  Pedal hair growth diminished and Pedal skin thin and shiny on right  Pedal hair growth diminished and Pedal skin thin and shiny on left    Nails:  10 nail(s) elongated, 10 nail(s) thickened, and 10 nail(s) discolored; tender throughout, worst at left 1st toenail    Vascular  Exam    Arteries:  Posterior tibial artery palpable on right  Dorsalis pedis artery palpable on right  Posterior tibial artery palpable on left  Dorsalis pedis artery palpable on left    Veins:  Superficial veins unremarkable on right  Superficial veins unremarkable on left    [place]+ pitting on right  1+ pitting on left     Neurological Exam    [place] touch test:  6/6 sites sensed, light touch intact     Other:  + provocation test sural nerve lateral heel right    Musculoskeletal Exam    Footwear:  Casual on right  Casual on left    Gait Exam:   Ambulatory Status: Ambulatory  Gait: Normal  Assistive Devices: None    Foot Progression Angle:  Normal on right  Normal on left     Right Lower Extremity Additional Findings:  Mild pain on palpation lateral STJ, sinus tarsi, Kager triangle  Mild pain on palpation right lateral heel  Right foot and ankle function, strength, and range of motion unremarkable except as noted above.     Left Lower Extremity Additional Findings:  Mild pain on palpation lateral STJ, sinus tarsi, Kager triangle  Mild pain on palpation 2nd and 4th MTPJs  Left foot and ankle function, strength, and range of motion unremarkable except as noted above.    Imaging and Other Tests    Imagin24 B foot and ankle X rays: bilateral STJ severe arthritis vs STJ coalition; left 3rd and 4th metatarsal fractures with signs of healing    24 left foot X rays: interval healing of 3rd and 4th metatarsal fractures, alignment relatively unchanged    24 left foot X rays: prior 3rd and 4th metatarsal fractures appear healed, new nondisplaced fracture of 4th proximal phalanx    Independently reviewed and interpreted imaging, findings are as follows: N/A     Assessment:     Encounter Diagnoses   Name Primary?    Sinus tarsi syndrome of left foot Yes    Arthritis of left ankle     Chronic pain of left ankle     Chronic pain in left foot             Plan:     I counseled the patient on his  conditions, their implications and medical management.    Bilateral ankle pain, sinus tarsitis, arthritis: chronic stable  -Previously failed duloxetine. Worried about trying amitriptyline due to fall risk.   -Start pregabalin.   -Ordered left custom AFO.     Tinea unguium, neuropathy: chronic stable   -Previously discussed treatment options including (1) monitoring, (2) debridement, (3) topical antifungals, (4) oral antifungals. Discussed potential risks, benefits, alternatives to each. Patient opted for debridements only.  -Will return for routine foot care.        Return to clinic 1 month after receiving AFO, call sooner PRN.

## 2024-10-23 ENCOUNTER — CLINICAL SUPPORT (OUTPATIENT)
Dept: CARDIOLOGY | Facility: HOSPITAL | Age: 74
End: 2024-10-23
Attending: INTERNAL MEDICINE
Payer: MEDICARE

## 2024-10-23 ENCOUNTER — CLINICAL SUPPORT (OUTPATIENT)
Dept: CARDIOLOGY | Facility: HOSPITAL | Age: 74
End: 2024-10-23
Payer: MEDICARE

## 2024-10-23 DIAGNOSIS — I45.2 BIFASCICULAR BLOCK: ICD-10-CM

## 2024-10-23 PROCEDURE — 93294 REM INTERROG EVL PM/LDLS PM: CPT | Mod: HCNC,,, | Performed by: INTERNAL MEDICINE

## 2024-10-23 PROCEDURE — 93296 REM INTERROG EVL PM/IDS: CPT | Mod: HCNC | Performed by: INTERNAL MEDICINE

## 2024-10-23 RX ORDER — IMIPRAMINE HYDROCHLORIDE 25 MG/1
25 TABLET, FILM COATED ORAL NIGHTLY
Qty: 30 TABLET | Refills: 11 | Status: SHIPPED | OUTPATIENT
Start: 2024-10-23 | End: 2025-10-23

## 2024-11-04 NOTE — PROGRESS NOTES
Telemedicine/Virtual Visit Documentation:     The patient location is: home    The chief complaint leading to consultation is: see HPI    VISIT TYPE X   Virtual visit with synchronous audio and video X   Telephone E/M service      Total time spent with patient: see X berto on chart below.   More than half of the time was spent counseling or coordinating care including prognosis, differential diagnosis, risks and benefits of treatment, instructions, compliance risk reductions     EST MINUTES X   04775 5    78824 10    [id number] 15 X   99214 25    77689 40    NEW     26911 10    73680 20    34278 30    07950 45    79501 60    PHONE      5-10    50403 11-20    88198 21-30      H&P  Orthopaedics      SUBJECTIVE:     History of Present Illness:  Patient is a 74 y.o. male with left knee pain following up after CSI.  Patient reports he is pain-free at today's visit.  Has been pain-free since the injection.  Of note, patient states that he recently had some dental work and has been taking ibuprofen for his tooth pain.    Review of patient's allergies indicates:   Allergen Reactions    Augmentin [amoxicillin-pot clavulanate] Rash     Rash, confusion, TIA like symptoms    Azithromycin Rash    Avodart [dutasteride]     House dust mite Other (See Comments)    Mold     Naproxen     Ragweed     Synthroid [levothyroxine] Rash       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, didn't use ear protection    Dermatitis 12/09/2020     nystat groin    DJD (degenerative joint disease) of hip 01/29/2015    Enlarged liver 04/08/2021    US 4/21     Hemispheric carotid artery syndrome 06/06/2018    MCA , see MRI    Hiatal hernia 04/01/2021    CT chest tiny 2018    HTN (hypertension), benign 08/20/2020    Morbid obesity with BMI of 40.0-44.9, adult 01/29/2015    OAB (overactive bladder) 04/01/2021    Rash with pads    Right-sided low back pain with right-sided sciatica 03/06/2019    Seasonal allergic rhinitis due to pollen 04/13/2016    Thoracic aortic aneurysm without rupture 05/27/2018    tx with Su, Cardiologist Dr Stone, 5.1 CTS Dr Duenas, follows yearly    Transient cerebral ischemia 06/06/2018     Past Surgical History:   Procedure Laterality Date    A-V CARDIAC PACEMAKER INSERTION N/A 08/05/2021    Procedure: INSERTION, CARDIAC PACEMAKER, DUAL CHAMBER;  Surgeon: Sheldon Miranda MD;  Location: [facility] EP LAB;  Service: Cardiology;  Laterality: N/A;  Near syncope,SB,Sinus Pause, RBBB,LAFB, Dual PPM, BIO, MAC, [place], 3 Prep    ADENOIDECTOMY      CATARACT EXTRACTION      CYSTOSCOPY N/A 7/26/2023    Procedure: CYSTOSCOPY;  Surgeon: Jamal Zamora MD;  Location: [facility] OR [address] [place] [address];  Service: Urology;  Laterality: N/A;    CYSTOSCOPY WITH TRANSURETHRAL DESTRUCTION OF PROSTATE,RFA N/A 5/22/2024    Procedure: CYSTOSCOPY WITH TRANSURETHRAL DESTRUCTION OF PROSTATE,RFA;  Surgeon: Jamal Zamora MD;  Location: [facility] OR [address] [place] [address];  Service: Urology;  Laterality: N/A;    CYSTOSCOPY WITH URODYNAMIC TESTING N/A 10/25/2021    Procedure: CYSTOSCOPY, WITH URODYNAMIC TESTING FLOUROSCOPIC;  Surgeon: Sancho Wesley MD;  Location: [facility] OR [address] [place] [address];  Service: Urology;  Laterality: N/A;  1hr    CYSTOSCOPY,WITH BOTULINUM TOXIN INJECTION N/A 5/22/2024    Procedure: CYSTOSCOPY,WITH BOTULINUM TOXIN INJECTION;  Surgeon: Jamal Zamora MD;  Location: [facility] OR [address] [place] [address];  Service: Urology;  Laterality: N/A;  100 units    HERNIA REPAIR      INJECTION OF BOTULINUM TOXIN TYPE A N/A  2023    Procedure: INJECTION, BOTULINUM TOXIN, TYPE A;  Surgeon: Jamal Zamora MD;  Location: [facility] OR [address] [place] [address];  Service: Urology;  Laterality: N/A;  100 units    TONSILLECTOMY      YAG LAser Capsulotomy Bilateral     2019 OS Dr. Cornelius     Family History   Problem Relation Name Age of Onset    Diabetes Mother      Leukemia Father      Aneurysm Father      No Known Problems Daughter x1     No Known Problems Son x1     Cataracts Paternal Uncle      Amblyopia Neg Hx      Blindness Neg Hx      Glaucoma Neg Hx      Macular degeneration Neg Hx      Retinal detachment Neg Hx      Strabismus Neg Hx       Social History     Tobacco Use    Smoking status: Former     Types: Cigarettes     Start date:      Quit date:      Years since quittin.8    Smokeless tobacco: Never   Substance Use Topics    Alcohol use: Yes     Comment: wine, seldom    Drug use: No        Review of Systems:  Patient denies constitutional symptoms, cardiac symptoms, respiratory symptoms, GI symptoms.  The remainder of the musculoskeletal ROS is included in the HPI.      OBJECTIVE:     Physical Exam:  Physical exam limited as this was a virtual visit, but it is apparent that the individual is in no acute distress, well groomed, well kept.  Speech is normal.  Patient is alert and oriented x3.  Mood and affect appear normal.       ASSESSMENT/PLAN:     A/P: Adan Webb is a 74 y.o. with chronic left knee pain secondary to primary osteoarthritis which seems to be responsive to intra-articular corticosteroid injection.  Plan will be to repeat injection on or after 2024, unless pain return sooner.  Should pain return sooner, we will move for with hyaluronic acid injection (Monovisc).  Patient to let us know if and when pain returns.

## 2024-11-05 ENCOUNTER — OFFICE VISIT (OUTPATIENT)
Dept: SPORTS MEDICINE | Facility: CLINIC | Age: 74
End: 2024-11-05
Payer: MEDICARE

## 2024-11-05 DIAGNOSIS — M17.12 PRIMARY OSTEOARTHRITIS OF LEFT KNEE: Primary | ICD-10-CM

## 2024-11-05 PROCEDURE — 1159F MED LIST DOCD IN RCRD: CPT | Mod: HCNC,CPTII,95, | Performed by: STUDENT IN AN ORGANIZED HEALTH CARE EDUCATION/TRAINING PROGRAM

## 2024-11-05 PROCEDURE — 1160F RVW MEDS BY RX/DR IN RCRD: CPT | Mod: HCNC,CPTII,95, | Performed by: STUDENT IN AN ORGANIZED HEALTH CARE EDUCATION/TRAINING PROGRAM

## 2024-11-05 PROCEDURE — 1157F ADVNC CARE PLAN IN RCRD: CPT | Mod: HCNC,CPTII,95, | Performed by: STUDENT IN AN ORGANIZED HEALTH CARE EDUCATION/TRAINING PROGRAM

## 2024-11-05 PROCEDURE — 1126F AMNT PAIN NOTED NONE PRSNT: CPT | Mod: HCNC,CPTII,95, | Performed by: STUDENT IN AN ORGANIZED HEALTH CARE EDUCATION/TRAINING PROGRAM

## 2024-11-05 PROCEDURE — 99213 OFFICE O/P EST LOW 20 MIN: CPT | Mod: HCNC,95,, | Performed by: STUDENT IN AN ORGANIZED HEALTH CARE EDUCATION/TRAINING PROGRAM

## 2024-11-15 ENCOUNTER — OFFICE VISIT (OUTPATIENT)
Dept: UROLOGY | Facility: CLINIC | Age: 74
End: 2024-11-15
Payer: MEDICARE

## 2024-11-15 VITALS
HEIGHT: 73 IN | HEART RATE: 68 BPM | DIASTOLIC BLOOD PRESSURE: 82 MMHG | SYSTOLIC BLOOD PRESSURE: 164 MMHG | BODY MASS INDEX: 41.75 KG/M2 | WEIGHT: 315 LBS

## 2024-11-15 DIAGNOSIS — N32.81 OAB (OVERACTIVE BLADDER): ICD-10-CM

## 2024-11-15 DIAGNOSIS — Z90.79 S/P TURP: Primary | ICD-10-CM

## 2024-11-15 DIAGNOSIS — R35.1 BENIGN PROSTATIC HYPERPLASIA WITH NOCTURIA: ICD-10-CM

## 2024-11-15 DIAGNOSIS — N40.1 BENIGN PROSTATIC HYPERPLASIA WITH NOCTURIA: ICD-10-CM

## 2024-11-15 PROBLEM — R33.9 URINARY RETENTION: Status: RESOLVED | Noted: 2024-05-30 | Resolved: 2024-11-15

## 2024-11-15 PROCEDURE — 99999 PR PBB SHADOW E&M-EST. PATIENT-LVL IV: CPT | Mod: PBBFAC,HCNC,, | Performed by: UROLOGY

## 2024-11-15 NOTE — PROGRESS NOTES
CHIEF COMPLAINT:  S/p Rezum Therapy and botox injection 100 units on 5/22/24      HISTORY OF PRESENTING ILLINESS:  Adan Webb is a 74 y.o. male established patient of Dr. Zamora. He is s/p Rezum and 100 units bladder botox 5/22/2024. Overall he is voiding better with better urine flow.  His nocturia decreased from 6 x to 3 x a night.  He is on flomax, imipramine q hs and Avodart.    He is here with his wife.    Procedure(s) Performed: 5/22/24  1. Transurethral destruction of prostate; radiofrequency thermotherapy  2. Bladder Botox Injection 100 units  Findings:    1. Bilobar prostatic hypertrophy, slightly large on the right. Rezum therapy done with 10 treatment.  Rezum: 10 total treatments were given. Specifically 6 treatments were given to 9 o'clock position, and four were given to the 3 o'clock position.   2. Small capacity bladder capacity  with mild trabeculations.  100 units of botox given.    Following the above procedure, he definitely did well.  Combined with timed voiding every 2 hours.      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, didn't use ear protection    Dermatitis 12/09/2020    nystat groin    DJD (degenerative joint disease) of hip 01/29/2015    Enlarged liver 04/08/2021    US 4/21     Hemispheric carotid artery syndrome 06/06/2018    MCA , see MRI    Hiatal hernia 04/01/2021    CT chest tiny 2018    HTN (hypertension), benign 08/20/2020    Morbid obesity with BMI  of 40.0-44.9, adult 01/29/2015    OAB (overactive bladder) 04/01/2021    Rash with pads    Right-sided low back pain with right-sided sciatica 03/06/2019    Seasonal allergic rhinitis due to pollen 04/13/2016    Thoracic aortic aneurysm without rupture 05/27/2018    tx with Su, Cardiologist Dr Stone, 5.1 CTS Dr Duenas, follows yearly    Transient cerebral ischemia 06/06/2018       Past Surgical History:   Procedure Laterality Date    A-V CARDIAC PACEMAKER INSERTION N/A 08/05/2021    Procedure: INSERTION, CARDIAC PACEMAKER, DUAL CHAMBER;  Surgeon: Sheldon Miranda MD;  Location: [facility] EP LAB;  Service: Cardiology;  Laterality: N/A;  Near syncope,SB,Sinus Pause, RBBB,LAFB, Dual PPM, BIO, MAC, [place], 3 Prep    ADENOIDECTOMY      CATARACT EXTRACTION      CYSTOSCOPY N/A 7/26/2023    Procedure: CYSTOSCOPY;  Surgeon: Jamal Zamora MD;  Location: [facility] OR [address] [place] [address];  Service: Urology;  Laterality: N/A;    CYSTOSCOPY WITH TRANSURETHRAL DESTRUCTION OF PROSTATE,RFA N/A 5/22/2024    Procedure: CYSTOSCOPY WITH TRANSURETHRAL DESTRUCTION OF PROSTATE,RFA;  Surgeon: Jamal Zamora MD;  Location: [facility] OR [address] [place] [address];  Service: Urology;  Laterality: N/A;    CYSTOSCOPY WITH URODYNAMIC TESTING N/A 10/25/2021    Procedure: CYSTOSCOPY, WITH URODYNAMIC TESTING FLOUROSCOPIC;  Surgeon: Sancho Wesley MD;  Location: [facility] OR [address] [place] [address];  Service: Urology;  Laterality: N/A;  1hr    CYSTOSCOPY,WITH BOTULINUM TOXIN INJECTION N/A 5/22/2024    Procedure: CYSTOSCOPY,WITH BOTULINUM TOXIN INJECTION;  Surgeon: Jamal Zamora MD;  Location: [facility] OR [address] [place] [address];  Service: Urology;  Laterality: N/A;  100 units    HERNIA REPAIR      INJECTION OF BOTULINUM TOXIN TYPE A N/A 7/26/2023    Procedure: INJECTION, BOTULINUM TOXIN, TYPE A;  Surgeon: Jamal Zamora MD;  Location: [facility] OR [address] [place] [address];  Service: Urology;  Laterality: N/A;  100 units    TONSILLECTOMY      YAG LAser Capsulotomy Bilateral     2/18/2019 OS Dr. Cornelius       Family History   Problem Relation  Name Age of Onset    Diabetes Mother      Leukemia Father      Aneurysm Father      No Known Problems Daughter x1     No Known Problems Son x1     Cataracts Paternal Uncle      Amblyopia Neg Hx      Blindness Neg Hx      Glaucoma Neg Hx      Macular degeneration Neg Hx      Retinal detachment Neg Hx      Strabismus Neg Hx         Social History     Socioeconomic History    Marital status:    Tobacco Use    Smoking status: Former     Types: Cigarettes     Start date:      Quit date:      Years since quittin.9    Smokeless tobacco: Never   Substance and Sexual Activity    Alcohol use: Yes     Comment: wine, seldom    Drug use: No    Sexual activity: Yes     Partners: Female   Social History Narrative     to 'T', 2 children, nonsmoker, ETOH none, never had colonscopy & declines     Social Drivers of Health     Financial Resource Strain: Low Risk  (2024)    Overall Financial Resource Strain (CARDIA)     Difficulty of Paying Living Expenses: Not hard at all   Food Insecurity: No Food Insecurity (2024)    Hunger Vital Sign     Worried About Running Out of Food in the Last Year: Never true     Ran Out of Food in the Last Year: Never true   Transportation Needs: No Transportation Needs (2024)    PRAPARE - Transportation     Lack of Transportation (Medical): No     Lack of Transportation (Non-Medical): No   Physical Activity: Inactive (2024)    Exercise Vital Sign     Days of Exercise per Week: 0 days     Minutes of Exercise per Session: 0 min   Stress: No Stress Concern Present (2024)    [employer] [place] [employer] - Occupational Stress Questionnaire     Feeling of Stress : Not at all   Housing Stability: Low Risk  (2024)    Housing Stability Vital Sign     Unable to Pay for Housing in the Last Year: No     Number of Places Lived in the Last Year: 1     Unstable Housing in the Last Year: No       Allergies:  Augmentin [amoxicillin-pot clavulanate],  Azithromycin, Avodart [dutasteride], House dust mite, Mold, Naproxen, Ragweed, and Synthroid [levothyroxine]    Medications:    Current Outpatient Medications:     acetaminophen (TYLENOL) 500 MG tablet, Take 1 tablet (500 mg total) by mouth every 6 (six) hours as needed for Pain., Disp: 30 tablet, Rfl: 0    albuterol (VENTOLIN HFA) 90 mcg/actuation inhaler, Inhale 2 puffs into the lungs every 6 (six) hours as needed for Wheezing. Rescue, Disp: 18 g, Rfl: 1    BABY ASPIRIN ORAL, Take 81 tablets by mouth every evening. , Disp: , Rfl:     cholecalciferol, vitamin D3, 1,000 unit capsule, Take 1,000 Units by mouth once daily., Disp: , Rfl:     coenzyme Q10 100 mg capsule, Take by mouth once daily., Disp: , Rfl:     dexAMETHasone (DECADRON) 4 MG Tab, Take 1 tablet (4 mg total) by mouth once daily., Disp: 30 tablet, Rfl: 0    finasteride (PROSCAR) 5 mg tablet, Take 1 tablet (5 mg total) by mouth once daily., Disp: 90 tablet, Rfl: 3    glucosamine-chondroitin 500-400 mg tablet, Take 1 tablet by mouth 2 (two) times daily. , Disp: , Rfl:     imipramine (TOFRANIL) 25 MG tablet, Take 1 tablet (25 mg total) by mouth every evening., Disp: 30 tablet, Rfl: 11    imipramine (TOFRANIL) 25 MG tablet, TAKE 1 TABLET (25 MG TOTAL) BY MOUTH EVERY EVENING., Disp: 30 tablet, Rfl: 11    MULTIVITAMIN W-MINERALS/LUTEIN (CENTRUM SILVER ORAL), Take by mouth once daily. , Disp: , Rfl:     omega-3 fatty acids 300 mg Cap, Take 1 tablet by mouth once daily. , Disp: , Rfl:     pregabalin (LYRICA) 75 MG capsule, Take 1 capsule (75 mg total) by mouth 2 (two) times daily., Disp: 60 capsule, Rfl: 5    propranoloL (INDERAL LA) 60 MG 24 hr capsule, Take 1 capsule (60 mg total) by mouth once daily., Disp: 30 capsule, Rfl: 11    rosuvastatin (CRESTOR) 10 MG tablet, TAKE 1 TABLET (10 MG TOTAL) BY MOUTH ONCE DAILY., Disp: 90 tablet, Rfl: 2    TURMERIC ORAL, Take by mouth. Pt take 1 in the evening., Disp: , Rfl:     vibegron 75 mg Tab, Take 1 tablet (75 mg  total) by mouth once daily., Disp: 30 tablet, Rfl: 11    VITAMIN B COMPLEX (SUPER B COMPLEX-B-12 ORAL), Take by mouth., Disp: , Rfl:     azelastine (ASTELIN) 137 mcg (0.1 %) nasal spray, 1 spray (137 mcg total) by Nasal route 2 (two) times daily., Disp: 30 mL, Rfl: 12    diclofenac sodium (VOLTAREN) 1 % Gel, Apply 2 g topically 4 (four) times daily. (Patient not taking: Reported on 11/15/2024), Disp: 1 Tube, Rfl: 2    loratadine (CLARITIN) 10 mg tablet, Take 1 tablet (10 mg total) by mouth daily as needed for Allergies., Disp: 30 tablet, Rfl: 0    nystatin-triamcinolone (MYCOLOG II) cream, APPLY TOPICALLY 4 (FOUR) TIMES DAILY. (Patient not taking: Reported on 11/15/2024), Disp: 60 g, Rfl: 5    olopatadine (PATANOL) 0.1 % ophthalmic solution, Place 1 drop into both eyes 2 (two) times daily as needed for Allergies. (Patient not taking: Reported on 9/24/2024), Disp: 5 mL, Rfl: 0    tamsulosin (FLOMAX) 0.4 mg Cap, Take 1 capsule (0.4 mg total) by mouth once daily., Disp: 30 capsule, Rfl: 11        Lab Results   Component Value Date    PSA 2.7 01/20/2015    PSA 1.5 05/30/2008    PSA 2.1 05/11/2005    PSADIAG 2.5 07/16/2018    PSADIAG 2.8 06/12/2017    PSADIAG 2.7 08/18/2014       Lab Results   Component Value Date    CREATININE 0.7 02/06/2024    EGFRNORACEVR >60.0 02/06/2024               IMPRESSION:    Encounter Diagnoses   Name Primary?    S/P Rezum Therapy Yes    OAB (overactive bladder)     Benign prostatic hyperplasia with nocturia                Assessment:       S/P Rezum Therapy    OAB (overactive bladder)    Benign prostatic hyperplasia with nocturia           Plan:   - Post op Rezum and botox injection 100 units 5/22/24    Doing really without much leakage.  He still uses a 1 pad as protection.  Explained that he may need botox injection again in the future.  Typically it will last 5 yo 9 months.    Will follow up in 6 months or sooner.  Recommend to continue Gemtesa for OAB.  Because of its cost, he would like  to stop it.  I will leave it up to him.    Continue flomax and finasteride as well as imipramine at night.    Follow up in about 6 months (around 5/15/2025).

## 2024-11-16 DIAGNOSIS — N40.1 BENIGN PROSTATIC HYPERPLASIA WITH NOCTURIA: ICD-10-CM

## 2024-11-16 DIAGNOSIS — R35.1 BENIGN PROSTATIC HYPERPLASIA WITH NOCTURIA: ICD-10-CM

## 2024-11-17 DIAGNOSIS — R35.1 BENIGN PROSTATIC HYPERPLASIA WITH NOCTURIA: ICD-10-CM

## 2024-11-17 DIAGNOSIS — N40.1 BENIGN PROSTATIC HYPERPLASIA WITH NOCTURIA: ICD-10-CM

## 2024-11-18 RX ORDER — TAMSULOSIN HYDROCHLORIDE 0.4 MG/1
1 CAPSULE ORAL
Qty: 30 CAPSULE | Refills: 11 | Status: SHIPPED | OUTPATIENT
Start: 2024-11-18

## 2024-11-18 RX ORDER — TAMSULOSIN HYDROCHLORIDE 0.4 MG/1
0.4 CAPSULE ORAL DAILY
Qty: 30 CAPSULE | Refills: 11 | Status: SHIPPED | OUTPATIENT
Start: 2024-11-18 | End: 2025-11-18

## 2024-11-21 DIAGNOSIS — J34.2 DEVIATED SEPTUM: ICD-10-CM

## 2024-11-21 RX ORDER — AZELASTINE 1 MG/ML
1 SPRAY, METERED NASAL 2 TIMES DAILY
Qty: 90 ML | Refills: 2 | Status: SHIPPED | OUTPATIENT
Start: 2024-11-21 | End: 2025-11-21

## 2024-11-21 NOTE — TELEPHONE ENCOUNTER
No care due was identified.  [facility] Embedded Care Due Messages. Reference number: [id number].   11/21/2024 10:07:44 AM CST

## 2024-11-22 LAB
OHS CV AF BURDEN PERCENT: < 1
OHS CV DC REMOTE DEVICE TYPE: NORMAL
OHS CV RV PACING PERCENT: 5 %

## 2025-01-22 ENCOUNTER — CLINICAL SUPPORT (OUTPATIENT)
Dept: CARDIOLOGY | Facility: HOSPITAL | Age: 75
End: 2025-01-22
Payer: MEDICARE

## 2025-01-22 ENCOUNTER — CLINICAL SUPPORT (OUTPATIENT)
Dept: CARDIOLOGY | Facility: HOSPITAL | Age: 75
End: 2025-01-22
Attending: INTERNAL MEDICINE
Payer: MEDICARE

## 2025-01-22 DIAGNOSIS — I45.2 BIFASCICULAR BLOCK: ICD-10-CM

## 2025-01-22 PROCEDURE — 93294 REM INTERROG EVL PM/LDLS PM: CPT | Mod: ,,, | Performed by: INTERNAL MEDICINE

## 2025-01-22 PROCEDURE — 93296 REM INTERROG EVL PM/IDS: CPT | Performed by: INTERNAL MEDICINE

## 2025-01-27 LAB
OHS CV AF BURDEN PERCENT: < 1
OHS CV DC REMOTE DEVICE TYPE: NORMAL
OHS CV RV PACING PERCENT: 4 %

## 2025-02-03 DIAGNOSIS — I49.3 PVC (PREMATURE VENTRICULAR CONTRACTION): Primary | ICD-10-CM

## 2025-03-11 ENCOUNTER — TELEPHONE (OUTPATIENT)
Dept: UROLOGY | Facility: CLINIC | Age: 75
End: 2025-03-11
Payer: MEDICARE

## 2025-03-11 ENCOUNTER — PATIENT MESSAGE (OUTPATIENT)
Dept: UROLOGY | Facility: CLINIC | Age: 75
End: 2025-03-11
Payer: MEDICARE

## 2025-03-11 NOTE — TELEPHONE ENCOUNTER
----- Message from Nurse Calhoun sent at 3/10/2025  4:01 PM CDT -----  Regarding: FW: call back  Contact: [phone]    ----- Message -----  From: Isabela Max  Sent: 3/10/2025   3:33 PM CDT  To: Noah CELESTIN Staff  Subject: call back                                        Type:  call backWho Called: Kwabena (please be specific): urine incontinence Would the patient rather a call back or a response via [facility]? [facility] Call Back Number: [phone] Additional Information:

## 2025-03-14 ENCOUNTER — TELEPHONE (OUTPATIENT)
Dept: UROLOGY | Facility: CLINIC | Age: 75
End: 2025-03-14
Payer: MEDICARE

## 2025-03-14 NOTE — TELEPHONE ENCOUNTER
Left message    I personally evaluated the patient. I reviewed the Resident’s or Physician Assistant’s note (as assigned above), and agree with the findings and plan except as documented in my note. I have seen this patient with a scribe. Please see progress note section for scribe documentation of my history and physical examination and plan. I agree with scribe documentation except as indicated in my notes.

## 2025-03-14 NOTE — TELEPHONE ENCOUNTER
----- Message from Jessica sent at 3/13/2025  5:49 PM CDT -----  Type:  Patient Returning CallWho Called:ptJimi Left Message for Patient:ptDoes the patient know what this is regarding?:Pt had a call from nurse Douglas about his appt but he need his appt in the morning he pick on his grand kids at that time Would the patient rather a call back or a response via [employer]? [facility] Back Number:[phone] Information: call back

## 2025-03-28 ENCOUNTER — TELEPHONE (OUTPATIENT)
Dept: UROLOGY | Facility: CLINIC | Age: 75
End: 2025-03-28
Payer: MEDICARE

## 2025-03-28 NOTE — TELEPHONE ENCOUNTER
----- Message from Ana Luisa sent at 3/28/2025  2:06 PM CDT -----  Regarding: RX Inquiry  Type: Rx Inquiry Who Called:Adan Pereraould the patient rather a call back or a response via [facility]? Call [facility] Call Back Number: [phone] Information:Patient called about questions he has on Rx-oxytocin and helping with his urination.

## 2025-04-08 NOTE — PROGRESS NOTES
Mr. Webb is a patient of Dr. Miranda and was last seen in clinic 4/9/2024.      Subjective:   Patient ID:  Adan Webb is a 75 y.o. male who presents for follow up of Pacemaker Check  .     HPI:    Mr. Webb is a 75 y.o. male with PVC, bifascicular block, HTN, sinus pauses,. PPM here for follow up.     Background:    Mr. Cain has a hx of frequent, mildly symptomatic PVCs (papillary muscle in origin?) with baseline RBBB/LAFB, and hypertension. He was seen in EP clinic 1/19/2021 for his PVCs. He noted only episodes of intermittent skipped heart beats with slow heart rates intermittently noted on a pulse-oximeter. I recommended Holter monitor/ECHO and likely uptitration of metoprolol. Suspect his observed bradycardia on his pulse ox was secondary to ectopy. Plan was if no bradycardia issues on holter would likely increase metoprolol. If EF abnormal, would recommend stress test and if normal consider PVC ablation.    4/2021: Mr. Cain returned for follow-up. Holter monitor noted sinus rhythm with an average rate of 69 bpm with a 1.2% PVC burden. His echocardiogram noted normal LV function with severe aortic root dilation (5.1cm). He is now on 50mg of metoprolol succinate daily. He has no current complaints besides over-active bladder.    7/6/2021: Pt experienced LOC event. Event monitor ordered.     8/4/2021: Received an alert on patient's event monitor for sinus pause/bradycardia. Patient with symptomatic PVCs on 50mg of metoprolol who had an episode of near syncope for which a monitor was applied. For this current event, reports he just ate breakfast and was lying in his recliner when he felt a sudden wave of light-headedness from his feet to his head. His wife reports his eyes rolled back in his head and he started shaking. He is unsure if he fully lost consciousness however does not remember shaking. He reports once he became fully awake he noted he was very tired, which has persisted.    Event monitor strips reviewed and are consistent with a few seconds of sinus rate slowing followed by several sinus pauses up to 6.5 seconds in duration with a sinus rate of ~20-30 bpm in between followed by sinus rate speeding and then normal sinus rhythm. He has a RBBB/LAFB however no complete heart block was observed. Taken together this was likely a vagal episode, unclear what the trigger was. Recommend PPM implantation.      8/5/2021: Successful implantation of PPM Dual.     3/2022: Seen by Lian Oates. Today he reports he has had no more syncopal spells device device implant. Primary complaint is back pain and nocturia. No CP, THACKER, palpitations, LH, syncope.    6/13/2023: PPM is operating normally. Continue remote checks. ECG is sinus rhythm with RBBB/LAFB.    4/9/2024: Mr. Webb is doing well from a device perspective with stable lead and device function. No arrhythmia noted. RV pacing 2%. Echo 3/2023 normal LVEF. No CHF symptoms. Treated for covid PNA in December.  PVC 2%. On toprol 50mg daily. Stable bifascicular block on ECG. Urology appt this afternoon for nocturia. RTC 1 yr.       Update (04/10/2025):    Today his primary complaints are joint pain, hip, leg, knee. Also reporting worsening leg edema. More fatigue. Chronic THACKER. Has gained some weight. No palps, CP, LH, syncope reported.  Tremor improved on propranolol.  Not active as his bladder issues limit activity.    Device Interrogation (4/10/2025) reveals an intrinsic SR with PVCs with stable lead and device function. No arrhythmias or treated episodes were noted.  He paces 53% in the RA and 4% in the RV. Estimated battery longevity 6.2 years. PVC 4%.    I have personally reviewed the patient's EKG today, which shows APVS with RBBB and occ PVCs at 76bpm. AZ interval is 204. QRS is 134. QTc is 483.    Relevant Cardiac Test Results:    2D Echo (3/20/2023):  Mild left atrial enlargement.  The left ventricle is normal in size with concentric  remodeling and normal systolic function.  Indeterminate left ventricular diastolic function.  The estimated ejection fraction is 60%.  Normal right ventricular size with normal right ventricular systolic function.  Normal central venous pressure (3 mmHg).  The estimated PA systolic pressure is 23 mmHg.  The ascending aorta is severely dilated measuring 5.4 cm.  The aortic root is normal in size and the aortic valve is trileaflet.    Current Outpatient Medications   Medication Sig    acetaminophen (TYLENOL) 500 MG tablet Take 1 tablet (500 mg total) by mouth every 6 (six) hours as needed for Pain.    albuterol (VENTOLIN HFA) 90 mcg/actuation inhaler Inhale 2 puffs into the lungs every 6 (six) hours as needed for Wheezing. Rescue    azelastine (ASTELIN) 137 mcg (0.1 %) nasal spray 1 spray (137 mcg total) by Nasal route 2 (two) times daily.    BABY ASPIRIN ORAL Take 81 tablets by mouth every evening.     cholecalciferol, vitamin D3, 1,000 unit capsule Take 1,000 Units by mouth once daily.    coenzyme Q10 100 mg capsule Take by mouth once daily.    dexAMETHasone (DECADRON) 4 MG Tab Take 1 tablet (4 mg total) by mouth once daily.    diclofenac sodium (VOLTAREN) 1 % Gel Apply 2 g topically 4 (four) times daily. (Patient not taking: Reported on 11/15/2024)    finasteride (PROSCAR) 5 mg tablet Take 1 tablet (5 mg total) by mouth once daily.    glucosamine-chondroitin 500-400 mg tablet Take 1 tablet by mouth 2 (two) times daily.     imipramine (TOFRANIL) 25 MG tablet Take 1 tablet (25 mg total) by mouth every evening.    imipramine (TOFRANIL) 25 MG tablet TAKE 1 TABLET (25 MG TOTAL) BY MOUTH EVERY EVENING.    loratadine (CLARITIN) 10 mg tablet Take 1 tablet (10 mg total) by mouth daily as needed for Allergies.    MULTIVITAMIN W-MINERALS/LUTEIN (CENTRUM SILVER ORAL) Take by mouth once daily.     nystatin-triamcinolone (MYCOLOG II) cream APPLY TOPICALLY 4 (FOUR) TIMES DAILY. (Patient not taking: Reported on 11/15/2024)     "olopatadine (PATANOL) 0.1 % ophthalmic solution Place 1 drop into both eyes 2 (two) times daily as needed for Allergies. (Patient not taking: Reported on 9/24/2024)    omega-3 fatty acids 300 mg Cap Take 1 tablet by mouth once daily.     pregabalin (LYRICA) 75 MG capsule Take 1 capsule (75 mg total) by mouth 2 (two) times daily.    propranoloL (INDERAL LA) 60 MG 24 hr capsule Take 1 capsule (60 mg total) by mouth once daily.    rosuvastatin (CRESTOR) 10 MG tablet TAKE 1 TABLET (10 MG TOTAL) BY MOUTH ONCE DAILY.    tamsulosin (FLOMAX) 0.4 mg Cap TAKE ONE CAPSULE BY MOUTH ONCE DAILY    tamsulosin (FLOMAX) 0.4 mg Cap Take 1 capsule (0.4 mg total) by mouth once daily.    TURMERIC ORAL Take by mouth. Pt take 1 in the evening.    vibegron 75 mg Tab Take 1 tablet (75 mg total) by mouth once daily.    VITAMIN B COMPLEX (SUPER B COMPLEX-B-12 ORAL) Take by mouth.     No current facility-administered medications for this visit.       Review of Systems   Constitutional: Positive for malaise/fatigue.   Cardiovascular:  Positive for dyspnea on exertion and leg swelling. Negative for chest pain, irregular heartbeat and palpitations.   Respiratory:  Positive for shortness of breath.    Hematologic/Lymphatic: Negative for bleeding problem.   Skin:  Negative for rash.   Musculoskeletal:  Positive for joint pain and muscle weakness. Negative for myalgias.   Gastrointestinal:  Negative for hematemesis, hematochezia and nausea.   Genitourinary:  Positive for frequency and nocturia. Negative for hematuria.   Neurological:  Negative for light-headedness.   Psychiatric/Behavioral:  Negative for altered mental status.    Allergic/Immunologic: Negative for persistent infections.       Objective:          BP (!) 140/86 (Patient Position: Sitting)   Pulse 76   Ht 6' 1" (1.854 m)   Wt (!) 151.2 kg (333 lb 5.4 oz)   BMI 43.98 kg/m²     Physical Exam  Vitals and nursing note reviewed.   Constitutional:       Appearance: Normal appearance. He " is well-developed.   HENT:      Head: Normocephalic.      Nose: Nose normal.   Eyes:      Pupils: Pupils are equal, round, and reactive to light.   Cardiovascular:      Rate and Rhythm: Normal rate and regular rhythm.   Pulmonary:      Effort: No respiratory distress.   Chest:      Comments: Device to LUCW. Incision and pocket in good repair.    Musculoskeletal:         General: Normal range of motion.   Skin:     General: Skin is warm and dry.      Findings: No erythema.   Neurological:      Mental Status: He is alert and oriented to person, place, and time.   Psychiatric:         Speech: Speech normal.         Behavior: Behavior normal.           Lab Results   Component Value Date     02/06/2024    K 5.1 02/06/2024    MG 1.9 12/18/2023    BUN 23 02/06/2024    CREATININE 0.7 02/06/2024    ALT 24 02/06/2024    AST 23 02/06/2024    HGB 13.6 (L) 02/06/2024    HCT 41.3 02/06/2024    TSH 3.536 04/01/2021    LDLCALC 70.2 03/02/2023             Assessment:     1. Presence of cardiac pacemaker    2. Severe obesity (BMI 35.0-39.9) with comorbidity    3. Bifascicular block    4. HTN (hypertension), benign    5. PVC (premature ventricular contraction)    6. Bilateral hip pain        Plan:     In summary, Mr. Webb is a 75 y.o. male with PVC, bifascicular block, HTN, sinus pauses,. PPM here for follow up.   Mr. Webb is doing well from a device perspective with stable lead and device function. No arrhythmia noted. PVCs 4%.   RV pacing 4%. He is reporting worsening leg edema and THACKER. Update echo. Will refer to vascular medicine if echo WNL. Also recommend compression stockings.  Refer to orthopedics for hip pain.     TTE  Orthopedics  Continue current medication regimen and device settings.   Follow up in device clinic as scheduled.   Follow up in EP clinic in 1 year, sooner as needed.     *A copy of this note has been sent to Dr. Miranda*    Follow up in about 1 year (around  4/10/2026).      ------------------------------------------------------------------    HENRRY Reynoso, NP-C  Cardiac Electrophysiology

## 2025-04-10 ENCOUNTER — OFFICE VISIT (OUTPATIENT)
Dept: ELECTROPHYSIOLOGY | Facility: CLINIC | Age: 75
End: 2025-04-10
Payer: MEDICARE

## 2025-04-10 ENCOUNTER — CLINICAL SUPPORT (OUTPATIENT)
Dept: CARDIOLOGY | Facility: HOSPITAL | Age: 75
End: 2025-04-10
Attending: INTERNAL MEDICINE
Payer: MEDICARE

## 2025-04-10 ENCOUNTER — HOSPITAL ENCOUNTER (OUTPATIENT)
Dept: CARDIOLOGY | Facility: CLINIC | Age: 75
Discharge: HOME | End: 2025-04-10
Payer: MEDICARE

## 2025-04-10 VITALS
BODY MASS INDEX: 41.75 KG/M2 | HEIGHT: 73 IN | DIASTOLIC BLOOD PRESSURE: 86 MMHG | WEIGHT: 315 LBS | HEART RATE: 76 BPM | SYSTOLIC BLOOD PRESSURE: 140 MMHG

## 2025-04-10 DIAGNOSIS — I49.3 PVC (PREMATURE VENTRICULAR CONTRACTION): ICD-10-CM

## 2025-04-10 DIAGNOSIS — E66.01 SEVERE OBESITY (BMI 35.0-39.9) WITH COMORBIDITY: ICD-10-CM

## 2025-04-10 DIAGNOSIS — M25.552 BILATERAL HIP PAIN: ICD-10-CM

## 2025-04-10 DIAGNOSIS — I10 HTN (HYPERTENSION), BENIGN: ICD-10-CM

## 2025-04-10 DIAGNOSIS — I45.2 BIFASCICULAR BLOCK: ICD-10-CM

## 2025-04-10 DIAGNOSIS — M25.551 BILATERAL HIP PAIN: ICD-10-CM

## 2025-04-10 DIAGNOSIS — Z95.0 PRESENCE OF CARDIAC PACEMAKER: Primary | ICD-10-CM

## 2025-04-10 LAB
OHS QRS DURATION: 134 MS
OHS QTC CALCULATION: 483 MS

## 2025-04-10 PROCEDURE — 93005 ELECTROCARDIOGRAM TRACING: CPT | Mod: HCNC,S$GLB,, | Performed by: INTERNAL MEDICINE

## 2025-04-10 PROCEDURE — 99999 PR PBB SHADOW E&M-EST. PATIENT-LVL V: CPT | Mod: PBBFAC,HCNC,, | Performed by: NURSE PRACTITIONER

## 2025-04-10 PROCEDURE — 93280 PM DEVICE PROGR EVAL DUAL: CPT | Mod: HCNC

## 2025-04-10 PROCEDURE — 93010 ELECTROCARDIOGRAM REPORT: CPT | Mod: HCNC,S$GLB,, | Performed by: INTERNAL MEDICINE

## 2025-04-11 ENCOUNTER — OFFICE VISIT (OUTPATIENT)
Dept: UROLOGY | Facility: CLINIC | Age: 75
End: 2025-04-11
Payer: MEDICARE

## 2025-04-11 VITALS
HEART RATE: 67 BPM | BODY MASS INDEX: 41.75 KG/M2 | WEIGHT: 315 LBS | HEIGHT: 73 IN | SYSTOLIC BLOOD PRESSURE: 159 MMHG | DIASTOLIC BLOOD PRESSURE: 78 MMHG

## 2025-04-11 DIAGNOSIS — N32.81 OAB (OVERACTIVE BLADDER): Primary | ICD-10-CM

## 2025-04-11 DIAGNOSIS — N39.41 URGE INCONTINENCE: Primary | ICD-10-CM

## 2025-04-11 DIAGNOSIS — N32.81 OAB (OVERACTIVE BLADDER): ICD-10-CM

## 2025-04-11 DIAGNOSIS — N39.41 URGE INCONTINENCE: ICD-10-CM

## 2025-04-11 LAB
OHS CV DC REMOTE DEVICE TYPE: NORMAL
OHS CV RV PACING PERCENT: 4 %

## 2025-04-11 PROCEDURE — 87086 URINE CULTURE/COLONY COUNT: CPT | Mod: HCNC | Performed by: UROLOGY

## 2025-04-11 PROCEDURE — 99999 PR PBB SHADOW E&M-EST. PATIENT-LVL IV: CPT | Mod: PBBFAC,HCNC,, | Performed by: UROLOGY

## 2025-04-11 RX ORDER — OXYBUTYNIN CHLORIDE 10 MG/1
10 TABLET, EXTENDED RELEASE ORAL DAILY
Qty: 30 TABLET | Refills: 11 | Status: SHIPPED | OUTPATIENT
Start: 2025-04-11 | End: 2026-04-11

## 2025-04-11 NOTE — PROGRESS NOTES
OAB (overactive bladder)  -     Case Request Operating Room: CYSTOSCOPY,WITH BOTULINUM TOXIN INJECTION    Urge incontinence  -     Case Request Operating Room: CYSTOSCOPY,WITH BOTULINUM TOXIN INJECTION

## 2025-04-11 NOTE — PROGRESS NOTES
CHIEF COMPLAINT:  S/p Rezum Therapy and botox injection 100 units on 5/22/24      HISTORY OF PRESENTING ILLINESS:  Adan Webb is a 75 y.o. male established patient of Dr. Zamora. He is s/p Rezum and 100 units bladder botox 5/22/2024. Overall he is voiding better with better urine flow.  His nocturia decreased from 6 x to 3 x a night.  He is on flomax, imipramine q hs and Avodart.    He is here with his wife.    Procedure(s) Performed: 5/22/24  1. Transurethral destruction of prostate; radiofrequency thermotherapy  2. Bladder Botox Injection 100 units  Findings:    1. Bilobar prostatic hypertrophy, slightly large on the right. Rezum therapy done with 10 treatment.  Rezum: 10 total treatments were given. Specifically 6 treatments were given to 9 o'clock position, and four were given to the 3 o'clock position.   2. Small capacity bladder capacity  with mild trabeculations.  100 units of botox given.    Following the above procedure, he definitely did well.  Combined with timed voiding every 2 hours.      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, didn't use ear protection    Dermatitis 12/09/2020    nystat groin    DJD (degenerative joint disease) of hip 01/29/2015    Enlarged liver 04/08/2021    US 4/21     Hemispheric carotid artery syndrome 06/06/2018    MCA , see MRI    Hiatal hernia 04/01/2021    CT chest tiny 2018    HTN (hypertension), benign 08/20/2020    Morbid obesity with  BMI of 40.0-44.9, adult 01/29/2015    OAB (overactive bladder) 04/01/2021    Rash with pads    Right-sided low back pain with right-sided sciatica 03/06/2019    Seasonal allergic rhinitis due to pollen 04/13/2016    Thoracic aortic aneurysm without rupture 05/27/2018    tx with Su, Cardiologist Dr Stone, 5.1 CTS Dr Duenas, follows yearly    Transient cerebral ischemia 06/06/2018       Past Surgical History:   Procedure Laterality Date    A-V CARDIAC PACEMAKER INSERTION N/A 08/05/2021    Procedure: INSERTION, CARDIAC PACEMAKER, DUAL CHAMBER;  Surgeon: Sheldon Miranda MD;  Location: [facility] EP LAB;  Service: Cardiology;  Laterality: N/A;  Near syncope,SB,Sinus Pause, RBBB,LAFB, Dual PPM, BIO, MAC, [place], 3 Prep    ADENOIDECTOMY      CATARACT EXTRACTION      CYSTOSCOPY N/A 7/26/2023    Procedure: CYSTOSCOPY;  Surgeon: Jamal Zamora MD;  Location: [facility] OR [address] [place] [address];  Service: Urology;  Laterality: N/A;    CYSTOSCOPY WITH TRANSURETHRAL DESTRUCTION OF PROSTATE,RFA N/A 5/22/2024    Procedure: CYSTOSCOPY WITH TRANSURETHRAL DESTRUCTION OF PROSTATE,RFA;  Surgeon: Jamal Zamora MD;  Location: [facility] OR [address] [place] [address];  Service: Urology;  Laterality: N/A;    CYSTOSCOPY WITH URODYNAMIC TESTING N/A 10/25/2021    Procedure: CYSTOSCOPY, WITH URODYNAMIC TESTING FLOUROSCOPIC;  Surgeon: Sancho Wesley MD;  Location: [facility] OR [address] [place] [address];  Service: Urology;  Laterality: N/A;  1hr    CYSTOSCOPY,WITH BOTULINUM TOXIN INJECTION N/A 5/22/2024    Procedure: CYSTOSCOPY,WITH BOTULINUM TOXIN INJECTION;  Surgeon: Jamal Zamora MD;  Location: [facility] OR [address] [place] [address];  Service: Urology;  Laterality: N/A;  100 units    HERNIA REPAIR      INJECTION OF BOTULINUM TOXIN TYPE A N/A 7/26/2023    Procedure: INJECTION, BOTULINUM TOXIN, TYPE A;  Surgeon: Jamal Zamora MD;  Location: [facility] OR [address] [place] [address];  Service: Urology;  Laterality: N/A;  100 units    TONSILLECTOMY      YAG LAser Capsulotomy Bilateral     2/18/2019 OS Dr. Cornelius       Family History   Problem  Relation Name Age of Onset    Diabetes Mother      Leukemia Father      Aneurysm Father      No Known Problems Daughter x1     No Known Problems Son x1     Cataracts Paternal Uncle      Amblyopia Neg Hx      Blindness Neg Hx      Glaucoma Neg Hx      Macular degeneration Neg Hx      Retinal detachment Neg Hx      Strabismus Neg Hx         Social History     Socioeconomic History    Marital status:    Tobacco Use    Smoking status: Former     Types: Cigarettes     Start date:      Quit date:      Years since quittin.3    Smokeless tobacco: Never   Substance and Sexual Activity    Alcohol use: Yes     Comment: wine, seldom    Drug use: No    Sexual activity: Yes     Partners: Female   Social History Narrative     to 'T', 2 children, nonsmoker, ETOH none, never had colonscopy & declines     Social Drivers of Health     Financial Resource Strain: Low Risk  (4/10/2025)    Overall Financial Resource Strain (CARDIA)     Difficulty of Paying Living Expenses: Not hard at all   Food Insecurity: No Food Insecurity (4/10/2025)    Hunger Vital Sign     Worried About Running Out of Food in the Last Year: Never true     Ran Out of Food in the Last Year: Never true   Transportation Needs: No Transportation Needs (4/10/2025)    PRAPARE - Transportation     Lack of Transportation (Medical): No     Lack of Transportation (Non-Medical): No   Physical Activity: Inactive (4/10/2025)    Exercise Vital Sign     Days of Exercise per Week: 0 days     Minutes of Exercise per Session: 0 min   Stress: Stress Concern Present (4/10/2025)    [place] citizen [place] [employer] - Occupational Stress Questionnaire     Feeling of Stress : To some extent   Housing Stability: Low Risk  (4/10/2025)    Housing Stability Vital Sign     Unable to Pay for Housing in the Last Year: No     Number of Times Moved in the Last Year: 0     Homeless in the Last Year: No       Allergies:  Augmentin [amoxicillin-pot clavulanate],  Azithromycin, Avodart [dutasteride], House dust mite, Mold, Naproxen, Ragweed, and Synthroid [levothyroxine]    Medications:    Current Outpatient Medications:     acetaminophen (TYLENOL) 500 MG tablet, Take 1 tablet (500 mg total) by mouth every 6 (six) hours as needed for Pain., Disp: 30 tablet, Rfl: 0    albuterol (VENTOLIN HFA) 90 mcg/actuation inhaler, Inhale 2 puffs into the lungs every 6 (six) hours as needed for Wheezing. Rescue, Disp: 18 g, Rfl: 1    azelastine (ASTELIN) 137 mcg (0.1 %) nasal spray, 1 spray (137 mcg total) by Nasal route 2 (two) times daily., Disp: 90 mL, Rfl: 2    BABY ASPIRIN ORAL, Take 81 tablets by mouth every evening. , Disp: , Rfl:     cholecalciferol, vitamin D3, 1,000 unit capsule, Take 1,000 Units by mouth once daily., Disp: , Rfl:     coenzyme Q10 100 mg capsule, Take by mouth once daily., Disp: , Rfl:     dexAMETHasone (DECADRON) 4 MG Tab, Take 1 tablet (4 mg total) by mouth once daily., Disp: 30 tablet, Rfl: 0    diclofenac sodium (VOLTAREN) 1 % Gel, Apply 2 g topically 4 (four) times daily., Disp: 1 Tube, Rfl: 2    finasteride (PROSCAR) 5 mg tablet, Take 1 tablet (5 mg total) by mouth once daily., Disp: 90 tablet, Rfl: 3    glucosamine-chondroitin 500-400 mg tablet, Take 1 tablet by mouth 2 (two) times daily. , Disp: , Rfl:     imipramine (TOFRANIL) 25 MG tablet, Take 1 tablet (25 mg total) by mouth every evening., Disp: 30 tablet, Rfl: 11    imipramine (TOFRANIL) 25 MG tablet, TAKE 1 TABLET (25 MG TOTAL) BY MOUTH EVERY EVENING., Disp: 30 tablet, Rfl: 11    loratadine (CLARITIN) 10 mg tablet, Take 1 tablet (10 mg total) by mouth daily as needed for Allergies., Disp: 30 tablet, Rfl: 0    MULTIVITAMIN W-MINERALS/LUTEIN (CENTRUM SILVER ORAL), Take by mouth once daily. , Disp: , Rfl:     nystatin-triamcinolone (MYCOLOG II) cream, APPLY TOPICALLY 4 (FOUR) TIMES DAILY., Disp: 60 g, Rfl: 5    olopatadine (PATANOL) 0.1 % ophthalmic solution, Place 1 drop into both eyes 2 (two) times  daily as needed for Allergies. (Patient not taking: Reported on 9/24/2024), Disp: 5 mL, Rfl: 0    omega-3 fatty acids 300 mg Cap, Take 1 tablet by mouth once daily. , Disp: , Rfl:     oxybutynin (DITROPAN-XL) 10 MG 24 hr tablet, Take 1 tablet (10 mg total) by mouth once daily., Disp: 30 tablet, Rfl: 11    pregabalin (LYRICA) 75 MG capsule, Take 1 capsule (75 mg total) by mouth 2 (two) times daily., Disp: 60 capsule, Rfl: 5    propranoloL (INDERAL LA) 60 MG 24 hr capsule, Take 1 capsule (60 mg total) by mouth once daily., Disp: 30 capsule, Rfl: 11    rosuvastatin (CRESTOR) 10 MG tablet, TAKE 1 TABLET (10 MG TOTAL) BY MOUTH ONCE DAILY., Disp: 90 tablet, Rfl: 2    tamsulosin (FLOMAX) 0.4 mg Cap, TAKE ONE CAPSULE BY MOUTH ONCE DAILY, Disp: 30 capsule, Rfl: 11    tamsulosin (FLOMAX) 0.4 mg Cap, Take 1 capsule (0.4 mg total) by mouth once daily., Disp: 30 capsule, Rfl: 11    TURMERIC ORAL, Take by mouth. Pt take 1 in the evening., Disp: , Rfl:     vibegron 75 mg Tab, Take 1 tablet (75 mg total) by mouth once daily., Disp: 30 tablet, Rfl: 11    VITAMIN B COMPLEX (SUPER B COMPLEX-B-12 ORAL), Take by mouth., Disp: , Rfl:         Lab Results   Component Value Date    PSA 2.7 01/20/2015    PSA 1.5 05/30/2008    PSA 2.1 05/11/2005    PSADIAG 2.5 07/16/2018    PSADIAG 2.8 06/12/2017    PSADIAG 2.7 08/18/2014       Lab Results   Component Value Date    CREATININE 0.7 02/06/2024    EGFRNORACEVR >60.0 02/06/2024               IMPRESSION:    Encounter Diagnoses   Name Primary?    Urge incontinence Yes    OAB (overactive bladder)                  Assessment:       Urge incontinence  -     Urine Culture High Risk  -     oxybutynin (DITROPAN-XL) 10 MG 24 hr tablet; Take 1 tablet (10 mg total) by mouth once daily.  Dispense: 30 tablet; Refill: 11    OAB (overactive bladder)  -     oxybutynin (DITROPAN-XL) 10 MG 24 hr tablet; Take 1 tablet (10 mg total) by mouth once daily.  Dispense: 30 tablet; Refill: 11             Plan:   - Post op  Rezum and botox injection 100 units 5/22/24    Did really without much leakage following botox injection.  I explained that botox typically last 6 to 9 months.  So if he has experienced worsening bladder control, he will need additional botox injection.    Recommend to continue Gemtesa for OAB.  Because of its cost, he did not take it.  So he can at least try oxybutynin xl 10 mg daily again.    He also stopped flomax and finasteride as well as imipramine at night.    Follow up in about 31 days (around 5/12/2025), or Cysto botox injection 100 units at [facility]

## 2025-04-12 LAB — BACTERIA UR CULT: ABNORMAL

## 2025-04-14 ENCOUNTER — RESULTS FOLLOW-UP (OUTPATIENT)
Dept: UROLOGY | Facility: CLINIC | Age: 75
End: 2025-04-14

## 2025-04-14 ENCOUNTER — TELEPHONE (OUTPATIENT)
Dept: UROLOGY | Facility: CLINIC | Age: 75
End: 2025-04-14
Payer: MEDICARE

## 2025-04-14 DIAGNOSIS — N30.90 CYSTITIS: Primary | ICD-10-CM

## 2025-04-14 RX ORDER — SULFAMETHOXAZOLE AND TRIMETHOPRIM 800; 160 MG/1; MG/1
1 TABLET ORAL 2 TIMES DAILY
Qty: 20 TABLET | Refills: 0 | Status: SHIPPED | OUTPATIENT
Start: 2025-04-14 | End: 2025-04-24

## 2025-04-14 NOTE — TELEPHONE ENCOUNTER
Cystitis  -     sulfamethoxazole-trimethoprim 800-160mg (BACTRIM DS) 800-160 mg Tab; Take 1 tablet by mouth 2 (two) times daily. for 10 days  Dispense: 20 tablet; Refill: 0     Please ask him to take Bactrimd DS twice a day for 7 days and take 3 more days of antibiotics before his cysto botox injection scheduled on 5/12/25.

## 2025-04-15 DIAGNOSIS — M25.552 BILATERAL HIP PAIN: Primary | ICD-10-CM

## 2025-04-15 DIAGNOSIS — M25.551 BILATERAL HIP PAIN: Primary | ICD-10-CM

## 2025-04-17 ENCOUNTER — HOSPITAL ENCOUNTER (OUTPATIENT)
Facility: HOSPITAL | Age: 75
Discharge: HOME | End: 2025-04-17
Attending: ORTHOPAEDIC SURGERY
Payer: MEDICARE

## 2025-04-17 ENCOUNTER — OFFICE VISIT (OUTPATIENT)
Dept: ORTHOPEDICS | Facility: CLINIC | Age: 75
End: 2025-04-17
Payer: MEDICARE

## 2025-04-17 VITALS — HEIGHT: 73 IN | WEIGHT: 315 LBS | BODY MASS INDEX: 41.75 KG/M2

## 2025-04-17 DIAGNOSIS — M25.551 BILATERAL HIP PAIN: ICD-10-CM

## 2025-04-17 DIAGNOSIS — M25.552 BILATERAL HIP PAIN: ICD-10-CM

## 2025-04-17 DIAGNOSIS — M51.362 DEGENERATION OF INTERVERTEBRAL DISC OF LUMBAR REGION WITH DISCOGENIC BACK PAIN AND LOWER EXTREMITY PAIN: Primary | ICD-10-CM

## 2025-04-17 DIAGNOSIS — M54.16 LUMBAR RADICULOPATHY, CHRONIC: ICD-10-CM

## 2025-04-17 PROCEDURE — 1160F RVW MEDS BY RX/DR IN RCRD: CPT | Mod: CPTII,S$GLB,, | Performed by: ORTHOPAEDIC SURGERY

## 2025-04-17 PROCEDURE — 1159F MED LIST DOCD IN RCRD: CPT | Mod: CPTII,S$GLB,, | Performed by: ORTHOPAEDIC SURGERY

## 2025-04-17 PROCEDURE — 3288F FALL RISK ASSESSMENT DOCD: CPT | Mod: CPTII,S$GLB,, | Performed by: ORTHOPAEDIC SURGERY

## 2025-04-17 PROCEDURE — 1101F PT FALLS ASSESS-DOCD LE1/YR: CPT | Mod: CPTII,S$GLB,, | Performed by: ORTHOPAEDIC SURGERY

## 2025-04-17 PROCEDURE — 73521 X-RAY EXAM HIPS BI 2 VIEWS: CPT | Mod: 26,HCNC,, | Performed by: STUDENT IN AN ORGANIZED HEALTH CARE EDUCATION/TRAINING PROGRAM

## 2025-04-17 PROCEDURE — 1125F AMNT PAIN NOTED PAIN PRSNT: CPT | Mod: CPTII,S$GLB,, | Performed by: ORTHOPAEDIC SURGERY

## 2025-04-17 PROCEDURE — 99999 PR PBB SHADOW E&M-EST. PATIENT-LVL V: CPT | Mod: PBBFAC,HCNC,, | Performed by: ORTHOPAEDIC SURGERY

## 2025-04-17 PROCEDURE — 1157F ADVNC CARE PLAN IN RCRD: CPT | Mod: CPTII,S$GLB,, | Performed by: ORTHOPAEDIC SURGERY

## 2025-04-17 PROCEDURE — 99202 OFFICE O/P NEW SF 15 MIN: CPT | Mod: S$GLB,,, | Performed by: ORTHOPAEDIC SURGERY

## 2025-04-17 PROCEDURE — 73521 X-RAY EXAM HIPS BI 2 VIEWS: CPT | Mod: TC,HCNC,PN

## 2025-04-17 NOTE — PROGRESS NOTES
Subjective:      Patient ID: Adan Webb is a 75 y.o. male.    Chief Complaint: Pain of the Left Hip and Pain of the Right Hip    HPI      Adan Webb is seen for evaluation and treatment of hip pain.  They have experienced problems with their bilateral hip over the past many years Pain is located  in the lower back radiating to the buttock area.  The patient denies focal neurologic deficits. . They have been treated with NSAIDS. Symptoms have recently stayed the same. Ambulation reportedly has been impaired. Self care ADLs are painful.    Review of Systems   Constitutional: Negative for fever and weight loss.   HENT:  Negative for congestion.    Eyes:  Negative for visual disturbance.   Cardiovascular:  Negative for chest pain.   Respiratory:  Negative for shortness of breath.    Hematologic/Lymphatic: Negative for bleeding problem. Does not bruise/bleed easily.   Skin:  Negative for poor wound healing.   Musculoskeletal:  Positive for back pain and joint pain.   Gastrointestinal:  Negative for abdominal pain.   Genitourinary:  Negative for dysuria.   Neurological:  Negative for seizures.   Psychiatric/Behavioral:  Negative for altered mental status.    Allergic/Immunologic: Negative for persistent infections.         Objective:            Ortho/SPM Exam      The patient is not in acute distress.   Body habitus is:obese.   Sclerae normal  The patient walks without a limp.  Flexed posture  Respiratory distress:  none    Right hip    The skin over the hip is:intact.   There is:no local tenderness.   Range of motion- Flexion 90, External rotation there, internal rotation 25.  Resisted SLR negative.  Pain with rotation negative  Sciatic tension findings negative.  Shortening/lengthening compared to the contralateral side exam deferred.  Pulses DP present, PT present.  Motor normal 5/5 strength in all tested muscle groups.   Sensory normal.    Left hip is identical    IMAGING- there is mild to moderate  "symmetrical joint narrowing of both hips.  No focal bony lesions.              Assessment:       Encounter Diagnoses   Name Primary?    Bilateral hip pain     Degeneration of intervertebral disc of lumbar region with discogenic back pain and lower extremity pain Yes    Lumbar radiculopathy, chronic             Physical examination suggests that chronic lumbar nerve root compression is more responsible for symptomatology than intrinsic hip pathology.        Plan:       Adan Gaitan" was seen today for pain and pain.    Diagnoses and all orders for this visit:    Degeneration of intervertebral disc of lumbar region with discogenic back pain and lower extremity pain    Bilateral hip pain  -     Ambulatory referral/consult to Orthopedics    Lumbar radiculopathy, chronic  -     Ambulatory referral/consult to Pain Clinic; Future  -     CT Lumbar Spine Without Contrast; Future          I explained my diagnostic impression and the reasoning behind it in detail, using layman's terms.  Models and/or pictures were used to help in the explanation.    Lumbar CT-patient has pacemaker    Pain clinic treatment    "

## 2025-04-23 ENCOUNTER — CLINICAL SUPPORT (OUTPATIENT)
Dept: CARDIOLOGY | Facility: HOSPITAL | Age: 75
End: 2025-04-23
Payer: MEDICARE

## 2025-04-23 ENCOUNTER — CLINICAL SUPPORT (OUTPATIENT)
Dept: CARDIOLOGY | Facility: HOSPITAL | Age: 75
End: 2025-04-23
Attending: INTERNAL MEDICINE
Payer: MEDICARE

## 2025-04-23 DIAGNOSIS — I45.2 BIFASCICULAR BLOCK: ICD-10-CM

## 2025-04-23 PROCEDURE — 93294 REM INTERROG EVL PM/LDLS PM: CPT | Mod: HCNC,,, | Performed by: INTERNAL MEDICINE

## 2025-04-23 PROCEDURE — 93296 REM INTERROG EVL PM/IDS: CPT | Mod: HCNC | Performed by: INTERNAL MEDICINE

## 2025-04-24 ENCOUNTER — HOSPITAL ENCOUNTER (OUTPATIENT)
Dept: RADIOLOGY | Facility: HOSPITAL | Age: 75
Discharge: HOME | End: 2025-04-24
Attending: ORTHOPAEDIC SURGERY
Payer: MEDICARE

## 2025-04-24 DIAGNOSIS — M54.16 LUMBAR RADICULOPATHY, CHRONIC: ICD-10-CM

## 2025-04-24 PROCEDURE — 72131 CT LUMBAR SPINE W/O DYE: CPT | Mod: TC,HCNC

## 2025-04-24 PROCEDURE — 72131 CT LUMBAR SPINE W/O DYE: CPT | Mod: 26,HCNC,, | Performed by: RADIOLOGY

## 2025-04-25 ENCOUNTER — TELEPHONE (OUTPATIENT)
Dept: PRIMARY CARE CLINIC | Facility: CLINIC | Age: 75
End: 2025-04-25

## 2025-04-25 DIAGNOSIS — I71.21 ASCENDING AORTIC ANEURYSM, UNSPECIFIED WHETHER RUPTURED: Primary | ICD-10-CM

## 2025-04-25 NOTE — TELEPHONE ENCOUNTER
Patient has been informed, and verbalized understanding.    Please assist with scheduling referral to cardiology

## 2025-04-25 NOTE — TELEPHONE ENCOUNTER
Dr Jamal Victor, who performed his back MRi noted his enlarged aorta.     Please call pt & offer himappt with cardiologist (last appt 2023) to discuss this, as it should be monitored.     Referral in .

## 2025-04-28 LAB
OHS CV AF BURDEN PERCENT: < 1
OHS CV DC REMOTE DEVICE TYPE: NORMAL
OHS CV RV PACING PERCENT: 2 %

## 2025-05-02 ENCOUNTER — TELEPHONE (OUTPATIENT)
Dept: PRIMARY CARE CLINIC | Facility: CLINIC | Age: 75
End: 2025-05-02
Payer: MEDICARE

## 2025-05-02 NOTE — TELEPHONE ENCOUNTER
----- Message from Pattie sent at 5/2/2025  8:02 AM CDT -----  Contact: [phone] Patient is calling for Medical Advice regarding: Med How long has patient had these symptoms:Pharmacy name and phone#:[facility] call back or [facility]:Comments:Pt would like a call back to discuss his new BP med valsartan (DIOVAN) 80 MG tablet. Pt says he took his med for the 1st time 5/1/25 at 8am he started having stomach cramps then diarrhea. Please call pt to advise Please advise patient replies from provider may take up to 48 hours.

## 2025-05-02 NOTE — TELEPHONE ENCOUNTER
Pt experiencing stomach cramps then diarrhea after taking new BP med valsartan (DIOVAN) 80 MG tablet.

## 2025-05-04 ENCOUNTER — PATIENT MESSAGE (OUTPATIENT)
Dept: PRIMARY CARE CLINIC | Facility: CLINIC | Age: 75
End: 2025-05-04
Payer: MEDICARE

## 2025-05-05 ENCOUNTER — TELEPHONE (OUTPATIENT)
Dept: PRIMARY CARE CLINIC | Facility: CLINIC | Age: 75
End: 2025-05-05
Payer: MEDICARE

## 2025-05-05 NOTE — TELEPHONE ENCOUNTER
We started Valsartan 80 on 4/15    Did diarrhea start with the first pill ?    Did diarrhea stop?    Does he feel it's due to Valsartan or a bug or certain food?    Since BP high, would he be willing to try it again

## 2025-05-05 NOTE — TELEPHONE ENCOUNTER
Per pt,     Diarrhea/stomach cramps started immediately after first dose. He discontinued meds and right after, and symptoms immediately subsided. He's willing to restart Valsartan, but prefers to take in the am along with Propranolol. Please advise

## 2025-05-08 ENCOUNTER — OFFICE VISIT (OUTPATIENT)
Dept: PAIN MEDICINE | Facility: CLINIC | Age: 75
End: 2025-05-08
Payer: MEDICARE

## 2025-05-08 ENCOUNTER — OFFICE VISIT (OUTPATIENT)
Dept: CARDIOLOGY | Facility: CLINIC | Age: 75
End: 2025-05-08
Payer: MEDICARE

## 2025-05-08 VITALS
DIASTOLIC BLOOD PRESSURE: 84 MMHG | HEIGHT: 73 IN | WEIGHT: 315 LBS | BODY MASS INDEX: 41.75 KG/M2 | HEART RATE: 67 BPM | SYSTOLIC BLOOD PRESSURE: 126 MMHG

## 2025-05-08 VITALS
SYSTOLIC BLOOD PRESSURE: 129 MMHG | BODY MASS INDEX: 44.15 KG/M2 | DIASTOLIC BLOOD PRESSURE: 70 MMHG | WEIGHT: 315 LBS | HEART RATE: 67 BPM

## 2025-05-08 DIAGNOSIS — I49.3 PVC (PREMATURE VENTRICULAR CONTRACTION): ICD-10-CM

## 2025-05-08 DIAGNOSIS — I71.21 ANEURYSM OF ASCENDING AORTA WITHOUT RUPTURE: Primary | ICD-10-CM

## 2025-05-08 DIAGNOSIS — E78.2 MIXED HYPERLIPIDEMIA: ICD-10-CM

## 2025-05-08 DIAGNOSIS — R25.1 TREMOR: ICD-10-CM

## 2025-05-08 DIAGNOSIS — Z95.0 PRESENCE OF CARDIAC PACEMAKER: ICD-10-CM

## 2025-05-08 DIAGNOSIS — M48.062 SPINAL STENOSIS OF LUMBAR REGION WITH NEUROGENIC CLAUDICATION: ICD-10-CM

## 2025-05-08 DIAGNOSIS — I45.2 BIFASCICULAR BLOCK: ICD-10-CM

## 2025-05-08 DIAGNOSIS — I71.21 ASCENDING AORTIC ANEURYSM, UNSPECIFIED WHETHER RUPTURED: ICD-10-CM

## 2025-05-08 DIAGNOSIS — M25.572 CHRONIC PAIN OF BOTH ANKLES: ICD-10-CM

## 2025-05-08 DIAGNOSIS — G89.29 CHRONIC PAIN OF BOTH ANKLES: ICD-10-CM

## 2025-05-08 DIAGNOSIS — M25.571 CHRONIC PAIN OF BOTH ANKLES: ICD-10-CM

## 2025-05-08 DIAGNOSIS — I10 HTN (HYPERTENSION), BENIGN: ICD-10-CM

## 2025-05-08 DIAGNOSIS — R60.0 LOCALIZED EDEMA: ICD-10-CM

## 2025-05-08 DIAGNOSIS — M54.16 LUMBAR RADICULOPATHY, CHRONIC: Primary | ICD-10-CM

## 2025-05-08 PROCEDURE — 3074F SYST BP LT 130 MM HG: CPT | Mod: CPTII,S$GLB,, | Performed by: INTERNAL MEDICINE

## 2025-05-08 PROCEDURE — 1157F ADVNC CARE PLAN IN RCRD: CPT | Mod: CPTII,S$GLB,, | Performed by: INTERNAL MEDICINE

## 2025-05-08 PROCEDURE — 99999 PR PBB SHADOW E&M-EST. PATIENT-LVL V: CPT | Mod: PBBFAC,,, | Performed by: INTERNAL MEDICINE

## 2025-05-08 PROCEDURE — 1101F PT FALLS ASSESS-DOCD LE1/YR: CPT | Mod: CPTII,S$GLB,, | Performed by: INTERNAL MEDICINE

## 2025-05-08 PROCEDURE — 1159F MED LIST DOCD IN RCRD: CPT | Mod: CPTII,S$GLB,, | Performed by: INTERNAL MEDICINE

## 2025-05-08 PROCEDURE — 93000 ELECTROCARDIOGRAM COMPLETE: CPT | Mod: S$GLB,,, | Performed by: INTERNAL MEDICINE

## 2025-05-08 PROCEDURE — 1125F AMNT PAIN NOTED PAIN PRSNT: CPT | Mod: CPTII,S$GLB,, | Performed by: INTERNAL MEDICINE

## 2025-05-08 PROCEDURE — 3078F DIAST BP <80 MM HG: CPT | Mod: CPTII,S$GLB,, | Performed by: INTERNAL MEDICINE

## 2025-05-08 PROCEDURE — 3288F FALL RISK ASSESSMENT DOCD: CPT | Mod: CPTII,S$GLB,, | Performed by: INTERNAL MEDICINE

## 2025-05-08 PROCEDURE — 99999 PR PBB SHADOW E&M-EST. PATIENT-LVL IV: CPT | Mod: PBBFAC,,, | Performed by: STUDENT IN AN ORGANIZED HEALTH CARE EDUCATION/TRAINING PROGRAM

## 2025-05-08 PROCEDURE — 99205 OFFICE O/P NEW HI 60 MIN: CPT | Mod: 25,S$GLB,, | Performed by: INTERNAL MEDICINE

## 2025-05-08 RX ORDER — PREGABALIN 75 MG/1
75 CAPSULE ORAL 2 TIMES DAILY
Qty: 60 CAPSULE | Refills: 5 | Status: SHIPPED | OUTPATIENT
Start: 2025-05-08 | End: 2025-11-06

## 2025-05-08 RX ORDER — FUROSEMIDE 40 MG/1
40 TABLET ORAL DAILY PRN
Qty: 30 TABLET | Refills: 11 | Status: SHIPPED | OUTPATIENT
Start: 2025-05-08 | End: 2026-05-08

## 2025-05-08 NOTE — PROGRESS NOTES
[facility] - New Patient Evaluation    Referred by: Dr. Jamal Victor   Reason for referral: Lumbar radiculopathy, chronic     CC:   Chief Complaint   Patient presents with    Low-back Pain         5/8/2025    11:12 AM   Last 3 PDI Scores   Pain Disability Index (PDI) 42       Subjective 05/08/2025:   Adan Webb is a 75 y.o. male who presents complaining of low back pain that radiates down the bilateral lower extremities.  Originally, he presented to Orthopedics with bilateral hip pain, but physical exam was more suggestive of chronic lumbar pain rather than intrinsic hip pain, so he is referred here.  He had a CT of the lumbar spine which was significant for multilevel degenerative disc disease, and severe central canal stenosis at L3/L4.  No recent physical therapy.  No history of spine surgeries.  Taking Tylenol PRN.    Initial Pain Assessment:  Location: lower back   Onset (Approx Date Pain started): two months ago  Current Pain Score: 0/10  Daily Pain of Range: 8-10/10  Quality: Throbbing and Sharp  Radiation: bilateral gluteal area and bilateral lower extremity  Worsened by: walking for more than a few minutes and getting out of the bed in the morning  Improved by: medications     Denies new weakness, saddle anesthesia, and bowel or bladder incontinence.    Previous Interventions:  -     Previous Therapies:  PT/OT: no   Chiropractor:   HEP:   Relevant Surgery: no     Previous Medications:   - Tylenol or NSAIDS: Tylenol, Voltaren gel  - Muscle Relaxants:    - TCAs:   - SNRIs:   - Topicals:   - Anticonvulsants:    - Opioids:   - Adjuvants:     Current Pain Medications:  Tylenol PRN     Anticoagulation:  Baby aspirin    Review of Systems:  ROS    GENERAL:  No weight loss, malaise or fevers.  HEENT:   No recent changes in vision or hearing  NECK:  No difficulty with swallowing. No stridor.   RESPIRATORY:  Negative for cough, wheezing or shortness of breath, patient denies any  recent URI.  CARDIOVASCULAR:  Negative for chest pain, leg swelling or palpitations.  GI:  Negative for abdominal discomfort, blood in stools or black stools or change in bowel habits.  MUSCULOSKELETAL:  See HPI.  SKIN:  Negative for lesions, rash, and itching.  PSYCH:  No mood disorder or recent psychosocial stressors.    HEMATOLOGY/LYMPHOLOGY:  Negative for prolonged bleeding, bruising easily or swollen nodes.  Patient is not currently taking any anti-coagulants  NEURO:   No history of headaches, syncope, paralysis, seizures or tremors.  All other reviewed and negative other than HPI.    History:  Current medications, allergies, medical history, surgical history,   family history, and social history were reviewed in the chart as marked.    Full Medication List:  Current Medications[1]     Allergies:  Augmentin [amoxicillin-pot clavulanate], Azithromycin, Avodart [dutasteride], House dust mite, Mold, Naproxen, Ragweed, and Synthroid [levothyroxine]     Medical History:   has a past medical history of Benign prostatic hyperplasia with nocturia (08/18/2014), BPH (benign prostatic hyperplasia), Calculus of gallbladder, Calculus of gallbladder without cholecystitis without obstruction (04/13/2016), Cataract, Chronic pain of both ankles (12/09/2020), Conductive hearing loss of right ear with restricted hearing of left ear (01/22/2019), Dermatitis (12/09/2020), DJD (degenerative joint disease) of hip (01/29/2015), Enlarged liver (04/08/2021), Hemispheric carotid artery syndrome (06/06/2018), Hiatal hernia (04/01/2021), HTN (hypertension), benign (08/20/2020), Morbid obesity with BMI of 40.0-44.9, adult (01/29/2015), OAB (overactive bladder) (04/01/2021), Right-sided low back pain with right-sided sciatica (03/06/2019), Seasonal allergic rhinitis due to pollen (04/13/2016), and Transient cerebral ischemia (06/06/2018).    Surgical History:   has a past surgical history that includes Hernia repair; Cataract extraction; YAG  "LAser Capsulotomy (Bilateral); A-V cardiac pacemaker insertion (N/A, 08/05/2021); Cystoscopy with urodynamic testing (N/A, 10/25/2021); Tonsillectomy; Adenoidectomy; Cystoscopy (N/A, 7/26/2023); Injection of botulinum toxin type A (N/A, 7/26/2023); cystoscopy with transurethral destruction of prostate,rfa (N/A, 5/22/2024); and cystoscopy,with botulinum toxin injection (N/A, 5/22/2024).    Family History:  family history includes Aneurysm in his father; Cataracts in his paternal uncle; Diabetes in his mother; Leukemia in his father; No Known Problems in his daughter and son.    Social History:   reports that he quit smoking about 58 years ago. His smoking use included cigarettes. He started smoking about 50 years ago. He has never used smokeless tobacco. He reports current alcohol use. He reports that he does not use drugs.    Physical Exam:  Vitals:    05/08/25 1102   BP: 126/84   Pulse: 67   Weight: (!) 151 kg (332 lb 14.3 oz)   Height: 6' 1" (1.854 m)   PainSc: 0-No pain   PainLoc: Back       GENERAL: Well appearing, in no acute distress, alert and oriented x3. + obese.  PSYCH:  Mood and affect appropriate.  SKIN: Skin color, texture, turgor normal, no rashes or lesions.  HEAD/FACE:  Normocephalic, atraumatic. Cranial nerves grossly intact.  NECK: Normal ROM. Supple.   CV: RRR with palpation of the radial artery.  PULM: No evidence of respiratory difficulty, symmetric chest rise.  GI:  Soft and non-distended.  MSK: Straight leg raising is positive bilaterally to radicular pain. + pain to palpation over the facet joints of the lumbar spine. + pain with lumbar facet loading. No pain over the SI joints.  ALEKSEY test is negative.  Peripheral joint ROM is full and pain free without obvious instability or laxity in all four extremities. No obvious deformities, edema, or skin discoloration.  No atrophy or tone abnormalities are noted.   NEURO: Bilateral upper and lower extremity coordination and strength is symmetric.  No " loss of sensation is noted. No clonus.  MENTAL STATUS: A x O x 3, good concentration, speech is fluent and goal directed  MOTOR: 5/5 in all muscle groups  GAIT:  Antalgic.  Ambulates with a cane.    Imaging:    LUMBAR CT Scan  Results for orders placed during the hospital encounter of 04/24/25    CT Lumbar Spine Without Contrast    Narrative  EXAMINATION:  CT LUMBAR SPINE WITHOUT CONTRAST    CLINICAL HISTORY:  Lumbar radiculopathy, symptoms persist with conservative treatment;  Radiculopathy, lumbar region    TECHNIQUE:  Low-dose axial, sagittal and coronal reformations are obtained through the lumbar spine.  Contrast was not administered.    COMPARISON:  None.    FINDINGS:  Mild levocurvature of the lumbar spine.  The vertebral body heights are well maintained.  Moderate disc space narrowing L4-5, severe disc space narrowing L5-S1.    No fracture, no osseous lesion seen.    T12-L1: Unremarkable    L1-L2: Mild disc bulge, mild facet joint osseous hypertrophy, no canal stenosis.  Mild left foraminal narrowing.    L2-L3: Mild disc bulge, no canal stenosis or foraminal narrowing.  Moderate left facet joint osseous hypertrophy.    L3-L4: Diffuse disc bulge, facet joint osseous hypertrophy and ligamentum flavum hypertrophy, there is severe canal stenosis.  There is mild bilateral foraminal narrowing.    L4-L5: Mild diffuse disc bulge, ligamentum flavum hypertrophy, moderate canal stenosis.  There is moderate left facet joint osseous hypertrophy with mild left foraminal narrowing.    L5-S1: Diffuse disc bulge with large left lateral disc osteophyte.  Moderate left facet joint osseous hypertrophy, mild central canal stenosis, moderate left foraminal narrowing.    The bilateral sacroiliac joints and the sacrum appear normal.  Small 2 mm round metallic density within the canal at S2-3.    Significant atherosclerotic plaque of the abdominal aorta.  The proximal abdominal aorta appears dilated measuring 4.7  cm.    Impression  Spondylosis of the lumbar spine with apparent significant canal stenosis at L3-L4 and L4-5 as detailed above.    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      Electronically signed by: Shelley Pagan MD  Date:    04/24/2025  Time:    13:48      Labs:  BMP  Lab Results   Component Value Date     02/06/2024    K 5.1 02/06/2024     02/06/2024    CO2 25 02/06/2024    BUN 23 02/06/2024    CREATININE 0.7 02/06/2024    CALCIUM 9.7 02/06/2024    ANIONGAP 8 02/06/2024    EGFRNORACEVR >60.0 02/06/2024     Lab Results   Component Value Date    ALT 24 02/06/2024    AST 23 02/06/2024    ALKPHOS 98 02/06/2024    BILITOT 0.4 02/06/2024     Lab Results   Component Value Date    WBC 8.74 02/06/2024    HGB 13.6 (L) 02/06/2024    HCT 41.3 02/06/2024    MCV 97 02/06/2024     02/06/2024           Assessment:  Problem List Items Addressed This Visit          Orthopedic    Chronic pain of both ankles    Relevant Medications    pregabalin (LYRICA) 75 MG capsule     Other Visit Diagnoses         Lumbar radiculopathy, chronic    -  Primary    Relevant Orders    Ambulatory Referral/Consult to Physical Therapy      Spinal stenosis of lumbar region with neurogenic claudication        Relevant Orders    Ambulatory Referral/Consult to Physical Therapy            05/08/2025 - Adan Webb is a 75 y.o. male who  has a past medical history of Benign prostatic hyperplasia with nocturia (08/18/2014), BPH (benign prostatic hyperplasia), Calculus of gallbladder, Calculus of gallbladder without cholecystitis without obstruction (04/13/2016), Cataract, Chronic pain of both ankles (12/09/2020), Conductive hearing loss of right ear with restricted hearing of left ear (01/22/2019), Dermatitis (12/09/2020), DJD (degenerative joint disease) of hip (01/29/2015), Enlarged liver (04/08/2021), Hemispheric carotid artery syndrome (06/06/2018), Hiatal hernia (04/01/2021), HTN (hypertension), benign  (08/20/2020), Morbid obesity with BMI of 40.0-44.9, adult (01/29/2015), OAB (overactive bladder) (04/01/2021), Right-sided low back pain with right-sided sciatica (03/06/2019), Seasonal allergic rhinitis due to pollen (04/13/2016), and Transient cerebral ischemia (06/06/2018).  By history and examination this patient has chronic low back pain with bilateral  radiculopathy.  The underlying cause is degenerative disc disease, central canal stenosis, and deconditioning.  Pathology is confirmed by imaging.  He has severe central canal stenosis at L3/L4.  We discussed the underlying diagnoses and multiple treatment options including non-opioid medications, interventional procedures, physical therapy, and weight loss.  Referral placed to physical therapy.  Start Lyrica 75 mg b.i.d..  Consider lumbar epidural steroid injection if no improvement with 6 weeks of conservative treatment.  The risks and benefits of each treatment option were discussed and all questions were answered.      Treatment Plan:   Procedures:  Consider lumbar epidural steroid injection if no improvement with 6 weeks of conservative therapy.  PT/OT/HEP: I have stressed the importance of physical activity and a home exercise plan to help with pain and improve health.  Referral placed to physical therapy.  Medications:    - start Lyrica 75 mg b.i.d..  Start with q.h.s. and increase to b.i.d. if tolerated.   -  Reviewed and consistent with medication use as prescribed.  Imaging:  Lumbar MRI independently reviewed and discussed with the patient.  Follow Up: RTC 4-6 weeks to assess response to Lyrica and physical therapy.    Sandy Melendrez DO   Interventional Pain Medicine / Anesthesiology    Disclaimer: This note was partly generated using dictation software which may occasionally result in transcription errors.         [1]   Current Outpatient Medications:     acetaminophen (TYLENOL) 500 MG tablet, Take 1 tablet (500 mg total) by mouth every 6 (six)  hours as needed for Pain., Disp: 30 tablet, Rfl: 0    albuterol (VENTOLIN HFA) 90 mcg/actuation inhaler, Inhale 2 puffs into the lungs every 6 (six) hours as needed for Wheezing. Rescue, Disp: 18 g, Rfl: 1    azelastine (ASTELIN) 137 mcg (0.1 %) nasal spray, 1 spray (137 mcg total) by Nasal route 2 (two) times daily., Disp: 90 mL, Rfl: 2    BABY ASPIRIN ORAL, Take 81 tablets by mouth every evening. , Disp: , Rfl:     cholecalciferol, vitamin D3, 1,000 unit capsule, Take 1,000 Units by mouth once daily., Disp: , Rfl:     coenzyme Q10 100 mg capsule, Take by mouth once daily., Disp: , Rfl:     dexAMETHasone (DECADRON) 4 MG Tab, Take 1 tablet (4 mg total) by mouth once daily., Disp: 30 tablet, Rfl: 0    diclofenac sodium (VOLTAREN) 1 % Gel, Apply 2 g topically 4 (four) times daily., Disp: 1 Tube, Rfl: 2    furosemide (LASIX) 40 MG tablet, Take 1 tablet (40 mg total) by mouth daily as needed (swelling)., Disp: 30 tablet, Rfl: 11    glucosamine-chondroitin 500-400 mg tablet, Take 1 tablet by mouth 2 (two) times daily. , Disp: , Rfl:     loratadine (CLARITIN) 10 mg tablet, Take 1 tablet (10 mg total) by mouth daily as needed for Allergies., Disp: 30 tablet, Rfl: 0    MULTIVITAMIN W-MINERALS/LUTEIN (CENTRUM SILVER ORAL), Take by mouth once daily. , Disp: , Rfl:     nystatin-triamcinolone (MYCOLOG II) cream, APPLY TOPICALLY 4 (FOUR) TIMES DAILY., Disp: 60 g, Rfl: 5    olopatadine (PATANOL) 0.1 % ophthalmic solution, Place 1 drop into both eyes 2 (two) times daily as needed for Allergies. (Patient not taking: Reported on 9/24/2024), Disp: 5 mL, Rfl: 0    omega-3 fatty acids 300 mg Cap, Take 1 tablet by mouth once daily. , Disp: , Rfl:     oxybutynin (DITROPAN-XL) 10 MG 24 hr tablet, Take 1 tablet (10 mg total) by mouth once daily., Disp: 30 tablet, Rfl: 11    pregabalin (LYRICA) 75 MG capsule, Take 1 capsule (75 mg total) by mouth 2 (two) times daily., Disp: 60 capsule, Rfl: 5    propranoloL (INDERAL LA) 60 MG 24 hr capsule,  Take 1 capsule (60 mg total) by mouth once daily., Disp: 30 capsule, Rfl: 11    rosuvastatin (CRESTOR) 10 MG tablet, TAKE 1 TABLET (10 MG TOTAL) BY MOUTH ONCE DAILY., Disp: 90 tablet, Rfl: 2    TURMERIC ORAL, Take by mouth. Pt take 1 in the evening., Disp: , Rfl:     valsartan (DIOVAN) 80 MG tablet, Take 1 tablet (80 mg total) by mouth once daily., Disp: 30 tablet, Rfl: 5    VITAMIN B COMPLEX (SUPER B COMPLEX-B-12 ORAL), Take by mouth., Disp: , Rfl:

## 2025-05-08 NOTE — PROGRESS NOTES
Subjective:   05/08/2025     Patient ID:  Adan Webb is a 75 y.o. male who presents for evaulation of Aortic Aneurysm, Hypertension, Establish Care (Abnormal CT Scan), and Foot Swelling (Right feet swelling)       History of Present Illness    CHIEF COMPLAINT:  Patient presents with leg pain, swollen feet, and a known history of aortic aneurysm for follow-up and evaluation.    HPI:  Patient reports pain from buttocks down leg, along with swollen feet, right foot more swollen than left, for 1 month. Recently seen by orthopedist who diagnosed spinal stenosis based on CT. He has a history of aortic aneurysm, previously monitored by surgeon with no growth for several years. Recent CT ordered by Dr. Pham revealed AAA measuring 4.7 cm, prompting current follow-up.    He reports dyspnea last night after taking out garbage and using stairs. 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 A syncopal event in July 2021 led to dual-chamber pacemaker implantation in August 2021.    He reports frequent urination, described as urinary incontinence. He has been seeing Dr. Zamora for this condition for 7 years and is scheduled for Botox procedure in bladder the following Monday. He reports nocturia x5 and involuntary urination upon standing.    He recently started Propranolol, prescribed by Dr. Kessler, for essential tremor, switching from metoprolol. He also started Valsartan this week for BP control.    He reports recent weight gain, attributed to bladder medication, possibly Oxybutynin. He sleeps in a reclining chair due to nasal congestion when lying flat, potentially contributing to leg swelling.    He denies chest pain, tightness, or abdominal pain. He denies history of diabetes.    CARDIAC HISTORY:  EKG ([id number]): similar to prior, frequent PVCs Echo 03/2023: normal ventricular function, severely dilated ascending aorta 5.4 cm  CT lumbar spine (Dr. Pham): abdominal aortic  aneurysm 4.7 cm  Thoracic aortic aneurysm, 5.2 cm ascending aorta Seen by Dr. Ledbetter 2021: slow heart rates  Seen by Ms. Oates (EP) 04/2025: Frequent symptomatic PVCs, baseline RBBB and LAFB, HTN  Suspected bradycardia due to ectopy  Reviewed 08/2021: symptomatic PVCs, sinus pauses present  Dual chamber pacemaker implantation 08/2021  Last seen 04/2025: pacemaker functioning normally, PVC burden 4%    MEDICATIONS:  Propranolol 60 mg long acting daily for tremor (essential tremor), recently changed from metoprolol  Valsartan 80 mg daily for HTN, recently started this week  Aspirin 81 mg daily  Rosuvastatin 10 mg daily for cholesterol Patient is on Oxybutynin for bladder issues.    MEDICAL HISTORY:  He has a history of spinal stenosis, which was recently diagnosed. Patient also has an overactive bladder and has been seeing Dr. Zamora for this condition for 7 years.    SURGICAL HISTORY:  Patient is scheduled for a Botox procedure in his bladder on the coming Monday to treat his overactive bladder.    FAMILY HISTORY:  Family history is significant for mother with diabetes.      ROS:  General: -fever, -chills, -fatigue, -weight gain, -weight loss  Eyes: -vision changes, -redness, -discharge  ENT: -ear pain, +nasal congestion, -sore throat  Cardiovascular: -chest pain, -palpitations, +lower extremity edema  Respiratory: -cough, -shortness of breath, +exertional dyspnea  Gastrointestinal: -abdominal pain, -nausea, -vomiting, -diarrhea, -constipation, -blood in stool  Genitourinary: -dysuria, -hematuria, -frequency, +excessive urination  Musculoskeletal: -joint pain, -muscle pain, +limb pain  Skin: -rash, -lesion  Neurological: -headache, -dizziness, -numbness, -tingling, +tremors  Psychiatric: -anxiety, -depression, -sleep difficulty          Problem List[1]     Review of patient's allergies indicates:   Allergen Reactions    Augmentin [amoxicillin-pot clavulanate] Rash     Rash, confusion, TIA like symptoms     Azithromycin Rash    Avodart [dutasteride]     House dust mite Other (See Comments)    Mold     Naproxen     Ragweed     Synthroid [levothyroxine] Rash       Current Medications[2]     Objective:   Physical Exam    General: No acute distress. Well-developed. Well-nourished.  Eyes: EOMI. Sclerae anicteric.  HENT: Normocephalic. Atraumatic. Nares patent. Moist oral mucosa.  Cardiovascular: Regular rate. Regular rhythm. No murmurs. No rubs. No gallops. Normal S1, S2.  Respiratory: Normal respiratory effort. Clear to auscultation bilaterally. No rales. No rhonchi. No wheezing.  Musculoskeletal: No  obvious deformity.  Extremities: No lower extremity edema.  Neurological: Alert & oriented x3. No slurred speech. Normal gait.  Psychiatric: Normal mood. Normal affect. Good insight. Good judgment.  Skin: Warm. Dry. No rash.          Vitals:    05/08/25 0901   BP: 129/70   Pulse: 67     Wt Readings from Last 3 Encounters:   05/08/25 (!) 151.8 kg (334 lb 10.5 oz)   04/17/25 (!) 151 kg (332 lb 14.3 oz)   04/11/25 (!) 151 kg (332 lb 14.3 oz)     Temp Readings from Last 3 Encounters:   09/09/24 97.9 °F (36.6 °C) (Oral)   08/15/24 98 °F (36.7 °C)   05/22/24 97.8 °F (36.6 °C) (Temporal)     BP Readings from Last 3 Encounters:   05/08/25 129/70   04/11/25 (!) 159/78   04/10/25 (!) 140/86     Pulse Readings from Last 3 Encounters:   05/08/25 67   04/11/25 67   04/10/25 76               Lab Results   Component Value Date    CHOL 124 03/02/2023    CHOL 186 12/02/2020    CHOL 168 05/26/2018     Lab Results   Component Value Date    HDL 41 03/02/2023    HDL 40 12/02/2020    HDL 36 (L) 05/26/2018     Lab Results   Component Value Date    LDLCALC 70.2 03/02/2023    LDLCALC 127.0 12/02/2020    LDLCALC 119.6 05/26/2018     Lab Results   Component Value Date    ALT 24 02/06/2024    AST 23 02/06/2024    AST 45 (H) 12/20/2023    AST 30 12/19/2023     Lab Results   Component Value Date    CREATININE 0.7 02/06/2024    BUN 23 02/06/2024      02/06/2024    K 5.1 02/06/2024    CO2 25 02/06/2024    CO2 24 12/20/2023    CO2 24 12/19/2023     Lab Results   Component Value Date    HGB 13.6 (L) 02/06/2024    HCT 41.3 02/06/2024    HCT 37.8 (L) 12/20/2023    HCT 37.9 (L) 12/19/2023       Assessment and Plan:   Assessment & Plan    I71.21 Aneurysm of ascending aorta without rupture  I45.2 Bifascicular block  I10 HTN (hypertension), benign  E78.2 Mixed hyperlipidemia  Z95.0 Presence of cardiac pacemaker  I49.3 PVC (premature ventricular contraction)  R25.1 Tremor  R60.0 Localized edema    IMPRESSION:  - Considered fluid retention as cause of leg swelling.  - Abdominal aortic aneurysm noted on recent CT (4.7 cm).    ANEURYSM OF ASCENDING AORTA WITHOUT RUPTURE:  - Ordered CT Chest W Contrast to evaluate thoracic aorta.  - Referred to vascular surgeon for follow-up on newly identified abdominal aortic aneurysm.    BIFASCICULAR BLOCK:  - Echocardiogram (already scheduled by Ms. Oates from electrophysiology).    PRESENCE OF CARDIAC PACEMAKER:  - Echocardiogram (already scheduled by Ms. Oates from electrophysiology).    TREMOR:  - Continued aspirin 81 mg daily, pause before Botox procedure and resume after no blood in urine.    LOCALIZED EDEMA:  - Patient to elevate legs above heart level to reduce swelling, use 6-inch Ace bandages to wrap legs from toes to knee to reduce swelling, and use a recliner chair that allows legs to be elevated above heart level.  - Started furosemide 40 mg daily as needed for swelling to address edema.    LIFESTYLE CHANGES:  - Ordered comprehensive labs.  - Follow up in 6 weeks to review test results.          Follow up in about 6 weeks (around 6/19/2025).        Future Appointments   Date Time Provider Department Center   5/8/2025 11:00 AM Sandy Melendrez DO [facility] PAINMA [employer]   5/9/2025  9:00 AM SPECIMEN LAB, [facility] LABDRA [employer]   5/14/2025  8:00 AM OC  CT1 [facility] CTSCAN [employer]   5/29/2025  3:15 PM ECHO, [facility]  [facility] ECHOSTR Keny Hwjohann   7/17/2025  2:00 PM Jamal Victor MD [facility] ORTHO Jerad Pickard       This note was generated with the assistance of ambient listening technology. Verbal consent was obtained by the patient and accompanying visitor(s) for the recording of patient appointment to facilitate this note. I attest to having reviewed and edited the generated note for accuracy, though some syntax or spelling errors may persist. Please contact the author of this note for any clarification.                      [1]   Patient Active Problem List  Diagnosis    Nocturia    Benign prostatic hyperplasia with nocturia    Hydrocele, right    Pseudophakia    DJD (degenerative joint disease) of hip    Seasonal allergic rhinitis due to pollen    Calculus of gallbladder without cholecystitis without obstruction    Right inguinal hernia    History of hydrocelectomy    Posterior capsular opacification, left eye    Open angle with borderline findings and high glaucoma risk in left eye    Conductive hearing loss of right ear with restricted hearing of left ear    Blurry vision, left eye    Right-sided low back pain with right-sided sciatica    Dry eye syndrome of both eyes    Ascending aortic aneurysm    HTN (hypertension), benign    Dermatitis    Chronic pain of both ankles    Bifascicular block    PVC (premature ventricular contraction)    Hiatal hernia    OAB (overactive bladder)    Enlarged liver    Near syncope    Presence of cardiac pacemaker    Calcified granuloma of lung    Urge incontinence    Aortic atherosclerosis    Nocturnal polyuria    Hyperlipidemia    Fatigue    Tremor    Subdural hygroma    S/P Rezum Therapy    Routine general medical examination at a health care facility   [2]   Current Outpatient Medications:     acetaminophen (TYLENOL) 500 MG tablet, Take 1 tablet (500 mg total) by mouth every 6 (six) hours as needed for Pain., Disp: 30 tablet, Rfl: 0    albuterol (VENTOLIN HFA) 90 mcg/actuation inhaler, Inhale 2  puffs into the lungs every 6 (six) hours as needed for Wheezing. Rescue, Disp: 18 g, Rfl: 1    azelastine (ASTELIN) 137 mcg (0.1 %) nasal spray, 1 spray (137 mcg total) by Nasal route 2 (two) times daily., Disp: 90 mL, Rfl: 2    BABY ASPIRIN ORAL, Take 81 tablets by mouth every evening. , Disp: , Rfl:     cholecalciferol, vitamin D3, 1,000 unit capsule, Take 1,000 Units by mouth once daily., Disp: , Rfl:     coenzyme Q10 100 mg capsule, Take by mouth once daily., Disp: , Rfl:     dexAMETHasone (DECADRON) 4 MG Tab, Take 1 tablet (4 mg total) by mouth once daily., Disp: 30 tablet, Rfl: 0    diclofenac sodium (VOLTAREN) 1 % Gel, Apply 2 g topically 4 (four) times daily., Disp: 1 Tube, Rfl: 2    glucosamine-chondroitin 500-400 mg tablet, Take 1 tablet by mouth 2 (two) times daily. , Disp: , Rfl:     MULTIVITAMIN W-MINERALS/LUTEIN (CENTRUM SILVER ORAL), Take by mouth once daily. , Disp: , Rfl:     nystatin-triamcinolone (MYCOLOG II) cream, APPLY TOPICALLY 4 (FOUR) TIMES DAILY., Disp: 60 g, Rfl: 5    omega-3 fatty acids 300 mg Cap, Take 1 tablet by mouth once daily. , Disp: , Rfl:     oxybutynin (DITROPAN-XL) 10 MG 24 hr tablet, Take 1 tablet (10 mg total) by mouth once daily., Disp: 30 tablet, Rfl: 11    propranoloL (INDERAL LA) 60 MG 24 hr capsule, Take 1 capsule (60 mg total) by mouth once daily., Disp: 30 capsule, Rfl: 11    rosuvastatin (CRESTOR) 10 MG tablet, TAKE 1 TABLET (10 MG TOTAL) BY MOUTH ONCE DAILY., Disp: 90 tablet, Rfl: 2    TURMERIC ORAL, Take by mouth. Pt take 1 in the evening., Disp: , Rfl:     valsartan (DIOVAN) 80 MG tablet, Take 1 tablet (80 mg total) by mouth once daily., Disp: 30 tablet, Rfl: 5    VITAMIN B COMPLEX (SUPER B COMPLEX-B-12 ORAL), Take by mouth., Disp: , Rfl:     furosemide (LASIX) 40 MG tablet, Take 1 tablet (40 mg total) by mouth daily as needed (swelling)., Disp: 30 tablet, Rfl: 11    loratadine (CLARITIN) 10 mg tablet, Take 1 tablet (10 mg total) by mouth daily as needed for  Allergies., Disp: 30 tablet, Rfl: 0    olopatadine (PATANOL) 0.1 % ophthalmic solution, Place 1 drop into both eyes 2 (two) times daily as needed for Allergies. (Patient not taking: Reported on 9/24/2024), Disp: 5 mL, Rfl: 0    pregabalin (LYRICA) 75 MG capsule, Take 1 capsule (75 mg total) by mouth 2 (two) times daily., Disp: 60 capsule, Rfl: 5

## 2025-05-09 ENCOUNTER — LAB VISIT (OUTPATIENT)
Dept: LAB | Facility: HOSPITAL | Age: 75
End: 2025-05-09
Attending: INTERNAL MEDICINE
Payer: MEDICARE

## 2025-05-09 ENCOUNTER — TELEPHONE (OUTPATIENT)
Dept: UROLOGY | Facility: CLINIC | Age: 75
End: 2025-05-09
Payer: MEDICARE

## 2025-05-09 DIAGNOSIS — R60.0 LOCALIZED EDEMA: ICD-10-CM

## 2025-05-09 DIAGNOSIS — E78.2 MIXED HYPERLIPIDEMIA: ICD-10-CM

## 2025-05-09 LAB
ABSOLUTE EOSINOPHIL (OHS): 0.33 K/UL
ABSOLUTE MONOCYTE (OHS): 0.88 K/UL (ref 0.3–1)
ABSOLUTE NEUTROPHIL COUNT (OHS): 3.55 K/UL (ref 1.8–7.7)
ALBUMIN SERPL BCP-MCNC: 3.5 G/DL (ref 3.5–5.2)
ALP SERPL-CCNC: 82 UNIT/L (ref 40–150)
ALT SERPL W/O P-5'-P-CCNC: 17 UNIT/L (ref 10–44)
ANION GAP (OHS): 8 MMOL/L (ref 8–16)
AST SERPL-CCNC: 20 UNIT/L (ref 11–45)
BASOPHILS # BLD AUTO: 0.08 K/UL
BASOPHILS NFR BLD AUTO: 1.2 %
BILIRUB SERPL-MCNC: 0.7 MG/DL (ref 0.1–1)
BUN SERPL-MCNC: 24 MG/DL (ref 8–23)
CALCIUM SERPL-MCNC: 9.4 MG/DL (ref 8.7–10.5)
CHLORIDE SERPL-SCNC: 107 MMOL/L (ref 95–110)
CHOLEST SERPL-MCNC: 105 MG/DL (ref 120–199)
CHOLEST/HDLC SERPL: 2.8 {RATIO} (ref 2–5)
CO2 SERPL-SCNC: 27 MMOL/L (ref 23–29)
CREAT SERPL-MCNC: 0.7 MG/DL (ref 0.5–1.4)
ERYTHROCYTE [DISTWIDTH] IN BLOOD BY AUTOMATED COUNT: 13.8 % (ref 11.5–14.5)
GFR SERPLBLD CREATININE-BSD FMLA CKD-EPI: >60 ML/MIN/1.73/M2
GLUCOSE SERPL-MCNC: 102 MG/DL (ref 70–110)
HCT VFR BLD AUTO: 39.1 % (ref 40–54)
HDLC SERPL-MCNC: 38 MG/DL (ref 40–75)
HDLC SERPL: 36.2 % (ref 20–50)
HGB BLD-MCNC: 12.4 GM/DL (ref 14–18)
IMM GRANULOCYTES # BLD AUTO: 0.02 K/UL (ref 0–0.04)
IMM GRANULOCYTES NFR BLD AUTO: 0.3 % (ref 0–0.5)
LDLC SERPL CALC-MCNC: 56.6 MG/DL (ref 63–159)
LYMPHOCYTES # BLD AUTO: 2.03 K/UL (ref 1–4.8)
MCH RBC QN AUTO: 31.4 PG (ref 27–31)
MCHC RBC AUTO-ENTMCNC: 31.7 G/DL (ref 32–36)
MCV RBC AUTO: 99 FL (ref 82–98)
NONHDLC SERPL-MCNC: 67 MG/DL
NUCLEATED RBC (/100WBC) (OHS): 0 /100 WBC
OHS QRS DURATION: 138 MS
OHS QTC CALCULATION: 463 MS
PLATELET # BLD AUTO: 219 K/UL (ref 150–450)
PMV BLD AUTO: 11.4 FL (ref 9.2–12.9)
POTASSIUM SERPL-SCNC: 4.2 MMOL/L (ref 3.5–5.1)
PROT SERPL-MCNC: 7.2 GM/DL (ref 6–8.4)
RBC # BLD AUTO: 3.95 M/UL (ref 4.6–6.2)
RELATIVE EOSINOPHIL (OHS): 4.8 %
RELATIVE LYMPHOCYTE (OHS): 29.5 % (ref 18–48)
RELATIVE MONOCYTE (OHS): 12.8 % (ref 4–15)
RELATIVE NEUTROPHIL (OHS): 51.4 % (ref 38–73)
SODIUM SERPL-SCNC: 142 MMOL/L (ref 136–145)
TRIGL SERPL-MCNC: 52 MG/DL (ref 30–150)
WBC # BLD AUTO: 6.89 K/UL (ref 3.9–12.7)

## 2025-05-09 PROCEDURE — 82172 ASSAY OF APOLIPOPROTEIN: CPT

## 2025-05-09 PROCEDURE — 82310 ASSAY OF CALCIUM: CPT | Mod: HCNC

## 2025-05-09 PROCEDURE — 82465 ASSAY BLD/SERUM CHOLESTEROL: CPT | Mod: HCNC

## 2025-05-09 PROCEDURE — 83880 ASSAY OF NATRIURETIC PEPTIDE: CPT

## 2025-05-09 PROCEDURE — 36415 COLL VENOUS BLD VENIPUNCTURE: CPT

## 2025-05-09 PROCEDURE — 85025 COMPLETE CBC W/AUTO DIFF WBC: CPT | Mod: HCNC

## 2025-05-09 PROCEDURE — 83695 ASSAY OF LIPOPROTEIN(A): CPT

## 2025-05-09 NOTE — TELEPHONE ENCOUNTER
Spoke with patient with arrival time for surgery 0900am at [facility], rev preop instructions.

## 2025-05-11 ENCOUNTER — NURSE TRIAGE (OUTPATIENT)
Dept: ADMINISTRATIVE | Facility: CLINIC | Age: 75
End: 2025-05-11
Payer: MEDICARE

## 2025-05-11 LAB
APO B SERPL-MCNC: 62 MG/DL
NT-PROBNP SERPL IA-MCNC: 293 PG/ML

## 2025-05-12 NOTE — TELEPHONE ENCOUNTER
"Pt states vomiting, "stomach cramps, probably ate some food that didn't agree with me." Pt states scheduled for procedure in the morning, wants to reschedule. Pt declined triage at this time. Per    Reason for Disposition   Question about upcoming scheduled surgery, procedure or test, no triage required, and triager able to answer question    Protocols used: Information Only Call - No Triage-A-    "

## 2025-05-13 ENCOUNTER — TELEPHONE (OUTPATIENT)
Dept: UROLOGY | Facility: CLINIC | Age: 75
End: 2025-05-13
Payer: MEDICARE

## 2025-05-13 DIAGNOSIS — N30.90 RECURRENT CYSTITIS: Primary | ICD-10-CM

## 2025-05-13 LAB — W LP(A): 259 NMOL/L

## 2025-05-13 RX ORDER — SULFAMETHOXAZOLE AND TRIMETHOPRIM 800; 160 MG/1; MG/1
1 TABLET ORAL 2 TIMES DAILY
Qty: 14 TABLET | Refills: 0 | Status: SHIPPED | OUTPATIENT
Start: 2025-05-13 | End: 2025-05-20

## 2025-05-13 NOTE — TELEPHONE ENCOUNTER
----- Message from Med Assistant Melissa sent at 5/13/2025  8:47 AM CDT -----  Regarding: antibotics  I reschedule his surgery from 05/12/ cysto botox inj/ to 06/02/ do you want to put him on antibotics.?

## 2025-05-14 ENCOUNTER — HOSPITAL ENCOUNTER (OUTPATIENT)
Dept: RADIOLOGY | Facility: HOSPITAL | Age: 75
Discharge: HOME | End: 2025-05-14
Attending: INTERNAL MEDICINE
Payer: MEDICARE

## 2025-05-14 ENCOUNTER — TELEPHONE (OUTPATIENT)
Dept: UROLOGY | Facility: CLINIC | Age: 75
End: 2025-05-14
Payer: MEDICARE

## 2025-05-14 DIAGNOSIS — I71.21 ANEURYSM OF ASCENDING AORTA WITHOUT RUPTURE: ICD-10-CM

## 2025-05-14 PROCEDURE — 71275 CT ANGIOGRAPHY CHEST: CPT | Mod: TC

## 2025-05-14 PROCEDURE — 25500020 PHARM REV CODE 255: Performed by: INTERNAL MEDICINE

## 2025-05-14 PROCEDURE — 71275 CT ANGIOGRAPHY CHEST: CPT | Mod: 26,,, | Performed by: RADIOLOGY

## 2025-05-14 RX ADMIN — IOHEXOL 75 ML: 350 INJECTION, SOLUTION INTRAVENOUS at 08:05

## 2025-05-23 ENCOUNTER — E-CONSULT (OUTPATIENT)
Dept: CARDIOLOGY | Facility: CLINIC | Age: 75
End: 2025-05-23
Payer: MEDICARE

## 2025-05-23 DIAGNOSIS — Z01.818 PRE-OP TESTING: Primary | ICD-10-CM

## 2025-05-23 DIAGNOSIS — I71.21 ANEURYSM OF ASCENDING AORTA WITHOUT RUPTURE: Primary | ICD-10-CM

## 2025-05-23 NOTE — CONSULTS
[facility] ([facility])  Response for E-Consult     Patient Name: Adan Webb  MRN: [id number]  Primary Care Provider: Gina Reyes MD   Requesting Provider: Felisha Campos MD  Consults    Recommendation: Patient will need office visit prior to clearance    Additional future steps to consider:  None    Total time of Consultation: 10 minute    I did not speak to the requesting provider verbally about this.     *This eConsult is based on the clinical data available to me and is furnished without benefit of a physical examination. The eConsult will need to be interpreted in light of any clinical issues or changes in patient status not available to me at the time of filing this eConsults. Significant changes in patient condition or level of acuity should result in immediate formal consultation and reevaluation. Please alert me if you have further questions.    Thank you for this eConsult referral.     Ezra Abdalla MD  [facility] ([facility])

## 2025-05-26 ENCOUNTER — TELEPHONE (OUTPATIENT)
Dept: CARDIOLOGY | Facility: CLINIC | Age: 75
End: 2025-05-26
Payer: MEDICARE

## 2025-05-26 NOTE — TELEPHONE ENCOUNTER
----- Message from Nataliia sent at 5/26/2025 10:47 AM CDT -----  Regarding: clr  Pt is calling regarding his clr for procedure and can be reached at [phone].Thank you

## 2025-05-26 NOTE — TELEPHONE ENCOUNTER
----- Message from Ezra Abdalla MD sent at 5/26/2025  2:02 PM CDT -----  Regarding: RE: Preop Clearance  Needs office visit prior to clearance, okay to overbook  ----- Message -----  From: Alexa Sterling MA  Sent: 5/26/2025   1:46 PM CDT  To: Ezra Abdalla Jr., MD  Subject: Preop Clearance                                  Dr. GONSALES,You saw this patient 5/8/2025 can you clear him for surgery scheduled 6/2/2025 overactive bladder or can I overbook for Clearance.Thank you,Alexa

## 2025-05-29 ENCOUNTER — RESULTS FOLLOW-UP (OUTPATIENT)
Dept: ELECTROPHYSIOLOGY | Facility: CLINIC | Age: 75
End: 2025-05-29
Payer: MEDICARE

## 2025-05-29 ENCOUNTER — HOSPITAL ENCOUNTER (OUTPATIENT)
Dept: CARDIOLOGY | Facility: HOSPITAL | Age: 75
Discharge: HOME | End: 2025-05-29
Attending: NURSE PRACTITIONER
Payer: MEDICARE

## 2025-05-29 VITALS
WEIGHT: 315 LBS | HEIGHT: 73 IN | SYSTOLIC BLOOD PRESSURE: 129 MMHG | HEART RATE: 67 BPM | DIASTOLIC BLOOD PRESSURE: 70 MMHG | BODY MASS INDEX: 41.75 KG/M2

## 2025-05-29 DIAGNOSIS — Z95.0 PRESENCE OF CARDIAC PACEMAKER: ICD-10-CM

## 2025-05-29 DIAGNOSIS — I49.3 PVC (PREMATURE VENTRICULAR CONTRACTION): ICD-10-CM

## 2025-05-29 DIAGNOSIS — I71.21 ANEURYSM OF ASCENDING AORTA WITHOUT RUPTURE: Primary | ICD-10-CM

## 2025-05-29 LAB
AORTIC SIZE INDEX (SOV): 1.1 CM/M2
AORTIC SIZE INDEX: 2.1 CM/M2
ASCENDING AORTA: 5.6 CM
AV AREA BY CONTINUOUS VTI: 3.4 CM2
AV INDEX (PROSTH): 0.66
AV LVOT MEAN GRADIENT: 1 MMHG
AV LVOT PEAK GRADIENT: 3 MMHG
AV MEAN GRADIENT: 4 MMHG
AV PEAK GRADIENT: 7 MMHG
AV VALVE AREA BY VELOCITY RATIO: 3.3 CM²
AV VALVE AREA: 3.5 CM2
AV VELOCITY RATIO: 0.62
BSA FOR ECHO PROCEDURE: 2.79 M2
CV ECHO LV RWT: 0.38 CM
DOP CALC AO PEAK VEL: 1.3 M/S
DOP CALC AO VTI: 26.8 CM
DOP CALC LVOT AREA: 5.3 CM2
DOP CALC LVOT DIAMETER: 2.6 CM
DOP CALC LVOT PEAK VEL: 0.8 M/S
DOP CALC LVOT STROKE VOLUME: 94.5 CM3
DOP CALCLVOT PEAK VEL VTI: 17.8 CM
E WAVE DECELERATION TIME: 220 MS
E/A RATIO: 1.48
E/E' RATIO: 11 M/S
ECHO EF ESTIMATED: 66 %
ECHO LV POSTERIOR WALL: 1 CM (ref 0.6–1.1)
FRACTIONAL SHORTENING: 36.5 % (ref 28–44)
INTERVENTRICULAR SEPTUM: 1.1 CM (ref 0.6–1.1)
IVC DIAMETER: 2.24 CM
IVRT: 86 MS
LA MAJOR: 5.6 CM
LA MINOR: 5.4 CM
LA WIDTH: 5.2 CM
LEFT ATRIUM SIZE: 4.9 CM
LEFT ATRIUM VOLUME INDEX MOD: 31 ML/M2
LEFT ATRIUM VOLUME INDEX: 44 ML/M2
LEFT ATRIUM VOLUME MOD: 84 ML
LEFT ATRIUM VOLUME: 119 CM3
LEFT INTERNAL DIMENSION IN SYSTOLE: 3.3 CM (ref 2.1–4)
LEFT VENTRICLE DIASTOLIC VOLUME INDEX: 48.51 ML/M2
LEFT VENTRICLE DIASTOLIC VOLUME: 130 ML
LEFT VENTRICLE MASS INDEX: 77.3 G/M2
LEFT VENTRICLE SYSTOLIC VOLUME INDEX: 16.4 ML/M2
LEFT VENTRICLE SYSTOLIC VOLUME: 44 ML
LEFT VENTRICULAR INTERNAL DIMENSION IN DIASTOLE: 5.2 CM (ref 3.5–6)
LEFT VENTRICULAR MASS: 207.3 G
LV LATERAL E/E' RATIO: 10.7
LV SEPTAL E/E' RATIO: 12
MV PEAK A VEL: 0.65 M/S
MV PEAK E VEL: 0.96 M/S
OHS CV RV/LV RATIO: 0.87 CM
PISA TR MAX VEL: 2.7 M/S
RA MAJOR: 5.09 CM
RA PRESSURE ESTIMATED: 8 MMHG
RA WIDTH: 4.43 CM
RIGHT ATRIAL AREA: 17.4 CM2
RIGHT VENTRICLE DIASTOLIC BASEL DIMENSION: 4.5 CM
RV TB RVSP: 11 MMHG
RV TISSUE DOPPLER FREE WALL SYSTOLIC VELOCITY 1 (APICAL 4 CHAMBER VIEW): 12.32 CM/S
SINUS: 3.01 CM
STJ: 2.8 CM
TDI LATERAL: 0.09 M/S
TDI SEPTAL: 0.08 M/S
TDI: 0.09 M/S
TRICUSPID ANNULAR PLANE SYSTOLIC EXCURSION: 2.6 CM
TV PEAK GRADIENT: 30 MMHG
TV REST PULMONARY ARTERY PRESSURE: 37 MMHG
Z-SCORE OF LEFT VENTRICULAR DIMENSION IN END DIASTOLE: -15.24
Z-SCORE OF LEFT VENTRICULAR DIMENSION IN END SYSTOLE: -11.36

## 2025-05-29 PROCEDURE — 93306 TTE W/DOPPLER COMPLETE: CPT | Mod: HCNC

## 2025-05-29 PROCEDURE — 93306 TTE W/DOPPLER COMPLETE: CPT | Mod: 26,HCNC,, | Performed by: STUDENT IN AN ORGANIZED HEALTH CARE EDUCATION/TRAINING PROGRAM

## 2025-05-30 ENCOUNTER — TELEPHONE (OUTPATIENT)
Dept: CARDIOTHORACIC SURGERY | Facility: CLINIC | Age: 75
End: 2025-05-30
Payer: MEDICARE

## 2025-05-30 DIAGNOSIS — I71.21 ANEURYSM OF ASCENDING AORTA WITHOUT RUPTURE: Primary | ICD-10-CM

## 2025-05-30 NOTE — TELEPHONE ENCOUNTER
----- Message from MARIANNE Hassan sent at 5/30/2025  8:35 AM CDT -----  Regarding: FW: Referral  Please see this referral for an ascending aortic aneurysm.  ----- Message -----  From: Ezra Abdalla Jr., MD  Sent: 5/30/2025   7:40 AM CDT  To: Sonya Nguyễn RN  Subject: RE: Referral                                     Another consult place to cardiovascular surgery, prior referral not responded to.  If you are near to cardiovascular surgery or have contacts there, please ask him to do what this ascending aortic aneurysm.  Thanks.  ----- Message -----  From: Sonya Nguyễn RN  Sent: 5/29/2025   4:49 PM CDT  To: Ezra Abdalla Jr., MD  Subject: FW: Referral                                     Dr. Abdalla,This patient was referred incorrectly to vascular surgery as ascending aortic aneurysms are treated by our cardio vascular surgery department. Please edit the referarl or place a new referral reflecting the correct department.Sonya Nguyễn RN  ----- Message -----  From: Alexa Sterling MA  Sent: 5/29/2025   4:43 PM CDT  To: [facility] Vascular Surgery Clinical Support  Subject: Referral                                         Good evening Staff,Dr. Abdalla, placed a referral in for this patient can someone please schedule and call the patient .Thank you in advance,Alexa

## 2025-05-30 NOTE — TELEPHONE ENCOUNTER
Called pt and offered appt with CTS on 6/2.  Pt stated that he has a procedure scheduled that day and will be unable to come. Pt then offered appt on 6/9 which he accepted.  Pt informed of appt date, time, and location, which he verbalized understanding to, stating that he views his appt info via [employer].    ----- Message from MARIANNE Hassan sent at 5/30/2025  8:35 AM CDT -----  Regarding: FW: Referral  Please see this referral for an ascending aortic aneurysm.  ----- Message -----  From: Ezra Abdalla Jr., MD  Sent: 5/30/2025   7:40 AM CDT  To: Sonya Nguyễn RN  Subject: RE: Referral                                     Another consult place to cardiovascular surgery, prior referral not responded to.  If you are near to cardiovascular surgery or have contacts there, please ask him to do what this ascending aortic aneurysm.  Thanks.  ----- Message -----  From: Sonya Nguyễn RN  Sent: 5/29/2025   4:49 PM CDT  To: Ezra Abdalla Jr., MD  Subject: FW: Referral                                     Dr. Abdalla,This patient was referred incorrectly to vascular surgery as ascending aortic aneurysms are treated by our cardio vascular surgery department. Please edit the referarl or place a new referral reflecting the correct department.Sonya Nguyễn RN  ----- Message -----  From: Alexa Sterling MA  Sent: 5/29/2025   4:43 PM CDT  To: [facility] Vascular Surgery Clinical Support  Subject: Referral                                         Good evening Staff,Dr. Abdalla, placed a referral in for this patient can someone please schedule and call the patient .Thank you in advance,Alexa

## 2025-06-01 ENCOUNTER — PATIENT MESSAGE (OUTPATIENT)
Dept: UROLOGY | Facility: CLINIC | Age: 75
End: 2025-06-01
Payer: MEDICARE

## 2025-06-03 ENCOUNTER — PATIENT MESSAGE (OUTPATIENT)
Dept: PAIN MEDICINE | Facility: CLINIC | Age: 75
End: 2025-06-03
Payer: MEDICARE

## 2025-06-03 DIAGNOSIS — M48.062 SPINAL STENOSIS OF LUMBAR REGION WITH NEUROGENIC CLAUDICATION: ICD-10-CM

## 2025-06-03 DIAGNOSIS — M54.16 LUMBAR RADICULOPATHY, CHRONIC: Primary | ICD-10-CM

## 2025-06-03 RX ORDER — PREGABALIN 75 MG/1
150 CAPSULE ORAL 2 TIMES DAILY
Qty: 120 CAPSULE | Refills: 1 | Status: SHIPPED | OUTPATIENT
Start: 2025-06-03 | End: 2025-08-02

## 2025-06-03 NOTE — PROGRESS NOTES
Subjective:      Patient ID: Adan Webb is a 75 y.o. male.    Chief Complaint: No chief complaint on file.      HPI:  Adan Webb is a 75 y.o. male who presents to TAA follow up. Patient was last seen in 2023 at which time his aneurysm was ~5.2cm, stable. This is his 4th visit with Dr. Duenas. Medical history of TAA, dejenerative joint disease, seasonal allergic rhinitis, obesity, and BPH, tremor on propanolol, spinal stenosis, urinary incontinence.  He was previously followed by Dr. Ewing. Last visit 2023, he had imaging showing TAA at 5.2. This was stable over many years so it was felt patient no longer needed to be routinely followed. Was referred back for follow up by his PCP. Patient was going to get some botox injections for his bladder issues but wanted to get his heart re-evaluated prior to this. Ambulates with a cane. Reports that his breathing does not limit how far he can walk, just his back pain for which he follows with pain management. Denies any chest pain. Blood pressure today is elevated at 173/79. Patient reports this is not his norm. He is enrolled in the digital medicine program and his pressure is usually on the lower side. Denies any recent falls or surgeries.     Medical conditions include     Current Outpatient Medications   Medication Instructions    acetaminophen (TYLENOL) 500 mg, Oral, Every 6 hours PRN    albuterol (VENTOLIN HFA) 90 mcg/actuation inhaler 2 puffs, Inhalation, Every 6 hours PRN, Rescue    azelastine (ASTELIN) 137 mcg, Nasal, 2 times daily    BABY ASPIRIN ORAL 81 tablets, Nightly    cholecalciferol (vitamin D3) (VITAMIN D3) 1,000 Units, Daily    coenzyme Q10 100 mg capsule Daily    dexAMETHasone (DECADRON) 4 mg, Oral, Daily    diclofenac sodium (VOLTAREN) 2 g, Topical (Top), 4 times daily    furosemide (LASIX) 40 mg, Oral, Daily PRN    glucosamine-chondroitin 500-400 mg tablet 1 tablet, 2 times daily    loratadine (CLARITIN) 10 mg, Oral, Daily PRN     MULTIVITAMIN W-MINERALS/LUTEIN (CENTRUM SILVER ORAL) Daily    nystatin-triamcinolone (MYCOLOG II) cream Topical (Top), 4 times daily    olopatadine (PATANOL) 0.1 % ophthalmic solution 1 drop, Both Eyes, 2 times daily PRN    omega-3 fatty acids 300 mg Cap 1 tablet, Daily    oxybutynin (DITROPAN-XL) 10 mg, Oral, Daily    pregabalin (LYRICA) 150 mg, Oral, 2 times daily    propranoloL (INDERAL LA) 60 mg, Oral, Daily    rosuvastatin (CRESTOR) 10 mg, Oral, Daily    tamsulosin (FLOMAX) 0.4 mg Cap 1 capsule    TURMERIC ORAL Take by mouth. Pt take 1 in the evening.    valsartan (DIOVAN) 80 mg, Oral, Daily    VITAMIN B COMPLEX (SUPER B COMPLEX-B-12 ORAL) Take by mouth.         Current medications Reviewed  Medications Ordered Prior to Encounter[1]    Review of Systems   Constitutional:  Negative for fatigue.   HENT:  Negative for nosebleeds.    Eyes:  Negative for visual disturbance.   Respiratory:  Negative for shortness of breath.    Cardiovascular:  Positive for leg swelling. Negative for chest pain.   Gastrointestinal:  Negative for nausea.   Musculoskeletal:  Positive for back pain.   Skin:  Negative for color change.   Neurological:  Negative for seizures and syncope.   Hematological:  Does not bruise/bleed easily.   Psychiatric/Behavioral:  Negative for sleep disturbance.      Objective:   Physical Exam  Vitals reviewed.   Constitutional:       General: He is not in acute distress.     Appearance: He is well-developed. He is obese. He is not diaphoretic.   HENT:      Head: Normocephalic and atraumatic.   Eyes:      Conjunctiva/sclera: Conjunctivae normal.   Neck:      Vascular: No JVD.   Cardiovascular:      Rate and Rhythm: Normal rate.   Pulmonary:      Effort: Pulmonary effort is normal. No respiratory distress.   Musculoskeletal:         General: Swelling present.      Cervical back: Normal range of motion.   Skin:     Coloration: Skin is not pale.   Neurological:      General: No focal deficit present.      Mental  Status: He is alert.   Psychiatric:         Speech: Speech normal.         Behavior: Behavior normal.         Thought Content: Thought content normal.         Judgment: Judgment normal.         Diagnotic Results: reviewed   CTA 5/14/25  1. Grossly stable aneurysmal dilatation of the ascending aorta measuring 5.0 cm, prior 5.1 cm.  2. Additional findings. For details, see above.    Echo  Result Date: 5/29/2025    Left Ventricle: The left ventricle is normal in size. Ventricular mass   is normal. Normal wall thickness. Normal wall motion. There is normal   systolic function with a visually estimated ejection fraction of 55 - 60%.   There is normal diastolic function.    Right Ventricle: The right ventricle is mildly dilated Wall thickness   is normal. Systolic function is normal. Pacemaker lead present in the   ventricle.    Aortic Valve: There is moderate aortic valve sclerosis.    Mitral Valve: There is mild regurgitation.    Aorta: The aortic root is normal in size measuring 3.01 cm. The   ascending aorta is severely dilated measuring 5.6 cm.    Pulmonary Artery: The estimated pulmonary artery systolic pressure is   37 mmHg.    IVC/SVC: Intermediate venous pressure at 8 mmHg.          Assessment:   TAA ~6cm  Plan:   Extensive discussions carried out with patient regarding condition. He has had some interval increase in size of aneurysm, now measuring up to 6-6.1cm. Patient would be very high risk for surgical intervention due to his debility, use of a cane, obesity, and comorbidities. He also has uncontrolled HTN after review of his digital medicine logs.  I had extensive discussions with the patient as well as the wife.  Patient has only increase in his BMI from 39 all the way to 44 during this follow-up.  Patient is an extremely high-risk for postoperative complications with long-term dependency on ventilator.  After understanding all of those risks patient would want to continue with the conservative management.   Patient was encouraged to reduce weight.  Follow-up PRN.         [1]   Current Outpatient Medications on File Prior to Visit   Medication Sig Dispense Refill    acetaminophen (TYLENOL) 500 MG tablet Take 1 tablet (500 mg total) by mouth every 6 (six) hours as needed for Pain. 30 tablet 0    albuterol (VENTOLIN HFA) 90 mcg/actuation inhaler Inhale 2 puffs into the lungs every 6 (six) hours as needed for Wheezing. Rescue 18 g 1    azelastine (ASTELIN) 137 mcg (0.1 %) nasal spray 1 spray (137 mcg total) by Nasal route 2 (two) times daily. 90 mL 2    BABY ASPIRIN ORAL Take 81 tablets by mouth every evening.       cholecalciferol, vitamin D3, 1,000 unit capsule Take 1,000 Units by mouth once daily.      coenzyme Q10 100 mg capsule Take by mouth once daily.      dexAMETHasone (DECADRON) 4 MG Tab Take 1 tablet (4 mg total) by mouth once daily. 30 tablet 0    diclofenac sodium (VOLTAREN) 1 % Gel Apply 2 g topically 4 (four) times daily. 1 Tube 2    furosemide (LASIX) 40 MG tablet Take 1 tablet (40 mg total) by mouth daily as needed (swelling). 30 tablet 11    glucosamine-chondroitin 500-400 mg tablet Take 1 tablet by mouth 2 (two) times daily.       loratadine (CLARITIN) 10 mg tablet Take 1 tablet (10 mg total) by mouth daily as needed for Allergies. 30 tablet 0    MULTIVITAMIN W-MINERALS/LUTEIN (CENTRUM SILVER ORAL) Take by mouth once daily.       nystatin-triamcinolone (MYCOLOG II) cream APPLY TOPICALLY 4 (FOUR) TIMES DAILY. 60 g 5    olopatadine (PATANOL) 0.1 % ophthalmic solution Place 1 drop into both eyes 2 (two) times daily as needed for Allergies. (Patient not taking: Reported on 9/24/2024) 5 mL 0    omega-3 fatty acids 300 mg Cap Take 1 tablet by mouth once daily.       oxybutynin (DITROPAN-XL) 10 MG 24 hr tablet Take 1 tablet (10 mg total) by mouth once daily. 30 tablet 11    pregabalin (LYRICA) 75 MG capsule Take 1 capsule (75 mg total) by mouth 2 (two) times daily. 60 capsule 5    propranoloL (INDERAL LA) 60 MG  24 hr capsule Take 1 capsule (60 mg total) by mouth once daily. 30 capsule 11    rosuvastatin (CRESTOR) 10 MG tablet TAKE 1 TABLET (10 MG TOTAL) BY MOUTH ONCE DAILY. 90 tablet 2    TURMERIC ORAL Take by mouth. Pt take 1 in the evening.      valsartan (DIOVAN) 80 MG tablet Take 1 tablet (80 mg total) by mouth once daily. 30 tablet 5    VITAMIN B COMPLEX (SUPER B COMPLEX-B-12 ORAL) Take by mouth.       No current facility-administered medications on file prior to visit.

## 2025-06-06 ENCOUNTER — TELEPHONE (OUTPATIENT)
Dept: CARDIOTHORACIC SURGERY | Facility: CLINIC | Age: 75
End: 2025-06-06
Payer: MEDICARE

## 2025-06-09 ENCOUNTER — OFFICE VISIT (OUTPATIENT)
Dept: CARDIOTHORACIC SURGERY | Facility: CLINIC | Age: 75
End: 2025-06-09
Payer: MEDICARE

## 2025-06-09 VITALS
OXYGEN SATURATION: 95 % | DIASTOLIC BLOOD PRESSURE: 79 MMHG | BODY MASS INDEX: 41.75 KG/M2 | HEIGHT: 73 IN | WEIGHT: 315 LBS | SYSTOLIC BLOOD PRESSURE: 173 MMHG | HEART RATE: 60 BPM

## 2025-06-09 DIAGNOSIS — I71.21 ANEURYSM OF ASCENDING AORTA WITHOUT RUPTURE: ICD-10-CM

## 2025-06-09 PROCEDURE — 1159F MED LIST DOCD IN RCRD: CPT | Mod: CPTII,HCNC,S$GLB, | Performed by: THORACIC SURGERY (CARDIOTHORACIC VASCULAR SURGERY)

## 2025-06-09 PROCEDURE — 1125F AMNT PAIN NOTED PAIN PRSNT: CPT | Mod: CPTII,HCNC,S$GLB, | Performed by: THORACIC SURGERY (CARDIOTHORACIC VASCULAR SURGERY)

## 2025-06-09 PROCEDURE — 1157F ADVNC CARE PLAN IN RCRD: CPT | Mod: CPTII,HCNC,S$GLB, | Performed by: THORACIC SURGERY (CARDIOTHORACIC VASCULAR SURGERY)

## 2025-06-09 PROCEDURE — 3077F SYST BP >= 140 MM HG: CPT | Mod: CPTII,HCNC,S$GLB, | Performed by: THORACIC SURGERY (CARDIOTHORACIC VASCULAR SURGERY)

## 2025-06-09 PROCEDURE — 1160F RVW MEDS BY RX/DR IN RCRD: CPT | Mod: CPTII,HCNC,S$GLB, | Performed by: THORACIC SURGERY (CARDIOTHORACIC VASCULAR SURGERY)

## 2025-06-09 PROCEDURE — 99213 OFFICE O/P EST LOW 20 MIN: CPT | Mod: HCNC,S$GLB,, | Performed by: THORACIC SURGERY (CARDIOTHORACIC VASCULAR SURGERY)

## 2025-06-09 PROCEDURE — 99999 PR PBB SHADOW E&M-EST. PATIENT-LVL V: CPT | Mod: PBBFAC,HCNC,, | Performed by: THORACIC SURGERY (CARDIOTHORACIC VASCULAR SURGERY)

## 2025-06-09 PROCEDURE — 3078F DIAST BP <80 MM HG: CPT | Mod: CPTII,HCNC,S$GLB, | Performed by: THORACIC SURGERY (CARDIOTHORACIC VASCULAR SURGERY)

## 2025-06-09 RX ORDER — TAMSULOSIN HYDROCHLORIDE 0.4 MG/1
1 CAPSULE ORAL
COMMUNITY
Start: 2025-06-02

## 2025-06-10 DIAGNOSIS — M48.062 SPINAL STENOSIS OF LUMBAR REGION WITH NEUROGENIC CLAUDICATION: ICD-10-CM

## 2025-06-10 DIAGNOSIS — M54.16 LUMBAR RADICULOPATHY, CHRONIC: Primary | ICD-10-CM

## 2025-06-10 RX ORDER — AMITRIPTYLINE HYDROCHLORIDE 25 MG/1
25 TABLET, FILM COATED ORAL NIGHTLY
Qty: 30 TABLET | Refills: 0 | Status: SHIPPED | OUTPATIENT
Start: 2025-06-10 | End: 2025-07-10

## 2025-06-23 RX ORDER — ROSUVASTATIN CALCIUM 10 MG/1
10 TABLET, COATED ORAL DAILY
Qty: 90 TABLET | Refills: 0 | Status: SHIPPED | OUTPATIENT
Start: 2025-06-23

## 2025-06-23 NOTE — TELEPHONE ENCOUNTER
No care due was identified.  [facility] Embedded Care Due Messages. Reference number: [id number].   6/23/2025 9:29:16 AM CDT

## 2025-06-23 NOTE — TELEPHONE ENCOUNTER
Refill Decision Note   Adan Webb  is requesting a refill authorization.  Brief Assessment and Rationale for Refill:  Approve     Medication Therapy Plan:        Comments:     Note composed:10:19 AM 06/23/2025

## 2025-07-02 ENCOUNTER — TELEPHONE (OUTPATIENT)
Dept: PAIN MEDICINE | Facility: CLINIC | Age: 75
End: 2025-07-02
Payer: MEDICARE

## 2025-07-02 ENCOUNTER — PATIENT MESSAGE (OUTPATIENT)
Dept: PAIN MEDICINE | Facility: CLINIC | Age: 75
End: 2025-07-02
Payer: MEDICARE

## 2025-07-02 DIAGNOSIS — M54.16 LUMBAR RADICULOPATHY, CHRONIC: Primary | ICD-10-CM

## 2025-07-02 DIAGNOSIS — M48.062 SPINAL STENOSIS OF LUMBAR REGION WITH NEUROGENIC CLAUDICATION: ICD-10-CM

## 2025-07-02 RX ORDER — TRAMADOL HYDROCHLORIDE 50 MG/1
50 TABLET, FILM COATED ORAL EVERY 8 HOURS PRN
Qty: 21 TABLET | Refills: 0 | Status: SHIPPED | OUTPATIENT
Start: 2025-07-02 | End: 2025-07-09

## 2025-07-02 NOTE — PROGRESS NOTES
Patient continues to have significant back pain despite conservative treatment.  I will place orders for a lumbar AKHIL.  Short term rx for tramadol 50 mg q8h prn x7 days.     Sandy Melendrez,    Interventional Pain Management

## 2025-07-03 ENCOUNTER — OFFICE VISIT (OUTPATIENT)
Dept: PODIATRY | Facility: CLINIC | Age: 75
End: 2025-07-03
Payer: MEDICARE

## 2025-07-03 VITALS
BODY MASS INDEX: 41.75 KG/M2 | WEIGHT: 315 LBS | HEIGHT: 73 IN | DIASTOLIC BLOOD PRESSURE: 70 MMHG | HEART RATE: 60 BPM | SYSTOLIC BLOOD PRESSURE: 128 MMHG

## 2025-07-03 DIAGNOSIS — M25.572 SINUS TARSI SYNDROME OF LEFT FOOT: ICD-10-CM

## 2025-07-03 DIAGNOSIS — G60.9 IDIOPATHIC PERIPHERAL NEUROPATHY: ICD-10-CM

## 2025-07-03 DIAGNOSIS — G89.29 CHRONIC PAIN OF LEFT ANKLE: Primary | ICD-10-CM

## 2025-07-03 DIAGNOSIS — B35.1 TINEA UNGUIUM: ICD-10-CM

## 2025-07-03 DIAGNOSIS — M19.072 ARTHRITIS OF LEFT ANKLE: ICD-10-CM

## 2025-07-03 DIAGNOSIS — G89.29 CHRONIC PAIN IN LEFT FOOT: ICD-10-CM

## 2025-07-03 DIAGNOSIS — M79.672 CHRONIC PAIN IN LEFT FOOT: ICD-10-CM

## 2025-07-03 DIAGNOSIS — M25.572 CHRONIC PAIN OF LEFT ANKLE: Primary | ICD-10-CM

## 2025-07-03 PROCEDURE — 99999 PR PBB SHADOW E&M-EST. PATIENT-LVL IV: CPT | Mod: PBBFAC,HCNC,, | Performed by: STUDENT IN AN ORGANIZED HEALTH CARE EDUCATION/TRAINING PROGRAM

## 2025-07-03 RX ORDER — DEXAMETHASONE 4 MG/1
4 TABLET ORAL DAILY
Qty: 7 TABLET | Refills: 0 | Status: SHIPPED | OUTPATIENT
Start: 2025-07-03

## 2025-07-03 NOTE — PROCEDURES
"Routine Foot Care    Date/Time: 7/3/2025 3:30 PM    Performed by: Jaden Villeda DPM  Authorized by: Jaden Villeda DPM    Time out: Immediately prior to procedure a "time out" was called to verify the correct patient, procedure, equipment, support staff and site/side marked as required.    Consent Done?:  Yes (Verbal)  Hyperkeratotic Skin Lesions?: No      Nail Care Type:  Debride  Location(s): All  (Left 1st Toe, Left 3rd Toe, Left 2nd Toe, Left 4th Toe, Left 5th Toe, Right 1st Toe, Right 2nd Toe, Right 3rd Toe, Right 4th Toe and Right 5th Toe)  Patient tolerance:  Patient tolerated the procedure well with no immediate complications    "

## 2025-07-03 NOTE — PROGRESS NOTES
Subjective:     Patient    Adan Webb is a 75 y.o. male.    Problem    09/27/23: Presents for thick discolored nails causing pain, especially at 1st toes. Previously tried antifungals which did not help. Also has had left STJ/sinus tarsi pain for years, has had 2x steroid shots at the sinus tarsi which helped for a period of weeks to months each. Has had custom inserts in his shoes for a couple of years and recently had them adjusted, they help. Does not take medication for arthritis. Has attempted Voltaren without improvement.      01/19/24: Returns for thick discolored toenails causing pain, still primarily at 1st toes. Also with recurrent left STJ/sinus tarsi pain and now with pain at left lateral Kager's triangle; also with similar right sided pain but this does not bother him as much. Also with left forefoot pain and swelling for the past month or so, does not recall an injury, the pain has improved but the swelling is lingering.     02/20/24: Returns for followup of left metatarsal fractures; first seen on imaging 1 month ago although at that time they were already subacute with signs of healing. Has been walking normally, standard footwear, has been avoiding exercises. Also still with ongoing chronic left ankle pain. Did not respond well to last attempt at steroid injection.     08/09/24: Returns for routine foot care, nails are long and painful, worst at left 1st toenail. Also with newer onset right lateral heel pain since walking with family a lot recently. Pain not well controlled on duloxetine. Reports poor sleep.      08/22/24: Returns for left forefoot injury related to a fall in which the toes were bent back and he sat on them. Now with bruising, swelling, pain throughout left forefoot. Right heel pain improved with prior injection. Still having poorly controlled left ankle pain, has not started the amitriptyline.     09/19/24: Returns for followup of left foot fracture and chronic pain of  both ankles. Also requesting routine foot care. Admits to numbness, tingling, burning in feet.     10/22/24: Returns for chronic bilateral ankle pain, was prescribed pregabalin at last visit but did not start it. Chronic pain in both ankles-rearfoot is the same as before, slightly worse on left. He is hesitant to take more nerve medications but may fill the pregabalin and try it.     07/03/25: Returns for routine foot care. Also with flareup of radiculopathic nerve pain into lower legs and feet. Has an epidural injection planned for a few weeks from now. Had pregabalin decreased and never started amitriptyline. Hesitant to try other nerve meds.     History    History obtained from patient and review of medical records.     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, didn't use ear protection    Dermatitis 12/09/2020    nystat groin    DJD (degenerative joint disease) of hip 01/29/2015    Enlarged liver 04/08/2021    US 4/21     Hemispheric carotid artery syndrome 06/06/2018    MCA , see MRI    Hiatal hernia 04/01/2021    CT chest tiny 2018    HTN (hypertension), benign 08/20/2020    Morbid obesity with BMI of 40.0-44.9, adult 01/29/2015    OAB (overactive bladder) 04/01/2021    Rash with pads    Right-sided low back pain with right-sided sciatica 03/06/2019    Seasonal allergic rhinitis due to pollen 04/13/2016    Transient cerebral ischemia 06/06/2018       Past Surgical History:   Procedure Laterality Date    A-V  CARDIAC PACEMAKER INSERTION N/A 08/05/2021    Procedure: INSERTION, CARDIAC PACEMAKER, DUAL CHAMBER;  Surgeon: Sheldon Miranda MD;  Location: [facility];  Service: Cardiology;  Laterality: N/A;  Near syncope,SB,Sinus Pause, RBBB,LAFB, Dual PPM, BIO, MAC, [place], 3 Prep    ADENOIDECTOMY      CATARACT EXTRACTION      CYSTOSCOPY N/A 7/26/2023    Procedure: CYSTOSCOPY;  Surgeon: Jamal Zamora MD;  Location: [facility] OR [address] [place] [address];  Service: Urology;  Laterality: N/A;    CYSTOSCOPY WITH TRANSURETHRAL DESTRUCTION OF PROSTATE,RFA N/A 5/22/2024    Procedure: CYSTOSCOPY WITH TRANSURETHRAL DESTRUCTION OF PROSTATE,RFA;  Surgeon: Jamal Zamora MD;  Location: [facility] OR [address] [place] [address];  Service: Urology;  Laterality: N/A;    CYSTOSCOPY WITH URODYNAMIC TESTING N/A 10/25/2021    Procedure: CYSTOSCOPY, WITH URODYNAMIC TESTING FLOUROSCOPIC;  Surgeon: Sancho Wesley MD;  Location: [address];  Service: Urology;  Laterality: N/A;  1hr    CYSTOSCOPY,WITH BOTULINUM TOXIN INJECTION N/A 5/22/2024    Procedure: CYSTOSCOPY,WITH BOTULINUM TOXIN INJECTION;  Surgeon: Jamal Zamora MD;  Location: [address];  Service: Urology;  Laterality: N/A;  100 units    HERNIA REPAIR      INJECTION OF BOTULINUM TOXIN TYPE A N/A 7/26/2023    Procedure: INJECTION, BOTULINUM TOXIN, TYPE A;  Surgeon: Jamal Zamora MD;  Location: [address];  Service: Urology;  Laterality: N/A;  100 units    TONSILLECTOMY      YAG LAser Capsulotomy Bilateral     2/18/2019 OS Dr. Cornelius        Objective:     Vitals  Wt Readings from Last 1 Encounters:   07/03/25 (!) 150.6 kg (332 lb 0.2 oz)     Temp Readings from Last 1 Encounters:   09/09/24 97.9 °F (36.6 °C) (Oral)     BP Readings from Last 1 Encounters:   07/03/25 128/70     Pulse Readings from Last 1 Encounters:   07/03/25 60       Dermatological Exam    Skin:  Pedal hair growth diminished and Pedal skin thin and shiny on right  Pedal hair growth diminished and Pedal skin thin and shiny on left    Nails:  10  nail(s) elongated, 10 nail(s) thickened, and 10 nail(s) discolored; tender throughout, worst at left 1st toenail    Vascular Exam    Arteries:  Posterior tibial artery palpable on right  Dorsalis pedis artery palpable on right  Posterior tibial artery palpable on left  Dorsalis pedis artery palpable on left    Veins:  Superficial veins unremarkable on right  Superficial veins unremarkable on left    Swellin+ pitting on right  1+ pitting on left     Neurological Exam    [place] touch test:  6/6 sites sensed, light touch intact     Other:  + provocation test sural nerve lateral heel right    Musculoskeletal Exam    Footwear:  Casual on right  Casual on left    Gait Exam:   Ambulatory Status: Ambulatory  Gait: Normal  Assistive Devices: None    Foot Progression Angle:  Normal on right  Normal on left     Right Lower Extremity Additional Findings:  Mild pain on palpation lateral STJ, sinus tarsi, Kager triangle  Mild pain on palpation right lateral heel  Right foot and ankle function, strength, and range of motion unremarkable except as noted above.     Left Lower Extremity Additional Findings:  Mild pain on palpation lateral STJ, sinus tarsi, Kager triangle  Mild pain on palpation 2nd and 4th MTPJs  Left foot and ankle function, strength, and range of motion unremarkable except as noted above.    Imaging and Other Tests    Imagin24 B foot and ankle X rays: bilateral STJ severe arthritis vs STJ coalition; left 3rd and 4th metatarsal fractures with signs of healing    24 left foot X rays: interval healing of 3rd and 4th metatarsal fractures, alignment relatively unchanged    24 left foot X rays: prior 3rd and 4th metatarsal fractures appear healed, new nondisplaced fracture of 4th proximal phalanx    Independently reviewed and interpreted imaging, findings are as follows: N/A     Assessment:     Encounter Diagnoses   Name Primary?    Chronic pain of left ankle Yes    Arthritis of left ankle      Sinus tarsi syndrome of left foot     Chronic pain in left foot     Idiopathic peripheral neuropathy     Tinea unguium             Plan:     I counseled the patient on his conditions, their implications and medical management.    Bilateral ankle pain, sinus tarsitis, arthritis, neuropathy: chronic stable  -Previously failed duloxetine. Worried about trying amitriptyline due to fall risk.   -Pregabalin. Pulse dexamethasone.   -Ordered left custom AFO.     Tinea unguium, neuropathy: chronic stable   -Previously discussed treatment options including (1) monitoring, (2) debridement, (3) topical antifungals, (4) oral antifungals. Discussed potential risks, benefits, alternatives to each. Patient opted for debridements only.  -Debrided nails x10, see procedure note.       Return to clinic 1 month after receiving AFO, call sooner PRN.

## 2025-07-09 DIAGNOSIS — M48.062 SPINAL STENOSIS OF LUMBAR REGION WITH NEUROGENIC CLAUDICATION: ICD-10-CM

## 2025-07-09 DIAGNOSIS — M54.16 LUMBAR RADICULOPATHY, CHRONIC: Primary | ICD-10-CM

## 2025-07-09 RX ORDER — TRAMADOL HYDROCHLORIDE 50 MG/1
50 TABLET, FILM COATED ORAL EVERY 8 HOURS PRN
Qty: 21 TABLET | Refills: 0 | Status: SHIPPED | OUTPATIENT
Start: 2025-07-09 | End: 2025-07-16

## 2025-07-16 ENCOUNTER — TELEPHONE (OUTPATIENT)
Dept: PAIN MEDICINE | Facility: CLINIC | Age: 75
End: 2025-07-16
Payer: MEDICARE

## 2025-07-16 DIAGNOSIS — M54.16 LUMBAR RADICULOPATHY, CHRONIC: ICD-10-CM

## 2025-07-16 DIAGNOSIS — M48.062 SPINAL STENOSIS OF LUMBAR REGION WITH NEUROGENIC CLAUDICATION: ICD-10-CM

## 2025-07-16 RX ORDER — TRAMADOL HYDROCHLORIDE 50 MG/1
50 TABLET, FILM COATED ORAL EVERY 8 HOURS PRN
Qty: 21 TABLET | Refills: 0 | Status: SHIPPED | OUTPATIENT
Start: 2025-07-16 | End: 2025-07-23

## 2025-07-16 NOTE — TELEPHONE ENCOUNTER
Copied from CRM #[id number]. Topic: General Inquiry - Patient Advice  >> Jul 16, 2025  2:56 PM Indu wrote:  Type: Patient Call Back    Who called: Patient     What is the request in detail: Pt is requesting a call back to discuss if he needs to stop taking the RX traMADoL (ULTRAM) 50 mg tablet once he is out or if he shall continue on this medication then he will need a refill. Pt has been taking three times a day. Please advise     Would the patient rather a call back or a response via [facility]?  call    Best call back number: [phone]     Additional Information:

## 2025-07-18 ENCOUNTER — TELEPHONE (OUTPATIENT)
Dept: PAIN MEDICINE | Facility: HOSPITAL | Age: 75
End: 2025-07-18
Payer: MEDICARE

## 2025-07-22 ENCOUNTER — TELEPHONE (OUTPATIENT)
Dept: PAIN MEDICINE | Facility: CLINIC | Age: 75
End: 2025-07-22
Payer: MEDICARE

## 2025-07-22 NOTE — TELEPHONE ENCOUNTER
Pt has been advised of arrival time for tomorrows procedure with Dr. Melendrez.  Pt has been instructed to arrive at registration for 8am.  Pt verbalized comprehension.  HC

## 2025-07-23 ENCOUNTER — CLINICAL SUPPORT (OUTPATIENT)
Dept: CARDIOLOGY | Facility: HOSPITAL | Age: 75
End: 2025-07-23
Payer: MEDICARE

## 2025-07-23 ENCOUNTER — HOSPITAL ENCOUNTER (OUTPATIENT)
Facility: HOSPITAL | Age: 75
Discharge: HOME | End: 2025-07-23
Attending: STUDENT IN AN ORGANIZED HEALTH CARE EDUCATION/TRAINING PROGRAM | Admitting: STUDENT IN AN ORGANIZED HEALTH CARE EDUCATION/TRAINING PROGRAM
Payer: MEDICARE

## 2025-07-23 ENCOUNTER — CLINICAL SUPPORT (OUTPATIENT)
Dept: CARDIOLOGY | Facility: HOSPITAL | Age: 75
End: 2025-07-23
Attending: INTERNAL MEDICINE
Payer: MEDICARE

## 2025-07-23 VITALS
BODY MASS INDEX: 41.75 KG/M2 | TEMPERATURE: 99 F | OXYGEN SATURATION: 94 % | SYSTOLIC BLOOD PRESSURE: 140 MMHG | WEIGHT: 315 LBS | HEIGHT: 73 IN | DIASTOLIC BLOOD PRESSURE: 62 MMHG | HEART RATE: 64 BPM

## 2025-07-23 DIAGNOSIS — I45.2 BIFASCICULAR BLOCK: ICD-10-CM

## 2025-07-23 DIAGNOSIS — J30.9 ALLERGIC CONJUNCTIVITIS AND RHINITIS, UNSPECIFIED LATERALITY: ICD-10-CM

## 2025-07-23 DIAGNOSIS — H10.10 ALLERGIC CONJUNCTIVITIS AND RHINITIS, UNSPECIFIED LATERALITY: ICD-10-CM

## 2025-07-23 DIAGNOSIS — G89.29 CHRONIC PAIN: ICD-10-CM

## 2025-07-23 DIAGNOSIS — M54.16 LUMBAR RADICULOPATHY: Primary | ICD-10-CM

## 2025-07-23 PROCEDURE — 93294 REM INTERROG EVL PM/LDLS PM: CPT | Mod: HCNC,,, | Performed by: INTERNAL MEDICINE

## 2025-07-23 PROCEDURE — 93296 REM INTERROG EVL PM/IDS: CPT | Mod: HCNC | Performed by: INTERNAL MEDICINE

## 2025-07-23 RX ORDER — SODIUM CHLORIDE 9 MG/ML
INJECTION, SOLUTION INTRAVENOUS CONTINUOUS
Status: DISCONTINUED | OUTPATIENT
Start: 2025-07-23 | End: 2025-07-23 | Stop reason: HOSPADM

## 2025-07-23 NOTE — PLAN OF CARE
Pt in preop bay 24, VSS, meds given and IV inserted. Pt denies any open wounds on body or the use of any immunizations or antibiotics in the past 2 weeks.  ready to roll.

## 2025-07-23 NOTE — H&P
Patient has decided not to proceed with today's injection.  Procedure cancelled.     Sandy Melendrez,    Interventional Pain Management

## 2025-07-23 NOTE — TELEPHONE ENCOUNTER
No care due was identified.  [facility] Embedded Care Due Messages. Reference number: [id number].   7/23/2025 3:16:51 PM CDT

## 2025-07-24 RX ORDER — FLUTICASONE PROPIONATE 50 MCG
2 SPRAY, SUSPENSION (ML) NASAL DAILY PRN
Qty: 15.8 ML | Refills: 2 | Status: SHIPPED | OUTPATIENT
Start: 2025-07-24

## 2025-07-28 LAB
OHS CV AF BURDEN PERCENT: < 1
OHS CV DC REMOTE DEVICE TYPE: NORMAL
OHS CV RV PACING PERCENT: 2 %

## (undated) DEVICE — DRESSING AQUACEL AG ADV 3.5X6

## (undated) DEVICE — PACK CYSTOSCOPY III SIRUS

## (undated) DEVICE — SLING SWATHE UNIVERSAL FOAM

## (undated) DEVICE — LIDOCAINE HCI JLLY2%5ML UROJET

## (undated) DEVICE — SYR 10CC LUER LOCK

## (undated) DEVICE — SYR LUER LOCK 1CC

## (undated) DEVICE — SYS DELIVERY REZUM

## (undated) DEVICE — ELECTRODE REM PLYHSV RETURN 9

## (undated) DEVICE — NDL SPINAL SPINOCAN 22GX3.5

## (undated) DEVICE — UNDERGLOVES BIOGEL PI SIZE 7.5

## (undated) DEVICE — PAD DEFIB CADENCE ADULT R2

## (undated) DEVICE — SOL NACL IRR 3000ML

## (undated) DEVICE — SYR SALINE FLSH PRFL STRL 10ML

## (undated) DEVICE — NDL BLUNT TIP 16GX1/2

## (undated) DEVICE — NDL WILLIAMS CYSTOSCOPIC

## (undated) DEVICE — TRAY CYSTO BASIN OMC

## (undated) DEVICE — SHEATH SAFESHEATH II ULTRA 6FR

## (undated) DEVICE — ADHESIVE DERMABOND ADVANCED

## (undated) DEVICE — Device

## (undated) DEVICE — DRAPE INCISE IOBAN 2 23X17IN

## (undated) DEVICE — ADAPTER HOSE 10FT 8MM

## (undated) DEVICE — BAG URINARY DRAINAGE 2000ML

## (undated) DEVICE — PACK PACER PERMANENT

## (undated) DEVICE — SYR SLIP TIP 1CC

## (undated) DEVICE — HOLDER CATH IAB ADH STATLOCK